# Patient Record
Sex: MALE | Race: WHITE | NOT HISPANIC OR LATINO | Employment: FULL TIME | URBAN - METROPOLITAN AREA
[De-identification: names, ages, dates, MRNs, and addresses within clinical notes are randomized per-mention and may not be internally consistent; named-entity substitution may affect disease eponyms.]

---

## 2017-11-09 ENCOUNTER — HOSPITAL ENCOUNTER (EMERGENCY)
Facility: OTHER | Age: 57
Discharge: HOME | End: 2017-11-09
Attending: INTERNAL MEDICINE
Payer: COMMERCIAL

## 2017-11-09 VITALS
SYSTOLIC BLOOD PRESSURE: 169 MMHG | WEIGHT: 187 LBS | RESPIRATION RATE: 18 BRPM | TEMPERATURE: 97 F | DIASTOLIC BLOOD PRESSURE: 97 MMHG | HEART RATE: 78 BPM | OXYGEN SATURATION: 99 % | BODY MASS INDEX: 30.05 KG/M2 | HEIGHT: 66 IN

## 2017-11-09 DIAGNOSIS — H61.20 IMPACTED CERUMEN, UNSPECIFIED LATERALITY: Primary | ICD-10-CM

## 2017-11-09 PROCEDURE — 99282 EMERGENCY DEPT VISIT SF MDM: CPT
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
## 2022-09-30 ENCOUNTER — LAB VISIT (OUTPATIENT)
Dept: LAB | Facility: HOSPITAL | Age: 62
End: 2022-09-30
Attending: NURSE PRACTITIONER
Payer: COMMERCIAL

## 2022-09-30 DIAGNOSIS — I10 HYPERTENSION, UNSPECIFIED TYPE: ICD-10-CM

## 2022-09-30 LAB
ALBUMIN SERPL BCP-MCNC: 3.1 G/DL (ref 3.5–5.2)
ALP SERPL-CCNC: 155 U/L (ref 55–135)
ALT SERPL W/O P-5'-P-CCNC: 12 U/L (ref 10–44)
ANION GAP SERPL CALC-SCNC: 8 MMOL/L (ref 8–16)
AST SERPL-CCNC: 21 U/L (ref 10–40)
BASOPHILS # BLD AUTO: 0.06 K/UL (ref 0–0.2)
BASOPHILS NFR BLD: 0.5 % (ref 0–1.9)
BILIRUB SERPL-MCNC: 0.4 MG/DL (ref 0.1–1)
BUN SERPL-MCNC: 10 MG/DL (ref 8–23)
CALCIUM SERPL-MCNC: 10 MG/DL (ref 8.7–10.5)
CHLORIDE SERPL-SCNC: 97 MMOL/L (ref 95–110)
CHOLEST SERPL-MCNC: 179 MG/DL (ref 120–199)
CHOLEST/HDLC SERPL: 2.8 {RATIO} (ref 2–5)
CO2 SERPL-SCNC: 26 MMOL/L (ref 23–29)
CREAT SERPL-MCNC: 1 MG/DL (ref 0.5–1.4)
DIFFERENTIAL METHOD: ABNORMAL
EOSINOPHIL # BLD AUTO: 0 K/UL (ref 0–0.5)
EOSINOPHIL NFR BLD: 0.3 % (ref 0–8)
ERYTHROCYTE [DISTWIDTH] IN BLOOD BY AUTOMATED COUNT: 12.5 % (ref 11.5–14.5)
EST. GFR  (NO RACE VARIABLE): >60 ML/MIN/1.73 M^2
GLUCOSE SERPL-MCNC: 102 MG/DL (ref 70–110)
HCT VFR BLD AUTO: 41.7 % (ref 40–54)
HDLC SERPL-MCNC: 65 MG/DL (ref 40–75)
HDLC SERPL: 36.3 % (ref 20–50)
HGB BLD-MCNC: 13.9 G/DL (ref 14–18)
IMM GRANULOCYTES # BLD AUTO: 0.06 K/UL (ref 0–0.04)
IMM GRANULOCYTES NFR BLD AUTO: 0.5 % (ref 0–0.5)
LDLC SERPL CALC-MCNC: 90.2 MG/DL (ref 63–159)
LYMPHOCYTES # BLD AUTO: 1.7 K/UL (ref 1–4.8)
LYMPHOCYTES NFR BLD: 14.4 % (ref 18–48)
MCH RBC QN AUTO: 34 PG (ref 27–31)
MCHC RBC AUTO-ENTMCNC: 33.3 G/DL (ref 32–36)
MCV RBC AUTO: 102 FL (ref 82–98)
MONOCYTES # BLD AUTO: 0.9 K/UL (ref 0.3–1)
MONOCYTES NFR BLD: 7.7 % (ref 4–15)
NEUTROPHILS # BLD AUTO: 9.2 K/UL (ref 1.8–7.7)
NEUTROPHILS NFR BLD: 76.6 % (ref 38–73)
NONHDLC SERPL-MCNC: 114 MG/DL
NRBC BLD-RTO: 0 /100 WBC
PLATELET # BLD AUTO: 436 K/UL (ref 150–450)
PMV BLD AUTO: 10.3 FL (ref 9.2–12.9)
POTASSIUM SERPL-SCNC: 4.6 MMOL/L (ref 3.5–5.1)
PROT SERPL-MCNC: 7.1 G/DL (ref 6–8.4)
RBC # BLD AUTO: 4.09 M/UL (ref 4.6–6.2)
SODIUM SERPL-SCNC: 131 MMOL/L (ref 136–145)
TRIGL SERPL-MCNC: 119 MG/DL (ref 30–150)
TSH SERPL DL<=0.005 MIU/L-ACNC: 1.77 UIU/ML (ref 0.4–4)
WBC # BLD AUTO: 12.04 K/UL (ref 3.9–12.7)

## 2022-09-30 PROCEDURE — 83036 HEMOGLOBIN GLYCOSYLATED A1C: CPT | Performed by: NURSE PRACTITIONER

## 2022-09-30 PROCEDURE — 84443 ASSAY THYROID STIM HORMONE: CPT | Performed by: NURSE PRACTITIONER

## 2022-09-30 PROCEDURE — 87389 HIV-1 AG W/HIV-1&-2 AB AG IA: CPT | Performed by: NURSE PRACTITIONER

## 2022-09-30 PROCEDURE — 86803 HEPATITIS C AB TEST: CPT | Performed by: NURSE PRACTITIONER

## 2022-09-30 PROCEDURE — 80061 LIPID PANEL: CPT | Performed by: NURSE PRACTITIONER

## 2022-09-30 PROCEDURE — 80053 COMPREHEN METABOLIC PANEL: CPT | Performed by: NURSE PRACTITIONER

## 2022-09-30 PROCEDURE — 85025 COMPLETE CBC W/AUTO DIFF WBC: CPT | Performed by: NURSE PRACTITIONER

## 2022-10-01 LAB
ESTIMATED AVG GLUCOSE: 85 MG/DL (ref 68–131)
HBA1C MFR BLD: 4.6 % (ref 4–5.6)
HCV AB SERPL QL IA: NORMAL
HIV 1+2 AB+HIV1 P24 AG SERPL QL IA: NORMAL

## 2022-10-07 ENCOUNTER — HOSPITAL ENCOUNTER (OUTPATIENT)
Dept: RADIOLOGY | Facility: HOSPITAL | Age: 62
Discharge: HOME | End: 2022-10-07
Attending: NURSE PRACTITIONER
Payer: MEDICAID

## 2022-10-07 DIAGNOSIS — M54.2 NECK PAIN ON RIGHT SIDE: ICD-10-CM

## 2022-10-07 PROCEDURE — 72040 XR CERVICAL SPINE AP LATERAL: ICD-10-PCS | Mod: 26,,, | Performed by: RADIOLOGY

## 2022-10-07 PROCEDURE — 72040 X-RAY EXAM NECK SPINE 2-3 VW: CPT | Mod: 26,,, | Performed by: RADIOLOGY

## 2022-10-07 PROCEDURE — 72040 X-RAY EXAM NECK SPINE 2-3 VW: CPT | Mod: TC,FY

## 2022-10-10 ENCOUNTER — HOSPITAL ENCOUNTER (INPATIENT)
Facility: HOSPITAL | Age: 62
LOS: 11 days | Discharge: NURSING, REHAB OR LONG-TERM CARE | DRG: 471 | End: 2022-10-21
Attending: EMERGENCY MEDICINE | Admitting: PSYCHIATRY & NEUROLOGY
Payer: MEDICAID

## 2022-10-10 DIAGNOSIS — I10 ESSENTIAL HYPERTENSION: ICD-10-CM

## 2022-10-10 DIAGNOSIS — R20.2 PARESTHESIAS: ICD-10-CM

## 2022-10-10 DIAGNOSIS — R29.898 WEAKNESS OF LOWER EXTREMITY, UNSPECIFIED LATERALITY: ICD-10-CM

## 2022-10-10 DIAGNOSIS — S12.101A CLOSED NONDISPLACED FRACTURE OF SECOND CERVICAL VERTEBRA, UNSPECIFIED FRACTURE MORPHOLOGY, INITIAL ENCOUNTER: Primary | ICD-10-CM

## 2022-10-10 DIAGNOSIS — R53.1 WEAKNESS: ICD-10-CM

## 2022-10-10 DIAGNOSIS — S12.100A CLOSED DISPLACED FRACTURE OF SECOND CERVICAL VERTEBRA, UNSPECIFIED FRACTURE MORPHOLOGY, INITIAL ENCOUNTER: ICD-10-CM

## 2022-10-10 DIAGNOSIS — R00.0 TACHYCARDIA: ICD-10-CM

## 2022-10-10 DIAGNOSIS — S12.110A ODONTOID FRACTURE: ICD-10-CM

## 2022-10-10 PROCEDURE — 12000002 HC ACUTE/MED SURGE SEMI-PRIVATE ROOM

## 2022-10-10 RX ORDER — HYDROMORPHONE HYDROCHLORIDE 1 MG/ML
0.5 INJECTION, SOLUTION INTRAMUSCULAR; INTRAVENOUS; SUBCUTANEOUS
Status: COMPLETED | OUTPATIENT
Start: 2022-10-11 | End: 2022-10-11

## 2022-10-11 ENCOUNTER — ANESTHESIA EVENT (OUTPATIENT)
Dept: SURGERY | Facility: HOSPITAL | Age: 62
DRG: 471 | End: 2022-10-11
Payer: MEDICAID

## 2022-10-11 PROBLEM — S12.110A ODONTOID FRACTURE: Status: ACTIVE | Noted: 2022-10-11

## 2022-10-11 LAB
ABO + RH BLD: NORMAL
ALBUMIN SERPL BCP-MCNC: 2.4 G/DL (ref 3.5–5.2)
ALP SERPL-CCNC: 99 U/L (ref 55–135)
ALT SERPL W/O P-5'-P-CCNC: 9 U/L (ref 10–44)
ANION GAP SERPL CALC-SCNC: 6 MMOL/L (ref 8–16)
APTT BLDCRRT: 26.3 SEC (ref 21–32)
APTT BLDCRRT: 26.5 SEC (ref 21–32)
ASCENDING AORTA: 3.43 CM
AST SERPL-CCNC: 17 U/L (ref 10–40)
AV INDEX (PROSTH): 1.11
AV MEAN GRADIENT: 5 MMHG
AV PEAK GRADIENT: 7 MMHG
AV VALVE AREA: 3.86 CM2
AV VELOCITY RATIO: 0.89
BASOPHILS # BLD AUTO: 0.05 K/UL (ref 0–0.2)
BASOPHILS NFR BLD: 0.5 % (ref 0–1.9)
BILIRUB SERPL-MCNC: 0.4 MG/DL (ref 0.1–1)
BLD GP AB SCN CELLS X3 SERPL QL: NORMAL
BSA FOR ECHO PROCEDURE: 1.77 M2
BUN SERPL-MCNC: 10 MG/DL (ref 8–23)
CALCIUM SERPL-MCNC: 9.3 MG/DL (ref 8.7–10.5)
CHLORIDE SERPL-SCNC: 103 MMOL/L (ref 95–110)
CO2 SERPL-SCNC: 25 MMOL/L (ref 23–29)
CREAT SERPL-MCNC: 0.8 MG/DL (ref 0.5–1.4)
CV ECHO LV RWT: 0.64 CM
DIFFERENTIAL METHOD: ABNORMAL
DOP CALC AO PEAK VEL: 1.31 M/S
DOP CALC AO VTI: 20.31 CM
DOP CALC LVOT AREA: 3.5 CM2
DOP CALC LVOT DIAMETER: 2.11 CM
DOP CALC LVOT PEAK VEL: 1.17 M/S
DOP CALC LVOT STROKE VOLUME: 78.46 CM3
DOP CALCLVOT PEAK VEL VTI: 22.45 CM
ECHO LV POSTERIOR WALL: 1.23 CM (ref 0.6–1.1)
EJECTION FRACTION: 60 %
EOSINOPHIL # BLD AUTO: 0.2 K/UL (ref 0–0.5)
EOSINOPHIL NFR BLD: 1.6 % (ref 0–8)
ERYTHROCYTE [DISTWIDTH] IN BLOOD BY AUTOMATED COUNT: 12.6 % (ref 11.5–14.5)
EST. GFR  (NO RACE VARIABLE): >60 ML/MIN/1.73 M^2
ESTIMATED AVG GLUCOSE: 91 MG/DL (ref 68–131)
FRACTIONAL SHORTENING: 25 % (ref 28–44)
GLUCOSE SERPL-MCNC: 76 MG/DL (ref 70–110)
HBA1C MFR BLD: 4.8 % (ref 4–5.6)
HCT VFR BLD AUTO: 36.6 % (ref 40–54)
HGB BLD-MCNC: 12.2 G/DL (ref 14–18)
IMM GRANULOCYTES # BLD AUTO: 0.03 K/UL (ref 0–0.04)
IMM GRANULOCYTES NFR BLD AUTO: 0.3 % (ref 0–0.5)
INR PPP: 1 (ref 0.8–1.2)
INR PPP: 1 (ref 0.8–1.2)
INTERVENTRICULAR SEPTUM: 1.42 CM (ref 0.6–1.1)
LA MAJOR: 5.81 CM
LA MINOR: 4.73 CM
LA WIDTH: 4.34 CM
LEFT ATRIUM SIZE: 3.05 CM
LEFT ATRIUM VOLUME INDEX: 33.3 ML/M2
LEFT ATRIUM VOLUME: 58.67 CM3
LEFT INTERNAL DIMENSION IN SYSTOLE: 2.87 CM (ref 2.1–4)
LEFT VENTRICLE DIASTOLIC VOLUME INDEX: 36.05 ML/M2
LEFT VENTRICLE DIASTOLIC VOLUME: 63.44 ML
LEFT VENTRICLE MASS INDEX: 103 G/M2
LEFT VENTRICLE SYSTOLIC VOLUME INDEX: 17.8 ML/M2
LEFT VENTRICLE SYSTOLIC VOLUME: 31.41 ML
LEFT VENTRICULAR INTERNAL DIMENSION IN DIASTOLE: 3.84 CM (ref 3.5–6)
LEFT VENTRICULAR MASS: 180.92 G
LYMPHOCYTES # BLD AUTO: 2 K/UL (ref 1–4.8)
LYMPHOCYTES NFR BLD: 21.2 % (ref 18–48)
MAGNESIUM SERPL-MCNC: 1.6 MG/DL (ref 1.6–2.6)
MCH RBC QN AUTO: 34.9 PG (ref 27–31)
MCHC RBC AUTO-ENTMCNC: 33.3 G/DL (ref 32–36)
MCV RBC AUTO: 105 FL (ref 82–98)
MONOCYTES # BLD AUTO: 0.8 K/UL (ref 0.3–1)
MONOCYTES NFR BLD: 8.7 % (ref 4–15)
NEUTROPHILS # BLD AUTO: 6.2 K/UL (ref 1.8–7.7)
NEUTROPHILS NFR BLD: 67.7 % (ref 38–73)
NRBC BLD-RTO: 0 /100 WBC
PHOSPHATE SERPL-MCNC: 2.7 MG/DL (ref 2.7–4.5)
PLATELET # BLD AUTO: 276 K/UL (ref 150–450)
PMV BLD AUTO: 10.5 FL (ref 9.2–12.9)
POCT GLUCOSE: 93 MG/DL (ref 70–110)
POTASSIUM SERPL-SCNC: 3.2 MMOL/L (ref 3.5–5.1)
PROT SERPL-MCNC: 5.5 G/DL (ref 6–8.4)
PROTHROMBIN TIME: 10.5 SEC (ref 9–12.5)
PROTHROMBIN TIME: 10.8 SEC (ref 9–12.5)
RA MAJOR: 4.96 CM
RA WIDTH: 3.26 CM
RBC # BLD AUTO: 3.5 M/UL (ref 4.6–6.2)
RIGHT VENTRICULAR END-DIASTOLIC DIMENSION: 2.75 CM
SARS-COV-2 RDRP RESP QL NAA+PROBE: NEGATIVE
SINUS: 2.96 CM
SODIUM SERPL-SCNC: 134 MMOL/L (ref 136–145)
STJ: 2.83 CM
TDI LATERAL: 0.06 M/S
TDI SEPTAL: 0.05 M/S
TDI: 0.06 M/S
TRICUSPID ANNULAR PLANE SYSTOLIC EXCURSION: 1.64 CM
TSH SERPL DL<=0.005 MIU/L-ACNC: 1.25 UIU/ML (ref 0.4–4)
WBC # BLD AUTO: 9.23 K/UL (ref 3.9–12.7)

## 2022-10-11 PROCEDURE — 85025 COMPLETE CBC W/AUTO DIFF WBC: CPT | Performed by: EMERGENCY MEDICINE

## 2022-10-11 PROCEDURE — 85610 PROTHROMBIN TIME: CPT | Mod: 91 | Performed by: PHYSICIAN ASSISTANT

## 2022-10-11 PROCEDURE — 93010 ELECTROCARDIOGRAM REPORT: CPT | Mod: ,,, | Performed by: INTERNAL MEDICINE

## 2022-10-11 PROCEDURE — 63600175 PHARM REV CODE 636 W HCPCS

## 2022-10-11 PROCEDURE — 85610 PROTHROMBIN TIME: CPT | Performed by: EMERGENCY MEDICINE

## 2022-10-11 PROCEDURE — 99291 CRITICAL CARE FIRST HOUR: CPT | Mod: ,,, | Performed by: INTERNAL MEDICINE

## 2022-10-11 PROCEDURE — 80053 COMPREHEN METABOLIC PANEL: CPT | Performed by: EMERGENCY MEDICINE

## 2022-10-11 PROCEDURE — 85730 THROMBOPLASTIN TIME PARTIAL: CPT | Performed by: EMERGENCY MEDICINE

## 2022-10-11 PROCEDURE — 25000003 PHARM REV CODE 250

## 2022-10-11 PROCEDURE — 83036 HEMOGLOBIN GLYCOSYLATED A1C: CPT | Performed by: PHYSICIAN ASSISTANT

## 2022-10-11 PROCEDURE — 96374 THER/PROPH/DIAG INJ IV PUSH: CPT

## 2022-10-11 PROCEDURE — 99285 EMERGENCY DEPT VISIT HI MDM: CPT | Mod: 25

## 2022-10-11 PROCEDURE — 99291 PR CRITICAL CARE, E/M 30-74 MINUTES: ICD-10-PCS | Mod: ,,, | Performed by: INTERNAL MEDICINE

## 2022-10-11 PROCEDURE — S4991 NICOTINE PATCH NONLEGEND: HCPCS

## 2022-10-11 PROCEDURE — 86850 RBC ANTIBODY SCREEN: CPT

## 2022-10-11 PROCEDURE — 84443 ASSAY THYROID STIM HORMONE: CPT | Performed by: PHYSICIAN ASSISTANT

## 2022-10-11 PROCEDURE — 85730 THROMBOPLASTIN TIME PARTIAL: CPT | Mod: 91 | Performed by: PHYSICIAN ASSISTANT

## 2022-10-11 PROCEDURE — 63600175 PHARM REV CODE 636 W HCPCS: Performed by: EMERGENCY MEDICINE

## 2022-10-11 PROCEDURE — 25000003 PHARM REV CODE 250: Performed by: EMERGENCY MEDICINE

## 2022-10-11 PROCEDURE — 25500020 PHARM REV CODE 255: Performed by: EMERGENCY MEDICINE

## 2022-10-11 PROCEDURE — 83735 ASSAY OF MAGNESIUM: CPT | Performed by: PHYSICIAN ASSISTANT

## 2022-10-11 PROCEDURE — 92610 EVALUATE SWALLOWING FUNCTION: CPT

## 2022-10-11 PROCEDURE — 84100 ASSAY OF PHOSPHORUS: CPT | Performed by: PHYSICIAN ASSISTANT

## 2022-10-11 PROCEDURE — U0002 COVID-19 LAB TEST NON-CDC: HCPCS | Performed by: EMERGENCY MEDICINE

## 2022-10-11 PROCEDURE — 25000003 PHARM REV CODE 250: Performed by: PHYSICIAN ASSISTANT

## 2022-10-11 PROCEDURE — 99232 PR SUBSEQUENT HOSPITAL CARE,LEVL II: ICD-10-PCS | Mod: 57,,, | Performed by: STUDENT IN AN ORGANIZED HEALTH CARE EDUCATION/TRAINING PROGRAM

## 2022-10-11 PROCEDURE — 92523 SPEECH SOUND LANG COMPREHEN: CPT

## 2022-10-11 PROCEDURE — 93010 EKG 12-LEAD: ICD-10-PCS | Mod: ,,, | Performed by: INTERNAL MEDICINE

## 2022-10-11 PROCEDURE — 20000000 HC ICU ROOM

## 2022-10-11 PROCEDURE — 93005 ELECTROCARDIOGRAM TRACING: CPT

## 2022-10-11 PROCEDURE — 99232 SBSQ HOSP IP/OBS MODERATE 35: CPT | Mod: 57,,, | Performed by: STUDENT IN AN ORGANIZED HEALTH CARE EDUCATION/TRAINING PROGRAM

## 2022-10-11 PROCEDURE — 94761 N-INVAS EAR/PLS OXIMETRY MLT: CPT

## 2022-10-11 RX ORDER — LISINOPRIL 20 MG/1
20 TABLET ORAL DAILY
Status: CANCELLED | OUTPATIENT
Start: 2022-10-12

## 2022-10-11 RX ORDER — MAGNESIUM SULFATE HEPTAHYDRATE 40 MG/ML
2 INJECTION, SOLUTION INTRAVENOUS
Status: DISCONTINUED | OUTPATIENT
Start: 2022-10-11 | End: 2022-10-11

## 2022-10-11 RX ORDER — HYDRALAZINE HYDROCHLORIDE 20 MG/ML
10 INJECTION INTRAMUSCULAR; INTRAVENOUS EVERY 6 HOURS PRN
Status: CANCELLED | OUTPATIENT
Start: 2022-10-11

## 2022-10-11 RX ORDER — LIDOCAINE HYDROCHLORIDE 10 MG/ML
10 INJECTION INFILTRATION; PERINEURAL ONCE
Status: DISCONTINUED | OUTPATIENT
Start: 2022-10-11 | End: 2022-10-13

## 2022-10-11 RX ORDER — MAGNESIUM SULFATE HEPTAHYDRATE 40 MG/ML
4 INJECTION, SOLUTION INTRAVENOUS
Status: DISCONTINUED | OUTPATIENT
Start: 2022-10-11 | End: 2022-10-11

## 2022-10-11 RX ORDER — MIDAZOLAM HYDROCHLORIDE 1 MG/ML
2 INJECTION INTRAMUSCULAR; INTRAVENOUS ONCE
Status: COMPLETED | OUTPATIENT
Start: 2022-10-11 | End: 2022-10-11

## 2022-10-11 RX ORDER — MIDAZOLAM HYDROCHLORIDE 1 MG/ML
INJECTION INTRAMUSCULAR; INTRAVENOUS
Status: COMPLETED
Start: 2022-10-11 | End: 2022-10-11

## 2022-10-11 RX ORDER — ONDANSETRON 2 MG/ML
4 INJECTION INTRAMUSCULAR; INTRAVENOUS EVERY 8 HOURS PRN
Status: DISCONTINUED | OUTPATIENT
Start: 2022-10-11 | End: 2022-10-21 | Stop reason: HOSPADM

## 2022-10-11 RX ORDER — LISINOPRIL 10 MG/1
10 TABLET ORAL DAILY
Status: DISCONTINUED | OUTPATIENT
Start: 2022-10-12 | End: 2022-10-14

## 2022-10-11 RX ORDER — IBUPROFEN 200 MG
1 TABLET ORAL DAILY
Status: DISCONTINUED | OUTPATIENT
Start: 2022-10-11 | End: 2022-10-21 | Stop reason: HOSPADM

## 2022-10-11 RX ORDER — LABETALOL HCL 20 MG/4 ML
10 SYRINGE (ML) INTRAVENOUS
Status: COMPLETED | OUTPATIENT
Start: 2022-10-11 | End: 2022-10-11

## 2022-10-11 RX ORDER — CALCIUM GLUCONATE 20 MG/ML
3 INJECTION, SOLUTION INTRAVENOUS
Status: DISCONTINUED | OUTPATIENT
Start: 2022-10-11 | End: 2022-10-19

## 2022-10-11 RX ORDER — AMOXICILLIN 250 MG
1 CAPSULE ORAL 2 TIMES DAILY
Status: DISCONTINUED | OUTPATIENT
Start: 2022-10-11 | End: 2022-10-16

## 2022-10-11 RX ORDER — LABETALOL HCL 20 MG/4 ML
10 SYRINGE (ML) INTRAVENOUS EVERY 6 HOURS PRN
Status: CANCELLED | OUTPATIENT
Start: 2022-10-11

## 2022-10-11 RX ORDER — HYDROCODONE BITARTRATE AND ACETAMINOPHEN 5; 325 MG/1; MG/1
1 TABLET ORAL EVERY 6 HOURS PRN
Status: DISCONTINUED | OUTPATIENT
Start: 2022-10-11 | End: 2022-10-12

## 2022-10-11 RX ORDER — LISINOPRIL 10 MG/1
10 TABLET ORAL ONCE
Status: CANCELLED | OUTPATIENT
Start: 2022-10-11

## 2022-10-11 RX ORDER — LIDOCAINE HYDROCHLORIDE 10 MG/ML
INJECTION INFILTRATION; PERINEURAL
Status: DISPENSED
Start: 2022-10-11 | End: 2022-10-12

## 2022-10-11 RX ORDER — ACETAMINOPHEN 325 MG/1
650 TABLET ORAL EVERY 6 HOURS PRN
Status: DISCONTINUED | OUTPATIENT
Start: 2022-10-11 | End: 2022-10-12

## 2022-10-11 RX ORDER — CALCIUM GLUCONATE 20 MG/ML
1 INJECTION, SOLUTION INTRAVENOUS
Status: DISCONTINUED | OUTPATIENT
Start: 2022-10-11 | End: 2022-10-19

## 2022-10-11 RX ORDER — POTASSIUM CHLORIDE 7.45 MG/ML
40 INJECTION INTRAVENOUS
Status: DISCONTINUED | OUTPATIENT
Start: 2022-10-11 | End: 2022-10-11

## 2022-10-11 RX ORDER — POTASSIUM CHLORIDE 7.45 MG/ML
60 INJECTION INTRAVENOUS
Status: DISCONTINUED | OUTPATIENT
Start: 2022-10-11 | End: 2022-10-11

## 2022-10-11 RX ORDER — NICARDIPINE HYDROCHLORIDE 0.2 MG/ML
0-15 INJECTION INTRAVENOUS CONTINUOUS
Status: DISCONTINUED | OUTPATIENT
Start: 2022-10-11 | End: 2022-10-13

## 2022-10-11 RX ORDER — SODIUM,POTASSIUM PHOSPHATES 280-250MG
2 POWDER IN PACKET (EA) ORAL
Status: DISCONTINUED | OUTPATIENT
Start: 2022-10-11 | End: 2022-10-19

## 2022-10-11 RX ORDER — LANOLIN ALCOHOL/MO/W.PET/CERES
800 CREAM (GRAM) TOPICAL
Status: DISCONTINUED | OUTPATIENT
Start: 2022-10-11 | End: 2022-10-12

## 2022-10-11 RX ORDER — METHOCARBAMOL 500 MG/1
500 TABLET, FILM COATED ORAL 4 TIMES DAILY
Status: CANCELLED | OUTPATIENT
Start: 2022-10-11

## 2022-10-11 RX ORDER — LISINOPRIL 10 MG/1
10 TABLET ORAL
Status: COMPLETED | OUTPATIENT
Start: 2022-10-11 | End: 2022-10-11

## 2022-10-11 RX ORDER — SODIUM CHLORIDE 0.9 % (FLUSH) 0.9 %
10 SYRINGE (ML) INJECTION
Status: DISCONTINUED | OUTPATIENT
Start: 2022-10-11 | End: 2022-10-21 | Stop reason: HOSPADM

## 2022-10-11 RX ORDER — POTASSIUM CHLORIDE 7.45 MG/ML
80 INJECTION INTRAVENOUS
Status: DISCONTINUED | OUTPATIENT
Start: 2022-10-11 | End: 2022-10-11

## 2022-10-11 RX ORDER — MIDAZOLAM HYDROCHLORIDE 1 MG/ML
1 INJECTION INTRAMUSCULAR; INTRAVENOUS EVERY 12 HOURS PRN
Status: DISCONTINUED | OUTPATIENT
Start: 2022-10-11 | End: 2022-10-13

## 2022-10-11 RX ORDER — CALCIUM GLUCONATE 20 MG/ML
2 INJECTION, SOLUTION INTRAVENOUS
Status: DISCONTINUED | OUTPATIENT
Start: 2022-10-11 | End: 2022-10-19

## 2022-10-11 RX ORDER — SODIUM CHLORIDE 9 MG/ML
INJECTION, SOLUTION INTRAVENOUS CONTINUOUS
Status: DISCONTINUED | OUTPATIENT
Start: 2022-10-11 | End: 2022-10-13

## 2022-10-11 RX ORDER — HYDROCODONE BITARTRATE AND ACETAMINOPHEN 500; 5 MG/1; MG/1
TABLET ORAL
Status: DISCONTINUED | OUTPATIENT
Start: 2022-10-11 | End: 2022-10-21 | Stop reason: HOSPADM

## 2022-10-11 RX ADMIN — IOHEXOL 100 ML: 350 INJECTION, SOLUTION INTRAVENOUS at 02:10

## 2022-10-11 RX ADMIN — MIDAZOLAM HYDROCHLORIDE 2 MG: 1 INJECTION INTRAMUSCULAR; INTRAVENOUS at 02:10

## 2022-10-11 RX ADMIN — POTASSIUM BICARBONATE 35 MEQ: 391 TABLET, EFFERVESCENT ORAL at 06:10

## 2022-10-11 RX ADMIN — SENNOSIDES AND DOCUSATE SODIUM 1 TABLET: 50; 8.6 TABLET ORAL at 09:10

## 2022-10-11 RX ADMIN — MIDAZOLAM 2 MG: 1 INJECTION INTRAMUSCULAR; INTRAVENOUS at 02:10

## 2022-10-11 RX ADMIN — SODIUM CHLORIDE: 0.9 INJECTION, SOLUTION INTRAVENOUS at 08:10

## 2022-10-11 RX ADMIN — HYDROMORPHONE HYDROCHLORIDE 0.5 MG: 1 INJECTION, SOLUTION INTRAMUSCULAR; INTRAVENOUS; SUBCUTANEOUS at 12:10

## 2022-10-11 RX ADMIN — POTASSIUM BICARBONATE 35 MEQ: 391 TABLET, EFFERVESCENT ORAL at 04:10

## 2022-10-11 RX ADMIN — LISINOPRIL 10 MG: 10 TABLET ORAL at 05:10

## 2022-10-11 RX ADMIN — Medication 800 MG: at 04:10

## 2022-10-11 RX ADMIN — NICARDIPINE HYDROCHLORIDE 2.5 MG/HR: 0.2 INJECTION, SOLUTION INTRAVENOUS at 09:10

## 2022-10-11 RX ADMIN — NICOTINE 1 PATCH: 14 PATCH, EXTENDED RELEASE TRANSDERMAL at 10:10

## 2022-10-11 RX ADMIN — LABETALOL HYDROCHLORIDE 10 MG: 5 INJECTION, SOLUTION INTRAVENOUS at 05:10

## 2022-10-11 RX ADMIN — HYDROCODONE BITARTRATE AND ACETAMINOPHEN 1 TABLET: 5; 325 TABLET ORAL at 12:10

## 2022-10-11 NOTE — NURSING
Per MD Yessenia with [facility] team RN to prepare patient for traction placement. Traction cart requested with Transport team, ETA ~20 minutes. Will call [facility] resident once traction cart is at bedside.

## 2022-10-11 NOTE — CONSULTS
Inpatient consult to Physical Medicine Rehab  Consult performed by: Giovanna Ron NP  Consult ordered by: Tammy Montano PA-C  Reason for consult: Assess rehab needs    Reviewed patient history and current admission.  Rehab team following.  Full consult to follow.    QUINTON Fonseca, FNP-C  Physical Medicine & Rehabilitation   10/11/2022

## 2022-10-11 NOTE — NURSING TRANSFER
Nursing Transfer Note      10/11/2022     Reason patient is being transferred: Blood pressure management and neuro monitoring    Transfer From: ED    Transfer via stretcher    Transfer with cardiac monitoring    Transported by ED      Medicines sent: LISINOPRIL 10MG  MELOXICAM 7.5 MG  CYCLOBENZAPR 5 MG    Any special needs or follow-up needed: N/A      Chart send with patient: Yes    Notified: daughter    Patient reassessed at: 10/11 0746       Upon arrival to floor: cardiac monitor applied, patient oriented to room, call bell in reach, and bed in lowest position
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
## 2022-10-11 NOTE — PLAN OF CARE
3:05 PM    The SW emailed [employer] regarding applying for Medicaid benefits on behalf of this patient.       Kera Lora LMSW  Case Management [facility]  Ext: [phone]

## 2022-10-11 NOTE — PLAN OF CARE
A 62M with with traumatic odontoid and chasidy type I frx. Imaging reviewed.          Continue ICU care   Neurocheck   To the OR tomrw for C1-C4 PCF   Will place him on traction today   Pain control and muscle relaxer  Keep NPO after MN  Will obtain consent   Continue to monitor clinically, notify NSGY immediately with any changes in neuro status      MD NITESH YoungGY

## 2022-10-11 NOTE — FIRST PROVIDER EVALUATION
Emergency Department TeleTriage Encounter Note      CHIEF COMPLAINT    Chief Complaint   Patient presents with    Neck Pain     Pt BIB EMS for neck s/p a fall x3 weeks ago. Pt stated he has difficulty ambulating and using the restroom.       VITAL SIGNS   Initial Vitals [10/10/22 1743]   BP Pulse Resp Temp SpO2   (!) 182/90 (!) 115 18 98.5 °F (36.9 °C) 96 %      MAP       --            ALLERGIES    Review of patient's allergies indicates:  No Known Allergies    PROVIDER TRIAGE NOTE  61 yo M to the ED with neck pain. Fall a few weeks ago with neck injury. Now having limited strength and mobility of the upper extremities.       ORDERS  Labs Reviewed - No data to display    ED Orders (720h ago, onward)      Start Ordered     Status Ordering Provider    10/10/22 2012 10/10/22 2011  CT Cervical Spine Without Contrast  1 time imaging         Ordered SILVANA NARANJO              Virtual Visit Note: The provider triage portion of this emergency department evaluation and documentation was performed via [employer], a HIPAA-compliant telemedicine application, in concert with a tele-presenter in the room. A face to face patient evaluation with one of my colleagues will occur once the patient is placed in an emergency department room.      DISCLAIMER: This note was prepared with [employer] voice recognition transcription software. Garbled syntax, mangled pronouns, and other bizarre constructions may be attributed to that software system.
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
## 2022-10-11 NOTE — HPI
"Patient is a 62M with PMH of HTN and remote L knee surgery who presents with 3 days of weakness after a fall 2-3 weeks ago. Patient transferred from OSH for NSGY consult regarding odontoid fracture. He reports that he has had persistent neck pain, but that his weakness did not start until a few days ago and is worsening. Patient complains of an intermittent "rumbling" almost vibrating feeling in his chest/abdomen, as well as sometimes in his various muscle groups. He claims that he feel decreased sensation across his limbs/body, including his thighs and calves where it started before moving up to his upper extremities. He symptoms do not seem to localize to a specific dermatome. He does not complain of any new radiculopathic or myelopathic symptoms. Patient reports that he has had to use a walker in the past few days. When laying down flat on the bed, he feels fine and can move all extremities at least AG. When he is sitting upright, he states that he cannot move his arms past the shoulder line. He also notes weakness in his BLE which was required him to use a walker to get around.Patient did not feel safe to get up and demonstrate gait, but claims he is weak when walking.    He presented in a C-collar. Patient denies taking AC/AP. He also denies bowel/bladder dysfunction.  "

## 2022-10-11 NOTE — PLAN OF CARE
[facility]  Initial Discharge Assessment       Primary Care Provider: Andrae Camarillo MD    Admission Diagnosis: Paresthesias [R20.2]  Weakness [R53.1]  Odontoid fracture [S12.110A]  Essential hypertension [I10]  Weakness of lower extremity, unspecified laterality [R29.898]  Closed nondisplaced fracture of second cervical vertebra, unspecified fracture morphology, initial encounter [S12.101A]    Admission Date: 10/10/2022  Expected Discharge Date:     Discharge Barriers Identified: Unisured        Extended Emergency Contact Information  Primary Emergency Contact: Melissa De Jesus   [facility]  Home Phone: [phone]  Mobile Phone: [phone]  Relation: Daughter    Discharge Plan A: Rehab  Discharge Plan B: [facility]      [id number] - [place] - [address] AT [facility] & [facility]  [address]  [place] [address]  Phone: [phone] Fax: [phone]    [id number] - [place] - [address] AT [facility] & [place]  [address]  [place] [address]  Phone: [phone] Fax: [phone]    [id number] - [place] - [address]  [address]  [place] [address]  Phone: [phone] Fax: [phone]      Initial Assessment (most recent)       Adult Discharge Assessment - 10/11/22 1457          Discharge Assessment    Assessment Type Discharge Planning Assessment     Confirmed/corrected address, phone number and insurance Yes     Confirmed Demographics Correct on Facesheet     Source of Information family     Communicated BRANDON with patient/caregiver Date not available/Unable to determine     Reason For Admission Odontoid fracture     Lives With alone     Do you expect to return to your current living situation? Yes     Do you have help at home or someone to help you manage your care at home? No   The patient's daugther reported that she is unsure if the patient would have hep at home to manage his  care.    Prior to hospitilization cognitive status: Alert/Oriented     Home Layout Able to live on 1st floor     Equipment Currently Used at Home none     Patient currently being followed by outpatient case management? No     Do you currently have service(s) that help you manage your care at home? No     Do you take prescription medications? Yes     Is the patient taking medications as prescribed? yes     Who is going to help you get home at discharge? Melissa De Jesus     Are you on dialysis? No     Do you take coumadin? No     Discharge Plan A Rehab     Discharge Plan B Home Health     DME Needed Upon Discharge  other (see comments)   TBD    Discharge Plan discussed with: Adult children     Discharge Barriers Identified Unisured                      This SW spoke with the patient's daughter ( Melissa De Jesus)  to complete DPA. Questions answered / contact numbers provided.  Use PREFERRED PHARMACY / BEDSIDE DELIVERY for any necessary medications at time of discharge. The patient is independent with all ADLs - does not use DME, In-home equipment, is not on HD, BTs or home oxygen.   The patient's daughter reported that she is unsure if the patient would have help at home.  The patient's daughter  will be providing transportation home. Hospital follow up will be scheduled with PCP. Will continue to follow for course of hospitalization.     Kera Lora LMSW  Case Management [facility]  Ext: [phone]
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
## 2022-10-11 NOTE — PROGRESS NOTES
Physical Therapy Missed Evaluation Note      Patient Name:  Israel De Jesus   MRN:  [id number]  Admitting Diagnosis:  <principal problem not specified>   Recent Surgery: * No surgery found *    Admit Date: 10/10/2022  Length of Stay: 0 days    Patient not seen today due to hold for unstable odontoid fracture .Will follow-up for progressive mobility pending medical stability and patient participation.

## 2022-10-11 NOTE — PLAN OF CARE
Bedside swallow and cognitive-communication assessment completed this morning. Recommend pt be placed on a dental soft solid diet and thin liquids when cleared for PO intake. ST services will continue to follow.

## 2022-10-11 NOTE — PLAN OF CARE
Problem: Adult Inpatient Plan of Care  Goal: Plan of Care Review  Outcome: Ongoing, Progressing  Goal: Patient-Specific Goal (Individualized)  Outcome: Ongoing, Progressing  Goal: Absence of Hospital-Acquired Illness or Injury  Outcome: Ongoing, Progressing  Goal: Optimal Comfort and Wellbeing  Outcome: Ongoing, Progressing  Goal: Readiness for Transition of Care  Outcome: Ongoing, Progressing     Problem: Adjustment to Illness (Stroke, Hemorrhagic)  Goal: Optimal Coping  Outcome: Ongoing, Progressing     Problem: Bowel Elimination Impaired (Stroke, Hemorrhagic)  Goal: Effective Bowel Elimination  Outcome: Ongoing, Progressing     Problem: Cerebral Tissue Perfusion (Stroke, Hemorrhagic)  Goal: Optimal Cerebral Tissue Perfusion  Outcome: Ongoing, Progressing     Problem: Cognitive Impairment (Stroke, Hemorrhagic)  Goal: Optimal Cognitive Function  Outcome: Ongoing, Progressing     Problem: Communication Impairment (Stroke, Hemorrhagic)  Goal: Effective Communication Skills  Outcome: Ongoing, Progressing     Problem: Functional Ability Impaired (Stroke, Hemorrhagic)  Goal: Optimal Functional Ability  Outcome: Ongoing, Progressing     Problem: Pain (Stroke, Hemorrhagic)  Goal: Acceptable Pain Control  Outcome: Ongoing, Progressing     Problem: Respiratory Compromise (Stroke, Hemorrhagic)  Goal: Effective Oxygenation and Ventilation  Outcome: Ongoing, Progressing     Problem: Sensorimotor Impairment (Stroke, Hemorrhagic)  Goal: Improved Sensorimotor Function  Outcome: Ongoing, Progressing     Problem: Swallowing Impairment (Stroke, Hemorrhagic)  Goal: Optimal Eating and Swallowing Without Aspiration  Outcome: Ongoing, Progressing     Problem: Urinary Elimination Impaired (Stroke, Hemorrhagic)  Goal: Effective Urinary Elimination  Outcome: Ongoing, Progressing     Problem: Skin Injury Risk Increased  Goal: Skin Health and Integrity  Outcome: Ongoing, Progressing

## 2022-10-11 NOTE — ANESTHESIA PREPROCEDURE EVALUATION
[facility]  Anesthesia Pre-Operative Evaluation         Patient Name: Israel De Jesus  YOB: 1960  MRN: [id number]    SUBJECTIVE:     Pre-operative evaluation for Procedure(s) (LRB):  POSTERIOR CERVICAL FUSION, SPINE C1-C4 PCF REGULAR BED. [place]. CHEST ROLLS. FLUOROSCOPY. DEPUY NEUROMONITORING: EMG, SSEP, MEP (N/A)     10/11/2022    Israel De Jesus is a 62 y.o. male w/ a significant PMHx of HTN, current smoker, and remote L knee surgery who presented after a fall. Transferred to [facility] due to concern for odontoid fx.     CT Head w/o Contrast Impression:   Unstable acute C2 fracture as detailed above with moderate to severe spinal canal stenosis at the C1-2 level.     Patient now presents for the above procedure(s).      LDA:        Peripheral IV - Single Lumen 10/10/22 2219 18 G Right Antecubital (Active)   Site Assessment Clean;Dry;Intact;No redness;No swelling 10/11/22 1105   Extremity Assessment Distal to IV No abnormal discoloration;No redness;No swelling;No warmth 10/11/22 1105   Line Status Flushed;Saline locked 10/11/22 1105   Dressing Status Clean;Dry;Intact 10/11/22 1105   Dressing Intervention Sterile dressing change 10/11/22 1105   Dressing Change Due 10/14/22 10/11/22 1105   Site Change Due 10/14/22 10/11/22 0746   Reason Not Rotated Not due 10/11/22 1105   Number of days: 0            Peripheral IV - Single Lumen 10/10/22 2220 18 G Left Forearm (Active)   Site Assessment Clean;Dry;Intact;No redness;No swelling 10/11/22 1105   Extremity Assessment Distal to IV No abnormal discoloration;No redness;No swelling;No warmth 10/11/22 1105   Line Status Flushed;Saline locked 10/11/22 1105   Dressing Status Clean;Dry;Intact 10/11/22 1105   Dressing Intervention Integrity maintained 10/11/22 1105   Dressing Change Due 10/14/22 10/11/22 1105   Site Change Due 10/14/22 10/11/22 0746   Reason Not Rotated Not due 10/11/22 1105   Number of days: 0       Prev airway: None  documented.    Drips:    sodium chloride 0.9% 50 mL/hr at 10/11/22 0848    niCARdipine 2.5 mg/hr (10/11/22 1009)       Patient Active Problem List   Diagnosis    Hypertension    Tobacco use disorder    Impacted cerumen    Odontoid fracture       Review of patient's allergies indicates:  No Known Allergies    Current Inpatient Medications:   LIDOcaine HCL 10 mg/ml (1%)        LIDOcaine HCL 10 mg/ml (1%)  10 mL Other Once    [START ON 10/12/2022] lisinopriL  10 mg Oral Daily    nicotine  1 patch Transdermal Daily    senna-docusate 8.6-50 mg  1 tablet Oral BID       No current facility-administered medications on file prior to encounter.     Current Outpatient Medications on File Prior to Encounter   Medication Sig Dispense Refill    aspirin (ECOTRIN) 81 MG EC tablet Take 81 mg by mouth once daily.      ibuprofen (ADVIL,MOTRIN) 600 MG tablet Take 1 tablet (600 mg total) by mouth every 6 (six) hours as needed for Pain. 20 tablet 0    lisinopriL 10 MG tablet Take 1 tablet (10 mg total) by mouth once daily. 30 tablet 0    meloxicam (MOBIC) 7.5 MG tablet Take 1 tablet (7.5 mg total) by mouth once daily. for 14 days 14 tablet 0       Past Surgical History:   Procedure Laterality Date    KNEE ARTHROPLASTY         Social History     Socioeconomic History    Marital status: Single   Tobacco Use    Smoking status: Every Day     Packs/day: 1.00     Types: Cigarettes    Smokeless tobacco: Never   Substance and Sexual Activity    Alcohol use: Yes    Drug use: No    Sexual activity: Not Currently       OBJECTIVE:     Vital Signs Range (Last 24H):  Temp:  [36.5 °C (97.7 °F)-37.1 °C (98.8 °F)]   Pulse:  []   Resp:  [12-35]   BP: (147-229)/()   SpO2:  [93 %-100 %]       Significant Labs:  Lab Results   Component Value Date    WBC 9.23 10/11/2022    HGB 12.2 (L) 10/11/2022    HCT 36.6 (L) 10/11/2022     10/11/2022    CHOL 179 09/30/2022    TRIG 119 09/30/2022    HDL 65 09/30/2022    ALT 9 (L)  10/11/2022    AST 17 10/11/2022     (L) 10/11/2022    K 3.2 (L) 10/11/2022     10/11/2022    CREATININE 0.8 10/11/2022    BUN 10 10/11/2022    CO2 25 10/11/2022    TSH 1.252 10/11/2022    INR 1.0 10/11/2022    HGBA1C 4.8 10/11/2022       Diagnostic Studies: No relevant studies.    EKG:   Results for orders placed or performed during the hospital encounter of 10/10/22   EKG 12-lead    Collection Time: 10/11/22  1:15 AM    Narrative    Test Reason : R53.1,    Vent. Rate : 080 BPM     Atrial Rate : 080 BPM     P-R Int : 146 ms          QRS Dur : 086 ms      QT Int : 428 ms       P-R-T Axes : 084 056 061 degrees     QTc Int : 493 ms    Normal sinus rhythm  Nonspecific ST abnormality  Possible Inferior infarct ,age undetermined  Cannot rule out Anterior infarct ,age undetermined  Abnormal ECG  When compared with ECG of 25-APR-2016 11:37,  Borderline criteria for Inferior infarct are now Present    QT has lengthened  Confirmed by CARMELITA DC MD ([id number]) on 10/11/2022 9:46:13 AM    Referred By: [email or web address]   SELF           Confirmed By:CARMELITA DC MD       2D ECHO:  TTE:  Results for orders placed or performed during the hospital encounter of 10/10/22   Echo   Result Value Ref Range    Ascending aorta 3.43 cm    STJ 2.83 cm    AV mean gradient 5 mmHg    Ao peak gifty 1.31 m/s    Ao VTI 20.31 cm    IVS 1.42 (A) 0.6 - 1.1 cm    LA size 3.05 cm    Left Atrium Major Axis 5.81 cm    Left Atrium Minor Axis 4.73 cm    LVIDd 3.84 3.5 - 6.0 cm    LVIDs 2.87 2.1 - 4.0 cm    LVOT diameter 2.11 cm    LVOT peak VTI 22.45 cm    Posterior Wall 1.23 (A) 0.6 - 1.1 cm    RA Major Axis 4.96 cm    RA Width 3.26 cm    RVDD 2.75 cm    Sinus 2.96 cm    TAPSE 1.64 cm    TDI LATERAL 0.06 m/s    TDI SEPTAL 0.05 m/s    LA WIDTH 4.34 cm    LV Diastolic Volume 63.44 mL    LV Systolic Volume 31.41 mL    LVOT peak gifty 1.17 m/s    FS 25 %    LA volume 58.67 cm3    LV mass 180.92 g    Left Ventricle Relative Wall Thickness 0.64 cm    AV valve  area 3.86 cm2    AV Velocity Ratio 0.89     AV index (prosthetic) 1.11     Mean e' 0.06 m/s    LVOT area 3.5 cm2    LVOT stroke volume 78.46 cm3    AV peak gradient 7 mmHg    LV Systolic Volume Index 17.8 mL/m2    LV Diastolic Volume Index 36.05 mL/m2    LA Volume Index 33.3 mL/m2    LV Mass Index 103 g/m2    BSA 1.77 m2    EF 60 %    Narrative    · The left ventricle is normal in size with concentric remodeling and   normal systolic function. The estimated ejection fraction is 60%.  · Normal right ventricular size with normal right ventricular systolic   function.  · Indeterminate left ventricular diastolic function.  · The IVC was not well visualized.          MADI:  No results found for this or any previous visit.    ASSESSMENT/PLAN:                                                                                                                  10/11/2022  Israel De Jesus is a 62 y.o., male.      Pre-op Assessment    I have reviewed the Patient Summary Reports.     I have reviewed the Nursing Notes. I have reviewed the NPO Status.   I have reviewed the Medications.     Review of Systems  Anesthesia Hx:  No problems with previous Anesthesia  History of prior surgery of interest to airway management or planning: Denies Family Hx of Anesthesia complications.   Denies Personal Hx of Anesthesia complications.   Social:  Smoker    Cardiovascular:   Hypertension    Musculoskeletal:  Spine Disorders:        Physical Exam  General: Well nourished, Cooperative, Alert and Oriented    Airway:  Mallampati: II   Mouth Opening: Normal  TM Distance: Normal  Tongue: Normal    Dental:  Intact        Anesthesia Plan  Type of Anesthesia, risks & benefits discussed:    Anesthesia Type: Gen ETT  Intra-op Monitoring Plan: Standard ASA Monitors and Art Line  Post Op Pain Control Plan: multimodal analgesia  Induction:  IV  Airway Plan: Direct, Post-Induction  Informed Consent: Informed consent signed with the Patient and all parties  understand the risks and agree with anesthesia plan.  All questions answered.   ASA Score: 4  Day of Surgery Review of History & Physical: H&P Update referred to the surgeon/provider.    Ready For Surgery From Anesthesia Perspective.     .
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
## 2022-10-11 NOTE — ASSESSMENT & PLAN NOTE
Patient is a 62M with PMH of HTN who presents with odontoid fracture.    --Patient seen by NSGY at bedside  --CT cervical spine 10/10: acute complex odontoid fx completely through the base, displaced 12 mm anteriorly, mild fx of C2 pedicles, mod to severe C1-2  canal stenosis  --Follow up CTA neck  --Follow up MRI C-spine wo  --MAP goals >85 and neuro-ICU admission if cord compression is seen on MRI   -Reccomend A-line for active pressure monitoring per NCC   -Reccomend Central line for pressor administration per NCC  --Maintain C-collar at all times  --Follow-up full pre-op labs (CBC/CMP/PT-INR/PTT/T&S)  --NPO at this time for possible operative intervention  --Continue to monitor clinically, notify NSGY immediately with any changes in neuro status    Dispo: Ongoing    D/w Dr. Storm

## 2022-10-11 NOTE — ASSESSMENT & PLAN NOTE
Patient is a 62M with PMH of HTN who presents with odontoid fracture.  --CT cervical spine 10/10: acute complex odontoid fx completely through the base, displaced 12 mm anteriorly, mild fx of C2 pedicles, mod to severe C1-2  canal stenosis  -- CTA neck   -- Unstable displaced acute fracture of the odontoid process with extension into the bilateral lateral masses of C2 and left transverse foramen.  Associated focal narrowing of the left vertebral artery at the level of the left transverse foramen, which remains patent distally.  Subtle left vertebral artery injury or compression is difficult to exclude.  --MRI C-spine w/o  -- Complex comminuted fracture involving the C2 body and dens demonstrated to better extent on prior cervical spine CT. 9 mm anterolisthesis of the dens relative to the body of C2.  Subsequent moderate/severe spinal canal stenosis.  Increased intramedullary cord signal at this concerning for edema.    --SBP <200   --Maintain C-collar at all times  --Follow-up full pre-op labs (CBC/CMP/PT-INR/PTT/T&S)  --EKG, echo  --NPO at midnight for OR tomrw for C1-C4 PCF  --Placed in traction today   --PRN norco 5 for pain     --Continue to monitor clinically, notify NSGY immediately with any changes in neuro status

## 2022-10-11 NOTE — CONSULTS
"  [facility] - [facility] Critical Care  Adult Nutrition  Consult Note    SUMMARY     Recommendations    1. If able to tolerate PO intake, rec'd regular diet- texture per SLP.     2. If PO intake <50% and able to tolerate thin liquids, add Boost Plus TID.     3. If alternative route of nutrition warranted, rec'd TF regimen of Isosource 1.5 @ goal rate of 45 ml/hr to provide 1620 kcals, 73 g pro, and 825 ml free water.     4. RD following.    Goals: Will meet % EEN/EPN by next RD f/u.  Nutrition Goal Status: new  Communication of RD Recs:  (POC)    Assessment and Plan    Nutrition Problem  Inadequate oral intake    Related to (etiology):   Inability to consume sufficient needs    Signs and Symptoms (as evidenced by):   NPO status    Interventions/Recommendations (treatment strategy):  Collaboration of nutrition care with other providers  Diet and EN recs    Nutrition Diagnosis Status:   New    Reason for Assessment    Reason For Assessment: consult  Diagnosis:  (Odontoid fracture)  Relevant Medical History: HTN  Interdisciplinary Rounds: did not attend  General Information Comments: RD consulted to assess dietary needs. Unable to speak with pt or assess nutrtition status PTA 2/2 being unarousable during RD visit. NPO waiting escobar/SLP eval. No pert wt hx. NFPE not warranted 2/2 appearing well-nourished. LBM 10/10.  Nutrition Discharge Planning: Pending hospital course    Nutrition Risk Screen    Nutrition Risk Screen: no indicators present    Nutrition/Diet History    Food Allergies: NKFA  Factors Affecting Nutritional Intake: NPO    Anthropometrics    Temp: 97.7 °F (36.5 °C)  Height Method: Stated  Height: 5' 6" (167.6 cm)  Height (inches): 66 in  Weight Method: Stated  Weight: 68 kg (149 lb 14.6 oz)  Weight (lb): 149.91 lb  Ideal Body Weight (IBW), Male: 142 lb  % Ideal Body Weight, Male (lb): 105.57 %  BMI (Calculated): 24.2  BMI Grade: 18.5-24.9 - normal     Lab/Procedures/Meds    Pertinent Labs Reviewed: " reviewed  Pertinent Labs Comments: Albumin 2.4, Sodium 134, Potassium 3.2, ALT 9, Hgb 12.2, Hct 36.6  Pertinent Medications Reviewed: reviewed  Pertinent Medications Comments: senna-docusate    Estimated/Assessed Needs    Weight Used For Calorie Calculations: 68 kg (149 lb 14.6 oz)  Energy Calorie Requirements (kcal): 0595-3522 kcals  Energy Need Method: [place] Jeor (MSJ x 1.1-1.2 PAL)  Protein Requirements: 68-82 g (1.0-1.2 g/kg)  Weight Used For Protein Calculations: 68 kg (149 lb 14.6 oz)  Fluid Requirements (mL): 1 ml or fluid per MD  Estimated Fluid Requirement Method: RDA Method  RDA Method (mL): 1565     Nutrition Prescription Ordered    Current Diet Order: NPO    Evaluation of Received Nutrient/Fluid Intake    I/O: -  % Intake of Estimated Energy Needs: 0%  % Meal Intake: NPO    Nutrition Risk    Level of Risk/Frequency of Follow-up:  (1 time/week)     Monitor and Evaluation    Food and Nutrient Intake: energy intake, food and beverage intake, enteral nutrition intake  Food and Nutrient Adminstration: diet order, enteral and parenteral nutrition administration  Knowledge/Beliefs/Attitudes: food and nutrition knowledge/skill, beliefs and attitudes  Physical Activity and Function: nutrition-related ADLs and IADLs  Anthropometric Measurements: height/length, weight, weight change, body mass index  Biochemical Data, Medical Tests and Procedures: electrolyte and renal panel, gastrointestinal profile, glucose/endocrine profile, lipid profile, inflammatory profile  Nutrition-Focused Physical Findings: overall appearance     Nutrition Follow-Up    RD Follow-up?: Yes    Melissa Lowery PL-LDN

## 2022-10-11 NOTE — ED TRIAGE NOTES
Pt reports from home c/o not being able to ambulate [facility]. Reports difficulty walking since. Per pt he has noted generalized weakness with a gradual onset since Friday

## 2022-10-11 NOTE — HPI
"Patient is a 62M with PMH of HTN and remote L knee surgery who presents with 3 days of weakness after a fall on his right side 2.5 weeks ago. Patient transferred from OSH for NSGY consult regarding odontoid fracture. He reports that he has had persistent neck pain, but that his weakness did not start until a few days ago and is worsening. Patient complains of an intermittent "rumbling" almost vibrating feeling in his chest/abdomen, as well as sometimes in his various muscle groups. He claims that he feel decreased sensation across his limbs/body, including his thighs and calves where it started before moving up to his upper extremities. He symptoms do not seem to localize to a specific dermatome. He does not complain of any new radiculopathic or myelopathic symptoms. Patient reports that he has had to use a walker in the past few days due to BLE weakness, feeling like lifting his legs to walk is very difficult. When laying down flat on the bed, he feels fine and can move all extremities antigravity. When he is sitting upright, he states that he cannot move his arms past the shoulder line.     He presented in a C-collar. Patient takes home ASA 81mg. He also denies bowel/bladder dysfunction. Pt admitted to [facility] for higher level of pre-op care.   "

## 2022-10-11 NOTE — PT/OT/SLP PROGRESS
Occupational Therapy      Patient Name:  Israel De Jesus   MRN:  [id number]    Patient not seen today secondary to HOLD: continued bed rest, not appropriate for OOB activity this date- pt pending potential surgical intervention. Will follow-up when medically stable for eval.    10/11/2022

## 2022-10-11 NOTE — PLAN OF CARE
[facility] Care Plan    POC reviewed with Israel Martinsblanc and family at 1400. Pt verbalized understanding. Questions and concerns addressed. No acute events today. Pt progressing toward goals. Will continue to monitor. See below and flowsheets for full assessment and VS info.     -Pt blood pressure within parameters with cardene  -Pt neurologically intact  -Cervical traction applied  -Blanco initiated per neurosurgery  -NPO at midnight for surgery tomorrow        Is this a stroke patient? no    Neuro:  Chloe Coma Scale  Best Eye Response: 4-->(E4) spontaneous  Best Motor Response: 6-->(M6) obeys commands  Best Verbal Response: 5-->(V5) oriented  Chloe Coma Scale Score: 15  Assessment Qualifiers: patient not sedated/intubated  Pupil PERRLA: yes     24 hr Temp:  [97.7 °F (36.5 °C)-98.8 °F (37.1 °C)]     CV:   Rhythm: normal sinus rhythm  BP goals:   SBP <  200  MAP > 85    Resp:   O2 Device (Oxygen Therapy): room air       Plan: N/A    GI/:     Diet/Nutrition Received: NPO  Last Bowel Movement: 10/10/22       Intake/Output Summary (Last 24 hours) at 10/11/2022 1815  Last data filed at 10/11/2022 1744  Gross per 24 hour   Intake 1090.31 ml   Output 650 ml   Net 440.31 ml          Labs/Accuchecks:  Recent Labs   Lab 10/11/22  0138   WBC 9.23   RBC 3.50*   HGB 12.2*   HCT 36.6*         Recent Labs   Lab 10/11/22  0138   *   K 3.2*   CO2 25      BUN 10   CREATININE 0.8   ALKPHOS 99   ALT 9*   AST 17   BILITOT 0.4      Recent Labs   Lab 10/11/22  0624   INR 1.0   APTT 26.3    No results for input(s): CPK, CPKMB, TROPONINI, MB in the last 168 hours.    Electrolytes: Electrolytes replaced  Accuchecks: none    Gtts:   sodium chloride 0.9% 50 mL/hr at 10/11/22 1744    niCARdipine 2.5 mg/hr (10/11/22 1744)       LDA/Wounds:  Lines/Drains/Airways       Drain  Duration                  Urethral Catheter 10/11/22 1700 Silicone 16 Fr. <1 day              Peripheral Intravenous Line  Duration                   Peripheral IV - Single Lumen 10/10/22 2219 18 G Right Antecubital <1 day         Peripheral IV - Single Lumen 10/10/22 2220 18 G Left Forearm <1 day                  Wounds: No  Wound care consulted: No

## 2022-10-11 NOTE — PLAN OF CARE
Recommendations     1. If able to tolerate PO intake, rec'd regular diet- texture per SLP.      2. If PO intake <50% and able to tolerate thin liquids, add Boost Plus TID.      3. If alternative route of nutrition warranted, rec'd TF regimen of Isosource 1.5 @ goal rate of 45 ml/hr to provide 1620 kcals, 73 g pro, and 825 ml free water.      4. RD following.     Goals: Will meet % EEN/EPN by next RD f/u.  Nutrition Goal Status: new  Communication of RD Recs:  (POC)    Melissa Lowery PL-LDN

## 2022-10-11 NOTE — NURSING
Patient arrived to [facility] from ED (name of hospital or home) by (mode of transportation & company name)    Report received from: ED RN,      Type of stroke/diagnosis:  CERVICAL FRACTURE     start and end time (if applicable)    Thrombectomy start and end time (if applicable)    Current symptoms: N/A    Skin assessment done: YES  Wounds noted:  N/A  *If wounds noted, was Wound Care consulted? N/A    Wu Completed? NO    Patient Belongings on Admit: SHOES, MEDICATIONS, CELL PHONE, SHIRT, HAT, SOCKS, , CAR KEYS    NCC notified: DR. BARRY

## 2022-10-11 NOTE — NURSING
Pt c/o pain and requesting pain medication at this time. SLP completed dysphagia screen at 1130, refer to note for complete information. PRN pain medication order carried out. Will continue to monitor closely.
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
## 2022-10-12 ENCOUNTER — ANESTHESIA (OUTPATIENT)
Dept: SURGERY | Facility: HOSPITAL | Age: 62
DRG: 471 | End: 2022-10-12
Payer: MEDICAID

## 2022-10-12 LAB
ALBUMIN SERPL BCP-MCNC: 2.4 G/DL (ref 3.5–5.2)
ALP SERPL-CCNC: 105 U/L (ref 55–135)
ALT SERPL W/O P-5'-P-CCNC: 12 U/L (ref 10–44)
ANION GAP SERPL CALC-SCNC: 11 MMOL/L (ref 8–16)
AST SERPL-CCNC: 25 U/L (ref 10–40)
BASOPHILS # BLD AUTO: 0.08 K/UL (ref 0–0.2)
BASOPHILS NFR BLD: 0.7 % (ref 0–1.9)
BILIRUB SERPL-MCNC: 0.4 MG/DL (ref 0.1–1)
BUN SERPL-MCNC: 8 MG/DL (ref 8–23)
CALCIUM SERPL-MCNC: 9.2 MG/DL (ref 8.7–10.5)
CHLORIDE SERPL-SCNC: 104 MMOL/L (ref 95–110)
CO2 SERPL-SCNC: 19 MMOL/L (ref 23–29)
CREAT SERPL-MCNC: 0.8 MG/DL (ref 0.5–1.4)
DIFFERENTIAL METHOD: ABNORMAL
EOSINOPHIL # BLD AUTO: 0.1 K/UL (ref 0–0.5)
EOSINOPHIL NFR BLD: 1.2 % (ref 0–8)
ERYTHROCYTE [DISTWIDTH] IN BLOOD BY AUTOMATED COUNT: 12.6 % (ref 11.5–14.5)
EST. GFR  (NO RACE VARIABLE): >60 ML/MIN/1.73 M^2
GLUCOSE SERPL-MCNC: 80 MG/DL (ref 70–110)
HCT VFR BLD AUTO: 44.7 % (ref 40–54)
HGB BLD-MCNC: 14.4 G/DL (ref 14–18)
IMM GRANULOCYTES # BLD AUTO: 0.09 K/UL (ref 0–0.04)
IMM GRANULOCYTES NFR BLD AUTO: 0.8 % (ref 0–0.5)
LYMPHOCYTES # BLD AUTO: 1.6 K/UL (ref 1–4.8)
LYMPHOCYTES NFR BLD: 14.9 % (ref 18–48)
MAGNESIUM SERPL-MCNC: 1.6 MG/DL (ref 1.6–2.6)
MCH RBC QN AUTO: 34.2 PG (ref 27–31)
MCHC RBC AUTO-ENTMCNC: 32.2 G/DL (ref 32–36)
MCV RBC AUTO: 106 FL (ref 82–98)
MONOCYTES # BLD AUTO: 0.9 K/UL (ref 0.3–1)
MONOCYTES NFR BLD: 8.3 % (ref 4–15)
NEUTROPHILS # BLD AUTO: 8 K/UL (ref 1.8–7.7)
NEUTROPHILS NFR BLD: 74.1 % (ref 38–73)
NRBC BLD-RTO: 0 /100 WBC
PHOSPHATE SERPL-MCNC: 2.2 MG/DL (ref 2.7–4.5)
PLATELET # BLD AUTO: 288 K/UL (ref 150–450)
PMV BLD AUTO: 10.2 FL (ref 9.2–12.9)
POTASSIUM SERPL-SCNC: 4.6 MMOL/L (ref 3.5–5.1)
PROT SERPL-MCNC: 6 G/DL (ref 6–8.4)
RBC # BLD AUTO: 4.21 M/UL (ref 4.6–6.2)
SODIUM SERPL-SCNC: 134 MMOL/L (ref 136–145)
WBC # BLD AUTO: 10.82 K/UL (ref 3.9–12.7)

## 2022-10-12 PROCEDURE — 25000003 PHARM REV CODE 250: Performed by: NURSE ANESTHETIST, CERTIFIED REGISTERED

## 2022-10-12 PROCEDURE — 22595 ARTHRD PST TQ ATLAS-AXIS: CPT | Mod: 22,62,, | Performed by: ORTHOPAEDIC SURGERY

## 2022-10-12 PROCEDURE — 36620 PR INSERT CATH,ART,PERCUT,SHORTTERM: ICD-10-PCS | Mod: 59,,, | Performed by: ANESTHESIOLOGY

## 2022-10-12 PROCEDURE — 63600175 PHARM REV CODE 636 W HCPCS

## 2022-10-12 PROCEDURE — D9220A PRA ANESTHESIA: ICD-10-PCS | Mod: CRNA,,, | Performed by: NURSE ANESTHETIST, CERTIFIED REGISTERED

## 2022-10-12 PROCEDURE — 22595 ARTHRD PST TQ ATLAS-AXIS: CPT | Mod: 22,62,, | Performed by: STUDENT IN AN ORGANIZED HEALTH CARE EDUCATION/TRAINING PROGRAM

## 2022-10-12 PROCEDURE — 93010 EKG 12-LEAD: ICD-10-PCS | Mod: ,,, | Performed by: INTERNAL MEDICINE

## 2022-10-12 PROCEDURE — 22614 PR ARTHRODESIS, POST/POSTLAT, SNGL INTERSPACE, EA ADDTL: ICD-10-PCS | Mod: 62,,, | Performed by: ORTHOPAEDIC SURGERY

## 2022-10-12 PROCEDURE — 99291 CRITICAL CARE FIRST HOUR: CPT | Mod: ,,, | Performed by: INTERNAL MEDICINE

## 2022-10-12 PROCEDURE — D9220A PRA ANESTHESIA: ICD-10-PCS | Mod: ANES,,, | Performed by: ANESTHESIOLOGY

## 2022-10-12 PROCEDURE — 20930 PR ALLOGRAFT FOR SPINE SURGERY ONLY MORSELIZED: ICD-10-PCS | Mod: ,,, | Performed by: STUDENT IN AN ORGANIZED HEALTH CARE EDUCATION/TRAINING PROGRAM

## 2022-10-12 PROCEDURE — 93010 ELECTROCARDIOGRAM REPORT: CPT | Mod: ,,, | Performed by: INTERNAL MEDICINE

## 2022-10-12 PROCEDURE — 37000008 HC ANESTHESIA 1ST 15 MINUTES: Performed by: STUDENT IN AN ORGANIZED HEALTH CARE EDUCATION/TRAINING PROGRAM

## 2022-10-12 PROCEDURE — 25000003 PHARM REV CODE 250

## 2022-10-12 PROCEDURE — 25000003 PHARM REV CODE 250: Performed by: INTERNAL MEDICINE

## 2022-10-12 PROCEDURE — D9220A PRA ANESTHESIA: Mod: ANES,,, | Performed by: ANESTHESIOLOGY

## 2022-10-12 PROCEDURE — 94761 N-INVAS EAR/PLS OXIMETRY MLT: CPT

## 2022-10-12 PROCEDURE — 22600 PR ARTHRODESIS, POST/POSTLAT, SNGL INTERSPACE, CERVICAL BELOW C2: ICD-10-PCS | Mod: 62,51,, | Performed by: ORTHOPAEDIC SURGERY

## 2022-10-12 PROCEDURE — 25000003 PHARM REV CODE 250: Performed by: STUDENT IN AN ORGANIZED HEALTH CARE EDUCATION/TRAINING PROGRAM

## 2022-10-12 PROCEDURE — 84100 ASSAY OF PHOSPHORUS: CPT | Performed by: PHYSICIAN ASSISTANT

## 2022-10-12 PROCEDURE — 22842 PR POSTERIOR SEGMENTAL INSTRUMENTATION 3-6 VRT SEG: ICD-10-PCS | Mod: ,,, | Performed by: STUDENT IN AN ORGANIZED HEALTH CARE EDUCATION/TRAINING PROGRAM

## 2022-10-12 PROCEDURE — 22326 PR OPEN POST RX CERV VERT FX,1 LVL: ICD-10-PCS | Mod: 62,51,, | Performed by: STUDENT IN AN ORGANIZED HEALTH CARE EDUCATION/TRAINING PROGRAM

## 2022-10-12 PROCEDURE — 63600175 PHARM REV CODE 636 W HCPCS: Performed by: NURSE ANESTHETIST, CERTIFIED REGISTERED

## 2022-10-12 PROCEDURE — 25000003 PHARM REV CODE 250: Performed by: PHYSICIAN ASSISTANT

## 2022-10-12 PROCEDURE — 22595 PR ARTHRODESIS POSTERIOR ATLAS-AXIS C1-C2: ICD-10-PCS | Mod: 22,62,, | Performed by: ORTHOPAEDIC SURGERY

## 2022-10-12 PROCEDURE — 22326 PR OPEN POST RX CERV VERT FX,1 LVL: ICD-10-PCS | Mod: 62,51,, | Performed by: ORTHOPAEDIC SURGERY

## 2022-10-12 PROCEDURE — 27201423 OPTIME MED/SURG SUP & DEVICES STERILE SUPPLY: Performed by: STUDENT IN AN ORGANIZED HEALTH CARE EDUCATION/TRAINING PROGRAM

## 2022-10-12 PROCEDURE — 37000009 HC ANESTHESIA EA ADD 15 MINS: Performed by: STUDENT IN AN ORGANIZED HEALTH CARE EDUCATION/TRAINING PROGRAM

## 2022-10-12 PROCEDURE — C1713 ANCHOR/SCREW BN/BN,TIS/BN: HCPCS | Performed by: STUDENT IN AN ORGANIZED HEALTH CARE EDUCATION/TRAINING PROGRAM

## 2022-10-12 PROCEDURE — 93005 ELECTROCARDIOGRAM TRACING: CPT

## 2022-10-12 PROCEDURE — 22326 TREAT NECK SPINE FRACTURE: CPT | Mod: 62,51,, | Performed by: STUDENT IN AN ORGANIZED HEALTH CARE EDUCATION/TRAINING PROGRAM

## 2022-10-12 PROCEDURE — 22614 ARTHRD PST TQ 1NTRSPC EA ADD: CPT | Mod: 62,,, | Performed by: ORTHOPAEDIC SURGERY

## 2022-10-12 PROCEDURE — 20936 SP BONE AGRFT LOCAL ADD-ON: CPT | Mod: ,,, | Performed by: STUDENT IN AN ORGANIZED HEALTH CARE EDUCATION/TRAINING PROGRAM

## 2022-10-12 PROCEDURE — D9220A PRA ANESTHESIA: Mod: CRNA,,, | Performed by: NURSE ANESTHETIST, CERTIFIED REGISTERED

## 2022-10-12 PROCEDURE — S4991 NICOTINE PATCH NONLEGEND: HCPCS

## 2022-10-12 PROCEDURE — 20936 PR AUTOGRAFT SPINE SURGERY LOCAL FROM SAME INCISION: ICD-10-PCS | Mod: ,,, | Performed by: STUDENT IN AN ORGANIZED HEALTH CARE EDUCATION/TRAINING PROGRAM

## 2022-10-12 PROCEDURE — 22600 ARTHRD PST TQ 1NTRSPC CRV: CPT | Mod: 62,51,, | Performed by: ORTHOPAEDIC SURGERY

## 2022-10-12 PROCEDURE — 22600 ARTHRD PST TQ 1NTRSPC CRV: CPT | Mod: 62,51,, | Performed by: STUDENT IN AN ORGANIZED HEALTH CARE EDUCATION/TRAINING PROGRAM

## 2022-10-12 PROCEDURE — 20000000 HC ICU ROOM

## 2022-10-12 PROCEDURE — 22595 PR ARTHRODESIS POSTERIOR ATLAS-AXIS C1-C2: ICD-10-PCS | Mod: 22,62,, | Performed by: STUDENT IN AN ORGANIZED HEALTH CARE EDUCATION/TRAINING PROGRAM

## 2022-10-12 PROCEDURE — 99291 PR CRITICAL CARE, E/M 30-74 MINUTES: ICD-10-PCS | Mod: ,,, | Performed by: INTERNAL MEDICINE

## 2022-10-12 PROCEDURE — 83735 ASSAY OF MAGNESIUM: CPT | Performed by: PHYSICIAN ASSISTANT

## 2022-10-12 PROCEDURE — 80053 COMPREHEN METABOLIC PANEL: CPT | Performed by: PHYSICIAN ASSISTANT

## 2022-10-12 PROCEDURE — 22842 INSERT SPINE FIXATION DEVICE: CPT | Mod: 80,,, | Performed by: ORTHOPAEDIC SURGERY

## 2022-10-12 PROCEDURE — 63600175 PHARM REV CODE 636 W HCPCS: Performed by: STUDENT IN AN ORGANIZED HEALTH CARE EDUCATION/TRAINING PROGRAM

## 2022-10-12 PROCEDURE — 85025 COMPLETE CBC W/AUTO DIFF WBC: CPT | Performed by: INTERNAL MEDICINE

## 2022-10-12 PROCEDURE — 22842 INSERT SPINE FIXATION DEVICE: CPT | Mod: ,,, | Performed by: STUDENT IN AN ORGANIZED HEALTH CARE EDUCATION/TRAINING PROGRAM

## 2022-10-12 PROCEDURE — 36620 INSERTION CATHETER ARTERY: CPT | Mod: 59,,, | Performed by: ANESTHESIOLOGY

## 2022-10-12 PROCEDURE — 22614 PR ARTHRODESIS, POST/POSTLAT, SNGL INTERSPACE, EA ADDTL: ICD-10-PCS | Mod: 62,,, | Performed by: STUDENT IN AN ORGANIZED HEALTH CARE EDUCATION/TRAINING PROGRAM

## 2022-10-12 PROCEDURE — 22842 PR POSTERIOR SEGMENTAL INSTRUMENTATION 3-6 VRT SEG: ICD-10-PCS | Mod: 80,,, | Performed by: ORTHOPAEDIC SURGERY

## 2022-10-12 PROCEDURE — 99232 SBSQ HOSP IP/OBS MODERATE 35: CPT | Mod: 57,,, | Performed by: STUDENT IN AN ORGANIZED HEALTH CARE EDUCATION/TRAINING PROGRAM

## 2022-10-12 PROCEDURE — 27800903 OPTIME MED/SURG SUP & DEVICES OTHER IMPLANTS: Performed by: STUDENT IN AN ORGANIZED HEALTH CARE EDUCATION/TRAINING PROGRAM

## 2022-10-12 PROCEDURE — 22614 ARTHRD PST TQ 1NTRSPC EA ADD: CPT | Mod: 62,,, | Performed by: STUDENT IN AN ORGANIZED HEALTH CARE EDUCATION/TRAINING PROGRAM

## 2022-10-12 PROCEDURE — 22600 PR ARTHRODESIS, POST/POSTLAT, SNGL INTERSPACE, CERVICAL BELOW C2: ICD-10-PCS | Mod: 62,51,, | Performed by: STUDENT IN AN ORGANIZED HEALTH CARE EDUCATION/TRAINING PROGRAM

## 2022-10-12 PROCEDURE — 36000711: Performed by: STUDENT IN AN ORGANIZED HEALTH CARE EDUCATION/TRAINING PROGRAM

## 2022-10-12 PROCEDURE — 20930 SP BONE ALGRFT MORSEL ADD-ON: CPT | Mod: ,,, | Performed by: STUDENT IN AN ORGANIZED HEALTH CARE EDUCATION/TRAINING PROGRAM

## 2022-10-12 PROCEDURE — 99232 PR SUBSEQUENT HOSPITAL CARE,LEVL II: ICD-10-PCS | Mod: 57,,, | Performed by: STUDENT IN AN ORGANIZED HEALTH CARE EDUCATION/TRAINING PROGRAM

## 2022-10-12 PROCEDURE — 36000710: Performed by: STUDENT IN AN ORGANIZED HEALTH CARE EDUCATION/TRAINING PROGRAM

## 2022-10-12 PROCEDURE — 22326 TREAT NECK SPINE FRACTURE: CPT | Mod: 62,51,, | Performed by: ORTHOPAEDIC SURGERY

## 2022-10-12 DEVICE — KIT MED BONE INFUSE: Type: IMPLANTABLE DEVICE | Site: NECK | Status: FUNCTIONAL

## 2022-10-12 DEVICE — SET SYMPHONY SCREW NS: Type: IMPLANTABLE DEVICE | Site: NECK | Status: FUNCTIONAL

## 2022-10-12 DEVICE — GRAFT ALTAPORE MED CYL 2.6ML: Type: IMPLANTABLE DEVICE | Site: NECK | Status: FUNCTIONAL

## 2022-10-12 DEVICE — IMPLANTABLE DEVICE: Type: IMPLANTABLE DEVICE | Site: NECK | Status: FUNCTIONAL

## 2022-10-12 DEVICE — ROD SYMPHONY PRE-CUT 3.5X60MM: Type: IMPLANTABLE DEVICE | Site: NECK | Status: FUNCTIONAL

## 2022-10-12 DEVICE — SCREW SYMPHONY PA 3.5X14MM: Type: IMPLANTABLE DEVICE | Site: NECK | Status: FUNCTIONAL

## 2022-10-12 RX ORDER — HYDRALAZINE HYDROCHLORIDE 20 MG/ML
INJECTION INTRAMUSCULAR; INTRAVENOUS
Status: DISCONTINUED | OUTPATIENT
Start: 2022-10-12 | End: 2022-10-12

## 2022-10-12 RX ORDER — POLYETHYLENE GLYCOL 3350 17 G/17G
17 POWDER, FOR SOLUTION ORAL DAILY
Status: DISCONTINUED | OUTPATIENT
Start: 2022-10-13 | End: 2022-10-16

## 2022-10-12 RX ORDER — VANCOMYCIN HYDROCHLORIDE 1 G/20ML
INJECTION, POWDER, LYOPHILIZED, FOR SOLUTION INTRAVENOUS
Status: DISCONTINUED | OUTPATIENT
Start: 2022-10-12 | End: 2022-10-12 | Stop reason: HOSPADM

## 2022-10-12 RX ORDER — MAGNESIUM SULFATE HEPTAHYDRATE 40 MG/ML
4 INJECTION, SOLUTION INTRAVENOUS
Status: DISCONTINUED | OUTPATIENT
Start: 2022-10-12 | End: 2022-10-19

## 2022-10-12 RX ORDER — PROPOFOL 10 MG/ML
VIAL (ML) INTRAVENOUS
Status: DISCONTINUED | OUTPATIENT
Start: 2022-10-12 | End: 2022-10-12

## 2022-10-12 RX ORDER — POTASSIUM CHLORIDE 7.45 MG/ML
10 INJECTION INTRAVENOUS ONCE
Status: DISCONTINUED | OUTPATIENT
Start: 2022-10-12 | End: 2022-10-12

## 2022-10-12 RX ORDER — SUCCINYLCHOLINE CHLORIDE 20 MG/ML
INJECTION INTRAMUSCULAR; INTRAVENOUS
Status: DISCONTINUED | OUTPATIENT
Start: 2022-10-12 | End: 2022-10-12

## 2022-10-12 RX ORDER — REMIFENTANIL HYDROCHLORIDE 1 MG/ML
INJECTION, POWDER, LYOPHILIZED, FOR SOLUTION INTRAVENOUS CONTINUOUS PRN
Status: DISCONTINUED | OUTPATIENT
Start: 2022-10-12 | End: 2022-10-12

## 2022-10-12 RX ORDER — PHENYLEPHRINE HYDROCHLORIDE 10 MG/ML
INJECTION INTRAVENOUS CONTINUOUS PRN
Status: DISCONTINUED | OUTPATIENT
Start: 2022-10-12 | End: 2022-10-12

## 2022-10-12 RX ORDER — ONDANSETRON 2 MG/ML
INJECTION INTRAMUSCULAR; INTRAVENOUS
Status: DISCONTINUED | OUTPATIENT
Start: 2022-10-12 | End: 2022-10-12

## 2022-10-12 RX ORDER — ACETAMINOPHEN 10 MG/ML
INJECTION, SOLUTION INTRAVENOUS
Status: DISCONTINUED | OUTPATIENT
Start: 2022-10-12 | End: 2022-10-12

## 2022-10-12 RX ORDER — ACETAMINOPHEN 500 MG
1000 TABLET ORAL EVERY 8 HOURS
Status: DISCONTINUED | OUTPATIENT
Start: 2022-10-12 | End: 2022-10-21 | Stop reason: HOSPADM

## 2022-10-12 RX ORDER — KETAMINE HCL IN 0.9 % NACL 50 MG/5 ML
SYRINGE (ML) INTRAVENOUS
Status: DISCONTINUED | OUTPATIENT
Start: 2022-10-12 | End: 2022-10-12

## 2022-10-12 RX ORDER — OXYCODONE HYDROCHLORIDE 5 MG/1
5 TABLET ORAL EVERY 4 HOURS PRN
Status: DISCONTINUED | OUTPATIENT
Start: 2022-10-12 | End: 2022-10-19

## 2022-10-12 RX ORDER — HEPARIN SODIUM 5000 [USP'U]/ML
5000 INJECTION, SOLUTION INTRAVENOUS; SUBCUTANEOUS EVERY 8 HOURS
Status: DISCONTINUED | OUTPATIENT
Start: 2022-10-13 | End: 2022-10-18

## 2022-10-12 RX ORDER — FENTANYL CITRATE 50 UG/ML
INJECTION, SOLUTION INTRAMUSCULAR; INTRAVENOUS
Status: DISCONTINUED | OUTPATIENT
Start: 2022-10-12 | End: 2022-10-12

## 2022-10-12 RX ORDER — HYDROMORPHONE HYDROCHLORIDE 1 MG/ML
1 INJECTION, SOLUTION INTRAMUSCULAR; INTRAVENOUS; SUBCUTANEOUS EVERY 6 HOURS PRN
Status: DISCONTINUED | OUTPATIENT
Start: 2022-10-12 | End: 2022-10-19

## 2022-10-12 RX ORDER — ROCURONIUM BROMIDE 10 MG/ML
INJECTION, SOLUTION INTRAVENOUS
Status: DISCONTINUED | OUTPATIENT
Start: 2022-10-12 | End: 2022-10-12

## 2022-10-12 RX ORDER — DEXAMETHASONE SODIUM PHOSPHATE 4 MG/ML
INJECTION, SOLUTION INTRA-ARTICULAR; INTRALESIONAL; INTRAMUSCULAR; INTRAVENOUS; SOFT TISSUE
Status: DISCONTINUED | OUTPATIENT
Start: 2022-10-12 | End: 2022-10-12

## 2022-10-12 RX ORDER — CEFAZOLIN SODIUM 1 G/3ML
INJECTION, POWDER, FOR SOLUTION INTRAMUSCULAR; INTRAVENOUS
Status: DISCONTINUED | OUTPATIENT
Start: 2022-10-12 | End: 2022-10-12

## 2022-10-12 RX ORDER — PROPOFOL 10 MG/ML
VIAL (ML) INTRAVENOUS CONTINUOUS PRN
Status: DISCONTINUED | OUTPATIENT
Start: 2022-10-12 | End: 2022-10-12

## 2022-10-12 RX ORDER — LIDOCAINE HYDROCHLORIDE 20 MG/ML
INJECTION, SOLUTION EPIDURAL; INFILTRATION; INTRACAUDAL; PERINEURAL
Status: DISCONTINUED | OUTPATIENT
Start: 2022-10-12 | End: 2022-10-12

## 2022-10-12 RX ORDER — MAGNESIUM SULFATE HEPTAHYDRATE 40 MG/ML
2 INJECTION, SOLUTION INTRAVENOUS
Status: DISCONTINUED | OUTPATIENT
Start: 2022-10-12 | End: 2022-10-19

## 2022-10-12 RX ORDER — PREGABALIN 75 MG/1
75 CAPSULE ORAL 2 TIMES DAILY
Status: DISCONTINUED | OUTPATIENT
Start: 2022-10-12 | End: 2022-10-21 | Stop reason: HOSPADM

## 2022-10-12 RX ADMIN — SODIUM CHLORIDE, SODIUM GLUCONATE, SODIUM ACETATE, POTASSIUM CHLORIDE, MAGNESIUM CHLORIDE, SODIUM PHOSPHATE, DIBASIC, AND POTASSIUM PHOSPHATE: .53; .5; .37; .037; .03; .012; .00082 INJECTION, SOLUTION INTRAVENOUS at 05:10

## 2022-10-12 RX ADMIN — SODIUM CHLORIDE 250 ML: 0.9 INJECTION, SOLUTION INTRAVENOUS at 01:10

## 2022-10-12 RX ADMIN — PROPOFOL 200 MG: 10 INJECTION, EMULSION INTRAVENOUS at 03:10

## 2022-10-12 RX ADMIN — OXYCODONE 5 MG: 5 TABLET ORAL at 09:10

## 2022-10-12 RX ADMIN — REMIFENTANIL HYDROCHLORIDE 0.2 MCG/KG/MIN: 1 INJECTION, POWDER, LYOPHILIZED, FOR SOLUTION INTRAVENOUS at 03:10

## 2022-10-12 RX ADMIN — Medication 20 MG: at 05:10

## 2022-10-12 RX ADMIN — ROCURONIUM BROMIDE 30 MG: 10 INJECTION INTRAVENOUS at 04:10

## 2022-10-12 RX ADMIN — NICOTINE 1 PATCH: 14 PATCH, EXTENDED RELEASE TRANSDERMAL at 08:10

## 2022-10-12 RX ADMIN — SODIUM CHLORIDE: 9 INJECTION, SOLUTION INTRAVENOUS at 03:10

## 2022-10-12 RX ADMIN — DEXAMETHASONE SODIUM PHOSPHATE 10 MG: 4 INJECTION INTRA-ARTICULAR; INTRALESIONAL; INTRAMUSCULAR; INTRAVENOUS; SOFT TISSUE at 04:10

## 2022-10-12 RX ADMIN — SUCCINYLCHOLINE CHLORIDE 160 MG: 20 INJECTION, SOLUTION INTRAMUSCULAR; INTRAVENOUS at 03:10

## 2022-10-12 RX ADMIN — SENNOSIDES AND DOCUSATE SODIUM 1 TABLET: 50; 8.6 TABLET ORAL at 08:10

## 2022-10-12 RX ADMIN — LISINOPRIL 10 MG: 10 TABLET ORAL at 08:10

## 2022-10-12 RX ADMIN — Medication 20 MG: at 03:10

## 2022-10-12 RX ADMIN — PREGABALIN 75 MG: 75 CAPSULE ORAL at 08:10

## 2022-10-12 RX ADMIN — HYDRALAZINE HYDROCHLORIDE 5 MG: 20 INJECTION INTRAMUSCULAR; INTRAVENOUS at 06:10

## 2022-10-12 RX ADMIN — FENTANYL CITRATE 100 MCG: 50 INJECTION, SOLUTION INTRAMUSCULAR; INTRAVENOUS at 03:10

## 2022-10-12 RX ADMIN — Medication 125 MCG/KG/MIN: at 03:10

## 2022-10-12 RX ADMIN — PROPOFOL 50 MG: 10 INJECTION, EMULSION INTRAVENOUS at 04:10

## 2022-10-12 RX ADMIN — ONDANSETRON 4 MG: 2 INJECTION INTRAMUSCULAR; INTRAVENOUS at 05:10

## 2022-10-12 RX ADMIN — SODIUM CHLORIDE, SODIUM GLUCONATE, SODIUM ACETATE, POTASSIUM CHLORIDE, MAGNESIUM CHLORIDE, SODIUM PHOSPHATE, DIBASIC, AND POTASSIUM PHOSPHATE: .53; .5; .37; .037; .03; .012; .00082 INJECTION, SOLUTION INTRAVENOUS at 03:10

## 2022-10-12 RX ADMIN — ACETAMINOPHEN 1000 MG: 500 TABLET ORAL at 08:10

## 2022-10-12 RX ADMIN — LIDOCAINE HYDROCHLORIDE 100 MG: 20 INJECTION, SOLUTION EPIDURAL; INFILTRATION; INTRACAUDAL; PERINEURAL at 03:10

## 2022-10-12 RX ADMIN — ROCURONIUM BROMIDE 10 MG: 10 INJECTION INTRAVENOUS at 03:10

## 2022-10-12 RX ADMIN — MIDAZOLAM HYDROCHLORIDE 2 MG: 1 INJECTION INTRAMUSCULAR; INTRAVENOUS at 03:10

## 2022-10-12 RX ADMIN — ACETAMINOPHEN 1000 MG: 10 INJECTION INTRAVENOUS at 05:10

## 2022-10-12 RX ADMIN — PHENYLEPHRINE HYDROCHLORIDE 0.5 MCG/KG/MIN: 10 INJECTION INTRAVENOUS at 03:10

## 2022-10-12 RX ADMIN — CEFAZOLIN 2 G: 330 INJECTION, POWDER, FOR SOLUTION INTRAMUSCULAR; INTRAVENOUS at 03:10

## 2022-10-12 RX ADMIN — POTASSIUM PHOSPHATE, MONOBASIC POTASSIUM PHOSPHATE, DIBASIC 15 MMOL: 224; 236 INJECTION, SOLUTION, CONCENTRATE INTRAVENOUS at 09:10

## 2022-10-12 RX ADMIN — NICARDIPINE HYDROCHLORIDE 2.5 MG/HR: 0.2 INJECTION, SOLUTION INTRAVENOUS at 06:10

## 2022-10-12 RX ADMIN — METHOCARBAMOL 1000 MG: 100 INJECTION, SOLUTION INTRAMUSCULAR; INTRAVENOUS at 10:10

## 2022-10-12 RX ADMIN — DEXAMETHASONE SODIUM PHOSPHATE 12 MG: 4 INJECTION INTRA-ARTICULAR; INTRALESIONAL; INTRAMUSCULAR; INTRAVENOUS; SOFT TISSUE at 04:10

## 2022-10-12 RX ADMIN — SUGAMMADEX 200 MG: 100 INJECTION, SOLUTION INTRAVENOUS at 04:10

## 2022-10-12 RX ADMIN — PHENYLEPHRINE HYDROCHLORIDE 0.25 MCG/KG/MIN: 10 INJECTION INTRAVENOUS at 03:10

## 2022-10-12 RX ADMIN — Medication 10 MG: at 04:10
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
## 2022-10-12 NOTE — TRANSFER OF CARE
Anesthesia Transfer of Care Note    Patient: Israel De Jesus    Procedure(s) Performed: Procedure(s) (LRB):  POSTERIOR CERVICAL FUSION, SPINE C1-C4 PCF  (N/A)  LAMINECTOMY, SPINE, C-1 (N/A)    Patient location: ICU    Anesthesia Type: general    Transport from OR: Transported from OR on 6-10 L/min O2 by face mask with adequate spontaneous ventilation    Post pain: adequate analgesia    Post assessment: no apparent anesthetic complications    Post vital signs: stable    Level of consciousness: awake, alert and oriented    Nausea/Vomiting: no nausea/vomiting    Complications: none    Transfer of care protocol was followed      Last vitals:

## 2022-10-12 NOTE — ASSESSMENT & PLAN NOTE
Patient is a 62M with PMH of HTN who presents with odontoid fracture.  --CT cervical spine 10/10: acute complex odontoid fx completely through the base, displaced 12 mm anteriorly, mild fx of C2 pedicles, mod to severe C1-2  canal stenosis  -- CTA neck   -- Unstable displaced acute fracture of the odontoid process with extension into the bilateral lateral masses of C2 and left transverse foramen.  Associated focal narrowing of the left vertebral artery at the level of the left transverse foramen, which remains patent distally.  Subtle left vertebral artery injury or compression is difficult to exclude.  --MRI C-spine w/o  -- Complex comminuted fracture involving the C2 body and dens demonstrated to better extent on prior cervical spine CT. 9 mm anterolisthesis of the dens relative to the body of C2.  Subsequent moderate/severe spinal canal stenosis.  Increased intramedullary cord signal at this concerning for edema.    --SBP <200   --Maintain C-collar at all times  --Follow-up full pre-op labs (CBC/CMP/PT-INR/PTT/T&S)  --EKG  --Echo, EF 60%  --Placed in traction 10/11  --NPO, IVF NS  --OR today 10/12 for C1-C4 PCF    --Continue to monitor clinically, notify NSGY immediately with any changes in neuro status

## 2022-10-12 NOTE — PLAN OF CARE
[facility] Care Plan    POC reviewed with Israel Martinsblanc and family at 1400. Pt verbalized understanding. Questions and concerns addressed. No acute events today. Pt progressing toward goals. Will continue to monitor. See below and flowsheets for full assessment and VS info.     -C1-C4 fusion completed  -Pt on cardene to keep blood pressure within parameters  -C-collar being ordered per neurosurgery. Will place c-collar when it arrives        Is this a stroke patient? no    Neuro:  Chloe Coma Scale  Best Eye Response: 4-->(E4) spontaneous  Best Motor Response: 6-->(M6) obeys commands  Best Verbal Response: 5-->(V5) oriented  Chloe Coma Scale Score: 15  Assessment Qualifiers: patient not sedated/intubated  Pupil PERRLA: yes     24 hr Temp:  [94.8 °F (34.9 °C)-98.3 °F (36.8 °C)]     CV:   Rhythm: normal sinus rhythm  BP goals:   SBP < 160  MAP > 85    Resp:   O2 Device (Oxygen Therapy): room air       Plan: N/A    GI/:     Diet/Nutrition Received: mechanical/dental soft  Last Bowel Movement: 10/10/22  Voiding Characteristics: urostomy    Intake/Output Summary (Last 24 hours) at 10/12/2022 1842  Last data filed at 10/12/2022 1823  Gross per 24 hour   Intake 3074.82 ml   Output 880 ml   Net 2194.82 ml          Labs/Accuchecks:  Recent Labs   Lab 10/12/22  0225   WBC 10.82   RBC 4.21*   HGB 14.4   HCT 44.7         Recent Labs   Lab 10/12/22  0129   *   K 4.6   CO2 19*      BUN 8   CREATININE 0.8   ALKPHOS 105   ALT 12   AST 25   BILITOT 0.4      Recent Labs   Lab 10/11/22  0624   INR 1.0   APTT 26.3    No results for input(s): CPK, CPKMB, TROPONINI, MB in the last 168 hours.    Electrolytes: Electrolytes replaced  Accuchecks: none    Gtts:   sodium chloride 0.9% Stopped (10/12/22 1456)    niCARdipine 7.5 mg/hr (10/12/22 1833)       LDA/Wounds:  Lines/Drains/Airways       Drain  Duration                  Urethral Catheter 10/11/22 1700 Silicone 16 Fr. 1 day         Closed/Suction Drain 10/12/22  1749 Posterior Neck Accordion 10 Fr. <1 day              Arterial Line  Duration             Arterial Line 10/12/22 1518 Right Radial <1 day              Peripheral Intravenous Line  Duration                  Peripheral IV - Single Lumen 10/10/22 2219 18 G Right Antecubital 1 day         Peripheral IV - Single Lumen 10/10/22 2220 18 G Left Forearm 1 day                  Wounds: Yes  Wound care consulted: No

## 2022-10-12 NOTE — PT/OT/SLP PROGRESS
Occupational Therapy      Patient Name:  Israel De Jesus   MRN:  [id number]    Patient not seen today secondary to  Pt to the OR for C1-C4 PCDF and possible occiput fusion with rib graft. Will required new OT orders when medically appropriate.     10/12/2022

## 2022-10-12 NOTE — HOSPITAL COURSE
10/11: Patient presents to the ED for odontoid fax. Admitted to [facility] for map goals. Imaging negative for vert dissection/injury. Traction tomorrow.  10/12: NAEON. AFVSS. Exam stable. Cervical traction in place. OR today.   10/13: POD1. NAEON. Neuro stable. Has c-collar on. HV in place. XR c spine look good.  10/14: Two short runs of vtach last night. Exam stable. C-collar continues to be in place.  10/15: Stepped down from ICU overnight, no telemetry issues. HV with 70cc/24h. AM labs pending. Pain controlled.   10/16: NAEON. AF, VSS. Tolerating PO, DC obrien today, passing gas.   10/17: Afebrile. Tachycardic without signs of symptoms. Repeat troponin WNL. Sodium 129. Denies new weakness or numbness.  BLE US negative. Multiple BM overnight. Reporting slight bleeding due to hemorrhoids.   10/18: Afebrile. Leukocytosis down-trending. Remains tachy, EKG showing multifocal atrial tachycardia. Magnesium and phosphorus WNL. F/u BUE US. UA negative. Patient with coffee ground emesis overnight and this AM. GI consulted. H&H stable. Heparin dc'd. Drain removed.   10/19: NAEON. Patient continues to have intermittent tachycardia. Afebrile. Dopplers showing SVT of the distal right cephalic vein. No evidence of DVTs. Workup negative otherwise. Na 129 today, labs ordered to assess hyponatremia. Patient denies further emesis. Denies new numbness/weakness/tingling. Working well with therapy. States he is having significant posterior neck pain and stiffness. Lidocaine patches ordered.   10/20: NAEON. HR improved. Afebrile. WBC trending down. No emesis, okay to restart SQH and continue Protonix per GI. Na 129 today. Labs showing likely pseudohyponatremia. Neuro stable. Pain controlled. Large BM today with relief of abdominal distention and pain.   10/21: NAEON. Neuro stable. Tachycardia resolved. Labs improving/wnl. Loose black stools yesterday evening but resolved this am. Repeat Hg trended up and occult stool negative. Pain  controlled.
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
## 2022-10-12 NOTE — PT/OT/SLP PROGRESS
Speech Language Pathology  Discharge Summary      Israel De Jesus  MRN: [id number]    Patient not seen today secondary to currently NPO for surgery. SLP will require new orders post op for continued skilled ST services.

## 2022-10-12 NOTE — ANESTHESIA PROCEDURE NOTES
Intubation    Date/Time: 10/12/2022 3:12 PM  Performed by: Blake Barker CRNA  Authorized by: Adebayo Dorsey MD     Intubation:     Induction:  Intravenous    Intubated:  Postinduction    Mask Ventilation:  N/a    Attempts:  1    Attempted By:  Student    Method of Intubation:  Video laryngoscopy    Blade:  Suosa 3    Laryngeal View Grade: Grade I - full view of cords      Difficult Airway Encountered?: No      Airway Device:  Oral endotracheal tube    Airway Device Size:  7.5    Style/Cuff Inflation:  Cuffed (inflated to minimal occlusive pressure)    Tube secured:  23    Secured at:  The lips    Placement Verified By:  Capnometry    Complicating Factors:  None    Findings Post-Intubation:  BS equal bilateral

## 2022-10-12 NOTE — PT/OT/SLP PROGRESS
Physical Therapy      Patient Name:  Israel De Jesus   MRN:  [id number]    Patient not seen today secondary to pt to OR today for C1-C4 PCDF and possible occiput fusion with rib graft. Current PT orders discontinued, pt will require new orders post op. Will follow-up pending new orders post op..

## 2022-10-12 NOTE — PROGRESS NOTES
"[facility] - Neuro Critical Care  Neurosurgery  Progress Note    Subjective:     History of Present Illness: Patient is a 62M with PMH of HTN and remote L knee surgery who presents with 3 days of weakness after a fall 2-3 weeks ago. Patient transferred from [facility] for NSGY consult regarding odontoid fracture. He reports that he has had persistent neck pain, but that his weakness did not start until a few days ago and is worsening. Patient complains of an intermittent "rumbling" almost vibrating feeling in his chest/abdomen, as well as sometimes in his various muscle groups. He claims that he feel decreased sensation across his limbs/body, including his thighs and calves where it started before moving up to his upper extremities. He symptoms do not seem to localize to a specific dermatome. He does not complain of any new radiculopathic or myelopathic symptoms. Patient reports that he has had to use a walker in the past few days. When laying down flat on the bed, he feels fine and can move all extremities at least AG. When he is sitting upright, he states that he cannot move his arms past the shoulder line. He also notes weakness in his BLE which was required him to use a walker to get around.Patient did not feel safe to get up and demonstrate gait, but claims he is weak when walking.    He presented in a C-collar. Patient denies taking AC/AP. He also denies bowel/bladder dysfunction.      Post-Op Info:  Procedure(s) (LRB):  POSTERIOR CERVICAL FUSION, SPINE C1-C4 PCF REGULAR BED. [place]. CHEST ROLLS. FLUOROSCOPY. [facility] NEUROMONITORING: EMG, SSEP, MEP (N/A)         Interval History: 10/12: NAEON. AFVSS. Exam stable. Cervical traction in place. OR today.     Medications:  Continuous Infusions:   sodium chloride 0.9% 50 mL/hr at 10/12/22 0601    niCARdipine Stopped (10/12/22 0739)     Scheduled Meds:   LIDOcaine HCL 10 mg/ml (1%)  10 mL Other Once    lisinopriL  10 mg Oral Daily    nicotine  1 patch Transdermal Daily " "   potassium phosphate IVPB  15 mmol Intravenous Once    senna-docusate 8.6-50 mg  1 tablet Oral BID     PRN Meds:sodium chloride, acetaminophen, calcium gluconate IVPB, calcium gluconate IVPB, calcium gluconate IVPB, HYDROcodone-acetaminophen, magnesium sulfate IVPB, magnesium sulfate IVPB, midazolam, ondansetron, potassium bicarbonate, potassium bicarbonate, potassium bicarbonate, potassium, sodium phosphates, potassium, sodium phosphates, potassium, sodium phosphates, sodium chloride 0.9%     Review of Systems  Objective:     Weight: 67.6 kg (149 lb)  Body mass index is 24.05 kg/m².  Vital Signs (Most Recent):  Temp: 98.3 °F (36.8 °C) (10/12/22 0705)  Pulse: 109 (10/12/22 0904)  Resp: 18 (10/12/22 0705)  BP: (!) 161/95 (10/12/22 0904)  SpO2: 97 % (10/12/22 0904)   Vital Signs (24h Range):  Temp:  [97.8 °F (36.6 °C)-98.3 °F (36.8 °C)] 98.3 °F (36.8 °C)  Pulse:  [] 109  Resp:  [15-38] 18  SpO2:  [94 %-98 %] 97 %  BP: (133-167)/(73-96) 161/95     Date 10/12/22 0700 - 10/13/22 0659   Shift 1362-0807 2256-5602 8243-8130 24 Hour Total   INTAKE   Shift Total(mL/kg)       OUTPUT   Urine(mL/kg/hr) 75   75   Shift Total(mL/kg) 75(1.1)   75(1.1)   Weight (kg) 67.6 67.6 67.6 67.6                        Urethral Catheter 10/11/22 1700 Silicone 16 Fr. (Active)   Site Assessment Clean;Intact 10/12/22 0301   Collection Container Urimeter 10/12/22 0301   Securement Method secured to top of thigh w/ adhesive device 10/12/22 0301   Catheter Care Performed yes 10/12/22 0301   Reason for Continuing Urinary Catheterization Critically ill in ICU and requiring hourly monitoring of intake/output 10/12/22 0301   CAUTI Prevention Bundle Securement Device in place with 1" slack;Intact seal between catheter & drainage tubing;Drainage bag/urimeter off the floor;Sheeting clip in use;No dependent loops or kinks;Drainage bag/urimeter not overfilled (<2/3 full);Drainage bag/urimeter below bladder 10/11/22 1901   Output (mL) 75 mL 10/12/22 " 0920       Physical Exam    Neurosurgery Physical Exam    AOx3, GCS [id number]  PERRL, EOMI, visual fields grossly intact  Facial sensation normal, no asymmetry, TM  Should shrug equal, no pronator drift  LUE 4 delt, 5 bi, 5 tri  RUE 4 delt, 4 bi, 4 tri  BLE 5/5 motor throughout  SILT, decreased sensation on anterior thighs/calves, upper arms  Coordination intact throughout  DTRs 1+ R knee, 2+ L knee  No Bains's/clonus    Cervical traction in place with 10lb traction    Significant Labs:  Recent Labs   Lab 10/11/22  0138 10/11/22  0624 10/12/22  0129   GLU 76  --  80   *  --  134*   K 3.2*  --  4.6     --  104   CO2 25  --  19*   BUN 10  --  8   CREATININE 0.8  --  0.8   CALCIUM 9.3  --  9.2   MG  --  1.6 1.6     Recent Labs   Lab 10/11/22  0138 10/12/22  0225   WBC 9.23 10.82   HGB 12.2* 14.4   HCT 36.6* 44.7    288     Recent Labs   Lab 10/11/22  0138 10/11/22  0624   INR 1.0 1.0   APTT 26.5 26.3     Microbiology Results (last 7 days)       ** No results found for the last 168 hours. **          All pertinent labs from the last 24 hours have been reviewed.    Significant Diagnostics:  I have reviewed all pertinent imaging results/findings within the past 24 hours.  X-Ray Spine 1 View Any Level    Result Date: 10/12/2022  As above. Electronically signed by: Anil Boyd Date:    10/12/2022 Time:    09:28    X-Ray Spine 1 View Any Level    Result Date: 10/12/2022  As above. This report was flagged in Epic as abnormal. Electronically signed by: Anil Boyd Date:    10/12/2022 Time:    09:03       Assessment/Plan:     * Odontoid fracture  Patient is a 62M with PMH of HTN who presents with odontoid fracture.    --Patient seen by NSGY at bedside  --CT cervical spine 10/10: acute complex odontoid fx completely through the base, displaced 12 mm anteriorly, mild fx of C2 pedicles, mod to severe C1-2  canal stenosis  --CTA neck without evidence for vert injury  --MRI Csp with cord edema and C2  alar, transverse and longitudinal ligamentous disruption  --MAP goals >85 and neuro-ICU admission if cord compression is seen on MRI   -Reccomend A-line for active pressure monitoring per NCC   -Reccomend Central line for pressor administration per NCC  --Maintain C-collar at all times   --Cervical traction in place with 10lb traction -> XR Csp with some reduction   --NPO   --OR today for C1-C4 PCDF; possible Occiput fusion with rib graft  --Continue to monitor clinically, notify NSGY immediately with any changes in neuro status    Dispo: OR    D/w Dr. Dotty Becker MD  Neurosurgery  [facility] - Neuro Critical Care

## 2022-10-12 NOTE — BRIEF OP NOTE
[facility] - Neuro Critical Care  Brief Operative Note    SUMMARY     Surgery Date: 10/12/2022     Surgeon(s) and Role:     * Ike Storm, DO - Primary     * Hasmukh Oliveira MD    Assisting Surgeon: None    Pre-op Diagnosis:  Closed displaced fracture of second cervical vertebra, unspecified fracture morphology, initial encounter [S12.100A]    Post-op Diagnosis:  Post-Op Diagnosis Codes:     * Closed displaced fracture of second cervical vertebra, unspecified fracture morphology, initial encounter [S12.100A]    Procedure(s) (LRB):  POSTERIOR CERVICAL FUSION, SPINE C1-C4 PCF  (N/A)  LAMINECTOMY, SPINE, C-1 (N/A)    Anesthesia: General    Operative Findings: C1-C4 decompressed and instrumented. No intraoperative complications.     Estimated Blood Loss: see op note    Estimated Blood Loss has been documented.         Specimens:   Specimen (24h ago, onward)      None            [id number]

## 2022-10-12 NOTE — ASSESSMENT & PLAN NOTE
Patient is a 62M with PMH of HTN who presents with odontoid fracture.    --Patient seen by NSGY at bedside  --CT cervical spine 10/10: acute complex odontoid fx completely through the base, displaced 12 mm anteriorly, mild fx of C2 pedicles, mod to severe C1-2  canal stenosis  --CTA neck without evidence for vert injury  --MRI Csp with cord edema and C2 alar, transverse and longitudinal ligamentous disruption  --MAP goals >85 and neuro-ICU admission if cord compression is seen on MRI   -Reccomend A-line for active pressure monitoring per NCC   -Reccomend Central line for pressor administration per NCC  --Maintain C-collar at all times   --Cervical traction in place with 10lb traction -> XR Csp with some reduction   --NPO   --OR today for C1-C4 PCDF; possible Occiput fusion with rib graft  --Continue to monitor clinically, notify NSGY immediately with any changes in neuro status    Dispo: OR    D/w Dr. Storm

## 2022-10-12 NOTE — OP NOTE
DATE OF PROCEDURE: 10/12/22    PREOPERATIVE DIAGNOSIS:  1. Complex anteriorly displaced type II odontoid fracture  2. Type I hangman's fracture  3. Severe cervical stenosis C1/2      POSTOPERATIVE DIAGNOSIS:  Same.     PROCEDURE PERFORMED:  1. Posterior spinal fusion, C1-C4  2. Posterior nonsegmental spinal fixation, C1-4 (Depuy)  3. Open reduction and internal fixation of type II odontoid and type I hangman's fractures of C2  4. C1 laminectomy for severe central stenosis  5. Use of intraoperative flouroscopy  6. Use of intraoperative neuromonitoring with MEPs  7. Local and synthetic bone grafting     SURGEON: DO Marita Chavez surgeon: Hasmukh Oliveira MD Ortho spine    ASSISTANT: none     ANESTHESIA: GETA     ESTIMATED BLOOD LOSS: 100mL     COMPLICATIONS: None     DRAINS: One deep.     SPECIMENS SENT: None     FINDINGS: None     INDICATIONS:     62 M presented after fall with neck pain and upper/lower extremity weakness.  He was found to have a complex anteriorly displaced type II odontoid fracture with 12mm of displacement and type I hangman's fractures.  MRI c spine also showed severe cervical stenosis at C1/2.  He was placed in cervical traction in the ICU with moderate reduction of his odontoid fracture.  He was thus offered a C1-4 posterior instrumented fusion with C1 laminectomy in order to reduce and fixate his fractures and to decompress his neural elements.     I explained the risks, benefits, alternatives, indications and methods of the procedure in detail. The patient voiced understanding and all questions were answered. No guarantees were made. The patient agreed to proceed as planned.    PROCEDURE:     The patient was brought into the operating room where he was intubated and placed under general anesthesia without difficulty. All lines were placed.  Pre flip motors were done and found to be reliable in all 4 extremities.  A Tineo clamp was placed bitemporally and he was repositioned prone  onto the hospital bed with the head fixed to the table in capital flexion and neck extension. Appropriate padding of all pressure points was placed.  Xrays were used to localize the region of interest. It also showed adequate spinal alignment in the sagittal plane with further reduction of his displaced odontoid fracture.  Motors were run again and found to be consistent with baseline.  The posterior cervical spine was marked prepped and draped in the usual sterile fashion.  A timeout was performed prior to the procedure.  10mL of Lidocaine with epinephrine were injected in the skin.     A linear incision was made with a 10 blade from approximately C1-C4.  Suprafascial dissection was carried out in the midline with Bovie electrocautery from C1 to C4, and a subfascial dissection was carried out with Bovie electrocautery and Tarango elevators to expose the posterior elements from C1 to C4. Levels were confirmed with lateral xray.     First, we exposed the C1-C2 facet joint by mobilizing the C2 nerve root and its venous plexus superiorly. We decorticated the joint with the high speed drill. Next we mobilized the C2 nerve root and its plexus inferiorly to expose the C1 lateral masses bilaterally.  We then cauterized bilateral C2 nerve roots using bipolar and monopolar cautery.   This allowed excellent visualization of bilateral C1 lateral masses.    We placed bilateral C1 lateral mass screws with anatomic landmarks and freehand technique. AP and lateral xrays showed good hardware placement and motors were consistent with baseline.    We then placed bilateral lateral mass screws at C3,4 using anatomic landmarks and free hand technique.  Fluoro showed excellent placement of hardware and motors were consistent with baseline.    We then performed a C1 laminectomy with high speed drill and rongeurs.  The thecal sac was well decompressed following the C1 laminectomy.  We then decorticated the posterior elements from C2-4.         Titanium rods were placed into the tulip heads at C1 and C4 bilaterally. Set screws were tightened. Final xrays showed good spinal alignment and a good positioning of the final hardware construct.The wound was copiously irrigated with a dilute Betadine solution and normal saline. Infuse was then packed into the decorticated C1-2 facet joints and along the posterior elements from C1-4.  Additional synthetic bone and autologous bone was laid down bilaterally from C1-4 to promote arthrodesis.  One gram of vancomycin powder was placed into the wound. A subfascial Hemovac drain was placed and tunneled through a separate incision, where it was secured with Vicryl sutures.  A watertight fascial closure was achieved with interrupted 0 Vicryl sutures and a running Stratafix suture.  The soft tissues were closed in layers and the skin was closed with 4/0 running monocryl.  A preneo dressing was then placed and coated with dermabond.     The patient appeared to tolerate the procedure well from a hemodynamic and neuromonitoring standpoint.  Motor evoked potentials were present and stable in all extremities throughout the case. I was present for all critical portions of the case, and at the end of the case all counts were correct. He was removed from the Tineo clamp and repositioned supine onto the hospital bed where he was extubated and allowed to emerge from anesthesia without difficulty.  He was sent to the ICU for recovery.    Justification of Co surgeon: This was a complex displaced type II odontoid fracture in the setting of type I hangman's fracture of C2.  It was felt that 2 attending surgeons were needed in order to limit blood loss, place hardware, reduce and fixate the fracture, and perform the decompression in order to optimize patient outcomes.

## 2022-10-12 NOTE — ANESTHESIA PROCEDURE NOTES
Arterial    Diagnosis: posterior cervical fusion    Patient location during procedure: done in OR  Procedure start time: 10/12/2022 3:18 PM  Timeout: 10/12/2022 3:17 PM  Procedure end time: 10/12/2022 3:19 PM    Staffing  Authorizing Provider: Adebayo Dorsey MD  Performing Provider: Blake Barker CRNA    Anesthesiologist was present at the time of the procedure.    Preanesthetic Checklist  Completed: patient identified, IV checked, site marked, risks and benefits discussed, surgical consent, monitors and equipment checked, pre-op evaluation, timeout performed and anesthesia consent givenArterial  Skin Prep: chlorhexidine gluconate  Local Infiltration: none  Orientation: right  Location: radial    Catheter Size: 20 G  Catheter placement by Anatomical landmarks. Heme positive aspiration all ports. Insertion Attempts: 1  Assessment  Dressing: secured with tape and tegaderm  Patient: Tolerated well

## 2022-10-12 NOTE — PLAN OF CARE
Problem: Adult Inpatient Plan of Care  Goal: Plan of Care Review  10/12/2022 1842 by Getachew Mcmahan RN  Outcome: Ongoing, Progressing  10/12/2022 1506 by Getachew Mcmahan RN  Outcome: Ongoing, Progressing  Goal: Patient-Specific Goal (Individualized)  10/12/2022 1842 by Getachew Mcmahan RN  Outcome: Ongoing, Progressing  10/12/2022 1506 by Getachew Mcmahan RN  Outcome: Ongoing, Progressing  Goal: Absence of Hospital-Acquired Illness or Injury  10/12/2022 1842 by Getachew Mcmahan RN  Outcome: Ongoing, Progressing  10/12/2022 1506 by Getachew Mcmahan RN  Outcome: Ongoing, Progressing  Goal: Optimal Comfort and Wellbeing  10/12/2022 1842 by Getachew Mcmahan RN  Outcome: Ongoing, Progressing  10/12/2022 1506 by Getachew Mcmahan RN  Outcome: Ongoing, Progressing  Goal: Readiness for Transition of Care  10/12/2022 1842 by Getachew Mcmahan RN  Outcome: Ongoing, Progressing  10/12/2022 1506 by Getachew Mcmahan RN  Outcome: Ongoing, Progressing     Problem: Adjustment to Illness (Stroke, Hemorrhagic)  Goal: Optimal Coping  10/12/2022 1842 by Getachew Mcmahan RN  Outcome: Ongoing, Progressing  10/12/2022 1506 by Getachew Mcmahan RN  Outcome: Ongoing, Progressing     Problem: Bowel Elimination Impaired (Stroke, Hemorrhagic)  Goal: Effective Bowel Elimination  10/12/2022 1842 by Getachew Mcmahan RN  Outcome: Ongoing, Progressing  10/12/2022 1506 by Getachew Mcmahan RN  Outcome: Ongoing, Progressing     Problem: Cerebral Tissue Perfusion (Stroke, Hemorrhagic)  Goal: Optimal Cerebral Tissue Perfusion  10/12/2022 1842 by Getachew Mcmahan RN  Outcome: Ongoing, Progressing  10/12/2022 1506 by Getachew Mcmahan RN  Outcome: Ongoing, Progressing     Problem: Cognitive Impairment (Stroke, Hemorrhagic)  Goal: Optimal Cognitive Function  10/12/2022 1842 by Getachew Mcmahan RN  Outcome: Ongoing, Progressing  10/12/2022 1506 by Getachew Mcmahan RN  Outcome: Ongoing, Progressing     Problem: Communication Impairment (Stroke, Hemorrhagic)  Goal: Effective Communication  Skills  10/12/2022 1842 by Getachew Mcmahan RN  Outcome: Ongoing, Progressing  10/12/2022 1506 by Getachew Mcmahan RN  Outcome: Ongoing, Progressing     Problem: Functional Ability Impaired (Stroke, Hemorrhagic)  Goal: Optimal Functional Ability  10/12/2022 1842 by Getachew Mcmahan RN  Outcome: Ongoing, Progressing  10/12/2022 1506 by Getachew Mcmahan RN  Outcome: Ongoing, Progressing     Problem: Pain (Stroke, Hemorrhagic)  Goal: Acceptable Pain Control  10/12/2022 1842 by Getachew Mcmahan RN  Outcome: Ongoing, Progressing  10/12/2022 1506 by Getachew Mcmahan RN  Outcome: Ongoing, Progressing     Problem: Respiratory Compromise (Stroke, Hemorrhagic)  Goal: Effective Oxygenation and Ventilation  10/12/2022 1842 by Getachew Mcmahan RN  Outcome: Ongoing, Progressing  10/12/2022 1506 by Getachew Mcmahan RN  Outcome: Ongoing, Progressing     Problem: Sensorimotor Impairment (Stroke, Hemorrhagic)  Goal: Improved Sensorimotor Function  10/12/2022 1842 by Getachew Mcmahan RN  Outcome: Ongoing, Progressing  10/12/2022 1506 by Getachew Mcmahan RN  Outcome: Ongoing, Progressing     Problem: Swallowing Impairment (Stroke, Hemorrhagic)  Goal: Optimal Eating and Swallowing Without Aspiration  10/12/2022 1842 by Getachew Mcmahan RN  Outcome: Ongoing, Progressing  10/12/2022 1506 by Getachew Mcmahan RN  Outcome: Ongoing, Progressing     Problem: Urinary Elimination Impaired (Stroke, Hemorrhagic)  Goal: Effective Urinary Elimination  10/12/2022 1842 by Getachew Mcmahan RN  Outcome: Ongoing, Progressing  10/12/2022 1506 by Getachew Mcmahan RN  Outcome: Ongoing, Progressing     Problem: Skin Injury Risk Increased  Goal: Skin Health and Integrity  10/12/2022 1842 by Getachew Mcmahan RN  Outcome: Ongoing, Progressing  10/12/2022 1506 by Getachew Mcmahan RN  Outcome: Ongoing, Progressing     Problem: Infection  Goal: Absence of Infection Signs and Symptoms  10/12/2022 1842 by Getachew Mcmahan RN  Outcome: Ongoing, Progressing  10/12/2022 1506 by Getachew Mcmahan  RN  Outcome: Ongoing, Progressing

## 2022-10-12 NOTE — HOSPITAL COURSE
10/12/2022 NAEON. Cardene off this AM. After physical exam, pt became tachycardic to 150-200 sustaining for around 10 minutes. Resolved without intervention. Pt denied any pain, SOB, lightheadedness, chest pain or palpitations. EKG with shortened NJ interval, no arrhythmia. Pt in 10lb traction. Pending OR this afternoon.   10/13/2022 S/p C1-4 ORIF. Occasional episodes of tachycardia 150-210s. Added amlodipine 5mg.   10/14/2022 6 beat run of Vtach overnight. CMP WNL. Increase lisinopril. D/c Art line. Stable to TTF.

## 2022-10-13 PROBLEM — R00.0 TACHYCARDIA: Status: ACTIVE | Noted: 2022-10-13

## 2022-10-13 LAB
ALBUMIN SERPL BCP-MCNC: 2.2 G/DL (ref 3.5–5.2)
ALP SERPL-CCNC: 95 U/L (ref 55–135)
ALT SERPL W/O P-5'-P-CCNC: 12 U/L (ref 10–44)
ANION GAP SERPL CALC-SCNC: 5 MMOL/L (ref 8–16)
ANION GAP SERPL CALC-SCNC: 9 MMOL/L (ref 8–16)
AST SERPL-CCNC: 23 U/L (ref 10–40)
BASOPHILS # BLD AUTO: 0.01 K/UL (ref 0–0.2)
BASOPHILS NFR BLD: 0.1 % (ref 0–1.9)
BILIRUB SERPL-MCNC: 0.3 MG/DL (ref 0.1–1)
BUN SERPL-MCNC: 16 MG/DL (ref 8–23)
BUN SERPL-MCNC: 8 MG/DL (ref 8–23)
CALCIUM SERPL-MCNC: 8.9 MG/DL (ref 8.7–10.5)
CALCIUM SERPL-MCNC: 9.1 MG/DL (ref 8.7–10.5)
CHLORIDE SERPL-SCNC: 105 MMOL/L (ref 95–110)
CHLORIDE SERPL-SCNC: 105 MMOL/L (ref 95–110)
CO2 SERPL-SCNC: 22 MMOL/L (ref 23–29)
CO2 SERPL-SCNC: 24 MMOL/L (ref 23–29)
CREAT SERPL-MCNC: 0.8 MG/DL (ref 0.5–1.4)
CREAT SERPL-MCNC: 1 MG/DL (ref 0.5–1.4)
DIFFERENTIAL METHOD: ABNORMAL
EOSINOPHIL # BLD AUTO: 0 K/UL (ref 0–0.5)
EOSINOPHIL NFR BLD: 0 % (ref 0–8)
ERYTHROCYTE [DISTWIDTH] IN BLOOD BY AUTOMATED COUNT: 12.5 % (ref 11.5–14.5)
EST. GFR  (NO RACE VARIABLE): >60 ML/MIN/1.73 M^2
EST. GFR  (NO RACE VARIABLE): >60 ML/MIN/1.73 M^2
GLUCOSE SERPL-MCNC: 173 MG/DL (ref 70–110)
GLUCOSE SERPL-MCNC: 211 MG/DL (ref 70–110)
HCT VFR BLD AUTO: 41.1 % (ref 40–54)
HGB BLD-MCNC: 13.5 G/DL (ref 14–18)
IMM GRANULOCYTES # BLD AUTO: 0.04 K/UL (ref 0–0.04)
IMM GRANULOCYTES NFR BLD AUTO: 0.3 % (ref 0–0.5)
LYMPHOCYTES # BLD AUTO: 0.3 K/UL (ref 1–4.8)
LYMPHOCYTES NFR BLD: 2.7 % (ref 18–48)
MAGNESIUM SERPL-MCNC: 1.6 MG/DL (ref 1.6–2.6)
MAGNESIUM SERPL-MCNC: 1.9 MG/DL (ref 1.6–2.6)
MCH RBC QN AUTO: 34.3 PG (ref 27–31)
MCHC RBC AUTO-ENTMCNC: 32.8 G/DL (ref 32–36)
MCV RBC AUTO: 104 FL (ref 82–98)
MONOCYTES # BLD AUTO: 0.1 K/UL (ref 0.3–1)
MONOCYTES NFR BLD: 0.4 % (ref 4–15)
NEUTROPHILS # BLD AUTO: 11.4 K/UL (ref 1.8–7.7)
NEUTROPHILS NFR BLD: 96.5 % (ref 38–73)
NRBC BLD-RTO: 0 /100 WBC
PHOSPHATE SERPL-MCNC: 3.6 MG/DL (ref 2.7–4.5)
PLATELET # BLD AUTO: 316 K/UL (ref 150–450)
PMV BLD AUTO: 10.8 FL (ref 9.2–12.9)
POTASSIUM SERPL-SCNC: 4.6 MMOL/L (ref 3.5–5.1)
POTASSIUM SERPL-SCNC: 4.6 MMOL/L (ref 3.5–5.1)
PROT SERPL-MCNC: 5.2 G/DL (ref 6–8.4)
RBC # BLD AUTO: 3.94 M/UL (ref 4.6–6.2)
SODIUM SERPL-SCNC: 134 MMOL/L (ref 136–145)
SODIUM SERPL-SCNC: 136 MMOL/L (ref 136–145)
WBC # BLD AUTO: 11.78 K/UL (ref 3.9–12.7)

## 2022-10-13 PROCEDURE — 97161 PT EVAL LOW COMPLEX 20 MIN: CPT

## 2022-10-13 PROCEDURE — 94761 N-INVAS EAR/PLS OXIMETRY MLT: CPT

## 2022-10-13 PROCEDURE — 85025 COMPLETE CBC W/AUTO DIFF WBC: CPT | Performed by: INTERNAL MEDICINE

## 2022-10-13 PROCEDURE — 25000003 PHARM REV CODE 250: Performed by: INTERNAL MEDICINE

## 2022-10-13 PROCEDURE — 25000003 PHARM REV CODE 250

## 2022-10-13 PROCEDURE — 63600175 PHARM REV CODE 636 W HCPCS: Performed by: STUDENT IN AN ORGANIZED HEALTH CARE EDUCATION/TRAINING PROGRAM

## 2022-10-13 PROCEDURE — 80053 COMPREHEN METABOLIC PANEL: CPT | Performed by: PHYSICIAN ASSISTANT

## 2022-10-13 PROCEDURE — 63600175 PHARM REV CODE 636 W HCPCS

## 2022-10-13 PROCEDURE — 20000000 HC ICU ROOM

## 2022-10-13 PROCEDURE — 84100 ASSAY OF PHOSPHORUS: CPT | Performed by: PHYSICIAN ASSISTANT

## 2022-10-13 PROCEDURE — 99233 PR SUBSEQUENT HOSPITAL CARE,LEVL III: ICD-10-PCS | Mod: ,,, | Performed by: INTERNAL MEDICINE

## 2022-10-13 PROCEDURE — S4991 NICOTINE PATCH NONLEGEND: HCPCS

## 2022-10-13 PROCEDURE — 97112 NEUROMUSCULAR REEDUCATION: CPT

## 2022-10-13 PROCEDURE — 25000003 PHARM REV CODE 250: Performed by: PHYSICIAN ASSISTANT

## 2022-10-13 PROCEDURE — 97535 SELF CARE MNGMENT TRAINING: CPT

## 2022-10-13 PROCEDURE — 97166 OT EVAL MOD COMPLEX 45 MIN: CPT

## 2022-10-13 PROCEDURE — 97116 GAIT TRAINING THERAPY: CPT

## 2022-10-13 PROCEDURE — 83735 ASSAY OF MAGNESIUM: CPT | Mod: 91 | Performed by: INTERNAL MEDICINE

## 2022-10-13 PROCEDURE — 80048 BASIC METABOLIC PNL TOTAL CA: CPT | Mod: XB | Performed by: INTERNAL MEDICINE

## 2022-10-13 PROCEDURE — 25000003 PHARM REV CODE 250: Performed by: STUDENT IN AN ORGANIZED HEALTH CARE EDUCATION/TRAINING PROGRAM

## 2022-10-13 PROCEDURE — 99233 SBSQ HOSP IP/OBS HIGH 50: CPT | Mod: ,,, | Performed by: INTERNAL MEDICINE

## 2022-10-13 PROCEDURE — 83735 ASSAY OF MAGNESIUM: CPT | Performed by: PHYSICIAN ASSISTANT

## 2022-10-13 RX ORDER — METHOCARBAMOL 500 MG/1
1000 TABLET, FILM COATED ORAL EVERY 8 HOURS
Status: DISCONTINUED | OUTPATIENT
Start: 2022-10-13 | End: 2022-10-21 | Stop reason: HOSPADM

## 2022-10-13 RX ORDER — AMLODIPINE BESYLATE 5 MG/1
5 TABLET ORAL DAILY
Status: DISCONTINUED | OUTPATIENT
Start: 2022-10-13 | End: 2022-10-21 | Stop reason: HOSPADM

## 2022-10-13 RX ORDER — AMLODIPINE BESYLATE 10 MG/1
10 TABLET ORAL DAILY
Status: DISCONTINUED | OUTPATIENT
Start: 2022-10-13 | End: 2022-10-13

## 2022-10-13 RX ADMIN — HEPARIN SODIUM 5000 UNITS: 5000 INJECTION INTRAVENOUS; SUBCUTANEOUS at 06:10

## 2022-10-13 RX ADMIN — METHOCARBAMOL 1000 MG: 500 TABLET ORAL at 02:10

## 2022-10-13 RX ADMIN — LISINOPRIL 10 MG: 10 TABLET ORAL at 09:10

## 2022-10-13 RX ADMIN — ACETAMINOPHEN 1000 MG: 500 TABLET ORAL at 09:10

## 2022-10-13 RX ADMIN — MAGNESIUM SULFATE 2 G: 2 INJECTION INTRAVENOUS at 03:10

## 2022-10-13 RX ADMIN — AMLODIPINE BESYLATE 5 MG: 5 TABLET ORAL at 10:10

## 2022-10-13 RX ADMIN — NICOTINE 1 PATCH: 14 PATCH, EXTENDED RELEASE TRANSDERMAL at 09:10

## 2022-10-13 RX ADMIN — METHOCARBAMOL 1000 MG: 500 TABLET ORAL at 09:10

## 2022-10-13 RX ADMIN — OXYCODONE 5 MG: 5 TABLET ORAL at 10:10

## 2022-10-13 RX ADMIN — OXYCODONE 5 MG: 5 TABLET ORAL at 09:10

## 2022-10-13 RX ADMIN — PREGABALIN 75 MG: 75 CAPSULE ORAL at 09:10

## 2022-10-13 RX ADMIN — METHOCARBAMOL 1000 MG: 100 INJECTION, SOLUTION INTRAMUSCULAR; INTRAVENOUS at 06:10

## 2022-10-13 RX ADMIN — ACETAMINOPHEN 1000 MG: 500 TABLET ORAL at 06:10

## 2022-10-13 RX ADMIN — SENNOSIDES AND DOCUSATE SODIUM 1 TABLET: 50; 8.6 TABLET ORAL at 09:10

## 2022-10-13 NOTE — PLAN OF CARE
[facility] Care Plan    POC reviewed with Israel De Jesus 0315. Pt verbalized understanding. Questions and concerns addressed. No acute events overnight. Pt progressing toward goals.     Cardene on/off.  XR of c-spine complete  C-collar placed  Oxy x1   Mg replaced  Drain output: 110 cc/shift.  Blanco removed    Will continue to monitor. See below and flowsheets for full assessment and VS info.           Is this a stroke patient? no    Neuro:  [place] Coma Scale  Best Eye Response: 4-->(E4) spontaneous  Best Motor Response: 6-->(M6) obeys commands  Best Verbal Response: 5-->(V5) oriented  [place] Coma Scale Score: 15  Assessment Qualifiers: patient not sedated/intubated, no eye obstruction present  Pupil PERRLA: yes     24hr Temp:  [94.8 °F (34.9 °C)-98.3 °F (36.8 °C)]     CV:   Rhythm: sinus tachycardia  BP goals:   SBP < 160  MAP > 85    Resp:   O2 Device (Oxygen Therapy): room air       Plan: N/A    GI/:     Diet/Nutrition Received: regular  Last Bowel Movement: 10/10/22  Voiding Characteristics: urethral catheter (bladder)    Intake/Output Summary (Last 24 hours) at 10/13/2022 0344  Last data filed at 10/13/2022 0302  Gross per 24 hour   Intake 3162.94 ml   Output 1185 ml   Net 1977.94 ml     Unmeasured Output  Stool Occurrence: 0    Labs/Accuchecks:  Recent Labs   Lab 10/13/22  0031   WBC 11.78   RBC 3.94*   HGB 13.5*   HCT 41.1         Recent Labs   Lab 10/13/22  0031      K 4.6   CO2 22*      BUN 8   CREATININE 0.8   ALKPHOS 95   ALT 12   AST 23   BILITOT 0.3      Recent Labs   Lab 10/11/22  0624   INR 1.0   APTT 26.3    No results for input(s): CPK, CPKMB, TROPONINI, MB in the last 168 hours.    Electrolytes: Electrolytes replaced  Accuchecks: none    Gtts:   sodium chloride 0.9% Stopped (10/12/22 1456)    niCARdipine Stopped (10/13/22 0256)       LDA/Wounds:  Lines/Drains/Airways       Drain  Duration                  Urethral Catheter 10/11/22 1700 Silicone 16 Fr. 1 day          Closed/Suction Drain 10/12/22 1749 Posterior Neck Accordion 10 Fr. <1 day              Arterial Line  Duration             Arterial Line 10/12/22 1518 Right Radial <1 day              Peripheral Intravenous Line  Duration                  Peripheral IV - Single Lumen 10/10/22 2219 18 G Right Antecubital 2 days         Peripheral IV - Single Lumen 10/10/22 2220 18 G Left Forearm 2 days                  Wounds: No  Wound care consulted: No         Problem: Adult Inpatient Plan of Care  Goal: Plan of Care Review  Outcome: Ongoing, Progressing  Goal: Optimal Comfort and Wellbeing  Outcome: Ongoing, Progressing  Goal: Readiness for Transition of Care  Outcome: Ongoing, Progressing

## 2022-10-13 NOTE — PLAN OF CARE
Problem: Physical Therapy  Goal: Physical Therapy Goal  Description: PT goals until 10/20/22    1. Pt supine to sit with CGA through sidelying-not met  2. Pt sit to supine with CGA through sidelying-not met  3. Pt sit to stand with RW with CGA-not met  4. Pt to perform gait 100ft with RW with CGA.-not met  5. Pt to go up/down curb step with RW with minimal assist.-not met  6. Pt to transfer bed to/from bedside chair with RW with CGA.-not met  7. Pt to perform B LE exs in sitting or supine x 15 reps to strengthen B LE to improve functional mobility.-not met   Outcome: Ongoing, Progressing   Pt's goals set and pt will benefit from skilled PT services to work towards improved functional mobility including: bed mobility, transfers, up/down steps, and gait.   10/13/2022

## 2022-10-13 NOTE — PROGRESS NOTES
"[facility] - Neuro Critical Care  Neurosurgery  Progress Note    Subjective:     History of Present Illness: Patient is a 62M with PMH of HTN and remote L knee surgery who presents with 3 days of weakness after a fall 2-3 weeks ago. Patient transferred from OSH for NSGY consult regarding odontoid fracture. He reports that he has had persistent neck pain, but that his weakness did not start until a few days ago and is worsening. Patient complains of an intermittent "rumbling" almost vibrating feeling in his chest/abdomen, as well as sometimes in his various muscle groups. He claims that he feel decreased sensation across his limbs/body, including his thighs and calves where it started before moving up to his upper extremities. He symptoms do not seem to localize to a specific dermatome. He does not complain of any new radiculopathic or myelopathic symptoms. Patient reports that he has had to use a walker in the past few days. When laying down flat on the bed, he feels fine and can move all extremities at least AG. When he is sitting upright, he states that he cannot move his arms past the shoulder line. He also notes weakness in his BLE which was required him to use a walker to get around.Patient did not feel safe to get up and demonstrate gait, but claims he is weak when walking.    He presented in a C-collar. Patient denies taking AC/AP. He also denies bowel/bladder dysfunction.      Post-Op Info:  Procedure(s) (LRB):  POSTERIOR CERVICAL FUSION, SPINE C1-C4 PCF  (N/A)  LAMINECTOMY, SPINE, C-1 (N/A)   1 Day Post-Op     Interval History: 10/13: POD1. MICHAELON. Neuro stable. Has c-collar on. HV in place. XR c spine look good.  Medications:  Continuous Infusions:      Scheduled Meds:   acetaminophen  1,000 mg Oral Q8H    amLODIPine  5 mg Oral Daily    heparin (porcine)  5,000 Units Subcutaneous Q8H    lisinopriL  10 mg Oral Daily    methocarbamoL  1,000 mg Oral Q8H    nicotine  1 patch Transdermal Daily    " "polyethylene glycol  17 g Oral Daily    pregabalin  75 mg Oral BID    senna-docusate 8.6-50 mg  1 tablet Oral BID     PRN Meds:sodium chloride, calcium gluconate IVPB, calcium gluconate IVPB, calcium gluconate IVPB, HYDROmorphone, magnesium sulfate IVPB, magnesium sulfate IVPB, ondansetron, oxyCODONE, potassium bicarbonate, potassium bicarbonate, potassium bicarbonate, potassium, sodium phosphates, potassium, sodium phosphates, potassium, sodium phosphates, sodium chloride 0.9%     Review of Systems  Objective:     Weight: 67.6 kg (149 lb)  Body mass index is 24.05 kg/m².  Vital Signs (Most Recent):  Temp: 97.6 °F (36.4 °C) (10/13/22 0302)  Pulse: 102 (10/13/22 0603)  Resp: (!) 23 (10/13/22 1023)  BP: (!) 159/81 (10/13/22 1000)  SpO2: 96 % (10/13/22 0603)   Vital Signs (24h Range):  Temp:  [94.8 °F (34.9 °C)-97.6 °F (36.4 °C)] 97.6 °F (36.4 °C)  Pulse:  [] 102  Resp:  [15-23] 23  SpO2:  [94 %-100 %] 96 %  BP: (132-185)/() 159/81  Arterial Line BP: (107-171)/(69-93) 107/93     Date 10/13/22 0700 - 10/14/22 0659   Shift 2618-2359 7308-7447 0246-7581 24 Hour Total   INTAKE   P.O. 500   500   IV Piggyback 99.9   99.9   Shift Total(mL/kg) 599.9(8.9)   599.9(8.9)   OUTPUT   Shift Total(mL/kg)       Weight (kg) 67.6 67.6 67.6 67.6                          Urethral Catheter 10/11/22 1700 Silicone 16 Fr. (Active)   Site Assessment Clean;Intact 10/12/22 0301   Collection Container Urimeter 10/12/22 0301   Securement Method secured to top of thigh w/ adhesive device 10/12/22 0301   Catheter Care Performed yes 10/12/22 0301   Reason for Continuing Urinary Catheterization Critically ill in ICU and requiring hourly monitoring of intake/output 10/12/22 0301   CAUTI Prevention Bundle Securement Device in place with 1" slack;Intact seal between catheter & drainage tubing;Drainage bag/urimeter off the floor;Sheeting clip in use;No dependent loops or kinks;Drainage bag/urimeter not overfilled (<2/3 full);Drainage " bag/urimeter below bladder 10/11/22 1901   Output (mL) 75 mL 10/12/22 0920       Physical Exam    Neurosurgery Physical Exam    AOx3, GCS E4V5M6  PERRL, EOMI, visual fields grossly intact  Facial sensation normal, no asymmetry, TM  Should shrug equal, no pronator drift  LUE 4 io, otherwise 5/5  RUE 4+ io, effort dependent R tri weakness, otherwise 5/5  BLE 5/5 motor throughout  SILT, decreased sensation on anterior thighs/calves, upper arms  Coordination intact throughout  DTRs 1+ R knee, 2+ L knee  No Bains's/clonus    C-collar in place    Significant Labs:  Recent Labs   Lab 10/12/22  0129 10/13/22  0031   GLU 80 211*   * 136   K 4.6 4.6    105   CO2 19* 22*   BUN 8 8   CREATININE 0.8 0.8   CALCIUM 9.2 9.1   MG 1.6 1.6       Recent Labs   Lab 10/12/22  0225 10/13/22  0031   WBC 10.82 11.78   HGB 14.4 13.5*   HCT 44.7 41.1    316       No results for input(s): LABPT, INR, APTT in the last 48 hours.    Microbiology Results (last 7 days)       ** No results found for the last 168 hours. **          All pertinent labs from the last 24 hours have been reviewed.    Significant Diagnostics:  I have reviewed all pertinent imaging results/findings within the past 24 hours.  X-Ray Cervical Spine AP And Lateral 10/12/2022    Narrative  EXAMINATION:  XR CERVICAL SPINE AP LATERAL    CLINICAL HISTORY:  s/p C1-C4 PCDF;    TECHNIQUE:  AP, lateral and open mouth views of the cervical spine were performed.    COMPARISON:  10/12/2022.    FINDINGS:  Postoperative changes of posterior cervical fusion at C1-C4 with grossly normal alignment of known odontoid fracture.  Remaining vertebral body heights are relatively well maintained.  Grossly normal sagittal alignment.  No new acute fracture identified.  Surgical drain and cervical collar are present.  Prevertebral soft tissues are unremarkable.    Impression  Postoperative changes of posterior cervical fusion at C1-C4 in this patient with known odontoid  fracture.      Electronically signed by: Ivan East MD  Date:    10/12/2022  Time:    23:33        Assessment/Plan:     * Odontoid fracture  Patient is a 62M with PMH of HTN who presents with odontoid fracture.    --Neuro-ICU status   -stable for TTF today with q4 neurochecks  --CT cervical spine 10/10: acute complex odontoid fx completely through the base, displaced 12 mm anteriorly, mild fx of C2 pedicles, mod to severe C1-2  canal stenosis  --CTA neck without evidence for vert injury  --MRI Csp with cord edema and C2 alar, transverse and longitudinal ligamentous disruption  --XR Csp 10/12: grossly normal alignment of known odontoid fracture  --Maintain C-collar at all times   --PT/OT  --OK for SQH 24 hours after surgery  --Continue to monitor clinically, notify NSGY immediately with any changes in neuro status    Dispo: stable for TTF    D/w Dr. Dotty Mike MD  Neurosurgery  [facility] - Neuro Critical Care
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
## 2022-10-13 NOTE — ASSESSMENT & PLAN NOTE
Patient is a 62M with PMH of HTN who presents with odontoid fracture.    --Neuro-ICU status   -stable for TTF today with q4 neurochecks  --CT cervical spine 10/10: acute complex odontoid fx completely through the base, displaced 12 mm anteriorly, mild fx of C2 pedicles, mod to severe C1-2  canal stenosis  --CTA neck without evidence for vert injury  --MRI Csp with cord edema and C2 alar, transverse and longitudinal ligamentous disruption  --XR Csp 10/12: grossly normal alignment of known odontoid fracture  --Maintain C-collar at all times   --PT/OT  --OK for SQH 24 hours after surgery  --Continue to monitor clinically, notify NSGY immediately with any changes in neuro status    Dispo: stable for TTF    D/w Dr. Storm
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
## 2022-10-13 NOTE — PLAN OF CARE
[facility] - Neuro Critical Care  Discharge Reassessment    Primary Care Provider: Andrae Camarillo MD    Expected Discharge Date: 10/19/2022    Post-Op Info:  Procedure(s) (LRB):  POSTERIOR CERVICAL FUSION, SPINE C1-C4 PCF  (N/A)  LAMINECTOMY, SPINE, C-1 (N/A)   1 Day Post-Op      Per MD:  10/13: POD1. NAEON. Neuro stable. Has c-collar on. HV in place.  Awaiting therapy recs when appropriate.  Patient with Pending Medicaid and no post acute benefits at this time.    Patient not medically ready for discharge.     Payor: MEDICAID / Plan: PENDING MEDICAID / Product Type: Government /       Reassessment (most recent)       Discharge Reassessment - 10/13/22 1217          Discharge Reassessment    Assessment Type Discharge Planning Reassessment     Did the patient's condition or plan change since previous assessment? No     Communicated BRANDON with patient/caregiver Date not available/Unable to determine     Discharge Plan A Rehab     Discharge Plan B Home Health     DME Needed Upon Discharge  none     Discharge Barriers Identified Unisured     Why the patient remains in the hospital Requires continued medical care                   Mimi Miller RN, CCRN-K, [facility]  Neuro-Critical Care   [phone]

## 2022-10-13 NOTE — PLAN OF CARE
10/13/22 1606   Post-Acute Status   Post-Acute Authorization Placement;Other   Post-Acute Placement Status Pending medical clearance/testing   Other Status See Comments  (need rehab, pending Medicaid)   Discharge Delays (!) Payor Issues  (pending Medicaid)     SW reviewed chart for dc planning needs. Therapy recs are for rehab, but Pt has no insurance, is pending Medicaid so this is not a covered benefit. Pt will need OP upon dc.     Madeline Stanley LCSW  Neurocritical Care   [facility]  [phone]

## 2022-10-13 NOTE — ASSESSMENT & PLAN NOTE
Patient is a 62M with PMH of HTN who presents with odontoid fracture.  --CT cervical spine 10/10: acute complex odontoid fx completely through the base, displaced 12 mm anteriorly, mild fx of C2 pedicles, mod to severe C1-2  canal stenosis  -- CTA neck   -- Unstable displaced acute fracture of the odontoid process with extension into the bilateral lateral masses of C2 and left transverse foramen.  Associated focal narrowing of the left vertebral artery at the level of the left transverse foramen, which remains patent distally.  Subtle left vertebral artery injury or compression is difficult to exclude.  --MRI C-spine w/o  -- Complex comminuted fracture involving the C2 body and dens demonstrated to better extent on prior cervical spine CT. 9 mm anterolisthesis of the dens relative to the body of C2.  Subsequent moderate/severe spinal canal stenosis.  Increased intramedullary cord signal at this concerning for edema.    --SBP <160  --Maintain C-collar at all times  --Follow-up full pre-op labs (CBC/CMP/PT-INR/PTT/T&S)  --EKG  --Echo, EF 60%  --Placed in traction 10/11  --C1-4 ORIF on 10/12  --Keep C collar in place  --Resume diet   --Start SQH tonight   --Robaxin  --Tylenol   --Lyrica   --Continue to monitor clinically, notify NSGY immediately with any changes in neuro status

## 2022-10-13 NOTE — ANESTHESIA POSTPROCEDURE EVALUATION
Anesthesia Post Evaluation    Patient: Israel De Jesus    Procedure(s) Performed: Procedure(s) (LRB):  POSTERIOR CERVICAL FUSION, SPINE C1-C4 PCF  (N/A)  LAMINECTOMY, SPINE, C-1 (N/A)    Final Anesthesia Type: general      Patient location during evaluation: PACU  Patient participation: Yes- Able to Participate  Level of consciousness: awake and alert  Post-procedure vital signs: reviewed and stable  Pain management: adequate  Airway patency: patent    PONV status at discharge: No PONV  Anesthetic complications: no      Cardiovascular status: blood pressure returned to baseline  Respiratory status: unassisted  Hydration status: euvolemic  Follow-up not needed.          Vitals Value Taken Time   /111 10/13/22 1401   Temp 36.4 °C (97.6 °F) 10/13/22 0302   Pulse 101 10/13/22 1409   Resp 23 10/13/22 1023   SpO2 97 % 10/13/22 1409   Vitals shown include unvalidated device data.      No case tracking events are documented in the log.      Pain/Fannie Score: Pain Rating Prior to Med Admin: 6 (10/13/2022 10:23 AM)

## 2022-10-13 NOTE — ASSESSMENT & PLAN NOTE
Occasional episodes of tachycardia 150-210s sustaining for several minutes. BP stable.  - P waves present   - Slight improvement with vagal maneuver.  - On home Lisinopril 10  - Given pt's lack of CAD, added amlodipine 5mg  - EKG   - Monitor for 24 hours before transfer to floor with nsgy   - Will likely need out patient cards workup

## 2022-10-13 NOTE — SUBJECTIVE & OBJECTIVE
Interval History: 10/13: POD1. NAEON. Neuro stable. Has c-collar on. HV in place. XR c spine look good.  Medications:  Continuous Infusions:      Scheduled Meds:   acetaminophen  1,000 mg Oral Q8H    amLODIPine  5 mg Oral Daily    heparin (porcine)  5,000 Units Subcutaneous Q8H    lisinopriL  10 mg Oral Daily    methocarbamoL  1,000 mg Oral Q8H    nicotine  1 patch Transdermal Daily    polyethylene glycol  17 g Oral Daily    pregabalin  75 mg Oral BID    senna-docusate 8.6-50 mg  1 tablet Oral BID     PRN Meds:sodium chloride, calcium gluconate IVPB, calcium gluconate IVPB, calcium gluconate IVPB, HYDROmorphone, magnesium sulfate IVPB, magnesium sulfate IVPB, ondansetron, oxyCODONE, potassium bicarbonate, potassium bicarbonate, potassium bicarbonate, potassium, sodium phosphates, potassium, sodium phosphates, potassium, sodium phosphates, sodium chloride 0.9%     Review of Systems  Objective:     Weight: 67.6 kg (149 lb)  Body mass index is 24.05 kg/m².  Vital Signs (Most Recent):  Temp: 97.6 °F (36.4 °C) (10/13/22 0302)  Pulse: 102 (10/13/22 0603)  Resp: (!) 23 (10/13/22 1023)  BP: (!) 159/81 (10/13/22 1000)  SpO2: 96 % (10/13/22 0603)   Vital Signs (24h Range):  Temp:  [94.8 °F (34.9 °C)-97.6 °F (36.4 °C)] 97.6 °F (36.4 °C)  Pulse:  [] 102  Resp:  [15-23] 23  SpO2:  [94 %-100 %] 96 %  BP: (132-185)/() 159/81  Arterial Line BP: (107-171)/(69-93) 107/93     Date 10/13/22 0700 - 10/14/22 0659   Shift 6162-6648 1973-6105 3426-7819 24 Hour Total   INTAKE   P.O. 500   500   IV Piggyback 99.9   99.9   Shift Total(mL/kg) 599.9(8.9)   599.9(8.9)   OUTPUT   Shift Total(mL/kg)       Weight (kg) 67.6 67.6 67.6 67.6                          Urethral Catheter 10/11/22 1700 Silicone 16 Fr. (Active)   Site Assessment Clean;Intact 10/12/22 0301   Collection Container Urimeter 10/12/22 0301   Securement Method secured to top of thigh w/ adhesive device 10/12/22 0301   Catheter Care Performed yes 10/12/22 0301   Reason  "for Continuing Urinary Catheterization Critically ill in ICU and requiring hourly monitoring of intake/output 10/12/22 0301   CAUTI Prevention Bundle Securement Device in place with 1" slack;Intact seal between catheter & drainage tubing;Drainage bag/urimeter off the floor;Sheeting clip in use;No dependent loops or kinks;Drainage bag/urimeter not overfilled (<2/3 full);Drainage bag/urimeter below bladder 10/11/22 1901   Output (mL) 75 mL 10/12/22 0920       Physical Exam    Neurosurgery Physical Exam    AOx3, GCS E4V5M6  PERRL, EOMI, visual fields grossly intact  Facial sensation normal, no asymmetry, TM  Should shrug equal, no pronator drift  LUE 4 io, otherwise 5/5  RUE 4+ io, effort dependent R tri weakness, otherwise 5/5  BLE 5/5 motor throughout  SILT, decreased sensation on anterior thighs/calves, upper arms  Coordination intact throughout  DTRs 1+ R knee, 2+ L knee  No Bains's/clonus    C-collar in place    Significant Labs:  Recent Labs   Lab 10/12/22  0129 10/13/22  0031   GLU 80 211*   * 136   K 4.6 4.6    105   CO2 19* 22*   BUN 8 8   CREATININE 0.8 0.8   CALCIUM 9.2 9.1   MG 1.6 1.6       Recent Labs   Lab 10/12/22  0225 10/13/22  0031   WBC 10.82 11.78   HGB 14.4 13.5*   HCT 44.7 41.1    316       No results for input(s): LABPT, INR, APTT in the last 48 hours.    Microbiology Results (last 7 days)       ** No results found for the last 168 hours. **          All pertinent labs from the last 24 hours have been reviewed.    Significant Diagnostics:  I have reviewed all pertinent imaging results/findings within the past 24 hours.  X-Ray Cervical Spine AP And Lateral 10/12/2022    Narrative  EXAMINATION:  XR CERVICAL SPINE AP LATERAL    CLINICAL HISTORY:  s/p C1-C4 PCDF;    TECHNIQUE:  AP, lateral and open mouth views of the cervical spine were performed.    COMPARISON:  10/12/2022.    FINDINGS:  Postoperative changes of posterior cervical fusion at C1-C4 with grossly normal alignment " of known odontoid fracture.  Remaining vertebral body heights are relatively well maintained.  Grossly normal sagittal alignment.  No new acute fracture identified.  Surgical drain and cervical collar are present.  Prevertebral soft tissues are unremarkable.    Impression  Postoperative changes of posterior cervical fusion at C1-C4 in this patient with known odontoid fracture.      Electronically signed by: Ivan East MD  Date:    10/12/2022  Time:    23:33

## 2022-10-13 NOTE — OP NOTE
DATE OF PROCEDURE: 10/12/22     PRIMARY SURGEON: Ike Storm DO NSGY     CO-SURGEON: Hasmukh Oliveira M.D. Orthopedics     PREOPERATIVE DIAGNOSIS:  1. Complex anteriorly displaced type II odontoid fracture  2. Type I hangman's fracture  3. Severe cervical stenosis C1/2      POSTOPERATIVE DIAGNOSIS:  Same.     PROCEDURE PERFORMED:  1. Posterior spinal fusion, C1-C4  2. Posterior nonsegmental spinal fixation, C1-4 (Depuy)  3. Open reduction and internal fixation of type II odontoid and type I hangman's fractures of C2  4. C1 laminectomy for severe central stenosis  5. Use of intraoperative flouroscopy  6. Use of intraoperative neuromonitoring with MEPs  7. Local and synthetic bone grafting     ANESTHESIA: General endotracheal anesthesia.     ESTIMATED BLOOD LOSS: 100 mL.     IMPLANTS: DePuy Synthes screws  Medium Infuse  Altapore shape     DRAINS: One deep HV drain    COMPLICATIONS: None.     SPONGE AND NEEDLE COUNT: Correct x2.     NEUROLOGICAL MONITORING: Motor evoked potentials, somatosensory evoked potential, free-running EMG.  There were no changes to preincisional MEP and SSEP baselines.       REASON FOR OPERATION AND BRIEF HISTORY AND PHYSICAL: Israel Milton a 62 y.o. male who presented after fall with neck pain and upper/lower extremity weakness.  He was found to have a complex anteriorly displaced type II odontoid fracture with 12mm of displacement and type I hangman's fractures.  MRI c spine also showed severe cervical stenosis at C1/2.  He was placed in cervical traction in the ICU with moderate reduction of his odontoid fracture.  He was thus offered a C1-4 posterior instrumented fusion with C1 laminectomy in order to reduce and fixate his fractures and to decompress his neural elements.      DESCRIPTION OF INFORMED CONSENT: Please see Dr. Storm's note for informed consent.     DESCRIPTION OF PROCEDURE:   The patient was brought into the operating room where he was intubated and placed under  general anesthesia without difficulty. All lines were placed.  Pre flip motors were done and found to be reliable in all 4 extremities.  A Tineo clamp was placed bitemporally and he was repositioned prone onto the hospital bed with the head fixed to the table in capital flexion and neck extension. Appropriate padding of all pressure points was placed.  Xrays were used to localize the region of interest. It also showed adequate spinal alignment in the sagittal plane with further reduction of his displaced odontoid fracture.  Motors were run again and found to be consistent with baseline.  The posterior cervical spine was marked prepped and draped in the usual sterile fashion.  A timeout was performed prior to the procedure.  10mL of Lidocaine with epinephrine were injected in the skin.     A linear incision was made with a 10 blade from approximately C1-C4.  Suprafascial dissection was carried out in the midline with Bovie electrocautery from C1 to C4, and a subfascial dissection was carried out with Bovie electrocautery and Tarango elevators to expose the posterior elements from C1 to C4. Levels were confirmed with lateral xray.     First, we exposed the C1-C2 facet joint by mobilizing the C2 nerve root and its venous plexus superiorly. We decorticated the joint with the high speed drill. Next we mobilized the C2 nerve root and its plexus inferiorly to expose the C1 lateral masses bilaterally.  We then cauterized bilateral C2 nerve roots using bipolar and monopolar cautery. This allowed excellent visualization of bilateral C1 lateral masses.     We placed bilateral C1 lateral mass screws with anatomic landmarks and freehand technique. AP and lateral xrays showed good hardware placement and motors were consistent with baseline.     We then placed bilateral lateral mass screws at C3,4 using anatomic landmarks and free hand technique.  Fluoro showed excellent placement of hardware and motors were consistent with  baseline.     We then performed a C1 laminectomy with high speed drill and rongeurs.  The thecal sac was well decompressed following the C1 laminectomy.  We then decorticated the posterior elements from C2-4.     Titanium rods were placed into the tulip heads at C1 and C4 bilaterally. Set screws were tightened. Final xrays showed good spinal alignment and a good positioning of the final hardware construct.The wound was copiously irrigated with a dilute Betadine solution and normal saline. Infuse was then packed into the decorticated C1-2 facet joints and along the posterior elements from C1-4.  Additional synthetic bone and autologous bone was laid down bilaterally from C1-4 to promote arthrodesis.  One gram of vancomycin powder was placed into the wound. A subfascial Hemovac drain was placed and tunneled through a separate incision, where it was secured with Vicryl sutures.  A watertight fascial closure was achieved with interrupted 0 Vicryl sutures and a running Stratafix suture.      Please see Dr. Storm's OP note for rest of closure and extubation process.    JUSTIFICATION OF CO-SURGEON AND MODIFIER-22: This was a complex displaced type II odontoid fracture in the setting of type I hangman's fracture of C2.  It was felt that 2 attending surgeons were needed in order to limit blood loss, place hardware, reduce and fixate the fracture, and perform the decompression in order to optimize patient outcomes.    Hasmukh Oliveira MD  Orthopaedic Spine Surgeon  Department of Orthopaedic Surgery  [phone]
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
## 2022-10-13 NOTE — PLAN OF CARE
Problem: Occupational Therapy  Goal: Occupational Therapy Goal  Description: Goals to be met by: 10/27/22     Patient will increase functional independence with ADLs by performing:    Feeding with [place].  UE Dressing with Stand-by Assistance.  LE Dressing with Minimal Assistance.  Grooming while standing at sink with Contact Guard Assistance.  Toileting from toilet with Minimal Assistance for hygiene and clothing management.   Toilet transfer to toilet with Contact Guard Assistance.    Outcome: Ongoing, Progressing

## 2022-10-14 LAB
ALBUMIN SERPL BCP-MCNC: 2.1 G/DL (ref 3.5–5.2)
ALP SERPL-CCNC: 85 U/L (ref 55–135)
ALT SERPL W/O P-5'-P-CCNC: 10 U/L (ref 10–44)
ANION GAP SERPL CALC-SCNC: 8 MMOL/L (ref 8–16)
AST SERPL-CCNC: 15 U/L (ref 10–40)
BASOPHILS # BLD AUTO: 0.02 K/UL (ref 0–0.2)
BASOPHILS NFR BLD: 0.1 % (ref 0–1.9)
BILIRUB SERPL-MCNC: 0.2 MG/DL (ref 0.1–1)
BUN SERPL-MCNC: 16 MG/DL (ref 8–23)
CALCIUM SERPL-MCNC: 9 MG/DL (ref 8.7–10.5)
CHLORIDE SERPL-SCNC: 105 MMOL/L (ref 95–110)
CO2 SERPL-SCNC: 22 MMOL/L (ref 23–29)
CREAT SERPL-MCNC: 1 MG/DL (ref 0.5–1.4)
DIFFERENTIAL METHOD: ABNORMAL
EOSINOPHIL # BLD AUTO: 0 K/UL (ref 0–0.5)
EOSINOPHIL NFR BLD: 0 % (ref 0–8)
ERYTHROCYTE [DISTWIDTH] IN BLOOD BY AUTOMATED COUNT: 12.8 % (ref 11.5–14.5)
EST. GFR  (NO RACE VARIABLE): >60 ML/MIN/1.73 M^2
GLUCOSE SERPL-MCNC: 143 MG/DL (ref 70–110)
HCT VFR BLD AUTO: 39 % (ref 40–54)
HGB BLD-MCNC: 13 G/DL (ref 14–18)
IMM GRANULOCYTES # BLD AUTO: 0.1 K/UL (ref 0–0.04)
IMM GRANULOCYTES NFR BLD AUTO: 0.5 % (ref 0–0.5)
LYMPHOCYTES # BLD AUTO: 1.1 K/UL (ref 1–4.8)
LYMPHOCYTES NFR BLD: 5.4 % (ref 18–48)
MAGNESIUM SERPL-MCNC: 2 MG/DL (ref 1.6–2.6)
MCH RBC QN AUTO: 34.2 PG (ref 27–31)
MCHC RBC AUTO-ENTMCNC: 33.3 G/DL (ref 32–36)
MCV RBC AUTO: 103 FL (ref 82–98)
MONOCYTES # BLD AUTO: 0.7 K/UL (ref 0.3–1)
MONOCYTES NFR BLD: 3.5 % (ref 4–15)
NEUTROPHILS # BLD AUTO: 17.5 K/UL (ref 1.8–7.7)
NEUTROPHILS NFR BLD: 90.5 % (ref 38–73)
NRBC BLD-RTO: 0 /100 WBC
PHOSPHATE SERPL-MCNC: 2.8 MG/DL (ref 2.7–4.5)
PLATELET # BLD AUTO: 310 K/UL (ref 150–450)
PMV BLD AUTO: 10.7 FL (ref 9.2–12.9)
POTASSIUM SERPL-SCNC: 4.7 MMOL/L (ref 3.5–5.1)
PROT SERPL-MCNC: 5.4 G/DL (ref 6–8.4)
RBC # BLD AUTO: 3.8 M/UL (ref 4.6–6.2)
SODIUM SERPL-SCNC: 135 MMOL/L (ref 136–145)
WBC # BLD AUTO: 19.39 K/UL (ref 3.9–12.7)

## 2022-10-14 PROCEDURE — 25000003 PHARM REV CODE 250: Performed by: STUDENT IN AN ORGANIZED HEALTH CARE EDUCATION/TRAINING PROGRAM

## 2022-10-14 PROCEDURE — 84100 ASSAY OF PHOSPHORUS: CPT | Performed by: PHYSICIAN ASSISTANT

## 2022-10-14 PROCEDURE — 25000003 PHARM REV CODE 250

## 2022-10-14 PROCEDURE — 99233 PR SUBSEQUENT HOSPITAL CARE,LEVL III: ICD-10-PCS | Mod: ,,, | Performed by: INTERNAL MEDICINE

## 2022-10-14 PROCEDURE — 63600175 PHARM REV CODE 636 W HCPCS: Performed by: STUDENT IN AN ORGANIZED HEALTH CARE EDUCATION/TRAINING PROGRAM

## 2022-10-14 PROCEDURE — 51798 US URINE CAPACITY MEASURE: CPT

## 2022-10-14 PROCEDURE — 83735 ASSAY OF MAGNESIUM: CPT | Performed by: PHYSICIAN ASSISTANT

## 2022-10-14 PROCEDURE — S4991 NICOTINE PATCH NONLEGEND: HCPCS

## 2022-10-14 PROCEDURE — 11000001 HC ACUTE MED/SURG PRIVATE ROOM

## 2022-10-14 PROCEDURE — 97530 THERAPEUTIC ACTIVITIES: CPT | Mod: CQ

## 2022-10-14 PROCEDURE — 25000003 PHARM REV CODE 250: Performed by: NURSE PRACTITIONER

## 2022-10-14 PROCEDURE — 25000003 PHARM REV CODE 250: Performed by: PHYSICIAN ASSISTANT

## 2022-10-14 PROCEDURE — 85025 COMPLETE CBC W/AUTO DIFF WBC: CPT | Performed by: INTERNAL MEDICINE

## 2022-10-14 PROCEDURE — 80053 COMPREHEN METABOLIC PANEL: CPT | Performed by: PHYSICIAN ASSISTANT

## 2022-10-14 PROCEDURE — 94761 N-INVAS EAR/PLS OXIMETRY MLT: CPT

## 2022-10-14 PROCEDURE — 99233 SBSQ HOSP IP/OBS HIGH 50: CPT | Mod: ,,, | Performed by: INTERNAL MEDICINE

## 2022-10-14 PROCEDURE — 97116 GAIT TRAINING THERAPY: CPT | Mod: CQ

## 2022-10-14 RX ORDER — LISINOPRIL 20 MG/1
20 TABLET ORAL DAILY
Status: DISCONTINUED | OUTPATIENT
Start: 2022-10-14 | End: 2022-10-21 | Stop reason: HOSPADM

## 2022-10-14 RX ORDER — LABETALOL HCL 20 MG/4 ML
10 SYRINGE (ML) INTRAVENOUS EVERY 4 HOURS PRN
Status: DISCONTINUED | OUTPATIENT
Start: 2022-10-14 | End: 2022-10-21 | Stop reason: HOSPADM

## 2022-10-14 RX ORDER — LISINOPRIL 20 MG/1
20 TABLET ORAL DAILY
Status: DISCONTINUED | OUTPATIENT
Start: 2022-10-15 | End: 2022-10-14

## 2022-10-14 RX ORDER — LABETALOL HYDROCHLORIDE 5 MG/ML
10 INJECTION, SOLUTION INTRAVENOUS EVERY 4 HOURS PRN
Status: DISCONTINUED | OUTPATIENT
Start: 2022-10-14 | End: 2022-10-14

## 2022-10-14 RX ADMIN — LISINOPRIL 20 MG: 20 TABLET ORAL at 11:10

## 2022-10-14 RX ADMIN — OXYCODONE 5 MG: 5 TABLET ORAL at 11:10

## 2022-10-14 RX ADMIN — PREGABALIN 75 MG: 75 CAPSULE ORAL at 08:10

## 2022-10-14 RX ADMIN — POLYETHYLENE GLYCOL 3350 17 G: 17 POWDER, FOR SOLUTION ORAL at 09:10

## 2022-10-14 RX ADMIN — OXYCODONE 5 MG: 5 TABLET ORAL at 01:10

## 2022-10-14 RX ADMIN — METHOCARBAMOL 1000 MG: 500 TABLET ORAL at 09:10

## 2022-10-14 RX ADMIN — SENNOSIDES AND DOCUSATE SODIUM 1 TABLET: 50; 8.6 TABLET ORAL at 08:10

## 2022-10-14 RX ADMIN — ACETAMINOPHEN 1000 MG: 500 TABLET ORAL at 02:10

## 2022-10-14 RX ADMIN — HEPARIN SODIUM 5000 UNITS: 5000 INJECTION INTRAVENOUS; SUBCUTANEOUS at 09:10

## 2022-10-14 RX ADMIN — HEPARIN SODIUM 5000 UNITS: 5000 INJECTION INTRAVENOUS; SUBCUTANEOUS at 02:10

## 2022-10-14 RX ADMIN — HEPARIN SODIUM 5000 UNITS: 5000 INJECTION INTRAVENOUS; SUBCUTANEOUS at 05:10

## 2022-10-14 RX ADMIN — SENNOSIDES AND DOCUSATE SODIUM 1 TABLET: 50; 8.6 TABLET ORAL at 09:10

## 2022-10-14 RX ADMIN — AMLODIPINE BESYLATE 5 MG: 5 TABLET ORAL at 09:10

## 2022-10-14 RX ADMIN — LABETALOL HYDROCHLORIDE 10 MG: 5 INJECTION INTRAVENOUS at 05:10

## 2022-10-14 RX ADMIN — ACETAMINOPHEN 1000 MG: 500 TABLET ORAL at 05:10

## 2022-10-14 RX ADMIN — ACETAMINOPHEN 1000 MG: 500 TABLET ORAL at 09:10

## 2022-10-14 RX ADMIN — NICOTINE 1 PATCH: 14 PATCH, EXTENDED RELEASE TRANSDERMAL at 09:10

## 2022-10-14 RX ADMIN — PREGABALIN 75 MG: 75 CAPSULE ORAL at 09:10

## 2022-10-14 RX ADMIN — METHOCARBAMOL 1000 MG: 500 TABLET ORAL at 05:10

## 2022-10-14 RX ADMIN — METHOCARBAMOL 1000 MG: 500 TABLET ORAL at 02:10

## 2022-10-14 NOTE — PT/OT/SLP PROGRESS
"Physical Therapy Treatment    Patient Name:  Israel De Jesus   MRN:  [id number]    Recommendations:     Discharge Recommendations:  rehabilitation facility   Discharge Equipment Recommendations:  (TBD)   Barriers to discharge: Inaccessible home and Decreased caregiver support    Assessment:     Israel De Jesus is a 62 y.o. male admitted with a medical diagnosis of Odontoid fracture.  He presents with the following impairments/functional limitations:  weakness, impaired endurance, impaired self care skills, impaired functional mobility, gait instability, impaired balance, decreased upper extremity function, decreased lower extremity function, pain.    Rehab Prognosis: Good; patient would benefit from acute skilled PT services to address these deficits and reach maximum level of function.    Recent Surgery: Procedure(s) (LRB):  POSTERIOR CERVICAL FUSION, SPINE C1-C4 PCF  (N/A)  LAMINECTOMY, SPINE, C-1 (N/A) 2 Days Post-Op    Plan:     During this hospitalization, patient to be seen 4 x/week to address the identified rehab impairments via gait training, therapeutic activities, therapeutic exercises, neuromuscular re-education and progress toward the following goals:    Plan of Care Expires:  11/12/22    Subjective     Chief Complaint: pain  Patient/Family Comments/goals: "I'm just really nervous about all of this."  Pain/Comfort:  Pain Rating 1: 5/10  Location - Orientation 1: generalized  Location 1: neck  Pain Addressed 1: Reposition, Distraction  Pain Rating Post-Intervention 1: 3/10      Objective:     Communicated with RN prior to session.  Patient found HOB elevated, too low in bed causing neck and trunk flexion with   obrien catheter, telemetry, pulse ox, and hemovac upon PTA entry to room.     General Precautions: Standard, fall   Orthopedic Precautions:spinal precautions   Braces: Cervical collar  Respiratory Status: Room air     Functional Mobility:  Bed Mobility:     Rolling Left:  minimum " assistance  Supine to Sit: minimum assistance  Transfers:     Sit to Stand:  minimum assistance with rolling walker; min A with no AD/B HHA  Gait: Pt ambulates ~16 ft fwd/bwd at EOB with RW and Min A for stability. Second trial for L side steps only to get pt higher in bed prior to return to supine.       AM-PAC 6 CLICK MOBILITY  Turning over in bed (including adjusting bedclothes, sheets and blankets)?: 3  Sitting down on and standing up from a chair with arms (e.g., wheelchair, bedside commode, etc.): 3  Moving from lying on back to sitting on the side of the bed?: 3  Moving to and from a bed to a chair (including a wheelchair)?: 3  Need to walk in hospital room?: 3  Climbing 3-5 steps with a railing?: 1  Basic Mobility Total Score: 16       Therapeutic Activities and Exercises:   Pt assisted with functional mobility as noted above.   Pt and son educated on pts spinal precautions, safe positioning in bed, calling for assistance to mobilize, and importance of mobility OOB to bedside chair with RN as able/tolerated.     Patient left HOB elevated with all lines intact, call button in reach, RN notified, and son present..    GOALS:   Multidisciplinary Problems       Physical Therapy Goals          Problem: Physical Therapy    Goal Priority Disciplines Outcome Goal Variances Interventions   Physical Therapy Goal     PT, PT/OT Ongoing, Progressing     Description: PT goals until 10/20/22    1. Pt supine to sit with CGA through sidelying-not met  2. Pt sit to supine with CGA through sidelying-not met  3. Pt sit to stand with RW with CGA-not met  4. Pt to perform gait 100ft with RW with CGA.-not met  5. Pt to go up/down curb step with RW with minimal assist.-not met  6. Pt to transfer bed to/from bedside chair with RW with CGA.-not met  7. Pt to perform B LE exs in sitting or supine x 15 reps to strengthen B LE to improve functional mobility.-not met                        Time Tracking:     PT Received On: 10/14/22  PT  Start Time: 1435     PT Stop Time: 1514  PT Total Time (min): 39 min     Billable Minutes: Gait Training 10 and Therapeutic Activity 29    Treatment Type: Treatment  PT/PTA: PTA     PTA Visit Number: 1     10/14/2022

## 2022-10-14 NOTE — PROGRESS NOTES
"[facility]  Neurosurgery  Progress Note    Subjective:     History of Present Illness: Patient is a 62M with PMH of HTN and remote L knee surgery who presents with 3 days of weakness after a fall 2-3 weeks ago. Patient transferred from OSH for NSGY consult regarding odontoid fracture. He reports that he has had persistent neck pain, but that his weakness did not start until a few days ago and is worsening. Patient complains of an intermittent "rumbling" almost vibrating feeling in his chest/abdomen, as well as sometimes in his various muscle groups. He claims that he feel decreased sensation across his limbs/body, including his thighs and calves where it started before moving up to his upper extremities. He symptoms do not seem to localize to a specific dermatome. He does not complain of any new radiculopathic or myelopathic symptoms. Patient reports that he has had to use a walker in the past few days. When laying down flat on the bed, he feels fine and can move all extremities at least AG. When he is sitting upright, he states that he cannot move his arms past the shoulder line. He also notes weakness in his BLE which was required him to use a walker to get around.Patient did not feel safe to get up and demonstrate gait, but claims he is weak when walking.    He presented in a C-collar. Patient denies taking AC/AP. He also denies bowel/bladder dysfunction.      Post-Op Info:  Procedure(s) (LRB):  POSTERIOR CERVICAL FUSION, SPINE C1-C4 PCF  (N/A)  LAMINECTOMY, SPINE, C-1 (N/A)   2 Days Post-Op     Interval History: 10/14: Two short runs of vtach last night. Exam stable. C-collar continues to be in place.    Medications:  Continuous Infusions:      Scheduled Meds:   acetaminophen  1,000 mg Oral Q8H    amLODIPine  5 mg Oral Daily    heparin (porcine)  5,000 Units Subcutaneous Q8H    lisinopriL  20 mg Oral Daily    methocarbamoL  1,000 mg Oral Q8H    nicotine  1 patch Transdermal Daily    " "polyethylene glycol  17 g Oral Daily    pregabalin  75 mg Oral BID    senna-docusate 8.6-50 mg  1 tablet Oral BID     PRN Meds:sodium chloride, calcium gluconate IVPB, calcium gluconate IVPB, calcium gluconate IVPB, HYDROmorphone, labetaloL, magnesium sulfate IVPB, magnesium sulfate IVPB, ondansetron, oxyCODONE, potassium bicarbonate, potassium bicarbonate, potassium bicarbonate, potassium, sodium phosphates, potassium, sodium phosphates, potassium, sodium phosphates, sodium chloride 0.9%     Review of Systems  Objective:     Weight: 67.6 kg (149 lb)  Body mass index is 24.05 kg/m².  Vital Signs (Most Recent):  Temp: 97.7 °F (36.5 °C) (10/14/22 0800)  Pulse: 103 (10/14/22 1100)  Resp: (!) 23 (10/14/22 1104)  BP: (!) 146/83 (10/14/22 1100)  SpO2: 99 % (10/14/22 1100)   Vital Signs (24h Range):  Temp:  [97.7 °F (36.5 °C)-98.6 °F (37 °C)] 97.7 °F (36.5 °C)  Pulse:  [] 103  Resp:  [11-31] 23  SpO2:  [96 %-100 %] 99 %  BP: (136-188)/() 146/83  Arterial Line BP: (105-175)/() 111/81     Date 10/14/22 0700 - 10/15/22 0659   Shift 8471-5404 5498-0780 2804-4884 24 Hour Total   INTAKE   P.O. 350   350   Shift Total(mL/kg) 350(5.2)   350(5.2)   OUTPUT   Urine(mL/kg/hr) 150   150   Shift Total(mL/kg) 150(2.2)   150(2.2)   Weight (kg) 67.6 67.6 67.6 67.6                          Urethral Catheter 10/11/22 1700 Silicone 16 Fr. (Active)   Site Assessment Clean;Intact 10/12/22 0301   Collection Container Urimeter 10/12/22 0301   Securement Method secured to top of thigh w/ adhesive device 10/12/22 0301   Catheter Care Performed yes 10/12/22 0301   Reason for Continuing Urinary Catheterization Critically ill in ICU and requiring hourly monitoring of intake/output 10/12/22 0301   CAUTI Prevention Bundle Securement Device in place with 1" slack;Intact seal between catheter & drainage tubing;Drainage bag/urimeter off the floor;Sheeting clip in use;No dependent loops or kinks;Drainage bag/urimeter not overfilled (<2/3 " full);Drainage bag/urimeter below bladder 10/11/22 1901   Output (mL) 75 mL 10/12/22 0920       Physical Exam    Neurosurgery Physical Exam    AOx3, GCS E4V5M6  PERRL, EOMI, visual fields grossly intact  Facial sensation normal, no asymmetry, TM  Should shrug equal, no pronator drift  LUE 4 io, otherwise 5/5  RUE 4+ io, effort dependent R tri weakness, otherwise 5/5  BLE 5/5 motor throughout  SILT, decreased sensation on anterior thighs/calves, upper arms  Coordination intact throughout  DTRs 1+ R knee, 2+ L knee  No Bains's/clonus    C-collar in place    Significant Labs:  Recent Labs   Lab 10/13/22  0031 10/13/22  2140 10/14/22  0138   * 173* 143*    134* 135*   K 4.6 4.6 4.7    105 105   CO2 22* 24 22*   BUN 8 16 16   CREATININE 0.8 1.0 1.0   CALCIUM 9.1 8.9 9.0   MG 1.6 1.9 2.0       Recent Labs   Lab 10/13/22  0031 10/14/22  0138   WBC 11.78 19.39*   HGB 13.5* 13.0*   HCT 41.1 39.0*    310       No results for input(s): LABPT, INR, APTT in the last 48 hours.    Microbiology Results (last 7 days)       ** No results found for the last 168 hours. **          All pertinent labs from the last 24 hours have been reviewed.    Significant Diagnostics:  I have reviewed all pertinent imaging results/findings within the past 24 hours.  X-Ray Cervical Spine AP And Lateral 10/12/2022    Narrative  EXAMINATION:  XR CERVICAL SPINE AP LATERAL    CLINICAL HISTORY:  s/p C1-C4 PCDF;    TECHNIQUE:  AP, lateral and open mouth views of the cervical spine were performed.    COMPARISON:  10/12/2022.    FINDINGS:  Postoperative changes of posterior cervical fusion at C1-C4 with grossly normal alignment of known odontoid fracture.  Remaining vertebral body heights are relatively well maintained.  Grossly normal sagittal alignment.  No new acute fracture identified.  Surgical drain and cervical collar are present.  Prevertebral soft tissues are unremarkable.    Impression  Postoperative changes of posterior  cervical fusion at C1-C4 in this patient with known odontoid fracture.      Electronically signed by: Ivan East MD  Date:    10/12/2022  Time:    23:33        Assessment/Plan:     * Odontoid fracture  Patient is a 62M with PMH of HTN who presents with odontoid fracture.    --Neuro-ICU status   -stable for TTF today with q4 neurochecks  --CT cervical spine 10/10: acute complex odontoid fx completely through the base, displaced 12 mm anteriorly, mild fx of C2 pedicles, mod to severe C1-2  canal stenosis  --CTA neck without evidence for vert injury  --MRI Csp with cord edema and C2 alar, transverse and longitudinal ligamentous disruption  --XR Csp 10/12: grossly normal alignment of known odontoid fracture  --Maintain C-collar at all times   --PT/OT  --OK for SQH 24 hours after surgery  --Continue to monitor clinically, notify NSGY immediately with any changes in neuro status    Dispo: stable for TTF per [facility]    D/w Dr. Dotty Mike MD  Neurosurgery  [facility] - Neuro Critical Care

## 2022-10-14 NOTE — SUBJECTIVE & OBJECTIVE
Interval History:  See above.     Review of Systems   Constitutional:  Negative for fever.   HENT:  Negative for facial swelling and tinnitus.    Eyes:  Negative for visual disturbance.   Respiratory:  Negative for chest tightness and shortness of breath.    Cardiovascular:  Negative for chest pain, palpitations and leg swelling.   Gastrointestinal:  Negative for abdominal distention, abdominal pain, constipation, diarrhea, nausea and vomiting.   Endocrine: Negative for polyuria.   Musculoskeletal:  Positive for neck pain and neck stiffness.   Skin:  Positive for wound.   Neurological:  Positive for weakness and headaches. Negative for dizziness, tremors, syncope and light-headedness.     Objective:     Vitals:  Temp: 98 °F (36.7 °C)  Pulse: 98  Rhythm: normal sinus rhythm  BP: 125/86  MAP (mmHg): 101  Resp: (!) 21  SpO2: 98 %  O2 Device (Oxygen Therapy): room air    Temp  Min: 97.7 °F (36.5 °C)  Max: 98.6 °F (37 °C)  Pulse  Min: 56  Max: 103  BP  Min: 125/86  Max: 188/94  MAP (mmHg)  Min: 100  Max: 153  Resp  Min: 11  Max: 31  SpO2  Min: 96 %  Max: 100 %    10/13 0701 - 10/14 0700  In: 1879.9 [P.O.:1780]  Out: 580 [Urine:450; Drains:130]   Unmeasured Output  Urine Occurrence: 1  Stool Occurrence: 0       Physical Exam  Vitals and nursing note reviewed.   Constitutional:       Appearance: He is normal weight.      Comments: Sitting in bed eating breakfast.    HENT:      Head: Normocephalic.      Right Ear: External ear normal.      Left Ear: External ear normal.      Nose: Nose normal.      Mouth/Throat:      Pharynx: Oropharynx is clear.   Eyes:      Pupils: Pupils are equal, round, and reactive to light.   Cardiovascular:      Rate and Rhythm: Normal rate and regular rhythm.      Pulses: Normal pulses.   Pulmonary:      Effort: Pulmonary effort is normal.      Breath sounds: Normal breath sounds.   Abdominal:      General: Abdomen is flat.      Palpations: Abdomen is soft.   Skin:     General: Skin is warm and  dry.      Capillary Refill: Capillary refill takes less than 2 seconds.   Neurological:      Mental Status: He is alert and oriented to person, place, and time.      Comments: E4V5M6  Awake, Oriented x4. Appropriate insight. Recent and remote memory intact.   PERRLA, EOMI  No facial asymmetry  Shoulder shrug equal  BUE strength equal, no pronator drift, decreased ROM- cannot lift above shoulder, no ataxia   BLE strength equal  Mildly decreased sensation on upper arms      Psychiatric:         Mood and Affect: Mood normal.         Behavior: Behavior normal.         Thought Content: Thought content normal.         Judgment: Judgment normal.         Medications:  Continuous Scheduledacetaminophen, 1,000 mg, Q8H  amLODIPine, 5 mg, Daily  heparin (porcine), 5,000 Units, Q8H  lisinopriL, 20 mg, Daily  methocarbamoL, 1,000 mg, Q8H  nicotine, 1 patch, Daily  polyethylene glycol, 17 g, Daily  pregabalin, 75 mg, BID  senna-docusate 8.6-50 mg, 1 tablet, BID    PRNsodium chloride, , Q24H PRN  calcium gluconate IVPB, 1 g, PRN  calcium gluconate IVPB, 2 g, PRN  calcium gluconate IVPB, 3 g, PRN  HYDROmorphone, 1 mg, Q6H PRN  labetaloL, 10 mg, Q4H PRN  magnesium sulfate IVPB, 2 g, PRN  magnesium sulfate IVPB, 4 g, PRN  ondansetron, 4 mg, Q8H PRN  oxyCODONE, 5 mg, Q4H PRN  potassium bicarbonate, 35 mEq, PRN  potassium bicarbonate, 50 mEq, PRN  potassium bicarbonate, 60 mEq, PRN  potassium, sodium phosphates, 2 packet, PRN  potassium, sodium phosphates, 2 packet, PRN  potassium, sodium phosphates, 2 packet, PRN  sodium chloride 0.9%, 10 mL, PRN      Today I personally reviewed pertinent medications, lines/drains/airways, imaging, cardiology results, laboratory results, microbiology results, notably:    Diet  Diet Adult Regular (IDDSI Level 7)  Diet Adult Regular (IDDSI Level 7)

## 2022-10-14 NOTE — SUBJECTIVE & OBJECTIVE
Interval History: 10/14: Two short runs of vtach last night. Exam stable. C-collar continues to be in place.    Medications:  Continuous Infusions:      Scheduled Meds:   acetaminophen  1,000 mg Oral Q8H    amLODIPine  5 mg Oral Daily    heparin (porcine)  5,000 Units Subcutaneous Q8H    lisinopriL  20 mg Oral Daily    methocarbamoL  1,000 mg Oral Q8H    nicotine  1 patch Transdermal Daily    polyethylene glycol  17 g Oral Daily    pregabalin  75 mg Oral BID    senna-docusate 8.6-50 mg  1 tablet Oral BID     PRN Meds:sodium chloride, calcium gluconate IVPB, calcium gluconate IVPB, calcium gluconate IVPB, HYDROmorphone, labetaloL, magnesium sulfate IVPB, magnesium sulfate IVPB, ondansetron, oxyCODONE, potassium bicarbonate, potassium bicarbonate, potassium bicarbonate, potassium, sodium phosphates, potassium, sodium phosphates, potassium, sodium phosphates, sodium chloride 0.9%     Review of Systems  Objective:     Weight: 67.6 kg (149 lb)  Body mass index is 24.05 kg/m².  Vital Signs (Most Recent):  Temp: 97.7 °F (36.5 °C) (10/14/22 0800)  Pulse: 103 (10/14/22 1100)  Resp: (!) 23 (10/14/22 1104)  BP: (!) 146/83 (10/14/22 1100)  SpO2: 99 % (10/14/22 1100)   Vital Signs (24h Range):  Temp:  [97.7 °F (36.5 °C)-98.6 °F (37 °C)] 97.7 °F (36.5 °C)  Pulse:  [] 103  Resp:  [11-31] 23  SpO2:  [96 %-100 %] 99 %  BP: (136-188)/() 146/83  Arterial Line BP: (105-175)/() 111/81     Date 10/14/22 0700 - 10/15/22 0659   Shift 2696-6685 9254-1925 7613-5646 24 Hour Total   INTAKE   P.O. 350   350   Shift Total(mL/kg) 350(5.2)   350(5.2)   OUTPUT   Urine(mL/kg/hr) 150   150   Shift Total(mL/kg) 150(2.2)   150(2.2)   Weight (kg) 67.6 67.6 67.6 67.6                          Urethral Catheter 10/11/22 1700 Silicone 16 Fr. (Active)   Site Assessment Clean;Intact 10/12/22 0301   Collection Container Urimeter 10/12/22 0301   Securement Method secured to top of thigh w/ adhesive device 10/12/22 0301   Catheter Care  "Performed yes 10/12/22 0301   Reason for Continuing Urinary Catheterization Critically ill in ICU and requiring hourly monitoring of intake/output 10/12/22 0301   CAUTI Prevention Bundle Securement Device in place with 1" slack;Intact seal between catheter & drainage tubing;Drainage bag/urimeter off the floor;Sheeting clip in use;No dependent loops or kinks;Drainage bag/urimeter not overfilled (<2/3 full);Drainage bag/urimeter below bladder 10/11/22 1901   Output (mL) 75 mL 10/12/22 0920       Physical Exam    Neurosurgery Physical Exam    AOx3, GCS E4V5M6  PERRL, EOMI, visual fields grossly intact  Facial sensation normal, no asymmetry, TM  Should shrug equal, no pronator drift  LUE 4 io, otherwise 5/5  RUE 4+ io, effort dependent R tri weakness, otherwise 5/5  BLE 5/5 motor throughout  SILT, decreased sensation on anterior thighs/calves, upper arms  Coordination intact throughout  DTRs 1+ R knee, 2+ L knee  No Bains's/clonus    C-collar in place    Significant Labs:  Recent Labs   Lab 10/13/22  0031 10/13/22  2140 10/14/22  0138   * 173* 143*    134* 135*   K 4.6 4.6 4.7    105 105   CO2 22* 24 22*   BUN 8 16 16   CREATININE 0.8 1.0 1.0   CALCIUM 9.1 8.9 9.0   MG 1.6 1.9 2.0       Recent Labs   Lab 10/13/22  0031 10/14/22  0138   WBC 11.78 19.39*   HGB 13.5* 13.0*   HCT 41.1 39.0*    310       No results for input(s): LABPT, INR, APTT in the last 48 hours.    Microbiology Results (last 7 days)       ** No results found for the last 168 hours. **          All pertinent labs from the last 24 hours have been reviewed.    Significant Diagnostics:  I have reviewed all pertinent imaging results/findings within the past 24 hours.  X-Ray Cervical Spine AP And Lateral 10/12/2022    Narrative  EXAMINATION:  XR CERVICAL SPINE AP LATERAL    CLINICAL HISTORY:  s/p C1-C4 PCDF;    TECHNIQUE:  AP, lateral and open mouth views of the cervical spine were " performed.    COMPARISON:  10/12/2022.    FINDINGS:  Postoperative changes of posterior cervical fusion at C1-C4 with grossly normal alignment of known odontoid fracture.  Remaining vertebral body heights are relatively well maintained.  Grossly normal sagittal alignment.  No new acute fracture identified.  Surgical drain and cervical collar are present.  Prevertebral soft tissues are unremarkable.    Impression  Postoperative changes of posterior cervical fusion at C1-C4 in this patient with known odontoid fracture.      Electronically signed by: Ivan East MD  Date:    10/12/2022  Time:    23:33

## 2022-10-14 NOTE — ASSESSMENT & PLAN NOTE
Patient is a 62M with PMH of HTN who presents with odontoid fracture.    --Neuro-ICU status   -stable for TTF today with q4 neurochecks  --CT cervical spine 10/10: acute complex odontoid fx completely through the base, displaced 12 mm anteriorly, mild fx of C2 pedicles, mod to severe C1-2  canal stenosis  --CTA neck without evidence for vert injury  --MRI Csp with cord edema and C2 alar, transverse and longitudinal ligamentous disruption  --XR Csp 10/12: grossly normal alignment of known odontoid fracture  --Maintain C-collar at all times   --PT/OT  --OK for SQH 24 hours after surgery  --Continue to monitor clinically, notify NSGY immediately with any changes in neuro status    Dispo: stable for TTF per NCC    D/w Dr. Storm

## 2022-10-14 NOTE — ASSESSMENT & PLAN NOTE
Occasional episodes of tachycardia 150-210s sustaining for several minutes. On 10/14 patient with 6 beat run of Vtach over night. CMP WNL  - Will likely need out patient cards workup   - Stable for TTF

## 2022-10-14 NOTE — PLAN OF CARE
The patient stepped down to NPU at 1700. Pt A/O x4, neuro assessment intact. VSS. Bladder scan at 1700 is 103 mL. Pt has condom cath on and is voiding. Family at bedside. Skin is intact.   Problem: Adult Inpatient Plan of Care  Goal: Plan of Care Review  Outcome: Ongoing, Progressing  Goal: Patient-Specific Goal (Individualized)  Outcome: Ongoing, Progressing  Goal: Absence of Hospital-Acquired Illness or Injury  Outcome: Ongoing, Progressing  Goal: Optimal Comfort and Wellbeing  Outcome: Ongoing, Progressing  Goal: Readiness for Transition of Care  Outcome: Ongoing, Progressing

## 2022-10-14 NOTE — PROGRESS NOTES
"[facility]  Neurocritical Care  Progress Note    Admit Date: 10/10/2022  Service Date: 10/14/2022  Length of Stay: 3    Subjective:     Chief Complaint: Odontoid fracture    History of Present Illness: Patient is a 62M with PMH of HTN and remote L knee surgery who presents with 3 days of weakness after a fall on his right side 2.5 weeks ago. Patient transferred from OSH for NSGY consult regarding odontoid fracture. He reports that he has had persistent neck pain, but that his weakness did not start until a few days ago and is worsening. Patient complains of an intermittent "rumbling" almost vibrating feeling in his chest/abdomen, as well as sometimes in his various muscle groups. He claims that he feel decreased sensation across his limbs/body, including his thighs and calves where it started before moving up to his upper extremities. He symptoms do not seem to localize to a specific dermatome. He does not complain of any new radiculopathic or myelopathic symptoms. Patient reports that he has had to use a walker in the past few days due to BLE weakness, feeling like lifting his legs to walk is very difficult. When laying down flat on the bed, he feels fine and can move all extremities antigravity. When he is sitting upright, he states that he cannot move his arms past the shoulder line.     He presented in a C-collar. Patient takes home ASA 81mg. He also denies bowel/bladder dysfunction. Pt admitted to [facility] for higher level of pre-op care.       Hospital Course: 10/12/2022 NAEON. Cardene off this AM. After physical exam, pt became tachycardic to 150-200 sustaining for around 10 minutes. Resolved without intervention. Pt denied any pain, SOB, lightheadedness, chest pain or palpitations. EKG with shortened MD interval, no arrhythmia. Pt in 10lb traction. Pending OR this afternoon.   10/13/2022 S/p C1-4 ORIF. Occasional episodes of tachycardia 150-210s. Added amlodipine 5mg.   10/14/2022 6 beat run " of Vtach overnight. CMP WNL. Increase lisinopril. D/c Art line. Stable to TTF.       Interval History:  See above.     Review of Systems   Constitutional:  Negative for fever.   HENT:  Negative for facial swelling and tinnitus.    Eyes:  Negative for visual disturbance.   Respiratory:  Negative for chest tightness and shortness of breath.    Cardiovascular:  Negative for chest pain, palpitations and leg swelling.   Gastrointestinal:  Negative for abdominal distention, abdominal pain, constipation, diarrhea, nausea and vomiting.   Endocrine: Negative for polyuria.   Musculoskeletal:  Positive for neck pain and neck stiffness.   Skin:  Positive for wound.   Neurological:  Positive for weakness and headaches. Negative for dizziness, tremors, syncope and light-headedness.     Objective:     Vitals:  Temp: 98 °F (36.7 °C)  Pulse: 98  Rhythm: normal sinus rhythm  BP: 125/86  MAP (mmHg): 101  Resp: (!) 21  SpO2: 98 %  O2 Device (Oxygen Therapy): room air    Temp  Min: 97.7 °F (36.5 °C)  Max: 98.6 °F (37 °C)  Pulse  Min: 56  Max: 103  BP  Min: 125/86  Max: 188/94  MAP (mmHg)  Min: 100  Max: 153  Resp  Min: 11  Max: 31  SpO2  Min: 96 %  Max: 100 %    10/13 0701 - 10/14 0700  In: 1879.9 [P.O.:1780]  Out: 580 [Urine:450; Drains:130]   Unmeasured Output  Urine Occurrence: 1  Stool Occurrence: 0       Physical Exam  Vitals and nursing note reviewed.   Constitutional:       Appearance: He is normal weight.      Comments: Sitting in bed eating breakfast.    HENT:      Head: Normocephalic.      Right Ear: External ear normal.      Left Ear: External ear normal.      Nose: Nose normal.      Mouth/Throat:      Pharynx: Oropharynx is clear.   Eyes:      Pupils: Pupils are equal, round, and reactive to light.   Cardiovascular:      Rate and Rhythm: Normal rate and regular rhythm.      Pulses: Normal pulses.   Pulmonary:      Effort: Pulmonary effort is normal.      Breath sounds: Normal breath sounds.   Abdominal:      General: Abdomen  is flat.      Palpations: Abdomen is soft.   Skin:     General: Skin is warm and dry.      Capillary Refill: Capillary refill takes less than 2 seconds.   Neurological:      Mental Status: He is alert and oriented to person, place, and time.      Comments: E4V5M6  Awake, Oriented x4. Appropriate insight. Recent and remote memory intact.   PERRLA, EOMI  No facial asymmetry  Shoulder shrug equal  BUE strength equal, no pronator drift, decreased ROM- cannot lift above shoulder, no ataxia   BLE strength equal  Mildly decreased sensation on upper arms      Psychiatric:         Mood and Affect: Mood normal.         Behavior: Behavior normal.         Thought Content: Thought content normal.         Judgment: Judgment normal.         Medications:  Continuous Scheduledacetaminophen, 1,000 mg, Q8H  amLODIPine, 5 mg, Daily  heparin (porcine), 5,000 Units, Q8H  lisinopriL, 20 mg, Daily  methocarbamoL, 1,000 mg, Q8H  nicotine, 1 patch, Daily  polyethylene glycol, 17 g, Daily  pregabalin, 75 mg, BID  senna-docusate 8.6-50 mg, 1 tablet, BID    PRNsodium chloride, , Q24H PRN  calcium gluconate IVPB, 1 g, PRN  calcium gluconate IVPB, 2 g, PRN  calcium gluconate IVPB, 3 g, PRN  HYDROmorphone, 1 mg, Q6H PRN  labetaloL, 10 mg, Q4H PRN  magnesium sulfate IVPB, 2 g, PRN  magnesium sulfate IVPB, 4 g, PRN  ondansetron, 4 mg, Q8H PRN  oxyCODONE, 5 mg, Q4H PRN  potassium bicarbonate, 35 mEq, PRN  potassium bicarbonate, 50 mEq, PRN  potassium bicarbonate, 60 mEq, PRN  potassium, sodium phosphates, 2 packet, PRN  potassium, sodium phosphates, 2 packet, PRN  potassium, sodium phosphates, 2 packet, PRN  sodium chloride 0.9%, 10 mL, PRN      Today I personally reviewed pertinent medications, lines/drains/airways, imaging, cardiology results, laboratory results, microbiology results, notably:    Diet  Diet Adult Regular (IDDSI Level 7)  Diet Adult Regular (IDDSI Level 7)          Assessment/Plan:     Neuro  * Odontoid fracture  Patient is a 62M  with PMH of HTN who presents with odontoid fracture. Placed in traction 10/11. C1-4 ORIF on 10/12.  --CT cervical spine 10/10: acute complex odontoid fx completely through the base, displaced 12 mm anteriorly, mild fx of C2 pedicles, mod to severe C1-2  canal stenosis  -- CTA neck   -- Unstable displaced acute fracture of the odontoid process with extension into the bilateral lateral masses of C2 and left transverse foramen.  Associated focal narrowing of the left vertebral artery at the level of the left transverse foramen, which remains patent distally.  Subtle left vertebral artery injury or compression is difficult to exclude.  --MRI C-spine w/o  -- Complex comminuted fracture involving the C2 body and dens demonstrated to better extent on prior cervical spine CT. 9 mm anterolisthesis of the dens relative to the body of C2.  Subsequent moderate/severe spinal canal stenosis.  Increased intramedullary cord signal at this concerning for edema.    --SBP <160, MAP goals complete  --Maintain C-collar at all times  --Tylenol, Lyrica, robaxin  --Stable for transfer to NS floor       Cardiac/Vascular  Tachycardia  Occasional episodes of tachycardia 150-210s sustaining for several minutes. On 10/14 patient with 6 beat run of Vtach over night. CMP WNL  - Will likely need out patient cards workup   - Stable for TTF    Hypertension  SBP goal <160  - Lisinopril 10 --> 20   - Added amlodipine 5    Other  Tobacco use disorder  -Nicotine patch           The patient is being Prophylaxed for:  Venous Thromboembolism with: Mechanical or Chemical  Stress Ulcer with: None  Ventilator Pneumonia with: none    Activity Orders          Discontinue arterial line starting at 10/14 1012    Diet Adult Regular (IDDSI Level 7): Regular starting at 10/12 1756    Elevate HOB starting at 10/11 0600    Turn patient starting at 10/11 0600        Full Code     Level III    [facility]  Neurocritical Care  [facility]  [facility]

## 2022-10-14 NOTE — PLAN OF CARE
[facility] Care Plan    POC reviewed with Israel De Jesus and family at 0315. Pt verbalized understanding. Questions and concerns addressed. No acute events overnight. Pt progressing toward goals.       7- beat run of Vtach overnight.   Labetalol x1 for SBP >160  Oxy x2   Drain output: 40 cc/shift.     Will continue to monitor. See below and flowsheets for full assessment and VS info.           Is this a stroke patient? no    Neuro:  Chloe Coma Scale  Best Eye Response: 4-->(E4) spontaneous  Best Motor Response: 6-->(M6) obeys commands  Best Verbal Response: 5-->(V5) oriented  Chloe Coma Scale Score: 15  Assessment Qualifiers: patient not sedated/intubated, no eye obstruction present  Pupil PERRLA: yes     24hr Temp:  [97.7 °F (36.5 °C)-98.6 °F (37 °C)]     CV:   Rhythm: normal sinus rhythm  BP goals:   SBP < 160  MAP > 65    Resp:   O2 Device (Oxygen Therapy): room air       Plan: N/A    GI/:     Diet/Nutrition Received: regular  Last Bowel Movement: 10/10/22  Voiding Characteristics: external catheter    Intake/Output Summary (Last 24 hours) at 10/14/2022 0323  Last data filed at 10/14/2022 0202  Gross per 24 hour   Intake 1858.25 ml   Output 810 ml   Net 1048.25 ml     Unmeasured Output  Urine Occurrence: 1  Stool Occurrence: 0    Labs/Accuchecks:  Recent Labs   Lab 10/14/22  0138   WBC 19.39*   RBC 3.80*   HGB 13.0*   HCT 39.0*         Recent Labs   Lab 10/14/22  0138   *   K 4.7   CO2 22*      BUN 16   CREATININE 1.0   ALKPHOS 85   ALT 10   AST 15   BILITOT 0.2      Recent Labs   Lab 10/11/22  0624   INR 1.0   APTT 26.3    No results for input(s): CPK, CPKMB, TROPONINI, MB in the last 168 hours.    Electrolytes: N/A - electrolytes WDL  Accuchecks: none    Gtts:      LDA/Wounds:  Lines/Drains/Airways       Drain  Duration                  Closed/Suction Drain 10/12/22 1749 Posterior Neck Accordion 10 Fr. 1 day    Male External Urinary Catheter 10/13/22 0622 Small <1 day               Arterial Line  Duration             Arterial Line 10/12/22 1518 Right Radial 1 day              Peripheral Intravenous Line  Duration                  Peripheral IV - Single Lumen 10/10/22 2219 18 G Right Antecubital 3 days         Peripheral IV - Single Lumen 10/10/22 2220 18 G Left Forearm 3 days                  Wounds: No  Wound care consulted: No       Problem: Adult Inpatient Plan of Care  Goal: Plan of Care Review  Outcome: Ongoing, Progressing  Goal: Optimal Comfort and Wellbeing  Outcome: Ongoing, Progressing  Goal: Readiness for Transition of Care  Outcome: Ongoing, Progressing

## 2022-10-14 NOTE — ASSESSMENT & PLAN NOTE
Patient is a 62M with PMH of HTN who presents with odontoid fracture. Placed in traction 10/11. C1-4 ORIF on 10/12.  --CT cervical spine 10/10: acute complex odontoid fx completely through the base, displaced 12 mm anteriorly, mild fx of C2 pedicles, mod to severe C1-2  canal stenosis  -- CTA neck   -- Unstable displaced acute fracture of the odontoid process with extension into the bilateral lateral masses of C2 and left transverse foramen.  Associated focal narrowing of the left vertebral artery at the level of the left transverse foramen, which remains patent distally.  Subtle left vertebral artery injury or compression is difficult to exclude.  --MRI C-spine w/o  -- Complex comminuted fracture involving the C2 body and dens demonstrated to better extent on prior cervical spine CT. 9 mm anterolisthesis of the dens relative to the body of C2.  Subsequent moderate/severe spinal canal stenosis.  Increased intramedullary cord signal at this concerning for edema.    --SBP <160, MAP goals complete  --Maintain C-collar at all times  --Tylenol, Lyrica, robaxin  --Stable for transfer to NSGY floor

## 2022-10-15 LAB
ALBUMIN SERPL BCP-MCNC: 2 G/DL (ref 3.5–5.2)
ALP SERPL-CCNC: 78 U/L (ref 55–135)
ALT SERPL W/O P-5'-P-CCNC: 9 U/L (ref 10–44)
ANION GAP SERPL CALC-SCNC: 5 MMOL/L (ref 8–16)
AST SERPL-CCNC: 15 U/L (ref 10–40)
BASOPHILS # BLD AUTO: 0.02 K/UL (ref 0–0.2)
BASOPHILS NFR BLD: 0.1 % (ref 0–1.9)
BILIRUB SERPL-MCNC: 0.2 MG/DL (ref 0.1–1)
BUN SERPL-MCNC: 25 MG/DL (ref 8–23)
CALCIUM SERPL-MCNC: 9 MG/DL (ref 8.7–10.5)
CHLORIDE SERPL-SCNC: 102 MMOL/L (ref 95–110)
CO2 SERPL-SCNC: 26 MMOL/L (ref 23–29)
CREAT SERPL-MCNC: 1 MG/DL (ref 0.5–1.4)
DIFFERENTIAL METHOD: ABNORMAL
EOSINOPHIL # BLD AUTO: 0 K/UL (ref 0–0.5)
EOSINOPHIL NFR BLD: 0.1 % (ref 0–8)
ERYTHROCYTE [DISTWIDTH] IN BLOOD BY AUTOMATED COUNT: 13.1 % (ref 11.5–14.5)
EST. GFR  (NO RACE VARIABLE): >60 ML/MIN/1.73 M^2
GLUCOSE SERPL-MCNC: 71 MG/DL (ref 70–110)
HCT VFR BLD AUTO: 36.8 % (ref 40–54)
HGB BLD-MCNC: 11.9 G/DL (ref 14–18)
IMM GRANULOCYTES # BLD AUTO: 0.1 K/UL (ref 0–0.04)
IMM GRANULOCYTES NFR BLD AUTO: 0.7 % (ref 0–0.5)
LYMPHOCYTES # BLD AUTO: 1.8 K/UL (ref 1–4.8)
LYMPHOCYTES NFR BLD: 12.8 % (ref 18–48)
MAGNESIUM SERPL-MCNC: 1.7 MG/DL (ref 1.6–2.6)
MCH RBC QN AUTO: 34 PG (ref 27–31)
MCHC RBC AUTO-ENTMCNC: 32.3 G/DL (ref 32–36)
MCV RBC AUTO: 105 FL (ref 82–98)
MONOCYTES # BLD AUTO: 1.1 K/UL (ref 0.3–1)
MONOCYTES NFR BLD: 7.6 % (ref 4–15)
NEUTROPHILS # BLD AUTO: 11.2 K/UL (ref 1.8–7.7)
NEUTROPHILS NFR BLD: 78.7 % (ref 38–73)
NRBC BLD-RTO: 0 /100 WBC
PHOSPHATE SERPL-MCNC: 2.6 MG/DL (ref 2.7–4.5)
PLATELET # BLD AUTO: 263 K/UL (ref 150–450)
PMV BLD AUTO: 11.1 FL (ref 9.2–12.9)
POTASSIUM SERPL-SCNC: 4.2 MMOL/L (ref 3.5–5.1)
PROT SERPL-MCNC: 5.1 G/DL (ref 6–8.4)
RBC # BLD AUTO: 3.5 M/UL (ref 4.6–6.2)
SODIUM SERPL-SCNC: 133 MMOL/L (ref 136–145)
WBC # BLD AUTO: 14.28 K/UL (ref 3.9–12.7)

## 2022-10-15 PROCEDURE — 80053 COMPREHEN METABOLIC PANEL: CPT | Performed by: PHYSICIAN ASSISTANT

## 2022-10-15 PROCEDURE — 25000003 PHARM REV CODE 250: Performed by: STUDENT IN AN ORGANIZED HEALTH CARE EDUCATION/TRAINING PROGRAM

## 2022-10-15 PROCEDURE — 25000003 PHARM REV CODE 250

## 2022-10-15 PROCEDURE — 25000003 PHARM REV CODE 250: Performed by: PHYSICIAN ASSISTANT

## 2022-10-15 PROCEDURE — 84100 ASSAY OF PHOSPHORUS: CPT | Performed by: PHYSICIAN ASSISTANT

## 2022-10-15 PROCEDURE — 25000003 PHARM REV CODE 250: Performed by: NEUROLOGICAL SURGERY

## 2022-10-15 PROCEDURE — 63600175 PHARM REV CODE 636 W HCPCS: Performed by: PHYSICIAN ASSISTANT

## 2022-10-15 PROCEDURE — 83735 ASSAY OF MAGNESIUM: CPT | Performed by: PHYSICIAN ASSISTANT

## 2022-10-15 PROCEDURE — 63600175 PHARM REV CODE 636 W HCPCS: Performed by: STUDENT IN AN ORGANIZED HEALTH CARE EDUCATION/TRAINING PROGRAM

## 2022-10-15 PROCEDURE — 85025 COMPLETE CBC W/AUTO DIFF WBC: CPT | Performed by: INTERNAL MEDICINE

## 2022-10-15 PROCEDURE — S4991 NICOTINE PATCH NONLEGEND: HCPCS

## 2022-10-15 PROCEDURE — 11000001 HC ACUTE MED/SURG PRIVATE ROOM

## 2022-10-15 PROCEDURE — 36415 COLL VENOUS BLD VENIPUNCTURE: CPT | Performed by: PHYSICIAN ASSISTANT

## 2022-10-15 RX ORDER — CEFAZOLIN SODIUM/D5W 2 G/100 ML
2 PLASTIC BAG, INJECTION (ML) INTRAVENOUS
Status: DISCONTINUED | OUTPATIENT
Start: 2022-10-15 | End: 2022-10-16

## 2022-10-15 RX ADMIN — HEPARIN SODIUM 5000 UNITS: 5000 INJECTION INTRAVENOUS; SUBCUTANEOUS at 01:10

## 2022-10-15 RX ADMIN — HEPARIN SODIUM 5000 UNITS: 5000 INJECTION INTRAVENOUS; SUBCUTANEOUS at 05:10

## 2022-10-15 RX ADMIN — ONDANSETRON 4 MG: 2 INJECTION INTRAMUSCULAR; INTRAVENOUS at 09:10

## 2022-10-15 RX ADMIN — PREGABALIN 75 MG: 75 CAPSULE ORAL at 09:10

## 2022-10-15 RX ADMIN — Medication 2 G: at 11:10

## 2022-10-15 RX ADMIN — AMLODIPINE BESYLATE 5 MG: 5 TABLET ORAL at 09:10

## 2022-10-15 RX ADMIN — METHOCARBAMOL 1000 MG: 500 TABLET ORAL at 09:10

## 2022-10-15 RX ADMIN — METHOCARBAMOL 1000 MG: 500 TABLET ORAL at 01:10

## 2022-10-15 RX ADMIN — Medication 2 G: at 04:10

## 2022-10-15 RX ADMIN — ACETAMINOPHEN 1000 MG: 500 TABLET ORAL at 02:10

## 2022-10-15 RX ADMIN — SENNOSIDES AND DOCUSATE SODIUM 1 TABLET: 50; 8.6 TABLET ORAL at 09:10

## 2022-10-15 RX ADMIN — ACETAMINOPHEN 1000 MG: 500 TABLET ORAL at 05:10

## 2022-10-15 RX ADMIN — HEPARIN SODIUM 5000 UNITS: 5000 INJECTION INTRAVENOUS; SUBCUTANEOUS at 09:10

## 2022-10-15 RX ADMIN — OXYCODONE 5 MG: 5 TABLET ORAL at 05:10

## 2022-10-15 RX ADMIN — LABETALOL HYDROCHLORIDE 10 MG: 5 INJECTION, SOLUTION INTRAVENOUS at 09:10

## 2022-10-15 RX ADMIN — NICOTINE 1 PATCH: 14 PATCH, EXTENDED RELEASE TRANSDERMAL at 09:10

## 2022-10-15 RX ADMIN — OXYCODONE 5 MG: 5 TABLET ORAL at 04:10

## 2022-10-15 RX ADMIN — METHOCARBAMOL 1000 MG: 500 TABLET ORAL at 05:10

## 2022-10-15 RX ADMIN — Medication 2 G: at 06:10

## 2022-10-15 RX ADMIN — LISINOPRIL 20 MG: 20 TABLET ORAL at 09:10

## 2022-10-15 RX ADMIN — OXYCODONE 5 MG: 5 TABLET ORAL at 09:10

## 2022-10-15 NOTE — SUBJECTIVE & OBJECTIVE
Interval History: Stepped down from ICU overnight, no telemetry issues. HV with 70cc/24h. AM labs pending. Pain controlle    Medications:  Continuous Infusions:  Scheduled Meds:   acetaminophen  1,000 mg Oral Q8H    amLODIPine  5 mg Oral Daily    heparin (porcine)  5,000 Units Subcutaneous Q8H    lisinopriL  20 mg Oral Daily    methocarbamoL  1,000 mg Oral Q8H    nicotine  1 patch Transdermal Daily    polyethylene glycol  17 g Oral Daily    pregabalin  75 mg Oral BID    senna-docusate 8.6-50 mg  1 tablet Oral BID     PRN Meds:sodium chloride, calcium gluconate IVPB, calcium gluconate IVPB, calcium gluconate IVPB, HYDROmorphone, labetaloL, magnesium sulfate IVPB, magnesium sulfate IVPB, ondansetron, oxyCODONE, potassium bicarbonate, potassium bicarbonate, potassium bicarbonate, potassium, sodium phosphates, potassium, sodium phosphates, potassium, sodium phosphates, sodium chloride 0.9%     Review of Systems  Objective:     Weight: 67.6 kg (149 lb)  Body mass index is 24.05 kg/m².  Vital Signs (Most Recent):  Temp: 98 °F (36.7 °C) (10/15/22 0024)  Pulse: 87 (10/15/22 0024)  Resp: 18 (10/15/22 0024)  BP: (!) 159/91 (10/15/22 0024)  SpO2: 96 % (10/15/22 0202)   Vital Signs (24h Range):  Temp:  [97.7 °F (36.5 °C)-98.2 °F (36.8 °C)] 98 °F (36.7 °C)  Pulse:  [] 87  Resp:  [11-27] 18  SpO2:  [95 %-100 %] 96 %  BP: (125-186)/() 159/91  Arterial Line BP: (108-175)/() 126/86                          Closed/Suction Drain 10/12/22 1749 Posterior Neck Accordion 10 Fr. (Active)   Site Description Unable to view 10/14/22 2000   Dressing Type Other (Comment) 10/14/22 1200   Dressing Status Dry 10/14/22 1200   Dressing Intervention Other (Comment) 10/14/22 1200   Drainage Sanguineous 10/14/22 2000   Status To bulb suction 10/14/22 2000   Output (mL) 40 mL 10/14/22 2000       Male External Urinary Catheter 10/13/22 0622 Small (Active)   Collection Container Urimeter 10/14/22 1200   Securement Method secured to top  of thigh w/ adhesive device 10/14/22 1200   Skin no redness;no breakdown 10/14/22 1200   Tolerance no signs/symptoms of discomfort 10/14/22 1200   Output (mL) 0 mL 10/14/22 2000   Catheter Change Date 10/13/22 10/14/22 0302   Catheter Change Time 0645 10/14/22 0302       Physical Exam    Neurosurgery Physical Exam  AOx3, GCS E4V5M6  PERRL, EOMI, visual fields grossly intact  Facial sensation normal, no asymmetry, TM  Should shrug equal, no pronator drift  LUE 4 io, otherwise 5/5  RUE 4+ io, effort dependent R tri weakness, otherwise 5/5  BLE 5/5 motor throughout  SILT, decreased sensation on anterior thighs/calves, upper arms  Coordination intact throughout  DTRs 1+ R knee, 2+ L knee  No Bains's/clonus     C-collar in place    Significant Labs:  Recent Labs   Lab 10/13/22  2140 10/14/22  0138   * 143*   * 135*   K 4.6 4.7    105   CO2 24 22*   BUN 16 16   CREATININE 1.0 1.0   CALCIUM 8.9 9.0   MG 1.9 2.0     Recent Labs   Lab 10/14/22  0138   WBC 19.39*   HGB 13.0*   HCT 39.0*        No results for input(s): LABPT, INR, APTT in the last 48 hours.  Microbiology Results (last 7 days)       ** No results found for the last 168 hours. **          All pertinent labs from the last 24 hours have been reviewed.    Significant Diagnostics:  I have reviewed and interpreted all pertinent imaging results/findings within the past 24 hours.

## 2022-10-15 NOTE — PROGRESS NOTES
"Joel Zhou - Neurosurgery ([facility])  Neurosurgery  Progress Note    Subjective:     History of Present Illness: Patient is a 62M with PMH of HTN and remote L knee surgery who presents with 3 days of weakness after a fall 2-3 weeks ago. Patient transferred from OSH for NSGY consult regarding odontoid fracture. He reports that he has had persistent neck pain, but that his weakness did not start until a few days ago and is worsening. Patient complains of an intermittent "rumbling" almost vibrating feeling in his chest/abdomen, as well as sometimes in his various muscle groups. He claims that he feel decreased sensation across his limbs/body, including his thighs and calves where it started before moving up to his upper extremities. He symptoms do not seem to localize to a specific dermatome. He does not complain of any new radiculopathic or myelopathic symptoms. Patient reports that he has had to use a walker in the past few days. When laying down flat on the bed, he feels fine and can move all extremities at least AG. When he is sitting upright, he states that he cannot move his arms past the shoulder line. He also notes weakness in his BLE which was required him to use a walker to get around.Patient did not feel safe to get up and demonstrate gait, but claims he is weak when walking.    He presented in a C-collar. Patient denies taking AC/AP. He also denies bowel/bladder dysfunction.      Post-Op Info:  Procedure(s) (LRB):  POSTERIOR CERVICAL FUSION, SPINE C1-C4 PCF  (N/A)  LAMINECTOMY, SPINE, C-1 (N/A)   3 Days Post-Op     Interval History: Stepped down from ICU overnight, no telemetry issues. HV with 110cc/24h. AM labs pending. Pain controlled.    Medications:  Continuous Infusions:  Scheduled Meds:   acetaminophen  1,000 mg Oral Q8H    amLODIPine  5 mg Oral Daily    heparin (porcine)  5,000 Units Subcutaneous Q8H    lisinopriL  20 mg Oral Daily    methocarbamoL  1,000 mg Oral Q8H    nicotine  1 patch Transdermal " Daily    polyethylene glycol  17 g Oral Daily    pregabalin  75 mg Oral BID    senna-docusate 8.6-50 mg  1 tablet Oral BID     PRN Meds:sodium chloride, calcium gluconate IVPB, calcium gluconate IVPB, calcium gluconate IVPB, HYDROmorphone, labetaloL, magnesium sulfate IVPB, magnesium sulfate IVPB, ondansetron, oxyCODONE, potassium bicarbonate, potassium bicarbonate, potassium bicarbonate, potassium, sodium phosphates, potassium, sodium phosphates, potassium, sodium phosphates, sodium chloride 0.9%     Review of Systems  Objective:     Weight: 67.6 kg (149 lb)  Body mass index is 24.05 kg/m².  Vital Signs (Most Recent):  Temp: 98 °F (36.7 °C) (10/15/22 0024)  Pulse: 87 (10/15/22 0024)  Resp: 18 (10/15/22 0024)  BP: (!) 159/91 (10/15/22 0024)  SpO2: 96 % (10/15/22 0202)   Vital Signs (24h Range):  Temp:  [97.7 °F (36.5 °C)-98.2 °F (36.8 °C)] 98 °F (36.7 °C)  Pulse:  [] 87  Resp:  [11-27] 18  SpO2:  [95 %-100 %] 96 %  BP: (125-186)/() 159/91  Arterial Line BP: (108-175)/() 126/86                          Closed/Suction Drain 10/12/22 1749 Posterior Neck Accordion 10 Fr. (Active)   Site Description Unable to view 10/14/22 2000   Dressing Type Other (Comment) 10/14/22 1200   Dressing Status Dry 10/14/22 1200   Dressing Intervention Other (Comment) 10/14/22 1200   Drainage Sanguineous 10/14/22 2000   Status To bulb suction 10/14/22 2000   Output (mL) 40 mL 10/14/22 2000       Male External Urinary Catheter 10/13/22 0622 Small (Active)   Collection Container Urimeter 10/14/22 1200   Securement Method secured to top of thigh w/ adhesive device 10/14/22 1200   Skin no redness;no breakdown 10/14/22 1200   Tolerance no signs/symptoms of discomfort 10/14/22 1200   Output (mL) 0 mL 10/14/22 2000   Catheter Change Date 10/13/22 10/14/22 0302   Catheter Change Time 0645 10/14/22 0302       Physical Exam    Neurosurgery Physical Exam  AOx3, GCS E4V5M6  PERRL, EOMI, visual fields grossly intact  Facial sensation  normal, no asymmetry, TM  Should shrug equal, no pronator drift  LUE 4 io, otherwise 5/5  RUE 4+ io, effort dependent R tri weakness, otherwise 5/5  BLE 5/5 motor throughout  SILT, decreased sensation on anterior thighs/calves, upper arms  Coordination intact throughout  DTRs 1+ R knee, 2+ L knee  No Bains's/clonus     C-collar in place    Significant Labs:  Recent Labs   Lab 10/13/22  2140 10/14/22  0138   * 143*   * 135*   K 4.6 4.7    105   CO2 24 22*   BUN 16 16   CREATININE 1.0 1.0   CALCIUM 8.9 9.0   MG 1.9 2.0     Recent Labs   Lab 10/14/22  0138   WBC 19.39*   HGB 13.0*   HCT 39.0*        No results for input(s): LABPT, INR, APTT in the last 48 hours.  Microbiology Results (last 7 days)       ** No results found for the last 168 hours. **          All pertinent labs from the last 24 hours have been reviewed.    Significant Diagnostics:  I have reviewed and interpreted all pertinent imaging results/findings within the past 24 hours.    Assessment/Plan:     * Odontoid fracture  Patient is a 62M with PMH of HTN who presents with odontoid fracture now s/p C1-4 PCDF on 10/12:    --Transferred to NSGY floor with telemetry on 10/14  --q4h neurochecks on floor  --CT cervical spine 10/10: acute complex odontoid fx completely through the base, displaced 12 mm anteriorly, mild fx of C2 pedicles, mod to severe C1-2  canal stenosis  --CTA neck without evidence for vert injury  --MRI Csp with cord edema and C2 alar, transverse and longitudinal ligamentous disruption  --XR Csp 10/12: grossly normal alignment of known odontoid fracture  --XR 10/12: post op hardware in good position, s/p C1-4 PCF  --Maintain C-collar at all times   --HV to full suction, abx while in place  --Ok for diet  --AM labs pending  --Continue continuous telemetry  --PT/OT rec IPR (ordered)  --SQH/LOLLY/SCDs  --Continue to monitor clinically, notify NSGY immediately with any changes in neuro status    Dispo: ongoing pending  IPR            Guera Hutson MD  Neurosurgery  [facility] - Neurosurgery ([facility])

## 2022-10-15 NOTE — PLAN OF CARE
Pt up x1 assist + walker pivot to chair and bedside commode. Bed bath completed today. IV site rotated. Pain controlled with PO medications. Pt voiding well. VSS. No significant changes today. Drain with minimal output.   Problem: Adult Inpatient Plan of Care  Goal: Plan of Care Review  Outcome: Ongoing, Progressing  Goal: Patient-Specific Goal (Individualized)  Outcome: Ongoing, Progressing  Goal: Absence of Hospital-Acquired Illness or Injury  Outcome: Ongoing, Progressing  Goal: Optimal Comfort and Wellbeing  Outcome: Ongoing, Progressing  Goal: Readiness for Transition of Care  Outcome: Ongoing, Progressing

## 2022-10-15 NOTE — ASSESSMENT & PLAN NOTE
Patient is a 62M with PMH of HTN who presents with odontoid fracture now s/p C1-4 PCDF on 10/12:    --Transferred to NSGY floor with telemetry on 10/14  --q4h neurochecks on floor  --CT cervical spine 10/10: acute complex odontoid fx completely through the base, displaced 12 mm anteriorly, mild fx of C2 pedicles, mod to severe C1-2  canal stenosis  --CTA neck without evidence for vert injury  --MRI Csp with cord edema and C2 alar, transverse and longitudinal ligamentous disruption  --XR Csp 10/12: grossly normal alignment of known odontoid fracture  --XR 10/12: post op hardware in good position, s/p C1-4 PCF  --Maintain C-collar at all times   --HV to full suction, abx while in place  --Ok for diet  --AM labs pending  --Continue continuous telemetry  --PT/OT rec IPR (ordered)  --SQH/LOLLY/SCDs  --Continue to monitor clinically, notify NSGY immediately with any changes in neuro status    Dispo: ongoing pending IPR

## 2022-10-15 NOTE — PLAN OF CARE
Problem: Adult Inpatient Plan of Care  Goal: Optimal Comfort and Wellbeing  Outcome: Ongoing, Progressing     Problem: Functional Ability Impaired (Stroke, Hemorrhagic)  Goal: Optimal Functional Ability  Outcome: Ongoing, Progressing     Problem: Pain (Stroke, Hemorrhagic)  Goal: Acceptable Pain Control  Outcome: Ongoing, Progressing     Problem: Skin Injury Risk Increased  Goal: Skin Health and Integrity  Outcome: Ongoing, Progressing    Patient has been awake off and on with no s/sx of distress and continues to deny pain. C-Collar in place, adjusted for comfort and pressure relief. Bed in lowest position with SR's up times 3, call light in reach and bed alarm activated for safety purposes. He is AOx4 and makes his needs known, uses call light as directed. VSS, flow sheets completed see for assessments. SCD's in palce

## 2022-10-16 LAB
ALBUMIN SERPL BCP-MCNC: 2 G/DL (ref 3.5–5.2)
ALP SERPL-CCNC: 80 U/L (ref 55–135)
ALT SERPL W/O P-5'-P-CCNC: 8 U/L (ref 10–44)
ANION GAP SERPL CALC-SCNC: 6 MMOL/L (ref 8–16)
ANION GAP SERPL CALC-SCNC: 7 MMOL/L (ref 8–16)
AST SERPL-CCNC: 14 U/L (ref 10–40)
BASOPHILS # BLD AUTO: 0.04 K/UL (ref 0–0.2)
BASOPHILS NFR BLD: 0.3 % (ref 0–1.9)
BILIRUB SERPL-MCNC: 0.1 MG/DL (ref 0.1–1)
BUN SERPL-MCNC: 17 MG/DL (ref 8–23)
BUN SERPL-MCNC: 18 MG/DL (ref 8–23)
CALCIUM SERPL-MCNC: 9.2 MG/DL (ref 8.7–10.5)
CALCIUM SERPL-MCNC: 9.5 MG/DL (ref 8.7–10.5)
CHLORIDE SERPL-SCNC: 101 MMOL/L (ref 95–110)
CHLORIDE SERPL-SCNC: 99 MMOL/L (ref 95–110)
CO2 SERPL-SCNC: 25 MMOL/L (ref 23–29)
CO2 SERPL-SCNC: 25 MMOL/L (ref 23–29)
CREAT SERPL-MCNC: 0.8 MG/DL (ref 0.5–1.4)
CREAT SERPL-MCNC: 0.8 MG/DL (ref 0.5–1.4)
DIFFERENTIAL METHOD: ABNORMAL
EOSINOPHIL # BLD AUTO: 0 K/UL (ref 0–0.5)
EOSINOPHIL NFR BLD: 0.3 % (ref 0–8)
ERYTHROCYTE [DISTWIDTH] IN BLOOD BY AUTOMATED COUNT: 12.9 % (ref 11.5–14.5)
EST. GFR  (NO RACE VARIABLE): >60 ML/MIN/1.73 M^2
EST. GFR  (NO RACE VARIABLE): >60 ML/MIN/1.73 M^2
GLUCOSE SERPL-MCNC: 110 MG/DL (ref 70–110)
GLUCOSE SERPL-MCNC: 91 MG/DL (ref 70–110)
HCT VFR BLD AUTO: 40.5 % (ref 40–54)
HGB BLD-MCNC: 13.4 G/DL (ref 14–18)
IMM GRANULOCYTES # BLD AUTO: 0.1 K/UL (ref 0–0.04)
IMM GRANULOCYTES NFR BLD AUTO: 0.7 % (ref 0–0.5)
LYMPHOCYTES # BLD AUTO: 1.8 K/UL (ref 1–4.8)
LYMPHOCYTES NFR BLD: 11.7 % (ref 18–48)
MAGNESIUM SERPL-MCNC: 1.6 MG/DL (ref 1.6–2.6)
MAGNESIUM SERPL-MCNC: 1.7 MG/DL (ref 1.6–2.6)
MCH RBC QN AUTO: 34.3 PG (ref 27–31)
MCHC RBC AUTO-ENTMCNC: 33.1 G/DL (ref 32–36)
MCV RBC AUTO: 104 FL (ref 82–98)
MONOCYTES # BLD AUTO: 1.2 K/UL (ref 0.3–1)
MONOCYTES NFR BLD: 8.1 % (ref 4–15)
NEUTROPHILS # BLD AUTO: 12.1 K/UL (ref 1.8–7.7)
NEUTROPHILS NFR BLD: 78.9 % (ref 38–73)
NRBC BLD-RTO: 0 /100 WBC
PHOSPHATE SERPL-MCNC: 2.3 MG/DL (ref 2.7–4.5)
PHOSPHATE SERPL-MCNC: 2.9 MG/DL (ref 2.7–4.5)
PLATELET # BLD AUTO: 265 K/UL (ref 150–450)
PMV BLD AUTO: 11 FL (ref 9.2–12.9)
POTASSIUM SERPL-SCNC: 4.5 MMOL/L (ref 3.5–5.1)
POTASSIUM SERPL-SCNC: 4.7 MMOL/L (ref 3.5–5.1)
PROT SERPL-MCNC: 5.6 G/DL (ref 6–8.4)
RBC # BLD AUTO: 3.91 M/UL (ref 4.6–6.2)
SODIUM SERPL-SCNC: 131 MMOL/L (ref 136–145)
SODIUM SERPL-SCNC: 132 MMOL/L (ref 136–145)
TROPONIN I SERPL DL<=0.01 NG/ML-MCNC: 0.03 NG/ML (ref 0–0.03)
WBC # BLD AUTO: 15.27 K/UL (ref 3.9–12.7)

## 2022-10-16 PROCEDURE — 25000003 PHARM REV CODE 250: Performed by: STUDENT IN AN ORGANIZED HEALTH CARE EDUCATION/TRAINING PROGRAM

## 2022-10-16 PROCEDURE — 84100 ASSAY OF PHOSPHORUS: CPT | Mod: 91 | Performed by: STUDENT IN AN ORGANIZED HEALTH CARE EDUCATION/TRAINING PROGRAM

## 2022-10-16 PROCEDURE — 25000003 PHARM REV CODE 250

## 2022-10-16 PROCEDURE — 63600175 PHARM REV CODE 636 W HCPCS: Performed by: STUDENT IN AN ORGANIZED HEALTH CARE EDUCATION/TRAINING PROGRAM

## 2022-10-16 PROCEDURE — 83735 ASSAY OF MAGNESIUM: CPT | Performed by: PHYSICIAN ASSISTANT

## 2022-10-16 PROCEDURE — 25000003 PHARM REV CODE 250: Performed by: PHYSICIAN ASSISTANT

## 2022-10-16 PROCEDURE — 36415 COLL VENOUS BLD VENIPUNCTURE: CPT | Performed by: PHYSICIAN ASSISTANT

## 2022-10-16 PROCEDURE — 80053 COMPREHEN METABOLIC PANEL: CPT | Performed by: PHYSICIAN ASSISTANT

## 2022-10-16 PROCEDURE — 99900035 HC TECH TIME PER 15 MIN (STAT)

## 2022-10-16 PROCEDURE — S4991 NICOTINE PATCH NONLEGEND: HCPCS

## 2022-10-16 PROCEDURE — 85025 COMPLETE CBC W/AUTO DIFF WBC: CPT | Performed by: INTERNAL MEDICINE

## 2022-10-16 PROCEDURE — 94761 N-INVAS EAR/PLS OXIMETRY MLT: CPT

## 2022-10-16 PROCEDURE — 80048 BASIC METABOLIC PNL TOTAL CA: CPT | Mod: XB | Performed by: STUDENT IN AN ORGANIZED HEALTH CARE EDUCATION/TRAINING PROGRAM

## 2022-10-16 PROCEDURE — 11000001 HC ACUTE MED/SURG PRIVATE ROOM

## 2022-10-16 PROCEDURE — 93005 ELECTROCARDIOGRAM TRACING: CPT

## 2022-10-16 PROCEDURE — 84484 ASSAY OF TROPONIN QUANT: CPT | Performed by: STUDENT IN AN ORGANIZED HEALTH CARE EDUCATION/TRAINING PROGRAM

## 2022-10-16 PROCEDURE — 83735 ASSAY OF MAGNESIUM: CPT | Mod: 91 | Performed by: STUDENT IN AN ORGANIZED HEALTH CARE EDUCATION/TRAINING PROGRAM

## 2022-10-16 PROCEDURE — 93010 EKG 12-LEAD: ICD-10-PCS | Mod: ,,, | Performed by: INTERNAL MEDICINE

## 2022-10-16 PROCEDURE — 84100 ASSAY OF PHOSPHORUS: CPT | Performed by: PHYSICIAN ASSISTANT

## 2022-10-16 PROCEDURE — 36415 COLL VENOUS BLD VENIPUNCTURE: CPT | Performed by: STUDENT IN AN ORGANIZED HEALTH CARE EDUCATION/TRAINING PROGRAM

## 2022-10-16 PROCEDURE — 93010 ELECTROCARDIOGRAM REPORT: CPT | Mod: ,,, | Performed by: INTERNAL MEDICINE

## 2022-10-16 RX ORDER — POLYETHYLENE GLYCOL 3350 17 G/17G
17 POWDER, FOR SOLUTION ORAL 2 TIMES DAILY
Status: DISCONTINUED | OUTPATIENT
Start: 2022-10-16 | End: 2022-10-20

## 2022-10-16 RX ORDER — BISACODYL 10 MG
10 SUPPOSITORY, RECTAL RECTAL DAILY PRN
Status: DISCONTINUED | OUTPATIENT
Start: 2022-10-16 | End: 2022-10-20

## 2022-10-16 RX ORDER — MAGNESIUM SULFATE 1 G/100ML
1 INJECTION INTRAVENOUS ONCE
Status: COMPLETED | OUTPATIENT
Start: 2022-10-16 | End: 2022-10-17

## 2022-10-16 RX ORDER — HYDRALAZINE HYDROCHLORIDE 20 MG/ML
10 INJECTION INTRAMUSCULAR; INTRAVENOUS EVERY 6 HOURS PRN
Status: DISCONTINUED | OUTPATIENT
Start: 2022-10-16 | End: 2022-10-21 | Stop reason: HOSPADM

## 2022-10-16 RX ORDER — CEFAZOLIN SODIUM 1 G/50ML
1 SOLUTION INTRAVENOUS
Status: DISCONTINUED | OUTPATIENT
Start: 2022-10-16 | End: 2022-10-18

## 2022-10-16 RX ORDER — SODIUM,POTASSIUM PHOSPHATES 280-250MG
2 POWDER IN PACKET (EA) ORAL EVERY 4 HOURS
Status: COMPLETED | OUTPATIENT
Start: 2022-10-16 | End: 2022-10-17

## 2022-10-16 RX ORDER — AMOXICILLIN 250 MG
2 CAPSULE ORAL 2 TIMES DAILY
Status: DISCONTINUED | OUTPATIENT
Start: 2022-10-17 | End: 2022-10-20

## 2022-10-16 RX ADMIN — METHOCARBAMOL 1000 MG: 500 TABLET ORAL at 05:10

## 2022-10-16 RX ADMIN — SENNOSIDES AND DOCUSATE SODIUM 1 TABLET: 50; 8.6 TABLET ORAL at 09:10

## 2022-10-16 RX ADMIN — HEPARIN SODIUM 5000 UNITS: 5000 INJECTION INTRAVENOUS; SUBCUTANEOUS at 09:10

## 2022-10-16 RX ADMIN — SODIUM CHLORIDE 1000 ML: 0.9 INJECTION, SOLUTION INTRAVENOUS at 04:10

## 2022-10-16 RX ADMIN — POTASSIUM & SODIUM PHOSPHATES POWDER PACK 280-160-250 MG 2 PACKET: 280-160-250 PACK at 11:10

## 2022-10-16 RX ADMIN — POLYETHYLENE GLYCOL 3350 17 G: 17 POWDER, FOR SOLUTION ORAL at 11:10

## 2022-10-16 RX ADMIN — BISACODYL 10 MG: 10 SUPPOSITORY RECTAL at 11:10

## 2022-10-16 RX ADMIN — HEPARIN SODIUM 5000 UNITS: 5000 INJECTION INTRAVENOUS; SUBCUTANEOUS at 05:10

## 2022-10-16 RX ADMIN — AMLODIPINE BESYLATE 5 MG: 5 TABLET ORAL at 08:10

## 2022-10-16 RX ADMIN — OXYCODONE 5 MG: 5 TABLET ORAL at 08:10

## 2022-10-16 RX ADMIN — METHOCARBAMOL 1000 MG: 500 TABLET ORAL at 02:10

## 2022-10-16 RX ADMIN — MAGNESIUM SULFATE HEPTAHYDRATE 1 G: 500 INJECTION, SOLUTION INTRAMUSCULAR; INTRAVENOUS at 11:10

## 2022-10-16 RX ADMIN — ACETAMINOPHEN 1000 MG: 500 TABLET ORAL at 09:10

## 2022-10-16 RX ADMIN — ACETAMINOPHEN 1000 MG: 500 TABLET ORAL at 02:10

## 2022-10-16 RX ADMIN — METHOCARBAMOL 1000 MG: 500 TABLET ORAL at 09:10

## 2022-10-16 RX ADMIN — PREGABALIN 75 MG: 75 CAPSULE ORAL at 09:10

## 2022-10-16 RX ADMIN — HYDRALAZINE HYDROCHLORIDE 10 MG: 20 INJECTION, SOLUTION INTRAMUSCULAR; INTRAVENOUS at 09:10

## 2022-10-16 RX ADMIN — PREGABALIN 75 MG: 75 CAPSULE ORAL at 08:10

## 2022-10-16 RX ADMIN — LISINOPRIL 20 MG: 20 TABLET ORAL at 08:10

## 2022-10-16 RX ADMIN — ACETAMINOPHEN 1000 MG: 500 TABLET ORAL at 05:10

## 2022-10-16 RX ADMIN — CEFAZOLIN SODIUM 1 G: 1 SOLUTION INTRAVENOUS at 11:10

## 2022-10-16 RX ADMIN — HEPARIN SODIUM 5000 UNITS: 5000 INJECTION INTRAVENOUS; SUBCUTANEOUS at 02:10

## 2022-10-16 RX ADMIN — Medication 2 G: at 02:10

## 2022-10-16 RX ADMIN — OXYCODONE 5 MG: 5 TABLET ORAL at 09:10

## 2022-10-16 RX ADMIN — NICOTINE 1 PATCH: 14 PATCH, EXTENDED RELEASE TRANSDERMAL at 08:10

## 2022-10-16 NOTE — PLAN OF CARE
Pt resting in bed and up to chair with 1 assist + walker today. Pt's HR tachy in the 100's-110's throughout the day. Neurosurgery team notified, 1000 mL NS bolus and STAT EKG order obtained. No other orders received. Pt asymptomatic, reports he is comfortable with minimal pain. Pain controlled with PO medications. IV abx continued today.   Problem: Adult Inpatient Plan of Care  Goal: Plan of Care Review  Outcome: Ongoing, Progressing  Goal: Patient-Specific Goal (Individualized)  Outcome: Ongoing, Progressing  Goal: Absence of Hospital-Acquired Illness or Injury  Outcome: Ongoing, Progressing  Goal: Optimal Comfort and Wellbeing  Outcome: Ongoing, Progressing  Goal: Readiness for Transition of Care  Outcome: Ongoing, Progressing

## 2022-10-16 NOTE — SUBJECTIVE & OBJECTIVE
Interval History:   10/16: NAEON. AF, VSS. Tolerating PO, DC obrien today, passing gas.     Medications:  Continuous Infusions:  Scheduled Meds:   acetaminophen  1,000 mg Oral Q8H    amLODIPine  5 mg Oral Daily    ceFAZolin (ANCEF) IVPB  1 g Intravenous Q8H    heparin (porcine)  5,000 Units Subcutaneous Q8H    lisinopriL  20 mg Oral Daily    methocarbamoL  1,000 mg Oral Q8H    nicotine  1 patch Transdermal Daily    polyethylene glycol  17 g Oral Daily    pregabalin  75 mg Oral BID    senna-docusate 8.6-50 mg  1 tablet Oral BID     PRN Meds:sodium chloride, calcium gluconate IVPB, calcium gluconate IVPB, calcium gluconate IVPB, HYDROmorphone, labetaloL, magnesium sulfate IVPB, magnesium sulfate IVPB, ondansetron, oxyCODONE, potassium bicarbonate, potassium bicarbonate, potassium bicarbonate, potassium, sodium phosphates, potassium, sodium phosphates, potassium, sodium phosphates, sodium chloride 0.9%     Review of Systems  Objective:     Weight: 67.6 kg (149 lb)  Body mass index is 24.05 kg/m².  Vital Signs (Most Recent):  Temp: 98.4 °F (36.9 °C) (10/16/22 0715)  Pulse: 93 (10/16/22 0715)  Resp: 18 (10/16/22 0715)  BP: (!) 138/93 (10/16/22 0715)  SpO2: 97 % (10/16/22 0715)   Vital Signs (24h Range):  Temp:  [97.6 °F (36.4 °C)-98.6 °F (37 °C)] 98.4 °F (36.9 °C)  Pulse:  [] 93  Resp:  [16-20] 18  SpO2:  [93 %-97 %] 97 %  BP: (126-170)/() 138/93                          Closed/Suction Drain 10/12/22 1749 Posterior Neck Accordion 10 Fr. (Active)   Site Description Unable to view 10/15/22 2000   Dressing Type Other (Comment) 10/14/22 1200   Dressing Status Clean;Dry;Intact 10/15/22 2000   Dressing Intervention Other (Comment) 10/14/22 1200   Drainage Sanguineous 10/14/22 2000   Status To bulb suction 10/15/22 2000   Output (mL) 10 mL 10/15/22 1755       Male External Urinary Catheter 10/13/22 0622 Small (Active)   Collection Container Standard drainage bag 10/15/22 2000   Securement Method secured to top of  thigh w/ adhesive device 10/15/22 2000   Skin no redness;no breakdown 10/15/22 2000   Tolerance no signs/symptoms of discomfort 10/15/22 2000   Output (mL) 500 mL 10/15/22 0844   Catheter Change Date 10/13/22 10/14/22 0302   Catheter Change Time 0645 10/14/22 0302       Physical Exam    Neurosurgery Physical Exam  AOx3, GCS E4V5M6  PERRL, EOMI, visual fields grossly intact  Facial sensation normal, no asymmetry, TM  Should shrug equal, no pronator drift  LUE 4 io, otherwise 5/5  RUE 4+ io, effort dependent R tri weakness, otherwise 5/5  BLE 5/5 motor throughout  SILT, decreased sensation on anterior thighs/calves, upper arms  Coordination intact throughout  DTRs 1+ R knee, 2+ L knee  No Bains's/clonus     C-collar in place       Significant Labs:  Recent Labs   Lab 10/15/22  0426 10/16/22  0443   GLU 71 91   * 131*   K 4.2 4.7    99   CO2 26 25   BUN 25* 17   CREATININE 1.0 0.8   CALCIUM 9.0 9.5   MG 1.7 1.7     Recent Labs   Lab 10/15/22  0426 10/16/22  0443   WBC 14.28* 15.27*   HGB 11.9* 13.4*   HCT 36.8* 40.5    265     No results for input(s): LABPT, INR, APTT in the last 48 hours.  Microbiology Results (last 7 days)       ** No results found for the last 168 hours. **          All pertinent labs from the last 24 hours have been reviewed.    Significant Diagnostics:  I have reviewed all pertinent imaging results/findings within the past 24 hours.

## 2022-10-16 NOTE — PROGRESS NOTES
"Joel Zhou - Neurosurgery ([facility])  Neurosurgery  Progress Note    Subjective:     History of Present Illness: Patient is a 62M with PMH of HTN and remote L knee surgery who presents with 3 days of weakness after a fall 2-3 weeks ago. Patient transferred from OSH for NSGY consult regarding odontoid fracture. He reports that he has had persistent neck pain, but that his weakness did not start until a few days ago and is worsening. Patient complains of an intermittent "rumbling" almost vibrating feeling in his chest/abdomen, as well as sometimes in his various muscle groups. He claims that he feel decreased sensation across his limbs/body, including his thighs and calves where it started before moving up to his upper extremities. He symptoms do not seem to localize to a specific dermatome. He does not complain of any new radiculopathic or myelopathic symptoms. Patient reports that he has had to use a walker in the past few days. When laying down flat on the bed, he feels fine and can move all extremities at least AG. When he is sitting upright, he states that he cannot move his arms past the shoulder line. He also notes weakness in his BLE which was required him to use a walker to get around.Patient did not feel safe to get up and demonstrate gait, but claims he is weak when walking.    He presented in a C-collar. Patient denies taking AC/AP. He also denies bowel/bladder dysfunction.      Post-Op Info:  Procedure(s) (LRB):  POSTERIOR CERVICAL FUSION, SPINE C1-C4 PCF  (N/A)  LAMINECTOMY, SPINE, C-1 (N/A)   4 Days Post-Op     Interval History:   10/16: COLUMBA. AF, VSS. Tolerating PO, DC obrien today, passing gas.     Medications:  Continuous Infusions:  Scheduled Meds:   acetaminophen  1,000 mg Oral Q8H    amLODIPine  5 mg Oral Daily    ceFAZolin (ANCEF) IVPB  1 g Intravenous Q8H    heparin (porcine)  5,000 Units Subcutaneous Q8H    lisinopriL  20 mg Oral Daily    methocarbamoL  1,000 mg Oral Q8H    nicotine  1 " patch Transdermal Daily    polyethylene glycol  17 g Oral Daily    pregabalin  75 mg Oral BID    senna-docusate 8.6-50 mg  1 tablet Oral BID     PRN Meds:sodium chloride, calcium gluconate IVPB, calcium gluconate IVPB, calcium gluconate IVPB, HYDROmorphone, labetaloL, magnesium sulfate IVPB, magnesium sulfate IVPB, ondansetron, oxyCODONE, potassium bicarbonate, potassium bicarbonate, potassium bicarbonate, potassium, sodium phosphates, potassium, sodium phosphates, potassium, sodium phosphates, sodium chloride 0.9%     Review of Systems  Objective:     Weight: 67.6 kg (149 lb)  Body mass index is 24.05 kg/m².  Vital Signs (Most Recent):  Temp: 98.4 °F (36.9 °C) (10/16/22 0715)  Pulse: 93 (10/16/22 0715)  Resp: 18 (10/16/22 0715)  BP: (!) 138/93 (10/16/22 0715)  SpO2: 97 % (10/16/22 0715)   Vital Signs (24h Range):  Temp:  [97.6 °F (36.4 °C)-98.6 °F (37 °C)] 98.4 °F (36.9 °C)  Pulse:  [] 93  Resp:  [16-20] 18  SpO2:  [93 %-97 %] 97 %  BP: (126-170)/() 138/93                          Closed/Suction Drain 10/12/22 1749 Posterior Neck Accordion 10 Fr. (Active)   Site Description Unable to view 10/15/22 2000   Dressing Type Other (Comment) 10/14/22 1200   Dressing Status Clean;Dry;Intact 10/15/22 2000   Dressing Intervention Other (Comment) 10/14/22 1200   Drainage Sanguineous 10/14/22 2000   Status To bulb suction 10/15/22 2000   Output (mL) 10 mL 10/15/22 1755       Male External Urinary Catheter 10/13/22 0622 Small (Active)   Collection Container Standard drainage bag 10/15/22 2000   Securement Method secured to top of thigh w/ adhesive device 10/15/22 2000   Skin no redness;no breakdown 10/15/22 2000   Tolerance no signs/symptoms of discomfort 10/15/22 2000   Output (mL) 500 mL 10/15/22 0844   Catheter Change Date 10/13/22 10/14/22 0302   Catheter Change Time 0645 10/14/22 0302       Physical Exam    Neurosurgery Physical Exam  AOx3, GCS E4V5M6  PERRL, EOMI, visual fields grossly intact  Facial  sensation normal, no asymmetry, TM  Should shrug equal, no pronator drift  LUE 4 io, otherwise 5/5  RUE 4+ io, effort dependent R tri weakness, otherwise 5/5  BLE 5/5 motor throughout  SILT, decreased sensation on anterior thighs/calves, upper arms  Coordination intact throughout  DTRs 1+ R knee, 2+ L knee  No Bains's/clonus     C-collar in place       Significant Labs:  Recent Labs   Lab 10/15/22  0426 10/16/22  0443   GLU 71 91   * 131*   K 4.2 4.7    99   CO2 26 25   BUN 25* 17   CREATININE 1.0 0.8   CALCIUM 9.0 9.5   MG 1.7 1.7     Recent Labs   Lab 10/15/22  0426 10/16/22  0443   WBC 14.28* 15.27*   HGB 11.9* 13.4*   HCT 36.8* 40.5    265     No results for input(s): LABPT, INR, APTT in the last 48 hours.  Microbiology Results (last 7 days)       ** No results found for the last 168 hours. **          All pertinent labs from the last 24 hours have been reviewed.    Significant Diagnostics:  I have reviewed all pertinent imaging results/findings within the past 24 hours.    Assessment/Plan:     * Odontoid fracture  Patient is a 62M with PMH of HTN who presents with odontoid fracture now s/p C1-4 PCDF on 10/12:    --Transferred to NSGY floor with telemetry on 10/14  --q4h neurochecks on floor  --CT cervical spine 10/10: acute complex odontoid fx completely through the base, displaced 12 mm anteriorly, mild fx of C2 pedicles, mod to severe C1-2  canal stenosis  --CTA neck without evidence for vert injury  --MRI Csp with cord edema and C2 alar, transverse and longitudinal ligamentous disruption  --XR Csp 10/12: grossly normal alignment of known odontoid fracture  --XR 10/12: post op hardware in good position, s/p C1-4 PCF  --Maintain C-collar at all times   --HV to full suction, abx while in place  --Ok for diet  --AM labs pending  --Continue continuous telemetry  --PT/OT rec IPR (ordered)  --SQH/LOLLY/SCDs  --Continue to monitor clinically, notify NSGY immediately with any changes in neuro  status    Dispo: ongoing pending IPR            Colten Lujan MD  Neurosurgery  [facility] - Neurosurgery ([facility])

## 2022-10-17 LAB
ALBUMIN SERPL BCP-MCNC: 1.8 G/DL (ref 3.5–5.2)
ALP SERPL-CCNC: 79 U/L (ref 55–135)
ALT SERPL W/O P-5'-P-CCNC: 7 U/L (ref 10–44)
ANION GAP SERPL CALC-SCNC: 7 MMOL/L (ref 8–16)
AST SERPL-CCNC: 16 U/L (ref 10–40)
BASOPHILS # BLD AUTO: 0.03 K/UL (ref 0–0.2)
BASOPHILS NFR BLD: 0.2 % (ref 0–1.9)
BILIRUB SERPL-MCNC: 0.3 MG/DL (ref 0.1–1)
BILIRUB UR QL STRIP: NEGATIVE
BUN SERPL-MCNC: 14 MG/DL (ref 8–23)
CALCIUM SERPL-MCNC: 9.2 MG/DL (ref 8.7–10.5)
CHLORIDE SERPL-SCNC: 98 MMOL/L (ref 95–110)
CLARITY UR REFRACT.AUTO: CLEAR
CO2 SERPL-SCNC: 24 MMOL/L (ref 23–29)
COLOR UR AUTO: YELLOW
CREAT SERPL-MCNC: 0.7 MG/DL (ref 0.5–1.4)
DIFFERENTIAL METHOD: ABNORMAL
EOSINOPHIL # BLD AUTO: 0 K/UL (ref 0–0.5)
EOSINOPHIL NFR BLD: 0.2 % (ref 0–8)
ERYTHROCYTE [DISTWIDTH] IN BLOOD BY AUTOMATED COUNT: 13 % (ref 11.5–14.5)
EST. GFR  (NO RACE VARIABLE): >60 ML/MIN/1.73 M^2
GLUCOSE SERPL-MCNC: 91 MG/DL (ref 70–110)
GLUCOSE UR QL STRIP: NEGATIVE
HCT VFR BLD AUTO: 37.6 % (ref 40–54)
HGB BLD-MCNC: 12.2 G/DL (ref 14–18)
HGB UR QL STRIP: NEGATIVE
IMM GRANULOCYTES # BLD AUTO: 0.19 K/UL (ref 0–0.04)
IMM GRANULOCYTES NFR BLD AUTO: 1 % (ref 0–0.5)
KETONES UR QL STRIP: NEGATIVE
LEUKOCYTE ESTERASE UR QL STRIP: ABNORMAL
LYMPHOCYTES # BLD AUTO: 1.7 K/UL (ref 1–4.8)
LYMPHOCYTES NFR BLD: 8.4 % (ref 18–48)
MAGNESIUM SERPL-MCNC: 1.9 MG/DL (ref 1.6–2.6)
MCH RBC QN AUTO: 34 PG (ref 27–31)
MCHC RBC AUTO-ENTMCNC: 32.4 G/DL (ref 32–36)
MCV RBC AUTO: 105 FL (ref 82–98)
MICROSCOPIC COMMENT: NORMAL
MONOCYTES # BLD AUTO: 1.5 K/UL (ref 0.3–1)
MONOCYTES NFR BLD: 7.8 % (ref 4–15)
NEUTROPHILS # BLD AUTO: 16.2 K/UL (ref 1.8–7.7)
NEUTROPHILS NFR BLD: 82.4 % (ref 38–73)
NITRITE UR QL STRIP: NEGATIVE
NRBC BLD-RTO: 0 /100 WBC
PH UR STRIP: 6 [PH] (ref 5–8)
PHOSPHATE SERPL-MCNC: 3.2 MG/DL (ref 2.7–4.5)
PLATELET # BLD AUTO: 260 K/UL (ref 150–450)
PMV BLD AUTO: 11 FL (ref 9.2–12.9)
POTASSIUM SERPL-SCNC: 4.8 MMOL/L (ref 3.5–5.1)
PROT SERPL-MCNC: 5.5 G/DL (ref 6–8.4)
PROT UR QL STRIP: NEGATIVE
RBC # BLD AUTO: 3.59 M/UL (ref 4.6–6.2)
RBC #/AREA URNS AUTO: 0 /HPF (ref 0–4)
SODIUM SERPL-SCNC: 129 MMOL/L (ref 136–145)
SP GR UR STRIP: 1.01 (ref 1–1.03)
SQUAMOUS #/AREA URNS AUTO: 0 /HPF
TROPONIN I SERPL DL<=0.01 NG/ML-MCNC: 0.01 NG/ML (ref 0–0.03)
URN SPEC COLLECT METH UR: ABNORMAL
WBC # BLD AUTO: 19.68 K/UL (ref 3.9–12.7)
WBC #/AREA URNS AUTO: 1 /HPF (ref 0–5)

## 2022-10-17 PROCEDURE — 84484 ASSAY OF TROPONIN QUANT: CPT | Performed by: STUDENT IN AN ORGANIZED HEALTH CARE EDUCATION/TRAINING PROGRAM

## 2022-10-17 PROCEDURE — 25000003 PHARM REV CODE 250: Performed by: STUDENT IN AN ORGANIZED HEALTH CARE EDUCATION/TRAINING PROGRAM

## 2022-10-17 PROCEDURE — 80053 COMPREHEN METABOLIC PANEL: CPT | Performed by: PHYSICIAN ASSISTANT

## 2022-10-17 PROCEDURE — 63600175 PHARM REV CODE 636 W HCPCS: Performed by: STUDENT IN AN ORGANIZED HEALTH CARE EDUCATION/TRAINING PROGRAM

## 2022-10-17 PROCEDURE — 83735 ASSAY OF MAGNESIUM: CPT | Performed by: PHYSICIAN ASSISTANT

## 2022-10-17 PROCEDURE — 25000003 PHARM REV CODE 250

## 2022-10-17 PROCEDURE — 99222 PR INITIAL HOSPITAL CARE,LEVL II: ICD-10-PCS | Mod: ,,, | Performed by: NURSE PRACTITIONER

## 2022-10-17 PROCEDURE — 25000003 PHARM REV CODE 250: Performed by: PHYSICIAN ASSISTANT

## 2022-10-17 PROCEDURE — 93010 EKG 12-LEAD: ICD-10-PCS | Mod: ,,, | Performed by: INTERNAL MEDICINE

## 2022-10-17 PROCEDURE — 11000001 HC ACUTE MED/SURG PRIVATE ROOM

## 2022-10-17 PROCEDURE — 36415 COLL VENOUS BLD VENIPUNCTURE: CPT | Performed by: PHYSICIAN ASSISTANT

## 2022-10-17 PROCEDURE — 99222 1ST HOSP IP/OBS MODERATE 55: CPT | Mod: ,,, | Performed by: NURSE PRACTITIONER

## 2022-10-17 PROCEDURE — S4991 NICOTINE PATCH NONLEGEND: HCPCS

## 2022-10-17 PROCEDURE — 99024 POSTOP FOLLOW-UP VISIT: CPT | Mod: ,,, | Performed by: PHYSICIAN ASSISTANT

## 2022-10-17 PROCEDURE — 25000003 PHARM REV CODE 250: Performed by: NEUROLOGICAL SURGERY

## 2022-10-17 PROCEDURE — 93010 ELECTROCARDIOGRAM REPORT: CPT | Mod: ,,, | Performed by: INTERNAL MEDICINE

## 2022-10-17 PROCEDURE — 84100 ASSAY OF PHOSPHORUS: CPT | Performed by: PHYSICIAN ASSISTANT

## 2022-10-17 PROCEDURE — 81001 URINALYSIS AUTO W/SCOPE: CPT | Performed by: PHYSICIAN ASSISTANT

## 2022-10-17 PROCEDURE — 94761 N-INVAS EAR/PLS OXIMETRY MLT: CPT

## 2022-10-17 PROCEDURE — 99900035 HC TECH TIME PER 15 MIN (STAT)

## 2022-10-17 PROCEDURE — 99024 PR POST-OP FOLLOW-UP VISIT: ICD-10-PCS | Mod: ,,, | Performed by: PHYSICIAN ASSISTANT

## 2022-10-17 PROCEDURE — 93005 ELECTROCARDIOGRAM TRACING: CPT

## 2022-10-17 PROCEDURE — 85025 COMPLETE CBC W/AUTO DIFF WBC: CPT | Performed by: INTERNAL MEDICINE

## 2022-10-17 RX ADMIN — OXYCODONE 5 MG: 5 TABLET ORAL at 02:10

## 2022-10-17 RX ADMIN — PREGABALIN 75 MG: 75 CAPSULE ORAL at 08:10

## 2022-10-17 RX ADMIN — ACETAMINOPHEN 1000 MG: 500 TABLET ORAL at 02:10

## 2022-10-17 RX ADMIN — POTASSIUM & SODIUM PHOSPHATES POWDER PACK 280-160-250 MG 2 PACKET: 280-160-250 PACK at 02:10

## 2022-10-17 RX ADMIN — AMLODIPINE BESYLATE 5 MG: 5 TABLET ORAL at 09:10

## 2022-10-17 RX ADMIN — OXYCODONE 5 MG: 5 TABLET ORAL at 09:10

## 2022-10-17 RX ADMIN — METHOCARBAMOL 1000 MG: 500 TABLET ORAL at 05:10

## 2022-10-17 RX ADMIN — OXYCODONE 5 MG: 5 TABLET ORAL at 01:10

## 2022-10-17 RX ADMIN — OXYCODONE 5 MG: 5 TABLET ORAL at 08:10

## 2022-10-17 RX ADMIN — ACETAMINOPHEN 1000 MG: 500 TABLET ORAL at 10:10

## 2022-10-17 RX ADMIN — HYDRALAZINE HYDROCHLORIDE 10 MG: 20 INJECTION, SOLUTION INTRAMUSCULAR; INTRAVENOUS at 08:10

## 2022-10-17 RX ADMIN — METHOCARBAMOL 1000 MG: 500 TABLET ORAL at 02:10

## 2022-10-17 RX ADMIN — NICOTINE 1 PATCH: 14 PATCH, EXTENDED RELEASE TRANSDERMAL at 09:10

## 2022-10-17 RX ADMIN — HEPARIN SODIUM 5000 UNITS: 5000 INJECTION INTRAVENOUS; SUBCUTANEOUS at 10:10

## 2022-10-17 RX ADMIN — CEFAZOLIN SODIUM 1 G: 1 SOLUTION INTRAVENOUS at 09:10

## 2022-10-17 RX ADMIN — POLYETHYLENE GLYCOL 3350 17 G: 17 POWDER, FOR SOLUTION ORAL at 08:10

## 2022-10-17 RX ADMIN — HEPARIN SODIUM 5000 UNITS: 5000 INJECTION INTRAVENOUS; SUBCUTANEOUS at 02:10

## 2022-10-17 RX ADMIN — LISINOPRIL 20 MG: 20 TABLET ORAL at 09:10

## 2022-10-17 RX ADMIN — SODIUM CHLORIDE 500 ML: 0.9 INJECTION, SOLUTION INTRAVENOUS at 10:10

## 2022-10-17 RX ADMIN — CEFAZOLIN SODIUM 1 G: 1 SOLUTION INTRAVENOUS at 05:10

## 2022-10-17 RX ADMIN — PREGABALIN 75 MG: 75 CAPSULE ORAL at 09:10

## 2022-10-17 RX ADMIN — METHOCARBAMOL 1000 MG: 500 TABLET ORAL at 10:10

## 2022-10-17 RX ADMIN — LABETALOL HYDROCHLORIDE 10 MG: 5 INJECTION, SOLUTION INTRAVENOUS at 02:10

## 2022-10-17 RX ADMIN — HEPARIN SODIUM 5000 UNITS: 5000 INJECTION INTRAVENOUS; SUBCUTANEOUS at 05:10

## 2022-10-17 RX ADMIN — SENNOSIDES AND DOCUSATE SODIUM 2 TABLET: 50; 8.6 TABLET ORAL at 08:10

## 2022-10-17 NOTE — NURSING
Patient is alert and awake. No signs of acute distress. Most recent vitals are stable. No needs at this time

## 2022-10-17 NOTE — CARE UPDATE
RAPID RESPONSE NURSE ROUND       Rounding completed with charge RNDivina for tachycardia No additional concerns verbalized at this time. Instructed to call [phone] for further concerns or assistance.

## 2022-10-17 NOTE — ASSESSMENT & PLAN NOTE
-s/p C1-4 PCDF on 10/12.  -Post Op Xray stable as per NSGY.  -C-collar at all times as per NSGY.  -HV drain in place.  -Pain appears controlled with oral regimen.  -Post op BM occurred already.  -PT/OT rec for IPR. Pt amenable.

## 2022-10-17 NOTE — HPI
"Per chart review, Patient is a 62M with PMH of HTN and remote L knee surgery who presents with 3 days of weakness after a fall 2-3 weeks ago. Patient transferred from OSH for NSGY consult regarding odontoid fracture. He reports that he has had persistent neck pain, but that his weakness did not start until a few days ago and is worsening. Patient complains of an intermittent "rumbling" almost vibrating feeling in his chest/abdomen, as well as sometimes in his various muscle groups. He claims that he feel decreased sensation across his limbs/body, including his thighs and calves where it started before moving up to his upper extremities. He symptoms do not seem to localize to a specific dermatome. He does not complain of any new radiculopathic or myelopathic symptoms. Patient reports that he has had to use a walker in the past few days. When laying down flat on the bed, he feels fine and can move all extremities at least AG. When he is sitting upright, he states that he cannot move his arms past the shoulder line. He also notes weakness in his BLE which was required him to use a walker to get around.Patient did not feel safe to get up and demonstrate gait, but claims he is weak when walking. Pt is now S/P  C1-4 PCDF on 10/12. PM&R was consulted to evaluate pt for IPR placement.         Functional History: Patient lives in alone  in a single  story home with threshold  to enter.  Prior to admission, ptindependent with ADLs and used a standard walker for mobility  DME: cane, straight, walker, standard.  "
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
## 2022-10-17 NOTE — CARE UPDATE
Patient with persistent tachycardia this afternoon. BP stable. Stat EKG sinus tach. Minimal improvement with IVF bolus.    Patient with distressing news today w/death in family, certainly could be contributing.    Repeat BMP with low Mg, Phos - repletion ordered  DVT US negative  Trop 0.032, repeat pending for 0330  KUB with significant stool burden, no BM documented during admission. +flatus. Bowel regimen increased and suppository/enema added.  Bladder scan 100cc, voiding spontaneously via condom cath  Pain controlled.    WCTM and f/up repeat trop    Guera Savage MD  Neurosurgery  [facility]

## 2022-10-17 NOTE — NURSING
Reported hemorrhoids with minimal bleeding. RAIMUNDO Young gave verbal order for prep H. Ordered. WCTM.

## 2022-10-17 NOTE — PLAN OF CARE
Problem: Adult Inpatient Plan of Care  Goal: Plan of Care Review  Outcome: Ongoing, Progressing  Goal: Patient-Specific Goal (Individualized)  Outcome: Ongoing, Progressing  Goal: Absence of Hospital-Acquired Illness or Injury  Outcome: Ongoing, Progressing  Goal: Optimal Comfort and Wellbeing  Outcome: Ongoing, Progressing  Goal: Readiness for Transition of Care  Outcome: Ongoing, Progressing     Problem: Adjustment to Illness (Stroke, Hemorrhagic)  Goal: Optimal Coping  Outcome: Ongoing, Progressing     Problem: Cerebral Tissue Perfusion (Stroke, Hemorrhagic)  Goal: Optimal Cerebral Tissue Perfusion  Outcome: Ongoing, Progressing     Problem: Cognitive Impairment (Stroke, Hemorrhagic)  Goal: Optimal Cognitive Function  Outcome: Ongoing, Progressing     Problem: Communication Impairment (Stroke, Hemorrhagic)  Goal: Effective Communication Skills  Outcome: Ongoing, Progressing     Problem: Functional Ability Impaired (Stroke, Hemorrhagic)  Goal: Optimal Functional Ability  Outcome: Ongoing, Progressing     Problem: Pain (Stroke, Hemorrhagic)  Goal: Acceptable Pain Control  Outcome: Ongoing, Progressing     Problem: Respiratory Compromise (Stroke, Hemorrhagic)  Goal: Effective Oxygenation and Ventilation  Outcome: Ongoing, Progressing     Problem: Sensorimotor Impairment (Stroke, Hemorrhagic)  Goal: Improved Sensorimotor Function  Outcome: Ongoing, Progressing     Problem: Swallowing Impairment (Stroke, Hemorrhagic)  Goal: Optimal Eating and Swallowing Without Aspiration  Outcome: Ongoing, Progressing     Problem: Urinary Elimination Impaired (Stroke, Hemorrhagic)  Goal: Effective Urinary Elimination  Outcome: Ongoing, Progressing     Problem: Skin Injury Risk Increased  Goal: Skin Health and Integrity  Outcome: Ongoing, Progressing     Problem: Infection  Goal: Absence of Infection Signs and Symptoms  Outcome: Ongoing, Progressing     Problem: Impaired Wound Healing  Goal: Optimal Wound Healing  Outcome: Ongoing,  Progressing
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
## 2022-10-17 NOTE — ASSESSMENT & PLAN NOTE
-Intermittent tachycardia noted. EKG with ST.  -Recommend cardiology consult.  -Recommend checking CMP, Mg, Phos.  -Monitor HR and rhythm.

## 2022-10-17 NOTE — SUBJECTIVE & OBJECTIVE
Interval History: Afebrile. Tachycardic without signs of symptoms. Repeat troponin WNL. Sodium 129. Denies new weakness or numbness.  BLE US negative. Multiple BM overnight. Reporting slight bleeding due to hemorrhoids.    Medications:  Continuous Infusions:  Scheduled Meds:   acetaminophen  1,000 mg Oral Q8H    amLODIPine  5 mg Oral Daily    ceFAZolin (ANCEF) IVPB  1 g Intravenous Q8H    heparin (porcine)  5,000 Units Subcutaneous Q8H    lisinopriL  20 mg Oral Daily    methocarbamoL  1,000 mg Oral Q8H    nicotine  1 patch Transdermal Daily    polyethylene glycol  17 g Oral BID    pregabalin  75 mg Oral BID    senna-docusate 8.6-50 mg  2 tablet Oral BID     PRN Meds:sodium chloride, bisacodyL, calcium gluconate IVPB, calcium gluconate IVPB, calcium gluconate IVPB, hydrALAZINE, HYDROmorphone, labetaloL, magnesium sulfate IVPB, magnesium sulfate IVPB, ondansetron, oxyCODONE, potassium bicarbonate, potassium bicarbonate, potassium bicarbonate, potassium, sodium phosphates, potassium, sodium phosphates, potassium, sodium phosphates, sodium chloride 0.9%, sodium phosphates     Review of Systems  Objective:     Weight: 67.6 kg (149 lb)  Body mass index is 24.05 kg/m².  Vital Signs (Most Recent):  Temp: 98.8 °F (37.1 °C) (10/17/22 0732)  Pulse: (!) 113 (10/17/22 0909)  Resp: (!) 22 (10/17/22 0909)  BP: (!) 148/99 (10/17/22 0909)  SpO2: 97 % (10/17/22 0909)   Vital Signs (24h Range):  Temp:  [97.6 °F (36.4 °C)-98.8 °F (37.1 °C)] 98.8 °F (37.1 °C)  Pulse:  [100-126] 113  Resp:  [18-22] 22  SpO2:  [96 %-100 %] 97 %  BP: (135-173)/() 148/99     Date 10/17/22 0700 - 10/18/22 0659   Shift 3422-8399 6779-3723 7387-0589 24 Hour Total   INTAKE   Shift Total(mL/kg)       OUTPUT   Urine(mL/kg/hr) 425   425   Shift Total(mL/kg) 425(6.3)   425(6.3)   Weight (kg) 67.6 67.6 67.6 67.6              Oxygen Concentration (%):  [21] 21         Closed/Suction Drain 10/12/22 3474 Posterior Neck Accordion 10 Fr. (Active)   Site Description  Unable to view 10/16/22 2000   Dressing Type Transparent (Tegaderm) 10/16/22 2000   Dressing Status Clean;Dry;Intact 10/16/22 2000   Dressing Intervention Integrity maintained 10/16/22 2000   Drainage Serosanguineous 10/16/22 2000   Status To bulb suction 10/16/22 2000   Output (mL) 30 mL 10/17/22 0613       Male External Urinary Catheter 10/13/22 0622 Small (Active)   Collection Container Standard drainage bag 10/16/22 2000   Securement Method secured to top of thigh w/ adhesive device 10/16/22 2000   Skin no redness;no breakdown 10/16/22 2000   Tolerance no signs/symptoms of discomfort 10/16/22 0816   Output (mL) 900 mL 10/17/22 0633   Catheter Change Date 10/13/22 10/14/22 0302   Catheter Change Time 0645 10/14/22 0302     Neurosurgery Physical Exam  AOx3, GCS [id number]  PERRL, EOMI, visual fields grossly intact  Facial sensation normal, no asymmetry, TM  Should shrug equal, no pronator drift  LUE 4 io, otherwise 5/5  RUE 4+ io, effort dependent R tri weakness, otherwise 5/5  BLE 5/5 motor throughout  SILT, decreased sensation on anterior thighs/calves, upper arms  Coordination intact throughout  DTRs 1+ R knee, 2+ L knee  No Bains's/clonus     C-collar in place   Incision: c/d/i with skin edges well approximated with Prineo tape. No surrounding erythema or edema. No drainage from incision. No palpable hematoma or underlying fluid collection.      Significant Labs:  Recent Labs   Lab 10/16/22  0443 10/16/22  2029 10/17/22  0625   GLU 91 110 91   * 132* 129*   K 4.7 4.5 4.8   CL 99 101 98   CO2 25 25 24   BUN 17 18 14   CREATININE 0.8 0.8 0.7   CALCIUM 9.5 9.2 9.2   MG 1.7 1.6 1.9     Recent Labs   Lab 10/16/22  0443 10/17/22  0625   WBC 15.27* 19.68*   HGB 13.4* 12.2*   HCT 40.5 37.6*    260     No results for input(s): LABPT, INR, APTT in the last 48 hours.  Microbiology Results (last 7 days)       ** No results found for the last 168 hours. **          All pertinent labs from the last 24 hours have  been reviewed.    Significant Diagnostics:  I have reviewed all pertinent imaging results/findings within the past 24 hours.

## 2022-10-17 NOTE — HOSPITAL COURSE
PT-10/14    Functional Mobility:  Bed Mobility:     Rolling Left:  minimum assistance  Supine to Sit: minimum assistance  Transfers:     Sit to Stand:  minimum assistance with rolling walker; min A with no AD/B HHA  Gait: Pt ambulates ~16 ft fwd/bwd at EOB with RW and Min A for stability. Second trial for L side steps only to get pt higher in bed prior to return to supine.     OT-  10/13    Bed Mobility:    Patient completed Rolling/Turning to Left with  moderate assistance  Patient completed Scooting/Bridging with moderate assistance  Patient completed Supine to Sit with moderate assistance  Patient completed Sit to Supine with moderate assistance  Pt sat EOB with Mod A progressing to CGA with a L sided lean      Functional Mobility/Transfers:  Patient completed Sit <> Stand Transfer with minimum assistance  with  hand-held assist   Functional Mobility: Pt took forward and side steps towards with Mod A and B HHA      Activities of Daily Living:  Grooming: contact guard assistance : To wash his face sitting EOB  Bathing: moderate assistance : To clean underarms and apply deodorant sitting EOB. Assistance to apply under L arm.

## 2022-10-17 NOTE — PLAN OF CARE
Problem: Adult Inpatient Plan of Care  Goal: Plan of Care Review  Outcome: Ongoing, Progressing  Flowsheets (Taken 10/17/2022 1820)  Plan of Care Reviewed With: patient  Goal: Patient-Specific Goal (Individualized)  Outcome: Ongoing, Progressing  Flowsheets (Taken 10/17/2022 1820)  Individualized Care Needs: hemovac x 1, c-collar in place  Patient-Specific Goals (Include Timeframe): work with therapy tomorrow     Problem: Skin Injury Risk Increased  Goal: Skin Health and Integrity  Outcome: Ongoing, Progressing     Problem: Infection  Goal: Absence of Infection Signs and Symptoms  Outcome: Ongoing, Progressing     POC reviewed with the patient and they verbalized understanding. All comments and concerns addressed. Bed locked in lowest position with bed alarm set, call light within reach. Safety precautions maintained -- c-collar in place. Visimobile and telemetry -- VSS, see flowsheets; pt sinus tachycardia through out shift, team aware. Hemovac x 1 with serosanguineous output.  No events this shift. Will continue to monitor for changes to POC and clinical condition.

## 2022-10-17 NOTE — ASSESSMENT & PLAN NOTE
Patient is a 62M with PMH of HTN who presents with odontoid fracture now s/p C1-4 PCDF on 10/12:    --Transferred to NSGY floor with telemetry on 10/14  --q4h neurochecks on floor  --CT cervical spine 10/10: acute complex odontoid fx completely through the base, displaced 12 mm anteriorly, mild fx of C2 pedicles, mod to severe C1-2  canal stenosis  --CTA neck without evidence for vert injury  --MRI Csp with cord edema and C2 alar, transverse and longitudinal ligamentous disruption  --XR Csp 10/12: grossly normal alignment of known odontoid fracture  --XR 10/12: post op hardware in good position, s/p C1-4 PCF  --Maintain C-collar at all times   --Continue HV to full suction, abx while in place  --Ok for diet  --AM labs pending  --Continue continuous telemetry  --PT/OT rec IPR   --SQH/LOLLY/SCDs. BLE US 10/16 negative for DVT.  --Tachycardia:   -Tachy overnight continuing this AM.    -Repeat troponin down-trending and WNL.   -EKG sinus tachy. F/u repeat EKG  --Hyponatremia: 129 10/17. 500 mL bolus ordered. F/u AM 10/18 labs.  --Continue to monitor clinically, notify NSGY immediately with any changes in neuro status    Dispo: ongoing pending IPR    Discussed with Dr. Storm

## 2022-10-17 NOTE — PLAN OF CARE
[facility] - Neurosurgery (Hospital)  Discharge Reassessment    Primary Care Provider: Andrae Camarillo MD    Expected Discharge Date: 10/19/2022    Patient is not medically ready for discharge.  Patient is pending Medicaid.    Reassessment (most recent)       Discharge Reassessment - 10/17/22 1250          Discharge Reassessment    Assessment Type Discharge Planning Reassessment     Did the patient's condition or plan change since previous assessment? No     Communicated BRANDON with patient/caregiver Date not available/Unable to determine     Discharge Plan A Rehab     Discharge Plan B Home with family     DME Needed Upon Discharge  other (see comments)   tbd    Discharge Barriers Identified Unisured     Why the patient remains in the hospital Requires continued medical care        Post-Acute Status    Discharge Delays None known at this time

## 2022-10-17 NOTE — PROGRESS NOTES
"[facility] - Neurosurgery ([facility])  Neurosurgery  Progress Note    Subjective:     History of Present Illness: Patient is a 62M with PMH of HTN and remote L knee surgery who presents with 3 days of weakness after a fall 2-3 weeks ago. Patient transferred from OSH for NSGY consult regarding odontoid fracture. He reports that he has had persistent neck pain, but that his weakness did not start until a few days ago and is worsening. Patient complains of an intermittent "rumbling" almost vibrating feeling in his chest/abdomen, as well as sometimes in his various muscle groups. He claims that he feel decreased sensation across his limbs/body, including his thighs and calves where it started before moving up to his upper extremities. He symptoms do not seem to localize to a specific dermatome. He does not complain of any new radiculopathic or myelopathic symptoms. Patient reports that he has had to use a walker in the past few days. When laying down flat on the bed, he feels fine and can move all extremities at least AG. When he is sitting upright, he states that he cannot move his arms past the shoulder line. He also notes weakness in his BLE which was required him to use a walker to get around.Patient did not feel safe to get up and demonstrate gait, but claims he is weak when walking.    He presented in a C-collar. Patient denies taking AC/AP. He also denies bowel/bladder dysfunction.      Post-Op Info:  Procedure(s) (LRB):  POSTERIOR CERVICAL FUSION, SPINE C1-C4 PCF  (N/A)  LAMINECTOMY, SPINE, C-1 (N/A)   5 Days Post-Op     Interval History: Afebrile. Tachycardic without signs of symptoms. Repeat troponin WNL. Sodium 129. Denies new weakness or numbness.  BLE US negative. Multiple BM overnight. Reporting slight bleeding due to hemorrhoids.    Medications:  Continuous Infusions:  Scheduled Meds:   acetaminophen  1,000 mg Oral Q8H    amLODIPine  5 mg Oral Daily    ceFAZolin (ANCEF) IVPB  1 g Intravenous Q8H    " heparin (porcine)  5,000 Units Subcutaneous Q8H    lisinopriL  20 mg Oral Daily    methocarbamoL  1,000 mg Oral Q8H    nicotine  1 patch Transdermal Daily    polyethylene glycol  17 g Oral BID    pregabalin  75 mg Oral BID    senna-docusate 8.6-50 mg  2 tablet Oral BID     PRN Meds:sodium chloride, bisacodyL, calcium gluconate IVPB, calcium gluconate IVPB, calcium gluconate IVPB, hydrALAZINE, HYDROmorphone, labetaloL, magnesium sulfate IVPB, magnesium sulfate IVPB, ondansetron, oxyCODONE, potassium bicarbonate, potassium bicarbonate, potassium bicarbonate, potassium, sodium phosphates, potassium, sodium phosphates, potassium, sodium phosphates, sodium chloride 0.9%, sodium phosphates     Review of Systems  Objective:     Weight: 67.6 kg (149 lb)  Body mass index is 24.05 kg/m².  Vital Signs (Most Recent):  Temp: 98.8 °F (37.1 °C) (10/17/22 0732)  Pulse: (!) 113 (10/17/22 0909)  Resp: (!) 22 (10/17/22 0909)  BP: (!) 148/99 (10/17/22 0909)  SpO2: 97 % (10/17/22 0909)   Vital Signs (24h Range):  Temp:  [97.6 °F (36.4 °C)-98.8 °F (37.1 °C)] 98.8 °F (37.1 °C)  Pulse:  [100-126] 113  Resp:  [18-22] 22  SpO2:  [96 %-100 %] 97 %  BP: (135-173)/() 148/99     Date 10/17/22 0700 - 10/18/22 0659   Shift 8773-5596 5310-6482 4140-7825 24 Hour Total   INTAKE   Shift Total(mL/kg)       OUTPUT   Urine(mL/kg/hr) 425   425   Shift Total(mL/kg) 425(6.3)   425(6.3)   Weight (kg) 67.6 67.6 67.6 67.6              Oxygen Concentration (%):  [21] 21         Closed/Suction Drain 10/12/22 5760 Posterior Neck Accordion 10 Fr. (Active)   Site Description Unable to view 10/16/22 2000   Dressing Type Transparent (Tegaderm) 10/16/22 2000   Dressing Status Clean;Dry;Intact 10/16/22 2000   Dressing Intervention Integrity maintained 10/16/22 2000   Drainage Serosanguineous 10/16/22 2000   Status To bulb suction 10/16/22 2000   Output (mL) 30 mL 10/17/22 0613       Male External Urinary Catheter 10/13/22 0622 Small (Active)   Collection  Container Standard drainage bag 10/16/22 2000   Securement Method secured to top of thigh w/ adhesive device 10/16/22 2000   Skin no redness;no breakdown 10/16/22 2000   Tolerance no signs/symptoms of discomfort 10/16/22 0816   Output (mL) 900 mL 10/17/22 0633   Catheter Change Date 10/13/22 10/14/22 0302   Catheter Change Time 0645 10/14/22 0302     Neurosurgery Physical Exam  AOx3, GCS [id number]  PERRL, EOMI, visual fields grossly intact  Facial sensation normal, no asymmetry, TM  Should shrug equal, no pronator drift  LUE 4 io, otherwise 5/5  RUE 4+ io, effort dependent R tri weakness, otherwise 5/5  BLE 5/5 motor throughout  SILT, decreased sensation on anterior thighs/calves, upper arms  Coordination intact throughout  DTRs 1+ R knee, 2+ L knee  No Bains's/clonus     C-collar in place   Incision: c/d/i with skin edges well approximated with Prineo tape. No surrounding erythema or edema. No drainage from incision. No palpable hematoma or underlying fluid collection.      Significant Labs:  Recent Labs   Lab 10/16/22  0443 10/16/22  2029 10/17/22  0625   GLU 91 110 91   * 132* 129*   K 4.7 4.5 4.8   CL 99 101 98   CO2 25 25 24   BUN 17 18 14   CREATININE 0.8 0.8 0.7   CALCIUM 9.5 9.2 9.2   MG 1.7 1.6 1.9     Recent Labs   Lab 10/16/22  0443 10/17/22  0625   WBC 15.27* 19.68*   HGB 13.4* 12.2*   HCT 40.5 37.6*    260     No results for input(s): LABPT, INR, APTT in the last 48 hours.  Microbiology Results (last 7 days)       ** No results found for the last 168 hours. **          All pertinent labs from the last 24 hours have been reviewed.    Significant Diagnostics:  I have reviewed all pertinent imaging results/findings within the past 24 hours.    Assessment/Plan:     * Odontoid fracture  Patient is a 62M with PMH of HTN who presents with odontoid fracture now s/p C1-4 PCDF on 10/12:    --[place] [facility] [place] with [place] on 10/14  --q4h neurochecks on floor  --CT cervical spine 10/10: acute  complex odontoid fx completely through the base, displaced 12 mm anteriorly, mild fx of C2 pedicles, mod to severe C1-2  canal stenosis  --CTA neck without evidence for vert injury  --MRI Csp with cord edema and C2 alar, transverse and longitudinal ligamentous disruption  --XR Csp 10/12: grossly normal alignment of known odontoid fracture  --XR 10/12: post op hardware in good position, s/p C1-4 PCF  --Maintain C-collar at all times   --Continue HV to full suction, abx while in place  --Ok for diet  --AM labs pending  --Continue continuous telemetry  --PT/OT rec IPR   --SQH/LOLLY/SCDs. BLE US 10/16 negative for DVT.  --Tachycardia:   -Tachy overnight continuing this AM.    -Repeat troponin down-trending and WNL.   -EKG sinus tachy. F/u repeat EKG  --Hyponatremia: 129 10/17. 500 mL bolus ordered. F/u AM 10/18 labs.  --Continue to monitor clinically, notify NSGY immediately with any changes in neuro status    Dispo: ongoing pending IPR    Discussed with IAN RileyC  Neurosurgery  [facility] - Neurosurgery ([facility])

## 2022-10-18 LAB
ALBUMIN SERPL BCP-MCNC: 1.9 G/DL (ref 3.5–5.2)
ALP SERPL-CCNC: 83 U/L (ref 55–135)
ALT SERPL W/O P-5'-P-CCNC: 7 U/L (ref 10–44)
ANION GAP SERPL CALC-SCNC: 10 MMOL/L (ref 8–16)
AST SERPL-CCNC: 15 U/L (ref 10–40)
BASOPHILS # BLD AUTO: 0.03 K/UL (ref 0–0.2)
BASOPHILS NFR BLD: 0.2 % (ref 0–1.9)
BILIRUB SERPL-MCNC: 0.3 MG/DL (ref 0.1–1)
BUN SERPL-MCNC: 12 MG/DL (ref 8–23)
CALCIUM SERPL-MCNC: 9.8 MG/DL (ref 8.7–10.5)
CHLORIDE SERPL-SCNC: 100 MMOL/L (ref 95–110)
CO2 SERPL-SCNC: 22 MMOL/L (ref 23–29)
CREAT SERPL-MCNC: 0.7 MG/DL (ref 0.5–1.4)
DIFFERENTIAL METHOD: ABNORMAL
EOSINOPHIL # BLD AUTO: 0 K/UL (ref 0–0.5)
EOSINOPHIL NFR BLD: 0 % (ref 0–8)
ERYTHROCYTE [DISTWIDTH] IN BLOOD BY AUTOMATED COUNT: 12.9 % (ref 11.5–14.5)
EST. GFR  (NO RACE VARIABLE): >60 ML/MIN/1.73 M^2
GLUCOSE SERPL-MCNC: 129 MG/DL (ref 70–110)
HCT VFR BLD AUTO: 38.8 % (ref 40–54)
HGB BLD-MCNC: 12.9 G/DL (ref 14–18)
IMM GRANULOCYTES # BLD AUTO: 0.08 K/UL (ref 0–0.04)
IMM GRANULOCYTES NFR BLD AUTO: 0.5 % (ref 0–0.5)
LYMPHOCYTES # BLD AUTO: 0.9 K/UL (ref 1–4.8)
LYMPHOCYTES NFR BLD: 5.1 % (ref 18–48)
MAGNESIUM SERPL-MCNC: 1.7 MG/DL (ref 1.6–2.6)
MCH RBC QN AUTO: 33.8 PG (ref 27–31)
MCHC RBC AUTO-ENTMCNC: 33.2 G/DL (ref 32–36)
MCV RBC AUTO: 102 FL (ref 82–98)
MONOCYTES # BLD AUTO: 0.8 K/UL (ref 0.3–1)
MONOCYTES NFR BLD: 4.9 % (ref 4–15)
NEUTROPHILS # BLD AUTO: 15.3 K/UL (ref 1.8–7.7)
NEUTROPHILS NFR BLD: 89.3 % (ref 38–73)
NRBC BLD-RTO: 0 /100 WBC
PHOSPHATE SERPL-MCNC: 2.7 MG/DL (ref 2.7–4.5)
PLATELET # BLD AUTO: 277 K/UL (ref 150–450)
PMV BLD AUTO: 11 FL (ref 9.2–12.9)
POTASSIUM SERPL-SCNC: 4.4 MMOL/L (ref 3.5–5.1)
PROT SERPL-MCNC: 6 G/DL (ref 6–8.4)
RBC # BLD AUTO: 3.82 M/UL (ref 4.6–6.2)
SODIUM SERPL-SCNC: 132 MMOL/L (ref 136–145)
WBC # BLD AUTO: 17.13 K/UL (ref 3.9–12.7)

## 2022-10-18 PROCEDURE — 63600175 PHARM REV CODE 636 W HCPCS: Performed by: STUDENT IN AN ORGANIZED HEALTH CARE EDUCATION/TRAINING PROGRAM

## 2022-10-18 PROCEDURE — 63600175 PHARM REV CODE 636 W HCPCS: Performed by: PHYSICIAN ASSISTANT

## 2022-10-18 PROCEDURE — 25000003 PHARM REV CODE 250

## 2022-10-18 PROCEDURE — 80053 COMPREHEN METABOLIC PANEL: CPT | Performed by: PHYSICIAN ASSISTANT

## 2022-10-18 PROCEDURE — 99222 PR INITIAL HOSPITAL CARE,LEVL II: ICD-10-PCS | Mod: ,,, | Performed by: INTERNAL MEDICINE

## 2022-10-18 PROCEDURE — 25000003 PHARM REV CODE 250: Performed by: STUDENT IN AN ORGANIZED HEALTH CARE EDUCATION/TRAINING PROGRAM

## 2022-10-18 PROCEDURE — 36415 COLL VENOUS BLD VENIPUNCTURE: CPT | Performed by: PHYSICIAN ASSISTANT

## 2022-10-18 PROCEDURE — 83735 ASSAY OF MAGNESIUM: CPT | Performed by: PHYSICIAN ASSISTANT

## 2022-10-18 PROCEDURE — 97530 THERAPEUTIC ACTIVITIES: CPT

## 2022-10-18 PROCEDURE — 99222 1ST HOSP IP/OBS MODERATE 55: CPT | Mod: ,,, | Performed by: INTERNAL MEDICINE

## 2022-10-18 PROCEDURE — 85025 COMPLETE CBC W/AUTO DIFF WBC: CPT | Performed by: PHYSICIAN ASSISTANT

## 2022-10-18 PROCEDURE — 97116 GAIT TRAINING THERAPY: CPT

## 2022-10-18 PROCEDURE — 11000001 HC ACUTE MED/SURG PRIVATE ROOM

## 2022-10-18 PROCEDURE — 84100 ASSAY OF PHOSPHORUS: CPT | Performed by: PHYSICIAN ASSISTANT

## 2022-10-18 PROCEDURE — S4991 NICOTINE PATCH NONLEGEND: HCPCS

## 2022-10-18 PROCEDURE — 99024 PR POST-OP FOLLOW-UP VISIT: ICD-10-PCS | Mod: ,,, | Performed by: PHYSICIAN ASSISTANT

## 2022-10-18 PROCEDURE — 99024 POSTOP FOLLOW-UP VISIT: CPT | Mod: ,,, | Performed by: PHYSICIAN ASSISTANT

## 2022-10-18 RX ORDER — METOPROLOL TARTRATE 25 MG/1
25 TABLET, FILM COATED ORAL EVERY 6 HOURS
Status: DISCONTINUED | OUTPATIENT
Start: 2022-10-18 | End: 2022-10-21 | Stop reason: HOSPADM

## 2022-10-18 RX ORDER — PANTOPRAZOLE SODIUM 40 MG/1
40 TABLET, DELAYED RELEASE ORAL DAILY
Status: DISCONTINUED | OUTPATIENT
Start: 2022-10-18 | End: 2022-10-19

## 2022-10-18 RX ADMIN — METOPROLOL TARTRATE 25 MG: 25 TABLET, FILM COATED ORAL at 11:10

## 2022-10-18 RX ADMIN — CEFAZOLIN SODIUM 1 G: 1 SOLUTION INTRAVENOUS at 08:10

## 2022-10-18 RX ADMIN — ACETAMINOPHEN 1000 MG: 500 TABLET ORAL at 05:10

## 2022-10-18 RX ADMIN — PREGABALIN 75 MG: 75 CAPSULE ORAL at 09:10

## 2022-10-18 RX ADMIN — METHOCARBAMOL 1000 MG: 500 TABLET ORAL at 09:10

## 2022-10-18 RX ADMIN — OXYCODONE 5 MG: 5 TABLET ORAL at 08:10

## 2022-10-18 RX ADMIN — HEPARIN SODIUM 5000 UNITS: 5000 INJECTION INTRAVENOUS; SUBCUTANEOUS at 05:10

## 2022-10-18 RX ADMIN — PANTOPRAZOLE SODIUM 40 MG: 40 TABLET, DELAYED RELEASE ORAL at 01:10

## 2022-10-18 RX ADMIN — SENNOSIDES AND DOCUSATE SODIUM 2 TABLET: 50; 8.6 TABLET ORAL at 08:10

## 2022-10-18 RX ADMIN — ACETAMINOPHEN 1000 MG: 500 TABLET ORAL at 02:10

## 2022-10-18 RX ADMIN — PREGABALIN 75 MG: 75 CAPSULE ORAL at 08:10

## 2022-10-18 RX ADMIN — NICOTINE 1 PATCH: 14 PATCH, EXTENDED RELEASE TRANSDERMAL at 08:10

## 2022-10-18 RX ADMIN — METHOCARBAMOL 1000 MG: 500 TABLET ORAL at 05:10

## 2022-10-18 RX ADMIN — AMLODIPINE BESYLATE 5 MG: 5 TABLET ORAL at 08:10

## 2022-10-18 RX ADMIN — POLYETHYLENE GLYCOL 3350 17 G: 17 POWDER, FOR SOLUTION ORAL at 08:10

## 2022-10-18 RX ADMIN — ONDANSETRON 4 MG: 2 INJECTION INTRAMUSCULAR; INTRAVENOUS at 01:10

## 2022-10-18 RX ADMIN — SENNOSIDES AND DOCUSATE SODIUM 2 TABLET: 50; 8.6 TABLET ORAL at 09:10

## 2022-10-18 RX ADMIN — OXYCODONE 5 MG: 5 TABLET ORAL at 09:10

## 2022-10-18 RX ADMIN — METHOCARBAMOL 1000 MG: 500 TABLET ORAL at 02:10

## 2022-10-18 RX ADMIN — CEFAZOLIN SODIUM 1 G: 1 SOLUTION INTRAVENOUS at 12:10

## 2022-10-18 RX ADMIN — LISINOPRIL 20 MG: 20 TABLET ORAL at 08:10

## 2022-10-18 NOTE — PROGRESS NOTES
I have reviewed the notes, assessments, and/or procedures performed this visit, and I concur with the documentation.    Patient appears to be coughing or spitting up coffee-ground material.  There does not appear to be significant blood loss.  I suspect he has significant reflux esophagitis related to being supine for the past 7 days, the might even be some baseline reflux.  For the time being I do not think urgent EGD while he is in the hospital is indicated.  Would treat reflux empirically with PPI

## 2022-10-18 NOTE — PT/OT/SLP PROGRESS
Physical Therapy Treatment    Patient Name:  Israel De Jesus   MRN:  [id number]    Recommendations:     Discharge Recommendations:  rehabilitation facility   Discharge Equipment Recommendations:  (TBD at next level of care)   Barriers to discharge: Inaccessible home and Decreased caregiver support at current functional level    Assessment:     Israel De Jesus is a 62 y.o. male admitted with a medical diagnosis of Odontoid fracture.  He presents with the following impairments/functional limitations:  weakness, impaired endurance, impaired self care skills, impaired functional mobility, gait instability, impaired balance, decreased coordination, decreased lower extremity function, pain, orthopedic precautions, impaired cardiopulmonary response to activity. Pt with good tolerance to therapy session on this day. Pt motivated and agreeable to participating in OOB mobility. Pt requiring less assistance than previous therapy session for bed mobility and tranfers. Pt with improved activity tolerance as demo'd by increased distance of ambulation trials with RW, but continues to require Alberto for maintenance of postural stability. Given pt's high level of motivation, progress made in therapy, prior level of independence, decreased caregiver support within his home, and inability to safely navigate stairs to enter home, pt an excellent candidate for inpatient rehabilitation to improve aerobic endurance, increase strength, and reduce fall risk. Pt to benefit from continued skilled acute care therapy to remedy the above deficits and maximize functional mobility potential.     Rehab Prognosis: Good; patient would benefit from acute skilled PT services to address these deficits and reach maximum level of function.    Recent Surgery: Procedure(s) (LRB):  POSTERIOR CERVICAL FUSION, SPINE C1-C4 PCF  (N/A)  LAMINECTOMY, SPINE, C-1 (N/A) 6 Days Post-Op    Plan:     During this hospitalization, patient to be seen 4 x/week to address  the identified rehab impairments via gait training, therapeutic activities, therapeutic exercises, neuromuscular re-education and progress toward the following goals:    Plan of Care Expires:  10/19/22    Subjective     Chief Complaint: Heartburn, lack of sleep  Patient/Family Comments/goals: Pt agreeable to OOB mobility throughout session.  Pain/Comfort:  Pain Rating 1:  (did not rate)  Location - Orientation 1: generalized  Location 1:  (heartburn)  Pain Addressed 1: Reposition, Cessation of Activity      Objective:     Communicated with RN prior to session.  Patient found HOB elevated with SCD, cervical collar, telemetry upon PT entry to room.   Cervical collar donned throughout session.   Pt not able to recall 3 spinal precautions correctly. Educated on precautions. Pt able to recall 3/3 spinal precautions at end of session without cueing.    General Precautions: Standard, fall   Orthopedic Precautions:spinal precautions   Braces: Cervical collar  Respiratory Status: Room air     Functional Mobility:  Bed Mobility:     Supine to Sit: stand by assistance with HOB elevated using log roll technique  Verbal cueing for technique throughout  Transfers:     Sit to Stand x 2 reps:  contact guard assistance with rolling walker 1 rep from EOB and 1 rep from chair  Verbal cueing for transfer technique using RW  Toilet Transfer  Using RW and bedside commode with CGA.  Verbal cueing for technique throughout  Gait:   Pt ambulated ~50' x 4 CGA-Alberto with RW. Pt requiring intermittent rest breaks due to SOB and LE fatigue. Pt with decreased weight shifting ability, heavy UE reliance on RW, decreased foot-clearance, decreased heel strike, increased bo, and mild gait instability. Verbal cueing provided throughout to increase lateral weight shifting, slow bo, and relax UE. Cueing provided on pacing and managing energy expenditure.   Pt ambulated ~5' CGA with RW from chair to bedside commode.  Balance:  Static sitting:  SBA  Static standing: CGA with RW      AM-PAC 6 CLICK MOBILITY  Turning over in bed (including adjusting bedclothes, sheets and blankets)?: 3  Sitting down on and standing up from a chair with arms (e.g., wheelchair, bedside commode, etc.): 3  Moving from lying on back to sitting on the side of the bed?: 3  Moving to and from a bed to a chair (including a wheelchair)?: 3  Need to walk in hospital room?: 3  Climbing 3-5 steps with a railing?: 2  Basic Mobility Total Score: 17       Treatment & Education:  Pt educated on PT role, goal of session, and POC. Pt verbalized understanding.  Pt educated on spinal precautions and importance of adherence to precautions and log roll technique. Pt verbalized understanding.  Pt's gown changed in supine prior to beginning session.    RN and nursing student notified of pt position on bedside commode and level of assist required to return to chair/bed.    Patient left  on bedside commode  with call button in reach and RN notified..    GOALS:   Multidisciplinary Problems       Physical Therapy Goals          Problem: Physical Therapy    Goal Priority Disciplines Outcome Goal Variances Interventions   Physical Therapy Goal     PT, PT/OT Ongoing, Progressing     Description: PT goals until 10/20/22    1. Pt supine to sit with CGA through sidelying- met 10/18  1a. Pt supine to sit with Lanier through sidelying  2. Pt sit to supine with CGA through sidelying- met 10/18  3. Pt sit to stand with RW with CGA-not met  4. Pt to perform gait 100ft with RW with CGA - not met  5. Pt to go up/down curb step with RW with minimal assist.-not met  6. Pt to transfer bed to/from bedside chair with RW with CGA.-not met  7. Pt to perform B LE exs in sitting or supine x 15 reps to strengthen B LE to improve functional mobility.-not met                        Time Tracking:     PT Received On: 10/18/22  PT Start Time: 0320     PT Stop Time: 0346  PT Total Time (min): 26 min     Billable Minutes:  Gait Training 14 and Therapeutic Activity 12    Treatment Type: Treatment  PT/PTA: PT     PTA Visit Number: 0     10/18/2022

## 2022-10-18 NOTE — PLAN OF CARE
Problem: Adult Inpatient Plan of Care  Goal: Plan of Care Review  Outcome: Ongoing, Progressing  Goal: Absence of Hospital-Acquired Illness or Injury  Outcome: Ongoing, Progressing  Intervention: Identify and Manage Fall Risk  Flowsheets (Taken 10/18/2022 1642)  Safety Promotion/Fall Prevention:   assistive device/personal item within reach   commode/urinal/bedpan at bedside   Fall Risk reviewed with patient/family   nonskid shoes/socks when out of bed  Intervention: Prevent and Manage VTE (Venous Thromboembolism) Risk  Flowsheets (Taken 10/18/2022 1642)  Activity Management: Ambulated in room - L4  VTE Prevention/Management: remove, assess skin, and reapply sequential compression device  Range of Motion: active ROM (range of motion) encouraged  Goal: Optimal Comfort and Wellbeing  Outcome: Ongoing, Progressing     Problem: Bowel Elimination Impaired (Stroke, Hemorrhagic)  Goal: Effective Bowel Elimination  Outcome: Ongoing, Progressing  Intervention: Promote Effective Bowel Elimination  Flowsheets (Taken 10/18/2022 1642)  Bowel Elimination Management:   sitting position facilitated   relaxation techniques promoted  Bowel Program: maintenance program followed     Problem: Functional Ability Impaired (Stroke, Hemorrhagic)  Goal: Optimal Functional Ability  Outcome: Ongoing, Progressing  Intervention: Optimize Functional Ability  Flowsheets (Taken 10/18/2022 1642)  Self-Care Promotion:   independence encouraged   BADL personal objects within reach   safe use of adaptive equipment encouraged  Activity Management: Ambulated in room - L4

## 2022-10-18 NOTE — ASSESSMENT & PLAN NOTE
Patient is a 62M with PMH of HTN who presents with odontoid fracture now s/p C1-4 PCDF on 10/12:    --Transferred to NSGY floor with telemetry on 10/14  --All pertinent labs and diagnostics personally reviewed.  --q4h neurochecks on floor  --CT cervical spine 10/10: acute complex odontoid fx completely through the base, displaced 12 mm anteriorly, mild fx of C2 pedicles, mod to severe C1-2  canal stenosis  --CTA neck without evidence for vert injury  --MRI Csp with cord edema and C2 alar, transverse and longitudinal ligamentous disruption  --XR Csp 10/12: grossly normal alignment of known odontoid fracture  --XR 10/12: post op hardware in good position, s/p C1-4 PCF  --Maintain C-collar at all times   --Continue HV to full suction, abx while in place  --Ok for diet  --Continue continuous telemetry  --PT/OT rec IPR   --DVT ppx: LOLLY/SCDs. BLE US 10/16 negative for DVT. F/u BUE US. Heparin ppx dc'd in setting of possible GI bleed.   --Coffee ground emesis: Pt reports started night of 10/17. GI consulted. Appreciate assistance.    -Heparin dc'd. H&H stable; 12.9/38.8.  --Tachycardia:   -Tachy overnight continuing this AM.    -Repeat troponin down-trending and WNL.   -EKG 10/17: Multifocal atrial tachycardia. Mag/phos WNL. F/u BUE us.  --Hyponatremia: Improving s/p NaCl bolus. 132 this AM. CTM  --Continue to monitor clinically, notify NSGY immediately with any changes in neuro status    Dispo: ongoing pending IPR    Discussed with Dr. Storm

## 2022-10-18 NOTE — PROGRESS NOTES
"Joel Zhou - Neurosurgery ([facility])  Neurosurgery  Progress Note    Subjective:     History of Present Illness: Patient is a 62M with PMH of HTN and remote L knee surgery who presents with 3 days of weakness after a fall 2-3 weeks ago. Patient transferred from OSH for NSGY consult regarding odontoid fracture. He reports that he has had persistent neck pain, but that his weakness did not start until a few days ago and is worsening. Patient complains of an intermittent "rumbling" almost vibrating feeling in his chest/abdomen, as well as sometimes in his various muscle groups. He claims that he feel decreased sensation across his limbs/body, including his thighs and calves where it started before moving up to his upper extremities. He symptoms do not seem to localize to a specific dermatome. He does not complain of any new radiculopathic or myelopathic symptoms. Patient reports that he has had to use a walker in the past few days. When laying down flat on the bed, he feels fine and can move all extremities at least AG. When he is sitting upright, he states that he cannot move his arms past the shoulder line. He also notes weakness in his BLE which was required him to use a walker to get around.Patient did not feel safe to get up and demonstrate gait, but claims he is weak when walking.    He presented in a C-collar. Patient denies taking AC/AP. He also denies bowel/bladder dysfunction.      Post-Op Info:  Procedure(s) (LRB):  POSTERIOR CERVICAL FUSION, SPINE C1-C4 PCF  (N/A)  LAMINECTOMY, SPINE, C-1 (N/A)   6 Days Post-Op     Interval History: Afebrile. Leukocytosis down-trending. Remains tachy, EKG showing multifocal atrial tachycardia. Magnesium and phosphorus WNL. F/u BUE US. UA negative. Patient with coffee ground emesis overnight and this AM. GI consulted. H&H stable. Heparin dc'd. Drain removed.     Medications:  Continuous Infusions:  Scheduled Meds:   acetaminophen  1,000 mg Oral Q8H    amLODIPine  5 mg Oral " Daily    lisinopriL  20 mg Oral Daily    methocarbamoL  1,000 mg Oral Q8H    nicotine  1 patch Transdermal Daily    pantoprazole  40 mg Oral Daily    polyethylene glycol  17 g Oral BID    pregabalin  75 mg Oral BID    senna-docusate 8.6-50 mg  2 tablet Oral BID     PRN Meds:sodium chloride, bisacodyL, calcium gluconate IVPB, calcium gluconate IVPB, calcium gluconate IVPB, hydrALAZINE, HYDROmorphone, labetaloL, magnesium sulfate IVPB, magnesium sulfate IVPB, ondansetron, oxyCODONE, phenyleph-min oil-petrolatum, potassium bicarbonate, potassium bicarbonate, potassium bicarbonate, potassium, sodium phosphates, potassium, sodium phosphates, potassium, sodium phosphates, sodium chloride 0.9%, sodium phosphates     Review of Systems  Objective:     Weight: 67.6 kg (149 lb)  Body mass index is 24.05 kg/m².  Vital Signs (Most Recent):  Temp: 98.6 °F (37 °C) (10/18/22 0714)  Pulse: 97 (10/18/22 0714)  Resp: 16 (10/18/22 0843)  BP: 134/62 (10/18/22 0714)  SpO2: 95 % (10/18/22 0714) Vital Signs (24h Range):  Temp:  [97.1 °F (36.2 °C)-98.6 °F (37 °C)] 98.6 °F (37 °C)  Pulse:  [] 97  Resp:  [16-24] 16  SpO2:  [90 %-97 %] 95 %  BP: (130-216)/() 134/62     Date 10/18/22 0700 - 10/19/22 0659   Shift 1899-4599 6995-7344 0837-7082 24 Hour Total   INTAKE   Shift Total(mL/kg)       OUTPUT   Emesis/NG output 15   15   Drains 10   10   Shift Total(mL/kg) 25(0.4)   25(0.4)   Weight (kg) 67.6 67.6 67.6 67.6                        Closed/Suction Drain 10/12/22 1749 Posterior Neck Accordion 10 Fr. (Active)   Site Description Healing 10/17/22 2000   Dressing Type Transparent (Tegaderm) 10/16/22 2000   Dressing Status Clean;Dry;Intact 10/16/22 2000   Dressing Intervention Integrity maintained 10/16/22 2000   Drainage Serosanguineous 10/17/22 2000   Status Other (Comment) 10/17/22 2000   Output (mL) 10 mL 10/18/22 0741     Neurosurgery Physical Exam  General: well developed, well nourished  Head: normocephalic,  atraumatic  Neck: No tracheal deviation. No palpable masses. Full ROM.   Neurologic: Alert and oriented. Thought content appropriate.  GCS: E4 V5 M6. GCS Total: 15  Mental Status: Awake, Alert, Oriented x 4  Language: No aphasia  Speech: No dysarthria  Cranial nerves: face symmetric  Eyes: EOMI.   Pulmonary: normal respirations, no signs of respiratory distress  Abdomen: not tender to palpation    Sensory: intact to light touch throughout  Motor Strength: Moves all extremities spontaneously with good tone.  No abnormal movements seen.     Strength  Deltoids Triceps Biceps Wrist Extension Wrist Flexion Hand    Upper: R 4+/5 5/5 5/5 5/5 5/5 5/5    L 4+/5 5/5 5/5 5/5 5/5 5/5     Strength  Iliopsoas Quadriceps Knee  Flexion Tibialis  anterior Gastro- cnemius EHL   Lower: R 5/5 5/5 5/5 5/5 5/5 5/5    L 5/5 5/5 5/5 5/5 5/5 5/5     Vascular: No LE edema.   Skin: Skin is warm, dry and intact.    Incision: c/d/i with skin edges well approximated with Prineo tape. No surrounding erythema or edema. No drainage from incision. No palpable hematoma or underlying fluid collection.    Coffee ground emesis on gown:    Significant Labs:  Recent Labs   Lab 10/16/22  2029 10/17/22  0625 10/18/22  0521    91 129*   * 129* 132*   K 4.5 4.8 4.4    98 100   CO2 25 24 22*   BUN 18 14 12   CREATININE 0.8 0.7 0.7   CALCIUM 9.2 9.2 9.8   MG 1.6 1.9 1.7     Recent Labs   Lab 10/17/22  0625 10/18/22  0846   WBC 19.68* 17.13*   HGB 12.2* 12.9*   HCT 37.6* 38.8*    277     No results for input(s): LABPT, INR, APTT in the last 48 hours.  Microbiology Results (last 7 days)       ** No results found for the last 168 hours. **          All pertinent labs from the last 24 hours have been reviewed.    Significant Diagnostics:  I have reviewed all pertinent imaging results/findings within the past 24 hours.    Assessment/Plan:     * Odontoid fracture  Patient is a 62M with PMH of HTN who presents with odontoid fracture now  s/p C1-4 PCDF on 10/12:    --Transferred to NSGY floor with telemetry on 10/14  --All pertinent labs and diagnostics personally reviewed.  --q4h neurochecks on floor  --CT cervical spine 10/10: acute complex odontoid fx completely through the base, displaced 12 mm anteriorly, mild fx of C2 pedicles, mod to severe C1-2  canal stenosis  --CTA neck without evidence for vert injury  --MRI Csp with cord edema and C2 alar, transverse and longitudinal ligamentous disruption  --XR Csp 10/12: grossly normal alignment of known odontoid fracture  --XR 10/12: post op hardware in good position, s/p C1-4 PCF  --Maintain C-collar at all times   --Continue HV to full suction, abx while in place  --Ok for diet  --Continue continuous telemetry  --PT/OT rec IPR   --DVT ppx: LOLLY/SCDs. BLE US 10/16 negative for DVT. F/u BUE US. Heparin ppx dc'd in setting of possible GI bleed.   --Coffee ground emesis: Pt reports started night of 10/17. GI consulted. Appreciate assistance.    -Heparin dc'd. H&H stable; 12.9/38.8.  --Tachycardia:   -Tachy overnight continuing this AM.    -Repeat troponin down-trending and WNL.   -EKG 10/17: Multifocal atrial tachycardia. Mag/phos WNL. F/u BUE us.  --Hyponatremia: Improving s/p NaCl bolus. 132 this AM. CTM  --Continue to monitor clinically, notify NSGY immediately with any changes in neuro status    Dispo: ongoing pending IPR    Discussed with IAN RileyC  Neurosurgery  Joel Zhou - Neurosurgery ([facility])

## 2022-10-18 NOTE — PLAN OF CARE
Problem: Adult Inpatient Plan of Care  Goal: Plan of Care Review  Outcome: Ongoing, Progressing  Flowsheets (Taken 10/18/2022 5256)  Plan of Care Reviewed With: patient     Problem: Skin Injury Risk Increased  Goal: Skin Health and Integrity  Outcome: Ongoing, Progressing     Problem: Infection  Goal: Absence of Infection Signs and Symptoms  Outcome: Ongoing, Progressing     Problem: Impaired Wound Healing  Goal: Optimal Wound Healing  Outcome: Ongoing, Progressing  POC reviewed with patient, verbalized understanding. Pt AAOX4, pain managed with prn pain med regimen. Pt c/o heartburn, paged Dr. Adkins. New order placed for Protonix PO, pt also medicated with prn Zofran for nausea. Fall precautions maintained. Bed locked and in lowest position, side rails up and locked. Bed alarm set and audible. All questions and concerns addressed, see flow sheet for full assessment and V/S info. X1 hemovac still in place, c-collar at all times. Will continue to monitor.

## 2022-10-18 NOTE — PROGRESS NOTES
"Joel Zhou - Neurosurgery ([facility])  Adult Nutrition  Progress Note    SUMMARY       Recommendations    1. Continue current regular diet- encourage adequate PO intake.   - If pt has increased issues with swallowing, consider altering diet texture per SLP.     2. Add Boost Plus TID.     3. RD following.    Goals: Will meet % EEN/EPN by next RD f/u.  Nutrition Goal Status: progressing towards goal  Communication of RD Recs:  (POC)    Assessment and Plan    Nutrition Problem  Inadequate energy/protein intake    Related to (etiology):   Decreased appetite and issues with heartburn    Signs and Symptoms (as evidenced by):   PO intake 0-25%    Interventions/Recommendations (treatment strategy):  Collaboration of nutrition care with other providers  Shareablee    Nutrition Diagnosis Status:   New    Reason for Assessment    Reason For Assessment: RD follow-up  Diagnosis:  (Odontoid fracture)  Relevant Medical History: HTN  Interdisciplinary Rounds: did not attend  General Information Comments: Spoke with pt at bedside. Unable to provide information regarding intake PTA. States intake now 0-25% d/t issues with heartburn and poor appetite. States issues with nausea but issues with v/d/c/chewing. Reports issues with swallowing hot tea and sweets. States he tolerates iced tea better. Noted SLP rec'd on 10/11 for pt to be on dental soft diet. #. No wt changes since last RD visit. NFPE not warranted d/t appearing well-nourished. LBM 10/17.  Nutrition Discharge Planning: Adequate nutrition    Nutrition Risk Screen    Nutrition Risk Screen: no indicators present    Nutrition/Diet History    Spiritual, Cultural Beliefs, Anabaptism Practices, Values that Affect Care: no  Food Allergies: NKFA  Factors Affecting Nutritional Intake: decreased appetite    Anthropometrics    Temp: 98.6 °F (37 °C)  Height Method: Stated  Height: 5' 6" (167.6 cm)  Height (inches): 66 in  Weight Method: Bed Scale  Weight: 67.6 kg (149 " lb)  Weight (lb): 149 lb  Ideal Body Weight (IBW), Male: 142 lb  % Ideal Body Weight, Male (lb): 104.93 %  BMI (Calculated): 24.1  BMI Grade: 18.5-24.9 - normal     Lab/Procedures/Meds    Pertinent Labs Reviewed: reviewed  Pertinent Labs Comments: Glucose 129, Albumin 1.9, Sodium 132, ALT 7, Hgb 12.2, Hct 37.6  Pertinent Medications Reviewed: reviewed  Pertinent Medications Comments: amlodipine, heparin, pantoprazole, senna-docusate    Estimated/Assessed Needs    Weight Used For Calorie Calculations: 68 kg (149 lb 14.6 oz)  Energy Calorie Requirements (kcal): 4955-7628 kcals  Energy Need Method: [place] (MSJ x 1.1-1.2 PAL)  Protein Requirements: 68-82 g (1.0-1.2 g/kg)  Weight Used For Protein Calculations: 68 kg (149 lb 14.6 oz)  Fluid Requirements (mL): 1 ml or fluid per MD  Estimated Fluid Requirement Method: RDA Method  RDA Method (mL): 1565     Nutrition Prescription Ordered    Current Diet Order: Regular    Evaluation of Received Nutrient/Fluid Intake    I/O: -187 ml since admit  Energy Calories Required: not meeting needs  Protein Required: not meeting needs  Fluid Required: not meeting needs  Tolerance: tolerating  % Intake of Estimated Energy Needs: 0 - 25 %  % Meal Intake: 0 - 25 %    Nutrition Risk    Level of Risk/Frequency of Follow-up:  (1 time/week)     Monitor and Evaluation    Food and Nutrient Intake: food and beverage intake, energy intake  Food and Nutrient Adminstration: diet order  Knowledge/Beliefs/Attitudes: food and nutrition knowledge/skill, beliefs and attitudes  Physical Activity and Function: nutrition-related ADLs and IADLs  Anthropometric Measurements: height/length, weight, weight change, body mass index  Biochemical Data, Medical Tests and Procedures: electrolyte and renal panel, gastrointestinal profile, glucose/endocrine profile, lipid profile, inflammatory profile  Nutrition-Focused Physical Findings: overall appearance     Nutrition Follow-Up    RD Follow-up?: Yes    Melissa  Colon PL-LDN
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
## 2022-10-18 NOTE — PLAN OF CARE
Recommendations     1. Continue current regular diet- encourage adequate PO intake.   - If pt has increased issues with swallowing, consider altering diet texture per SLP.      2. Add Boost Plus TID.      3. RD following.     Goals: Will meet % EEN/EPN by next RD f/u.  Nutrition Goal Status: progressing towards goal  Communication of RD Recs:  (POC)    Melissa Lowery PL-LDN

## 2022-10-18 NOTE — PLAN OF CARE
Problem: Physical Therapy  Goal: Physical Therapy Goal  Description: PT goals until 10/20/22    1. Pt supine to sit with CGA through sidelying- met 10/18  1a. Pt supine to sit with [place] through sidelying  2. Pt sit to supine with CGA through sidelying- met 10/18  3. Pt sit to stand with RW with CGA-not met  4. Pt to perform gait 100ft with RW with CGA - not met  5. Pt to go up/down curb step with RW with minimal assist.-not met  6. Pt to transfer bed to/from bedside chair with RW with CGA.-not met  7. Pt to perform B LE exs in sitting or supine x 15 reps to strengthen B LE to improve functional mobility.-not met   Outcome: Ongoing, Progressing   Updated goals due to pt's progress in therapy    Diana Li, SPT  10/18/2022

## 2022-10-18 NOTE — SUBJECTIVE & OBJECTIVE
Interval History: Afebrile. Leukocytosis down-trending. Remains tachy, EKG showing multifocal atrial tachycardia. Magnesium and phosphorus WNL. F/u BUE US. UA negative. Patient with coffee ground emesis overnight and this AM. GI consulted. H&H stable. Heparin dc'd. Drain removed.     Medications:  Continuous Infusions:  Scheduled Meds:   acetaminophen  1,000 mg Oral Q8H    amLODIPine  5 mg Oral Daily    lisinopriL  20 mg Oral Daily    methocarbamoL  1,000 mg Oral Q8H    nicotine  1 patch Transdermal Daily    pantoprazole  40 mg Oral Daily    polyethylene glycol  17 g Oral BID    pregabalin  75 mg Oral BID    senna-docusate 8.6-50 mg  2 tablet Oral BID     PRN Meds:sodium chloride, bisacodyL, calcium gluconate IVPB, calcium gluconate IVPB, calcium gluconate IVPB, hydrALAZINE, HYDROmorphone, labetaloL, magnesium sulfate IVPB, magnesium sulfate IVPB, ondansetron, oxyCODONE, phenyleph-min oil-petrolatum, potassium bicarbonate, potassium bicarbonate, potassium bicarbonate, potassium, sodium phosphates, potassium, sodium phosphates, potassium, sodium phosphates, sodium chloride 0.9%, sodium phosphates     Review of Systems  Objective:     Weight: 67.6 kg (149 lb)  Body mass index is 24.05 kg/m².  Vital Signs (Most Recent):  Temp: 98.6 °F (37 °C) (10/18/22 0714)  Pulse: 97 (10/18/22 0714)  Resp: 16 (10/18/22 0843)  BP: 134/62 (10/18/22 0714)  SpO2: 95 % (10/18/22 0714) Vital Signs (24h Range):  Temp:  [97.1 °F (36.2 °C)-98.6 °F (37 °C)] 98.6 °F (37 °C)  Pulse:  [] 97  Resp:  [16-24] 16  SpO2:  [90 %-97 %] 95 %  BP: (130-216)/() 134/62     Date 10/18/22 0700 - 10/19/22 0659   Shift 5298-8885 1640-4694 8781-9645 24 Hour Total   INTAKE   Shift Total(mL/kg)       OUTPUT   Emesis/NG output 15   15   Drains 10   10   Shift Total(mL/kg) 25(0.4)   25(0.4)   Weight (kg) 67.6 67.6 67.6 67.6                        Closed/Suction Drain 10/12/22 1749 Posterior Neck Accordion 10 Fr. (Active)   Site Description Healing  10/17/22 2000   Dressing Type Transparent (Tegaderm) 10/16/22 2000   Dressing Status Clean;Dry;Intact 10/16/22 2000   Dressing Intervention Integrity maintained 10/16/22 2000   Drainage Serosanguineous 10/17/22 2000   Status Other (Comment) 10/17/22 2000   Output (mL) 10 mL 10/18/22 0741     Neurosurgery Physical Exam  General: well developed, well nourished  Head: normocephalic, atraumatic  Neck: No tracheal deviation. No palpable masses. Full ROM.   Neurologic: Alert and oriented. Thought content appropriate.  GCS: E4 V5 M6. GCS Total: 15  Mental Status: Awake, Alert, Oriented x 4  Language: No aphasia  Speech: No dysarthria  Cranial nerves: face symmetric  Eyes: EOMI.   Pulmonary: normal respirations, no signs of respiratory distress  Abdomen: not tender to palpation    Sensory: intact to light touch throughout  Motor Strength: Moves all extremities spontaneously with good tone.  No abnormal movements seen.     Strength  Deltoids Triceps Biceps Wrist Extension Wrist Flexion Hand    Upper: R 4+/5 5/5 5/5 5/5 5/5 5/5    L 4+/5 5/5 5/5 5/5 5/5 5/5     Strength  Iliopsoas Quadriceps Knee  Flexion Tibialis  anterior Gastro- cnemius EHL   Lower: R 5/5 5/5 5/5 5/5 5/5 5/5    L 5/5 5/5 5/5 5/5 5/5 5/5     Vascular: No LE edema.   Skin: Skin is warm, dry and intact.    Incision: c/d/i with skin edges well approximated with Prineo tape. No surrounding erythema or edema. No drainage from incision. No palpable hematoma or underlying fluid collection.    Coffee ground emesis on gown:    Significant Labs:  Recent Labs   Lab 10/16/22  2029 10/17/22  0625 10/18/22  0521    91 129*   * 129* 132*   K 4.5 4.8 4.4    98 100   CO2 25 24 22*   BUN 18 14 12   CREATININE 0.8 0.7 0.7   CALCIUM 9.2 9.2 9.8   MG 1.6 1.9 1.7     Recent Labs   Lab 10/17/22  0625 10/18/22  0846   WBC 19.68* 17.13*   HGB 12.2* 12.9*   HCT 37.6* 38.8*    277     No results for input(s): LABPT, INR, APTT in the last 48  hours.  Microbiology Results (last 7 days)       ** No results found for the last 168 hours. **          All pertinent labs from the last 24 hours have been reviewed.    Significant Diagnostics:  I have reviewed all pertinent imaging results/findings within the past 24 hours.

## 2022-10-19 LAB
ALBUMIN SERPL BCP-MCNC: 1.7 G/DL (ref 3.5–5.2)
ALP SERPL-CCNC: 74 U/L (ref 55–135)
ALT SERPL W/O P-5'-P-CCNC: 5 U/L (ref 10–44)
ANION GAP SERPL CALC-SCNC: 8 MMOL/L (ref 8–16)
AST SERPL-CCNC: 12 U/L (ref 10–40)
BASOPHILS # BLD AUTO: 0.03 K/UL (ref 0–0.2)
BASOPHILS NFR BLD: 0.2 % (ref 0–1.9)
BILIRUB SERPL-MCNC: 0.3 MG/DL (ref 0.1–1)
BUN SERPL-MCNC: 16 MG/DL (ref 8–23)
CALCIUM SERPL-MCNC: 9.6 MG/DL (ref 8.7–10.5)
CHLORIDE SERPL-SCNC: 98 MMOL/L (ref 95–110)
CO2 SERPL-SCNC: 23 MMOL/L (ref 23–29)
CREAT SERPL-MCNC: 0.7 MG/DL (ref 0.5–1.4)
DIFFERENTIAL METHOD: ABNORMAL
EOSINOPHIL # BLD AUTO: 0 K/UL (ref 0–0.5)
EOSINOPHIL NFR BLD: 0.2 % (ref 0–8)
ERYTHROCYTE [DISTWIDTH] IN BLOOD BY AUTOMATED COUNT: 13 % (ref 11.5–14.5)
EST. GFR  (NO RACE VARIABLE): >60 ML/MIN/1.73 M^2
GLUCOSE SERPL-MCNC: 80 MG/DL (ref 70–110)
HCT VFR BLD AUTO: 32 % (ref 40–54)
HGB BLD-MCNC: 10.6 G/DL (ref 14–18)
IMM GRANULOCYTES # BLD AUTO: 0.09 K/UL (ref 0–0.04)
IMM GRANULOCYTES NFR BLD AUTO: 0.5 % (ref 0–0.5)
LYMPHOCYTES # BLD AUTO: 1.2 K/UL (ref 1–4.8)
LYMPHOCYTES NFR BLD: 7.1 % (ref 18–48)
MAGNESIUM SERPL-MCNC: 1.7 MG/DL (ref 1.6–2.6)
MCH RBC QN AUTO: 33.9 PG (ref 27–31)
MCHC RBC AUTO-ENTMCNC: 33.1 G/DL (ref 32–36)
MCV RBC AUTO: 102 FL (ref 82–98)
MONOCYTES # BLD AUTO: 1.2 K/UL (ref 0.3–1)
MONOCYTES NFR BLD: 7.1 % (ref 4–15)
NEUTROPHILS # BLD AUTO: 13.9 K/UL (ref 1.8–7.7)
NEUTROPHILS NFR BLD: 84.9 % (ref 38–73)
NRBC BLD-RTO: 0 /100 WBC
OSMOLALITY SERPL: 285 MOSM/KG (ref 280–300)
OSMOLALITY UR: 451 MOSM/KG (ref 50–1200)
PHOSPHATE SERPL-MCNC: 2.6 MG/DL (ref 2.7–4.5)
PLATELET # BLD AUTO: 299 K/UL (ref 150–450)
PMV BLD AUTO: 11.1 FL (ref 9.2–12.9)
POTASSIUM SERPL-SCNC: 4.3 MMOL/L (ref 3.5–5.1)
PROT SERPL-MCNC: 5.5 G/DL (ref 6–8.4)
RBC # BLD AUTO: 3.13 M/UL (ref 4.6–6.2)
SODIUM SERPL-SCNC: 129 MMOL/L (ref 136–145)
SODIUM UR-SCNC: 96 MMOL/L (ref 20–250)
WBC # BLD AUTO: 16.39 K/UL (ref 3.9–12.7)

## 2022-10-19 PROCEDURE — 11000001 HC ACUTE MED/SURG PRIVATE ROOM

## 2022-10-19 PROCEDURE — 63600175 PHARM REV CODE 636 W HCPCS

## 2022-10-19 PROCEDURE — 25000003 PHARM REV CODE 250

## 2022-10-19 PROCEDURE — 83735 ASSAY OF MAGNESIUM: CPT | Performed by: PHYSICIAN ASSISTANT

## 2022-10-19 PROCEDURE — 80053 COMPREHEN METABOLIC PANEL: CPT | Performed by: PHYSICIAN ASSISTANT

## 2022-10-19 PROCEDURE — 83935 ASSAY OF URINE OSMOLALITY: CPT

## 2022-10-19 PROCEDURE — 25000003 PHARM REV CODE 250: Performed by: STUDENT IN AN ORGANIZED HEALTH CARE EDUCATION/TRAINING PROGRAM

## 2022-10-19 PROCEDURE — 25000003 PHARM REV CODE 250: Performed by: PHYSICIAN ASSISTANT

## 2022-10-19 PROCEDURE — 83930 ASSAY OF BLOOD OSMOLALITY: CPT

## 2022-10-19 PROCEDURE — 99024 POSTOP FOLLOW-UP VISIT: CPT | Mod: ,,,

## 2022-10-19 PROCEDURE — 97530 THERAPEUTIC ACTIVITIES: CPT

## 2022-10-19 PROCEDURE — 36415 COLL VENOUS BLD VENIPUNCTURE: CPT | Performed by: PHYSICIAN ASSISTANT

## 2022-10-19 PROCEDURE — 84100 ASSAY OF PHOSPHORUS: CPT | Performed by: PHYSICIAN ASSISTANT

## 2022-10-19 PROCEDURE — C9113 INJ PANTOPRAZOLE SODIUM, VIA: HCPCS

## 2022-10-19 PROCEDURE — 97535 SELF CARE MNGMENT TRAINING: CPT

## 2022-10-19 PROCEDURE — 25000003 PHARM REV CODE 250: Performed by: NEUROLOGICAL SURGERY

## 2022-10-19 PROCEDURE — 99024 PR POST-OP FOLLOW-UP VISIT: ICD-10-PCS | Mod: ,,,

## 2022-10-19 PROCEDURE — 84300 ASSAY OF URINE SODIUM: CPT

## 2022-10-19 PROCEDURE — 85025 COMPLETE CBC W/AUTO DIFF WBC: CPT

## 2022-10-19 PROCEDURE — S4991 NICOTINE PATCH NONLEGEND: HCPCS

## 2022-10-19 PROCEDURE — 36415 COLL VENOUS BLD VENIPUNCTURE: CPT

## 2022-10-19 RX ORDER — SODIUM,POTASSIUM PHOSPHATES 280-250MG
1 POWDER IN PACKET (EA) ORAL ONCE
Status: DISCONTINUED | OUTPATIENT
Start: 2022-10-19 | End: 2022-10-19

## 2022-10-19 RX ORDER — SODIUM,POTASSIUM PHOSPHATES 280-250MG
2 POWDER IN PACKET (EA) ORAL ONCE
Status: COMPLETED | OUTPATIENT
Start: 2022-10-19 | End: 2022-10-19

## 2022-10-19 RX ORDER — LIDOCAINE 50 MG/G
2 PATCH TOPICAL
Status: DISCONTINUED | OUTPATIENT
Start: 2022-10-19 | End: 2022-10-21 | Stop reason: HOSPADM

## 2022-10-19 RX ORDER — OXYCODONE HYDROCHLORIDE 5 MG/1
5 TABLET ORAL EVERY 4 HOURS PRN
Status: DISCONTINUED | OUTPATIENT
Start: 2022-10-19 | End: 2022-10-21 | Stop reason: HOSPADM

## 2022-10-19 RX ORDER — PANTOPRAZOLE SODIUM 40 MG/10ML
40 INJECTION, POWDER, LYOPHILIZED, FOR SOLUTION INTRAVENOUS EVERY 12 HOURS
Status: DISCONTINUED | OUTPATIENT
Start: 2022-10-19 | End: 2022-10-21 | Stop reason: HOSPADM

## 2022-10-19 RX ADMIN — NICOTINE 1 PATCH: 14 PATCH, EXTENDED RELEASE TRANSDERMAL at 09:10

## 2022-10-19 RX ADMIN — PREGABALIN 75 MG: 75 CAPSULE ORAL at 09:10

## 2022-10-19 RX ADMIN — PANTOPRAZOLE SODIUM 40 MG: 40 TABLET, DELAYED RELEASE ORAL at 09:10

## 2022-10-19 RX ADMIN — PREGABALIN 75 MG: 75 CAPSULE ORAL at 08:10

## 2022-10-19 RX ADMIN — PANTOPRAZOLE SODIUM 40 MG: 40 INJECTION, POWDER, FOR SOLUTION INTRAVENOUS at 08:10

## 2022-10-19 RX ADMIN — AMLODIPINE BESYLATE 5 MG: 5 TABLET ORAL at 09:10

## 2022-10-19 RX ADMIN — METHOCARBAMOL 1000 MG: 500 TABLET ORAL at 01:10

## 2022-10-19 RX ADMIN — POLYETHYLENE GLYCOL 3350 17 G: 17 POWDER, FOR SOLUTION ORAL at 08:10

## 2022-10-19 RX ADMIN — METOPROLOL TARTRATE 25 MG: 25 TABLET, FILM COATED ORAL at 05:10

## 2022-10-19 RX ADMIN — METHOCARBAMOL 1000 MG: 500 TABLET ORAL at 05:10

## 2022-10-19 RX ADMIN — MINERAL OIL, PETROLATUM, PHENYLEPHRINE HCI 1 APPLICATOR: .25; 14; 74.9 OINTMENT TOPICAL at 11:10

## 2022-10-19 RX ADMIN — SENNOSIDES AND DOCUSATE SODIUM 2 TABLET: 50; 8.6 TABLET ORAL at 08:10

## 2022-10-19 RX ADMIN — LABETALOL HYDROCHLORIDE 10 MG: 5 INJECTION, SOLUTION INTRAVENOUS at 08:10

## 2022-10-19 RX ADMIN — LISINOPRIL 20 MG: 20 TABLET ORAL at 09:10

## 2022-10-19 RX ADMIN — SENNOSIDES AND DOCUSATE SODIUM 2 TABLET: 50; 8.6 TABLET ORAL at 09:10

## 2022-10-19 RX ADMIN — METOPROLOL TARTRATE 25 MG: 25 TABLET, FILM COATED ORAL at 11:10

## 2022-10-19 RX ADMIN — METOPROLOL TARTRATE 25 MG: 25 TABLET, FILM COATED ORAL at 12:10

## 2022-10-19 RX ADMIN — METHOCARBAMOL 1000 MG: 500 TABLET ORAL at 08:10

## 2022-10-19 RX ADMIN — LIDOCAINE 2 PATCH: 50 PATCH CUTANEOUS at 01:10

## 2022-10-19 RX ADMIN — ACETAMINOPHEN 1000 MG: 500 TABLET ORAL at 08:10

## 2022-10-19 RX ADMIN — POTASSIUM & SODIUM PHOSPHATES POWDER PACK 280-160-250 MG 2 PACKET: 280-160-250 PACK at 04:10

## 2022-10-19 RX ADMIN — ACETAMINOPHEN 1000 MG: 500 TABLET ORAL at 05:10

## 2022-10-19 RX ADMIN — MINERAL OIL, PETROLATUM, PHENYLEPHRINE HCI 1 APPLICATOR: .25; 14; 74.9 OINTMENT TOPICAL at 05:10

## 2022-10-19 RX ADMIN — ACETAMINOPHEN 1000 MG: 500 TABLET ORAL at 01:10

## 2022-10-19 RX ADMIN — POLYETHYLENE GLYCOL 3350 17 G: 17 POWDER, FOR SOLUTION ORAL at 09:10

## 2022-10-19 NOTE — PT/OT/SLP PROGRESS
"Occupational Therapy   Treatment    Name: Israel De Jesus  MRN: [id number]  Admitting Diagnosis:  Odontoid fracture  7 Days Post-Op    Recommendations:     Discharge Recommendations: rehabilitation facility  Discharge Equipment Recommendations:  other (see comments) (TBD)  Barriers to discharge:  Decreased caregiver support    Assessment:     Israel De Jesus is a 62 y.o. male with a medical diagnosis of Odontoid fracture.  He presents with the following performance deficits affecting function:  weakness, impaired endurance, impaired self care skills, impaired functional mobility, impaired balance, gait instability, decreased lower extremity function, decreased safety awareness, pain, orthopedic precautions.     Pt agreeable to therapy and tolerated the session well. He demonstrated increased progress towards his goals this date, especially with functional mobility. Pt also performed UB dressing with Mod A and a toilet transfer with Min A. Pt would benefit from continued skilled acute OT services in order to maximize independence and safety with ADLs and functional mobility to ensure safe return to PLOF in the least restrictive environment. OT recommending Rehab once pt is medically appropriate for d/c.     Rehab Prognosis:  Good; patient would benefit from acute skilled OT services to address these deficits and reach maximum level of function.       Plan:     Patient to be seen 4 x/week to address the above listed problems via self-care/home management, therapeutic activities, therapeutic exercises, neuromuscular re-education  Plan of Care Expires: 11/13/22  Plan of Care Reviewed with: patient    Subjective     "I am doing better today"     Pain/Comfort:  Pain Rating 1: 4/10  Location - Side 1: Bilateral  Location - Orientation 1: generalized  Location 1: neck  Pain Addressed 1: Reposition, Distraction  Pain Rating Post-Intervention 1: other (see comments) (not rated)    Objective:     Communicated with: RN " prior to session.  Patient found HOB elevated with SCD, cervical collar, obrien catheter, telemetry upon OT entry to room.    General Precautions: Standard, fall   Orthopedic Precautions:spinal precautions   Braces: Cervical collar  Respiratory Status: Room air     Occupational Performance:     Bed Mobility:    Patient completed Rolling/Turning to Right with stand by assistance  Patient completed Scooting/Bridging with stand by assistance  Patient completed Supine to Sit with contact guard assistance     Functional Mobility/Transfers:  Patient completed Sit <> Stand Transfer with minimum assistance  with  rolling walker   Patient completed Toilet Transfer Step Transfer technique with minimum assistance with  rolling walker  Functional Mobility: Pt engaging in functional mobility to simulate household/community distances approx 75+75 ft with CGA and utilizing RW in order to maximize functional activity tolerance and standing balance required for engagement in occupations of choice.  Standing rest break half way through   Cues for upright posture     Activities of Daily Living:  Upper Body Dressing: moderate assistance : to don gown around the back like a robe      [facility] 6 Click ADL: 17    Treatment & Education:  Therapist provided facilitation and instruction of proper body mechanics and fall prevention strategies during tasks listed above.  Instructed patient to sit in bedside chair daily to increase OOB/activity tolerance.  Instructed patient to use call light to have nursing staff assist with needs/transfers.  Discussed OT POC and answered all questions within OT scope of practice.  Whiteboard updated       Patient left up in chair with all lines intact, call button in reach, chair alarm on, and RN notified    GOALS:   Multidisciplinary Problems       Occupational Therapy Goals          Problem: Occupational Therapy    Goal Priority Disciplines Outcome Interventions   Occupational Therapy Goal     OT, PT/OT  Ongoing, Progressing    Description: Goals to be met by: 10/27/22     Patient will increase functional independence with ADLs by performing:    Feeding with [place].  UE Dressing with Stand-by Assistance.  LE Dressing with Minimal Assistance.  Grooming while standing at sink with Contact Guard Assistance.  Toileting from toilet with Minimal Assistance for hygiene and clothing management.   Toilet transfer to toilet with Contact Guard Assistance.                         Time Tracking:     OT Date of Treatment: 10/19/22  OT Start Time: 1539  OT Stop Time: 1617  OT Total Time (min): 38 min    Billable Minutes:Self Care/Home Management 28  Therapeutic Activity 10    OT/SONA: OT          10/19/2022

## 2022-10-19 NOTE — PLAN OF CARE
Problem: Adult Inpatient Plan of Care  Goal: Plan of Care Review  Outcome: Ongoing, Progressing  Flowsheets (Taken 10/19/2022 0352)  Plan of Care Reviewed With: patient  Goal: Optimal Comfort and Wellbeing  Outcome: Ongoing, Progressing  Intervention: Monitor Pain and Promote Comfort  Flowsheets (Taken 10/19/2022 0352)  Pain Management Interventions:   care clustered   pain management plan reviewed with patient/caregiver   pillow support provided   position adjusted   quiet environment facilitated   relaxation techniques promoted     Problem: Skin Injury Risk Increased  Goal: Skin Health and Integrity  Outcome: Ongoing, Progressing     Problem: Impaired Wound Healing  Goal: Optimal Wound Healing  Outcome: Ongoing, Progressing     POC reviewed with patient, verbalized understanding. Pt AAOX4, pain managed with prn pain med regimen. Pt was tachy overnight, sustaining in the 130s-140s. Paged Dr. Mao, new order placed for metoprolol 25mg BID. Fall precautions maintained. Bed locked and in lowest position, side rails up and locked. Bed alarm set and audible. All questions and concerns addressed, see flow sheet for full assessment and V/S info. C-collar at all times, will continue to monitor.

## 2022-10-19 NOTE — SUBJECTIVE & OBJECTIVE
Interval History: NAEON. Patient continues to have intermittent tachycardia. Afebrile. Dopplers showing SVT of the distal right cephalic vein. No evidence of DVTs. Workup negative otherwise. Na 129 today, labs ordered to assess hyponatremia. Patient denies further emesis. Denies new numbness/weakness/tingling. Working well with therapy. States he is having significant posterior neck pain and stiffness. Lidocaine patches ordered.     Medications:  Continuous Infusions:  Scheduled Meds:   acetaminophen  1,000 mg Oral Q8H    amLODIPine  5 mg Oral Daily    LIDOcaine  2 patch Transdermal Q24H    lisinopriL  20 mg Oral Daily    methocarbamoL  1,000 mg Oral Q8H    metoprolol tartrate  25 mg Oral Q6H    nicotine  1 patch Transdermal Daily    pantoprazole  40 mg Oral Daily    polyethylene glycol  17 g Oral BID    pregabalin  75 mg Oral BID    senna-docusate 8.6-50 mg  2 tablet Oral BID     PRN Meds:sodium chloride, bisacodyL, calcium gluconate IVPB, calcium gluconate IVPB, calcium gluconate IVPB, hydrALAZINE, HYDROmorphone, labetaloL, magnesium sulfate IVPB, magnesium sulfate IVPB, ondansetron, oxyCODONE, phenyleph-min oil-petrolatum, potassium bicarbonate, potassium bicarbonate, potassium bicarbonate, potassium, sodium phosphates, potassium, sodium phosphates, potassium, sodium phosphates, sodium chloride 0.9%, sodium phosphates     Review of Systems  Objective:     Weight: 67.6 kg (149 lb)  Body mass index is 24.05 kg/m².  Vital Signs (Most Recent):  Temp: 98.7 °F (37.1 °C) (10/19/22 1128)  Pulse: (!) 118 (10/19/22 1157)  Resp: 16 (10/19/22 1128)  BP: (!) 150/95 (10/19/22 1128)  SpO2: (!) 94 % (10/19/22 1128)   Vital Signs (24h Range):  Temp:  [97.8 °F (36.6 °C)-99 °F (37.2 °C)] 98.7 °F (37.1 °C)  Pulse:  [] 118  Resp:  [16-18] 16  SpO2:  [94 %-97 %] 94 %  BP: (130-165)/(65-97) 150/95     Date 10/19/22 0700 - 10/20/22 0659   Shift 7475-6331 9665-1580 1741-3687 24 Hour Total   INTAKE   P.O. 240   240   Shift  Total(mL/kg) 240(3.6)   240(3.6)   OUTPUT   Urine(mL/kg/hr) 600   600   Shift Total(mL/kg) 600(8.9)   600(8.9)   Weight (kg) 67.6 67.6 67.6 67.6                        Closed/Suction Drain 10/12/22 1749 Posterior Neck Accordion 10 Fr. (Active)   Site Description Healing 10/17/22 2000   Dressing Type Transparent (Tegaderm) 10/16/22 2000   Dressing Status Clean;Dry;Intact 10/16/22 2000   Dressing Intervention Integrity maintained 10/16/22 2000   Drainage Serosanguineous 10/17/22 2000   Status Other (Comment) 10/17/22 2000   Output (mL) 10 mL 10/18/22 0741       Neurosurgery Physical Exam  General: well developed, well nourished  Head: normocephalic, atraumatic  Neck: No tracheal deviation. No palpable masses. Full ROM.   Neurologic: Alert and oriented. Thought content appropriate.  GCS: E4 V5 M6. GCS Total: 15  Mental Status: Awake, Alert, Oriented x 4  Language: No aphasia  Speech: No dysarthria  Cranial nerves: face symmetric  Eyes: EOMI.   Pulmonary: normal respirations, no signs of respiratory distress  Abdomen: not tender to palpation  Sensory: intact to light touch throughout  Motor Strength: Moves all extremities spontaneously with good tone.  No abnormal movements seen.      Strength   Deltoids Triceps Biceps Wrist Extension Wrist Flexion Hand    Upper: R 4+/5 5/5 5/5 5/5 5/5 5/5     L 4+/5 5/5 5/5 5/5 5/5 5/5      Strength   Iliopsoas Quadriceps Knee  Flexion Tibialis  anterior Gastro- cnemius EHL   Lower: R 5/5 5/5 5/5 5/5 5/5 5/5     L 5/5 5/5 5/5 5/5 5/5 5/5    4+/5 R IO; 4/5 L IO  Vascular: No LE edema.   Skin: Skin is warm, dry and intact.     Incision: c/d/i with skin edges well approximated with Prineo tape. No surrounding erythema or edema. No drainage from incision. No palpable hematoma or underlying fluid collection.    Significant Labs:  Recent Labs   Lab 10/18/22  0521 10/19/22  0510   * 80   * 129*   K 4.4 4.3    98   CO2 22* 23   BUN 12 16   CREATININE 0.7 0.7   CALCIUM  9.8 9.6   MG 1.7 1.7     Recent Labs   Lab 10/18/22  0846   WBC 17.13*   HGB 12.9*   HCT 38.8*        No results for input(s): LABPT, INR, APTT in the last 48 hours.  Microbiology Results (last 7 days)       ** No results found for the last 168 hours. **          All pertinent labs from the last 24 hours have been reviewed.    Significant Diagnostics:  I have reviewed and interpreted all pertinent imaging results/findings within the past 24 hours.

## 2022-10-19 NOTE — ASSESSMENT & PLAN NOTE
Patient is a 62M with PMH of HTN who presents with odontoid fracture/type I hangman's fracture now s/p C1-4 PCDF on 10/12:    --Floor status, nsgy primary. Q4hr neurochecks.  --All pertinent labs and diagnostics personally reviewed.  --XR 10/12: post op hardware in good position, s/p C1-4 PCF.  --C-collar when out of bed.  --HV removed.   --Regular diet.  --PT/OT rec IPR.  --DVT ppx: LOLLY/SCDs. BLE US 10/16 negative for DVT. BUE US showing SVT of distal R cephalic vein. Warm compresses to site. Heparin ppx dc'd in setting of possible GI bleed.   --Coffee ground emesis: GI consulted. Rec IV Protonix 40 MG BID. No EGD while inpatient. Monitor H/H.   -Heparin dc'd per GI recs. H&H stable.  --Tachycardia:              -Continue telemetry.   -Repeat troponin down-trending and WNL.   -EKG 10/17: Multifocal atrial tachycardia.               -Mag/phos WNL. Replace PRN.              -May be pain related. Lidocaine patches to posterior neck.  --Leukocytosis downtrending:              -UA negative.              -CXR negative.   --Hyponatremia: 129 this am. Labs ordered to assess for SIADH. Will f/u.  --Encourage IS use hourly.   --Continue to monitor clinically, notify NSGY immediately with any changes in neuro status    Dispo: ongoing pending IPR    Discussed with Dr. Storm

## 2022-10-19 NOTE — PROGRESS NOTES
"Joel Zhou - Neurosurgery ([facility])  Neurosurgery  Progress Note    Subjective:     History of Present Illness: Patient is a 62M with PMH of HTN and remote L knee surgery who presents with 3 days of weakness after a fall 2-3 weeks ago. Patient transferred from OSH for NSGY consult regarding odontoid fracture. He reports that he has had persistent neck pain, but that his weakness did not start until a few days ago and is worsening. Patient complains of an intermittent "rumbling" almost vibrating feeling in his chest/abdomen, as well as sometimes in his various muscle groups. He claims that he feel decreased sensation across his limbs/body, including his thighs and calves where it started before moving up to his upper extremities. He symptoms do not seem to localize to a specific dermatome. He does not complain of any new radiculopathic or myelopathic symptoms. Patient reports that he has had to use a walker in the past few days. When laying down flat on the bed, he feels fine and can move all extremities at least AG. When he is sitting upright, he states that he cannot move his arms past the shoulder line. He also notes weakness in his BLE which was required him to use a walker to get around.Patient did not feel safe to get up and demonstrate gait, but claims he is weak when walking.    He presented in a C-collar. Patient denies taking AC/AP. He also denies bowel/bladder dysfunction.      Post-Op Info:  Procedure(s) (LRB):  POSTERIOR CERVICAL FUSION, SPINE C1-C4 PCF  (N/A)  LAMINECTOMY, SPINE, C-1 (N/A)   7 Days Post-Op     Interval History: NAEON. Patient continues to have intermittent tachycardia. Afebrile. Dopplers showing SVT of the distal right cephalic vein. No evidence of DVTs. Workup negative otherwise. Na 129 today, labs ordered to assess hyponatremia. Patient denies further emesis. Denies new numbness/weakness/tingling. Working well with therapy. States he is having significant posterior neck pain and " stiffness. Lidocaine patches ordered.     Medications:  Continuous Infusions:  Scheduled Meds:   acetaminophen  1,000 mg Oral Q8H    amLODIPine  5 mg Oral Daily    LIDOcaine  2 patch Transdermal Q24H    lisinopriL  20 mg Oral Daily    methocarbamoL  1,000 mg Oral Q8H    metoprolol tartrate  25 mg Oral Q6H    nicotine  1 patch Transdermal Daily    pantoprazole  40 mg Oral Daily    polyethylene glycol  17 g Oral BID    pregabalin  75 mg Oral BID    senna-docusate 8.6-50 mg  2 tablet Oral BID     PRN Meds:sodium chloride, bisacodyL, calcium gluconate IVPB, calcium gluconate IVPB, calcium gluconate IVPB, hydrALAZINE, HYDROmorphone, labetaloL, magnesium sulfate IVPB, magnesium sulfate IVPB, ondansetron, oxyCODONE, phenyleph-min oil-petrolatum, potassium bicarbonate, potassium bicarbonate, potassium bicarbonate, potassium, sodium phosphates, potassium, sodium phosphates, potassium, sodium phosphates, sodium chloride 0.9%, sodium phosphates     Review of Systems  Objective:     Weight: 67.6 kg (149 lb)  Body mass index is 24.05 kg/m².  Vital Signs (Most Recent):  Temp: 98.7 °F (37.1 °C) (10/19/22 1128)  Pulse: (!) 118 (10/19/22 1157)  Resp: 16 (10/19/22 1128)  BP: (!) 150/95 (10/19/22 1128)  SpO2: (!) 94 % (10/19/22 1128)   Vital Signs (24h Range):  Temp:  [97.8 °F (36.6 °C)-99 °F (37.2 °C)] 98.7 °F (37.1 °C)  Pulse:  [] 118  Resp:  [16-18] 16  SpO2:  [94 %-97 %] 94 %  BP: (130-165)/(65-97) 150/95     Date 10/19/22 0700 - 10/20/22 0659   Shift 5980-5635 8316-8906 3823-9143 24 Hour Total   INTAKE   P.O. 240   240   Shift Total(mL/kg) 240(3.6)   240(3.6)   OUTPUT   Urine(mL/kg/hr) 600   600   Shift Total(mL/kg) 600(8.9)   600(8.9)   Weight (kg) 67.6 67.6 67.6 67.6                        Closed/Suction Drain 10/12/22 1749 Posterior Neck Accordion 10 Fr. (Active)   Site Description Healing 10/17/22 2000   Dressing Type Transparent (Tegaderm) 10/16/22 2000   Dressing Status Clean;Dry;Intact 10/16/22 2000    Dressing Intervention Integrity maintained 10/16/22 2000   Drainage Serosanguineous 10/17/22 2000   Status Other (Comment) 10/17/22 2000   Output (mL) 10 mL 10/18/22 0741       Neurosurgery Physical Exam  General: well developed, well nourished  Head: normocephalic, atraumatic  Neck: No tracheal deviation. No palpable masses. Full ROM.   Neurologic: Alert and oriented. Thought content appropriate.  GCS: E4 V5 M6. GCS Total: 15  Mental Status: Awake, Alert, Oriented x 4  Language: No aphasia  Speech: No dysarthria  Cranial nerves: face symmetric  Eyes: EOMI.   Pulmonary: normal respirations, no signs of respiratory distress  Abdomen: not tender to palpation  Sensory: intact to light touch throughout  Motor Strength: Moves all extremities spontaneously with good tone.  No abnormal movements seen.      Strength   Deltoids Triceps Biceps Wrist Extension Wrist Flexion Hand    Upper: R 4+/5 5/5 5/5 5/5 5/5 5/5     L 4+/5 5/5 5/5 5/5 5/5 5/5      Strength   Iliopsoas Quadriceps Knee  Flexion Tibialis  anterior Gastro- cnemius EHL   Lower: R 5/5 5/5 5/5 5/5 5/5 5/5     L 5/5 5/5 5/5 5/5 5/5 5/5    4+/5 R IO; 4/5 L IO  Vascular: No LE edema.   Skin: Skin is warm, dry and intact.     Incision: c/d/i with skin edges well approximated with Prineo tape. No surrounding erythema or edema. No drainage from incision. No palpable hematoma or underlying fluid collection.    Significant Labs:  Recent Labs   Lab 10/18/22  0521 10/19/22  0510   * 80   * 129*   K 4.4 4.3    98   CO2 22* 23   BUN 12 16   CREATININE 0.7 0.7   CALCIUM 9.8 9.6   MG 1.7 1.7     Recent Labs   Lab 10/18/22  0846   WBC 17.13*   HGB 12.9*   HCT 38.8*        No results for input(s): LABPT, INR, APTT in the last 48 hours.  Microbiology Results (last 7 days)       ** No results found for the last 168 hours. **          All pertinent labs from the last 24 hours have been reviewed.    Significant Diagnostics:  I have reviewed and  interpreted all pertinent imaging results/findings within the past 24 hours.    Assessment/Plan:     * Odontoid fracture  Patient is a 62M with PMH of HTN who presents with odontoid fracture/type I hangman's fracture now s/p C1-4 PCDF on 10/12:    --Floor status, nsgy primary. Q4hr neurochecks.  --All pertinent labs and diagnostics personally reviewed.  --XR 10/12: post op hardware in good position, s/p C1-4 PCF.  --C-collar when out of bed.  --HV removed.   --Regular diet.  --PT/OT rec IPR.  --DVT ppx: LOLLY/SCDs. BLE US 10/16 negative for DVT. BUE US showing SVT of distal R cephalic vein. Warm compresses to site. Heparin ppx dc'd in setting of possible GI bleed.   --Coffee ground emesis: GI consulted. Rec IV Protonix 40 MG BID. No EGD while inpatient. Monitor H/H.   -Heparin dc'd per GI recs. H&H stable.  --Tachycardia:              -Continue telemetry.        -Repeat troponin down-trending and WNL.        -EKG 10/17: Multifocal atrial tachycardia.               -Mag/phos WNL. Replace PRN.              -May be pain related. Lidocaine patches to posterior neck.  --Leukocytosis downtrending:              -UA negative.              -CXR negative.   --Hyponatremia: 129 this am. Labs ordered to assess for SIADH. Will f/u.  --Encourage IS use hourly.   --Continue to monitor clinically, notify NSGY immediately with any changes in neuro status    Dispo: ongoing pending IPR    Discussed with Dr. Dotty Boyle PAYoandyC  Neurosurgery  Joel Zhou - Neurosurgery ([facility])

## 2022-10-19 NOTE — PLAN OF CARE
Patient rec is Rehab.  Per PT, patient will probably be good to discharge home after 2 weeks of inpatient Rehab.  CM sent referral to [facility]. PMR rec is IPR.  Per Jil Torres,   Dept., CM may be able to pay for the 2 weeks of IPR since Medicaid has not been approved at this time.

## 2022-10-19 NOTE — PLAN OF CARE
Problem: Adult Inpatient Plan of Care  Goal: Plan of Care Review  Outcome: Ongoing, Progressing  Goal: Patient-Specific Goal (Individualized)  Outcome: Ongoing, Progressing  Goal: Absence of Hospital-Acquired Illness or Injury  Outcome: Ongoing, Progressing  Intervention: Identify and Manage Fall Risk  Flowsheets (Taken 10/19/2022 1700)  Safety Promotion/Fall Prevention:   assistive device/personal item within reach   side rails raised x 2   nonskid shoes/socks when out of bed   instructed to call staff for mobility  Intervention: Prevent and Manage VTE (Venous Thromboembolism) Risk  Flowsheets (Taken 10/19/2022 1700)  Activity Management: Walk with assistive devise and /or staff member - L3  VTE Prevention/Management:   remove, assess skin, and reapply sequential compression device   ROM (active) performed

## 2022-10-20 LAB
ALBUMIN SERPL BCP-MCNC: 1.8 G/DL (ref 3.5–5.2)
ALP SERPL-CCNC: 81 U/L (ref 55–135)
ALT SERPL W/O P-5'-P-CCNC: 7 U/L (ref 10–44)
ANION GAP SERPL CALC-SCNC: 7 MMOL/L (ref 8–16)
AST SERPL-CCNC: 13 U/L (ref 10–40)
BILIRUB SERPL-MCNC: 0.3 MG/DL (ref 0.1–1)
BUN SERPL-MCNC: 13 MG/DL (ref 8–23)
CALCIUM SERPL-MCNC: 9.4 MG/DL (ref 8.7–10.5)
CHLORIDE SERPL-SCNC: 96 MMOL/L (ref 95–110)
CO2 SERPL-SCNC: 26 MMOL/L (ref 23–29)
CREAT SERPL-MCNC: 0.7 MG/DL (ref 0.5–1.4)
EST. GFR  (NO RACE VARIABLE): >60 ML/MIN/1.73 M^2
GLUCOSE SERPL-MCNC: 100 MG/DL (ref 70–110)
HGB BLD-MCNC: 11.1 G/DL (ref 14–18)
MAGNESIUM SERPL-MCNC: 1.6 MG/DL (ref 1.6–2.6)
PHOSPHATE SERPL-MCNC: 3.3 MG/DL (ref 2.7–4.5)
POTASSIUM SERPL-SCNC: 4.2 MMOL/L (ref 3.5–5.1)
PROT SERPL-MCNC: 5.6 G/DL (ref 6–8.4)
SODIUM SERPL-SCNC: 129 MMOL/L (ref 136–145)

## 2022-10-20 PROCEDURE — 36415 COLL VENOUS BLD VENIPUNCTURE: CPT

## 2022-10-20 PROCEDURE — 83735 ASSAY OF MAGNESIUM: CPT | Performed by: PHYSICIAN ASSISTANT

## 2022-10-20 PROCEDURE — 25000003 PHARM REV CODE 250

## 2022-10-20 PROCEDURE — 84100 ASSAY OF PHOSPHORUS: CPT | Performed by: PHYSICIAN ASSISTANT

## 2022-10-20 PROCEDURE — 11000001 HC ACUTE MED/SURG PRIVATE ROOM

## 2022-10-20 PROCEDURE — S4991 NICOTINE PATCH NONLEGEND: HCPCS

## 2022-10-20 PROCEDURE — 25000003 PHARM REV CODE 250: Performed by: STUDENT IN AN ORGANIZED HEALTH CARE EDUCATION/TRAINING PROGRAM

## 2022-10-20 PROCEDURE — 36415 COLL VENOUS BLD VENIPUNCTURE: CPT | Performed by: PHYSICIAN ASSISTANT

## 2022-10-20 PROCEDURE — 63600175 PHARM REV CODE 636 W HCPCS

## 2022-10-20 PROCEDURE — 99024 PR POST-OP FOLLOW-UP VISIT: ICD-10-PCS | Mod: ,,,

## 2022-10-20 PROCEDURE — 99024 POSTOP FOLLOW-UP VISIT: CPT | Mod: ,,,

## 2022-10-20 PROCEDURE — 97530 THERAPEUTIC ACTIVITIES: CPT

## 2022-10-20 PROCEDURE — 82272 OCCULT BLD FECES 1-3 TESTS: CPT

## 2022-10-20 PROCEDURE — C9113 INJ PANTOPRAZOLE SODIUM, VIA: HCPCS

## 2022-10-20 PROCEDURE — 85018 HEMOGLOBIN: CPT

## 2022-10-20 PROCEDURE — 80053 COMPREHEN METABOLIC PANEL: CPT | Performed by: PHYSICIAN ASSISTANT

## 2022-10-20 RX ORDER — AMOXICILLIN 250 MG
2 CAPSULE ORAL 2 TIMES DAILY
Start: 2022-10-20 | End: 2022-12-01 | Stop reason: CLARIF

## 2022-10-20 RX ORDER — METHOCARBAMOL 500 MG/1
1000 TABLET, FILM COATED ORAL EVERY 8 HOURS
Qty: 60 TABLET | Refills: 0
Start: 2022-10-20 | End: 2022-10-30

## 2022-10-20 RX ORDER — POLYETHYLENE GLYCOL 3350 17 G/17G
17 POWDER, FOR SOLUTION ORAL 2 TIMES DAILY
Refills: 0
Start: 2022-10-20 | End: 2022-12-01 | Stop reason: CLARIF

## 2022-10-20 RX ORDER — OXYCODONE HYDROCHLORIDE 5 MG/1
5 TABLET ORAL EVERY 6 HOURS PRN
Refills: 0 | Status: ON HOLD
Start: 2022-10-20 | End: 2022-12-12 | Stop reason: HOSPADM

## 2022-10-20 RX ORDER — PSEUDOEPHEDRINE/ACETAMINOPHEN 30MG-500MG
100 TABLET ORAL
Status: DISCONTINUED | OUTPATIENT
Start: 2022-10-20 | End: 2022-10-20

## 2022-10-20 RX ORDER — AMLODIPINE BESYLATE 5 MG/1
5 TABLET ORAL DAILY
Qty: 30 TABLET | Refills: 11
Start: 2022-10-21 | End: 2023-03-03 | Stop reason: SDUPTHER

## 2022-10-20 RX ORDER — PANTOPRAZOLE SODIUM 40 MG/1
40 TABLET, DELAYED RELEASE ORAL 2 TIMES DAILY
Qty: 112 TABLET | Refills: 0 | Status: SHIPPED | OUTPATIENT
Start: 2022-10-20 | End: 2022-12-02 | Stop reason: SDUPTHER

## 2022-10-20 RX ORDER — HEPARIN SODIUM 5000 [USP'U]/ML
5000 INJECTION, SOLUTION INTRAVENOUS; SUBCUTANEOUS EVERY 8 HOURS
Status: DISCONTINUED | OUTPATIENT
Start: 2022-10-20 | End: 2022-10-21 | Stop reason: HOSPADM

## 2022-10-20 RX ORDER — PREGABALIN 75 MG/1
75 CAPSULE ORAL 2 TIMES DAILY
Qty: 60 CAPSULE | Refills: 6
Start: 2022-10-20 | End: 2023-04-20

## 2022-10-20 RX ORDER — METOPROLOL TARTRATE 25 MG/1
25 TABLET, FILM COATED ORAL EVERY 6 HOURS
Qty: 120 TABLET | Refills: 11
Start: 2022-10-20 | End: 2023-03-03 | Stop reason: SDUPTHER

## 2022-10-20 RX ORDER — SYRING-NEEDL,DISP,INSUL,0.3 ML 29 G X1/2"
296 SYRINGE, EMPTY DISPOSABLE MISCELLANEOUS
Status: DISCONTINUED | OUTPATIENT
Start: 2022-10-20 | End: 2022-10-20

## 2022-10-20 RX ADMIN — PREGABALIN 75 MG: 75 CAPSULE ORAL at 08:10

## 2022-10-20 RX ADMIN — ACETAMINOPHEN 1000 MG: 500 TABLET ORAL at 08:10

## 2022-10-20 RX ADMIN — SENNOSIDES AND DOCUSATE SODIUM 2 TABLET: 50; 8.6 TABLET ORAL at 08:10

## 2022-10-20 RX ADMIN — METHOCARBAMOL 1000 MG: 500 TABLET ORAL at 08:10

## 2022-10-20 RX ADMIN — PANTOPRAZOLE SODIUM 40 MG: 40 INJECTION, POWDER, FOR SOLUTION INTRAVENOUS at 08:10

## 2022-10-20 RX ADMIN — METHOCARBAMOL 1000 MG: 500 TABLET ORAL at 02:10

## 2022-10-20 RX ADMIN — METOPROLOL TARTRATE 25 MG: 25 TABLET, FILM COATED ORAL at 01:10

## 2022-10-20 RX ADMIN — AMLODIPINE BESYLATE 5 MG: 5 TABLET ORAL at 08:10

## 2022-10-20 RX ADMIN — METOPROLOL TARTRATE 25 MG: 25 TABLET, FILM COATED ORAL at 05:10

## 2022-10-20 RX ADMIN — HEPARIN SODIUM 5000 UNITS: 5000 INJECTION INTRAVENOUS; SUBCUTANEOUS at 02:10

## 2022-10-20 RX ADMIN — LIDOCAINE 2 PATCH: 50 PATCH CUTANEOUS at 02:10

## 2022-10-20 RX ADMIN — POLYETHYLENE GLYCOL 3350 17 G: 17 POWDER, FOR SOLUTION ORAL at 08:10

## 2022-10-20 RX ADMIN — NICOTINE 1 PATCH: 14 PATCH, EXTENDED RELEASE TRANSDERMAL at 08:10

## 2022-10-20 RX ADMIN — LISINOPRIL 20 MG: 20 TABLET ORAL at 08:10

## 2022-10-20 RX ADMIN — METHOCARBAMOL 1000 MG: 500 TABLET ORAL at 05:10

## 2022-10-20 RX ADMIN — ACETAMINOPHEN 1000 MG: 500 TABLET ORAL at 02:10

## 2022-10-20 RX ADMIN — HEPARIN SODIUM 5000 UNITS: 5000 INJECTION INTRAVENOUS; SUBCUTANEOUS at 08:10

## 2022-10-20 RX ADMIN — PANTOPRAZOLE SODIUM 40 MG: 40 INJECTION, POWDER, FOR SOLUTION INTRAVENOUS at 10:10

## 2022-10-20 NOTE — DISCHARGE INSTRUCTIONS
Please follow ONLY the instructions that are checked below.    Activity Restrictions:  [x]  Return to work will be determined on an individual basis.  [x]  No lifting greater than 10 pounds.  [x]  Avoid bending and twisting the area of your surgery more than 45 degrees from neutral position in any direction.  [x]  No driving or operating machinery:  []  until cleared by your surgeon.  [x]  while taking narcotic pain medications or muscle relaxants.  [x]  Wear your c-collar except when flat in bed.  [x]  Increase ambulation over the next 2 weeks so that you are walking 2 miles per day at 2 weeks post-operatively.  [x]  Walk on paved surfaces only. It is okay to walk up and down stairs while holding onto a side rail.  [x]  No sexual activity for 2-3 weeks.    Discharge Medication/Follow-up:  [x]  Please refer to discharge medication reconciliation form.  [x]  Do not take ANY non-steroidal anti-inflammatory drugs (NSAIDS), including the following: ibuprofen, naprosyn, Aleve, Advil, Indocin, Mobic, or Celebrex for:  []  4 weeks  []  8 weeks  [x]  6 months  [x]  Prescriptions for appropriate medication will be given upon discharge.  [x]  Take docusate (Colace 100 mg): take one capsule a day as needed for constipation. You can get this over the counter.  [x]  Follow-up appointment:  [x]  10-14 days post-op for wound check by physician assistant/nurse  [x]  4-6 weeks with MD:  [x]  with x-rays  []  without x-rays  [x]  An appointment will be mailed to you.    Wound Care:  [x]  No bandage required. Keep your incision open to the air.  [x]  You may shower on the 2nd day after your surgery. Have the force of water hit you opposite from the incision. Pat the incision dry after your shower; do not scrub the incision.  [x]  You cannot take a bath until 8 weeks after surgery.    Call your doctor or go to the Emergency Room for any signs of infection, including: increased redness, drainage, pain, or fever (temperature ?101.5 for  24 hours). Call your doctor or go to the Emergency Room if there are any localized neurological changes; problems with speech, vision, numbness, tingling, weakness, or severe headache; or for other concerns.    Special Instructions:  [x]  No use of tobacco products.  [x]  Diet: Please eat a regular diet as tolerated.  []  Other diet:              Specific physician instructions:           Physicians need 3 days' notice for pain medicine to be refilled. Pain medicine will only be refilled between 8 AM and 5 PM, Monday through Friday, due to Food and Drug Administration regulation of documentation.    If you have any questions about this form, please call [phone].    Form No. 45551 (Revised 10/31/2013)

## 2022-10-20 NOTE — PT/OT/SLP PROGRESS
"Physical Therapy Treatment    Patient Name:  Israel De Jesus   MRN:  [id number]    Recommendations:     Discharge Recommendations:  rehabilitation facility   Discharge Equipment Recommendations:  (TBD at next level of care)   Barriers to discharge: Inaccessible home and Decreased caregiver support at current functional level    Assessment:     Israel De Jesus is a 62 y.o. male admitted with a medical diagnosis of Odontoid fracture.  He presents with the following impairments/functional limitations:  weakness, impaired endurance, impaired self care skills, impaired functional mobility, gait instability, impaired balance, decreased lower extremity function, decreased safety awareness, pain, orthopedic precautions. Pt with good tolerance to therapy session on this day. Pt motivated and agreeable to participating in OOB mobility. Pt continues to require same assist levels with functional mobility as previous session. Upon initial STS to begin ambulation, pt requested the beside commode to have a bowel movement. Ambulation was deferred on this day 2* pt having prolonged bowel movement after several attempts to begin ambulation trials. Pt to benefit from continued skilled acute care therapy to remedy the above deficits and maximize functional mobility potential.     Rehab Prognosis: Good; patient would benefit from acute skilled PT services to address these deficits and reach maximum level of function.    Recent Surgery: Procedure(s) (LRB):  POSTERIOR CERVICAL FUSION, SPINE C1-C4 PCF  (N/A)  LAMINECTOMY, SPINE, C-1 (N/A) 8 Days Post-Op    Plan:     During this hospitalization, patient to be seen 4 x/week to address the identified rehab impairments via gait training, therapeutic activities, therapeutic exercises, neuromuscular re-education and progress toward the following goals:    Plan of Care Expires:  11/11/22    Subjective     Chief Complaint: Stomach pain, needing to have BM  Patient/Family Comments/goals: "My " "stomach is killing me, and he is going to give me an enema later."  Pain/Comfort:  Pain Rating 1: 0/10  Pain Rating Post-Intervention 1:  (not rated)  Location - Orientation 2: posterior  Location 2: neck  Pain Addressed 2: Distraction, Cessation of Activity, Reposition      Objective:     Communicated with RN prior to session.  Patient found HOB elevated with SCD, telemetry, condom catheter, cervical collar upon PT entry to room.   Cervical collar donned while seated at EOB, and remained throughout session.   Pt able to recall 3/3 spinal precautions correctly with minimal cueing (given acronym).     General Precautions: Standard, fall   Orthopedic Precautions:spinal precautions   Braces: Cervical collar  Respiratory Status: Room air     Functional Mobility:  Bed Mobility:     Supine to Sit: stand by assistance with HOB elevated using log roll technique  Verbal cueing for technique throughout  Transfers:     Sit to Stand x 6 reps:  contact guard assistance with rolling walker 3 rep from EOB and 3 rep from bedside commode  Verbal cueing for transfer technique using RW  Rocio-care completed in standing  Toilet Transfer  Using RW and bedside commode with CGA.  Verbal cueing for technique throughout  Gait  ~8 ft ambulated within room between bedside commode and bed CGA with RW  Verbal cueing for technique of managing RW   Balance:  Static sitting: SBA  Static standing: CGA with RW x 5 extended trials for rocio-care       AM-PAC 6 CLICK MOBILITY  Turning over in bed (including adjusting bedclothes, sheets and blankets)?: 3  Sitting down on and standing up from a chair with arms (e.g., wheelchair, bedside commode, etc.): 3  Moving from lying on back to sitting on the side of the bed?: 3  Moving to and from a bed to a chair (including a wheelchair)?: 3  Need to walk in hospital room?: 3  Climbing 3-5 steps with a railing?: 2  Basic Mobility Total Score: 17       Treatment & Education:  Pt educated on PT role, goal of " session, and POC. Pt verbalized understanding.  Pt educated on spinal precautions and importance of adherence to precautions and log roll technique. Pt verbalized understanding.  Pt's gown changed while seated at EOB prior to beginning session.    RN and nursing student notified of pt position on bedside commode and level of assist required to return to chair/bed.    Patient left  on bedside commode  with call button in reach and RN notified..    GOALS:   Multidisciplinary Problems       Physical Therapy Goals          Problem: Physical Therapy    Goal Priority Disciplines Outcome Goal Variances Interventions   Physical Therapy Goal     PT, PT/OT Ongoing, Progressing     Description: PT goals until 10/30/22    1. Pt supine to sit with CGA through sidelying- met 10/18  1a. Pt supine to sit with [place] through sidelying  2. Pt sit to supine with CGA through sidelying- met 10/18  3. Pt sit to stand with RW with CGA- met 10/20  3a. Pt sit to stand with RW Modified Independent- not met  4. Pt to perform gait 100ft with RW with CGA - not met  5. Pt to go up/down curb step with RW with minimal assist.-not met  6. Pt to transfer bed to/from bedside chair with RW with CGA.-not met  7. Pt to perform B LE exs in sitting or supine x 15 reps to strengthen B LE to improve functional mobility.-not met                        Time Tracking:     PT Received On: 10/20/22  PT Start Time: 1033     PT Stop Time: 1111  PT Total Time (min): 38 min     Billable Minutes: Therapeutic Activity 38    Treatment Type: Treatment  PT/PTA: PT     PTA Visit Number: 0     10/20/2022

## 2022-10-20 NOTE — TREATMENT PLAN
GI Treatment Plan    Israel De Jesus is a 62 y.o. male admitted to hospital 10/10/2022 (Hospital Day: 11) due to Odontoid fracture.     Interval History  Spitting/coughing up brown contents has resolved, no more heartburn    Objective  Temp:  [97.1 °F (36.2 °C)-98.7 °F (37.1 °C)] 98.2 °F (36.8 °C) (10/20 0750)  Pulse:  [] 107 (10/20 0750)  BP: (146-176)/() 165/79 (10/20 0758)  Resp:  [15-18] 15 (10/20 0750)  SpO2:  [90 %-98 %] 96 % (10/20 0750)      Laboratory    Recent Labs   Lab 10/17/22  0625 10/18/22  0846 10/19/22  1342   HGB 12.2* 12.9* 10.6*         Assessment and Plan  Israel De Jesus is a 62 y.o. male with history of HTN who presents with odontoid fracture now s/p C1-4 PCDF on 10/12.      Problem List:  Possible hematemesis      Suspect etiology due to esophagitis or gastric reflux  Hgb/hct today 12.9/38.8 on admission 12.2/36.6 now to 10 but with no further episodes        Recommendations:  - Suspect esophagitis while laying supine. No further episodes of emesis.  - IV pantoprazole 40mg BID while inpatient  - At discharge, can transition to pantoprazole 40 mg BID for 8 weeks then once daily  - Trend Hgb q24 hrs. Transfuse for Hgb <7, unless otherwise indicated      Thank you for involving us in the care of Israel De Jesus. GI will sign off at this time. Please call with any additional questions, concerns or changes in the patient's clinical status.    Harvey Miller MD  Gastroenterology Fellow, PGY IV

## 2022-10-20 NOTE — ASSESSMENT & PLAN NOTE
Patient is a 62M with PMH of HTN who presents with odontoid fracture/type I hangman's fracture now s/p C1-4 PCDF on 10/12:    --Floor status, nsgy primary. Q4hr neurochecks.  --All pertinent labs and diagnostics personally reviewed.  --XR 10/12: post op hardware in good position, s/p C1-4 PCF.  --C-collar when out of bed only.  --HV removed.   --Regular diet.  --PT/OT rec IPR.  --DVT ppx: LOLLY/SCDs. BLE US 10/16 negative for DVT. BUE US showing SVT of distal R cephalic vein. Warm compresses to site. Heparin ppx dc'd in setting of possible GI bleed.   --Coffee ground emesis: GI consulted. Rec IV Protonix 40 MG BID. No EGD while inpatient. Monitor H/H.   -okay to restart SQH per GI.  --Tachycardia:              -Continue telemetry.   -Repeat troponin down-trending and WNL.   -EKG 10/17: Multifocal atrial tachycardia.               -Mag/phos WNL. Replace PRN.              -May be pain related. Lidocaine patches to posterior neck. Improved today.  --Leukocytosis downtrending:              -UA negative.              -CXR negative.   --Hyponatremia: 129 this am. Labs ordered to assess, showing it is likely pseudohyponatremia.  --Continue bowel regimen.  --Encourage IS use hourly.   --Continue to monitor clinically, notify NSGY immediately with any changes in neuro status    Dispo: ongoing pending IPR    Discussed with Dr. Storm

## 2022-10-20 NOTE — PLAN OF CARE
Problem: Adult Inpatient Plan of Care  Goal: Plan of Care Review  Outcome: Ongoing, Progressing     Problem: Adult Inpatient Plan of Care  Goal: Optimal Comfort and Wellbeing  Outcome: Ongoing, Progressing     Problem: Adjustment to Illness (Stroke, Hemorrhagic)  Goal: Optimal Coping  Outcome: Ongoing, Progressing     Problem: Bowel Elimination Impaired (Stroke, Hemorrhagic)  Goal: Effective Bowel Elimination  Outcome: Ongoing, Progressing     Problem: Infection  Goal: Absence of Infection Signs and Symptoms  Outcome: Ongoing, Progressing     Problem: Pain (Stroke, Hemorrhagic)  Goal: Acceptable Pain Control  Outcome: Ongoing, Progressing     POC reviewed with the patient and they verbalized understanding. All comments and concerns addressed. PRN BP given for HTN. Pt has hx of hemorroids and given stool softeners and refusing suppository as has feeling of having a BM but no BM during this shift. Tele in place and with sinus tachy in low 100s and asymptomatic. Bed locked in lowest position with bed alarm set, call light within reach. Safety precautions maintained. VSS, see flowsheets. No events this shift. Will continue to monitor for changes to POC and clinical condition.

## 2022-10-20 NOTE — PLAN OF CARE
[facility]    FACILITY TRANSFER ORDERS      Patient Name: Israel De Jesus  YOB: 1960    PCP: Andrae Camarillo MD   PCP Address: [facility] [address]  PCP Phone Number: [phone]  PCP Fax: [phone]    Encounter Date: 10/20/2022    Admit to: rehab    Vital Signs:  Routine    Diagnoses:   Active Hospital Problems    Diagnosis  POA    *Odontoid fracture [S12.110A]  Yes    Tachycardia [R00.0]  Yes    Tobacco use disorder [F17.200]  Yes    Hypertension [I10]  Yes     Chronic      Resolved Hospital Problems   No resolved problems to display.       Allergies:Review of patient's allergies indicates:  No Known Allergies    Diet: regular diet    Activities: Activity as tolerated    Goals of Care Treatment Preferences:  Code Status: Full Code      Nursing: fall precautions; c-collar when out of bed only      Labs: CBC and BMP  per facility protocol      CONSULTS:    Physical Therapy to evaluate and treat.  and Occupational Therapy to evaluate and treat.    WOUND CARE ORDERS  Yes: Surgical Wound:  Location: posterior cervical incision with dissolvable sutures and prineo tape.    No dressings needed. Keep clean and dry. No ointments needed. Allow tape to fall off on its own, do not peel off.     Medications: Review discharge medications with patient and family and provide education.      Current Discharge Medication List        START taking these medications    Details   amLODIPine (NORVASC) 5 MG tablet Take 1 tablet (5 mg total) by mouth once daily.  Qty: 30 tablet, Refills: 11    Comments: .      methocarbamoL (ROBAXIN) 500 MG Tab Take 2 tablets (1,000 mg total) by mouth every 8 (eight) hours. for 10 days  Qty: 60 tablet, Refills: 0      metoprolol tartrate (LOPRESSOR) 25 MG tablet Take 1 tablet (25 mg total) by mouth every 6 (six) hours.  Qty: 120 tablet, Refills: 11    Comments: .      oxyCODONE (ROXICODONE) 5 MG immediate release tablet Take 1 tablet (5 mg total) by  mouth every 6 (six) hours as needed for Pain.  Refills: 0    Comments: Quantity prescribed more than 7 day supply? Yes, quantity medically necessary      polyethylene glycol (GLYCOLAX) 17 gram PwPk Take 17 g by mouth 2 (two) times daily.  Refills: 0      pregabalin (LYRICA) 75 MG capsule Take 1 capsule (75 mg total) by mouth 2 (two) times daily.  Qty: 60 capsule, Refills: 6      senna-docusate 8.6-50 mg (PERICOLACE) 8.6-50 mg per tablet Take 2 tablets by mouth 2 (two) times daily.           CONTINUE these medications which have NOT CHANGED    Details   lisinopriL 10 MG tablet Take 1 tablet (10 mg total) by mouth once daily.  Qty: 30 tablet, Refills: 0    Comments: .  Associated Diagnoses: Hypertension, unspecified type           STOP taking these medications       aspirin (ECOTRIN) 81 MG EC tablet Comments:   Reason for Stopping:         ibuprofen (ADVIL,MOTRIN) 600 MG tablet Comments:   Reason for Stopping:         meloxicam (MOBIC) 7.5 MG tablet Comments:   Reason for Stopping:                  Immunizations Administered as of 10/20/2022       No immunizations on file.              _________________________________  Christina Boyle PA-C  10/20/2022

## 2022-10-20 NOTE — PLAN OF CARE
[facility] (Hospital)  Discharge Reassessment    Primary Care Provider: Andrae Camarillo MD    Expected Discharge Date: 10/21/2022    Patient medically ready for discharge.  Patient is pending Medicaid.  Per JAMAL Patterson CM possible that CM Dept will pay for Rehab.  O Rehab to work on agreement with JAMAL Patterson    Reassessment (most recent)       Discharge Reassessment - 10/20/22 1513          Discharge Reassessment    Assessment Type Discharge Planning Reassessment     Did the patient's condition or plan change since previous assessment? No     Discharge Plan discussed with: Patient;Adult children     Communicated BRANDON with patient/caregiver Date not available/Unable to determine     Discharge Plan A Rehab     Discharge Plan B Home with family     DME Needed Upon Discharge  other (see comments)   tbd    Discharge Barriers Identified Unisured     Why the patient remains in the hospital Insurance issues        Post-Acute Status    Post-Acute Authorization Placement     Post-Acute Placement Status Pending state direction/certification     Discharge Delays Other   Patient pending medicaid

## 2022-10-20 NOTE — SUBJECTIVE & OBJECTIVE
Interval History: NAEON. HR improved. Afebrile. WBC trending down. No emesis, okay to restart SQH and continue Protonix per GI. Na 129 today. Labs showing likely pseudohyponatremia. Neuro stable. Pain controlled. Large BM today with relief of abdominal distention and pain.     Medications:  Continuous Infusions:  Scheduled Meds:   acetaminophen  1,000 mg Oral Q8H    amLODIPine  5 mg Oral Daily    heparin (porcine)  5,000 Units Subcutaneous Q8H    LIDOcaine  2 patch Transdermal Q24H    lisinopriL  20 mg Oral Daily    methocarbamoL  1,000 mg Oral Q8H    metoprolol tartrate  25 mg Oral Q6H    nicotine  1 patch Transdermal Daily    pantoprozole (PROTONIX) IV  40 mg Intravenous Q12H    pregabalin  75 mg Oral BID     PRN Meds:sodium chloride, hydrALAZINE, labetaloL, ondansetron, oxyCODONE, sodium chloride 0.9%     Review of Systems  Objective:     Weight: 67.6 kg (149 lb)  Body mass index is 24.05 kg/m².  Vital Signs (Most Recent):  Temp: 98.2 °F (36.8 °C) (10/20/22 0750)  Pulse: 107 (10/20/22 0750)  Resp: 15 (10/20/22 0750)  BP: (!) 165/79 (10/20/22 0758)  SpO2: 96 % (10/20/22 0750)   Vital Signs (24h Range):  Temp:  [97.1 °F (36.2 °C)-98.7 °F (37.1 °C)] 98.2 °F (36.8 °C)  Pulse:  [] 107  Resp:  [15-18] 15  SpO2:  [90 %-98 %] 96 %  BP: (146-176)/() 165/79       Neurosurgery Physical Exam  General: well developed, well nourished  Head: normocephalic, atraumatic  Neck: No tracheal deviation. No palpable masses. Full ROM.   Neurologic: Alert and oriented. Thought content appropriate.  GCS: E4 V5 M6. GCS Total: 15  Mental Status: Awake, Alert, Oriented x 4  Language: No aphasia  Speech: No dysarthria  Cranial nerves: face symmetric  Eyes: EOMI.   Pulmonary: normal respirations, no signs of respiratory distress  Abdomen: not tender to palpation  Sensory: intact to light touch throughout  Motor Strength: Moves all extremities spontaneously with good tone.  No abnormal movements seen.      Strength   Deltoids  Triceps Biceps Wrist Extension Wrist Flexion Hand    Upper: R 4+/5 5/5 5/5 5/5 5/5 5/5     L 4+/5 5/5 5/5 5/5 5/5 5/5      Strength   Iliopsoas Quadriceps Knee  Flexion Tibialis  anterior Gastro- cnemius EHL   Lower: R 5/5 5/5 5/5 5/5 5/5 5/5     L 5/5 5/5 5/5 5/5 5/5 5/5    4+/5 R IO; 4/5 L IO  Vascular: No LE edema.   Skin: Skin is warm, dry and intact.     Incision: c/d/i with skin edges well approximated with Prineo tape. No surrounding erythema or edema. No drainage from incision. No palpable hematoma or underlying fluid collection.    Significant Labs:  Recent Labs   Lab 10/19/22  0510 10/20/22  0536   GLU 80 100   * 129*   K 4.3 4.2   CL 98 96   CO2 23 26   BUN 16 13   CREATININE 0.7 0.7   CALCIUM 9.6 9.4   MG 1.7 1.6     Recent Labs   Lab 10/19/22  1342   WBC 16.39*   HGB 10.6*   HCT 32.0*        No results for input(s): LABPT, INR, APTT in the last 48 hours.  Microbiology Results (last 7 days)       ** No results found for the last 168 hours. **          All pertinent labs from the last 24 hours have been reviewed.    Significant Diagnostics:  I have reviewed all pertinent imaging results/findings within the past 24 hours.

## 2022-10-20 NOTE — PLAN OF CARE
Problem: Physical Therapy  Goal: Physical Therapy Goal  Description: PT goals until 10/30/22    1. Pt supine to sit with CGA through sidelying- met 10/18  1a. Pt supine to sit with [place] through sidelying  2. Pt sit to supine with CGA through sidelying- met 10/18  3. Pt sit to stand with RW with CGA- met 10/20  3a. Pt sit to stand with RW Modified Independent- not met  4. Pt to perform gait 100ft with RW with CGA - not met  5. Pt to go up/down curb step with RW with minimal assist.-not met  6. Pt to transfer bed to/from bedside chair with RW with CGA.-not met  7. Pt to perform B LE exs in sitting or supine x 15 reps to strengthen B LE to improve functional mobility.-not met   Outcome: Ongoing, Progressing   Goals updated to reflect pt progress    Diana Li, SPT  10/20/2022

## 2022-10-20 NOTE — PROGRESS NOTES
"Joel Zhou - Neurosurgery ([facility])  Neurosurgery  Progress Note    Subjective:     History of Present Illness: Patient is a 62M with PMH of HTN and remote L knee surgery who presents with 3 days of weakness after a fall 2-3 weeks ago. Patient transferred from OSH for NSGY consult regarding odontoid fracture. He reports that he has had persistent neck pain, but that his weakness did not start until a few days ago and is worsening. Patient complains of an intermittent "rumbling" almost vibrating feeling in his chest/abdomen, as well as sometimes in his various muscle groups. He claims that he feel decreased sensation across his limbs/body, including his thighs and calves where it started before moving up to his upper extremities. He symptoms do not seem to localize to a specific dermatome. He does not complain of any new radiculopathic or myelopathic symptoms. Patient reports that he has had to use a walker in the past few days. When laying down flat on the bed, he feels fine and can move all extremities at least AG. When he is sitting upright, he states that he cannot move his arms past the shoulder line. He also notes weakness in his BLE which was required him to use a walker to get around.Patient did not feel safe to get up and demonstrate gait, but claims he is weak when walking.    He presented in a C-collar. Patient denies taking AC/AP. He also denies bowel/bladder dysfunction.      Post-Op Info:  Procedure(s) (LRB):  POSTERIOR CERVICAL FUSION, SPINE C1-C4 PCF  (N/A)  LAMINECTOMY, SPINE, C-1 (N/A)   8 Days Post-Op     Interval History: NAEON. HR improved. Afebrile. WBC trending down. No emesis, okay to restart SQH and continue Protonix per GI. Na 129 today. Labs showing likely pseudohyponatremia. Neuro stable. Pain controlled. Large BM today with relief of abdominal distention and pain.     Medications:  Continuous Infusions:  Scheduled Meds:   acetaminophen  1,000 mg Oral Q8H    amLODIPine  5 mg Oral Daily "    heparin (porcine)  5,000 Units Subcutaneous Q8H    LIDOcaine  2 patch Transdermal Q24H    lisinopriL  20 mg Oral Daily    methocarbamoL  1,000 mg Oral Q8H    metoprolol tartrate  25 mg Oral Q6H    nicotine  1 patch Transdermal Daily    pantoprozole (PROTONIX) IV  40 mg Intravenous Q12H    pregabalin  75 mg Oral BID     PRN Meds:sodium chloride, hydrALAZINE, labetaloL, ondansetron, oxyCODONE, sodium chloride 0.9%     Review of Systems  Objective:     Weight: 67.6 kg (149 lb)  Body mass index is 24.05 kg/m².  Vital Signs (Most Recent):  Temp: 98.2 °F (36.8 °C) (10/20/22 0750)  Pulse: 107 (10/20/22 0750)  Resp: 15 (10/20/22 0750)  BP: (!) 165/79 (10/20/22 0758)  SpO2: 96 % (10/20/22 0750)   Vital Signs (24h Range):  Temp:  [97.1 °F (36.2 °C)-98.7 °F (37.1 °C)] 98.2 °F (36.8 °C)  Pulse:  [] 107  Resp:  [15-18] 15  SpO2:  [90 %-98 %] 96 %  BP: (146-176)/() 165/79       Neurosurgery Physical Exam  General: well developed, well nourished  Head: normocephalic, atraumatic  Neck: No tracheal deviation. No palpable masses. Full ROM.   Neurologic: Alert and oriented. Thought content appropriate.  GCS: E4 V5 M6. GCS Total: 15  Mental Status: Awake, Alert, Oriented x 4  Language: No aphasia  Speech: No dysarthria  Cranial nerves: face symmetric  Eyes: EOMI.   Pulmonary: normal respirations, no signs of respiratory distress  Abdomen: not tender to palpation  Sensory: intact to light touch throughout  Motor Strength: Moves all extremities spontaneously with good tone.  No abnormal movements seen.      Strength   Deltoids Triceps Biceps Wrist Extension Wrist Flexion Hand    Upper: R 4+/5 5/5 5/5 5/5 5/5 5/5     L 4+/5 5/5 5/5 5/5 5/5 5/5      Strength   Iliopsoas Quadriceps Knee  Flexion Tibialis  anterior Gastro- cnemius EHL   Lower: R 5/5 5/5 5/5 5/5 5/5 5/5     L 5/5 5/5 5/5 5/5 5/5 5/5    4+/5 R IO; 4/5 L IO  Vascular: No LE edema.   Skin: Skin is warm, dry and intact.     Incision: c/d/i with skin  edges well approximated with Prineo tape. No surrounding erythema or edema. No drainage from incision. No palpable hematoma or underlying fluid collection.    Significant Labs:  Recent Labs   Lab 10/19/22  0510 10/20/22  0536   GLU 80 100   * 129*   K 4.3 4.2   CL 98 96   CO2 23 26   BUN 16 13   CREATININE 0.7 0.7   CALCIUM 9.6 9.4   MG 1.7 1.6     Recent Labs   Lab 10/19/22  1342   WBC 16.39*   HGB 10.6*   HCT 32.0*        No results for input(s): LABPT, INR, APTT in the last 48 hours.  Microbiology Results (last 7 days)       ** No results found for the last 168 hours. **          All pertinent labs from the last 24 hours have been reviewed.    Significant Diagnostics:  I have reviewed all pertinent imaging results/findings within the past 24 hours.    Assessment/Plan:     * Odontoid fracture  Patient is a 62M with PMH of HTN who presents with odontoid fracture/type I hangman's fracture now s/p C1-4 PCDF on 10/12:    --Floor status, nsgy primary. Q4hr neurochecks.  --All pertinent labs and diagnostics personally reviewed.  --XR 10/12: post op hardware in good position, s/p C1-4 PCF.  --C-collar when out of bed only.  --HV removed.   --Regular diet.  --PT/OT rec IPR.  --DVT ppx: LOLLY/SCDs. BLE US 10/16 negative for DVT. BUE US showing SVT of distal R cephalic vein. Warm compresses to site. Heparin ppx dc'd in setting of possible GI bleed.   --Coffee ground emesis: GI consulted. Rec IV Protonix 40 MG BID. No EGD while inpatient. Monitor H/H.   -okay to restart SQH per GI.  --Tachycardia:              -Continue telemetry.   -Repeat troponin down-trending and WNL.   -EKG 10/17: Multifocal atrial tachycardia.               -Mag/phos WNL. Replace PRN.              -May be pain related. Lidocaine patches to posterior neck. Improved today.  --Leukocytosis downtrending:              -UA negative.              -CXR negative.   --Hyponatremia: 129 this am. Labs ordered to assess, showing it is likely  pseudohyponatremia.  --Continue bowel regimen.  --Encourage IS use hourly.   --Continue to monitor clinically, notify NSGY immediately with any changes in neuro status    Dispo: ongoing pending IPR    Discussed with IAN BarC  Neurosurgery  Joel Zhou - Neurosurgery ([facility])

## 2022-10-21 VITALS
TEMPERATURE: 99 F | RESPIRATION RATE: 17 BRPM | OXYGEN SATURATION: 95 % | HEART RATE: 100 BPM | HEIGHT: 66 IN | SYSTOLIC BLOOD PRESSURE: 153 MMHG | WEIGHT: 149 LBS | BODY MASS INDEX: 23.95 KG/M2 | DIASTOLIC BLOOD PRESSURE: 74 MMHG

## 2022-10-21 DIAGNOSIS — S12.101A CLOSED NONDISPLACED FRACTURE OF SECOND CERVICAL VERTEBRA, UNSPECIFIED FRACTURE MORPHOLOGY, INITIAL ENCOUNTER: Primary | ICD-10-CM

## 2022-10-21 LAB
ALBUMIN SERPL BCP-MCNC: 2 G/DL (ref 3.5–5.2)
ALP SERPL-CCNC: 85 U/L (ref 55–135)
ALT SERPL W/O P-5'-P-CCNC: 11 U/L (ref 10–44)
ANION GAP SERPL CALC-SCNC: 6 MMOL/L (ref 8–16)
AST SERPL-CCNC: 20 U/L (ref 10–40)
BILIRUB SERPL-MCNC: 0.3 MG/DL (ref 0.1–1)
BUN SERPL-MCNC: 11 MG/DL (ref 8–23)
CALCIUM SERPL-MCNC: 9.6 MG/DL (ref 8.7–10.5)
CHLORIDE SERPL-SCNC: 98 MMOL/L (ref 95–110)
CO2 SERPL-SCNC: 28 MMOL/L (ref 23–29)
CREAT SERPL-MCNC: 0.7 MG/DL (ref 0.5–1.4)
EST. GFR  (NO RACE VARIABLE): >60 ML/MIN/1.73 M^2
GLUCOSE SERPL-MCNC: 87 MG/DL (ref 70–110)
MAGNESIUM SERPL-MCNC: 1.7 MG/DL (ref 1.6–2.6)
OB PNL STL: NEGATIVE
PHOSPHATE SERPL-MCNC: 3.4 MG/DL (ref 2.7–4.5)
POTASSIUM SERPL-SCNC: 4.4 MMOL/L (ref 3.5–5.1)
PROT SERPL-MCNC: 5.8 G/DL (ref 6–8.4)
SODIUM SERPL-SCNC: 132 MMOL/L (ref 136–145)

## 2022-10-21 PROCEDURE — 36415 COLL VENOUS BLD VENIPUNCTURE: CPT | Performed by: PHYSICIAN ASSISTANT

## 2022-10-21 PROCEDURE — 99024 PR POST-OP FOLLOW-UP VISIT: ICD-10-PCS | Mod: ,,,

## 2022-10-21 PROCEDURE — S4991 NICOTINE PATCH NONLEGEND: HCPCS

## 2022-10-21 PROCEDURE — 94761 N-INVAS EAR/PLS OXIMETRY MLT: CPT

## 2022-10-21 PROCEDURE — C9113 INJ PANTOPRAZOLE SODIUM, VIA: HCPCS

## 2022-10-21 PROCEDURE — 84100 ASSAY OF PHOSPHORUS: CPT | Performed by: PHYSICIAN ASSISTANT

## 2022-10-21 PROCEDURE — 25000003 PHARM REV CODE 250

## 2022-10-21 PROCEDURE — 25000003 PHARM REV CODE 250: Performed by: STUDENT IN AN ORGANIZED HEALTH CARE EDUCATION/TRAINING PROGRAM

## 2022-10-21 PROCEDURE — 80053 COMPREHEN METABOLIC PANEL: CPT | Performed by: PHYSICIAN ASSISTANT

## 2022-10-21 PROCEDURE — 99024 POSTOP FOLLOW-UP VISIT: CPT | Mod: ,,,

## 2022-10-21 PROCEDURE — 83735 ASSAY OF MAGNESIUM: CPT | Performed by: PHYSICIAN ASSISTANT

## 2022-10-21 PROCEDURE — 97116 GAIT TRAINING THERAPY: CPT

## 2022-10-21 PROCEDURE — 63600175 PHARM REV CODE 636 W HCPCS

## 2022-10-21 PROCEDURE — 97530 THERAPEUTIC ACTIVITIES: CPT

## 2022-10-21 RX ADMIN — METHOCARBAMOL 1000 MG: 500 TABLET ORAL at 05:10

## 2022-10-21 RX ADMIN — ACETAMINOPHEN 1000 MG: 500 TABLET ORAL at 01:10

## 2022-10-21 RX ADMIN — LISINOPRIL 20 MG: 20 TABLET ORAL at 08:10

## 2022-10-21 RX ADMIN — ACETAMINOPHEN 1000 MG: 500 TABLET ORAL at 05:10

## 2022-10-21 RX ADMIN — HEPARIN SODIUM 5000 UNITS: 5000 INJECTION INTRAVENOUS; SUBCUTANEOUS at 05:10

## 2022-10-21 RX ADMIN — METOPROLOL TARTRATE 25 MG: 25 TABLET, FILM COATED ORAL at 05:10

## 2022-10-21 RX ADMIN — PANTOPRAZOLE SODIUM 40 MG: 40 INJECTION, POWDER, FOR SOLUTION INTRAVENOUS at 08:10

## 2022-10-21 RX ADMIN — PREGABALIN 75 MG: 75 CAPSULE ORAL at 08:10

## 2022-10-21 RX ADMIN — METHOCARBAMOL 1000 MG: 500 TABLET ORAL at 01:10

## 2022-10-21 RX ADMIN — METOPROLOL TARTRATE 25 MG: 25 TABLET, FILM COATED ORAL at 12:10

## 2022-10-21 RX ADMIN — NICOTINE 1 PATCH: 14 PATCH, EXTENDED RELEASE TRANSDERMAL at 08:10

## 2022-10-21 RX ADMIN — AMLODIPINE BESYLATE 5 MG: 5 TABLET ORAL at 08:10

## 2022-10-21 NOTE — NURSING
Patient discharged to [facility]    Patient visualized leaving floor. Telemetry Dc'd. IV Dc'd. VSS. Aox4.

## 2022-10-21 NOTE — SUBJECTIVE & OBJECTIVE
Interval History: NAEON. Neuro stable. Tachycardia resolved. Labs improving/wnl. Loose black stools yesterday evening but resolved this am. Repeat Hg trended up and occult stool negative. Pain controlled.     Medications:  Continuous Infusions:  Scheduled Meds:   acetaminophen  1,000 mg Oral Q8H    amLODIPine  5 mg Oral Daily    heparin (porcine)  5,000 Units Subcutaneous Q8H    LIDOcaine  2 patch Transdermal Q24H    lisinopriL  20 mg Oral Daily    methocarbamoL  1,000 mg Oral Q8H    metoprolol tartrate  25 mg Oral Q6H    nicotine  1 patch Transdermal Daily    pantoprozole (PROTONIX) IV  40 mg Intravenous Q12H    pregabalin  75 mg Oral BID     PRN Meds:sodium chloride, hydrALAZINE, labetaloL, ondansetron, oxyCODONE, sodium chloride 0.9%     Review of Systems  Objective:     Weight: 67.6 kg (149 lb)  Body mass index is 24.05 kg/m².  Vital Signs (Most Recent):  Temp: 97.7 °F (36.5 °C) (10/21/22 0802)  Pulse: 89 (10/21/22 0802)  Resp: 16 (10/21/22 0802)  BP: (!) 151/82 (10/21/22 0802)  SpO2: (!) 94 % (10/21/22 0802)   Vital Signs (24h Range):  Temp:  [97.6 °F (36.4 °C)-99.2 °F (37.3 °C)] 97.7 °F (36.5 °C)  Pulse:  [] 89  Resp:  [14-18] 16  SpO2:  [91 %-98 %] 94 %  BP: (135-176)/(76-96) 151/82     Neurosurgery Physical Exam  General: well developed, well nourished  Head: normocephalic, atraumatic  Neck: No tracheal deviation. No palpable masses. Full ROM.   Neurologic: Alert and oriented. Thought content appropriate.  GCS: E4 V5 M6. GCS Total: 15  Mental Status: Awake, Alert, Oriented x 4  Language: No aphasia  Speech: No dysarthria  Cranial nerves: face symmetric  Eyes: EOMI.   Pulmonary: normal respirations, no signs of respiratory distress  Abdomen: not tender to palpation  Sensory: intact to light touch throughout  Motor Strength: Moves all extremities spontaneously with good tone.  No abnormal movements seen.      Strength   Deltoids Triceps Biceps Wrist Extension Wrist Flexion Hand    Upper: R 4/5 5/5  5/5 5/5 5/5 5/5     L 4/5 5/5 5/5 5/5 5/5 5/5      Strength   Iliopsoas Quadriceps Knee  Flexion Tibialis  anterior Gastro- cnemius EHL   Lower: R 5/5 5/5 5/5 5/5 5/5 5/5     L 5/5 5/5 5/5 5/5 5/5 5/5    4+/5 R IO; 4/5 L IO  Vascular: No LE edema.   Skin: Skin is warm, dry and intact.     Incision: c/d/i with skin edges well approximated with Prineo tape. No surrounding erythema or edema. No drainage from incision. No palpable hematoma or underlying fluid collection.     Significant Labs:  Recent Labs   Lab 10/20/22  0536 10/21/22  0506    87   * 132*   K 4.2 4.4   CL 96 98   CO2 26 28   BUN 13 11   CREATININE 0.7 0.7   CALCIUM 9.4 9.6   MG 1.6 1.7     Recent Labs   Lab 10/19/22  1342 10/20/22  1721   WBC 16.39*  --    HGB 10.6* 11.1*   HCT 32.0*  --      --      No results for input(s): LABPT, INR, APTT in the last 48 hours.  Microbiology Results (last 7 days)       ** No results found for the last 168 hours. **          All pertinent labs from the last 24 hours have been reviewed.    Significant Diagnostics:  I have reviewed and interpreted all pertinent imaging results/findings within the past 24 hours.

## 2022-10-21 NOTE — ASSESSMENT & PLAN NOTE
Patient is a 62M with PMH of HTN who presents with odontoid fracture/type I hangman's fracture now s/p C1-4 PCDF on 10/12:    --Floor status, nsgy primary. Q4hr neurochecks.  --All pertinent labs and diagnostics personally reviewed.  --XR 10/12: post op hardware in good position, s/p C1-4 PCF.  --C-collar when out of bed only.  --HV removed.   --Regular diet.  --PT/OT rec IPR.  --DVT ppx: LOLLY/SCDs. BLE US 10/16 negative for DVT. BUE US showing SVT of distal R cephalic vein. Warm compresses to site. SQH restarted.  --Coffee ground emesis: GI consulted. Rec IV Protonix 40 MG BID. No EGD while inpatient. Monitor H/H.   -okay to restart SQH per GI.  --Loose black stools resolved. Hg trended up. Stool occult blood negative.   --Tachycardia resolved.  --Leukocytosis downtrending.  --Hyponatremia: Improved to 132 this am. Labs ordered to assess, showing it is likely pseudohyponatremia.  --Continue bowel regimen.  --Encourage IS use hourly.   --Continue to monitor clinically, notify NSGY immediately with any changes in neuro status.    Dispo: medically ready for rehab    Discussed with Dr. Storm

## 2022-10-21 NOTE — PLAN OF CARE
Problem: Adult Inpatient Plan of Care  Goal: Plan of Care Review  Outcome: Ongoing, Progressing     Problem: Adult Inpatient Plan of Care  Goal: Optimal Comfort and Wellbeing  Outcome: Ongoing, Progressing     Problem: Adult Inpatient Plan of Care  Goal: Readiness for Transition of Care  Outcome: Ongoing, Progressing     Problem: Adjustment to Illness (Stroke, Hemorrhagic)  Goal: Optimal Coping  Outcome: Ongoing, Progressing     Problem: Pain (Stroke, Hemorrhagic)  Goal: Acceptable Pain Control  Outcome: Ongoing, Progressing     Problem: Urinary Elimination Impaired (Stroke, Hemorrhagic)  Goal: Effective Urinary Elimination  Outcome: Ongoing, Progressing     Problem: Infection  Goal: Absence of Infection Signs and Symptoms  Outcome: Ongoing, Progressing     Problem: Impaired Wound Healing  Goal: Optimal Wound Healing  Outcome: Ongoing, Progressing     POC updated and reviewed with the patient/family at bedside. Questions regarding POC were encouraged and addressed. Occult stool results are negative. Pt had a small BM during this shift. One time dose of IV hydralazine given this AM for HTN. VSS, see flowsheets. Patient is AO x 4 at this time. Tele maintained. Pain/Nausea management using PRNs, effective. See MAR for medication administration details. Fall/safety precautions maintained. No signs of injury noted over night. Patient was repositioned for comfort with bed locked in low position, side rails up x 2, bed alarm on, and call light within reach. Instructed patient to call staff for mobility, verbalized understanding. No acute signs of distress noted at this time.

## 2022-10-21 NOTE — PT/OT/SLP PROGRESS
"Physical Therapy Treatment    Patient Name:  Israel De Jesus   MRN:  [id number]    Recommendations:     Discharge Recommendations:  rehabilitation facility   Discharge Equipment Recommendations: other (see comments) (TBD at next level of care)   Barriers to discharge: Inaccessible home and Decreased caregiver support    Assessment:     Israel De Jesus is a 62 y.o. male admitted with a medical diagnosis of Odontoid fracture.  He presents with the following impairments/functional limitations:  weakness, impaired endurance, impaired self care skills, impaired functional mobility, gait instability, impaired balance, decreased safety awareness, pain, orthopedic precautions. Pt agreeable to PT and tolerated ambulating ~70' x 2 with CGA and RW with no LOB noted. Pt was pleasant with nurse and PT when during discussing discharge process and rehab schedule, and wanted assistance with packing up room and preparing for scheduled discharge to rehab today.     Rehab Prognosis: Good; patient would benefit from acute skilled PT services to address these deficits and reach maximum level of function.    Recent Surgery: Procedure(s) (LRB):  POSTERIOR CERVICAL FUSION, SPINE C1-C4 PCF  (N/A)  LAMINECTOMY, SPINE, C-1 (N/A) 9 Days Post-Op    Plan:     During this hospitalization, patient to be seen 4 x/week to address the identified rehab impairments via gait training, therapeutic activities, therapeutic exercises, neuromuscular re-education and progress toward the following goals:    Plan of Care Expires:  11/11/22    Subjective     Chief Complaint: to discharge to rehab  Patient/Family Comments/goals: "will you help me get changed into my clothes"; "can we go to the exit sign and then come back to get ready"  Pain/Comfort:  Pain Rating 1: 0/10      Objective:     Communicated with nursing prior to session.  Patient found up in chair with cervical collar, telemetry upon PT entry to room.     General Precautions: Standard, fall "   Orthopedic Precautions:spinal precautions   Braces: Cervical collar  Respiratory Status: Room air     Functional Mobility:  Transfers:     Sit to Stand: from rolling bedside chair: contact guard assistance with rolling walker  Scoot anteriorly to edge of chair with verbal cuing and CGA  Gait: CGA with RW, ambulating ~70' x 2 with no LOB noted; verbal cuing to facilitate cervical extension; demo'd increased kyphotic posture, decreased B hip extension in terminal stance, excess knee flexion, mild gait instability, impaired weight shifting, and lack of heel strike  Balance: static sitting: good, SBA in chair      AM-PAC 6 CLICK MOBILITY  Turning over in bed (including adjusting bedclothes, sheets and blankets)?: 3  Sitting down on and standing up from a chair with arms (e.g., wheelchair, bedside commode, etc.): 3  Moving from lying on back to sitting on the side of the bed?: 3  Moving to and from a bed to a chair (including a wheelchair)?: 3  Need to walk in hospital room?: 3  Climbing 3-5 steps with a railing?: 2  Basic Mobility Total Score: 17       Treatment & Education:  Assisted pt doff and don front and back gown and placed pt's belongings and objects in bags. Discussed rehab scheduling and discharge process with pt and pt verbalized understanding.    Patient left up in chair with all lines intact, call button in reach, chair alarm on, and nurse present..    GOALS:   Multidisciplinary Problems       Physical Therapy Goals          Problem: Physical Therapy    Goal Priority Disciplines Outcome Goal Variances Interventions   Physical Therapy Goal     PT, PT/OT Ongoing, Progressing     Description: PT goals until 10/30/22    1. Pt supine to sit with CGA through sidelying- met 10/18  1a. Pt supine to sit with [place] through sidelying  2. Pt sit to supine with CGA through sidelying- met 10/18  3. Pt sit to stand with RW with CGA- met 10/20  3a. Pt sit to stand with RW Modified Independent- not met  4. Pt to  perform gait 100ft with RW with CGA - not met  5. Pt to go up/down curb step with RW with minimal assist.-not met  6. Pt to transfer bed to/from bedside chair with RW with CGA.-not met  7. Pt to perform B LE exs in sitting or supine x 15 reps to strengthen B LE to improve functional mobility.-not met                        Time Tracking:     PT Received On: 10/21/22  PT Start Time: 1400     PT Stop Time: 1423  PT Total Time (min): 23 min     Billable Minutes: Gait Training 10 min and Therapeutic Activity 13 min    Treatment Type: Treatment  PT/PTA: PT     PTA Visit Number: 0     10/21/2022

## 2022-10-21 NOTE — PLAN OF CARE
Joel Zhou - Neurosurgery (Hospital)  Discharge Final Note    Primary Care Provider: Andrae Camarillo MD    Expected Discharge Date: 10/21/2022    [facility] to be [facility] to [facility].  Care deferred to [facility].  Patient continues to be pending Medicaid, [place] Dept. To cover cost for Rehab, approved by Jil Torres ,[facility] to provide transportation.  Neurosurgery clinic to schedule follow up appointment.    Nurse to call report to [phone] or [phone].  Wheelchair requested for 2:30 pm.    Final Discharge Note (most recent)       Final Note - 10/21/22 1320          Final Note    Assessment Type Final Discharge Note     Anticipated Discharge Disposition Rehab Facility        Post-Acute Status    Post-Acute Authorization Placement     Post-Acute Placement Status Set-up Complete/Auth obtained     Discharge Delays None known at this time                     Important Message from Medicare

## 2022-10-21 NOTE — PROGRESS NOTES
"Joel Zhou - Neurosurgery ([facility])  Neurosurgery  Progress Note    Subjective:     History of Present Illness: Patient is a 62M with PMH of HTN and remote L knee surgery who presents with 3 days of weakness after a fall 2-3 weeks ago. Patient transferred from OSH for NSGY consult regarding odontoid fracture. He reports that he has had persistent neck pain, but that his weakness did not start until a few days ago and is worsening. Patient complains of an intermittent "rumbling" almost vibrating feeling in his chest/abdomen, as well as sometimes in his various muscle groups. He claims that he feel decreased sensation across his limbs/body, including his thighs and calves where it started before moving up to his upper extremities. He symptoms do not seem to localize to a specific dermatome. He does not complain of any new radiculopathic or myelopathic symptoms. Patient reports that he has had to use a walker in the past few days. When laying down flat on the bed, he feels fine and can move all extremities at least AG. When he is sitting upright, he states that he cannot move his arms past the shoulder line. He also notes weakness in his BLE which was required him to use a walker to get around.Patient did not feel safe to get up and demonstrate gait, but claims he is weak when walking.    He presented in a C-collar. Patient denies taking AC/AP. He also denies bowel/bladder dysfunction.      Post-Op Info:  Procedure(s) (LRB):  POSTERIOR CERVICAL FUSION, SPINE C1-C4 PCF  (N/A)  LAMINECTOMY, SPINE, C-1 (N/A)   9 Days Post-Op     Interval History: NAEON. Neuro stable. Tachycardia resolved. Labs improving/wnl. Loose black stools yesterday evening but resolved this am. Repeat Hg trended up and occult stool negative. Pain controlled.     Medications:  Continuous Infusions:  Scheduled Meds:   acetaminophen  1,000 mg Oral Q8H    amLODIPine  5 mg Oral Daily    heparin (porcine)  5,000 Units Subcutaneous Q8H    LIDOcaine  2 " patch Transdermal Q24H    lisinopriL  20 mg Oral Daily    methocarbamoL  1,000 mg Oral Q8H    metoprolol tartrate  25 mg Oral Q6H    nicotine  1 patch Transdermal Daily    pantoprozole (PROTONIX) IV  40 mg Intravenous Q12H    pregabalin  75 mg Oral BID     PRN Meds:sodium chloride, hydrALAZINE, labetaloL, ondansetron, oxyCODONE, sodium chloride 0.9%     Review of Systems  Objective:     Weight: 67.6 kg (149 lb)  Body mass index is 24.05 kg/m².  Vital Signs (Most Recent):  Temp: 97.7 °F (36.5 °C) (10/21/22 0802)  Pulse: 89 (10/21/22 0802)  Resp: 16 (10/21/22 0802)  BP: (!) 151/82 (10/21/22 0802)  SpO2: (!) 94 % (10/21/22 0802)   Vital Signs (24h Range):  Temp:  [97.6 °F (36.4 °C)-99.2 °F (37.3 °C)] 97.7 °F (36.5 °C)  Pulse:  [] 89  Resp:  [14-18] 16  SpO2:  [91 %-98 %] 94 %  BP: (135-176)/(76-96) 151/82     Neurosurgery Physical Exam  General: well developed, well nourished  Head: normocephalic, atraumatic  Neck: No tracheal deviation. No palpable masses. Full ROM.   Neurologic: Alert and oriented. Thought content appropriate.  GCS: E4 V5 M6. GCS Total: 15  Mental Status: Awake, Alert, Oriented x 4  Language: No aphasia  Speech: No dysarthria  Cranial nerves: face symmetric  Eyes: EOMI.   Pulmonary: normal respirations, no signs of respiratory distress  Abdomen: not tender to palpation  Sensory: intact to light touch throughout  Motor Strength: Moves all extremities spontaneously with good tone.  No abnormal movements seen.      Strength   Deltoids Triceps Biceps Wrist Extension Wrist Flexion Hand    Upper: R 4/5 5/5 5/5 5/5 5/5 5/5     L 4/5 5/5 5/5 5/5 5/5 5/5      Strength   Iliopsoas Quadriceps Knee  Flexion Tibialis  anterior Gastro- cnemius EHL   Lower: R 5/5 5/5 5/5 5/5 5/5 5/5     L 5/5 5/5 5/5 5/5 5/5 5/5    4+/5 R IO; 4/5 L IO  Vascular: No LE edema.   Skin: Skin is warm, dry and intact.     Incision: c/d/i with skin edges well approximated with Prineo tape. No surrounding erythema or edema.  No drainage from incision. No palpable hematoma or underlying fluid collection.     Significant Labs:  Recent Labs   Lab 10/20/22  0536 10/21/22  0506    87   * 132*   K 4.2 4.4   CL 96 98   CO2 26 28   BUN 13 11   CREATININE 0.7 0.7   CALCIUM 9.4 9.6   MG 1.6 1.7     Recent Labs   Lab 10/19/22  1342 10/20/22  1721   WBC 16.39*  --    HGB 10.6* 11.1*   HCT 32.0*  --      --      No results for input(s): LABPT, INR, APTT in the last 48 hours.  Microbiology Results (last 7 days)       ** No results found for the last 168 hours. **          All pertinent labs from the last 24 hours have been reviewed.    Significant Diagnostics:  I have reviewed and interpreted all pertinent imaging results/findings within the past 24 hours.    Assessment/Plan:     * Odontoid fracture  Patient is a 62M with PMH of HTN who presents with odontoid fracture/type I hangman's fracture now s/p C1-4 PCDF on 10/12:    --Floor status, nsgy primary. Q4hr neurochecks.  --All pertinent labs and diagnostics personally reviewed.  --XR 10/12: post op hardware in good position, s/p C1-4 PCF.  --C-collar when out of bed only.  --HV removed.   --Regular diet.  --PT/OT rec IPR.  --DVT ppx: LOLLY/SCDs. BLE US 10/16 negative for DVT. BUE US showing SVT of distal R cephalic vein. Warm compresses to site. SQH restarted.  --Coffee ground emesis: GI consulted. Rec IV Protonix 40 MG BID. No EGD while inpatient. Monitor H/H.   -okay to restart SQH per GI.  --Loose black stools resolved. Hg trended up. Stool occult blood negative.   --Tachycardia resolved.  --Leukocytosis downtrending.  --Hyponatremia: Improved to 132 this am. Labs ordered to assess, showing it is likely pseudohyponatremia.  --Continue bowel regimen.  --Encourage IS use hourly.   --Continue to monitor clinically, notify NSGY immediately with any changes in neuro status.    Dispo: medically ready for rehab    Discussed with Dr. Dotty Boyle,  MADHURI  Neurosurgery  Joel Zhou - Neurosurgery ([facility])

## 2022-10-24 NOTE — DISCHARGE SUMMARY
"Joel Zhou - Neurosurgery ([facility])  Neurosurgery  Discharge Summary      Patient Name: Israel De Jesus  MRN: [id number]  Admission Date: 10/10/2022  Hospital Length of Stay: 11 days  Discharge Date and Time: 10/21/2022  2:40 PM  Attending Physician: No att. providers found   Discharging Provider: Christina Boyle PA-C  Primary Care Provider: Andrae Camarillo MD    HPI:   Patient is a 62M with PMH of HTN and remote L knee surgery who presents with 3 days of weakness after a fall 2-3 weeks ago. Patient transferred from [facility] for NSGY consult regarding odontoid fracture. He reports that he has had persistent neck pain, but that his weakness did not start until a few days ago and is worsening. Patient complains of an intermittent "rumbling" almost vibrating feeling in his chest/abdomen, as well as sometimes in his various muscle groups. He claims that he feel decreased sensation across his limbs/body, including his thighs and calves where it started before moving up to his upper extremities. He symptoms do not seem to localize to a specific dermatome. He does not complain of any new radiculopathic or myelopathic symptoms. Patient reports that he has had to use a walker in the past few days. When laying down flat on the bed, he feels fine and can move all extremities at least AG. When he is sitting upright, he states that he cannot move his arms past the shoulder line. He also notes weakness in his BLE which was required him to use a walker to get around.Patient did not feel safe to get up and demonstrate gait, but claims he is weak when walking.    He presented in a C-collar. Patient denies taking AC/AP. He also denies bowel/bladder dysfunction.      Procedure(s) (LRB):  POSTERIOR CERVICAL FUSION, SPINE C1-C4 PCF  (N/A)  LAMINECTOMY, SPINE, C-1 (N/A)     Hospital Course: 10/11: Patient presents to the ED for odontoid fax. Admitted to [facility] for map goals. Imaging negative for vert dissection/injury. Traction tomorrow.  10/12: " NAEON. AFVSS. Exam stable. Cervical traction in place. OR today.   10/13: POD1. NAEON. Neuro stable. Has c-collar on. HV in place. XR c spine look good.  10/14: Two short runs of vtach last night. Exam stable. C-collar continues to be in place.  10/15: Stepped down from ICU overnight, no telemetry issues. HV with 70cc/24h. AM labs pending. Pain controlled.   10/16: NAEON. AF, VSS. Tolerating PO, DC obrien today, passing gas.   10/17: Afebrile. Tachycardic without signs of symptoms. Repeat troponin WNL. Sodium 129. Denies new weakness or numbness.  BLE US negative. Multiple BM overnight. Reporting slight bleeding due to hemorrhoids.   10/18: Afebrile. Leukocytosis down-trending. Remains tachy, EKG showing multifocal atrial tachycardia. Magnesium and phosphorus WNL. F/u BUE US. UA negative. Patient with coffee ground emesis overnight and this AM. GI consulted. H&H stable. Heparin dc'd. Drain removed.   10/19: NAEON. Patient continues to have intermittent tachycardia. Afebrile. Dopplers showing SVT of the distal right cephalic vein. No evidence of DVTs. Workup negative otherwise. Na 129 today, labs ordered to assess hyponatremia. Patient denies further emesis. Denies new numbness/weakness/tingling. Working well with therapy. States he is having significant posterior neck pain and stiffness. Lidocaine patches ordered.   10/20: NAEON. HR improved. Afebrile. WBC trending down. No emesis, okay to restart SQH and continue Protonix per GI. Na 129 today. Labs showing likely pseudohyponatremia. Neuro stable. Pain controlled. Large BM today with relief of abdominal distention and pain.   10/21: NAEON. Neuro stable. Tachycardia resolved. Labs improving/wnl. Loose black stools yesterday evening but resolved this am. Repeat Hg trended up and occult stool negative. Pain controlled.       Goals of Care Treatment Preferences:  Code Status: Full Code      Consults:   Consults (From admission, onward)        Status Ordering Provider      Inpatient consult to Gastroenterology  Once        Provider:  (Not yet assigned)    Completed YOLETTE MCKENZIE     Inpatient consult to Physical Medicine Rehab  Once        Provider:  (Not yet assigned)    Completed CHUY CAMARENA     Inpatient consult to Registered Dietitian/Nutritionist  Once        Provider:  (Not yet assigned)    Completed CHUY CAMARENA     Inpatient consult to Neurosurgery  Once        Provider:  (Not yet assigned)    Completed SEDA SHIPMAN          Significant Diagnostic Studies: Labs: CMP No results for input(s): NA, K, CL, CO2, GLU, BUN, CREATININE, CALCIUM, PROT, ALBUMIN, BILITOT, ALKPHOS, AST, ALT, ANIONGAP, ESTGFRAFRICA, EGFRNONAA in the last 48 hours., CBC No results for input(s): WBC, HGB, HCT, PLT in the last 48 hours. and All labs within the past 24 hours have been reviewed  Radiology: X-Ray: cervical spine  MRI: cervical spine  CT scan: cervical spine; CTA head and neck     Pending Diagnostic Studies:     None        Final Active Diagnoses:    Diagnosis Date Noted POA    PRINCIPAL PROBLEM:  Odontoid fracture [S12.110A] 10/11/2022 Yes    Tachycardia [R00.0] 10/13/2022 Yes    Tobacco use disorder [F17.200] 07/29/2016 Yes    Hypertension [I10] 04/03/2014 Yes     Chronic      Problems Resolved During this Admission:      Discharged Condition: good     Disposition: Rehab Facility    Follow Up:    Patient Instructions:      Notify your health care provider if you experience any of the following:  temperature >100.4     Notify your health care provider if you experience any of the following:  persistent nausea and vomiting or diarrhea     Notify your health care provider if you experience any of the following:  severe uncontrolled pain     Notify your health care provider if you experience any of the following:  redness, tenderness, or signs of infection (pain, swelling, redness, odor or green/yellow discharge around incision site)     Notify your health care provider if you  experience any of the following:  difficulty breathing or increased cough     Notify your health care provider if you experience any of the following:  severe persistent headache     Notify your health care provider if you experience any of the following:  worsening rash     Notify your health care provider if you experience any of the following:  persistent dizziness, light-headedness, or visual disturbances     Notify your health care provider if you experience any of the following:  increased confusion or weakness     Activity as tolerated     Medications:  Reconciled Home Medications:      Medication List      START taking these medications    amLODIPine 5 MG tablet  Commonly known as: NORVASC  Take 1 tablet (5 mg total) by mouth once daily.     methocarbamoL 500 MG Tab  Commonly known as: ROBAXIN  Take 2 tablets (1,000 mg total) by mouth every 8 (eight) hours. for 10 days     metoprolol tartrate 25 MG tablet  Commonly known as: LOPRESSOR  Take 1 tablet (25 mg total) by mouth every 6 (six) hours.     oxyCODONE 5 MG immediate release tablet  Commonly known as: ROXICODONE  Take 1 tablet (5 mg total) by mouth every 6 (six) hours as needed for Pain.     pantoprazole 40 MG tablet  Commonly known as: PROTONIX  Take 1 tablet (40 mg total) by mouth 2 (two) times daily.     polyethylene glycol 17 gram Pwpk  Commonly known as: GLYCOLAX  Take 17 g by mouth 2 (two) times daily.     pregabalin 75 MG capsule  Commonly known as: LYRICA  Take 1 capsule (75 mg total) by mouth 2 (two) times daily.     senna-docusate 8.6-50 mg 8.6-50 mg per tablet  Commonly known as: PERICOLACE  Take 2 tablets by mouth 2 (two) times daily.        CONTINUE taking these medications    lisinopriL 10 MG tablet  Take 1 tablet (10 mg total) by mouth once daily.        STOP taking these medications    aspirin 81 MG EC tablet  Commonly known as: ECOTRIN     ibuprofen 600 MG tablet  Commonly known as: ADVIL,MOTRIN     meloxicam 7.5 MG tablet  Commonly  known as: EBONY Boyle PA-C  Neurosurgery  [facility] ([facility])

## 2022-10-31 ENCOUNTER — TELEPHONE (OUTPATIENT)
Dept: NEUROSURGERY | Facility: CLINIC | Age: 62
End: 2022-10-31
Payer: MEDICAID

## 2022-10-31 NOTE — TELEPHONE ENCOUNTER
----- Message from Sherri Trinh MA sent at 10/21/2022  3:33 PM CDT -----    ----- Message -----  From: Christina Boyle PA-C  Sent: 10/21/2022   3:06 PM CDT  To: Dotty [facility] Staff    Hi,    This patient needs a 2 week post op scheduled as well as a 6 week with a cervical spine XR! Thanks!

## 2022-11-04 ENCOUNTER — TELEPHONE (OUTPATIENT)
Dept: NEUROSURGERY | Facility: CLINIC | Age: 62
End: 2022-11-04
Payer: MEDICAID

## 2022-11-04 NOTE — TELEPHONE ENCOUNTER
Patient currently at [facility]- spoke to EVIN Johnston NP who informed me patient's staples have been removed and picture uploaded into media dated today. Patient to followup with Dr. Storm with xrays. Will cancel today's appointment.    Future Appointments   Date Time Provider Department Center   11/28/2022 12:00 PM [facility] OIC-XRAY [facility]   11/28/2022  1:00 PM Ike Storm DO [facility] NEUROS8 [facility]   12/5/2022  3:00 PM Benoit Arenas MD [facility] [place]

## 2022-11-28 ENCOUNTER — OFFICE VISIT (OUTPATIENT)
Dept: NEUROSURGERY | Facility: CLINIC | Age: 62
End: 2022-11-28
Payer: MEDICAID

## 2022-11-28 ENCOUNTER — HOSPITAL ENCOUNTER (OUTPATIENT)
Dept: RADIOLOGY | Facility: HOSPITAL | Age: 62
Discharge: HOME | End: 2022-11-28
Payer: MEDICAID

## 2022-11-28 VITALS — SYSTOLIC BLOOD PRESSURE: 191 MMHG | HEART RATE: 108 BPM | DIASTOLIC BLOOD PRESSURE: 91 MMHG

## 2022-11-28 DIAGNOSIS — S12.101A CLOSED NONDISPLACED FRACTURE OF SECOND CERVICAL VERTEBRA, UNSPECIFIED FRACTURE MORPHOLOGY, INITIAL ENCOUNTER: ICD-10-CM

## 2022-11-28 DIAGNOSIS — Z98.1 S/P CERVICAL SPINAL FUSION: Primary | ICD-10-CM

## 2022-11-28 PROCEDURE — 99024 POSTOP FOLLOW-UP VISIT: CPT | Mod: S$PBB,,, | Performed by: STUDENT IN AN ORGANIZED HEALTH CARE EDUCATION/TRAINING PROGRAM

## 2022-11-28 PROCEDURE — 3080F DIAST BP >= 90 MM HG: CPT | Mod: CPTII,,, | Performed by: STUDENT IN AN ORGANIZED HEALTH CARE EDUCATION/TRAINING PROGRAM

## 2022-11-28 PROCEDURE — 4010F PR ACE/ARB THEARPY RXD/TAKEN: ICD-10-PCS | Mod: CPTII,,, | Performed by: STUDENT IN AN ORGANIZED HEALTH CARE EDUCATION/TRAINING PROGRAM

## 2022-11-28 PROCEDURE — 3077F PR MOST RECENT SYSTOLIC BLOOD PRESSURE >= 140 MM HG: ICD-10-PCS | Mod: CPTII,,, | Performed by: STUDENT IN AN ORGANIZED HEALTH CARE EDUCATION/TRAINING PROGRAM

## 2022-11-28 PROCEDURE — 99211 OFF/OP EST MAY X REQ PHY/QHP: CPT | Mod: PBBFAC | Performed by: STUDENT IN AN ORGANIZED HEALTH CARE EDUCATION/TRAINING PROGRAM

## 2022-11-28 PROCEDURE — 3044F HG A1C LEVEL LT 7.0%: CPT | Mod: CPTII,,, | Performed by: STUDENT IN AN ORGANIZED HEALTH CARE EDUCATION/TRAINING PROGRAM

## 2022-11-28 PROCEDURE — 3044F PR MOST RECENT HEMOGLOBIN A1C LEVEL <7.0%: ICD-10-PCS | Mod: CPTII,,, | Performed by: STUDENT IN AN ORGANIZED HEALTH CARE EDUCATION/TRAINING PROGRAM

## 2022-11-28 PROCEDURE — 72040 XR CERVICAL SPINE AP LATERAL: ICD-10-PCS | Mod: 26,,, | Performed by: RADIOLOGY

## 2022-11-28 PROCEDURE — 99999 PR PBB SHADOW E&M-EST. PATIENT-LVL I: ICD-10-PCS | Mod: PBBFAC,,, | Performed by: STUDENT IN AN ORGANIZED HEALTH CARE EDUCATION/TRAINING PROGRAM

## 2022-11-28 PROCEDURE — 4010F ACE/ARB THERAPY RXD/TAKEN: CPT | Mod: CPTII,,, | Performed by: STUDENT IN AN ORGANIZED HEALTH CARE EDUCATION/TRAINING PROGRAM

## 2022-11-28 PROCEDURE — 3061F PR NEG MICROALBUMINURIA RESULT DOCUMENTED/REVIEW: ICD-10-PCS | Mod: CPTII,,, | Performed by: STUDENT IN AN ORGANIZED HEALTH CARE EDUCATION/TRAINING PROGRAM

## 2022-11-28 PROCEDURE — 72040 X-RAY EXAM NECK SPINE 2-3 VW: CPT | Mod: TC

## 2022-11-28 PROCEDURE — 3080F PR MOST RECENT DIASTOLIC BLOOD PRESSURE >= 90 MM HG: ICD-10-PCS | Mod: CPTII,,, | Performed by: STUDENT IN AN ORGANIZED HEALTH CARE EDUCATION/TRAINING PROGRAM

## 2022-11-28 PROCEDURE — 99999 PR PBB SHADOW E&M-EST. PATIENT-LVL I: CPT | Mod: PBBFAC,,, | Performed by: STUDENT IN AN ORGANIZED HEALTH CARE EDUCATION/TRAINING PROGRAM

## 2022-11-28 PROCEDURE — 3077F SYST BP >= 140 MM HG: CPT | Mod: CPTII,,, | Performed by: STUDENT IN AN ORGANIZED HEALTH CARE EDUCATION/TRAINING PROGRAM

## 2022-11-28 PROCEDURE — 99024 PR POST-OP FOLLOW-UP VISIT: ICD-10-PCS | Mod: S$PBB,,, | Performed by: STUDENT IN AN ORGANIZED HEALTH CARE EDUCATION/TRAINING PROGRAM

## 2022-11-28 PROCEDURE — 3066F NEPHROPATHY DOC TX: CPT | Mod: CPTII,,, | Performed by: STUDENT IN AN ORGANIZED HEALTH CARE EDUCATION/TRAINING PROGRAM

## 2022-11-28 PROCEDURE — 3066F PR DOCUMENTATION OF TREATMENT FOR NEPHROPATHY: ICD-10-PCS | Mod: CPTII,,, | Performed by: STUDENT IN AN ORGANIZED HEALTH CARE EDUCATION/TRAINING PROGRAM

## 2022-11-28 PROCEDURE — 3061F NEG MICROALBUMINURIA REV: CPT | Mod: CPTII,,, | Performed by: STUDENT IN AN ORGANIZED HEALTH CARE EDUCATION/TRAINING PROGRAM

## 2022-11-28 PROCEDURE — 72040 X-RAY EXAM NECK SPINE 2-3 VW: CPT | Mod: 26,,, | Performed by: RADIOLOGY
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
## 2022-11-29 ENCOUNTER — HOSPITAL ENCOUNTER (OUTPATIENT)
Dept: RADIOLOGY | Facility: HOSPITAL | Age: 62
Discharge: HOME | End: 2022-11-29
Attending: STUDENT IN AN ORGANIZED HEALTH CARE EDUCATION/TRAINING PROGRAM
Payer: MEDICAID

## 2022-11-29 DIAGNOSIS — Z98.1 S/P CERVICAL SPINAL FUSION: ICD-10-CM

## 2022-11-29 PROCEDURE — 72125 CT NECK SPINE W/O DYE: CPT | Mod: 26,,, | Performed by: RADIOLOGY

## 2022-11-29 PROCEDURE — 72125 CT NECK SPINE W/O DYE: CPT | Mod: TC

## 2022-11-29 PROCEDURE — 72125 CT CERVICAL SPINE WITHOUT CONTRAST: ICD-10-PCS | Mod: 26,,, | Performed by: RADIOLOGY

## 2022-11-30 ENCOUNTER — TELEPHONE (OUTPATIENT)
Dept: NEUROSURGERY | Facility: CLINIC | Age: 62
End: 2022-11-30
Payer: MEDICAID

## 2022-11-30 DIAGNOSIS — M40.209 KYPHOSIS, UNSPECIFIED KYPHOSIS TYPE, UNSPECIFIED SPINAL REGION: Primary | ICD-10-CM

## 2022-11-30 DIAGNOSIS — Z98.1 STATUS POST CERVICAL SPINAL FUSION: Primary | ICD-10-CM

## 2022-12-01 DIAGNOSIS — Z01.818 PREOPERATIVE TESTING: Primary | ICD-10-CM

## 2022-12-01 RX ORDER — ACETAMINOPHEN 325 MG/1
325 TABLET ORAL EVERY 6 HOURS PRN
COMMUNITY
End: 2024-02-14

## 2022-12-01 NOTE — PRE-PROCEDURE INSTRUCTIONS
Patient stated has not had any problem with anesthesia in the past. Will  need medical clearance from your PCP. He dose not have a PCP . He is out of his meds and will try to see Margarita Ramos NP at [facility]. Will need t&s. Our  will call to set up this appt. He saidd he sometimes is able to get into [facility] portal.  Preop instructions given. Hold aspirin, aspirin containing products, nsaids(aleve, advil, motrin, ibuprofen, naprosyn, naproxen, voltaren, diclofenac), vitamins and supplements one week prior to surgery.     May take Tylenol.  Medication instructions given:   acetaminophen (TYLENOL) 325 MG tablet       Sig: Take 325 mg by mouth every 6 (six) hours as needed for Pain.   Class: Historical Med   Route: Francis Lockwood RN 12/1/2022  3:08 PM   TAKE IF NEEDED             amLODIPine (NORVASC) 5 MG tablet 30 tablet 11 10/21/2022 10/21/2023   Sig: Take 1 tablet (5 mg total) by mouth once daily.   Class: No Print   Route: Oral   Renewals    Renewal requests to authorizing provider (Christina Boyle PA-C) [email or web address]          Dolores Lockwood RN 12/1/2022  3:05 PM   TAKE IN AM OF SURGERY.              lisinopriL 10 MG tablet 30 tablet 0 9/30/2022 10/30/2022   Sig: Take 1 tablet (10 mg total) by mouth once daily.   Route: Oral       Dolores Lockwood RN 12/1/2022  3:06 PM   HOLD IN AM OF SURGERY.             metoprolol tartrate (LOPRESSOR) 25 MG tablet 120 tablet 11 10/20/2022 10/20/2023   Sig: Take 1 tablet (25 mg total) by mouth every 6 (six) hours.   Class: No Print   Route: Oral   Renewals    Renewal requests to authorizing provider (Christina Boyle PA-C) jennifer Lockwood RN 12/1/2022  3:06 PM   TAKE IN AM OF SURGERY.              oxyCODONE (ROXICODONE) 5 MG immediate release tablet  0 10/20/2022    Sig: Take 1 tablet (5 mg total) by mouth every 6 (six) hours as needed for Pain.   Class: No Print   Route: Oral       Dolores Lockwood RN 12/1/2022  3:06 PM   TAKE IF NEEDED             pantoprazole (PROTONIX) 40 MG tablet 112 tablet 0 10/20/2022 12/15/2022   Sig: Take 1 tablet (40 mg total) by mouth 2 (two) times daily.   Route: Francis Lockwood RN 12/1/2022  3:06 PM   Not currently taking            pregabalin (LYRICA) 75 MG capsule 60 capsule 6 10/20/2022 4/20/2023   Sig: Take 1 capsule (75 mg total) by mouth 2 (two) times daily.   Class: No Print   Route: Francis Lockwood RN 12/1/2022  3:07 PM   TAKE IN AM OF SURGERY.              Stated knows where the surgery center is located. Verbalizes understanding. Sent preop and med instructions to [facility] portal. He then asked if I could speak with his daughter , Melissa De Jesus and given her all this information. I called her and went over all of his preop and med instructions . She called me back and siad he has an ppt with Margarita Ramos Np on 12/2 at 10:40 am for medical clearance and to see about getting his medications that he is out of.

## 2022-12-01 NOTE — ANESTHESIA PAT ROS NOTE
12/01/2022  Israel De Jesus is a 62 y.o., male.      Pre-op Assessment          Review of Systems  Anesthesia Hx:  No problems with previous Anesthesia   History of prior surgery of interest to airway management or planning: cervical fusion.         Denies Family Hx of Anesthesia complications.    Denies Personal Hx of Anesthesia complications.                    Social:  Smoker, No Alcohol Use IS DECREASING THE AMOUNT OF SMOKING PRIOR TO SURGERY PER PATIENT.  Tobacco Use:  of cigarette    Hematology/Oncology:  Hematology Normal   Oncology Normal                                   EENT/Dental:  EENT/Dental Normal           Cardiovascular:            Denies Angina.          Functional Capacity 4 METS, ABLE TO CLIMB 2 FLIGHTS OF STAIRS PER PATIENT                   Hypertension  , Recent typical clinic B/P of 191/91       Pulmonary:  Pulmonary Normal      Denies Shortness of breath.  Denies Recent URI.                 Renal/:  Renal/ Normal                 Hepatic/GI:  Hepatic/GI Normal                 Musculoskeletal:   Musculoskeletal General/Symptoms:  Functional capacity is ambulatory with walker.        Cervical Spine Disorder     Neurological:           KYPHOSIS                            Endocrine:  Endocrine Normal            Psych:  Psychiatric Normal                       Anesthesia Assessment: Preoperative EQUATION    Planned Procedure: Procedure(s) (LRB):  FUSION,SPINE,CERVICAL,POSTERIOR APPROACH MINIMALLY INVASIVE C1-T1 (N/A)  Requested Anesthesia Type:General  Surgeon: Ike Storm DO  Service: Neurosurgery  Known or anticipated Date of Surgery:12/7/2022    Surgeon notes: reviewed    Electronic QUestionnaire Assessment completed via nurse interview with patient.        Triage considerations:     The patient has no apparent active cardiac condition (No unstable coronary Syndrome such  as severe unstable angina or recent [<1 month] myocardial infarction, decompensated CHF, severe valvular   disease or significant arrhythmia)    Previous anesthesia records:GETA and No problems  10/12/2022 POSTERIOR CERVICAL FUSION, SPINE C1-C4 PCF   LAMINECTOMY, SPINE, C-1 (Back)  Airway:  Mallampati: II   Mouth Opening: Normal  TM Distance: Normal  Tongue: Normal       Airway - Non-Surgical Placement Date: 10/12/22; Placement Time: 1512 (created via procedure documentation); Method of Intubation: Video Laryngoscopy; Mask Ventilation: Not Attempted; Intubated: Postinduction; Blade: Sousa #3; Airway Device Size: 7.5; Cuff Inflation: Minimal occlusive pressure; Placement Verified By: Capnometry; Complicating Factors: None; Intubation Findings: Bilateral breath sounds; Securment: Lips; Removal Date: 10/12/22;  Removal Time: 1813   ** USED VIDEO LARYNGOSCOPY WITH LAST SURGERY**    ** H/O CERVICAL FUSION**    ** LIMITED NECK MOBILITY, LOOKING UP PER PATIENT**    Last PCP note: within 3 months , outside [facility]   Subspecialty notes: Neurosurgery    Other important co-morbidities: HTN, Smoker, and KYPHOSIS       Tests already available:  Available tests,  within 3 months , within [facility] .   11/29/2022 CT CERVICAL SPINE W.IO CONTRAST, 11/28/2022 XRAY CERVICAL SPINE AP & L, 11/17/2022 UA, MICRO UA, 1/3/2022 CBC,BMP,10/17/2022 EKG 10/11/2022 PT/INR, PTT, TSH, HGA1C             Instructions given. (See in Nurse's note)    Optimization:  Anesthesia Preop Clinic Assessment- not  Indicated for this surgery    Medical Opinion Indicated             Plan:    Testing:  T&S       Consultation:Patient's PCP for re-evaluation     Patient  has previously scheduled Medical Appointment:NONE    Navigation: Tests Scheduled. TBD             Consults scheduled.TBD             Results will be tracked by Preop Clinic.  12/6 I spoke with Margarita Ramos NP on 12/6 and she said she will clear him for surgery and finish her note after lunch today.  -My only reservation is BP control especially when holding his ACE; however, I know that this can be managed intraoperatively if needed.  -I would like for him to have home health postoperatively.   Dolores Lockwood RN BSN

## 2022-12-02 ENCOUNTER — TELEPHONE (OUTPATIENT)
Dept: PREADMISSION TESTING | Facility: HOSPITAL | Age: 62
End: 2022-12-02
Payer: MEDICAID

## 2022-12-02 NOTE — TELEPHONE ENCOUNTER
----- Message from Dolores Lockwood RN sent at 12/1/2022  3:46 PM CST -----  Surgery 12/7  Please scheduled t&s. May talk with his daughter, Melissa De Jesus per patient.She will be bringing him to appt.  Thanks!

## 2022-12-05 ENCOUNTER — TELEPHONE (OUTPATIENT)
Dept: NEUROSURGERY | Facility: CLINIC | Age: 62
End: 2022-12-05
Payer: MEDICAID

## 2022-12-05 DIAGNOSIS — Z01.818 PREOPERATIVE TESTING: Primary | ICD-10-CM

## 2022-12-05 NOTE — TELEPHONE ENCOUNTER
Called patient with arrival time for surgery that is scheduled for 12/7/22 answered answered all of patient questions ans concerns patient verbalized understanding.

## 2022-12-05 NOTE — TELEPHONE ENCOUNTER
----- Message from Nuria Alejo sent at 12/5/2022 11:56 AM CST -----  Regarding: arrival time  Name of Who is Calling: Israel           What is the request in detail: Patient is requesting a call back to get arrival time.            Can the clinic reply by [facility]: No           What Number to Call Back if not in [facility]: [phone]

## 2022-12-07 ENCOUNTER — HOSPITAL ENCOUNTER (INPATIENT)
Facility: HOSPITAL | Age: 62
LOS: 5 days | Discharge: HOME HEALTH | DRG: 472 | End: 2022-12-12
Attending: STUDENT IN AN ORGANIZED HEALTH CARE EDUCATION/TRAINING PROGRAM | Admitting: STUDENT IN AN ORGANIZED HEALTH CARE EDUCATION/TRAINING PROGRAM
Payer: MEDICAID

## 2022-12-07 ENCOUNTER — ANESTHESIA EVENT (OUTPATIENT)
Dept: SURGERY | Facility: HOSPITAL | Age: 62
DRG: 472 | End: 2022-12-07
Payer: MEDICAID

## 2022-12-07 ENCOUNTER — ANESTHESIA (OUTPATIENT)
Dept: SURGERY | Facility: HOSPITAL | Age: 62
DRG: 472 | End: 2022-12-07
Payer: MEDICAID

## 2022-12-07 DIAGNOSIS — Z01.818 PREOPERATIVE TESTING: ICD-10-CM

## 2022-12-07 DIAGNOSIS — S12.110A: ICD-10-CM

## 2022-12-07 LAB
ABO + RH BLD: NORMAL
BLD GP AB SCN CELLS X3 SERPL QL: NORMAL
GRAM STN SPEC: NORMAL

## 2022-12-07 PROCEDURE — 86901 BLOOD TYPING SEROLOGIC RH(D): CPT | Performed by: ANESTHESIOLOGY

## 2022-12-07 PROCEDURE — 36620 PR INSERT CATH,ART,PERCUT,SHORTTERM: ICD-10-PCS | Mod: 59,,, | Performed by: ANESTHESIOLOGY

## 2022-12-07 PROCEDURE — 22600 ARTHRD PST TQ 1NTRSPC CRV: CPT | Mod: 62,51,, | Performed by: STUDENT IN AN ORGANIZED HEALTH CARE EDUCATION/TRAINING PROGRAM

## 2022-12-07 PROCEDURE — 63600175 PHARM REV CODE 636 W HCPCS: Performed by: NURSE ANESTHETIST, CERTIFIED REGISTERED

## 2022-12-07 PROCEDURE — 22595 ARTHRD PST TQ ATLAS-AXIS: CPT | Mod: 62,,, | Performed by: ORTHOPAEDIC SURGERY

## 2022-12-07 PROCEDURE — 87075 CULTR BACTERIA EXCEPT BLOOD: CPT | Performed by: STUDENT IN AN ORGANIZED HEALTH CARE EDUCATION/TRAINING PROGRAM

## 2022-12-07 PROCEDURE — 22843 INSERT SPINE FIXATION DEVICE: CPT | Mod: 80,,, | Performed by: ORTHOPAEDIC SURGERY

## 2022-12-07 PROCEDURE — 63600175 PHARM REV CODE 636 W HCPCS: Performed by: STUDENT IN AN ORGANIZED HEALTH CARE EDUCATION/TRAINING PROGRAM

## 2022-12-07 PROCEDURE — D9220A PRA ANESTHESIA: ICD-10-PCS | Mod: CRNA,,, | Performed by: NURSE ANESTHETIST, CERTIFIED REGISTERED

## 2022-12-07 PROCEDURE — 22614 PR ARTHRODESIS, POST/POSTLAT, SNGL INTERSPACE, EA ADDTL: ICD-10-PCS | Mod: 62,,, | Performed by: ORTHOPAEDIC SURGERY

## 2022-12-07 PROCEDURE — 25000003 PHARM REV CODE 250: Performed by: STUDENT IN AN ORGANIZED HEALTH CARE EDUCATION/TRAINING PROGRAM

## 2022-12-07 PROCEDURE — 22326 PR OPEN POST RX CERV VERT FX,1 LVL: ICD-10-PCS | Mod: 62,51,, | Performed by: STUDENT IN AN ORGANIZED HEALTH CARE EDUCATION/TRAINING PROGRAM

## 2022-12-07 PROCEDURE — D9220A PRA ANESTHESIA: Mod: ANES,,, | Performed by: ANESTHESIOLOGY

## 2022-12-07 PROCEDURE — 22614 ARTHRD PST TQ 1NTRSPC EA ADD: CPT | Mod: 62,,, | Performed by: ORTHOPAEDIC SURGERY

## 2022-12-07 PROCEDURE — 22595 PR ARTHRODESIS POSTERIOR ATLAS-AXIS C1-C2: ICD-10-PCS | Mod: 62,,, | Performed by: ORTHOPAEDIC SURGERY

## 2022-12-07 PROCEDURE — 22595 ARTHRD PST TQ ATLAS-AXIS: CPT | Mod: 62,,, | Performed by: STUDENT IN AN ORGANIZED HEALTH CARE EDUCATION/TRAINING PROGRAM

## 2022-12-07 PROCEDURE — 63600175 PHARM REV CODE 636 W HCPCS: Performed by: ANESTHESIOLOGY

## 2022-12-07 PROCEDURE — 71000015 HC POSTOP RECOV 1ST HR: Performed by: STUDENT IN AN ORGANIZED HEALTH CARE EDUCATION/TRAINING PROGRAM

## 2022-12-07 PROCEDURE — 87205 SMEAR GRAM STAIN: CPT | Performed by: STUDENT IN AN ORGANIZED HEALTH CARE EDUCATION/TRAINING PROGRAM

## 2022-12-07 PROCEDURE — 87015 SPECIMEN INFECT AGNT CONCNTJ: CPT | Mod: 59 | Performed by: STUDENT IN AN ORGANIZED HEALTH CARE EDUCATION/TRAINING PROGRAM

## 2022-12-07 PROCEDURE — 27800903 OPTIME MED/SURG SUP & DEVICES OTHER IMPLANTS: Performed by: STUDENT IN AN ORGANIZED HEALTH CARE EDUCATION/TRAINING PROGRAM

## 2022-12-07 PROCEDURE — C1713 ANCHOR/SCREW BN/BN,TIS/BN: HCPCS | Performed by: STUDENT IN AN ORGANIZED HEALTH CARE EDUCATION/TRAINING PROGRAM

## 2022-12-07 PROCEDURE — 20930 SP BONE ALGRFT MORSEL ADD-ON: CPT | Mod: ,,, | Performed by: STUDENT IN AN ORGANIZED HEALTH CARE EDUCATION/TRAINING PROGRAM

## 2022-12-07 PROCEDURE — D9220A PRA ANESTHESIA: Mod: CRNA,,, | Performed by: NURSE ANESTHETIST, CERTIFIED REGISTERED

## 2022-12-07 PROCEDURE — 63600175 PHARM REV CODE 636 W HCPCS

## 2022-12-07 PROCEDURE — 71000016 HC POSTOP RECOV ADDL HR: Performed by: STUDENT IN AN ORGANIZED HEALTH CARE EDUCATION/TRAINING PROGRAM

## 2022-12-07 PROCEDURE — 22600 ARTHRD PST TQ 1NTRSPC CRV: CPT | Mod: 62,51,, | Performed by: ORTHOPAEDIC SURGERY

## 2022-12-07 PROCEDURE — 25000003 PHARM REV CODE 250: Performed by: NURSE ANESTHETIST, CERTIFIED REGISTERED

## 2022-12-07 PROCEDURE — D9220A PRA ANESTHESIA: ICD-10-PCS | Mod: ANES,,, | Performed by: ANESTHESIOLOGY

## 2022-12-07 PROCEDURE — 36620 INSERTION CATHETER ARTERY: CPT | Mod: 59,,, | Performed by: ANESTHESIOLOGY

## 2022-12-07 PROCEDURE — 22595 PR ARTHRODESIS POSTERIOR ATLAS-AXIS C1-C2: ICD-10-PCS | Mod: 62,,, | Performed by: STUDENT IN AN ORGANIZED HEALTH CARE EDUCATION/TRAINING PROGRAM

## 2022-12-07 PROCEDURE — 22843 PR POSTERIOR SEGMENTAL INSTRUMENTATION 7-12 VRT SEG: ICD-10-PCS | Mod: ,,, | Performed by: STUDENT IN AN ORGANIZED HEALTH CARE EDUCATION/TRAINING PROGRAM

## 2022-12-07 PROCEDURE — 37000009 HC ANESTHESIA EA ADD 15 MINS: Performed by: STUDENT IN AN ORGANIZED HEALTH CARE EDUCATION/TRAINING PROGRAM

## 2022-12-07 PROCEDURE — 22843 INSERT SPINE FIXATION DEVICE: CPT | Mod: ,,, | Performed by: STUDENT IN AN ORGANIZED HEALTH CARE EDUCATION/TRAINING PROGRAM

## 2022-12-07 PROCEDURE — 22326 TREAT NECK SPINE FRACTURE: CPT | Mod: 62,51,, | Performed by: STUDENT IN AN ORGANIZED HEALTH CARE EDUCATION/TRAINING PROGRAM

## 2022-12-07 PROCEDURE — 22326 TREAT NECK SPINE FRACTURE: CPT | Mod: 62,51,, | Performed by: ORTHOPAEDIC SURGERY

## 2022-12-07 PROCEDURE — 22614 PR ARTHRODESIS, POST/POSTLAT, SNGL INTERSPACE, EA ADDTL: ICD-10-PCS | Mod: 62,,, | Performed by: STUDENT IN AN ORGANIZED HEALTH CARE EDUCATION/TRAINING PROGRAM

## 2022-12-07 PROCEDURE — 11000001 HC ACUTE MED/SURG PRIVATE ROOM

## 2022-12-07 PROCEDURE — 22600 PR ARTHRODESIS, POST/POSTLAT, SNGL INTERSPACE, CERVICAL BELOW C2: ICD-10-PCS | Mod: 62,51,, | Performed by: ORTHOPAEDIC SURGERY

## 2022-12-07 PROCEDURE — 20930 PR ALLOGRAFT FOR SPINE SURGERY ONLY MORSELIZED: ICD-10-PCS | Mod: ,,, | Performed by: STUDENT IN AN ORGANIZED HEALTH CARE EDUCATION/TRAINING PROGRAM

## 2022-12-07 PROCEDURE — 87070 CULTURE OTHR SPECIMN AEROBIC: CPT | Mod: 59 | Performed by: STUDENT IN AN ORGANIZED HEALTH CARE EDUCATION/TRAINING PROGRAM

## 2022-12-07 PROCEDURE — 87102 FUNGUS ISOLATION CULTURE: CPT | Mod: 59 | Performed by: STUDENT IN AN ORGANIZED HEALTH CARE EDUCATION/TRAINING PROGRAM

## 2022-12-07 PROCEDURE — 87206 SMEAR FLUORESCENT/ACID STAI: CPT | Mod: 91 | Performed by: STUDENT IN AN ORGANIZED HEALTH CARE EDUCATION/TRAINING PROGRAM

## 2022-12-07 PROCEDURE — 87116 MYCOBACTERIA CULTURE: CPT | Performed by: STUDENT IN AN ORGANIZED HEALTH CARE EDUCATION/TRAINING PROGRAM

## 2022-12-07 PROCEDURE — 22843 PR POSTERIOR SEGMENTAL INSTRUMENTATION 7-12 VRT SEG: ICD-10-PCS | Mod: 80,,, | Performed by: ORTHOPAEDIC SURGERY

## 2022-12-07 PROCEDURE — 36000711: Performed by: STUDENT IN AN ORGANIZED HEALTH CARE EDUCATION/TRAINING PROGRAM

## 2022-12-07 PROCEDURE — 22600 PR ARTHRODESIS, POST/POSTLAT, SNGL INTERSPACE, CERVICAL BELOW C2: ICD-10-PCS | Mod: 62,51,, | Performed by: STUDENT IN AN ORGANIZED HEALTH CARE EDUCATION/TRAINING PROGRAM

## 2022-12-07 PROCEDURE — 27201423 OPTIME MED/SURG SUP & DEVICES STERILE SUPPLY: Performed by: STUDENT IN AN ORGANIZED HEALTH CARE EDUCATION/TRAINING PROGRAM

## 2022-12-07 PROCEDURE — 71000033 HC RECOVERY, INTIAL HOUR: Performed by: STUDENT IN AN ORGANIZED HEALTH CARE EDUCATION/TRAINING PROGRAM

## 2022-12-07 PROCEDURE — 22326 PR OPEN POST RX CERV VERT FX,1 LVL: ICD-10-PCS | Mod: 62,51,, | Performed by: ORTHOPAEDIC SURGERY

## 2022-12-07 PROCEDURE — 37000008 HC ANESTHESIA 1ST 15 MINUTES: Performed by: STUDENT IN AN ORGANIZED HEALTH CARE EDUCATION/TRAINING PROGRAM

## 2022-12-07 PROCEDURE — 36000710: Performed by: STUDENT IN AN ORGANIZED HEALTH CARE EDUCATION/TRAINING PROGRAM

## 2022-12-07 PROCEDURE — 22614 ARTHRD PST TQ 1NTRSPC EA ADD: CPT | Mod: 62,,, | Performed by: STUDENT IN AN ORGANIZED HEALTH CARE EDUCATION/TRAINING PROGRAM

## 2022-12-07 DEVICE — GRAFT ALTAPORE LARGE CYL 8ML: Type: IMPLANTABLE DEVICE | Site: SPINE CERVICAL | Status: FUNCTIONAL

## 2022-12-07 DEVICE — SCREW SYMPHONY PA 3.5X14MM: Type: IMPLANTABLE DEVICE | Site: SPINE CERVICAL | Status: FUNCTIONAL

## 2022-12-07 DEVICE — SET SYMPHONY SCREW NS: Type: IMPLANTABLE DEVICE | Site: SPINE CERVICAL | Status: FUNCTIONAL

## 2022-12-07 DEVICE — NUT SYMPHONY OUTER: Type: IMPLANTABLE DEVICE | Site: SPINE CERVICAL | Status: FUNCTIONAL

## 2022-12-07 DEVICE — SCREW SYMPHONY SET TALL: Type: IMPLANTABLE DEVICE | Site: SPINE CERVICAL | Status: FUNCTIONAL

## 2022-12-07 DEVICE — KIT MED BONE INFUSE: Type: IMPLANTABLE DEVICE | Site: SPINE CERVICAL | Status: FUNCTIONAL

## 2022-12-07 RX ORDER — GLUCAGON 1 MG
1 KIT INJECTION
Status: DISCONTINUED | OUTPATIENT
Start: 2022-12-07 | End: 2022-12-12 | Stop reason: HOSPADM

## 2022-12-07 RX ORDER — SODIUM CHLORIDE 0.9 % (FLUSH) 0.9 %
3 SYRINGE (ML) INJECTION EVERY 30 MIN PRN
Status: DISCONTINUED | OUTPATIENT
Start: 2022-12-07 | End: 2022-12-07 | Stop reason: HOSPADM

## 2022-12-07 RX ORDER — OXYCODONE HYDROCHLORIDE 5 MG/1
5 TABLET ORAL EVERY 4 HOURS PRN
Status: DISCONTINUED | OUTPATIENT
Start: 2022-12-07 | End: 2022-12-08

## 2022-12-07 RX ORDER — LIDOCAINE HYDROCHLORIDE AND EPINEPHRINE 10; 10 MG/ML; UG/ML
INJECTION, SOLUTION INFILTRATION; PERINEURAL
Status: DISCONTINUED | OUTPATIENT
Start: 2022-12-07 | End: 2022-12-07 | Stop reason: HOSPADM

## 2022-12-07 RX ORDER — KETAMINE HCL IN 0.9 % NACL 50 MG/5 ML
SYRINGE (ML) INTRAVENOUS
Status: DISCONTINUED | OUTPATIENT
Start: 2022-12-07 | End: 2022-12-07

## 2022-12-07 RX ORDER — HEPARIN SODIUM 5000 [USP'U]/ML
5000 INJECTION, SOLUTION INTRAVENOUS; SUBCUTANEOUS EVERY 8 HOURS
Status: DISCONTINUED | OUTPATIENT
Start: 2022-12-08 | End: 2022-12-12 | Stop reason: HOSPADM

## 2022-12-07 RX ORDER — BACITRACIN ZINC 500 UNIT/G
OINTMENT (GRAM) TOPICAL
Status: DISCONTINUED | OUTPATIENT
Start: 2022-12-07 | End: 2022-12-07 | Stop reason: HOSPADM

## 2022-12-07 RX ORDER — PREGABALIN 75 MG/1
75 CAPSULE ORAL 2 TIMES DAILY
Status: DISCONTINUED | OUTPATIENT
Start: 2022-12-07 | End: 2022-12-12 | Stop reason: HOSPADM

## 2022-12-07 RX ORDER — IBUPROFEN 200 MG
1 TABLET ORAL DAILY
Status: DISCONTINUED | OUTPATIENT
Start: 2022-12-08 | End: 2022-12-12 | Stop reason: HOSPADM

## 2022-12-07 RX ORDER — METOPROLOL TARTRATE 25 MG/1
25 TABLET, FILM COATED ORAL EVERY 6 HOURS
Status: DISCONTINUED | OUTPATIENT
Start: 2022-12-07 | End: 2022-12-12 | Stop reason: HOSPADM

## 2022-12-07 RX ORDER — HYDROMORPHONE HYDROCHLORIDE 1 MG/ML
1 INJECTION, SOLUTION INTRAMUSCULAR; INTRAVENOUS; SUBCUTANEOUS
Status: DISCONTINUED | OUTPATIENT
Start: 2022-12-07 | End: 2022-12-08

## 2022-12-07 RX ORDER — HYDROMORPHONE HYDROCHLORIDE 1 MG/ML
0.2 INJECTION, SOLUTION INTRAMUSCULAR; INTRAVENOUS; SUBCUTANEOUS EVERY 5 MIN PRN
Status: DISCONTINUED | OUTPATIENT
Start: 2022-12-07 | End: 2022-12-07 | Stop reason: HOSPADM

## 2022-12-07 RX ORDER — TIZANIDINE 4 MG/1
4 TABLET ORAL EVERY 6 HOURS PRN
Status: DISCONTINUED | OUTPATIENT
Start: 2022-12-07 | End: 2022-12-12 | Stop reason: HOSPADM

## 2022-12-07 RX ORDER — ONDANSETRON 2 MG/ML
4 INJECTION INTRAMUSCULAR; INTRAVENOUS DAILY PRN
Status: DISCONTINUED | OUTPATIENT
Start: 2022-12-07 | End: 2022-12-07

## 2022-12-07 RX ORDER — SUCCINYLCHOLINE CHLORIDE 20 MG/ML
INJECTION INTRAMUSCULAR; INTRAVENOUS
Status: DISCONTINUED | OUTPATIENT
Start: 2022-12-07 | End: 2022-12-07

## 2022-12-07 RX ORDER — MUPIROCIN 20 MG/G
1 OINTMENT TOPICAL 2 TIMES DAILY
Status: DISPENSED | OUTPATIENT
Start: 2022-12-07 | End: 2022-12-08

## 2022-12-07 RX ORDER — DIAZEPAM 5 MG/1
5 TABLET ORAL EVERY 8 HOURS PRN
Status: DISCONTINUED | OUTPATIENT
Start: 2022-12-07 | End: 2022-12-12 | Stop reason: HOSPADM

## 2022-12-07 RX ORDER — HYDRALAZINE HYDROCHLORIDE 20 MG/ML
10 INJECTION INTRAMUSCULAR; INTRAVENOUS ONCE
Status: COMPLETED | OUTPATIENT
Start: 2022-12-07 | End: 2022-12-07

## 2022-12-07 RX ORDER — LIDOCAINE HYDROCHLORIDE 20 MG/ML
INJECTION, SOLUTION EPIDURAL; INFILTRATION; INTRACAUDAL; PERINEURAL
Status: DISCONTINUED | OUTPATIENT
Start: 2022-12-07 | End: 2022-12-07

## 2022-12-07 RX ORDER — PROPOFOL 10 MG/ML
VIAL (ML) INTRAVENOUS
Status: DISCONTINUED | OUTPATIENT
Start: 2022-12-07 | End: 2022-12-07

## 2022-12-07 RX ORDER — IBUPROFEN 200 MG
24 TABLET ORAL
Status: DISCONTINUED | OUTPATIENT
Start: 2022-12-07 | End: 2022-12-12 | Stop reason: HOSPADM

## 2022-12-07 RX ORDER — AMLODIPINE BESYLATE 5 MG/1
5 TABLET ORAL DAILY
Status: DISCONTINUED | OUTPATIENT
Start: 2022-12-08 | End: 2022-12-07

## 2022-12-07 RX ORDER — LISINOPRIL 20 MG/1
20 TABLET ORAL DAILY
Status: DISCONTINUED | OUTPATIENT
Start: 2022-12-08 | End: 2022-12-12 | Stop reason: HOSPADM

## 2022-12-07 RX ORDER — HYDROMORPHONE HYDROCHLORIDE 2 MG/ML
INJECTION, SOLUTION INTRAMUSCULAR; INTRAVENOUS; SUBCUTANEOUS
Status: DISCONTINUED | OUTPATIENT
Start: 2022-12-07 | End: 2022-12-07

## 2022-12-07 RX ORDER — ONDANSETRON 2 MG/ML
4 INJECTION INTRAMUSCULAR; INTRAVENOUS EVERY 6 HOURS PRN
Status: DISCONTINUED | OUTPATIENT
Start: 2022-12-07 | End: 2022-12-12 | Stop reason: HOSPADM

## 2022-12-07 RX ORDER — PANTOPRAZOLE SODIUM 20 MG/1
40 TABLET, DELAYED RELEASE ORAL DAILY
Status: DISCONTINUED | OUTPATIENT
Start: 2022-12-08 | End: 2022-12-12 | Stop reason: HOSPADM

## 2022-12-07 RX ORDER — MUPIROCIN 20 MG/G
OINTMENT TOPICAL
Status: DISPENSED | OUTPATIENT
Start: 2022-12-07

## 2022-12-07 RX ORDER — MIDAZOLAM HYDROCHLORIDE 1 MG/ML
INJECTION, SOLUTION INTRAMUSCULAR; INTRAVENOUS
Status: DISCONTINUED | OUTPATIENT
Start: 2022-12-07 | End: 2022-12-07

## 2022-12-07 RX ORDER — SODIUM CHLORIDE 9 MG/ML
INJECTION, SOLUTION INTRAVENOUS CONTINUOUS
Status: DISCONTINUED | OUTPATIENT
Start: 2022-12-07 | End: 2022-12-08

## 2022-12-07 RX ORDER — ACETAMINOPHEN 500 MG
1000 TABLET ORAL EVERY 8 HOURS PRN
Status: DISCONTINUED | OUTPATIENT
Start: 2022-12-07 | End: 2022-12-12 | Stop reason: HOSPADM

## 2022-12-07 RX ORDER — BISACODYL 10 MG
10 SUPPOSITORY, RECTAL RECTAL DAILY PRN
Status: DISCONTINUED | OUTPATIENT
Start: 2022-12-07 | End: 2022-12-12 | Stop reason: HOSPADM

## 2022-12-07 RX ORDER — DEXAMETHASONE SODIUM PHOSPHATE 4 MG/ML
INJECTION, SOLUTION INTRA-ARTICULAR; INTRALESIONAL; INTRAMUSCULAR; INTRAVENOUS; SOFT TISSUE
Status: DISCONTINUED | OUTPATIENT
Start: 2022-12-07 | End: 2022-12-07

## 2022-12-07 RX ORDER — ROCURONIUM BROMIDE 10 MG/ML
INJECTION, SOLUTION INTRAVENOUS
Status: DISCONTINUED | OUTPATIENT
Start: 2022-12-07 | End: 2022-12-07

## 2022-12-07 RX ORDER — PROPOFOL 10 MG/ML
VIAL (ML) INTRAVENOUS CONTINUOUS PRN
Status: DISCONTINUED | OUTPATIENT
Start: 2022-12-07 | End: 2022-12-07

## 2022-12-07 RX ORDER — ACETAMINOPHEN 10 MG/ML
INJECTION, SOLUTION INTRAVENOUS
Status: DISCONTINUED | OUTPATIENT
Start: 2022-12-07 | End: 2022-12-07

## 2022-12-07 RX ORDER — POLYETHYLENE GLYCOL 3350 17 G/17G
17 POWDER, FOR SOLUTION ORAL DAILY
Status: DISCONTINUED | OUTPATIENT
Start: 2022-12-08 | End: 2022-12-12 | Stop reason: HOSPADM

## 2022-12-07 RX ORDER — DIAZEPAM 5 MG/1
5 TABLET ORAL EVERY 8 HOURS PRN
Status: DISCONTINUED | OUTPATIENT
Start: 2022-12-07 | End: 2022-12-07

## 2022-12-07 RX ORDER — OXYCODONE HYDROCHLORIDE 5 MG/1
5 TABLET ORAL EVERY 6 HOURS PRN
Status: DISCONTINUED | OUTPATIENT
Start: 2022-12-07 | End: 2022-12-07

## 2022-12-07 RX ORDER — FENTANYL CITRATE 50 UG/ML
INJECTION, SOLUTION INTRAMUSCULAR; INTRAVENOUS
Status: DISCONTINUED | OUTPATIENT
Start: 2022-12-07 | End: 2022-12-07

## 2022-12-07 RX ORDER — AMLODIPINE BESYLATE 10 MG/1
10 TABLET ORAL DAILY
Status: DISCONTINUED | OUTPATIENT
Start: 2022-12-08 | End: 2022-12-12 | Stop reason: HOSPADM

## 2022-12-07 RX ORDER — IBUPROFEN 200 MG
16 TABLET ORAL
Status: DISCONTINUED | OUTPATIENT
Start: 2022-12-07 | End: 2022-12-12 | Stop reason: HOSPADM

## 2022-12-07 RX ORDER — LABETALOL HCL 20 MG/4 ML
10 SYRINGE (ML) INTRAVENOUS EVERY 10 MIN PRN
Status: COMPLETED | OUTPATIENT
Start: 2022-12-07 | End: 2022-12-07

## 2022-12-07 RX ORDER — METHOCARBAMOL 500 MG/1
1000 TABLET, FILM COATED ORAL EVERY 8 HOURS
Status: DISCONTINUED | OUTPATIENT
Start: 2022-12-07 | End: 2022-12-12 | Stop reason: HOSPADM

## 2022-12-07 RX ORDER — NICARDIPINE HYDROCHLORIDE 2.5 MG/ML
INJECTION INTRAVENOUS
Status: DISCONTINUED | OUTPATIENT
Start: 2022-12-07 | End: 2022-12-07

## 2022-12-07 RX ORDER — ONDANSETRON 2 MG/ML
INJECTION INTRAMUSCULAR; INTRAVENOUS
Status: DISCONTINUED | OUTPATIENT
Start: 2022-12-07 | End: 2022-12-07

## 2022-12-07 RX ADMIN — HYDRALAZINE HYDROCHLORIDE 10 MG: 20 INJECTION, SOLUTION INTRAMUSCULAR; INTRAVENOUS at 07:12

## 2022-12-07 RX ADMIN — HYDROMORPHONE HYDROCHLORIDE 0.5 MG: 2 INJECTION INTRAMUSCULAR; INTRAVENOUS; SUBCUTANEOUS at 04:12

## 2022-12-07 RX ADMIN — REMIFENTANIL HYDROCHLORIDE 180 MCG: 1 INJECTION, POWDER, LYOPHILIZED, FOR SOLUTION INTRAVENOUS at 04:12

## 2022-12-07 RX ADMIN — LABETALOL HYDROCHLORIDE 10 MG: 5 INJECTION, SOLUTION INTRAVENOUS at 08:12

## 2022-12-07 RX ADMIN — Medication 10 MG: at 04:12

## 2022-12-07 RX ADMIN — ACETAMINOPHEN 1000 MG: 10 INJECTION, SOLUTION INTRAVENOUS at 04:12

## 2022-12-07 RX ADMIN — REMIFENTANIL HYDROCHLORIDE 180 MCG: 1 INJECTION, POWDER, LYOPHILIZED, FOR SOLUTION INTRAVENOUS at 01:12

## 2022-12-07 RX ADMIN — MUPIROCIN 1 G: 20 OINTMENT TOPICAL at 09:12

## 2022-12-07 RX ADMIN — CEFAZOLIN 2 G: 2 INJECTION, POWDER, FOR SOLUTION INTRAMUSCULAR; INTRAVENOUS at 02:12

## 2022-12-07 RX ADMIN — SODIUM CHLORIDE: 0.9 INJECTION, SOLUTION INTRAVENOUS at 01:12

## 2022-12-07 RX ADMIN — DIAZEPAM 5 MG: 5 TABLET ORAL at 06:12

## 2022-12-07 RX ADMIN — HYDROMORPHONE HYDROCHLORIDE 0.2 MG: 1 INJECTION, SOLUTION INTRAMUSCULAR; INTRAVENOUS; SUBCUTANEOUS at 05:12

## 2022-12-07 RX ADMIN — HYDROMORPHONE HYDROCHLORIDE 0.2 MG: 1 INJECTION, SOLUTION INTRAMUSCULAR; INTRAVENOUS; SUBCUTANEOUS at 06:12

## 2022-12-07 RX ADMIN — LABETALOL HYDROCHLORIDE 10 MG: 5 INJECTION, SOLUTION INTRAVENOUS at 06:12

## 2022-12-07 RX ADMIN — NICARDIPINE HYDROCHLORIDE 600 MCG: 25 INJECTION INTRAVENOUS at 05:12

## 2022-12-07 RX ADMIN — REMIFENTANIL HYDROCHLORIDE 180 MCG: 1 INJECTION, POWDER, LYOPHILIZED, FOR SOLUTION INTRAVENOUS at 02:12

## 2022-12-07 RX ADMIN — SODIUM CHLORIDE 0.25 MCG/KG/MIN: 9 INJECTION, SOLUTION INTRAVENOUS at 01:12

## 2022-12-07 RX ADMIN — FENTANYL CITRATE 50 MCG: 50 INJECTION, SOLUTION INTRAMUSCULAR; INTRAVENOUS at 01:12

## 2022-12-07 RX ADMIN — SUGAMMADEX 200 MG: 100 INJECTION, SOLUTION INTRAVENOUS at 03:12

## 2022-12-07 RX ADMIN — NICARDIPINE HYDROCHLORIDE 400 MCG: 25 INJECTION INTRAVENOUS at 03:12

## 2022-12-07 RX ADMIN — REMIFENTANIL HYDROCHLORIDE 0.2 MCG/KG/MIN: 1 INJECTION, POWDER, LYOPHILIZED, FOR SOLUTION INTRAVENOUS at 01:12

## 2022-12-07 RX ADMIN — DEXAMETHASONE SODIUM PHOSPHATE 12 MG: 4 INJECTION, SOLUTION INTRAMUSCULAR; INTRAVENOUS at 02:12

## 2022-12-07 RX ADMIN — MIDAZOLAM HYDROCHLORIDE 2 MG: 1 INJECTION, SOLUTION INTRAMUSCULAR; INTRAVENOUS at 01:12

## 2022-12-07 RX ADMIN — Medication 10 MG: at 02:12

## 2022-12-07 RX ADMIN — SUCCINYLCHOLINE CHLORIDE 120 MG: 20 INJECTION, SOLUTION INTRAMUSCULAR; INTRAVENOUS at 01:12

## 2022-12-07 RX ADMIN — OXYCODONE 5 MG: 5 TABLET ORAL at 09:12

## 2022-12-07 RX ADMIN — MUPIROCIN: 20 OINTMENT TOPICAL at 12:12

## 2022-12-07 RX ADMIN — Medication 150 MCG/KG/MIN: at 01:12

## 2022-12-07 RX ADMIN — PROPOFOL 50 MG: 10 INJECTION, EMULSION INTRAVENOUS at 01:12

## 2022-12-07 RX ADMIN — LABETALOL HYDROCHLORIDE 10 MG: 5 INJECTION, SOLUTION INTRAVENOUS at 05:12

## 2022-12-07 RX ADMIN — METHOCARBAMOL 1000 MG: 500 TABLET ORAL at 09:12

## 2022-12-07 RX ADMIN — NICARDIPINE HYDROCHLORIDE 600 MCG: 25 INJECTION INTRAVENOUS at 03:12

## 2022-12-07 RX ADMIN — OXYCODONE 5 MG: 5 TABLET ORAL at 05:12

## 2022-12-07 RX ADMIN — ROCURONIUM BROMIDE 25 MG: 10 INJECTION INTRAVENOUS at 02:12

## 2022-12-07 RX ADMIN — LIDOCAINE HYDROCHLORIDE 80 MG: 20 INJECTION, SOLUTION EPIDURAL; INFILTRATION; INTRACAUDAL; PERINEURAL at 01:12

## 2022-12-07 RX ADMIN — Medication 30 MG: at 01:12

## 2022-12-07 RX ADMIN — METOPROLOL TARTRATE 25 MG: 25 TABLET, FILM COATED ORAL at 06:12

## 2022-12-07 RX ADMIN — PROPOFOL 150 MG: 10 INJECTION, EMULSION INTRAVENOUS at 01:12

## 2022-12-07 RX ADMIN — ONDANSETRON 4 MG: 2 INJECTION INTRAMUSCULAR; INTRAVENOUS at 04:12

## 2022-12-07 RX ADMIN — ROCURONIUM BROMIDE 5 MG: 10 INJECTION INTRAVENOUS at 01:12

## 2022-12-07 RX ADMIN — DEXTROSE MONOHYDRATE 1 G: 2.5 INJECTION INTRAVENOUS at 09:12

## 2022-12-07 NOTE — TRANSFER OF CARE
"Anesthesia Transfer of Care Note    Patient: Israel De Jesus    Procedure(s) Performed: Procedure(s) (LRB):  FUSION,SPINE,CERVICAL,POSTERIOR APPROACH C1-T1 (N/A)    Patient location: PACU    Anesthesia Type: general    Transport from OR: Transported from OR on 6-10 L/min O2 by face mask with adequate spontaneous ventilation    Post pain: adequate analgesia    Post assessment: no apparent anesthetic complications    Post vital signs: stable    Level of consciousness: sedated    Nausea/Vomiting: no nausea/vomiting    Complications: none    Transfer of care protocol was followed      Last vitals:   Visit Vitals  BP (!) 177/107 (BP Location: Left arm, Patient Position: Lying)   Pulse 93   Temp 36.7 °C (98.1 °F) (Oral)   Resp 20   Ht 5' 7" (1.702 m)   Wt 64.4 kg (142 lb)   SpO2 97%   BMI 22.24 kg/m²     "

## 2022-12-07 NOTE — ANESTHESIA PROCEDURE NOTES
Arterial    Diagnosis: cervical spine fracture    Patient location during procedure: done in OR  Procedure start time: 12/7/2022 1:38 PM  Procedure end time: 12/7/2022 1:42 PM    Staffing  Authorizing Provider: Lynda Garza MD  Performing Provider: Lynda Garza MD    Anesthesiologist was present at the time of the procedure.    Preanesthetic Checklist  Completed: patient identified, IV checked, site marked, risks and benefits discussed, surgical consent, monitors and equipment checked, pre-op evaluation, timeout performed and anesthesia consent givenArterial  Skin Prep: chlorhexidine gluconate  Orientation: left  Location: radial    Catheter Size: 20 G  Catheter placement by Anatomical landmarks. Heme positive aspiration all ports. Insertion Attempts: 1  Assessment  Dressing: secured with tape and tegaderm  Patient: Tolerated well

## 2022-12-07 NOTE — INTERVAL H&P NOTE
The patient has been examined and the H&P has been reviewed:    I concur with the findings and no changes have occurred since H&P was written.    Surgery risks, benefits and alternative options discussed and understood by patient/family.    There are no hospital problems to display for this patient.    C1-T1 posterior cervical fusion revision    --Patient evaluated prior to procedure  --All diagnostics and imaging reviewed  --Patient NPO since MN  --No anti-coag/plt medication in the last 72h  --Patient consented and all questions answered  --Further reccs to follow procedure

## 2022-12-07 NOTE — BRIEF OP NOTE
[facility] - Surgery ([facility])  Brief Operative Note  Neurosurgery    SUMMARY     Surgery Date: 12/7/2022     Surgeon(s) and Role:     * Ike Storm DO - Primary     * Colten Lujan MD - Resident - Assisting     * Hasmukh Oliveira MD        Pre-op Diagnosis:  Kyphosis, unspecified kyphosis type, unspecified spinal region [M40.209]    Post-op Diagnosis: Post-Op Diagnosis Codes:     * Kyphosis, unspecified kyphosis type, unspecified spinal region [M40.209]    Procedure Performed:     Procedure(s) (LRB):  FUSION,SPINE,CERVICAL,POSTERIOR APPROACH C1-T1 (N/A)    Technical Procedures Used:   C1-T1 fusion revision    Operative Findings: see full op note    Estimated Blood Loss: * No values recorded between 12/7/2022  2:38 PM and 12/7/2022  5:10 PM *         Specimens:   Specimen (24h ago, onward)      None
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
## 2022-12-07 NOTE — PLAN OF CARE
Chart reviewed. Pre-op nursing care per orders. Safe surgery checklist complete. Family at bedside. Patient belongings given to family. Type and screen sent to blood bank.

## 2022-12-07 NOTE — OP NOTE
DATE OF PROCEDURE: 12/7/22     PREOPERATIVE DIAGNOSIS:  1. Complex anteriorly displaced type II odontoid fracture  2. Type I hangman's fracture  3. S/P C1-4 posterior cervical decompression/fusion with hardware failure.     POSTOPERATIVE DIAGNOSIS:  Same.     PROCEDURE PERFORMED:  1. Posterior spinal revision fusion, C1-T1  2. Posterior nonsegmental spinal fixation, C1-T1(Depuy)  3. Open reduction and internal fixation of type II odontoid and type I hangman's fractures of C2  4. Use of intraoperative flouroscopy  5. Use of intraoperative neuromonitoring with MEPs  6. Synthetic bone grafting     SURGEON: DO Marita Chavez surgeon: Hasmukh Oliveira MD Ortho spine     ASSISTANT: Colten Lujan MD     ANESTHESIA: GETA     ESTIMATED BLOOD LOSS: 100mL     COMPLICATIONS: None     DRAINS: One deep.     SPECIMENS SENT: None     FINDINGS: None     INDICATIONS:     62 M with hx of tobacco abuse who is s/p C1-4 ORIF d/t complex odontoid fracture in early oct of 2022 presented in routing [facility].  His neck pain was improving and he was at his neurobaseline however, his xrays showed worsening sagittal alignment secondary to hardware failure.  CT c spine showed haloing of the right C1 lateral mass screw with failure of the bilateral C3/4 lateral mass screws and worsened kyphotic deformity of his prior odontoid fracture.  He was thus offered a posterior cervical revision fusion with possible extension to the occiput.     I explained the risks, benefits, alternatives, indications and methods of the procedure in detail. The patient voiced understanding and all questions were answered. No guarantees were made. The patient agreed to proceed as planned.     PROCEDURE:     The patient was brought into the operating room where he was intubated and placed under general anesthesia without difficulty. All lines were placed.  Pre flip motors were done and found to be reliable in all 4 extremities.  A Tineo clamp was placed bitemporally and  he was repositioned prone onto the hospital bed with the head fixed to the table in capital flexion and neck extension. Appropriate padding of all pressure points was placed.  Xrays were used to localize the region of interest. It also showed adequate spinal alignment in the sagittal plane with minimal reduction of his displaced odontoid fracture.  Motors were run again and found to be consistent with baseline.  The posterior cervical spine was marked prepped and draped in the usual sterile fashion.  A timeout was performed prior to the procedure.  10mL of Lidocaine with epinephrine were injected in the skin.     A linear incision was made with a 10 blade from approximately C1-T1.  Suprafascial dissection was carried out in the midline with Bovie electrocautery from C1 to T1.  We then identified the prior hardware from C1-4 and carried out our subperiosteal dissection from C2-T1 with bovie and mcghee elevators.  We then removed the caps from the screws and then removed the rods.  I then scrubbed out in order to readjust the patient's head in order to obtain further reduction of his displaced odontoid fracture.  Motors following readjustment were stable with baseline and xrays showed minimal improvement in the odontoid fracture.    We then removed the prior screws from C1-4.  Both lateral mass screws were quite loose and easily removed.  The tracts had a mucoid film in them.  This tract was swabbed and sent for cultures in addition to the hardware.  We then removed bilateral C1 lateral mass screws and also swabbed their screw tracts.  We confirmed no breeches in the C1 lateral mass tracts with ball tip feeler and then placed upsized 5.0 x30mm threaded screws.  These screws exhibited solid purchase.  We chose not to reinstrument the bilateral C3,4 lateral masses as they were not suitable for hardware.  AP and lateral xrays showed good hardware placement and motors were consistent with baseline.    We then performed  inferior C7 facetectomies with osteotome to expose our entry point for bilateral T1 pedicle screws.  We placed bilateral T1 pedicle screws using anatomic landmarks and free hand technique.  AP and lateral xrays showed good hardware placement and motors were consistent with baseline.     We then placed bilateral lateral mass screws at C5,6 using anatomic landmarks and free hand technique.  Fluoro showed excellent placement of hardware and motors were consistent with baseline.     We then decorticated the posterior elements from C2-T1.        Titanium rods were placed into the tulip heads from C1 to T1 bilaterally. Set screws were tightened. Final xrays showed adequate spinal alignment and a good positioning of the final hardware construct.The wound was copiously irrigated with a dilute Betadine solution and normal saline. Infuse and altapore shape was then packed bilaterally from C1-T1 to promote arthrodesis.  One gram of vancomycin powder was placed into the wound. A subfascial Hemovac drain was placed and tunneled through a separate incision, where it was secured with Vicryl sutures.  A watertight fascial closure was achieved with interrupted 0 Vicryl sutures and a running Stratafix suture.  The soft tissues were closed in layers and the skin was closed with 4/0 running monocryl.  A preneo dressing was then placed and coated with dermabond.     The patient appeared to tolerate the procedure well from a hemodynamic and neuromonitoring standpoint.  Motor evoked potentials were present and stable in all extremities throughout the case. I was present for all critical portions of the case, and at the end of the case all counts were correct. He was removed from the Tineo clamp and repositioned supine onto the hospital bed where he was extubated and allowed to emerge from anesthesia without difficulty.  He was sent to the PACU for recovery.     Justification of Co surgeon: This was a complex hardware failure case in a  patient with a displaced type II odontoid fracture in the setting of type I hangman's fracture of C2 who already undergone ORIF.  It was felt that 2 attending surgeons were needed in order to limit blood loss, revise hardware and reduce and fixate the fracture in order to optimize patient outcomes.

## 2022-12-08 LAB
ANION GAP SERPL CALC-SCNC: 11 MMOL/L (ref 8–16)
BASOPHILS # BLD AUTO: 0.01 K/UL (ref 0–0.2)
BASOPHILS NFR BLD: 0.1 % (ref 0–1.9)
BUN SERPL-MCNC: 12 MG/DL (ref 8–23)
CALCIUM SERPL-MCNC: 10 MG/DL (ref 8.7–10.5)
CHLORIDE SERPL-SCNC: 104 MMOL/L (ref 95–110)
CO2 SERPL-SCNC: 23 MMOL/L (ref 23–29)
CREAT SERPL-MCNC: 0.9 MG/DL (ref 0.5–1.4)
DIFFERENTIAL METHOD: ABNORMAL
EOSINOPHIL # BLD AUTO: 0 K/UL (ref 0–0.5)
EOSINOPHIL NFR BLD: 0 % (ref 0–8)
ERYTHROCYTE [DISTWIDTH] IN BLOOD BY AUTOMATED COUNT: 12.4 % (ref 11.5–14.5)
EST. GFR  (NO RACE VARIABLE): >60 ML/MIN/1.73 M^2
GLUCOSE SERPL-MCNC: 128 MG/DL (ref 70–110)
HCT VFR BLD AUTO: 36.7 % (ref 40–54)
HGB BLD-MCNC: 11.7 G/DL (ref 14–18)
IMM GRANULOCYTES # BLD AUTO: 0.04 K/UL (ref 0–0.04)
IMM GRANULOCYTES NFR BLD AUTO: 0.3 % (ref 0–0.5)
LYMPHOCYTES # BLD AUTO: 1 K/UL (ref 1–4.8)
LYMPHOCYTES NFR BLD: 8.5 % (ref 18–48)
MCH RBC QN AUTO: 31.6 PG (ref 27–31)
MCHC RBC AUTO-ENTMCNC: 31.9 G/DL (ref 32–36)
MCV RBC AUTO: 99 FL (ref 82–98)
MONOCYTES # BLD AUTO: 0.1 K/UL (ref 0.3–1)
MONOCYTES NFR BLD: 1.1 % (ref 4–15)
NEUTROPHILS # BLD AUTO: 10.4 K/UL (ref 1.8–7.7)
NEUTROPHILS NFR BLD: 90 % (ref 38–73)
NRBC BLD-RTO: 0 /100 WBC
PLATELET # BLD AUTO: 350 K/UL (ref 150–450)
PMV BLD AUTO: 10.4 FL (ref 9.2–12.9)
POTASSIUM SERPL-SCNC: 4.5 MMOL/L (ref 3.5–5.1)
RBC # BLD AUTO: 3.7 M/UL (ref 4.6–6.2)
SODIUM SERPL-SCNC: 138 MMOL/L (ref 136–145)
WBC # BLD AUTO: 11.53 K/UL (ref 3.9–12.7)

## 2022-12-08 PROCEDURE — 97535 SELF CARE MNGMENT TRAINING: CPT

## 2022-12-08 PROCEDURE — 80048 BASIC METABOLIC PNL TOTAL CA: CPT | Performed by: STUDENT IN AN ORGANIZED HEALTH CARE EDUCATION/TRAINING PROGRAM

## 2022-12-08 PROCEDURE — 63600175 PHARM REV CODE 636 W HCPCS: Performed by: STUDENT IN AN ORGANIZED HEALTH CARE EDUCATION/TRAINING PROGRAM

## 2022-12-08 PROCEDURE — 25000003 PHARM REV CODE 250: Performed by: STUDENT IN AN ORGANIZED HEALTH CARE EDUCATION/TRAINING PROGRAM

## 2022-12-08 PROCEDURE — 36415 COLL VENOUS BLD VENIPUNCTURE: CPT | Performed by: STUDENT IN AN ORGANIZED HEALTH CARE EDUCATION/TRAINING PROGRAM

## 2022-12-08 PROCEDURE — 97161 PT EVAL LOW COMPLEX 20 MIN: CPT

## 2022-12-08 PROCEDURE — 99024 PR POST-OP FOLLOW-UP VISIT: ICD-10-PCS | Mod: ,,,

## 2022-12-08 PROCEDURE — 25000003 PHARM REV CODE 250

## 2022-12-08 PROCEDURE — 99024 POSTOP FOLLOW-UP VISIT: CPT | Mod: ,,,

## 2022-12-08 PROCEDURE — 11000001 HC ACUTE MED/SURG PRIVATE ROOM

## 2022-12-08 PROCEDURE — 97165 OT EVAL LOW COMPLEX 30 MIN: CPT

## 2022-12-08 PROCEDURE — S4991 NICOTINE PATCH NONLEGEND: HCPCS | Performed by: STUDENT IN AN ORGANIZED HEALTH CARE EDUCATION/TRAINING PROGRAM

## 2022-12-08 PROCEDURE — 97530 THERAPEUTIC ACTIVITIES: CPT

## 2022-12-08 PROCEDURE — 97116 GAIT TRAINING THERAPY: CPT

## 2022-12-08 PROCEDURE — 85025 COMPLETE CBC W/AUTO DIFF WBC: CPT | Performed by: STUDENT IN AN ORGANIZED HEALTH CARE EDUCATION/TRAINING PROGRAM

## 2022-12-08 RX ORDER — OXYCODONE HYDROCHLORIDE 5 MG/1
5 TABLET ORAL EVERY 4 HOURS PRN
Status: DISCONTINUED | OUTPATIENT
Start: 2022-12-08 | End: 2022-12-12 | Stop reason: HOSPADM

## 2022-12-08 RX ORDER — OXYCODONE HYDROCHLORIDE 10 MG/1
10 TABLET ORAL EVERY 4 HOURS PRN
Status: DISCONTINUED | OUTPATIENT
Start: 2022-12-08 | End: 2022-12-12 | Stop reason: HOSPADM

## 2022-12-08 RX ORDER — OXYCODONE HYDROCHLORIDE 5 MG/1
5 TABLET ORAL EVERY 4 HOURS PRN
Status: DISCONTINUED | OUTPATIENT
Start: 2022-12-08 | End: 2022-12-08

## 2022-12-08 RX ORDER — HYDROMORPHONE HYDROCHLORIDE 1 MG/ML
1 INJECTION, SOLUTION INTRAMUSCULAR; INTRAVENOUS; SUBCUTANEOUS EVERY 4 HOURS PRN
Status: DISCONTINUED | OUTPATIENT
Start: 2022-12-08 | End: 2022-12-12

## 2022-12-08 RX ORDER — HYDROMORPHONE HYDROCHLORIDE 1 MG/ML
1 INJECTION, SOLUTION INTRAMUSCULAR; INTRAVENOUS; SUBCUTANEOUS
Status: DISCONTINUED | OUTPATIENT
Start: 2022-12-08 | End: 2022-12-08

## 2022-12-08 RX ADMIN — PREGABALIN 75 MG: 75 CAPSULE ORAL at 09:12

## 2022-12-08 RX ADMIN — METOPROLOL TARTRATE 25 MG: 25 TABLET, FILM COATED ORAL at 05:12

## 2022-12-08 RX ADMIN — METOPROLOL TARTRATE 25 MG: 25 TABLET, FILM COATED ORAL at 12:12

## 2022-12-08 RX ADMIN — HYDROMORPHONE HYDROCHLORIDE 1 MG: 1 INJECTION, SOLUTION INTRAMUSCULAR; INTRAVENOUS; SUBCUTANEOUS at 12:12

## 2022-12-08 RX ADMIN — PREGABALIN 75 MG: 75 CAPSULE ORAL at 08:12

## 2022-12-08 RX ADMIN — OXYCODONE 5 MG: 5 TABLET ORAL at 06:12

## 2022-12-08 RX ADMIN — METOPROLOL TARTRATE 25 MG: 25 TABLET, FILM COATED ORAL at 11:12

## 2022-12-08 RX ADMIN — OXYCODONE HYDROCHLORIDE 10 MG: 10 TABLET ORAL at 09:12

## 2022-12-08 RX ADMIN — METHOCARBAMOL 1000 MG: 500 TABLET ORAL at 09:12

## 2022-12-08 RX ADMIN — METHOCARBAMOL 1000 MG: 500 TABLET ORAL at 05:12

## 2022-12-08 RX ADMIN — AMLODIPINE BESYLATE 10 MG: 10 TABLET ORAL at 08:12

## 2022-12-08 RX ADMIN — HEPARIN SODIUM 5000 UNITS: 5000 INJECTION INTRAVENOUS; SUBCUTANEOUS at 09:12

## 2022-12-08 RX ADMIN — OXYCODONE 5 MG: 5 TABLET ORAL at 01:12

## 2022-12-08 RX ADMIN — DEXTROSE MONOHYDRATE 1 G: 2.5 INJECTION INTRAVENOUS at 05:12

## 2022-12-08 RX ADMIN — METHOCARBAMOL 1000 MG: 500 TABLET ORAL at 11:12

## 2022-12-08 RX ADMIN — DEXTROSE MONOHYDRATE 1 G: 2.5 INJECTION INTRAVENOUS at 09:12

## 2022-12-08 RX ADMIN — LISINOPRIL 20 MG: 20 TABLET ORAL at 08:12

## 2022-12-08 RX ADMIN — POLYETHYLENE GLYCOL 3350 17 G: 17 POWDER, FOR SOLUTION ORAL at 08:12

## 2022-12-08 RX ADMIN — NICOTINE 1 PATCH: 14 PATCH, EXTENDED RELEASE TRANSDERMAL at 08:12

## 2022-12-08 RX ADMIN — HYDROMORPHONE HYDROCHLORIDE 1 MG: 1 INJECTION, SOLUTION INTRAMUSCULAR; INTRAVENOUS; SUBCUTANEOUS at 05:12

## 2022-12-08 RX ADMIN — PANTOPRAZOLE SODIUM 40 MG: 20 TABLET, DELAYED RELEASE ORAL at 08:12

## 2022-12-08 RX ADMIN — DEXTROSE MONOHYDRATE 1 G: 2.5 INJECTION INTRAVENOUS at 01:12

## 2022-12-08 NOTE — HPI
62 M with hx of tobacco abuse who is s/p C1-4 ORIF d/t complex odontoid fracture in early oct of 2022 presented in [facility].  His neck pain was improving and he was at his neurobaseline however, his xrays showed worsening sagittal alignment secondary to hardware failure.  CT c spine showed haloing of the right C1 lateral mass screw with failure of the bilateral C3/4 lateral mass screws and worsened kyphotic deformity of his prior odontoid fracture.  He was thus offered a posterior cervical revision fusion with possible extension to the occiput.

## 2022-12-08 NOTE — NURSING TRANSFER
Nursing Transfer Note      12/7/2022     Reason patient is being transferred: post procedure    Transfer To: 944    Transfer via bed    Transfer with cardiac monitoring, personal belongings (walker, eye glasses, celphone, clothes, shoes)    Transported by PCT    Medicines sent: none    Any special needs or follow-up needed: routine    Chart send with patient: Yes    Notified: daughter    Patient reassessed at: 12/7/2022 at 2000

## 2022-12-08 NOTE — PLAN OF CARE
Eval and POC in place spinal precautions Aspen collar AAT.  Problem: Occupational Therapy  Goal: Occupational Therapy Goal  Description: Goals to be met by: 1/09/22     Patient will increase functional independence with ADLs by performing:    LE Dressing with [facility].  Grooming while standing at sink with [facility].  Toileting from toilet with [facility] for hygiene and clothing management.   Toilet transfer to toilet with [facility] with RW and grab bars.    Outcome: Ongoing, Progressing

## 2022-12-08 NOTE — SUBJECTIVE & OBJECTIVE
Interval History:  NAEON. AFVSS. POD 1 s/p C1-T1 posterior cervical fusion revision. Neuro stable. Ambulated well with therapy. HV drain without high output, will keep. C-collar in place. Post op XR pending. Tolerating PO. Blanco removed this am, will follow up voiding.     Medications:  Continuous Infusions:  Scheduled Meds:   amLODIPine  10 mg Oral Daily    ceFAZolin (ANCEF) IVPB  1 g Intravenous Q8H    heparin (porcine)  5,000 Units Subcutaneous Q8H    lisinopriL  20 mg Oral Daily    methocarbamoL  1,000 mg Oral Q8H    metoprolol tartrate  25 mg Oral Q6H    nicotine  1 patch Transdermal Daily    pantoprazole  40 mg Oral Daily    polyethylene glycol  17 g Oral Daily    pregabalin  75 mg Oral BID     PRN Meds:acetaminophen, bisacodyL, diazePAM, glucagon (human recombinant), glucose, glucose, HYDROmorphone, mupirocin, ondansetron, oxyCODONE, oxyCODONE, tiZANidine     Review of Systems  Objective:     Weight: 64.4 kg (142 lb)  Body mass index is 22.24 kg/m².  Vital Signs (Most Recent):  Temp: 98.1 °F (36.7 °C) (12/08/22 1142)  Pulse: 96 (12/08/22 1142)  Resp: 16 (12/08/22 1310)  BP: 133/77 (12/08/22 1142)  SpO2: 98 % (12/08/22 1142) Vital Signs (24h Range):  Temp:  [96.8 °F (36 °C)-98.1 °F (36.7 °C)] 98.1 °F (36.7 °C)  Pulse:  [61-96] 96  Resp:  [15-21] 16  SpO2:  [94 %-100 %] 98 %  BP: (126-183)/() 133/77                          Closed/Suction Drain 12/07/22 1628 Posterior Neck 10 Fr. (Active)   Dressing Status Clean;Dry;Intact 12/08/22 0800   Dressing Intervention Integrity maintained 12/08/22 0800   Drainage Bloody 12/07/22 2100   Status To bulb suction 12/07/22 2100   Output (mL) 70 mL 12/08/22 0608       Neurosurgery Physical Exam  General: Well developed, well nourished, not in acute distress.   Head: Normocephalic, atraumatic.  Neck: C-collar in place.  Neurologic: Alert and oriented x 4. Thought content appropriate.  GCS: Motor:6  Verbal:5  Eyes:4   GCS Total: 15  Language: No aphasia.  Speech: No  dysarthria.  Cranial nerves: Face symmetric, tongue midline, CN II-XII grossly intact.   Eyes: Pupils equal, round, reactive to light with accomodation, EOMI.   Pulmonary: Normal respirations, no signs of respiratory distress.  Abdomen: Soft, non-distended, non-tender to palpation.  Sensory: Intact to light touch throughout.  Motor Strength: Moves all extremities spontaneously with good tone. Full strength upper and lower extremities. No abnormal movements seen.     Strength  Deltoids Triceps Biceps Wrist Extension Wrist Flexion Hand    Upper: R 5/5 5/5 5/5 5/5 5/5 5/5    L 5/5 5/5 5/5 5/5 5/5 5/5     Hip Flexion Knee Extension Knee  Flexion Dorsiflexion Plantar flexion EHL   Lower: R 5/5 5/5 5/5 5/5 5/5 5/5    L 5/5 5/5 5/5 5/5 5/5 5/5   4+/5 hand intrinsics    Bains: Absent.  Vascular: Pulses 2+ and symmetric radial and dorsalis pedis. No LE edema.   Skin: Skin is warm, dry and intact.  Incision c/d/I with skin edges well approximated with monocryl and prineo tape. No surrounding erythema or edema. No drainage from incision. No palpable hematoma or underlying fluid collection.    HV drain in place to full suction with SS output.    Significant Labs:  Recent Labs   Lab 12/08/22  0433   *      K 4.5      CO2 23   BUN 12   CREATININE 0.9   CALCIUM 10.0     Recent Labs   Lab 12/08/22  0433   WBC 11.53   HGB 11.7*   HCT 36.7*        No results for input(s): LABPT, INR, APTT in the last 48 hours.  Microbiology Results (last 7 days)       Procedure Component Value Units Date/Time    AFB Culture & Smear [[id number]] Collected: 12/07/22 1503    Order Status: Completed Specimen: Incision site from Back Updated: 12/08/22 1237     AFB CULTURE STAIN No acid fast bacilli seen.    Narrative:      LATERAL MASS SCREW TRACK    AFB Culture & Smear [[id number]] Collected: 12/07/22 1533    Order Status: Completed Specimen: Incision site from Back Updated: 12/08/22 1237     AFB CULTURE STAIN No acid fast  bacilli seen.    Narrative:      C1 SCREW TRACT    AFB Culture & Smear [[id number]] Collected: 12/07/22 1500    Order Status: Completed Specimen: Incision site from Back Updated: 12/08/22 1237     AFB CULTURE STAIN No acid fast bacilli seen.    Narrative:      EXPLANTED HARDWARE FOR CULTURE    Fungus culture [[id number]] Collected: 12/07/22 1533    Order Status: Completed Specimen: Incision site from Back Updated: 12/08/22 1020     Fungus (Mycology) Culture Culture in progress    Narrative:      C1 SCREW TRACT    Fungus culture [[id number]] Collected: 12/07/22 1503    Order Status: Completed Specimen: Incision site from Back Updated: 12/08/22 1009     Fungus (Mycology) Culture Culture in progress    Narrative:      LATERAL MASS SCREW TRACK    Fungus culture [[id number]] Collected: 12/07/22 1500    Order Status: Completed Specimen: Incision site from Back Updated: 12/08/22 1009     Fungus (Mycology) Culture Culture in progress    Narrative:      EXPLANTED HARDWARE FOR CULTURE    Culture, Anaerobe [[id number]] Collected: 12/07/22 1500    Order Status: Completed Specimen: Incision site from Back Updated: 12/08/22 0750     Anaerobic Culture Culture in progress    Narrative:      EXPLANTED HARDWARE FOR CULTURE    Culture, Anaerobe [[id number]] Collected: 12/07/22 1533    Order Status: Completed Specimen: Incision site from Back Updated: 12/08/22 0750     Anaerobic Culture Culture in progress    Narrative:      C1 SCREW TRACT    Culture, Anaerobe [[id number]] Collected: 12/07/22 1503    Order Status: Completed Specimen: Incision site from Back Updated: 12/08/22 0750     Anaerobic Culture Culture in progress    Narrative:      LATERAL MASS SCREW TRACK    Aerobic culture [[id number]] Collected: 12/07/22 1500    Order Status: Completed Specimen: Incision site from Back Updated: 12/08/22 0739     Aerobic Bacterial Culture No growth    Narrative:      EXPLANTED HARDWARE FOR CULTURE    Aerobic culture [[id number]] Collected: 12/07/22  1503    Order Status: Completed Specimen: Incision site from Back Updated: 12/08/22 0739     Aerobic Bacterial Culture No growth    Narrative:      LATERAL MASS SCREW TRACK    Aerobic culture [[id number]] Collected: 12/07/22 1533    Order Status: Completed Specimen: Incision site from Back Updated: 12/08/22 0739     Aerobic Bacterial Culture No growth    Narrative:      C1 SCREW TRACT    Gram stain [[id number]] Collected: 12/07/22 1500    Order Status: Completed Specimen: Incision site from Back Updated: 12/07/22 2001     Gram Stain Result No WBC's      No organisms seen    Narrative:      EXPLANTED HARDWARE FOR CULTURE    Gram stain [[id number]] Collected: 12/07/22 1503    Order Status: Completed Specimen: Incision site from Back Updated: 12/07/22 1959     Gram Stain Result Rare WBC's      No organisms seen    Narrative:      LATERAL MASS SCREW TRACK    Gram stain [[id number]] Collected: 12/07/22 1533    Order Status: Completed Specimen: Incision site from Back Updated: 12/07/22 1823     Gram Stain Result Rare WBC's      No organisms seen    Narrative:      C1 SCREW TRACT          All pertinent labs from the last 24 hours have been reviewed.    Significant Diagnostics:  I have reviewed and interpreted all pertinent imaging results/findings within the past 24 hours.

## 2022-12-08 NOTE — ANESTHESIA POSTPROCEDURE EVALUATION
Anesthesia Post Evaluation    Patient: Israel De Jesus    Procedure(s) Performed: Procedure(s) (LRB):  FUSION,SPINE,CERVICAL,POSTERIOR APPROACH C1-T1 (N/A)    Final Anesthesia Type: general      Patient location during evaluation: PACU  Patient participation: Yes- Able to Participate  Level of consciousness: awake and alert  Post-procedure vital signs: reviewed and stable  Pain management: adequate  Airway patency: patent    PONV status at discharge: No PONV  Anesthetic complications: no      Cardiovascular status: blood pressure returned to baseline  Respiratory status: unassisted  Hydration status: euvolemic  Follow-up not needed.          Vitals Value Taken Time   /74 12/08/22 0542   Temp 36.4 °C (97.6 °F) 12/08/22 0542   Pulse 78 12/08/22 0542   Resp 19 12/08/22 0542   SpO2 98 % 12/08/22 0542         Event Time   Out of Recovery 17:45:00         Pain/Fannie Score: Pain Rating Prior to Med Admin: 10 (12/8/2022  5:00 AM)  Pain Rating Post Med Admin: 0 (12/8/2022 12:41 AM)  Fannie Score: 10 (12/7/2022  5:45 PM)

## 2022-12-08 NOTE — PLAN OF CARE
[facility] - Neurosurgery (Hospital)  Initial Discharge Assessment       Primary Care Provider: Andrae Camarillo MD    Admission Diagnosis: Kyphosis, unspecified kyphosis type, unspecified spinal region [M40.209]  Odontoid fracture with type II morphology [S12.110A]    Admission Date: 12/7/2022  Expected Discharge Date: 12/10/2022    Discharge Barriers Identified: None    Payor: MEDICAID / Plan: MEDICAID [place] / Product Type: Government /     Extended Emergency Contact Information  Primary Emergency Contact: Melissa De Jesus   [facility]  Home Phone: [phone]  Mobile Phone: [phone]  Relation: Daughter    Discharge Plan A: Home  Discharge Plan B: Rehab      [id number] - [place] - [address] AT [facility]  [address]  [place] [address]  Phone: [phone] Fax: [phone]    [id number] - [place] - [address] AT [facility] [place]  [address]  [place] [address]  Phone: [phone] Fax: [phone]    [id number] - [place] - [address]  [address]  [place] [address]  Phone: [phone] Fax: [phone]    CM met with patient to obtain discharge planning assessment.  Patient provided with discharge planning booklet.    Initial Assessment (most recent)       Adult Discharge Assessment - 12/08/22 1319          Discharge Assessment    Assessment Type Discharge Planning Assessment     Confirmed/corrected address, phone number and insurance Yes     Confirmed Demographics Correct on Facesheet     Source of Information patient     Communicated BRANDON with patient/caregiver Yes     Reason For Admission kyphosis unspecified     People in Home alone     Do you expect to return to your current living situation? Yes     Do you have help at home or someone to help you manage your care at home? No     Prior to hospitilization cognitive status: Alert/Oriented     Current cognitive status:  Alert/Oriented     Walking or Climbing Stairs ambulation difficulty, requires equipment     Mobility Management rolling walker     Dressing/Bathing bathing difficulty, requires equipment     Dressing/Bathing Management bath bench     Equipment Currently Used at Home walker, rolling;bath bench     Readmission within 30 days? No     Patient currently being followed by outpatient case management? Unable to determine (comments)     Do you currently have service(s) that help you manage your care at home? No     Do you take prescription medications? Yes     Do you have prescription coverage? Yes     Coverage MEDICAID - MEDICAID [place]     Do you have any problems affording any of your prescribed medications? No     Is the patient taking medications as prescribed? yes     Who is going to help you get home at discharge? family     How do you get to doctors appointments? family or friend will provide     Are you on dialysis? No     Do you take coumadin? No     Discharge Plan A Home     Discharge Plan B Rehab     DME Needed Upon Discharge  none     Discharge Plan discussed with: Patient     Discharge Barriers Identified None

## 2022-12-08 NOTE — PLAN OF CARE
PT Jorge complete, appropriate goals created    Problem: Physical Therapy  Goal: Physical Therapy Goal  Description: Goals to be met by: 22     Patient will increase functional independence with mobility by performin. Supine to sit with [place]  2. Sit to stand transfer with [place]  3. Bed to chair transfer with [place] using No Assistive Device  4. Gait  x 300 feet with Modified [place] using Rolling Walker.   5. Ascend/descend 4 stair with right Handrails Modified [place] using No Assistive Device.     Outcome: Ongoing, Progressing

## 2022-12-08 NOTE — PROGRESS NOTES
[facility] - Neurosurgery ([facility])  Neurosurgery  Progress Note    Subjective:     History of Present Illness: 62 M with hx of tobacco abuse who is s/p C1-4 ORIF d/t complex odontoid fracture in early oct of 2022 presented in [facility].  His neck pain was improving and he was at his neurobaseline however, his xrays showed worsening sagittal alignment secondary to hardware failure.  CT c spine showed haloing of the right C1 lateral mass screw with failure of the bilateral C3/4 lateral mass screws and worsened kyphotic deformity of his prior odontoid fracture.  He was thus offered a posterior cervical revision fusion with possible extension to the occiput.      Post-Op Info:  Procedure(s) (LRB):  FUSION,SPINE,CERVICAL,POSTERIOR APPROACH C1-T1 (N/A)   1 Day Post-Op     Interval History:  NAEON. AFVSS. POD 1 s/p C1-T1 posterior cervical fusion revision. Neuro stable. Reports intermittent left finger numbness which is his baseline. Ambulated well with therapy. HV drain without high output, will keep. C-collar in place. Post op XR pending. Tolerating PO. Blanco removed this am, will follow up voiding.     Medications:  Continuous Infusions:  Scheduled Meds:   amLODIPine  10 mg Oral Daily    ceFAZolin (ANCEF) IVPB  1 g Intravenous Q8H    heparin (porcine)  5,000 Units Subcutaneous Q8H    lisinopriL  20 mg Oral Daily    methocarbamoL  1,000 mg Oral Q8H    metoprolol tartrate  25 mg Oral Q6H    nicotine  1 patch Transdermal Daily    pantoprazole  40 mg Oral Daily    polyethylene glycol  17 g Oral Daily    pregabalin  75 mg Oral BID     PRN Meds:acetaminophen, bisacodyL, diazePAM, glucagon (human recombinant), glucose, glucose, HYDROmorphone, mupirocin, ondansetron, oxyCODONE, oxyCODONE, tiZANidine     Review of Systems  Objective:     Weight: 64.4 kg (142 lb)  Body mass index is 22.24 kg/m².  Vital Signs (Most Recent):  Temp: 98.1 °F (36.7 °C) (12/08/22 1142)  Pulse: 96 (12/08/22 1142)  Resp: 16 (12/08/22 1310)  BP: 133/77  (12/08/22 1142)  SpO2: 98 % (12/08/22 1142) Vital Signs (24h Range):  Temp:  [96.8 °F (36 °C)-98.1 °F (36.7 °C)] 98.1 °F (36.7 °C)  Pulse:  [61-96] 96  Resp:  [15-21] 16  SpO2:  [94 %-100 %] 98 %  BP: (126-183)/() 133/77                          Closed/Suction Drain 12/07/22 1628 Posterior Neck 10 Fr. (Active)   Dressing Status Clean;Dry;Intact 12/08/22 0800   Dressing Intervention Integrity maintained 12/08/22 0800   Drainage Bloody 12/07/22 2100   Status To bulb suction 12/07/22 2100   Output (mL) 70 mL 12/08/22 0608       Neurosurgery Physical Exam  General: Well developed, well nourished, not in acute distress.   Head: Normocephalic, atraumatic.  Neck: C-collar in place.  Neurologic: Alert and oriented x 4. Thought content appropriate.  GCS: Motor:6  Verbal:5  Eyes:4   GCS Total: 15  Language: No aphasia.  Speech: No dysarthria.  Cranial nerves: Face symmetric, tongue midline, CN II-XII grossly intact.   Eyes: Pupils equal, round, reactive to light with accomodation, EOMI.   Pulmonary: Normal respirations, no signs of respiratory distress.  Abdomen: Soft, non-distended, non-tender to palpation.  Sensory: Intact to light touch throughout.  Motor Strength: Moves all extremities spontaneously with good tone. Full strength upper and lower extremities. No abnormal movements seen.     Strength  Deltoids Triceps Biceps Wrist Extension Wrist Flexion Hand    Upper: R 5/5 5/5 5/5 5/5 5/5 5/5    L 5/5 5/5 5/5 5/5 5/5 5/5     Hip Flexion Knee Extension Knee  Flexion Dorsiflexion Plantar flexion EHL   Lower: R 5/5 5/5 5/5 5/5 5/5 5/5    L 5/5 5/5 5/5 5/5 5/5 5/5   4+/5 hand intrinsics    Bains: Absent.  Vascular: Pulses 2+ and symmetric radial and dorsalis pedis. No LE edema.   Skin: Skin is warm, dry and intact.  Incision c/d/I with skin edges well approximated with monocryl and prineo tape. No surrounding erythema or edema. No drainage from incision. No palpable hematoma or underlying fluid collection.    HV  drain in place to full suction with SS output.    Significant Labs:  Recent Labs   Lab 12/08/22  0433   *      K 4.5      CO2 23   BUN 12   CREATININE 0.9   CALCIUM 10.0     Recent Labs   Lab 12/08/22  0433   WBC 11.53   HGB 11.7*   HCT 36.7*        No results for input(s): LABPT, INR, APTT in the last 48 hours.  Microbiology Results (last 7 days)       Procedure Component Value Units Date/Time    AFB Culture & Smear [[id number]] Collected: 12/07/22 1503    Order Status: Completed Specimen: Incision site from Back Updated: 12/08/22 1237     AFB CULTURE STAIN No acid fast bacilli seen.    Narrative:      LATERAL MASS SCREW TRACK    AFB Culture & Smear [[id number]] Collected: 12/07/22 1533    Order Status: Completed Specimen: Incision site from Back Updated: 12/08/22 1237     AFB CULTURE STAIN No acid fast bacilli seen.    Narrative:      C1 SCREW TRACT    AFB Culture & Smear [[id number]] Collected: 12/07/22 1500    Order Status: Completed Specimen: Incision site from Back Updated: 12/08/22 1237     AFB CULTURE STAIN No acid fast bacilli seen.    Narrative:      EXPLANTED HARDWARE FOR CULTURE    Fungus culture [[id number]] Collected: 12/07/22 1533    Order Status: Completed Specimen: Incision site from Back Updated: 12/08/22 1020     Fungus (Mycology) Culture Culture in progress    Narrative:      C1 SCREW TRACT    Fungus culture [[id number]] Collected: 12/07/22 1503    Order Status: Completed Specimen: Incision site from Back Updated: 12/08/22 1009     Fungus (Mycology) Culture Culture in progress    Narrative:      LATERAL MASS SCREW TRACK    Fungus culture [[id number]] Collected: 12/07/22 1500    Order Status: Completed Specimen: Incision site from Back Updated: 12/08/22 1009     Fungus (Mycology) Culture Culture in progress    Narrative:      EXPLANTED HARDWARE FOR CULTURE    Culture, Anaerobe [[id number]] Collected: 12/07/22 1500    Order Status: Completed Specimen: Incision site from Back  Updated: 12/08/22 0750     Anaerobic Culture Culture in progress    Narrative:      EXPLANTED HARDWARE FOR CULTURE    Culture, Anaerobe [[id number]] Collected: 12/07/22 1533    Order Status: Completed Specimen: Incision site from Back Updated: 12/08/22 0750     Anaerobic Culture Culture in progress    Narrative:      C1 SCREW TRACT    Culture, Anaerobe [[id number]] Collected: 12/07/22 1503    Order Status: Completed Specimen: Incision site from Back Updated: 12/08/22 0750     Anaerobic Culture Culture in progress    Narrative:      LATERAL MASS SCREW TRACK    Aerobic culture [[id number]] Collected: 12/07/22 1500    Order Status: Completed Specimen: Incision site from Back Updated: 12/08/22 0739     Aerobic Bacterial Culture No growth    Narrative:      EXPLANTED HARDWARE FOR CULTURE    Aerobic culture [[id number]] Collected: 12/07/22 1503    Order Status: Completed Specimen: Incision site from Back Updated: 12/08/22 0739     Aerobic Bacterial Culture No growth    Narrative:      LATERAL MASS SCREW TRACK    Aerobic culture [[id number]] Collected: 12/07/22 1533    Order Status: Completed Specimen: Incision site from Back Updated: 12/08/22 0739     Aerobic Bacterial Culture No growth    Narrative:      C1 SCREW TRACT    Gram stain [[id number]] Collected: 12/07/22 1500    Order Status: Completed Specimen: Incision site from Back Updated: 12/07/22 2001     Gram Stain Result No WBC's      No organisms seen    Narrative:      EXPLANTED HARDWARE FOR CULTURE    Gram stain [[id number]] Collected: 12/07/22 1503    Order Status: Completed Specimen: Incision site from Back Updated: 12/07/22 1959     Gram Stain Result Rare WBC's      No organisms seen    Narrative:      LATERAL MASS SCREW TRACK    Gram stain [[id number]] Collected: 12/07/22 1533    Order Status: Completed Specimen: Incision site from Back Updated: 12/07/22 1823     Gram Stain Result Rare WBC's      No organisms seen    Narrative:      C1 SCREW TRACT          All  pertinent labs from the last 24 hours have been reviewed.    Significant Diagnostics:  I have reviewed and interpreted all pertinent imaging results/findings within the past 24 hours.    Assessment/Plan:     * Odontoid fracture  Israel De Jesus is a 62 y.o. male with h/o odontoid fracture/type I hangman's s/p C1-C4 posterior cervical fusion on 10/12/22. He presents with hardware failure and worsening sagittal alignment. He is s/p C1-T1 posterior cervical revision 12/7/22 with Dr. Storm/Dr. Oliveira.    - Admit to neurosurgery floor.   -q4h neuro checks  - Patient is neurologically stable.  - All pertinent labs and diagnostics personally reviewed.  - Post op XR pending.  - HV drain to full suction. IV ancef while in place. Record output qshift.  - Pain control w/ multimodal regimen.  - DVT ppx: TEDs/SCDs/SQH  - Activity: PT/OT; OOB; C-collar at all times.  - Diet: regular.  - Bowel regimen: senna-docusate and miralax daily.  - Urinary: obrien removed this am, will f/u voiding status.  - Atelectasis ppx: Encourage IS hourly, up in chair at least 6 hrs/day.  - Please contact neurosurgery with any exam changes.    Discussed with Dr. Storm    Dispo: ongoing      IAN OrrC  Neurosurgery  [facility] - Neurosurgery ([facility])

## 2022-12-08 NOTE — HOSPITAL COURSE
12/8: DUNG SIMPSON. POD 1 s/p C1-T1 posterior cervical fusion revision. Neuro stable. Reports intermittent left finger numbness which is his baseline. Ambulated well with therapy. HV drain without high output, will keep. C-collar in place. Post op XR pending. Tolerating PO. Blanco removed this am, will follow up voiding.   12/9: DUNG SIMPSON. Neuro stable. Denies new numbness, weakness. Upper back feels sore but muscle relaxants are helping. Keep HV drain. Post op XR with hardware in satisfactory positioning. Voiding spontaneously. + flatus. Tolerating PO intake.   12/10: COLUMBA, continued lower neck burning pain, but no radiculopathy or weakness in upper extremities. OOB with PT. Drain output 30.   12/11: DUNG TRISTANS. Will remove HV today. No PAC from PT perspective, likely home this week

## 2022-12-08 NOTE — OP NOTE
DATE OF PROCEDURE: 12/7/22     PRIMARY SURGEON: Ike Storm DO NSGY     CO-SURGEON: Hasmukh Oliveira MD Orthopedics     PREOPERATIVE DIAGNOSIS:  1. Complex anteriorly displaced type II odontoid fracture  2. Type I hangman's fracture  3. S/P C1-4 posterior cervical decompression/fusion with hardware failure.     POSTOPERATIVE DIAGNOSIS:  Same.     PROCEDURE PERFORMED:  1. Posterior spinal revision fusion, C1-T1  2. Posterior nonsegmental spinal fixation, C1-T1(Depuy)  3. Open reduction and internal fixation of type II odontoid and type I hangman's fractures of C2  4. Use of intraoperative flouroscopy  5. Use of intraoperative neuromonitoring with MEPs  6. Synthetic bone grafting     ANESTHESIA: General endotracheal anesthesia.     ESTIMATED BLOOD LOSS: 100 mL.     IMPLANTS: DePuy Synthes screws  Altapore shape  Infuse     SPECIMENS: screw tract cultures     FINDINGS: None.     DRAINS: One deep HV drain     COMPLICATIONS: None.     SPONGE AND NEEDLE COUNT: Correct x2.     NEUROLOGICAL MONITORING: Motor evoked potentials, somatosensory evoked potential, free-running EMG.  There were no changes to preincisional MEP and SSEP baselines.       REASON FOR OPERATION AND BRIEF HISTORY AND PHYSICAL: Israel Milton a 62 y.o. male s/p C1-4 ORIF d/t complex odontoid fracture in early oct of 2022 presented in [facility].  His neck pain was improving and he was at his neurobaseline however, his xrays showed worsening sagittal alignment secondary to hardware failure.  CT c spine showed haloing of the right C1 lateral mass screw with failure of the bilateral C3/4 lateral mass screws and worsened kyphotic deformity of his prior odontoid fracture.  He was thus offered a posterior cervical revision fusion with possible extension to the occiput.     DESCRIPTION OF INFORMED CONSENT: Please see Dr. Storm's note for informed consent.     DESCRIPTION OF PROCEDURE:   The patient was brought into the operating room where he was  intubated and placed under general anesthesia without difficulty. All lines were placed.  Pre flip motors were done and found to be reliable in all 4 extremities.  A Tineo clamp was placed bitemporally and he was repositioned prone onto the hospital bed with the head fixed to the table in capital flexion and neck extension. Appropriate padding of all pressure points was placed.  Xrays were used to localize the region of interest. It also showed adequate spinal alignment in the sagittal plane with minimal reduction of his displaced odontoid fracture.  Motors were run again and found to be consistent with baseline.  The posterior cervical spine was marked prepped and draped in the usual sterile fashion.  A timeout was performed prior to the procedure.  10mL of Lidocaine with epinephrine were injected in the skin.     A linear incision was made with a 10 blade from approximately C1-T1.  Suprafascial dissection was carried out in the midline with Bovie electrocautery from C1 to T1.  We then identified the prior hardware from C1-4 and carried out our subperiosteal dissection from C2-T1 with bovie and mcghee elevators.  We then removed the caps from the screws and then removed the rods.  I then scrubbed out in order to readjust the patient's head in order to obtain further reduction of his displaced odontoid fracture.  Motors following readjustment were stable with baseline and xrays showed minimal improvement in the odontoid fracture.     We then removed the prior screws from C1-4.  Both lateral mass screws were quite loose and easily removed.  The tracts had a mucoid film in them.  This tract was swabbed and sent for cultures in addition to the hardware.  We then removed bilateral C1 lateral mass screws and also swabbed their screw tracts.  We confirmed no breeches in the C1 lateral mass tracts with ball tip feeler and then placed upsized 5.0 x30mm threaded screws.  These screws exhibited solid purchase.  We chose not  to reinstrument the bilateral C3,4 lateral masses as they were not suitable for hardware.  AP and lateral xrays showed good hardware placement and motors were consistent with baseline.     We then performed inferior C7 facetectomies with osteotome to expose our entry point for bilateral T1 pedicle screws.  We placed bilateral T1 pedicle screws using anatomic landmarks and free hand technique.  AP and lateral xrays showed good hardware placement and motors were consistent with baseline.     We then placed bilateral lateral mass screws at C5,6 using anatomic landmarks and free hand technique.  Fluoro showed excellent placement of hardware and motors were consistent with baseline.     We then decorticated the posterior elements from C2-T1.     Titanium rods were placed into the tulip heads from C1 to T1 bilaterally. Set screws were tightened. Final xrays showed adequate spinal alignment and a good positioning of the final hardware construct.The wound was copiously irrigated with a dilute Betadine solution and normal saline. Infuse and altapore shape was then packed bilaterally from C1-T1 to promote arthrodesis.  One gram of vancomycin powder was placed into the wound. A subfascial Hemovac drain was placed and tunneled through a separate incision, where it was secured with Vicryl sutures.  A watertight fascial closure was achieved with interrupted 0 Vicryl sutures and a running Stratafix suture.      Please see Dr. Storm's OP note for rest of closure and extubation.    JUSTIFICATION OF CO-SURGEON AND MODIFIER -22: This was a complex hardware failure case in a patient with a displaced type II odontoid fracture in the setting of type I hangman's fracture of C2 who already undergone ORIF.  It was felt that 2 attending surgeons were needed in order to limit blood loss, revise hardware and reduce and fixate the fracture in order to optimize patient outcomes.    Hasmukh Oliveira MD  Orthopaedic Spine Surgeon  Department of  Orthopaedic Surgery  [phone]
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
## 2022-12-08 NOTE — ASSESSMENT & PLAN NOTE
Israel De Jesus is a 62 y.o. male with h/o odontoid fracture/type I hangman's s/p C1-C4 posterior cervical fusion on 10/12/22. He presents with hardware failure and worsening sagittal alignment. He is s/p C1-T1 posterior cervical revision 12/7/22 with Dr. Storm/Dr. Oliveira.    - Admit to neurosurgery floor.   -q4h neuro checks  - Patient is neurologically stable.  - All pertinent labs and diagnostics personally reviewed.  - Post op XR pending.  - HV drain to full suction. IV ancef while in place. Record output qshift.  - Pain control w/ multimodal regimen.  - DVT ppx: TEDs/SCDs/SQH  - Activity: PT/OT; OOB; C-collar at all times.  - Diet: regular.  - Bowel regimen: senna-docusate and miralax daily.  - Urinary: obrien removed this am, will f/u voiding status.  - Atelectasis ppx: Encourage IS hourly, up in chair at least 6 hrs/day.  - Please contact neurosurgery with any exam changes.    Discussed with Dr. Storm    Dispo: ongoing
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
## 2022-12-09 LAB
ANION GAP SERPL CALC-SCNC: 6 MMOL/L (ref 8–16)
BASOPHILS # BLD AUTO: 0.02 K/UL (ref 0–0.2)
BASOPHILS NFR BLD: 0.2 % (ref 0–1.9)
BUN SERPL-MCNC: 17 MG/DL (ref 8–23)
CALCIUM SERPL-MCNC: 9.2 MG/DL (ref 8.7–10.5)
CHLORIDE SERPL-SCNC: 106 MMOL/L (ref 95–110)
CO2 SERPL-SCNC: 23 MMOL/L (ref 23–29)
CREAT SERPL-MCNC: 0.9 MG/DL (ref 0.5–1.4)
DIFFERENTIAL METHOD: ABNORMAL
EOSINOPHIL # BLD AUTO: 0 K/UL (ref 0–0.5)
EOSINOPHIL NFR BLD: 0.1 % (ref 0–8)
ERYTHROCYTE [DISTWIDTH] IN BLOOD BY AUTOMATED COUNT: 12.9 % (ref 11.5–14.5)
EST. GFR  (NO RACE VARIABLE): >60 ML/MIN/1.73 M^2
GLUCOSE SERPL-MCNC: 99 MG/DL (ref 70–110)
HCT VFR BLD AUTO: 30.6 % (ref 40–54)
HGB BLD-MCNC: 9.9 G/DL (ref 14–18)
IMM GRANULOCYTES # BLD AUTO: 0.03 K/UL (ref 0–0.04)
IMM GRANULOCYTES NFR BLD AUTO: 0.3 % (ref 0–0.5)
LYMPHOCYTES # BLD AUTO: 2 K/UL (ref 1–4.8)
LYMPHOCYTES NFR BLD: 17.9 % (ref 18–48)
MCH RBC QN AUTO: 31.5 PG (ref 27–31)
MCHC RBC AUTO-ENTMCNC: 32.4 G/DL (ref 32–36)
MCV RBC AUTO: 98 FL (ref 82–98)
MONOCYTES # BLD AUTO: 0.9 K/UL (ref 0.3–1)
MONOCYTES NFR BLD: 8.4 % (ref 4–15)
NEUTROPHILS # BLD AUTO: 8.2 K/UL (ref 1.8–7.7)
NEUTROPHILS NFR BLD: 73.1 % (ref 38–73)
NRBC BLD-RTO: 0 /100 WBC
PLATELET # BLD AUTO: 306 K/UL (ref 150–450)
PMV BLD AUTO: 10.5 FL (ref 9.2–12.9)
POTASSIUM SERPL-SCNC: 4 MMOL/L (ref 3.5–5.1)
RBC # BLD AUTO: 3.14 M/UL (ref 4.6–6.2)
SODIUM SERPL-SCNC: 135 MMOL/L (ref 136–145)
WBC # BLD AUTO: 11.19 K/UL (ref 3.9–12.7)

## 2022-12-09 PROCEDURE — 85025 COMPLETE CBC W/AUTO DIFF WBC: CPT | Performed by: STUDENT IN AN ORGANIZED HEALTH CARE EDUCATION/TRAINING PROGRAM

## 2022-12-09 PROCEDURE — 11000001 HC ACUTE MED/SURG PRIVATE ROOM

## 2022-12-09 PROCEDURE — 25000003 PHARM REV CODE 250

## 2022-12-09 PROCEDURE — 36415 COLL VENOUS BLD VENIPUNCTURE: CPT | Performed by: STUDENT IN AN ORGANIZED HEALTH CARE EDUCATION/TRAINING PROGRAM

## 2022-12-09 PROCEDURE — 63600175 PHARM REV CODE 636 W HCPCS: Performed by: STUDENT IN AN ORGANIZED HEALTH CARE EDUCATION/TRAINING PROGRAM

## 2022-12-09 PROCEDURE — 97116 GAIT TRAINING THERAPY: CPT | Mod: CQ

## 2022-12-09 PROCEDURE — 80048 BASIC METABOLIC PNL TOTAL CA: CPT | Performed by: STUDENT IN AN ORGANIZED HEALTH CARE EDUCATION/TRAINING PROGRAM

## 2022-12-09 PROCEDURE — 97530 THERAPEUTIC ACTIVITIES: CPT | Mod: CQ

## 2022-12-09 PROCEDURE — 25000003 PHARM REV CODE 250: Performed by: STUDENT IN AN ORGANIZED HEALTH CARE EDUCATION/TRAINING PROGRAM

## 2022-12-09 PROCEDURE — S4991 NICOTINE PATCH NONLEGEND: HCPCS | Performed by: STUDENT IN AN ORGANIZED HEALTH CARE EDUCATION/TRAINING PROGRAM

## 2022-12-09 PROCEDURE — 99024 PR POST-OP FOLLOW-UP VISIT: ICD-10-PCS | Mod: ,,,

## 2022-12-09 PROCEDURE — 99024 POSTOP FOLLOW-UP VISIT: CPT | Mod: ,,,

## 2022-12-09 RX ADMIN — DEXTROSE MONOHYDRATE 1 G: 2.5 INJECTION INTRAVENOUS at 09:12

## 2022-12-09 RX ADMIN — HEPARIN SODIUM 5000 UNITS: 5000 INJECTION INTRAVENOUS; SUBCUTANEOUS at 09:12

## 2022-12-09 RX ADMIN — METOPROLOL TARTRATE 25 MG: 25 TABLET, FILM COATED ORAL at 05:12

## 2022-12-09 RX ADMIN — PREGABALIN 75 MG: 75 CAPSULE ORAL at 09:12

## 2022-12-09 RX ADMIN — DEXTROSE MONOHYDRATE 1 G: 2.5 INJECTION INTRAVENOUS at 05:12

## 2022-12-09 RX ADMIN — METOPROLOL TARTRATE 25 MG: 25 TABLET, FILM COATED ORAL at 09:12

## 2022-12-09 RX ADMIN — HEPARIN SODIUM 5000 UNITS: 5000 INJECTION INTRAVENOUS; SUBCUTANEOUS at 01:12

## 2022-12-09 RX ADMIN — PANTOPRAZOLE SODIUM 40 MG: 20 TABLET, DELAYED RELEASE ORAL at 09:12

## 2022-12-09 RX ADMIN — METOPROLOL TARTRATE 25 MG: 25 TABLET, FILM COATED ORAL at 12:12

## 2022-12-09 RX ADMIN — HEPARIN SODIUM 5000 UNITS: 5000 INJECTION INTRAVENOUS; SUBCUTANEOUS at 05:12

## 2022-12-09 RX ADMIN — METHOCARBAMOL 1000 MG: 500 TABLET ORAL at 01:12

## 2022-12-09 RX ADMIN — NICOTINE 1 PATCH: 14 PATCH, EXTENDED RELEASE TRANSDERMAL at 09:12

## 2022-12-09 RX ADMIN — AMLODIPINE BESYLATE 10 MG: 10 TABLET ORAL at 09:12

## 2022-12-09 RX ADMIN — LISINOPRIL 20 MG: 20 TABLET ORAL at 09:12

## 2022-12-09 RX ADMIN — ACETAMINOPHEN 1000 MG: 500 TABLET ORAL at 12:12

## 2022-12-09 RX ADMIN — DEXTROSE MONOHYDRATE 1 G: 2.5 INJECTION INTRAVENOUS at 01:12

## 2022-12-09 RX ADMIN — DIAZEPAM 5 MG: 5 TABLET ORAL at 09:12

## 2022-12-09 RX ADMIN — METHOCARBAMOL 1000 MG: 500 TABLET ORAL at 05:12

## 2022-12-09 RX ADMIN — OXYCODONE HYDROCHLORIDE 10 MG: 10 TABLET ORAL at 09:12

## 2022-12-09 RX ADMIN — OXYCODONE HYDROCHLORIDE 10 MG: 10 TABLET ORAL at 05:12

## 2022-12-09 RX ADMIN — METHOCARBAMOL 1000 MG: 500 TABLET ORAL at 09:12

## 2022-12-09 NOTE — PROGRESS NOTES
Joel Zhou - Neurosurgery ([facility])  Neurosurgery  Progress Note    Subjective:     History of Present Illness: 62 M with hx of tobacco abuse who is s/p C1-4 ORIF d/t complex odontoid fracture in early oct of 2022 presented in routing [facility].  His neck pain was improving and he was at his neurobaseline however, his xrays showed worsening sagittal alignment secondary to hardware failure.  CT c spine showed haloing of the right C1 lateral mass screw with failure of the bilateral C3/4 lateral mass screws and worsened kyphotic deformity of his prior odontoid fracture.  He was thus offered a posterior cervical revision fusion with possible extension to the occiput.      Post-Op Info:  Procedure(s) (LRB):  FUSION,SPINE,CERVICAL,POSTERIOR APPROACH C1-T1 (N/A)   2 Days Post-Op     Interval History: NAEON. AFVSS. Neuro stable. Denies new numbness, weakness. Upper back feels sore but muscle relaxants are helping. Keep HV drain. Post op XR with hardware in satisfactory positioning. Voiding spontaneously. + flatus. Tolerating PO intake.    Medications:  Continuous Infusions:  Scheduled Meds:   amLODIPine  10 mg Oral Daily    ceFAZolin (ANCEF) IVPB  1 g Intravenous Q8H    heparin (porcine)  5,000 Units Subcutaneous Q8H    lisinopriL  20 mg Oral Daily    methocarbamoL  1,000 mg Oral Q8H    metoprolol tartrate  25 mg Oral Q6H    nicotine  1 patch Transdermal Daily    pantoprazole  40 mg Oral Daily    polyethylene glycol  17 g Oral Daily    pregabalin  75 mg Oral BID     PRN Meds:acetaminophen, bisacodyL, diazePAM, glucagon (human recombinant), glucose, glucose, HYDROmorphone, mupirocin, ondansetron, oxyCODONE, oxyCODONE, tiZANidine     Review of Systems  Objective:     Weight: 64.4 kg (142 lb)  Body mass index is 22.24 kg/m².  Vital Signs (Most Recent):  Temp: 98.5 °F (36.9 °C) (12/09/22 1517)  Pulse: 97 (12/09/22 1517)  Resp: 18 (12/09/22 1517)  BP: 115/61 (12/09/22 1517)  SpO2: 97 % (12/09/22 1517) Vital Signs (24h  Range):  Temp:  [96.5 °F (35.8 °C)-98.7 °F (37.1 °C)] 98.5 °F (36.9 °C)  Pulse:  [] 97  Resp:  [17-19] 18  SpO2:  [94 %-97 %] 97 %  BP: (107-131)/(61-83) 115/61     Date 12/09/22 0700 - 12/10/22 0659   Shift 7739-6608 1203-5367 5320-7902 24 Hour Total   INTAKE   P.O.  900  900   Shift Total(mL/kg)  900(14)  900(14)   OUTPUT   Urine(mL/kg/hr)  750  750   Shift Total(mL/kg)  750(11.6)  750(11.6)   Weight (kg) 64.4 64.4 64.4 64.4                        Closed/Suction Drain 12/07/22 1628 Posterior Neck 10 Fr. (Active)   Site Description Healing 12/09/22 0900   Dressing Status Clean;Dry;Intact 12/08/22 1902   Dressing Intervention Integrity maintained 12/08/22 1902   Drainage Bloody 12/09/22 0900   Status To bulb suction 12/09/22 0900   Output (mL) 128 mL 12/09/22 0556       Neurosurgery Physical Exam  General: Well developed, well nourished, not in acute distress.   Head: Normocephalic, atraumatic.  Neck: C-collar in place.  Neurologic: Alert and oriented x 4. Thought content appropriate.  GCS: Motor:6  Verbal:5  Eyes:4   GCS Total: 15  Language: No aphasia.  Speech: No dysarthria.  Cranial nerves: Face symmetric, tongue midline, CN II-XII grossly intact.   Eyes: Pupils equal, round, reactive to light with accomodation, EOMI.   Pulmonary: Normal respirations, no signs of respiratory distress.  Abdomen: Soft, non-distended, non-tender to palpation.  Sensory: Intact to light touch throughout.  Motor Strength: Moves all extremities spontaneously with good tone. Full strength upper and lower extremities. No abnormal movements seen.      Strength   Deltoids Triceps Biceps Wrist Extension Wrist Flexion Hand    Upper: R 5/5 5/5 5/5 5/5 5/5 5/5     L 5/5 5/5 5/5 5/5 5/5 5/5       Hip Flexion Knee Extension Knee  Flexion Dorsiflexion Plantar flexion EHL   Lower: R 5/5 5/5 5/5 5/5 5/5 5/5     L 5/5 5/5 5/5 5/5 5/5 5/5   4+/5 hand intrinsics     Bains: Absent.  Vascular: Pulses 2+ and symmetric radial and dorsalis  pedis. No LE edema.   Skin: Skin is warm, dry and intact.  Incision c/d/I with skin edges well approximated with monocryl and prineo tape. No surrounding erythema or edema. No drainage from incision. No palpable hematoma or underlying fluid collection.     HV drain in place to full suction with SS output.    Significant Labs:  Recent Labs   Lab 12/08/22 0433 12/09/22 0416   * 99    135*   K 4.5 4.0    106   CO2 23 23   BUN 12 17   CREATININE 0.9 0.9   CALCIUM 10.0 9.2     Recent Labs   Lab 12/08/22 0433 12/09/22 0416   WBC 11.53 11.19   HGB 11.7* 9.9*   HCT 36.7* 30.6*    306     No results for input(s): LABPT, INR, APTT in the last 48 hours.  Microbiology Results (last 7 days)       Procedure Component Value Units Date/Time    Culture, Anaerobe [[id number]] Collected: 12/07/22 1533    Order Status: Completed Specimen: Incision site from Back Updated: 12/09/22 0927     Anaerobic Culture Culture in progress    Narrative:      C1 SCREW TRACT    Culture, Anaerobe [[id number]] Collected: 12/07/22 1503    Order Status: Completed Specimen: Incision site from Back Updated: 12/09/22 0926     Anaerobic Culture Culture in progress    Narrative:      LATERAL MASS SCREW TRACK    Culture, Anaerobe [[id number]] Collected: 12/07/22 1500    Order Status: Completed Specimen: Incision site from Back Updated: 12/09/22 0925     Anaerobic Culture Culture in progress    Narrative:      EXPLANTED HARDWARE FOR CULTURE    AFB Culture & Smear [[id number]] Collected: 12/07/22 1533    Order Status: Completed Specimen: Incision site from Back Updated: 12/08/22 2127     AFB Culture & Smear Culture in progress     AFB CULTURE STAIN No acid fast bacilli seen.    Narrative:      C1 SCREW TRACT    AFB Culture & Smear [[id number]] Collected: 12/07/22 1500    Order Status: Completed Specimen: Incision site from Back Updated: 12/08/22 2127     AFB Culture & Smear Culture in progress     AFB CULTURE STAIN No acid fast bacilli  seen.    Narrative:      EXPLANTED HARDWARE FOR CULTURE    AFB Culture & Smear [[id number]] Collected: 12/07/22 1503    Order Status: Completed Specimen: Incision site from Back Updated: 12/08/22 2127     AFB Culture & Smear Culture in progress     AFB CULTURE STAIN No acid fast bacilli seen.    Narrative:      LATERAL MASS SCREW TRACK    Fungus culture [[id number]] Collected: 12/07/22 1533    Order Status: Completed Specimen: Incision site from Back Updated: 12/08/22 1020     Fungus (Mycology) Culture Culture in progress    Narrative:      C1 SCREW TRACT    Fungus culture [[id number]] Collected: 12/07/22 1503    Order Status: Completed Specimen: Incision site from Back Updated: 12/08/22 1009     Fungus (Mycology) Culture Culture in progress    Narrative:      LATERAL MASS SCREW TRACK    Fungus culture [[id number]] Collected: 12/07/22 1500    Order Status: Completed Specimen: Incision site from Back Updated: 12/08/22 1009     Fungus (Mycology) Culture Culture in progress    Narrative:      EXPLANTED HARDWARE FOR CULTURE    Aerobic culture [[id number]] Collected: 12/07/22 1500    Order Status: Completed Specimen: Incision site from Back Updated: 12/08/22 0739     Aerobic Bacterial Culture No growth    Narrative:      EXPLANTED HARDWARE FOR CULTURE    Aerobic culture [[id number]] Collected: 12/07/22 1503    Order Status: Completed Specimen: Incision site from Back Updated: 12/08/22 0739     Aerobic Bacterial Culture No growth    Narrative:      LATERAL MASS SCREW TRACK    Aerobic culture [[id number]] Collected: 12/07/22 1533    Order Status: Completed Specimen: Incision site from Back Updated: 12/08/22 0739     Aerobic Bacterial Culture No growth    Narrative:      C1 SCREW TRACT    Gram stain [[id number]] Collected: 12/07/22 1500    Order Status: Completed Specimen: Incision site from Back Updated: 12/07/22 2001     Gram Stain Result No WBC's      No organisms seen    Narrative:      EXPLANTED HARDWARE FOR CULTURE    Gram  stain [[id number]] Collected: 12/07/22 1503    Order Status: Completed Specimen: Incision site from Back Updated: 12/07/22 1959     Gram Stain Result Rare WBC's      No organisms seen    Narrative:      LATERAL MASS SCREW TRACK    Gram stain [[id number]] Collected: 12/07/22 1533    Order Status: Completed Specimen: Incision site from Back Updated: 12/07/22 1823     Gram Stain Result Rare WBC's      No organisms seen    Narrative:      C1 SCREW TRACT          All pertinent labs from the last 24 hours have been reviewed.    Significant Diagnostics:  I have reviewed and interpreted all pertinent imaging results/findings within the past 24 hours.    Assessment/Plan:     * Odontoid fracture  Israel De Jesus is a 62 y.o. male with h/o odontoid fracture/type I hangman's s/p C1-C4 posterior cervical fusion on 10/12/22. He presents with hardware failure and worsening sagittal alignment. He is s/p C1-T1 posterior cervical revision 12/7/22 with Dr. Storm/Dr. Oliveira.    - Admit to neurosurgery floor.   -q4h neuro checks  - Patient is neurologically stable.  - All pertinent labs and diagnostics personally reviewed.  - Post op XR with hardware in satisfactory positioning.  - HV drain to full suction. IV ancef while in place. Record output qshift.  - Pain control w/ multimodal regimen.  - DVT ppx: TEDs/SCDs/SQH  - Activity: PT/OT; OOB; C-collar at all times.  - Diet: regular.  - Bowel regimen: senna-docusate and miralax daily.  - Urinary: voiding spontaneously.  - Atelectasis ppx: Encourage IS hourly, up in chair at least 6 hrs/day.  - Please contact neurosurgery with any exam changes.    Discussed with Dr. Storm    Dispo: dc pending drain removal        Christina Boyle PA-C  Neurosurgery  Joel Zhou - Neurosurgery ([facility])

## 2022-12-09 NOTE — ASSESSMENT & PLAN NOTE
Israel De Jesus is a 62 y.o. male with h/o odontoid fracture/type I hangman's s/p C1-C4 posterior cervical fusion on 10/12/22. He presents with hardware failure and worsening sagittal alignment. He is s/p C1-T1 posterior cervical revision 12/7/22 with Dr. Storm/Dr. Oliveira.    - Admit to neurosurgery floor.   -q4h neuro checks  - Patient is neurologically stable.  - All pertinent labs and diagnostics personally reviewed.  - Post op XR with hardware in satisfactory positioning.  - HV drain to full suction. IV ancef while in place. Record output qshift.  - Pain control w/ multimodal regimen.  - DVT ppx: TEDs/SCDs/SQH  - Activity: PT/OT; OOB; C-collar at all times.  - Diet: regular.  - Bowel regimen: senna-docusate and miralax daily.  - Urinary: voiding spontaneously.  - Atelectasis ppx: Encourage IS hourly, up in chair at least 6 hrs/day.  - Please contact neurosurgery with any exam changes.    Discussed with Dr. Storm    Dispo: dc pending drain removal

## 2022-12-09 NOTE — SUBJECTIVE & OBJECTIVE
Interval History: NAEON. AFVSS. Neuro stable. Denies new numbness, weakness. Upper back feels sore but muscle relaxants are helping. Keep HV drain. Post op XR with hardware in satisfactory positioning. Voiding spontaneously. + flatus. Tolerating PO intake.    Medications:  Continuous Infusions:  Scheduled Meds:   amLODIPine  10 mg Oral Daily    ceFAZolin (ANCEF) IVPB  1 g Intravenous Q8H    heparin (porcine)  5,000 Units Subcutaneous Q8H    lisinopriL  20 mg Oral Daily    methocarbamoL  1,000 mg Oral Q8H    metoprolol tartrate  25 mg Oral Q6H    nicotine  1 patch Transdermal Daily    pantoprazole  40 mg Oral Daily    polyethylene glycol  17 g Oral Daily    pregabalin  75 mg Oral BID     PRN Meds:acetaminophen, bisacodyL, diazePAM, glucagon (human recombinant), glucose, glucose, HYDROmorphone, mupirocin, ondansetron, oxyCODONE, oxyCODONE, tiZANidine     Review of Systems  Objective:     Weight: 64.4 kg (142 lb)  Body mass index is 22.24 kg/m².  Vital Signs (Most Recent):  Temp: 98.5 °F (36.9 °C) (12/09/22 1517)  Pulse: 97 (12/09/22 1517)  Resp: 18 (12/09/22 1517)  BP: 115/61 (12/09/22 1517)  SpO2: 97 % (12/09/22 1517) Vital Signs (24h Range):  Temp:  [96.5 °F (35.8 °C)-98.7 °F (37.1 °C)] 98.5 °F (36.9 °C)  Pulse:  [] 97  Resp:  [17-19] 18  SpO2:  [94 %-97 %] 97 %  BP: (107-131)/(61-83) 115/61     Date 12/09/22 0700 - 12/10/22 0659   Shift 7050-6368 4139-1639 4757-3035 24 Hour Total   INTAKE   P.O.  900  900   Shift Total(mL/kg)  900(14)  900(14)   OUTPUT   Urine(mL/kg/hr)  750  750   Shift Total(mL/kg)  750(11.6)  750(11.6)   Weight (kg) 64.4 64.4 64.4 64.4                        Closed/Suction Drain 12/07/22 1628 Posterior Neck 10 Fr. (Active)   Site Description Healing 12/09/22 0900   Dressing Status Clean;Dry;Intact 12/08/22 1902   Dressing Intervention Integrity maintained 12/08/22 1902   Drainage Bloody 12/09/22 0900   Status To bulb suction 12/09/22 0900   Output (mL) 128 mL 12/09/22 0556        Neurosurgery Physical Exam  General: Well developed, well nourished, not in acute distress.   Head: Normocephalic, atraumatic.  Neck: C-collar in place.  Neurologic: Alert and oriented x 4. Thought content appropriate.  GCS: Motor:6  Verbal:5  Eyes:4   GCS Total: 15  Language: No aphasia.  Speech: No dysarthria.  Cranial nerves: Face symmetric, tongue midline, CN II-XII grossly intact.   Eyes: Pupils equal, round, reactive to light with accomodation, EOMI.   Pulmonary: Normal respirations, no signs of respiratory distress.  Abdomen: Soft, non-distended, non-tender to palpation.  Sensory: Intact to light touch throughout.  Motor Strength: Moves all extremities spontaneously with good tone. Full strength upper and lower extremities. No abnormal movements seen.      Strength   Deltoids Triceps Biceps Wrist Extension Wrist Flexion Hand    Upper: R 5/5 5/5 5/5 5/5 5/5 5/5     L 5/5 5/5 5/5 5/5 5/5 5/5       Hip Flexion Knee Extension Knee  Flexion Dorsiflexion Plantar flexion EHL   Lower: R 5/5 5/5 5/5 5/5 5/5 5/5     L 5/5 5/5 5/5 5/5 5/5 5/5   4+/5 hand intrinsics     Bains: Absent.  Vascular: Pulses 2+ and symmetric radial and dorsalis pedis. No LE edema.   Skin: Skin is warm, dry and intact.  Incision c/d/I with skin edges well approximated with monocryl and prineo tape. No surrounding erythema or edema. No drainage from incision. No palpable hematoma or underlying fluid collection.     HV drain in place to full suction with SS output.    Significant Labs:  Recent Labs   Lab 12/08/22 0433 12/09/22 0416   * 99    135*   K 4.5 4.0    106   CO2 23 23   BUN 12 17   CREATININE 0.9 0.9   CALCIUM 10.0 9.2     Recent Labs   Lab 12/08/22 0433 12/09/22 0416   WBC 11.53 11.19   HGB 11.7* 9.9*   HCT 36.7* 30.6*    306     No results for input(s): LABPT, INR, APTT in the last 48 hours.  Microbiology Results (last 7 days)       Procedure Component Value Units Date/Time    Culture,  Anaerobe [[id number]] Collected: 12/07/22 1533    Order Status: Completed Specimen: Incision site from Back Updated: 12/09/22 0927     Anaerobic Culture Culture in progress    Narrative:      C1 SCREW TRACT    Culture, Anaerobe [[id number]] Collected: 12/07/22 1503    Order Status: Completed Specimen: Incision site from Back Updated: 12/09/22 0926     Anaerobic Culture Culture in progress    Narrative:      LATERAL MASS SCREW TRACK    Culture, Anaerobe [[id number]] Collected: 12/07/22 1500    Order Status: Completed Specimen: Incision site from Back Updated: 12/09/22 0925     Anaerobic Culture Culture in progress    Narrative:      EXPLANTED HARDWARE FOR CULTURE    AFB Culture & Smear [[id number]] Collected: 12/07/22 1533    Order Status: Completed Specimen: Incision site from Back Updated: 12/08/22 2127     AFB Culture & Smear Culture in progress     AFB CULTURE STAIN No acid fast bacilli seen.    Narrative:      C1 SCREW TRACT    AFB Culture & Smear [[id number]] Collected: 12/07/22 1500    Order Status: Completed Specimen: Incision site from Back Updated: 12/08/22 2127     AFB Culture & Smear Culture in progress     AFB CULTURE STAIN No acid fast bacilli seen.    Narrative:      EXPLANTED HARDWARE FOR CULTURE    AFB Culture & Smear [[id number]] Collected: 12/07/22 1503    Order Status: Completed Specimen: Incision site from Back Updated: 12/08/22 2127     AFB Culture & Smear Culture in progress     AFB CULTURE STAIN No acid fast bacilli seen.    Narrative:      LATERAL MASS SCREW TRACK    Fungus culture [[id number]] Collected: 12/07/22 1533    Order Status: Completed Specimen: Incision site from Back Updated: 12/08/22 1020     Fungus (Mycology) Culture Culture in progress    Narrative:      C1 SCREW TRACT    Fungus culture [[id number]] Collected: 12/07/22 1503    Order Status: Completed Specimen: Incision site from Back Updated: 12/08/22 1009     Fungus (Mycology) Culture Culture in progress    Narrative:      LATERAL  MASS SCREW TRACK    Fungus culture [[id number]] Collected: 12/07/22 1500    Order Status: Completed Specimen: Incision site from Back Updated: 12/08/22 1009     Fungus (Mycology) Culture Culture in progress    Narrative:      EXPLANTED HARDWARE FOR CULTURE    Aerobic culture [[id number]] Collected: 12/07/22 1500    Order Status: Completed Specimen: Incision site from Back Updated: 12/08/22 0739     Aerobic Bacterial Culture No growth    Narrative:      EXPLANTED HARDWARE FOR CULTURE    Aerobic culture [[id number]] Collected: 12/07/22 1503    Order Status: Completed Specimen: Incision site from Back Updated: 12/08/22 0739     Aerobic Bacterial Culture No growth    Narrative:      LATERAL MASS SCREW TRACK    Aerobic culture [[id number]] Collected: 12/07/22 1533    Order Status: Completed Specimen: Incision site from Back Updated: 12/08/22 0739     Aerobic Bacterial Culture No growth    Narrative:      C1 SCREW TRACT    Gram stain [[id number]] Collected: 12/07/22 1500    Order Status: Completed Specimen: Incision site from Back Updated: 12/07/22 2001     Gram Stain Result No WBC's      No organisms seen    Narrative:      EXPLANTED HARDWARE FOR CULTURE    Gram stain [[id number]] Collected: 12/07/22 1503    Order Status: Completed Specimen: Incision site from Back Updated: 12/07/22 1959     Gram Stain Result Rare WBC's      No organisms seen    Narrative:      LATERAL MASS SCREW TRACK    Gram stain [[id number]] Collected: 12/07/22 1533    Order Status: Completed Specimen: Incision site from Back Updated: 12/07/22 1823     Gram Stain Result Rare WBC's      No organisms seen    Narrative:      C1 SCREW TRACT          All pertinent labs from the last 24 hours have been reviewed.    Significant Diagnostics:  I have reviewed and interpreted all pertinent imaging results/findings within the past 24 hours.

## 2022-12-09 NOTE — PLAN OF CARE
No significant changes this shift. Patient is voiding and has significant output. No signs of distress noted at this time and reports pain only when he is moving/ turning positions. Safety measures remain in place. Call light within reach, side rails up x2 and bed in lowest position.       Problem: Adult Inpatient Plan of Care  Goal: Plan of Care Review  Outcome: Ongoing, Progressing  Goal: Patient-Specific Goal (Individualized)  Outcome: Ongoing, Progressing  Goal: Absence of Hospital-Acquired Illness or Injury  Outcome: Ongoing, Progressing  Goal: Optimal Comfort and Wellbeing  Outcome: Ongoing, Progressing  Goal: Readiness for Transition of Care  Outcome: Ongoing, Progressing     Problem: Impaired Wound Healing  Goal: Optimal Wound Healing  Outcome: Ongoing, Progressing     Problem: Skin Injury Risk Increased  Goal: Skin Health and Integrity  Outcome: Ongoing, Progressing

## 2022-12-09 NOTE — PT/OT/SLP PROGRESS
"Physical Therapy Treatment    Patient Name:  Israel De Jesus   MRN:  [id number]    Recommendations:     Discharge Recommendations: other (see comments)  Discharge Equipment Recommendations: none  Barriers to discharge:  Decreased caregiver support; pt has good safety awareness and dtr helps w/ cooking/cleaning, pt doesn't require PAC placement from PT standpoint however pt did verbalize that he would feel more confident if he had another week prior to going home 2/2 living alone    Assessment:     Israel De Jesus is a 62 y.o. male admitted with a medical diagnosis of Odontoid fracture.  He presents with the following impairments/functional limitations: impaired endurance, impaired balance, impaired self care skills, impaired functional mobility, orthopedic precautions, gait instability. Pt demonstrated mod I with bed mobility while implementing spinal precautions. Pt was able to participate in gait training >300 ft @ min A with mod v/c for safety and prevent risk for falls. Pt will continue to benefit fro skilled acute PT to improve mobility, balance and safety awareness.    Rehab Prognosis: Good; patient would benefit from acute skilled PT services to address these deficits and reach maximum level of function.    Recent Surgery: Procedure(s) (LRB):  FUSION,SPINE,CERVICAL,POSTERIOR APPROACH C1-T1 (N/A) 2 Days Post-Op    Plan:     During this hospitalization, patient to be seen 3 x/week to address the identified rehab impairments via gait training, therapeutic activities, therapeutic exercises, neuromuscular re-education and progress toward the following goals:    Plan of Care Expires:  01/06/23    Subjective     Chief Complaint: None  Patient/Family Comments/goals: "Thank you"  Pain/Comfort:  Pain Rating 1:  (not rated)  Location - Side 1: Bilateral  Location - Orientation 1: posterior  Location 1: neck  Pain Addressed 1: Cessation of Activity, Nurse notified, Reposition      Objective:     Communicated with " nurse prior to session.  Patient found HOB elevated with hemovac, cervical collar, obrien catheter, bed alarm upon PT entry to room.     General Precautions: Standard, fall  Orthopedic Precautions: spinal precautions  Braces: Cervical collar  Respiratory Status: Room air     Functional Mobility:  Bed Mobility:     Rolling Left:  supervision  Rolling Right: supervision  Scooting: supervision  Bridging: supervision  Supine to Sit: supervision  Sit to Supine: supervision  Transfers:     Sit to Stand:  contact guard assistance with rolling walker  Gait: 260 ft with RW @ min A with mod v/c for safety  Balance: Fair dynamic standing dynamic balance      AM-PAC 6 CLICK MOBILITY  Turning over in bed (including adjusting bedclothes, sheets and blankets)?: 4  Sitting down on and standing up from a chair with arms (e.g., wheelchair, bedside commode, etc.): 3  Moving from lying on back to sitting on the side of the bed?: 3  Moving to and from a bed to a chair (including a wheelchair)?: 3  Need to walk in hospital room?: 3  Climbing 3-5 steps with a railing?:  (not done)       Treatment & Education:  Pt was educated implementing spinal precautions to enable optimal healing process.    Patient left HOB elevated with all lines intact, call button in reach, bed alarm on, and nurse notified..    GOALS:   Multidisciplinary Problems       Physical Therapy Goals          Problem: Physical Therapy    Goal Priority Disciplines Outcome Goal Variances Interventions   Physical Therapy Goal     PT, PT/OT Ongoing, Progressing     Description: Goals to be met by: 22     Patient will increase functional independence with mobility by performin. Supine to sit with [place]  2. Sit to stand transfer with [place]  3. Bed to chair transfer with [place] using No Assistive Device  4. Gait  x 300 feet with Modified [place] using Rolling Walker.   5. Ascend/descend 4 stair with right Handrails Modified [place] using  No Assistive Device.                          Time Tracking:     PT Received On: 12/09/22  PT Start Time: 1545     PT Stop Time: 1619  PT Total Time (min): 34 min     Billable Minutes: Gait Training 15 and Therapeutic Activity 19    Treatment Type: Treatment  PT/PTA: PTA     PTA Visit Number: 1     12/09/2022

## 2022-12-10 LAB
ANION GAP SERPL CALC-SCNC: 7 MMOL/L (ref 8–16)
BACTERIA SPEC AEROBE CULT: NO GROWTH
BASOPHILS # BLD AUTO: 0.03 K/UL (ref 0–0.2)
BASOPHILS NFR BLD: 0.3 % (ref 0–1.9)
BUN SERPL-MCNC: 16 MG/DL (ref 8–23)
CALCIUM SERPL-MCNC: 9.3 MG/DL (ref 8.7–10.5)
CHLORIDE SERPL-SCNC: 103 MMOL/L (ref 95–110)
CO2 SERPL-SCNC: 23 MMOL/L (ref 23–29)
CREAT SERPL-MCNC: 0.9 MG/DL (ref 0.5–1.4)
DIFFERENTIAL METHOD: ABNORMAL
EOSINOPHIL # BLD AUTO: 0 K/UL (ref 0–0.5)
EOSINOPHIL NFR BLD: 0.3 % (ref 0–8)
ERYTHROCYTE [DISTWIDTH] IN BLOOD BY AUTOMATED COUNT: 13.1 % (ref 11.5–14.5)
EST. GFR  (NO RACE VARIABLE): >60 ML/MIN/1.73 M^2
GLUCOSE SERPL-MCNC: 97 MG/DL (ref 70–110)
HCT VFR BLD AUTO: 28.8 % (ref 40–54)
HGB BLD-MCNC: 9.2 G/DL (ref 14–18)
IMM GRANULOCYTES # BLD AUTO: 0.11 K/UL (ref 0–0.04)
IMM GRANULOCYTES NFR BLD AUTO: 0.9 % (ref 0–0.5)
LYMPHOCYTES # BLD AUTO: 2.1 K/UL (ref 1–4.8)
LYMPHOCYTES NFR BLD: 17.3 % (ref 18–48)
MCH RBC QN AUTO: 31.5 PG (ref 27–31)
MCHC RBC AUTO-ENTMCNC: 31.9 G/DL (ref 32–36)
MCV RBC AUTO: 99 FL (ref 82–98)
MONOCYTES # BLD AUTO: 1.4 K/UL (ref 0.3–1)
MONOCYTES NFR BLD: 11.3 % (ref 4–15)
NEUTROPHILS # BLD AUTO: 8.4 K/UL (ref 1.8–7.7)
NEUTROPHILS NFR BLD: 69.9 % (ref 38–73)
NRBC BLD-RTO: 0 /100 WBC
PLATELET # BLD AUTO: 317 K/UL (ref 150–450)
PMV BLD AUTO: 10.5 FL (ref 9.2–12.9)
POTASSIUM SERPL-SCNC: 4.2 MMOL/L (ref 3.5–5.1)
RBC # BLD AUTO: 2.92 M/UL (ref 4.6–6.2)
SODIUM SERPL-SCNC: 133 MMOL/L (ref 136–145)
WBC # BLD AUTO: 11.95 K/UL (ref 3.9–12.7)

## 2022-12-10 PROCEDURE — S4991 NICOTINE PATCH NONLEGEND: HCPCS | Performed by: STUDENT IN AN ORGANIZED HEALTH CARE EDUCATION/TRAINING PROGRAM

## 2022-12-10 PROCEDURE — 36415 COLL VENOUS BLD VENIPUNCTURE: CPT | Performed by: STUDENT IN AN ORGANIZED HEALTH CARE EDUCATION/TRAINING PROGRAM

## 2022-12-10 PROCEDURE — 85025 COMPLETE CBC W/AUTO DIFF WBC: CPT | Performed by: STUDENT IN AN ORGANIZED HEALTH CARE EDUCATION/TRAINING PROGRAM

## 2022-12-10 PROCEDURE — 80048 BASIC METABOLIC PNL TOTAL CA: CPT | Performed by: STUDENT IN AN ORGANIZED HEALTH CARE EDUCATION/TRAINING PROGRAM

## 2022-12-10 PROCEDURE — 25000003 PHARM REV CODE 250: Performed by: STUDENT IN AN ORGANIZED HEALTH CARE EDUCATION/TRAINING PROGRAM

## 2022-12-10 PROCEDURE — 25000003 PHARM REV CODE 250

## 2022-12-10 PROCEDURE — 63600175 PHARM REV CODE 636 W HCPCS: Performed by: STUDENT IN AN ORGANIZED HEALTH CARE EDUCATION/TRAINING PROGRAM

## 2022-12-10 PROCEDURE — 94761 N-INVAS EAR/PLS OXIMETRY MLT: CPT

## 2022-12-10 PROCEDURE — 11000001 HC ACUTE MED/SURG PRIVATE ROOM

## 2022-12-10 RX ADMIN — METHOCARBAMOL 1000 MG: 500 TABLET ORAL at 06:12

## 2022-12-10 RX ADMIN — PREGABALIN 75 MG: 75 CAPSULE ORAL at 09:12

## 2022-12-10 RX ADMIN — METOPROLOL TARTRATE 25 MG: 25 TABLET, FILM COATED ORAL at 05:12

## 2022-12-10 RX ADMIN — HEPARIN SODIUM 5000 UNITS: 5000 INJECTION INTRAVENOUS; SUBCUTANEOUS at 10:12

## 2022-12-10 RX ADMIN — NICOTINE 1 PATCH: 14 PATCH, EXTENDED RELEASE TRANSDERMAL at 09:12

## 2022-12-10 RX ADMIN — DEXTROSE MONOHYDRATE 1 G: 2.5 INJECTION INTRAVENOUS at 09:12

## 2022-12-10 RX ADMIN — METOPROLOL TARTRATE 25 MG: 25 TABLET, FILM COATED ORAL at 12:12

## 2022-12-10 RX ADMIN — DEXTROSE MONOHYDRATE 1 G: 2.5 INJECTION INTRAVENOUS at 06:12

## 2022-12-10 RX ADMIN — DIAZEPAM 5 MG: 5 TABLET ORAL at 09:12

## 2022-12-10 RX ADMIN — OXYCODONE HYDROCHLORIDE 10 MG: 10 TABLET ORAL at 10:12

## 2022-12-10 RX ADMIN — METOPROLOL TARTRATE 25 MG: 25 TABLET, FILM COATED ORAL at 06:12

## 2022-12-10 RX ADMIN — DIAZEPAM 5 MG: 5 TABLET ORAL at 08:12

## 2022-12-10 RX ADMIN — HEPARIN SODIUM 5000 UNITS: 5000 INJECTION INTRAVENOUS; SUBCUTANEOUS at 06:12

## 2022-12-10 RX ADMIN — OXYCODONE HYDROCHLORIDE 10 MG: 10 TABLET ORAL at 06:12

## 2022-12-10 RX ADMIN — DEXTROSE MONOHYDRATE 1 G: 2.5 INJECTION INTRAVENOUS at 02:12

## 2022-12-10 RX ADMIN — METHOCARBAMOL 1000 MG: 500 TABLET ORAL at 08:12

## 2022-12-10 RX ADMIN — AMLODIPINE BESYLATE 10 MG: 10 TABLET ORAL at 09:12

## 2022-12-10 RX ADMIN — PANTOPRAZOLE SODIUM 40 MG: 20 TABLET, DELAYED RELEASE ORAL at 09:12

## 2022-12-10 RX ADMIN — OXYCODONE HYDROCHLORIDE 10 MG: 10 TABLET ORAL at 08:12

## 2022-12-10 RX ADMIN — HEPARIN SODIUM 5000 UNITS: 5000 INJECTION INTRAVENOUS; SUBCUTANEOUS at 02:12

## 2022-12-10 RX ADMIN — LISINOPRIL 20 MG: 20 TABLET ORAL at 09:12

## 2022-12-10 RX ADMIN — METHOCARBAMOL 1000 MG: 500 TABLET ORAL at 02:12

## 2022-12-10 NOTE — SUBJECTIVE & OBJECTIVE
Interval History: 12/10: NAEON, continued lower neck burning pain, but no radiculopathy or weakness in upper extremities. OOB with PT. Drain output 30.     Medications:  Continuous Infusions:  Scheduled Meds:   amLODIPine  10 mg Oral Daily    ceFAZolin (ANCEF) IVPB  1 g Intravenous Q8H    heparin (porcine)  5,000 Units Subcutaneous Q8H    lisinopriL  20 mg Oral Daily    methocarbamoL  1,000 mg Oral Q8H    metoprolol tartrate  25 mg Oral Q6H    nicotine  1 patch Transdermal Daily    pantoprazole  40 mg Oral Daily    polyethylene glycol  17 g Oral Daily    pregabalin  75 mg Oral BID     PRN Meds:acetaminophen, bisacodyL, diazePAM, glucagon (human recombinant), glucose, glucose, HYDROmorphone, mupirocin, ondansetron, oxyCODONE, oxyCODONE, tiZANidine     Review of Systems  Objective:     Weight: 64.4 kg (142 lb)  Body mass index is 22.24 kg/m².  Vital Signs (Most Recent):  Temp: 98.2 °F (36.8 °C) (12/10/22 0833)  Pulse: 72 (12/10/22 0833)  Resp: 15 (12/10/22 1017)  BP: 123/77 (12/10/22 0833)  SpO2: 97 % (12/10/22 0833) Vital Signs (24h Range):  Temp:  [97.3 °F (36.3 °C)-98.5 °F (36.9 °C)] 98.2 °F (36.8 °C)  Pulse:  [] 72  Resp:  [15-18] 15  SpO2:  [96 %-98 %] 97 %  BP: (110-137)/(61-84) 123/77                            Closed/Suction Drain 12/07/22 1628 Posterior Neck 10 Fr. (Active)   Site Description Healing 12/09/22 0900   Dressing Status Clean;Dry;Intact 12/08/22 1902   Dressing Intervention Integrity maintained 12/08/22 1902   Drainage Bloody 12/09/22 0900   Status To bulb suction 12/09/22 0900   Output (mL) 128 mL 12/09/22 0556       Neurosurgery Physical Exam  General: Well developed, well nourished, not in acute distress.   Head: Normocephalic, atraumatic.  Neck: C-collar in place.  Neurologic: Alert and oriented x 4. Thought content appropriate.  GCS: Motor:6  Verbal:5  Eyes:4   GCS Total: 15  Language: No aphasia.  Speech: No dysarthria.  Cranial nerves: Face symmetric, tongue midline, CN II-XII  grossly intact.   Eyes: Pupils equal, round, reactive to light with accomodation, EOMI.   Pulmonary: Normal respirations, no signs of respiratory distress.  Abdomen: Soft, non-distended, non-tender to palpation.  Sensory: Intact to light touch throughout.  Motor Strength: Moves all extremities spontaneously with good tone. Full strength upper and lower extremities. No abnormal movements seen.      Strength   Deltoids Triceps Biceps Wrist Extension Wrist Flexion Hand    Upper: R 5/5 5/5 5/5 5/5 5/5 5/5     L 5/5 5/5 5/5 5/5 5/5 5/5       Hip Flexion Knee Extension Knee  Flexion Dorsiflexion Plantar flexion EHL   Lower: R 5/5 5/5 5/5 5/5 5/5 5/5     L 5/5 5/5 5/5 5/5 5/5 5/5   4+/5 hand intrinsics     Bains: Absent.  Vascular: Pulses 2+ and symmetric radial and dorsalis pedis. No LE edema.   Skin: Skin is warm, dry and intact.  Incision c/d/I with skin edges well approximated with monocryl and prineo tape. No surrounding erythema or edema. No drainage from incision. No palpable hematoma or underlying fluid collection.     HV drain in place to full suction with SS output.    Significant Labs:  Recent Labs   Lab 12/09/22  0416 12/10/22  0459   GLU 99 97   * 133*   K 4.0 4.2    103   CO2 23 23   BUN 17 16   CREATININE 0.9 0.9   CALCIUM 9.2 9.3       Recent Labs   Lab 12/09/22  0416 12/10/22  0459   WBC 11.19 11.95   HGB 9.9* 9.2*   HCT 30.6* 28.8*    317       No results for input(s): LABPT, INR, APTT in the last 48 hours.  Microbiology Results (last 7 days)       Procedure Component Value Units Date/Time    Culture, Anaerobe [[id number]] Collected: 12/07/22 1533    Order Status: Completed Specimen: Incision site from Back Updated: 12/09/22 0927     Anaerobic Culture Culture in progress    Narrative:      C1 SCREW TRACT    Culture, Anaerobe [[id number]] Collected: 12/07/22 1503    Order Status: Completed Specimen: Incision site from Back Updated: 12/09/22 0926     Anaerobic Culture Culture in  progress    Narrative:      LATERAL MASS SCREW TRACK    Culture, Anaerobe [[id number]] Collected: 12/07/22 1500    Order Status: Completed Specimen: Incision site from Back Updated: 12/09/22 0925     Anaerobic Culture Culture in progress    Narrative:      EXPLANTED HARDWARE FOR CULTURE    AFB Culture & Smear [[id number]] Collected: 12/07/22 1533    Order Status: Completed Specimen: Incision site from Back Updated: 12/08/22 2127     AFB Culture & Smear Culture in progress     AFB CULTURE STAIN No acid fast bacilli seen.    Narrative:      C1 SCREW TRACT    AFB Culture & Smear [[id number]] Collected: 12/07/22 1500    Order Status: Completed Specimen: Incision site from Back Updated: 12/08/22 2127     AFB Culture & Smear Culture in progress     AFB CULTURE STAIN No acid fast bacilli seen.    Narrative:      EXPLANTED HARDWARE FOR CULTURE    AFB Culture & Smear [[id number]] Collected: 12/07/22 1503    Order Status: Completed Specimen: Incision site from Back Updated: 12/08/22 2127     AFB Culture & Smear Culture in progress     AFB CULTURE STAIN No acid fast bacilli seen.    Narrative:      LATERAL MASS SCREW TRACK    Fungus culture [[id number]] Collected: 12/07/22 1533    Order Status: Completed Specimen: Incision site from Back Updated: 12/08/22 1020     Fungus (Mycology) Culture Culture in progress    Narrative:      C1 SCREW TRACT    Fungus culture [[id number]] Collected: 12/07/22 1503    Order Status: Completed Specimen: Incision site from Back Updated: 12/08/22 1009     Fungus (Mycology) Culture Culture in progress    Narrative:      LATERAL MASS SCREW TRACK    Fungus culture [[id number]] Collected: 12/07/22 1500    Order Status: Completed Specimen: Incision site from Back Updated: 12/08/22 1009     Fungus (Mycology) Culture Culture in progress    Narrative:      EXPLANTED HARDWARE FOR CULTURE    Aerobic culture [[id number]] Collected: 12/07/22 1500    Order Status: Completed Specimen: Incision site from Back Updated:  12/08/22 0739     Aerobic Bacterial Culture No growth    Narrative:      EXPLANTED HARDWARE FOR CULTURE    Aerobic culture [[id number]] Collected: 12/07/22 1503    Order Status: Completed Specimen: Incision site from Back Updated: 12/08/22 0739     Aerobic Bacterial Culture No growth    Narrative:      LATERAL MASS SCREW TRACK    Aerobic culture [[id number]] Collected: 12/07/22 1533    Order Status: Completed Specimen: Incision site from Back Updated: 12/08/22 0739     Aerobic Bacterial Culture No growth    Narrative:      C1 SCREW TRACT    Gram stain [[id number]] Collected: 12/07/22 1500    Order Status: Completed Specimen: Incision site from Back Updated: 12/07/22 2001     Gram Stain Result No WBC's      No organisms seen    Narrative:      EXPLANTED HARDWARE FOR CULTURE    Gram stain [[id number]] Collected: 12/07/22 1503    Order Status: Completed Specimen: Incision site from Back Updated: 12/07/22 1959     Gram Stain Result Rare WBC's      No organisms seen    Narrative:      LATERAL MASS SCREW TRACK    Gram stain [[id number]] Collected: 12/07/22 1533    Order Status: Completed Specimen: Incision site from Back Updated: 12/07/22 1823     Gram Stain Result Rare WBC's      No organisms seen    Narrative:      C1 SCREW TRACT          All pertinent labs from the last 24 hours have been reviewed.    Significant Diagnostics:  I have reviewed and interpreted all pertinent imaging results/findings within the past 24 hours.  Physical Exam  Neurosurgery Physical Exam

## 2022-12-10 NOTE — PROGRESS NOTES
Joel Zhou - Neurosurgery ([facility])  Neurosurgery  Progress Note    Subjective:     History of Present Illness: 62 M with hx of tobacco abuse who is s/p C1-4 ORIF d/t complex odontoid fracture in early oct of 2022 presented in [facility].  His neck pain was improving and he was at his neurobaseline however, his xrays showed worsening sagittal alignment secondary to hardware failure.  CT c spine showed haloing of the right C1 lateral mass screw with failure of the bilateral C3/4 lateral mass screws and worsened kyphotic deformity of his prior odontoid fracture.  He was thus offered a posterior cervical revision fusion with possible extension to the occiput.      Post-Op Info:  Procedure(s) (LRB):  FUSION,SPINE,CERVICAL,POSTERIOR APPROACH C1-T1 (N/A)   3 Days Post-Op     Interval History: 12/10: NAEON, continued lower neck burning pain, but no radiculopathy or weakness in upper extremities. OOB with PT. Drain output 30.     Medications:  Continuous Infusions:  Scheduled Meds:   amLODIPine  10 mg Oral Daily    ceFAZolin (ANCEF) IVPB  1 g Intravenous Q8H    heparin (porcine)  5,000 Units Subcutaneous Q8H    lisinopriL  20 mg Oral Daily    methocarbamoL  1,000 mg Oral Q8H    metoprolol tartrate  25 mg Oral Q6H    nicotine  1 patch Transdermal Daily    pantoprazole  40 mg Oral Daily    polyethylene glycol  17 g Oral Daily    pregabalin  75 mg Oral BID     PRN Meds:acetaminophen, bisacodyL, diazePAM, glucagon (human recombinant), glucose, glucose, HYDROmorphone, mupirocin, ondansetron, oxyCODONE, oxyCODONE, tiZANidine     Review of Systems  Objective:     Weight: 64.4 kg (142 lb)  Body mass index is 22.24 kg/m².  Vital Signs (Most Recent):  Temp: 98.2 °F (36.8 °C) (12/10/22 0833)  Pulse: 72 (12/10/22 0833)  Resp: 15 (12/10/22 1017)  BP: 123/77 (12/10/22 0833)  SpO2: 97 % (12/10/22 0833) Vital Signs (24h Range):  Temp:  [97.3 °F (36.3 °C)-98.5 °F (36.9 °C)] 98.2 °F (36.8 °C)  Pulse:  [] 72  Resp:  [15-18]  · Elevated AST, ALT, alk phos appreciated on admission CMP   · Likely 2/2 chronic ETOH consumption   · Received IVF  · CMP improving  · Follow-up as outpatient  15  SpO2:  [96 %-98 %] 97 %  BP: (110-137)/(61-84) 123/77                            Closed/Suction Drain 12/07/22 1628 Posterior Neck 10 Fr. (Active)   Site Description Healing 12/09/22 0900   Dressing Status Clean;Dry;Intact 12/08/22 1902   Dressing Intervention Integrity maintained 12/08/22 1902   Drainage Bloody 12/09/22 0900   Status To bulb suction 12/09/22 0900   Output (mL) 128 mL 12/09/22 0556       Neurosurgery Physical Exam  General: Well developed, well nourished, not in acute distress.   Head: Normocephalic, atraumatic.  Neck: C-collar in place.  Neurologic: Alert and oriented x 4. Thought content appropriate.  GCS: Motor:6  Verbal:5  Eyes:4   GCS Total: 15  Language: No aphasia.  Speech: No dysarthria.  Cranial nerves: Face symmetric, tongue midline, CN II-XII grossly intact.   Eyes: Pupils equal, round, reactive to light with accomodation, EOMI.   Pulmonary: Normal respirations, no signs of respiratory distress.  Abdomen: Soft, non-distended, non-tender to palpation.  Sensory: Intact to light touch throughout.  Motor Strength: Moves all extremities spontaneously with good tone. Full strength upper and lower extremities. No abnormal movements seen.      Strength   Deltoids Triceps Biceps Wrist Extension Wrist Flexion Hand    Upper: R 5/5 5/5 5/5 5/5 5/5 5/5     L 5/5 5/5 5/5 5/5 5/5 5/5       Hip Flexion Knee Extension Knee  Flexion Dorsiflexion Plantar flexion EHL   Lower: R 5/5 5/5 5/5 5/5 5/5 5/5     L 5/5 5/5 5/5 5/5 5/5 5/5   4+/5 hand intrinsics     Bains: Absent.  Vascular: Pulses 2+ and symmetric radial and dorsalis pedis. No LE edema.   Skin: Skin is warm, dry and intact.  Incision c/d/I with skin edges well approximated with monocryl and prineo tape. No surrounding erythema or edema. No drainage from incision. No palpable hematoma or underlying fluid collection.     HV drain in place to full suction with SS output.    Significant Labs:  Recent Labs   Lab 12/09/22  5936 12/10/22  7235    GLU 99 97   * 133*   K 4.0 4.2    103   CO2 23 23   BUN 17 16   CREATININE 0.9 0.9   CALCIUM 9.2 9.3       Recent Labs   Lab 12/09/22  0416 12/10/22  0459   WBC 11.19 11.95   HGB 9.9* 9.2*   HCT 30.6* 28.8*    317       No results for input(s): LABPT, INR, APTT in the last 48 hours.  Microbiology Results (last 7 days)       Procedure Component Value Units Date/Time    Culture, Anaerobe [[id number]] Collected: 12/07/22 1533    Order Status: Completed Specimen: Incision site from Back Updated: 12/09/22 0927     Anaerobic Culture Culture in progress    Narrative:      C1 SCREW TRACT    Culture, Anaerobe [[id number]] Collected: 12/07/22 1503    Order Status: Completed Specimen: Incision site from Back Updated: 12/09/22 0926     Anaerobic Culture Culture in progress    Narrative:      LATERAL MASS SCREW TRACK    Culture, Anaerobe [[id number]] Collected: 12/07/22 1500    Order Status: Completed Specimen: Incision site from Back Updated: 12/09/22 0925     Anaerobic Culture Culture in progress    Narrative:      EXPLANTED HARDWARE FOR CULTURE    AFB Culture & Smear [[id number]] Collected: 12/07/22 1533    Order Status: Completed Specimen: Incision site from Back Updated: 12/08/22 2127     AFB Culture & Smear Culture in progress     AFB CULTURE STAIN No acid fast bacilli seen.    Narrative:      C1 SCREW TRACT    AFB Culture & Smear [[id number]] Collected: 12/07/22 1500    Order Status: Completed Specimen: Incision site from Back Updated: 12/08/22 2127     AFB Culture & Smear Culture in progress     AFB CULTURE STAIN No acid fast bacilli seen.    Narrative:      EXPLANTED HARDWARE FOR CULTURE    AFB Culture & Smear [[id number]] Collected: 12/07/22 1503    Order Status: Completed Specimen: Incision site from Back Updated: 12/08/22 2127     AFB Culture & Smear Culture in progress     AFB CULTURE STAIN No acid fast bacilli seen.    Narrative:      LATERAL MASS SCREW TRACK    Fungus culture [[id number]] Collected:  12/07/22 1533    Order Status: Completed Specimen: Incision site from Back Updated: 12/08/22 1020     Fungus (Mycology) Culture Culture in progress    Narrative:      C1 SCREW TRACT    Fungus culture [[id number]] Collected: 12/07/22 1503    Order Status: Completed Specimen: Incision site from Back Updated: 12/08/22 1009     Fungus (Mycology) Culture Culture in progress    Narrative:      LATERAL MASS SCREW TRACK    Fungus culture [[id number]] Collected: 12/07/22 1500    Order Status: Completed Specimen: Incision site from Back Updated: 12/08/22 1009     Fungus (Mycology) Culture Culture in progress    Narrative:      EXPLANTED HARDWARE FOR CULTURE    Aerobic culture [[id number]] Collected: 12/07/22 1500    Order Status: Completed Specimen: Incision site from Back Updated: 12/08/22 0739     Aerobic Bacterial Culture No growth    Narrative:      EXPLANTED HARDWARE FOR CULTURE    Aerobic culture [[id number]] Collected: 12/07/22 1503    Order Status: Completed Specimen: Incision site from Back Updated: 12/08/22 0739     Aerobic Bacterial Culture No growth    Narrative:      LATERAL MASS SCREW TRACK    Aerobic culture [[id number]] Collected: 12/07/22 1533    Order Status: Completed Specimen: Incision site from Back Updated: 12/08/22 0739     Aerobic Bacterial Culture No growth    Narrative:      C1 SCREW TRACT    Gram stain [[id number]] Collected: 12/07/22 1500    Order Status: Completed Specimen: Incision site from Back Updated: 12/07/22 2001     Gram Stain Result No WBC's      No organisms seen    Narrative:      EXPLANTED HARDWARE FOR CULTURE    Gram stain [[id number]] Collected: 12/07/22 1503    Order Status: Completed Specimen: Incision site from Back Updated: 12/07/22 1959     Gram Stain Result Rare WBC's      No organisms seen    Narrative:      LATERAL MASS SCREW TRACK    Gram stain [[id number]] Collected: 12/07/22 1533    Order Status: Completed Specimen: Incision site from Back Updated: 12/07/22 1823     Gram Stain  Result Rare WBC's      No organisms seen    Narrative:      C1 SCREW TRACT          All pertinent labs from the last 24 hours have been reviewed.    Significant Diagnostics:  I have reviewed and interpreted all pertinent imaging results/findings within the past 24 hours.  Physical Exam  Neurosurgery Physical Exam    Assessment/Plan:     * Odontoid fracture  Israel De Jesus is a 62 y.o. male with h/o odontoid fracture/type I hangman's s/p C1-C4 posterior cervical fusion on 10/12/22. He presents with hardware failure and worsening sagittal alignment. He is s/p C1-T1 posterior cervical revision 12/7/22 with Dr. Storm/Dr. Oliveira.    - Admit to neurosurgery floor.   -q4h neuro checks  - Patient is neurologically stable.  - All pertinent labs and diagnostics personally reviewed.  - Post op XR with hardware in satisfactory positioning.  - HV drain to full suction. IV ancef while in place. Record output qshift.  - Pain control w/ multimodal regimen.  - DVT ppx: TEDs/SCDs/SQH  - Activity: PT/OT; OOB; C-collar at all times.  - Diet: regular.  - Bowel regimen: senna-docusate and miralax daily.  - Urinary: voiding spontaneously.  - Atelectasis ppx: Encourage IS hourly, up in chair at least 6 hrs/day.  - Please contact neurosurgery with any exam changes.    Discussed with Dr. Storm    Dispo: dc pending drain removal        Elizabeth Sinha MD  Neurosurgery  [facility] Zhou - Neurosurgery ([facility])

## 2022-12-11 LAB
ANION GAP SERPL CALC-SCNC: 9 MMOL/L (ref 8–16)
BASOPHILS # BLD AUTO: 0.02 K/UL (ref 0–0.2)
BASOPHILS NFR BLD: 0.2 % (ref 0–1.9)
BUN SERPL-MCNC: 16 MG/DL (ref 8–23)
CALCIUM SERPL-MCNC: 9.7 MG/DL (ref 8.7–10.5)
CHLORIDE SERPL-SCNC: 102 MMOL/L (ref 95–110)
CO2 SERPL-SCNC: 23 MMOL/L (ref 23–29)
CREAT SERPL-MCNC: 0.9 MG/DL (ref 0.5–1.4)
DIFFERENTIAL METHOD: ABNORMAL
EOSINOPHIL # BLD AUTO: 0.1 K/UL (ref 0–0.5)
EOSINOPHIL NFR BLD: 0.7 % (ref 0–8)
ERYTHROCYTE [DISTWIDTH] IN BLOOD BY AUTOMATED COUNT: 12.8 % (ref 11.5–14.5)
EST. GFR  (NO RACE VARIABLE): >60 ML/MIN/1.73 M^2
GLUCOSE SERPL-MCNC: 123 MG/DL (ref 70–110)
HCT VFR BLD AUTO: 29.5 % (ref 40–54)
HGB BLD-MCNC: 9.5 G/DL (ref 14–18)
IMM GRANULOCYTES # BLD AUTO: 0.12 K/UL (ref 0–0.04)
IMM GRANULOCYTES NFR BLD AUTO: 1.1 % (ref 0–0.5)
LYMPHOCYTES # BLD AUTO: 2.3 K/UL (ref 1–4.8)
LYMPHOCYTES NFR BLD: 20 % (ref 18–48)
MCH RBC QN AUTO: 31.6 PG (ref 27–31)
MCHC RBC AUTO-ENTMCNC: 32.2 G/DL (ref 32–36)
MCV RBC AUTO: 98 FL (ref 82–98)
MONOCYTES # BLD AUTO: 1 K/UL (ref 0.3–1)
MONOCYTES NFR BLD: 8.8 % (ref 4–15)
NEUTROPHILS # BLD AUTO: 7.9 K/UL (ref 1.8–7.7)
NEUTROPHILS NFR BLD: 69.2 % (ref 38–73)
NRBC BLD-RTO: 0 /100 WBC
PLATELET # BLD AUTO: 319 K/UL (ref 150–450)
PMV BLD AUTO: 10.8 FL (ref 9.2–12.9)
POTASSIUM SERPL-SCNC: 4 MMOL/L (ref 3.5–5.1)
RBC # BLD AUTO: 3.01 M/UL (ref 4.6–6.2)
SODIUM SERPL-SCNC: 134 MMOL/L (ref 136–145)
WBC # BLD AUTO: 11.39 K/UL (ref 3.9–12.7)

## 2022-12-11 PROCEDURE — S4991 NICOTINE PATCH NONLEGEND: HCPCS | Performed by: STUDENT IN AN ORGANIZED HEALTH CARE EDUCATION/TRAINING PROGRAM

## 2022-12-11 PROCEDURE — 80048 BASIC METABOLIC PNL TOTAL CA: CPT | Performed by: STUDENT IN AN ORGANIZED HEALTH CARE EDUCATION/TRAINING PROGRAM

## 2022-12-11 PROCEDURE — 97535 SELF CARE MNGMENT TRAINING: CPT

## 2022-12-11 PROCEDURE — 36415 COLL VENOUS BLD VENIPUNCTURE: CPT | Performed by: STUDENT IN AN ORGANIZED HEALTH CARE EDUCATION/TRAINING PROGRAM

## 2022-12-11 PROCEDURE — 85025 COMPLETE CBC W/AUTO DIFF WBC: CPT | Performed by: STUDENT IN AN ORGANIZED HEALTH CARE EDUCATION/TRAINING PROGRAM

## 2022-12-11 PROCEDURE — 25000003 PHARM REV CODE 250: Performed by: STUDENT IN AN ORGANIZED HEALTH CARE EDUCATION/TRAINING PROGRAM

## 2022-12-11 PROCEDURE — 63600175 PHARM REV CODE 636 W HCPCS: Performed by: STUDENT IN AN ORGANIZED HEALTH CARE EDUCATION/TRAINING PROGRAM

## 2022-12-11 PROCEDURE — 25000003 PHARM REV CODE 250

## 2022-12-11 PROCEDURE — 11000001 HC ACUTE MED/SURG PRIVATE ROOM

## 2022-12-11 RX ADMIN — PREGABALIN 75 MG: 75 CAPSULE ORAL at 09:12

## 2022-12-11 RX ADMIN — PANTOPRAZOLE SODIUM 40 MG: 20 TABLET, DELAYED RELEASE ORAL at 09:12

## 2022-12-11 RX ADMIN — METOPROLOL TARTRATE 25 MG: 25 TABLET, FILM COATED ORAL at 05:12

## 2022-12-11 RX ADMIN — HEPARIN SODIUM 5000 UNITS: 5000 INJECTION INTRAVENOUS; SUBCUTANEOUS at 05:12

## 2022-12-11 RX ADMIN — DEXTROSE MONOHYDRATE 1 G: 2.5 INJECTION INTRAVENOUS at 01:12

## 2022-12-11 RX ADMIN — OXYCODONE 5 MG: 5 TABLET ORAL at 12:12

## 2022-12-11 RX ADMIN — METOPROLOL TARTRATE 25 MG: 25 TABLET, FILM COATED ORAL at 12:12

## 2022-12-11 RX ADMIN — METHOCARBAMOL 1000 MG: 500 TABLET ORAL at 10:12

## 2022-12-11 RX ADMIN — METHOCARBAMOL 1000 MG: 500 TABLET ORAL at 05:12

## 2022-12-11 RX ADMIN — LISINOPRIL 20 MG: 20 TABLET ORAL at 09:12

## 2022-12-11 RX ADMIN — HEPARIN SODIUM 5000 UNITS: 5000 INJECTION INTRAVENOUS; SUBCUTANEOUS at 01:12

## 2022-12-11 RX ADMIN — AMLODIPINE BESYLATE 10 MG: 10 TABLET ORAL at 09:12

## 2022-12-11 RX ADMIN — POLYETHYLENE GLYCOL 3350 17 G: 17 POWDER, FOR SOLUTION ORAL at 09:12

## 2022-12-11 RX ADMIN — HEPARIN SODIUM 5000 UNITS: 5000 INJECTION INTRAVENOUS; SUBCUTANEOUS at 10:12

## 2022-12-11 RX ADMIN — NICOTINE 1 PATCH: 14 PATCH, EXTENDED RELEASE TRANSDERMAL at 09:12

## 2022-12-11 RX ADMIN — METHOCARBAMOL 1000 MG: 500 TABLET ORAL at 01:12

## 2022-12-11 RX ADMIN — PREGABALIN 75 MG: 75 CAPSULE ORAL at 10:12

## 2022-12-11 RX ADMIN — DEXTROSE MONOHYDRATE 1 G: 2.5 INJECTION INTRAVENOUS at 05:12

## 2022-12-11 RX ADMIN — DEXTROSE MONOHYDRATE 1 G: 2.5 INJECTION INTRAVENOUS at 10:12

## 2022-12-11 NOTE — ASSESSMENT & PLAN NOTE
Israel De Jesus is a 62 y.o. male with h/o odontoid fracture/type I hangman's s/p C1-C4 posterior cervical fusion on 10/12/22. He presents with hardware failure and worsening sagittal alignment. He is s/p C1-T1 posterior cervical revision 12/7/22 with Dr. Storm/Dr. Oliveira.    - Admit to neurosurgery floor.   -q4h neuro checks  - Patient is neurologically stable.  - All pertinent labs and diagnostics personally reviewed.  - Post op XR with hardware in satisfactory positioning.  - HV drain to full suction. IV ancef while in place. Record output qshift.    --Removing today 12/11  - Pain control w/ multimodal regimen.  - DVT ppx: TEDs/SCDs/SQH  - Activity: PT/OT; OOB; C-collar at all times.  - Diet: regular.  - Bowel regimen: senna-docusate and miralax daily.  - Urinary: voiding spontaneously.  - Atelectasis ppx: Encourage IS hourly, up in chair at least 6 hrs/day.  - Please contact neurosurgery with any exam changes.    Discussed with Dr. Storm    Dispo: dc early this week; no PAC per PT

## 2022-12-11 NOTE — SUBJECTIVE & OBJECTIVE
Interval History: 12/11: MAYRALAXMI. AFVSS. Will remove HV today. No PAC from PT perspective, likely home this week    Medications:  Continuous Infusions:  Scheduled Meds:   amLODIPine  10 mg Oral Daily    ceFAZolin (ANCEF) IVPB  1 g Intravenous Q8H    heparin (porcine)  5,000 Units Subcutaneous Q8H    lisinopriL  20 mg Oral Daily    methocarbamoL  1,000 mg Oral Q8H    metoprolol tartrate  25 mg Oral Q6H    nicotine  1 patch Transdermal Daily    pantoprazole  40 mg Oral Daily    polyethylene glycol  17 g Oral Daily    pregabalin  75 mg Oral BID     PRN Meds:acetaminophen, bisacodyL, diazePAM, glucagon (human recombinant), glucose, glucose, HYDROmorphone, mupirocin, ondansetron, oxyCODONE, oxyCODONE, tiZANidine     Review of Systems  Objective:     Weight: 64.4 kg (142 lb)  Body mass index is 22.24 kg/m².  Vital Signs (Most Recent):  Temp: 98.6 °F (37 °C) (12/11/22 0722)  Pulse: 98 (12/11/22 0726)  Resp: 18 (12/11/22 0722)  BP: 132/79 (12/11/22 0722)  SpO2: (!) 93 % (12/11/22 0722)   Vital Signs (24h Range):  Temp:  [97.3 °F (36.3 °C)-99.1 °F (37.3 °C)] 98.6 °F (37 °C)  Pulse:  [] 98  Resp:  [15-18] 18  SpO2:  [93 %-96 %] 93 %  BP: (108-132)/(69-81) 132/79                          Closed/Suction Drain 12/07/22 1628 Posterior Neck 10 Fr. (Active)   Site Description Healing 12/10/22 2000   Dressing Type Other (Comment) 12/10/22 2000   Dressing Status Clean;Dry;Intact 12/10/22 2000   Dressing Intervention Integrity maintained 12/10/22 2000   Drainage Bloody 12/10/22 2000   Status To bulb suction 12/10/22 2000   Output (mL) 70 mL 12/10/22 1723       Physical Exam    Neurosurgery Physical Exam  General: Well developed, well nourished, not in acute distress.   Head: Normocephalic, atraumatic.  Neck: C-collar in place.  Neurologic: Alert and oriented x 4. Thought content appropriate.  GCS: Motor:6  Verbal:5  Eyes:4   GCS Total: 15  Language: No aphasia.  Speech: No dysarthria.  Cranial nerves: Face symmetric, tongue  midline, CN II-XII grossly intact.   Eyes: Pupils equal, round, reactive to light with accomodation, EOMI.   Pulmonary: Normal respirations, no signs of respiratory distress.  Abdomen: Soft, non-distended, non-tender to palpation.  Sensory: Intact to light touch throughout.  Motor Strength: Moves all extremities spontaneously with good tone. Full strength upper and lower extremities. No abnormal movements seen.      Strength   Deltoids Triceps Biceps Wrist Extension Wrist Flexion Hand    Upper: R 5/5 5/5 5/5 5/5 5/5 5/5     L 5/5 5/5 5/5 5/5 5/5 5/5       Hip Flexion Knee Extension Knee  Flexion Dorsiflexion Plantar flexion EHL   Lower: R 5/5 5/5 5/5 5/5 5/5 5/5     L 5/5 5/5 5/5 5/5 5/5 5/5   4+/5 hand intrinsics     Bains: Absent.  Vascular: Pulses 2+ and symmetric radial and dorsalis pedis. No LE edema.   Skin: Skin is warm, dry and intact.  Incision c/d/I with skin edges well approximated with monocryl and prineo tape. No surrounding erythema or edema. No drainage from incision. No palpable hematoma or underlying fluid collection.  Significant Labs:  Recent Labs   Lab 12/10/22  0459 12/11/22  0326   GLU 97 123*   * 134*   K 4.2 4.0    102   CO2 23 23   BUN 16 16   CREATININE 0.9 0.9   CALCIUM 9.3 9.7     Recent Labs   Lab 12/10/22  0459 12/11/22  0326   WBC 11.95 11.39   HGB 9.2* 9.5*   HCT 28.8* 29.5*    319     No results for input(s): LABPT, INR, APTT in the last 48 hours.  Microbiology Results (last 7 days)       Procedure Component Value Units Date/Time    Aerobic culture [[id number]] Collected: 12/07/22 1500    Order Status: Completed Specimen: Incision site from Back Updated: 12/10/22 1128     Aerobic Bacterial Culture No growth    Narrative:      EXPLANTED HARDWARE FOR CULTURE    Aerobic culture [[id number]] Collected: 12/07/22 1503    Order Status: Completed Specimen: Incision site from Back Updated: 12/10/22 1128     Aerobic Bacterial Culture No growth    Narrative:      LATERAL  MASS SCREW TRACK    Aerobic culture [[id number]] Collected: 12/07/22 1533    Order Status: Completed Specimen: Incision site from Back Updated: 12/10/22 1128     Aerobic Bacterial Culture No growth    Narrative:      C1 SCREW TRACT    Culture, Anaerobe [[id number]] Collected: 12/07/22 1533    Order Status: Completed Specimen: Incision site from Back Updated: 12/09/22 0927     Anaerobic Culture Culture in progress    Narrative:      C1 SCREW TRACT    Culture, Anaerobe [[id number]] Collected: 12/07/22 1503    Order Status: Completed Specimen: Incision site from Back Updated: 12/09/22 0926     Anaerobic Culture Culture in progress    Narrative:      LATERAL MASS SCREW TRACK    Culture, Anaerobe [[id number]] Collected: 12/07/22 1500    Order Status: Completed Specimen: Incision site from Back Updated: 12/09/22 0925     Anaerobic Culture Culture in progress    Narrative:      EXPLANTED HARDWARE FOR CULTURE    AFB Culture & Smear [[id number]] Collected: 12/07/22 1533    Order Status: Completed Specimen: Incision site from Back Updated: 12/08/22 2127     AFB Culture & Smear Culture in progress     AFB CULTURE STAIN No acid fast bacilli seen.    Narrative:      C1 SCREW TRACT    AFB Culture & Smear [[id number]] Collected: 12/07/22 1500    Order Status: Completed Specimen: Incision site from Back Updated: 12/08/22 2127     AFB Culture & Smear Culture in progress     AFB CULTURE STAIN No acid fast bacilli seen.    Narrative:      EXPLANTED HARDWARE FOR CULTURE    AFB Culture & Smear [[id number]] Collected: 12/07/22 1503    Order Status: Completed Specimen: Incision site from Back Updated: 12/08/22 2127     AFB Culture & Smear Culture in progress     AFB CULTURE STAIN No acid fast bacilli seen.    Narrative:      LATERAL MASS SCREW TRACK    Fungus culture [[id number]] Collected: 12/07/22 1533    Order Status: Completed Specimen: Incision site from Back Updated: 12/08/22 1020     Fungus (Mycology) Culture Culture in progress     Narrative:      C1 SCREW TRACT    Fungus culture [[id number]] Collected: 12/07/22 1503    Order Status: Completed Specimen: Incision site from Back Updated: 12/08/22 1009     Fungus (Mycology) Culture Culture in progress    Narrative:      LATERAL MASS SCREW TRACK    Fungus culture [[id number]] Collected: 12/07/22 1500    Order Status: Completed Specimen: Incision site from Back Updated: 12/08/22 1009     Fungus (Mycology) Culture Culture in progress    Narrative:      EXPLANTED HARDWARE FOR CULTURE    Gram stain [[id number]] Collected: 12/07/22 1500    Order Status: Completed Specimen: Incision site from Back Updated: 12/07/22 2001     Gram Stain Result No WBC's      No organisms seen    Narrative:      EXPLANTED HARDWARE FOR CULTURE    Gram stain [[id number]] Collected: 12/07/22 1503    Order Status: Completed Specimen: Incision site from Back Updated: 12/07/22 1959     Gram Stain Result Rare WBC's      No organisms seen    Narrative:      LATERAL MASS SCREW TRACK    Gram stain [[id number]] Collected: 12/07/22 1533    Order Status: Completed Specimen: Incision site from Back Updated: 12/07/22 1823     Gram Stain Result Rare WBC's      No organisms seen    Narrative:      C1 SCREW TRACT          All pertinent labs from the last 24 hours have been reviewed.    Significant Diagnostics:  I have reviewed and interpreted all pertinent imaging results/findings within the past 24 hours.  No results found in the last 24 hours.

## 2022-12-11 NOTE — PROGRESS NOTES
[facility] - Neurosurgery ([facility])  Neurosurgery  Progress Note    Subjective:     History of Present Illness: 62 M with hx of tobacco abuse who is s/p C1-4 ORIF d/t complex odontoid fracture in early oct of 2022 presented [place].  His neck pain was improving and he was at his neurobaseline however, his xrays showed worsening sagittal alignment secondary to hardware failure.  CT c spine showed haloing of the right C1 lateral mass screw with failure of the bilateral C3/4 lateral mass screws and worsened kyphotic deformity of his prior odontoid fracture.  He was thus offered a posterior cervical revision fusion with possible extension to the occiput.      Post-Op Info:  Procedure(s) (LRB):  FUSION,SPINE,CERVICAL,POSTERIOR APPROACH C1-T1 (N/A)   4 Days Post-Op     Interval History: 12/11: DUNG SIMPSON. Will remove HV today. No PAC from PT perspective, likely home this week    Medications:  Continuous Infusions:  Scheduled Meds:   amLODIPine  10 mg Oral Daily    ceFAZolin (ANCEF) IVPB  1 g Intravenous Q8H    heparin (porcine)  5,000 Units Subcutaneous Q8H    lisinopriL  20 mg Oral Daily    methocarbamoL  1,000 mg Oral Q8H    metoprolol tartrate  25 mg Oral Q6H    nicotine  1 patch Transdermal Daily    pantoprazole  40 mg Oral Daily    polyethylene glycol  17 g Oral Daily    pregabalin  75 mg Oral BID     PRN Meds:acetaminophen, bisacodyL, diazePAM, glucagon (human recombinant), glucose, glucose, HYDROmorphone, mupirocin, ondansetron, oxyCODONE, oxyCODONE, tiZANidine     Review of Systems  Objective:     Weight: 64.4 kg (142 lb)  Body mass index is 22.24 kg/m².  Vital Signs (Most Recent):  Temp: 98.6 °F (37 °C) (12/11/22 0722)  Pulse: 98 (12/11/22 0726)  Resp: 18 (12/11/22 0722)  BP: 132/79 (12/11/22 0722)  SpO2: (!) 93 % (12/11/22 0722)   Vital Signs (24h Range):  Temp:  [97.3 °F (36.3 °C)-99.1 °F (37.3 °C)] 98.6 °F (37 °C)  Pulse:  [] 98  Resp:  [15-18] 18  SpO2:  [93 %-96 %] 93 %  BP:  (108-132)/(69-81) 132/79                          Closed/Suction Drain 12/07/22 1628 Posterior Neck 10 Fr. (Active)   Site Description Healing 12/10/22 2000   Dressing Type Other (Comment) 12/10/22 2000   Dressing Status Clean;Dry;Intact 12/10/22 2000   Dressing Intervention Integrity maintained 12/10/22 2000   Drainage Bloody 12/10/22 2000   Status To bulb suction 12/10/22 2000   Output (mL) 70 mL 12/10/22 1723       Physical Exam    Neurosurgery Physical Exam  General: Well developed, well nourished, not in acute distress.   Head: Normocephalic, atraumatic.  Neck: C-collar in place.  Neurologic: Alert and oriented x 4. Thought content appropriate.  GCS: Motor:6  Verbal:5  Eyes:4   GCS Total: 15  Language: No aphasia.  Speech: No dysarthria.  Cranial nerves: Face symmetric, tongue midline, CN II-XII grossly intact.   Eyes: Pupils equal, round, reactive to light with accomodation, EOMI.   Pulmonary: Normal respirations, no signs of respiratory distress.  Abdomen: Soft, non-distended, non-tender to palpation.  Sensory: Intact to light touch throughout.  Motor Strength: Moves all extremities spontaneously with good tone. Full strength upper and lower extremities. No abnormal movements seen.      Strength   Deltoids Triceps Biceps Wrist Extension Wrist Flexion Hand    Upper: R 5/5 5/5 5/5 5/5 5/5 5/5     L 5/5 5/5 5/5 5/5 5/5 5/5       Hip Flexion Knee Extension Knee  Flexion Dorsiflexion Plantar flexion EHL   Lower: R 5/5 5/5 5/5 5/5 5/5 5/5     L 5/5 5/5 5/5 5/5 5/5 5/5   4+/5 hand intrinsics     Bains: Absent.  Vascular: Pulses 2+ and symmetric radial and dorsalis pedis. No LE edema.   Skin: Skin is warm, dry and intact.  Incision c/d/I with skin edges well approximated with monocryl and prineo tape. No surrounding erythema or edema. No drainage from incision. No palpable hematoma or underlying fluid collection.  Significant Labs:  Recent Labs   Lab 12/10/22  0459 12/11/22  0326   GLU 97 123*   * 134*    K 4.2 4.0    102   CO2 23 23   BUN 16 16   CREATININE 0.9 0.9   CALCIUM 9.3 9.7     Recent Labs   Lab 12/10/22  0459 12/11/22  0326   WBC 11.95 11.39   HGB 9.2* 9.5*   HCT 28.8* 29.5*    319     No results for input(s): LABPT, INR, APTT in the last 48 hours.  Microbiology Results (last 7 days)       Procedure Component Value Units Date/Time    Aerobic culture [[id number]] Collected: 12/07/22 1500    Order Status: Completed Specimen: Incision site from Back Updated: 12/10/22 1128     Aerobic Bacterial Culture No growth    Narrative:      EXPLANTED HARDWARE FOR CULTURE    Aerobic culture [[id number]] Collected: 12/07/22 1503    Order Status: Completed Specimen: Incision site from Back Updated: 12/10/22 1128     Aerobic Bacterial Culture No growth    Narrative:      LATERAL MASS SCREW TRACK    Aerobic culture [[id number]] Collected: 12/07/22 1533    Order Status: Completed Specimen: Incision site from Back Updated: 12/10/22 1128     Aerobic Bacterial Culture No growth    Narrative:      C1 SCREW TRACT    Culture, Anaerobe [[id number]] Collected: 12/07/22 1533    Order Status: Completed Specimen: Incision site from Back Updated: 12/09/22 0927     Anaerobic Culture Culture in progress    Narrative:      C1 SCREW TRACT    Culture, Anaerobe [[id number]] Collected: 12/07/22 1503    Order Status: Completed Specimen: Incision site from Back Updated: 12/09/22 0926     Anaerobic Culture Culture in progress    Narrative:      LATERAL MASS SCREW TRACK    Culture, Anaerobe [[id number]] Collected: 12/07/22 1500    Order Status: Completed Specimen: Incision site from Back Updated: 12/09/22 0925     Anaerobic Culture Culture in progress    Narrative:      EXPLANTED HARDWARE FOR CULTURE    AFB Culture & Smear [[id number]] Collected: 12/07/22 1533    Order Status: Completed Specimen: Incision site from Back Updated: 12/08/22 2127     AFB Culture & Smear Culture in progress     AFB CULTURE STAIN No acid fast bacilli seen.     Narrative:      C1 SCREW TRACT    AFB Culture & Smear [[id number]] Collected: 12/07/22 1500    Order Status: Completed Specimen: Incision site from Back Updated: 12/08/22 2127     AFB Culture & Smear Culture in progress     AFB CULTURE STAIN No acid fast bacilli seen.    Narrative:      EXPLANTED HARDWARE FOR CULTURE    AFB Culture & Smear [[id number]] Collected: 12/07/22 1503    Order Status: Completed Specimen: Incision site from Back Updated: 12/08/22 2127     AFB Culture & Smear Culture in progress     AFB CULTURE STAIN No acid fast bacilli seen.    Narrative:      LATERAL MASS SCREW TRACK    Fungus culture [[id number]] Collected: 12/07/22 1533    Order Status: Completed Specimen: Incision site from Back Updated: 12/08/22 1020     Fungus (Mycology) Culture Culture in progress    Narrative:      C1 SCREW TRACT    Fungus culture [[id number]] Collected: 12/07/22 1503    Order Status: Completed Specimen: Incision site from Back Updated: 12/08/22 1009     Fungus (Mycology) Culture Culture in progress    Narrative:      LATERAL MASS SCREW TRACK    Fungus culture [[id number]] Collected: 12/07/22 1500    Order Status: Completed Specimen: Incision site from Back Updated: 12/08/22 1009     Fungus (Mycology) Culture Culture in progress    Narrative:      EXPLANTED HARDWARE FOR CULTURE    Gram stain [[id number]] Collected: 12/07/22 1500    Order Status: Completed Specimen: Incision site from Back Updated: 12/07/22 2001     Gram Stain Result No WBC's      No organisms seen    Narrative:      EXPLANTED HARDWARE FOR CULTURE    Gram stain [[id number]] Collected: 12/07/22 1503    Order Status: Completed Specimen: Incision site from Back Updated: 12/07/22 1959     Gram Stain Result Rare WBC's      No organisms seen    Narrative:      LATERAL MASS SCREW TRACK    Gram stain [[id number]] Collected: 12/07/22 1533    Order Status: Completed Specimen: Incision site from Back Updated: 12/07/22 1823     Gram Stain Result Rare WBC's      No  organisms seen    Narrative:      C1 SCREW TRACT          All pertinent labs from the last 24 hours have been reviewed.    Significant Diagnostics:  I have reviewed and interpreted all pertinent imaging results/findings within the past 24 hours.  No results found in the last 24 hours.      Assessment/Plan:     * Odontoid fracture  Israel De Jesus is a 62 y.o. male with h/o odontoid fracture/type I hangman's s/p C1-C4 posterior cervical fusion on 10/12/22. He presents with hardware failure and worsening sagittal alignment. He is s/p C1-T1 posterior cervical revision 12/7/22 with Dr. Storm/Dr. Oliveira.    - Admit to neurosurgery floor.   -q4h neuro checks  - Patient is neurologically stable.  - All pertinent labs and diagnostics personally reviewed.  - Post op XR with hardware in satisfactory positioning.  - HV drain to full suction. IV ancef while in place. Record output qshift.    --Removing today 12/11  - Pain control w/ multimodal regimen.  - DVT ppx: TEDs/SCDs/SQH  - Activity: PT/OT; OOB; C-collar at all times.  - Diet: regular.  - Bowel regimen: senna-docusate and miralax daily.  - Urinary: voiding spontaneously.  - Atelectasis ppx: Encourage IS hourly, up in chair at least 6 hrs/day.  - Please contact neurosurgery with any exam changes.    Discussed with Dr. Storm    Dispo: dc early this week; no PAC per PT        Jaciel Pickett MD  Neurosurgery  [facility] - Neurosurgery ([facility])

## 2022-12-11 NOTE — PLAN OF CARE
Goals remain appropriate.  Problem: Occupational Therapy  Goal: Occupational Therapy Goal  Description: Goals set 12/11 to be addressed for 14 days with expiration date, 12/25:  Patient will increase functional independence with ADLs by performing:    Patient will demonstrate supine -sit with modified independence.   Not met  Patient will demonstrate stand pivot transfers with modified independence.   Not met  Patient will demonstrate grooming while standing with modified independence.   Not met  Patient will demonstrate upper body dressing with modified independence while seated EOB.   Not met  Patient will demonstrate lower body dressing with modified independence while seated EOB.   Not met  Patient will demonstrate toileting with modified independence.   Not met  Patient will demonstrate bathing while seated EOB with modified independence.   Not met  Patient will demonstrate independence with spinal precautions during ADLs .    Not met

## 2022-12-11 NOTE — PT/OT/SLP PROGRESS
"Occupational Therapy   Treatment    Name: Israel De Jesus  MRN: [id number]  Admitting Diagnosis:  Odontoid fracture  4 Days Post-Op    Recommendations:     Discharge Recommendations: other (see comments)  Discharge Equipment Recommendations:  none  Barriers to discharge:  None    Assessment:     Israel De Jesus is a 62 y.o. male with a medical diagnosis of Odontoid fracture.  He presents with performance deficits affecting function are weakness, impaired endurance, impaired functional mobility, impaired self care skills.     Rehab Prognosis:  Good; patient would benefit from acute skilled OT services to address these deficits and reach maximum level of function.       Plan:     Patient to be seen 3 x/week to address the above listed problems via cognitive retraining, neuromuscular re-education, therapeutic exercises, therapeutic activities, self-care/home management  Plan of Care Expires: 01/05/23  Plan of Care Reviewed with: patient    Subjective   Patient:  "The only person I have to rely on for help is my daughter."  Pain/Comfort:  Pain Rating 1: 0/10  Pain Rating Post-Intervention 1: 0/10    Objective:     Communicated with: Nurse prior to session.  Patient found supine with peripheral IV, hemovac, telemetry upon OT entry to room.    General Precautions: Standard, aspiration, fall    Orthopedic Precautions:spinal precautions  Braces: Aspen collar  Respiratory Status: Room air     Occupational Performance:     Bed Mobility:    Patient completed Rolling/Turning to Left with  modified independence  Patient completed Rolling/Turning to Right with modified independence  Patient completed Supine to Sit with supervision  Patient completed Sit to Supine with supervision     Functional Mobility/Transfers:  Patient completed Sit <> Stand Transfer with stand by assistance  with  no assistive device   Patient completed Bed <> Chair Transfer using Stand Pivot technique with stand by assistance with no assistive " device    Activities of Daily Living:  Grooming: stand by assistance while standing  Upper Body Dressing: stand by assistance    Lower Body Dressing: stand by assistance while seated EOB    [facility] 6 Click ADL: 19    Treatment & Education:  Patient alert and oriented x 3; able to follow 4/4 one step commands.  Patient attentive and interactive throughout the session.      Patient left supine with all lines intact, call button in reach, and bed alarm on    GOALS:   Multidisciplinary Problems       Occupational Therapy Goals          Problem: Occupational Therapy    Goal Priority Disciplines Outcome Interventions   Occupational Therapy Goal     OT, PT/OT Ongoing, Progressing    Description: Goals set 12/11 to be addressed for 14 days with expiration date, 12/25:  Patient will increase functional independence with ADLs by performing:    Patient will demonstrate supine -sit with modified independence.   Not met  Patient will demonstrate stand pivot transfers with modified independence.   Not met  Patient will demonstrate grooming while standing with modified independence.   Not met  Patient will demonstrate upper body dressing with modified independence while seated EOB.   Not met  Patient will demonstrate lower body dressing with modified independence while seated EOB.   Not met  Patient will demonstrate toileting with modified independence.   Not met  Patient will demonstrate bathing while seated EOB with modified independence.   Not met  Patient will demonstrate independence with spinal precautions during ADLs .    Not met                       Time Tracking:     OT Date of Treatment: 12/11/22  OT Start Time: 0705  OT Stop Time: 0730  OT Total Time (min): 25 min    Billable Minutes:Self Care/Home Management 25    OT/SONA: OT          12/11/2022

## 2022-12-12 VITALS
SYSTOLIC BLOOD PRESSURE: 140 MMHG | RESPIRATION RATE: 15 BRPM | WEIGHT: 142 LBS | TEMPERATURE: 98 F | BODY MASS INDEX: 22.29 KG/M2 | DIASTOLIC BLOOD PRESSURE: 75 MMHG | HEART RATE: 95 BPM | OXYGEN SATURATION: 96 % | HEIGHT: 67 IN

## 2022-12-12 DIAGNOSIS — S12.100K CLOSED ODONTOID FRACTURE WITH NONUNION, SUBSEQUENT ENCOUNTER: Primary | ICD-10-CM

## 2022-12-12 LAB
ANION GAP SERPL CALC-SCNC: 7 MMOL/L (ref 8–16)
BASOPHILS # BLD AUTO: 0.03 K/UL (ref 0–0.2)
BASOPHILS NFR BLD: 0.3 % (ref 0–1.9)
BUN SERPL-MCNC: 13 MG/DL (ref 8–23)
CALCIUM SERPL-MCNC: 10 MG/DL (ref 8.7–10.5)
CHLORIDE SERPL-SCNC: 103 MMOL/L (ref 95–110)
CO2 SERPL-SCNC: 24 MMOL/L (ref 23–29)
CREAT SERPL-MCNC: 0.8 MG/DL (ref 0.5–1.4)
DIFFERENTIAL METHOD: ABNORMAL
EOSINOPHIL # BLD AUTO: 0.1 K/UL (ref 0–0.5)
EOSINOPHIL NFR BLD: 0.5 % (ref 0–8)
ERYTHROCYTE [DISTWIDTH] IN BLOOD BY AUTOMATED COUNT: 12.8 % (ref 11.5–14.5)
EST. GFR  (NO RACE VARIABLE): >60 ML/MIN/1.73 M^2
GLUCOSE SERPL-MCNC: 109 MG/DL (ref 70–110)
HCT VFR BLD AUTO: 30 % (ref 40–54)
HGB BLD-MCNC: 9.6 G/DL (ref 14–18)
IMM GRANULOCYTES # BLD AUTO: 0.09 K/UL (ref 0–0.04)
IMM GRANULOCYTES NFR BLD AUTO: 0.8 % (ref 0–0.5)
LYMPHOCYTES # BLD AUTO: 2.1 K/UL (ref 1–4.8)
LYMPHOCYTES NFR BLD: 17.5 % (ref 18–48)
MCH RBC QN AUTO: 30.8 PG (ref 27–31)
MCHC RBC AUTO-ENTMCNC: 32 G/DL (ref 32–36)
MCV RBC AUTO: 96 FL (ref 82–98)
MONOCYTES # BLD AUTO: 1.2 K/UL (ref 0.3–1)
MONOCYTES NFR BLD: 9.6 % (ref 4–15)
NEUTROPHILS # BLD AUTO: 8.5 K/UL (ref 1.8–7.7)
NEUTROPHILS NFR BLD: 71.3 % (ref 38–73)
NRBC BLD-RTO: 0 /100 WBC
PLATELET # BLD AUTO: 368 K/UL (ref 150–450)
PMV BLD AUTO: 10.8 FL (ref 9.2–12.9)
POTASSIUM SERPL-SCNC: 4.4 MMOL/L (ref 3.5–5.1)
RBC # BLD AUTO: 3.12 M/UL (ref 4.6–6.2)
SODIUM SERPL-SCNC: 134 MMOL/L (ref 136–145)
WBC # BLD AUTO: 11.95 K/UL (ref 3.9–12.7)

## 2022-12-12 PROCEDURE — 97530 THERAPEUTIC ACTIVITIES: CPT

## 2022-12-12 PROCEDURE — 99024 PR POST-OP FOLLOW-UP VISIT: ICD-10-PCS | Mod: ,,,

## 2022-12-12 PROCEDURE — S4991 NICOTINE PATCH NONLEGEND: HCPCS | Performed by: STUDENT IN AN ORGANIZED HEALTH CARE EDUCATION/TRAINING PROGRAM

## 2022-12-12 PROCEDURE — 85025 COMPLETE CBC W/AUTO DIFF WBC: CPT | Performed by: STUDENT IN AN ORGANIZED HEALTH CARE EDUCATION/TRAINING PROGRAM

## 2022-12-12 PROCEDURE — 25000003 PHARM REV CODE 250: Performed by: STUDENT IN AN ORGANIZED HEALTH CARE EDUCATION/TRAINING PROGRAM

## 2022-12-12 PROCEDURE — 97535 SELF CARE MNGMENT TRAINING: CPT

## 2022-12-12 PROCEDURE — 63600175 PHARM REV CODE 636 W HCPCS: Performed by: STUDENT IN AN ORGANIZED HEALTH CARE EDUCATION/TRAINING PROGRAM

## 2022-12-12 PROCEDURE — 80048 BASIC METABOLIC PNL TOTAL CA: CPT | Performed by: STUDENT IN AN ORGANIZED HEALTH CARE EDUCATION/TRAINING PROGRAM

## 2022-12-12 PROCEDURE — 36415 COLL VENOUS BLD VENIPUNCTURE: CPT | Performed by: STUDENT IN AN ORGANIZED HEALTH CARE EDUCATION/TRAINING PROGRAM

## 2022-12-12 PROCEDURE — 97116 GAIT TRAINING THERAPY: CPT

## 2022-12-12 PROCEDURE — 25000003 PHARM REV CODE 250

## 2022-12-12 PROCEDURE — 99024 POSTOP FOLLOW-UP VISIT: CPT | Mod: ,,,

## 2022-12-12 RX ORDER — OXYCODONE HYDROCHLORIDE 5 MG/1
5 TABLET ORAL EVERY 4 HOURS PRN
Qty: 40 TABLET | Refills: 0 | Status: SHIPPED | OUTPATIENT
Start: 2022-12-12 | End: 2023-01-27

## 2022-12-12 RX ORDER — METHOCARBAMOL 500 MG/1
500 TABLET, FILM COATED ORAL 4 TIMES DAILY
Qty: 40 TABLET | Refills: 0 | Status: SHIPPED | OUTPATIENT
Start: 2022-12-12 | End: 2022-12-22

## 2022-12-12 RX ORDER — LACTULOSE 10 G/15ML
15 SOLUTION ORAL 3 TIMES DAILY
Status: DISCONTINUED | OUTPATIENT
Start: 2022-12-12 | End: 2022-12-12 | Stop reason: HOSPADM

## 2022-12-12 RX ORDER — DIAZEPAM 5 MG/1
5 TABLET ORAL EVERY 8 HOURS PRN
Qty: 15 TABLET | Refills: 0 | Status: SHIPPED | OUTPATIENT
Start: 2022-12-12 | End: 2023-03-03

## 2022-12-12 RX ADMIN — DEXTROSE MONOHYDRATE 1 G: 2.5 INJECTION INTRAVENOUS at 06:12

## 2022-12-12 RX ADMIN — METOPROLOL TARTRATE 25 MG: 25 TABLET, FILM COATED ORAL at 12:12

## 2022-12-12 RX ADMIN — PANTOPRAZOLE SODIUM 40 MG: 20 TABLET, DELAYED RELEASE ORAL at 09:12

## 2022-12-12 RX ADMIN — HEPARIN SODIUM 5000 UNITS: 5000 INJECTION INTRAVENOUS; SUBCUTANEOUS at 05:12

## 2022-12-12 RX ADMIN — METHOCARBAMOL 1000 MG: 500 TABLET ORAL at 05:12

## 2022-12-12 RX ADMIN — METOPROLOL TARTRATE 25 MG: 25 TABLET, FILM COATED ORAL at 05:12

## 2022-12-12 RX ADMIN — OXYCODONE HYDROCHLORIDE 10 MG: 10 TABLET ORAL at 09:12

## 2022-12-12 RX ADMIN — POLYETHYLENE GLYCOL 3350 17 G: 17 POWDER, FOR SOLUTION ORAL at 09:12

## 2022-12-12 RX ADMIN — LACTULOSE 15 G: 20 SOLUTION ORAL at 09:12

## 2022-12-12 RX ADMIN — PREGABALIN 75 MG: 75 CAPSULE ORAL at 09:12

## 2022-12-12 RX ADMIN — NICOTINE 1 PATCH: 14 PATCH, EXTENDED RELEASE TRANSDERMAL at 09:12

## 2022-12-12 RX ADMIN — AMLODIPINE BESYLATE 10 MG: 10 TABLET ORAL at 09:12

## 2022-12-12 RX ADMIN — LISINOPRIL 20 MG: 20 TABLET ORAL at 09:12

## 2022-12-12 NOTE — PT/OT/SLP PROGRESS
"Occupational Therapy   Treatment    Name: Israel De Jesus  MRN: [id number]  Admitting Diagnosis:  Odontoid fracture  5 Days Post-Op    Recommendations:     Discharge Recommendations: other   Discharge Equipment Recommendations:  none  Barriers to discharge:  None    Assessment:     Israel De Jesus is a 62 y.o. male with a medical diagnosis of Odontoid fracture.  He presents with performance deficits affecting function are weakness, impaired endurance, impaired functional mobility, impaired self care skills.     Rehab Prognosis:  Good; patient would benefit from acute skilled OT services to address these deficits and reach maximum level of function.       Plan:     Patient to be seen 3 x/week to address the above listed problems via self-care/home management, therapeutic activities, therapeutic exercises, neuromuscular re-education, cognitive retraining  Plan of Care Expires: 12/05/22  Plan of Care Reviewed with: patient    Subjective   Patient:  "My back is hurting."    Pain/Comfort:  Pain Rating 1: 5/10  Pain Rating Post-Intervention 1: 5/10    Objective:     Communicated with: Nurse prior to session.  Patient found supine with peripheral IV, telemetry upon OT entry to room.    General Precautions: Standard, aspiration, fall    Orthopedic Precautions:spinal precautions  Braces: Aspen collar  Respiratory Status: Room air     Occupational Performance:     Bed Mobility:    Patient completed Rolling/Turning to Left with  supervision  Patient completed Rolling/Turning to Right with supervision  Patient completed Supine to Sit with supervision  Patient completed Sit to Supine with supervision     Functional Mobility/Transfers:  Patient completed Sit <> Stand Transfer with stand by assistance  with  no assistive device   Patient completed Bed <> Chair Transfer using Stand Pivot technique with stand by assistance with no assistive device    Activities of Daily Living:  Grooming: stand by assistance while " standing  Upper Body Dressing: stand by assistance      [facility] 6 Click ADL: 19    Treatment & Education:  Patient alert and oriented x 3; able to follow 4/4 one step commands.  Patient attentive and interactive throughout the session.  Patient education provided on spinal precautions throughout  ADLs.     Patient left supine with all lines intact, call button in reach, and bed alarm on    GOALS:   Multidisciplinary Problems       Occupational Therapy Goals          Problem: Occupational Therapy    Goal Priority Disciplines Outcome Interventions   Occupational Therapy Goal     OT, PT/OT Ongoing, Progressing    Description: Goals set 12/11 to be addressed for 14 days with expiration date, 12/25:  Patient will increase functional independence with ADLs by performing:    Patient will demonstrate supine -sit with modified independence.   Not met  Patient will demonstrate stand pivot transfers with modified independence.   Not met  Patient will demonstrate grooming while standing with modified independence.   Not met  Patient will demonstrate upper body dressing with modified independence while seated EOB.   Not met  Patient will demonstrate lower body dressing with modified independence while seated EOB.   Not met  Patient will demonstrate toileting with modified independence.   Not met  Patient will demonstrate bathing while seated EOB with modified independence.   Not met  Patient will demonstrate independence with spinal precautions during ADLs .    Not met                       Time Tracking:     OT Date of Treatment: 12/12/22  OT Start Time: 0739  OT Stop Time: 0755  OT Total Time (min): 16 min    Billable Minutes:Self Care/Home Management 16    OT/SONA: OT          12/12/2022

## 2022-12-12 NOTE — PLAN OF CARE
Goals remain appropriate.  Problem: Occupational Therapy  Goal: Occupational Therapy Goal  Description: Goals set 12/11 to be addressed for 14 days with expiration date, 12/25:  Patient will increase functional independence with ADLs by performing:    Patient will demonstrate supine -sit with modified independence.   Not met  Patient will demonstrate stand pivot transfers with modified independence.   Not met  Patient will demonstrate grooming while standing with modified independence.   Not met  Patient will demonstrate upper body dressing with modified independence while seated EOB.   Not met  Patient will demonstrate lower body dressing with modified independence while seated EOB.   Not met  Patient will demonstrate toileting with modified independence.   Not met  Patient will demonstrate bathing while seated EOB with modified independence.   Not met  Patient will demonstrate independence with spinal precautions during ADLs .    Not met  Outcome: Ongoing, Progressing

## 2022-12-12 NOTE — NURSING
Patient discharged to home with family at bedside. AVS reviewed with patient. All questions addressed. Meds delivered to bedside. Left in wheelchair to private vehicle to home.

## 2022-12-12 NOTE — DISCHARGE INSTRUCTIONS
Post op Spine Patient Instructions    Activity Restrictions:  [x]  Return to work/school will be determined on an individual basis.  [x]  No lifting greater than 5-10 pounds.  [x]  Avoid bending and twisting the area of your surgery more than 45 degrees from neutral position in any direction.  [x]  No driving or operating machinery:  [x]  until cleared by your surgeon.  [x]  while taking narcotic pain medications or muscle relaxants.  [x]  Wear your c-collar AT ALL TIMES.  [x]  Increase ambulation over the next 2 weeks so that you are walking 2 miles per day at 2 weeks post-operatively.  [x]  Walk on paved surfaces only. It is okay to walk up and down stairs while holding onto a side rail.  [x]  No sexual activity for 6 weeks.    Discharge Medication/Follow-up:  [x]  Please refer to discharge medication reconciliation form.  Robaxin (methocarbamol) 500 MG 4 times a day for 10 days.   Percocet (oxycodone-acetaminophen) 5/325 MG every 6 hours AS NEEDED for pain.  You may take Tylenol (acetaminophen) when not taking your Percocet. The maximum daily dose of Tylenol is 3000 MG. Do not exceed this limit. Be aware of taking any other over the counter medications that may contain acetaminophen.  Valium (diazepam) 5 MG every 8 ours AS NEEDED for muscle spasms/pain.  [x]  Do not take ANY Aspirin or Aspirin containing products for 2 weeks after surgery.   [x]  Do not take ANY herbal supplements for 2 weeks after surgery.    [x]  Do not take ANY non-steroidal anti-inflammatory drugs (NSAIDS), including the following: Ibuprofen, Naproxen, Aleve, Advil, Indocin, Mobic, or Celebrex for 12 weeks after surgery.   [x]  Prescriptions for appropriate medication will be given upon discharge.  [x]  Take docusate (Colace 100 mg): take one capsule a day as needed for constipation. You can get this over the counter.  [x]  Follow-up appointment:  [x]  In 10-14 days post-op for wound check by physician assistant/nurse.  [x]  In 4-6 weeks with  MD:  [x]  with x-rays.  [x]  Appointments will be arranged for you and you will be contacted with a date and time.    Wound Care:  [x]  No bandage required. Keep your incision open to the air. You have prineo tape over your incision with dissolvable sutures. Allow this to fall off on its own. We will ensure this is healing appropriately at your 2 week wound check appointment.  [x]  You may shower after your surgery. Keep the incision clean and dry as much as possible. Do not allow the force of water to hit the incision. If the incision gets damp, pat it dry. Do not rub or scrub the incision.  [x]  You cannot take a bath until 8 weeks after surgery.    Call your doctor or go to the Emergency Room for any signs of infection, including: increased redness, drainage, pain, or fever (temperature ?101.5 for 24 hours). Call your doctor or go to the Emergency Room if there are any localized neurological changes; problems with speech, vision, numbness, tingling, weakness, or severe headache; inability to control urination or bowel movements; inability to urinate; or for other concerns.    Special Instructions:  [x]  No use of tobacco products.  [x]  Diet: Please eat your regular diet as tolerated.      Physicians need 3 days notice for pain medicine to be refilled. Pain medicine will only be refilled between 8 AM and 5 PM, Monday through Friday, due to Food and Drug Administration regulation of documentation.    If you have any questions about this form, please call [phone].    Form No. 17677 (Revised 10/31/2013)

## 2022-12-12 NOTE — PT/OT/SLP PROGRESS
Physical Therapy Treatment    Patient Name:  Israel De Jesus   MRN:  [id number]    Recommendations:     Discharge Recommendations: other (see comments)  Discharge Equipment Recommendations: none  Barriers to discharge: Decreased caregiver support    Assessment:     Israel De Jesus is a 62 y.o. male admitted with a medical diagnosis of Odontoid fracture.  He presents with the following impairments/functional limitations: impaired self care skills, impaired functional mobility, gait instability, impaired balance, decreased safety awareness, pain, orthopedic precautions. Pt w/ improved mobility despite LBP from lying in bed. Pt performed toileting w/ SBA and ambulated 250' w/ RW and supervision w/ min cueing for posture to assist w/ pain. Pt is safe w/ functional mobility to return home safely from a PT standpoint, but will continue to benefit from treatment to address the above listed impairments.    Rehab Prognosis: Good; patient would benefit from acute skilled PT services to address these deficits and reach maximum level of function.    Recent Surgery: Procedure(s) (LRB):  FUSION,SPINE,CERVICAL,POSTERIOR APPROACH C1-T1 (N/A) 5 Days Post-Op    Plan:     During this hospitalization, patient to be seen 3 x/week to address the identified rehab impairments via gait training, therapeutic activities, therapeutic exercises, neuromuscular re-education and progress toward the following goals:    Plan of Care Expires:  01/06/23    Subjective     Chief Complaint: back pain  Patient/Family Comments/goals: get back home  Pain/Comfort:  Location 1:  (unrated back pain)      Objective:     Communicated with RN prior to session.  Patient found HOB elevated with peripheral IV, telemetry, cervical collar upon PT entry to room.     General Precautions: Standard, aspiration, fall  Orthopedic Precautions: spinal precautions  Braces: Aspen collar  Respiratory Status: Room air     Functional Mobility:  Bed Mobility:     Supine to  Sit: supervision  Transfers:     Sit to Stand:  supervision with rolling walker  Bed to Chair: supervision with  rolling walker  using  Step Transfer  Toilet Transfer: stand by assistance with  rolling walker  using  Step Transfer  Gait: 250' w/ RW supervision  Balance: supervision for dynamic standing balance w/ RW      AM-PAC 6 CLICK MOBILITY  Turning over in bed (including adjusting bedclothes, sheets and blankets)?: 4  Sitting down on and standing up from a chair with arms (e.g., wheelchair, bedside commode, etc.): 4  Moving from lying on back to sitting on the side of the bed?: 4  Moving to and from a bed to a chair (including a wheelchair)?: 4  Need to walk in hospital room?: 4  Climbing 3-5 steps with a railing?: 3  Basic Mobility Total Score: 23       Treatment & Education:  PT educated patient on spinal precautions: no bending, lifting >10lbs (~1 gallon of milk), or twisting, w/ acronym no BLT to assist w/ remembering precautions. Patient verbalized understanding of spinal precautions and that these precautions will remain in place until MD clears pt to return to these activities.  Pt educated on use of C collar and proper positioning of brace, informed they are required to wear this brace until told otherwise by MD. Pt verbalized understanding.  TA: bed mobility w/ supervision, sts w/ supervision and min cueing for hand placement on bed/chair when pushing up to stand, and toileting w/ SBA and cueing for maintaining close proximity to RW without leaving it behind in bathroom. Pt assisted w/ donning new brief in sitting w/ PT assisting to get it on BLE and pt able to pull it up from knees independently once standing.  3min dynamic standing balance at sink w/ supervision  Gait training x 250' w/ RW and intermittent cueing for posture w/ pt educated on sitting in firm chairs to work on actively holding sitting balance rather than sinking into bed/couch to assist w/ LBP and work on strengthening postural  muscles w/ use of exercises such as shoulder retraction.  Pt educated on PT POC/goals, d/c recs, and continued treatment. All questions answered and pt in agreement w/ POC.      Patient left up in chair with all lines intact, call button in reach, chair alarm on, and RN notified..    GOALS:   Multidisciplinary Problems       Physical Therapy Goals          Problem: Physical Therapy    Goal Priority Disciplines Outcome Goal Variances Interventions   Physical Therapy Goal     PT, PT/OT Ongoing, Progressing     Description: Goals to be met by: 22     Patient will increase functional independence with mobility by performin. Supine to sit with [place]  2. Sit to stand transfer with [place]  3. Bed to chair transfer with [place] using No Assistive Device  4. Gait  x 300 feet with Modified [place] using Rolling Walker.   5. Ascend/descend 4 stair with right Handrails Modified [place] using No Assistive Device.                          Time Tracking:     PT Received On: 22  PT Start Time: 09     PT Stop Time: 1007  PT Total Time (min): 29 min     Billable Minutes: Gait Training 15 and Therapeutic Activity 14    Treatment Type: Treatment  PT/PTA: PT     PTA Visit Number: 0     2022

## 2022-12-12 NOTE — DISCHARGE SUMMARY
[facility] - Neurosurgery ([facility])  Neurosurgery  Discharge Summary      Patient Name: Israel De Jesus  MRN: [id number]  Admission Date: 12/7/2022  Hospital Length of Stay: 5 days  Discharge Date and Time: No discharge date for patient encounter.  Attending Physician: Ike Storm DO   Discharging Provider: IAN OrrC  Primary Care Provider: Andrae Camarillo MD    HPI:   62 M with hx of tobacco abuse who is s/p C1-4 ORIF d/t complex odontoid fracture in early oct of 2022 presented in [facility].  His neck pain was improving and he was at his neurobaseline however, his xrays showed worsening sagittal alignment secondary to hardware failure.  CT c spine showed haloing of the right C1 lateral mass screw with failure of the bilateral C3/4 lateral mass screws and worsened kyphotic deformity of his prior odontoid fracture.  He was thus offered a posterior cervical revision fusion with possible extension to the occiput.      Procedure(s) (LRB):  FUSION,SPINE,CERVICAL,POSTERIOR APPROACH C1-T1 (N/A)     Hospital Course: 12/8: NAEON. AFVSS. POD 1 s/p C1-T1 posterior cervical fusion revision. Neuro stable. Reports intermittent left finger numbness which is his baseline. Ambulated well with therapy. HV drain without high output, will keep. C-collar in place. Post op XR pending. Tolerating PO. Blanco removed this am, will follow up voiding.   12/9: NAEON. AFVSS. Neuro stable. Denies new numbness, weakness. Upper back feels sore but muscle relaxants are helping. Keep HV drain. Post op XR with hardware in satisfactory positioning. Voiding spontaneously. + flatus. Tolerating PO intake.   12/10: NAEON, continued lower neck burning pain, but no radiculopathy or weakness in upper extremities. OOB with PT. Drain output 30.   12/11: NAEON. AFVSS. Will remove HV today. No PAC from PT perspective, likely home this week  12/12: NAEON. AFVSS. Neuro stable. Patient is medically stable for discharge to home today. Incision  care and activity recommendations reviewed. Plan of care discussed with patient and he voiced understanding. All his questions were answered. Follow-up in Neurosurgery clinic arranged.     Neurosurgery Physical Exam  General: Well developed, well nourished, not in acute distress.   Head: Normocephalic, atraumatic.  Neck: C-collar in place.  Neurologic: Alert and oriented x 4. Thought content appropriate.  GCS: Motor:6  Verbal:5  Eyes:4   GCS Total: 15  Language: No aphasia.  Speech: No dysarthria.  Cranial nerves: Face symmetric, tongue midline, CN II-XII grossly intact.   Eyes: Pupils equal, round, reactive to light with accomodation, EOMI.   Pulmonary: Normal respirations, no signs of respiratory distress.  Abdomen: Soft, non-distended, non-tender to palpation.  Sensory: Intact to light touch throughout.  Motor Strength: Moves all extremities spontaneously with good tone. No abnormal movements seen.      Strength   Deltoids Triceps Biceps Wrist Extension Wrist Flexion Hand    Upper: R 5/5 5/5 5/5 5/5 5/5 5/5     L 5/5 5/5 5/5 5/5 5/5 5/5       Hip Flexion Knee Extension Knee  Flexion Dorsiflexion Plantar flexion EHL   Lower: R 5/5 5/5 5/5 5/5 5/5 5/5     L 5/5 5/5 5/5 5/5 5/5 5/5   4+/5 hand intrinsics  Bains: Absent.  Vascular: Pulses 2+ and symmetric radial and dorsalis pedis. No LE edema.   Skin: Skin is warm, dry and intact.    Incision c/d/I with skin edges well approximated with monocryl and prineo tape. No surrounding erythema or edema. No drainage from incision. No palpable hematoma or underlying fluid collection.      Goals of Care Treatment Preferences:  Code Status: Full Code      Consults:     Significant Diagnostic Studies: Labs:   CMP   Recent Labs   Lab 12/11/22  0326 12/12/22  0424   * 134*   K 4.0 4.4    103   CO2 23 24   * 109   BUN 16 13   CREATININE 0.9 0.8   CALCIUM 9.7 10.0   ANIONGAP 9 7*   , CBC   Recent Labs   Lab 12/11/22  0326 12/12/22  0424   WBC 11.39 11.95    HGB 9.5* 9.6*   HCT 29.5* 30.0*    368    and All labs within the past 24 hours have been reviewed  Radiology: X-Ray: cervical spine    Pending Diagnostic Studies:     None        Final Active Diagnoses:    Diagnosis Date Noted POA    PRINCIPAL PROBLEM:  Odontoid fracture [S12.110A] 10/11/2022 Yes      Problems Resolved During this Admission:      Discharged Condition: good     Disposition: Home or Self Care    Follow Up:    Patient Instructions:      Ambulatory referral/consult to Home Health   Standing Status: Future   Referral Priority: Routine Referral Type: Home Health   Referral Reason: Specialty Services Required   Requested Specialty: Home Health Services   Number of Visits Requested: 1     Ambulatory referral/consult to Physical/Occupational Therapy   Standing Status: Future   Referral Priority: Routine Referral Type: Physical Medicine   Referral Reason: Specialty Services Required   Number of Visits Requested: 1     Other restrictions (specify):   Order Comments: C-collar at all times.     Notify your health care provider if you experience any of the following:  temperature >100.4     Notify your health care provider if you experience any of the following:  increased confusion or weakness     Notify your health care provider if you experience any of the following:  persistent dizziness, light-headedness, or visual disturbances     Notify your health care provider if you experience any of the following:  worsening rash     Notify your health care provider if you experience any of the following:  severe persistent headache     Notify your health care provider if you experience any of the following:  difficulty breathing or increased cough     Notify your health care provider if you experience any of the following:  redness, tenderness, or signs of infection (pain, swelling, redness, odor or green/yellow discharge around incision site)     Notify your health care provider if you experience any of  the following:  severe uncontrolled pain     Notify your health care provider if you experience any of the following:  persistent nausea and vomiting or diarrhea     No dressing needed     Activity as tolerated     Medications:  Reconciled Home Medications:      Medication List      START taking these medications    diazePAM 5 MG tablet  Commonly known as: VALIUM  Take 1 tablet (5 mg total) by mouth every 8 (eight) hours as needed (use only as needed for muscle spasms).        CHANGE how you take these medications    methocarbamoL 500 MG Tab  Commonly known as: ROBAXIN  Take 1 tablet (500 mg total) by mouth 4 (four) times daily. for 10 days  What changed:   · how much to take  · when to take this     oxyCODONE 5 MG immediate release tablet  Commonly known as: ROXICODONE  Take 1 tablet (5 mg total) by mouth every 4 (four) hours as needed for Pain.  What changed: when to take this        CONTINUE taking these medications    * acetaminophen 500 MG tablet  Commonly known as: TYLENOL  Take 1,000 mg by mouth every 8 (eight) hours.     * acetaminophen 325 MG tablet  Commonly known as: TYLENOL  Take 325 mg by mouth every 6 (six) hours as needed for Pain.     amLODIPine 5 MG tablet  Commonly known as: NORVASC  Take 1 tablet (5 mg total) by mouth once daily.     lisinopriL 20 MG tablet  Commonly known as: PRINIVIL,ZESTRIL  Take 1 tablet (20 mg total) by mouth once daily.     metoprolol tartrate 25 MG tablet  Commonly known as: LOPRESSOR  Take 1 tablet (25 mg total) by mouth every 6 (six) hours.     nicotine 14 mg/24 hr  Commonly known as: NICODERM CQ  Place 14 mg onto the skin.     pantoprazole 40 MG tablet  Commonly known as: PROTONIX  Take 1 tablet (40 mg total) by mouth once daily.     polyethylene glycol 17 gram/dose powder  Commonly known as: GLYCOLAX  Take 17 g by mouth.     pregabalin 75 MG capsule  Commonly known as: LYRICA  Take 1 capsule (75 mg total) by mouth 2 (two) times daily.     tiZANidine 4 MG  tablet  Commonly known as: ZANAFLEX  Take 1 tablet (4 mg total) by mouth every 6 (six) hours as needed.         * This list has 2 medication(s) that are the same as other medications prescribed for you. Read the directions carefully, and ask your doctor or other care provider to review them with you.                Christina Boyle PA-C  Neurosurgery  [facility] - Neurosurgery ([facility])

## 2022-12-12 NOTE — PLAN OF CARE
[facility] - Neurosurgery (Hospital)  Discharge Final Note    Primary Care Provider: Andrae Camarillo MD    Expected Discharge Date: 12/12/2022    Patient to be discharged home.  The patient will have OP Therapy upon discharge, referral placed.  Family to provide transportation home.  Neurosurgery clinic to schedule follow up appointment.      Final Discharge Note (most recent)       Final Note - 12/12/22 1416          Final Note    Assessment Type Final Discharge Note     Anticipated Discharge Disposition Home or Self Care        Post-Acute Status    Post-Acute Authorization Other     Other Status No Post-Acute Service Needs     Discharge Delays None known at this time                     Important Message from Medicare

## 2022-12-13 ENCOUNTER — PATIENT OUTREACH (OUTPATIENT)
Dept: ADMINISTRATIVE | Facility: CLINIC | Age: 62
End: 2022-12-13
Payer: MEDICAID

## 2022-12-13 ENCOUNTER — TELEPHONE (OUTPATIENT)
Dept: FAMILY MEDICINE | Facility: CLINIC | Age: 62
End: 2022-12-13

## 2022-12-13 NOTE — PROGRESS NOTES
C3 nurse attempted to contact Israel De Jesus for a TCC post hospital discharge follow up call. No answer. No voicemail available.[facility] MESSAGE SENT. The patient does not have a scheduled HOSFU appointment. Message sent to PCP staff for assistance with scheduling visit with patient.

## 2022-12-14 ENCOUNTER — TELEPHONE (OUTPATIENT)
Dept: NEUROSURGERY | Facility: CLINIC | Age: 62
End: 2022-12-14
Payer: MEDICAID

## 2022-12-14 LAB
BACTERIA SPEC ANAEROBE CULT: NORMAL

## 2022-12-14 NOTE — TELEPHONE ENCOUNTER
----- Message from Sherri Trinh MA sent at 12/12/2022  3:52 PM CST -----    ----- Message -----  From: Christina Boyle PA-C  Sent: 12/12/2022   3:49 PM CST  To: [facility] Staff    Hi this patient needs a 2 week wound check and his 6 week with X-ray cervical spine scheduled! Thanks

## 2023-01-04 PROBLEM — H91.90 HEARING LOSS: Status: ACTIVE | Noted: 2020-11-07

## 2023-01-04 PROBLEM — M54.30 SCIATICA: Status: ACTIVE | Noted: 2020-12-28

## 2023-01-04 PROBLEM — M54.50 LOW BACK PAIN: Status: ACTIVE | Noted: 2021-01-03

## 2023-01-10 LAB
FUNGUS SPEC CULT: NORMAL

## 2023-01-23 ENCOUNTER — TELEPHONE (OUTPATIENT)
Dept: NEUROSURGERY | Facility: CLINIC | Age: 63
End: 2023-01-23

## 2023-01-23 ENCOUNTER — OFFICE VISIT (OUTPATIENT)
Dept: NEUROSURGERY | Facility: CLINIC | Age: 63
End: 2023-01-23
Payer: MEDICAID

## 2023-01-23 ENCOUNTER — HOSPITAL ENCOUNTER (OUTPATIENT)
Dept: RADIOLOGY | Facility: HOSPITAL | Age: 63
Discharge: HOME | End: 2023-01-23
Payer: MEDICAID

## 2023-01-23 DIAGNOSIS — S12.100K CLOSED ODONTOID FRACTURE WITH NONUNION, SUBSEQUENT ENCOUNTER: ICD-10-CM

## 2023-01-23 DIAGNOSIS — Z98.1 STATUS POST CERVICAL SPINAL FUSION: Primary | ICD-10-CM

## 2023-01-23 PROCEDURE — 1160F PR REVIEW ALL MEDS BY PRESCRIBER/CLIN PHARMACIST DOCUMENTED: ICD-10-PCS | Mod: CPTII,,, | Performed by: NURSE PRACTITIONER

## 2023-01-23 PROCEDURE — 1159F MED LIST DOCD IN RCRD: CPT | Mod: CPTII,,, | Performed by: NURSE PRACTITIONER

## 2023-01-23 PROCEDURE — 99024 PR POST-OP FOLLOW-UP VISIT: ICD-10-PCS | Mod: ,,, | Performed by: NURSE PRACTITIONER

## 2023-01-23 PROCEDURE — 99999 PR PBB SHADOW E&M-EST. PATIENT-LVL III: CPT | Mod: PBBFAC,,, | Performed by: NURSE PRACTITIONER

## 2023-01-23 PROCEDURE — 99213 OFFICE O/P EST LOW 20 MIN: CPT | Mod: PBBFAC | Performed by: NURSE PRACTITIONER

## 2023-01-23 PROCEDURE — 72040 XR CERVICAL SPINE AP LATERAL: ICD-10-PCS | Mod: 26,,, | Performed by: RADIOLOGY

## 2023-01-23 PROCEDURE — 72040 X-RAY EXAM NECK SPINE 2-3 VW: CPT | Mod: TC

## 2023-01-23 PROCEDURE — 99024 POSTOP FOLLOW-UP VISIT: CPT | Mod: ,,, | Performed by: NURSE PRACTITIONER

## 2023-01-23 PROCEDURE — 1160F RVW MEDS BY RX/DR IN RCRD: CPT | Mod: CPTII,,, | Performed by: NURSE PRACTITIONER

## 2023-01-23 PROCEDURE — 99999 PR PBB SHADOW E&M-EST. PATIENT-LVL III: ICD-10-PCS | Mod: PBBFAC,,, | Performed by: NURSE PRACTITIONER

## 2023-01-23 PROCEDURE — 72040 X-RAY EXAM NECK SPINE 2-3 VW: CPT | Mod: 26,,, | Performed by: RADIOLOGY

## 2023-01-23 PROCEDURE — 1159F PR MEDICATION LIST DOCUMENTED IN MEDICAL RECORD: ICD-10-PCS | Mod: CPTII,,, | Performed by: NURSE PRACTITIONER

## 2023-01-23 RX ORDER — METHOCARBAMOL 750 MG/1
750 TABLET, FILM COATED ORAL 3 TIMES DAILY PRN
Qty: 30 TABLET | Refills: 0 | Status: SHIPPED | OUTPATIENT
Start: 2023-01-23 | End: 2023-01-27

## 2023-01-23 NOTE — PROGRESS NOTES
Neurosurgery  Established Patient    SUBJECTIVE:     History of Present Illness: Israel De Jesus is a 62 y.o. male s/p posterior spinal revision fusion, C1-T1 with Dr. Storm on 12/7/22. The patient is being seen in clinic today with his daughter for his 6 week post-op evaluation. States that he is doing well. Rates his pain today as a 4/10. He is taking Tylenol and muscle relaxer for relief. Denies falls or trauma. Reports compliance with the cervical collar and bone stimulator. He has been ambulating daily and is interested in outpatient therapy, but has had difficulties finding a facility that takes his insurance. Denies new neurological symptoms.     Review of patient's allergies indicates:  No Known Allergies    Current Outpatient Medications   Medication Sig Dispense Refill    acetaminophen (TYLENOL) 325 MG tablet Take 325 mg by mouth every 6 (six) hours as needed for Pain.      acetaminophen (TYLENOL) 500 MG tablet Take 1,000 mg by mouth every 8 (eight) hours.      amLODIPine (NORVASC) 5 MG tablet Take 1 tablet (5 mg total) by mouth once daily. 30 tablet 11    diazePAM (VALIUM) 5 MG tablet Take 1 tablet (5 mg total) by mouth every 8 (eight) hours as needed (use only as needed for muscle spasms). 15 tablet 0    lisinopriL (PRINIVIL,ZESTRIL) 20 MG tablet Take 1 tablet (20 mg total) by mouth once daily. 90 tablet 0    methocarbamoL (ROBAXIN) 750 MG Tab Take 1 tablet (750 mg total) by mouth 3 (three) times daily as needed (muscle spasms). 30 tablet 0    metoprolol tartrate (LOPRESSOR) 25 MG tablet Take 1 tablet (25 mg total) by mouth every 6 (six) hours. 120 tablet 11    nicotine (NICODERM CQ) 14 mg/24 hr Place 14 mg onto the skin.      oxyCODONE (ROXICODONE) 5 MG immediate release tablet Take 1 tablet (5 mg total) by mouth every 4 (four) hours as needed for Pain. 40 tablet 0    pantoprazole (PROTONIX) 40 MG tablet Take 1 tablet (40 mg total) by mouth once daily. 90 tablet 0    polyethylene glycol (GLYCOLAX)  17 gram/dose powder Take 17 g by mouth.      pregabalin (LYRICA) 75 MG capsule Take 1 capsule (75 mg total) by mouth 2 (two) times daily. 60 capsule 6     No current facility-administered medications for this visit.     Facility-Administered Medications Ordered in Other Visits   Medication Dose Route Frequency Provider Last Rate Last Admin    mupirocin 2 % ointment   Nasal On Call Procedure Colten Lujan MD   Given at 12/07/22 1241       Past Medical History:   Diagnosis Date    Eye injury 09/01/1980    ? eye hot metal, and burn eyes ou     Hypertension     Sciatica 12/28/2020     Past Surgical History:   Procedure Laterality Date    FUSION OF CERVICAL SPINE BY POSTERIOR APPROACH N/A 10/12/2022    Procedure: POSTERIOR CERVICAL FUSION, SPINE C1-C4 PCF ;  Surgeon: Ike Storm DO;  [facility];  Service: Neurosurgery;  Laterality: N/A;    FUSION OF CERVICAL SPINE BY POSTERIOR APPROACH N/A 12/7/2022    Procedure: FUSION,SPINE,CERVICAL,POSTERIOR APPROACH C1-T1;  Surgeon: Ike Storm DO;  [facility];  Service: Neurosurgery;  Laterality: N/A;  GENERAL/ TYPE & SCREEN/ EMG/ SEP/ MEP/ [place]/ REGULAR BED, REVERSED/ BROWN/ PRONE/ C-ARM/ DEPUY/ COLEMAN/ PACU/ ASSISTANT SURGEON DR. MAYURI LEMOS    KNEE ARTHROPLASTY      LAMINECTOMY N/A 10/12/2022    Procedure: LAMINECTOMY, SPINE, C-1;  Surgeon: Ike Storm DO;  [facility];  Service: Neurosurgery;  Laterality: N/A;     Family History       Problem Relation (Age of Onset)    Cancer Maternal Grandmother    Hypertension Mother    Stroke Maternal Grandmother          Social History     Socioeconomic History    Marital status: Single   Tobacco Use    Smoking status: Every Day     Packs/day: 1.00     Types: Cigarettes    Smokeless tobacco: Never   Substance and Sexual Activity    Alcohol use: Yes    Drug use: No    Sexual activity: Not Currently       Review of Systems   Constitutional:  Negative for activity change,  appetite change and fever.   HENT:  Negative for hearing loss and postnasal drip.    Eyes:  Negative for pain and visual disturbance.   Respiratory:  Negative for shortness of breath.    Cardiovascular:  Negative for chest pain and palpitations.   Gastrointestinal:  Negative for abdominal pain.   Endocrine: Negative for polydipsia, polyphagia and polyuria.   Musculoskeletal:  Positive for arthralgias, myalgias and neck stiffness. Negative for back pain and gait problem.   Neurological:  Negative for dizziness, weakness, numbness and headaches.   Psychiatric/Behavioral:  Negative for confusion and dysphoric mood.      OBJECTIVE:     Vital Signs     There is no height or weight on file to calculate BMI.    Neurosurgery Physical Exam  General: well developed, well nourished, no distress.   Head: normocephalic, atraumatic  Neurologic: Alert and oriented. Thought content appropriate.  GCS: Motor: 6/Verbal: 5/Eyes: 4 GCS Total: 15  Mental Status: Awake, Alert, Oriented x 4  Language: No aphasia  Speech: No dysarthria  Cranial nerves: face symmetric, tongue midline, CN II-XII grossly intact.   Eyes: pupils equal, round, reactive to light with accomodation, EOMI.   Pulmonary: normal respirations, no signs of respiratory distress  Abdomen: soft, non-distended  Skin: Skin is warm, dry and intact.  Sensory: intact to light touch throughout  Motor Strength:Moves all extremities spontaneously with good tone.  Full strength upper and lower extremities.     Diagnostic Results:  I have personally reviewed the x-ray cervical spine dated 1/23/23 with Dr. Storm. The imaging shows posterior instrumented pedicle screws and fixation rods from the level of the C1 odontoid level to the level of C5-C6 and T1. No hardware failure seen.      ASSESSMENT/PLAN:   Israel De Jesus is a 62 y.o. male s/p posterior spinal revision fusion, C1-T1 with Dr. Storm on 12/7/22. The patient was seen in clinic today with his daughter for his 6 week  post-op evaluation. States that he is doing well. He continues to smoke about 3-4 cigarettes a day. We discussed that he should talk with his PCP about other treatment options for his anxiety. He was agreeable. I have placed another order for PT. He was informed that he can remove the cervical collar to bathe, eat, and sleep. The patient was encouraged to continue to wear the bone stimulator. A CT cervical spine has been ordered to assess for bony fusion. Medications were refilled.     I would like the patient to follow-up in clinic in 6 weeks with Dr. Storm for the 3 month post-op evaluation. I have encouraged him to contact the clinic with any questions, concerns, or adverse clinical changes. He verbalized understanding.      LUKAS Otero  Neurosurgery  [facility].      Note dictated with voice recognition software, please excuse any grammatical errors.

## 2023-01-23 NOTE — TELEPHONE ENCOUNTER
----- Message from Guerline Verdin NP sent at 1/23/2023  3:49 PM CST -----  Regarding: RE: refill/ pt advice  Contact: [phone]  We are switching to Robaxin bc the Zanaflex is making him too tired.   ----- Message -----  From: Kristen Parmar MA  Sent: 1/23/2023   2:56 PM CST  To: Guerline Verdin NP  Subject: FW: refill/ pt advice                              ----- Message -----  From: Michelle Samano  Sent: 1/23/2023   2:54 PM CST  To: Lambert Núñez  Subject: refill/ pt advice                                Caller, from [facility] requesting clarification on PT RX methocarbamoL (ROBAXIN) 750 MG Tab. Caller asking if PT should be taking this medication after been taking tiZANidine (ZANAFLEX) 4 MG tablet. Please call to discuss further.     [id number] - [place] - [address] [address] [facility]  [place] [id number]  Phone: [phone] Fax: [phone]

## 2023-01-24 ENCOUNTER — CLINICAL SUPPORT (OUTPATIENT)
Dept: REHABILITATION | Facility: HOSPITAL | Age: 63
End: 2023-01-24
Payer: MEDICAID

## 2023-01-24 DIAGNOSIS — R29.898 IMPAIRED STRENGTH OF UPPER EXTREMITY: ICD-10-CM

## 2023-01-24 DIAGNOSIS — M53.82 IMPAIRED RANGE OF MOTION OF CERVICAL SPINE: ICD-10-CM

## 2023-01-24 DIAGNOSIS — Z98.1 STATUS POST CERVICAL SPINAL FUSION: ICD-10-CM

## 2023-01-24 PROCEDURE — 97110 THERAPEUTIC EXERCISES: CPT | Mod: PN

## 2023-01-24 PROCEDURE — 97161 PT EVAL LOW COMPLEX 20 MIN: CPT | Mod: PN

## 2023-01-24 NOTE — PLAN OF CARE
[facility]  Physical Therapy Initial Evaluation    Name: Israel De Jesus  Clinic Number: [id number]    Therapy Diagnosis:   Encounter Diagnoses   Name Primary?    Status post cervical spinal fusion     Impaired range of motion of cervical spine     Impaired strength of upper extremity      Physician: Guerline Verdin, NP    Physician Orders: PT Eval and Treat   Medical Diagnosis from Referral: Z98.1 (ICD-10-CM) - Status post cervical spinal fusion  Evaluation Date: 1/24/2023  Plan of Care Expiration: 4/24/23    Authorization Period Expiration: 12/31/23  Visit # / Visits authorized: 1/ 1    DOS: 12/7/22    Time In: 1230  Time Out: 1330    Total Billable Time: 60 minutes    Precautions: spinal precautions, C Collar (accept for bathing , eating and sleeping).  S/p cervical fusion    Subjective   Date of onset: 12/7/22    History of current condition:     Israel  is a 62 year old right handed male s/p posterior spinal revision fusion, C1-T1.  ORIF of type odontoid and type 1 hangman's fracture of C2   12/7/2022  He reports a traumatic onset s/p fall last September.   He reports a prior fusion October the 12th of last year.  He states he spent a few weeks in rehab after his initial surgery. He reports following up with MD yesterday with instructions to be able to remove collar while  sleeping, eating and bathing. He states he wears a bone stimulator 4 hrs a day. His goal is to get completely better where he can go back to work.       Medical History:   Past Medical History:   Diagnosis Date    Eye injury 09/01/1980    ? eye hot metal, and burn eyes ou     Hypertension     Sciatica 12/28/2020       Surgical History:   Israel De Jesus  has a past surgical history that includes Knee Arthroplasty; Fusion of cervical spine by posterior approach (N/A, 10/12/2022); Laminectomy (N/A, 10/12/2022); and Fusion of cervical spine by posterior approach (N/A, 12/7/2022).    Medications:   Israel New  has a current medication list which includes the following prescription(s): acetaminophen, acetaminophen, amlodipine, diazepam, lisinopril, methocarbamol, metoprolol tartrate, nicotine, oxycodone, pantoprazole, polyethylene glycol, and pregabalin, and the following Facility-Administered Medications: mupirocin.    Allergies:   Review of patient's allergies indicates:  No Known Allergies     Prior Therapy: none    Social History:  lives alone  Occupation: hasn't worked since October 10th ([employer])  Prior Level of Function: independent  Current Level of Function: FWW    Pain:  Current 3/10, worst 6/10, best 3/10   Location: cervical     Aggravating Factors: lifting, pushing, pulling, moving  Easing Factors: medication.     Pts goals: His goal is to get completely better where he can go back to work.     Objective     Observation: pt presents ambulating with a FWW in a C collar. Posterior incision appears to be healing well.   Palpation: mild tenderness sub-occipitals globally.       Range of Motion/Strength:      Cervical AROM: Degrees   Flexion 10   Extension 10   Right side bending 5   Left side bending 5   Right rotation 15   Left rotation 15   Cervical quadrant reveals: mild discomfort in all planes    AROM: Bilateral UE: Grossly WFL  MMT:  Right UE: 4-   Left UE: 4-    :  WNL bilaterally    Bed Mobility:Independent  Transfers: Independent    CMS Impairment/Limitation/Restriction for FOTO Neck Survey  Status Limitation G-Code CMS Severity Modifier  Intake 38% 62% Current Status CL - At least 60 percent but less than 80 percent  Predicted 49% 51% Goal Status+ CK - At least 40 percent but less than 60 percent    TREATMENT     Treatment Time In: 1230  Treatment Time Out: 1300    Total Treatment time separate from Evaluation: 30 minutes    Israel received therapeutic exercises to develop strength, endurance, ROM, flexibility, posture and core stabilization for 10 minutes including:   Shoulder shrugs 3 x  10  Scapular retraction 2 x 10  Corner stretch 30 sec x 4  Supine wand flexion x 15    Education provided:   - postural awareness    Written Home Exercises Provided: yes.    Exercises were reviewed and Israel was able to demonstrate them prior to the end of the session.  Israel demonstrated good  understanding of the education provided.     See EMR under Patient Instructions for exercises provided 1/24/2023.    Assessment     Pt presents with signs and symptoms consistent with referring diagnosis. Evaluation has determined a decrease in functional status and subjective and objective deficits that can be addressed by physical therapy intervention. Pt demonstrates pain limiting functional activities. Decreased flexibility and strength limiting normal movement patterns. Decreased segmental motion. Decreased postural strength and awareness. Positive special testing. Decreased participation in functional and recreational activities. Subjective and objective measures are addressed by goals in the plan of care.  Patient/family are involved in the development of these goals. Patient/family are educated about current injury and treatment.       Plan of care was dicussed with patient. Pt will benefit from skilled outpatient Physical Therapy to address the deficits stated above and in the chart below, provide pt/family education, and to maximize pt's level of independence. Pt's spiritual, cultural and educational needs considered and patient is agreeable to the plan of care and goals as stated below:     Pt prognosis is Good.  Anticipated Barriers for therapy: none    Medical Necessity is demonstrated by the following  History  Co-morbidities and personal factors that may impact the plan of care Co-morbidities:   HTN    Personal Factors:   no deficits     low   Examination  Body Structures and Functions, activity limitations and participation restrictions that may impact the plan of care Body Regions:   neck  lower  extremities    Body Systems:    gross symmetry  ROM  strength  balance  gait  transfers  transitions    Participation Restrictions:   Work, housework, cooking, shopping    Activity limitations:   Learning and applying knowledge  no deficits    General Tasks and Commands  no deficits    Communication  no deficits    Mobility  lifting and carrying objects  walking    Self care  dressing    Domestic Life  shopping  cooking  doing house work (cleaning house, washing dishes, laundry)    Interactions/Relationships  no deficits    Life Areas  no deficits    Community and Social Life  community life         low   Clinical Presentation stable and uncomplicated low   Decision Making/ Complexity Score: low     Goals:    Short Term Goals (4 Weeks):     1.Pt to increase strength by a 1/2 grade of muscles test to allow for improvement in functional activities such as performing chores.  2.Pt to improve range of motion by 25% to allow for improved functional mobility to allow for improvement in IADLs.   3.Pt to report compliance with HEP and demonstrate proper exercise technique to PT to show competence with self management of condition.  4.Decrease pain by 25% during functional activities.    Long Term Goals (12 Weeks):     1. Increase ROM to allow improved joint biomechanics during functional activities.   2.Increase trunk and upper extremity strength to within normal limits during functional activities.   3. Independent with home exercise program.   4. Full return to functional activities with manageable complaints.  5. Patient to demonstrate improved posture and body mechanics.  6. Decrease pain by 75% during functional activities.    Plan     Plan of care Certification: 1/24/2023 to 4/24/23.    Recommended Treatment Plan: 2 times per week for 12 weeks with treatments to consist of:  Neuromuscular and postural re-education,  training, therapeutic exercise, therapeutic activities,balance training, gait training,  manual therapy, soft tissue mobilization, ROM exercises, Cardiovascular,  Postural stabilization, manual traction, spinal mobilization, moist heat, cryotherapy, electrical stimulation, ultrasound, home exercise education and planning.    Cedric Degroot, PT

## 2023-01-25 LAB
ACID FAST MOD KINY STN SPEC: NORMAL
MYCOBACTERIUM SPEC QL CULT: NORMAL

## 2023-01-31 ENCOUNTER — CLINICAL SUPPORT (OUTPATIENT)
Dept: REHABILITATION | Facility: HOSPITAL | Age: 63
End: 2023-01-31
Payer: MEDICAID

## 2023-01-31 DIAGNOSIS — M53.82 IMPAIRED RANGE OF MOTION OF CERVICAL SPINE: Primary | ICD-10-CM

## 2023-01-31 DIAGNOSIS — R29.898 IMPAIRED STRENGTH OF UPPER EXTREMITY: ICD-10-CM

## 2023-01-31 PROCEDURE — 97110 THERAPEUTIC EXERCISES: CPT | Mod: PN

## 2023-01-31 NOTE — PROGRESS NOTES
[facility]ERAP[facility] AND [facility]   Physical Therapy Treatment Note     Name: Israel De Jesus  Clinic Number: [id number]    Therapy Diagnosis:   Encounter Diagnoses   Name Primary?    Impaired range of motion of cervical spine Yes    Impaired strength of upper extremity      Physician: Guerline Verdin NP    Visit Date: 1/31/2023    Physician: Guerline Verdin NP     Physician Orders: PT Eval and Treat   Medical Diagnosis from Referral: Z98.1 (ICD-10-CM) - Status post cervical spinal fusion  Evaluation Date: 1/24/2023  Plan of Care Expiration: 4/24/23     Authorization Period Expiration: 12/31/23  Visit # / Visits authorized: 1/ 20 + Eval      DOS: 12/7/22     Precautions: spinal precautions, C Collar (accept for bathing , eating and sleeping).  S/p cervical fusion    PTA Visit #: 0/5     Time In: 3:00 PM  Time Out: 3:59 PM  Total Billable Time: 36 minutes (2 TE)   Modified per 1 on 1 time     SUBJECTIVE     Pt reports: Adhering to restrictions and wearing ASPEN collar at times required. Completing his HEP 2 x per day and walking around well with use of FWW. Sleeping currently 2 hours with interruptions to get up and use bathroom.   He was compliant with home exercise program.  Response to previous treatment: initial evaluation  Functional change: ongoing     Pain: 2/10   Location: bilateral neck      OBJECTIVE     Objective Measures updated at progress report unless specified.     Treatment     Israel received the treatments listed below:      Israel received therapeutic exercises to develop strength, endurance, ROM, flexibility, posture and core stabilization for 36 minutes including:     Seated pulleys flexion, abduction x 6 minutes total   Shoulder shrugs 2 x 10  Scapular retraction 2 x 10  Corner stretch 15 sec x 5  High falcon incline press 3# x 20  High falcon OH raises 3# x 20   YTB seated ER x 20   Standing RTB rows x 20      manual therapy techniques: Joint mobilizations and Soft tissue  Mobilization were applied to the: neck and upper back for 15 minutes, including:  STM B sub occipitals, cervical paraspinals, LS/UT  Grade II/III lateral PAVIMs and PA cervical joint mobilizations    cold pack for 8 minutes to neck in high fowlers.        Patient Education and Home Exercises     Home Exercises Provided and Patient Education Provided     Education provided:   - HEP review   -ASPEN collar positioning     Written Home Exercises Provided: Patient instructed to cont prior HEP. Exercises were reviewed and Israel was able to demonstrate them prior to the end of the session.  Israel demonstrated good  understanding of the education provided. See EMR under Patient Instructions for exercises provided during therapy sessions    ASSESSMENT     Patient demonstrating understanding of HEP and independent completion. States after follow up with MD that he may spend time out of cervical collar while showering and for short periods throughout the day. Arrives wearing ASPEN collar that is loosely attached. Progressed there-ex for OH mobility and gentle cervical motion per tolerance. Patient progressing with chin tuck execution following manual cues. Addition of standing rows where RTB provided to assist with HEP moving forward.     Israel Is progressing well towards his goals.   Pt prognosis is Good.     Pt will continue to benefit from skilled outpatient physical therapy to address the deficits listed in the problem list box on initial evaluation, provide pt/family education and to maximize pt's level of independence in the home and community environment.     Pt's spiritual, cultural and educational needs considered and pt agreeable to plan of care and goals.     Anticipated barriers to physical therapy: none    Short Term Goals (4 Weeks): --Progressing below      1.Pt to increase strength by a 1/2 grade of muscles test to allow for improvement in functional activities such as performing chores.  2.Pt to improve  range of motion by 25% to allow for improved functional mobility to allow for improvement in IADLs.   3.Pt to report compliance with HEP and demonstrate proper exercise technique to PT to show competence with self management of condition.  4.Decrease pain by 25% during functional activities.     Long Term Goals (12 Weeks): --Progressing below      1. Increase ROM to allow improved joint biomechanics during functional activities.   2.Increase trunk and upper extremity strength to within normal limits during functional activities.   3. Independent with home exercise program.   4. Full return to functional activities with manageable complaints.  5. Patient to demonstrate improved posture and body mechanics.  6. Decrease pain by 75% during functional activities.    PLAN     Continue per protocol for post operative cervical fusion. Continuation of scapular strengthening and working into OH movements. Additional cervical mobility interventions actively.     Carlos Aguilar, PT

## 2023-02-07 ENCOUNTER — CLINICAL SUPPORT (OUTPATIENT)
Dept: REHABILITATION | Facility: HOSPITAL | Age: 63
End: 2023-02-07
Payer: MEDICAID

## 2023-02-07 DIAGNOSIS — M53.82 IMPAIRED RANGE OF MOTION OF CERVICAL SPINE: Primary | ICD-10-CM

## 2023-02-07 DIAGNOSIS — R29.898 IMPAIRED STRENGTH OF UPPER EXTREMITY: ICD-10-CM

## 2023-02-07 PROCEDURE — 97110 THERAPEUTIC EXERCISES: CPT | Mod: PN,CQ

## 2023-02-07 NOTE — PROGRESS NOTES
[facility]   Physical Therapy Treatment Note     Name: Israel De Jesus  Clinic Number: [id number]    Therapy Diagnosis:   Encounter Diagnoses   Name Primary?    Impaired range of motion of cervical spine Yes    Impaired strength of upper extremity      Physician: Guerline Verdin NP    Visit Date: 2/7/2023    Physician: Guerline Verdin NP     Physician Orders: PT Eval and Treat   Medical Diagnosis from Referral: Z98.1 (ICD-10-CM) - Status post cervical spinal fusion  Evaluation Date: 1/24/2023  Plan of Care Expiration: 4/24/23     Authorization Period Expiration: 12/31/23  Visit # / Visits authorized: 1/ 20 + Eval      DOS: 12/7/22     Precautions: spinal precautions, C Collar (accept for bathing , eating and sleeping).  S/p cervical fusion    PTA Visit #: 0/5     Time In: 3:00 PM  Time Out: 3:59 PM  Total Billable Time: 36 minutes (2 TE)   Modified per 1 on 1 time     SUBJECTIVE     Pt reports: Adhering to restrictions and wearing ASPEN collar at times required. Completing his HEP 2 x per day and walking around well with use of FWW. Sleeping currently 2 hours with interruptions to get up and use bathroom.   He was compliant with home exercise program.  Response to previous treatment: initial evaluation  Functional change: ongoing     Pain: 2/10   Location: bilateral neck      OBJECTIVE     Objective Measures updated at progress report unless specified.     Treatment     Israel received the treatments listed below:      Israel received therapeutic exercises to develop strength, endurance, ROM, flexibility, posture and core stabilization for 36 minutes including:     Seated pulleys flexion, abduction x 6 minutes total   Shoulder shrugs 2 x 10  Scapular retraction 2 x 10  Corner stretch 15 sec x 5  High falcon incline press 3# x 20  High falcon OH raises 3# x 20   YTB seated ER x 20   Standing RTB rows x 20      manual therapy techniques: Joint mobilizations and Soft tissue  Mobilization were applied to the: neck and upper back for 15 minutes, including:  STM B sub occipitals, cervical paraspinals, LS/UT  Grade II/III lateral PAVIMs and PA cervical joint mobilizations    cold pack for 8 minutes to neck in high fowlers.        Patient Education and Home Exercises     Home Exercises Provided and Patient Education Provided     Education provided:   - HEP review   -ASPEN collar positioning     Written Home Exercises Provided: Patient instructed to cont prior HEP. Exercises were reviewed and Israel was able to demonstrate them prior to the end of the session.  Israel demonstrated good  understanding of the education provided. See EMR under Patient Instructions for exercises provided during therapy sessions    ASSESSMENT     Patient demonstrating understanding of HEP and independent completion. States after follow up with MD that he may spend time out of cervical collar while showering and for short periods throughout the day. Arrives wearing ASPEN collar that is loosely attached. Progressed there-ex for OH mobility and gentle cervical motion per tolerance. Patient progressing with chin tuck execution following manual cues. Addition of standing rows where RTB provided to assist with HEP moving forward.     Israel Is progressing well towards his goals.   Pt prognosis is Good.     Pt will continue to benefit from skilled outpatient physical therapy to address the deficits listed in the problem list box on initial evaluation, provide pt/family education and to maximize pt's level of independence in the home and community environment.     Pt's spiritual, cultural and educational needs considered and pt agreeable to plan of care and goals.     Anticipated barriers to physical therapy: none    Short Term Goals (4 Weeks): --Progressing below      1.Pt to increase strength by a 1/2 grade of muscles test to allow for improvement in functional activities such as performing chores.  2.Pt to improve  range of motion by 25% to allow for improved functional mobility to allow for improvement in IADLs.   3.Pt to report compliance with HEP and demonstrate proper exercise technique to PT to show competence with self management of condition.  4.Decrease pain by 25% during functional activities.     Long Term Goals (12 Weeks): --Progressing below      1. Increase ROM to allow improved joint biomechanics during functional activities.   2.Increase trunk and upper extremity strength to within normal limits during functional activities.   3. Independent with home exercise program.   4. Full return to functional activities with manageable complaints.  5. Patient to demonstrate improved posture and body mechanics.  6. Decrease pain by 75% during functional activities.    PLAN     Continue per protocol for post operative cervical fusion. Continuation of scapular strengthening and working into OH movements. Additional cervical mobility interventions actively.     Angelina Garcia, PTA

## 2023-02-07 NOTE — PROGRESS NOTES
[facility]   Physical Therapy Treatment Note     Name: Israel De Jesus  Clinic Number: [id number]    Therapy Diagnosis:   Encounter Diagnoses   Name Primary?    Impaired range of motion of cervical spine Yes    Impaired strength of upper extremity      Physician: Guerline Verdin NP    Visit Date: 2/7/2023    Physician: Guerline Verdin NP     Physician Orders: PT Eval and Treat   Medical Diagnosis from Referral: Z98.1 (ICD-10-CM) - Status post cervical spinal fusion  Evaluation Date: 1/24/2023  Plan of Care Expiration: 4/24/23     Authorization Period Expiration: 12/31/23  Visit # / Visits authorized: 3/ 20 + Eval      DOS: 12/7/22     Precautions: spinal precautions, C Collar (accept for bathing , eating and sleeping).  S/p cervical fusion    PTA Visit #: 0/5     Time In: 1230PM  Time Out: 120PM  Total Billable Time: 28 minutes (2 TE)   Modified per 1 on 1 time     SUBJECTIVE     Pt reports: His right shoulder has been bothering him lately. Everything else in his neck has been fine. His pain might be a 1 or a 2. He has been walking some without his walker around his house. He was able to vacuumed his entire apartment with only needing to hold on to the vacuum. He has been able to rotate his neck all the way side to side 4 sets of 5 ea reps.   He was compliant with home exercise program.  Response to previous treatment: initial evaluation  Functional change: ongoing     Pain: 3/10   Location: R shoulder    OBJECTIVE     Objective Measures updated at progress report unless specified.     Treatment     Israel received the treatments listed below:      Israel received therapeutic exercises to develop strength, endurance, ROM, flexibility, posture and core stabilization for 45 minutes including:     Seated pulleys flexion, abduction x 6 minutes total   Shoulder shrugs 2 x 10  Scapular retraction 2 x 10  Corner stretch 15 sec x 5  High falcon incline press 2# x 20  High falcon OH raises  2# x 20   YTB seated ER 2 x 10    High falcon horizonatal  abduction YTB 2 x 10  Standing RTB rows x 20   Standing extension RTB x 20      manual therapy techniques: Joint mobilizations and Soft tissue Mobilization were applied to the: neck and upper back for 05 minutes, including:  STM B sub occipitals, cervical paraspinals, LS/UT      cold pack for 8 minutes to neck in high fowlers.    Patient Education and Home Exercises     Home Exercises Provided and Patient Education Provided     Education provided:   - HEP review   -ASPEN collar positioning     Written Home Exercises Provided: Patient instructed to cont prior HEP. Exercises were reviewed and Israel was able to demonstrate them prior to the end of the session.  Israel demonstrated good  understanding of the education provided. See EMR under Patient Instructions for exercises provided during therapy sessions    ASSESSMENT   Israel tolerated session well. He states he has been following his precautions but he has been perfroming cervical ROM at home and it has been fine. Continued previously prescribed with the introduction of additional periscapular strengthening. Pt reported pain he has starting having recently over his R shoulder region and pt demos considerable shoulder external rotation weakness on the right side. Pt is unable to externally rotate on that arm. Will progress as tolerated within precautions.     Israel Is progressing well towards his goals.   Pt prognosis is Good.     Pt will continue to benefit from skilled outpatient physical therapy to address the deficits listed in the problem list box on initial evaluation, provide pt/family education and to maximize pt's level of independence in the home and community environment.     Pt's spiritual, cultural and educational needs considered and pt agreeable to plan of care and goals.     Anticipated barriers to physical therapy: none    Short Term Goals (4 Weeks): --Progressing below      1.Pt to  increase strength by a 1/2 grade of muscles test to allow for improvement in functional activities such as performing chores.  2.Pt to improve range of motion by 25% to allow for improved functional mobility to allow for improvement in IADLs.   3.Pt to report compliance with HEP and demonstrate proper exercise technique to PT to show competence with self management of condition.  4.Decrease pain by 25% during functional activities.     Long Term Goals (12 Weeks): --Progressing below      1. Increase ROM to allow improved joint biomechanics during functional activities.   2.Increase trunk and upper extremity strength to within normal limits during functional activities.   3. Independent with home exercise program.   4. Full return to functional activities with manageable complaints.  5. Patient to demonstrate improved posture and body mechanics.  6. Decrease pain by 75% during functional activities.    PLAN     Continue per protocol for post operative cervical fusion. Continuation of scapular strengthening and working into OH movements. Additional cervical mobility interventions actively.     Pedrito Alatorre, PTA   02/07/2023

## 2023-02-15 ENCOUNTER — TELEPHONE (OUTPATIENT)
Dept: NEUROSURGERY | Facility: CLINIC | Age: 63
End: 2023-02-15
Payer: MEDICAID

## 2023-02-15 ENCOUNTER — CLINICAL SUPPORT (OUTPATIENT)
Dept: REHABILITATION | Facility: HOSPITAL | Age: 63
End: 2023-02-15
Payer: MEDICAID

## 2023-02-15 DIAGNOSIS — R29.898 IMPAIRED STRENGTH OF UPPER EXTREMITY: ICD-10-CM

## 2023-02-15 DIAGNOSIS — M53.82 IMPAIRED RANGE OF MOTION OF CERVICAL SPINE: Primary | ICD-10-CM

## 2023-02-15 PROCEDURE — 97110 THERAPEUTIC EXERCISES: CPT | Mod: PN

## 2023-02-15 NOTE — TELEPHONE ENCOUNTER
----- Message from Genaro Jordan MA sent at 2/15/2023  2:00 PM CST -----  Contact: [phone]  Patient's daughter Melissa is calling to schedule 6 wk f/u post op and CT scan appt on the same day.  The patient's daughter would like to be reached at  [phone].

## 2023-02-15 NOTE — PROGRESS NOTES
[facility]   Physical Therapy Treatment Note     Name: Israel De Jesus  Clinic Number: [id number]    Therapy Diagnosis:   Encounter Diagnoses   Name Primary?    Impaired range of motion of cervical spine Yes    Impaired strength of upper extremity      Physician: Guerline Verdin NP    Visit Date: 2/15/2023     Physician Orders: PT Eval and Treat   Medical Diagnosis from Referral: Z98.1 (ICD-10-CM) - Status post cervical spinal fusion  Evaluation Date: 1/24/2023  Plan of Care Expiration: 4/24/23     Authorization Period Expiration: 12/31/23  Visit # / Visits authorized: 3/ 20 + Eval      DOS: 12/7/22     Precautions: spinal precautions, C Collar (accept for bathing , eating and sleeping).  S/p cervical fusion    PTA Visit #: 0/5     Time In: 1220  Time Out: 1315    Total Billable Time: 55 minutes      SUBJECTIVE     Pt reports: his is doing better overall. States his right shoulder has been a little bothersome of late.     Response to previous treatment: fair  Functional change: ongoing     Pain: 3/10   Location: R shoulder    OBJECTIVE     Objective Measures updated at progress report unless specified.     Treatment     Israel received the treatments listed below:      Israel received therapeutic exercises to develop strength, endurance, ROM, flexibility, posture and core stabilization for 45 minutes including:     R-bike x 6 min  Seated pulleys fscaption x 4 min  Shoulder shrugs 2 x 10  Scapular retraction 2 x 10  Corner stretch 15 sec x 5  Supine press up 2# 3 x 10  Supine wand flexion 2  10  YTB seated ER 2 x 10: hold  Supine horizonatal  abduction YTB 2 x 10  Standing RTB rows x 20   Standing extension RTB x 20      manual therapy techniques: Joint mobilizations and Soft tissue Mobilization were applied to the: neck and upper back for 05 minutes, including:  STM B sub occipitals, cervical paraspinals, LS/UT      cold pack for 8 minutes to neck in high fowlers.    Patient  Education and Home Exercises     Home Exercises Provided and Patient Education Provided     Education provided:   - HEP review   -ASPEN collar positioning     Written Home Exercises Provided: Patient instructed to cont prior HEP. Exercises were reviewed and Israel was able to demonstrate them prior to the end of the session.  Israel demonstrated good  understanding of the education provided. See EMR under Patient Instructions for exercises provided during therapy sessions    ASSESSMENT     Pt present ambulating compliant with cervical collar with FWW with difficulty.  Pt requires min cueing with postural correction with prescribed therex. No c/o increased discomfort with prescribed activities.  Good response to exercise progression consisting of cervicothoracic stabilization emphasis. Israel Is progressing well towards his goals.     Pt prognosis is Good.     Pt will continue to benefit from skilled outpatient physical therapy to address the deficits listed in the problem list box on initial evaluation, provide pt/family education and to maximize pt's level of independence in the home and community environment.     Pt's spiritual, cultural and educational needs considered and pt agreeable to plan of care and goals.     Anticipated barriers to physical therapy: none    Short Term Goals (4 Weeks):  in progress     1.Pt to increase strength by a 1/2 grade of muscles test to allow for improvement in functional activities such as performing chores.  2.Pt to improve range of motion by 25% to allow for improved functional mobility to allow for improvement in IADLs.   3.Pt to report compliance with HEP and demonstrate proper exercise technique to PT to show competence with self management of condition.  4.Decrease pain by 25% during functional activities.     Long Term Goals (12 Weeks): in progress     1. Increase ROM to allow improved joint biomechanics during functional activities.   2.Increase trunk and upper extremity  strength to within normal limits during functional activities.   3. Independent with home exercise program.   4. Full return to functional activities with manageable complaints.  5. Patient to demonstrate improved posture and body mechanics.  6. Decrease pain by 75% during functional activities.    PLAN     Continue per protocol for post operative cervical fusion. Continuation of scapular stabilization therex.     Cedric Degroot, PT   02/15/2023

## 2023-02-22 ENCOUNTER — TELEPHONE (OUTPATIENT)
Dept: NEUROSURGERY | Facility: CLINIC | Age: 63
End: 2023-02-22
Payer: MEDICAID

## 2023-02-22 NOTE — TELEPHONE ENCOUNTER
----- Message from Melissa Mares MA sent at 2023  2:06 PM CST -----  Regardinwks f/u with Dr. Storm with a CT  Hi Guerline Polanco saw this patient today and she would like him to see Dr. Storm in 6wks with a CT. Their orders are already in.      Thank you!  Melissa

## 2023-02-24 ENCOUNTER — CLINICAL SUPPORT (OUTPATIENT)
Dept: REHABILITATION | Facility: HOSPITAL | Age: 63
End: 2023-02-24
Payer: MEDICAID

## 2023-02-24 DIAGNOSIS — R29.898 IMPAIRED STRENGTH OF UPPER EXTREMITY: ICD-10-CM

## 2023-02-24 DIAGNOSIS — M53.82 IMPAIRED RANGE OF MOTION OF CERVICAL SPINE: Primary | ICD-10-CM

## 2023-02-24 PROCEDURE — 97140 MANUAL THERAPY 1/> REGIONS: CPT | Mod: PN

## 2023-02-24 PROCEDURE — 97110 THERAPEUTIC EXERCISES: CPT | Mod: PN

## 2023-02-24 NOTE — PROGRESS NOTES
"[facility]   Physical Therapy Treatment Note     Name: Israel De Jesus  Clinic Number: [id number]    Therapy Diagnosis:   Encounter Diagnoses   Name Primary?    Impaired range of motion of cervical spine Yes    Impaired strength of upper extremity        Physician: Guerline Verdin NP    Visit Date: 2/24/2023     Physician Orders: PT Eval and Treat   Medical Diagnosis from Referral: Z98.1 (ICD-10-CM) - Status post cervical spinal fusion  Evaluation Date: 1/24/2023  Plan of Care Expiration: 4/24/23     Authorization Period Expiration: 12/31/23  Visit # / Visits authorized: 3/ 20 + Eval      DOS: 12/7/22     Precautions: spinal precautions, C Collar (accept for bathing , eating and sleeping).  S/p cervical fusion    PTA Visit #: 0/5     Time In: 1045 (15min late)  Time Out: 1145    Total Billable Time: 60 minutes 40min 1:1       SUBJECTIVE     Pt reports: "I just feel tight. I am tired of wearing this collar but I am following my orders. I have been performing AROM cervical movements at night with my collar off. I do rotations B, flexion, extension and sidebending B 3x5. It feels like things loosen up and I am able to sleep better after doing those."    Response to previous treatment: fair  Functional change: ongoing     Pain: 3/10   Location: R shoulder    OBJECTIVE     Objective Measures updated at progress report unless specified.     Treatment     Israel received the treatments listed below:      Israel received therapeutic exercises to develop strength, endurance, ROM, flexibility, posture and core stabilization for 45 minutes including:   Collar on:   R-bike x 6 min  Seated pulleys scaption x 4 min  Shoulder shrugs retro 3 x 10  Scapular retraction 2 x 10  Standing SA slides at wall x30  Corner stretch 15 sec x 5   YTB seated ER 3 x 10: hold  Standing RTB rows x 30   Standing extension RTB x 20  Seated horizonatal abduction  YTB 2 x 10        manual therapy techniques: Joint " mobilizations and Soft tissue Mobilization were applied to the: neck and upper back for 15 minutes, including:  STM B sub occipitals, cervical paraspinals, LS/UT      cold pack for 00 minutes to neck in high fowlers. - pt opted out today     Patient Education and Home Exercises     Home Exercises Provided and Patient Education Provided     Education provided:   - HEP review   -ASPEN collar positioning     Written Home Exercises Provided: Patient instructed to cont prior HEP. Exercises were reviewed and Israel was able to demonstrate them prior to the end of the session.  Israel demonstrated good  understanding of the education provided. See EMR under Patient Instructions for exercises provided during therapy sessions    ASSESSMENT     Israel presents today with continued complaints of general stiffness and tension t/o base of skull and UT region R>L. Israel demonstrated AROM cervical movements seated at EOT without collar x5 reps of each movement and tolerated well. Noted compensations from upper thoracic during rotation B. Overall pt tolerated all progressions to TE today and will continue to benefit. Israel Is progressing well towards his goals.     Pt prognosis is Good.     Pt will continue to benefit from skilled outpatient physical therapy to address the deficits listed in the problem list box on initial evaluation, provide pt/family education and to maximize pt's level of independence in the home and community environment.     Pt's spiritual, cultural and educational needs considered and pt agreeable to plan of care and goals.     Anticipated barriers to physical therapy: none    Short Term Goals (4 Weeks):  in progress     1.Pt to increase strength by a 1/2 grade of muscles test to allow for improvement in functional activities such as performing chores.  2.Pt to improve range of motion by 25% to allow for improved functional mobility to allow for improvement in IADLs.   3.Pt to report compliance with HEP  and demonstrate proper exercise technique to PT to show competence with self management of condition.  4.Decrease pain by 25% during functional activities.     Long Term Goals (12 Weeks): in progress     1. Increase ROM to allow improved joint biomechanics during functional activities.   2.Increase trunk and upper extremity strength to within normal limits during functional activities.   3. Independent with home exercise program.   4. Full return to functional activities with manageable complaints.  5. Patient to demonstrate improved posture and body mechanics.  6. Decrease pain by 75% during functional activities.    PLAN     Continue per protocol for post operative cervical fusion. Continuation of scapular stabilization therex.     Brandi Mcintyre, PT, DPT, Cert DN  02/24/2023

## 2023-03-03 ENCOUNTER — CLINICAL SUPPORT (OUTPATIENT)
Dept: REHABILITATION | Facility: HOSPITAL | Age: 63
End: 2023-03-03
Payer: MEDICAID

## 2023-03-03 DIAGNOSIS — M53.82 IMPAIRED RANGE OF MOTION OF CERVICAL SPINE: Primary | ICD-10-CM

## 2023-03-03 DIAGNOSIS — R29.898 IMPAIRED STRENGTH OF UPPER EXTREMITY: ICD-10-CM

## 2023-03-03 PROBLEM — F41.1 GENERALIZED ANXIETY DISORDER: Status: ACTIVE | Noted: 2023-03-03

## 2023-03-03 PROBLEM — S12.120S: Status: ACTIVE | Noted: 2022-10-11

## 2023-03-03 PROCEDURE — 97110 THERAPEUTIC EXERCISES: CPT | Mod: PN

## 2023-03-03 NOTE — PROGRESS NOTES
[facility]   Physical Therapy Treatment Note     Name: Israel De Jesus  Clinic Number: [id number]    Therapy Diagnosis:   Encounter Diagnoses   Name Primary?    Impaired range of motion of cervical spine Yes    Impaired strength of upper extremity        Physician: Guerline Verdin NP    Visit Date: 3/3/2023     Physician Orders: PT Eval and Treat   Medical Diagnosis from Referral: Z98.1 (ICD-10-CM) - Status post cervical spinal fusion  Evaluation Date: 1/24/2023  Plan of Care Expiration: 4/24/23     Authorization Period Expiration: 12/31/23  Visit # / Visits authorized: 3/ 20 + Eval      DOS: 12/7/22     Precautions: spinal precautions, C Collar (accept for bathing , eating and sleeping).  S/p cervical fusion    PTA Visit #: 0/5     Time In: 1315  Time Out: 1410    Total Billable Time: 55 minutes       SUBJECTIVE     Pt reports: he continues to improve with therapy.     Response to previous treatment: fair  Functional change: ongoing     Pain: 3/10   Location: R shoulder    OBJECTIVE     Objective Measures updated at progress report unless specified.       Range of Motion/Strength:       Cervical AROM: Degrees   Flexion 20   Extension 15   Right side bending 8   Left side bending 8   Right rotation 27   Left rotation 28   Cervical quadrant reveals: mild discomfort in all planes     AROM: Bilateral UE: Grossly WFL  MMT:  Right UE: 4-   Left UE: 4-       Treatment     Israel received the treatments listed below:      Israel received therapeutic exercises to develop strength, endurance, ROM, flexibility, posture and core stabilization for 45 minutes including:     Collar on:   R-bike x 8 min  Seated pulleys scaption x 4 min  Shoulder shrugs retro 3 x 10  Scapular retraction 2 x 10  Standing SA slides at wall with pillow case 2 x 10  Corner stretch 15 sec x 5   YTB seated ER 3 x 10: hold  Standing RTB rows x 30   Standing extension RTB x 20  Seated horizonatal abduction  YTB 2 x  10    manual therapy techniques: Joint mobilizations and Soft tissue Mobilization were applied to the: neck and upper back for 10 minutes, including:  STM B sub occipitals, cervical paraspinals, LS/UT      cold pack for 00 minutes to neck in high fowlers. - pt opted out today     Patient Education and Home Exercises     Home Exercises Provided and Patient Education Provided     Education provided:   - HEP review   -ASPEN collar positioning     Written Home Exercises Provided: Patient instructed to cont prior HEP. Exercises were reviewed and Israel was able to demonstrate them prior to the end of the session.  Israel demonstrated good  understanding of the education provided. See EMR under Patient Instructions for exercises provided during therapy sessions    ASSESSMENT     Pt requires decreased  cueing with postural correction with prescribed therex. Improved cervical AROM in all planes. No c/o increased discomfort with prescribed activities.  Good response to exercise progression consisting of cervicothoracic stabilization emphasis. Israel Is progressing well towards his goals.     Pt prognosis is Good.     Pt will continue to benefit from skilled outpatient physical therapy to address the deficits listed in the problem list box on initial evaluation, provide pt/family education and to maximize pt's level of independence in the home and community environment.     Pt's spiritual, cultural and educational needs considered and pt agreeable to plan of care and goals.     Anticipated barriers to physical therapy: none    Short Term Goals (4 Weeks):  Updated 3/3/23  MET     1.Pt to increase strength by a 1/2 grade of muscles test to allow for improvement in functional activities such as performing chores.  2.Pt to improve range of motion by 25% to allow for improved functional mobility to allow for improvement in IADLs.   3.Pt to report compliance with HEP and demonstrate proper exercise technique to PT to show  competence with self management of condition.  4.Decrease pain by 25% during functional activities.     Long Term Goals (12 Weeks): in progress     1. Increase ROM to allow improved joint biomechanics during functional activities.   2.Increase trunk and upper extremity strength to within normal limits during functional activities.   3. Independent with home exercise program.   4. Full return to functional activities with manageable complaints.  5. Patient to demonstrate improved posture and body mechanics.  6. Decrease pain by 75% during functional activities.    PLAN     Continue per protocol for post operative cervical fusion. Continuation of scapular stabilization therex.     Cedric Degroot, PT, DPT  03/03/2023

## 2023-03-06 ENCOUNTER — OFFICE VISIT (OUTPATIENT)
Dept: NEUROSURGERY | Facility: CLINIC | Age: 63
End: 2023-03-06
Payer: MEDICAID

## 2023-03-06 ENCOUNTER — HOSPITAL ENCOUNTER (OUTPATIENT)
Dept: RADIOLOGY | Facility: HOSPITAL | Age: 63
Discharge: HOME | End: 2023-03-06
Attending: NURSE PRACTITIONER
Payer: MEDICAID

## 2023-03-06 VITALS — SYSTOLIC BLOOD PRESSURE: 129 MMHG | DIASTOLIC BLOOD PRESSURE: 76 MMHG | HEART RATE: 69 BPM

## 2023-03-06 DIAGNOSIS — Z98.1 STATUS POST CERVICAL SPINAL FUSION: Primary | ICD-10-CM

## 2023-03-06 DIAGNOSIS — Z98.1 STATUS POST CERVICAL SPINAL FUSION: ICD-10-CM

## 2023-03-06 PROCEDURE — 99999 PR PBB SHADOW E&M-EST. PATIENT-LVL II: ICD-10-PCS | Mod: PBBFAC,,, | Performed by: STUDENT IN AN ORGANIZED HEALTH CARE EDUCATION/TRAINING PROGRAM

## 2023-03-06 PROCEDURE — 1159F PR MEDICATION LIST DOCUMENTED IN MEDICAL RECORD: ICD-10-PCS | Mod: CPTII,,, | Performed by: STUDENT IN AN ORGANIZED HEALTH CARE EDUCATION/TRAINING PROGRAM

## 2023-03-06 PROCEDURE — 72125 CT NECK SPINE W/O DYE: CPT | Mod: 26,,, | Performed by: INTERNAL MEDICINE

## 2023-03-06 PROCEDURE — 3078F DIAST BP <80 MM HG: CPT | Mod: CPTII,,, | Performed by: STUDENT IN AN ORGANIZED HEALTH CARE EDUCATION/TRAINING PROGRAM

## 2023-03-06 PROCEDURE — 3074F PR MOST RECENT SYSTOLIC BLOOD PRESSURE < 130 MM HG: ICD-10-PCS | Mod: CPTII,,, | Performed by: STUDENT IN AN ORGANIZED HEALTH CARE EDUCATION/TRAINING PROGRAM

## 2023-03-06 PROCEDURE — 4010F PR ACE/ARB THEARPY RXD/TAKEN: ICD-10-PCS | Mod: CPTII,,, | Performed by: STUDENT IN AN ORGANIZED HEALTH CARE EDUCATION/TRAINING PROGRAM

## 2023-03-06 PROCEDURE — 99212 OFFICE O/P EST SF 10 MIN: CPT | Mod: PBBFAC,25 | Performed by: STUDENT IN AN ORGANIZED HEALTH CARE EDUCATION/TRAINING PROGRAM

## 2023-03-06 PROCEDURE — 1159F MED LIST DOCD IN RCRD: CPT | Mod: CPTII,,, | Performed by: STUDENT IN AN ORGANIZED HEALTH CARE EDUCATION/TRAINING PROGRAM

## 2023-03-06 PROCEDURE — 4010F ACE/ARB THERAPY RXD/TAKEN: CPT | Mod: CPTII,,, | Performed by: STUDENT IN AN ORGANIZED HEALTH CARE EDUCATION/TRAINING PROGRAM

## 2023-03-06 PROCEDURE — 99024 PR POST-OP FOLLOW-UP VISIT: ICD-10-PCS | Mod: S$PBB,,, | Performed by: STUDENT IN AN ORGANIZED HEALTH CARE EDUCATION/TRAINING PROGRAM

## 2023-03-06 PROCEDURE — 3074F SYST BP LT 130 MM HG: CPT | Mod: CPTII,,, | Performed by: STUDENT IN AN ORGANIZED HEALTH CARE EDUCATION/TRAINING PROGRAM

## 2023-03-06 PROCEDURE — 99999 PR PBB SHADOW E&M-EST. PATIENT-LVL II: CPT | Mod: PBBFAC,,, | Performed by: STUDENT IN AN ORGANIZED HEALTH CARE EDUCATION/TRAINING PROGRAM

## 2023-03-06 PROCEDURE — 3078F PR MOST RECENT DIASTOLIC BLOOD PRESSURE < 80 MM HG: ICD-10-PCS | Mod: CPTII,,, | Performed by: STUDENT IN AN ORGANIZED HEALTH CARE EDUCATION/TRAINING PROGRAM

## 2023-03-06 PROCEDURE — 72125 CT NECK SPINE W/O DYE: CPT | Mod: TC

## 2023-03-06 PROCEDURE — 72125 CT CERVICAL SPINE WITHOUT CONTRAST: ICD-10-PCS | Mod: 26,,, | Performed by: INTERNAL MEDICINE

## 2023-03-06 PROCEDURE — 99024 POSTOP FOLLOW-UP VISIT: CPT | Mod: S$PBB,,, | Performed by: STUDENT IN AN ORGANIZED HEALTH CARE EDUCATION/TRAINING PROGRAM
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
## 2023-03-07 ENCOUNTER — CLINICAL SUPPORT (OUTPATIENT)
Dept: REHABILITATION | Facility: HOSPITAL | Age: 63
End: 2023-03-07
Payer: MEDICAID

## 2023-03-07 DIAGNOSIS — M53.82 IMPAIRED RANGE OF MOTION OF CERVICAL SPINE: Primary | ICD-10-CM

## 2023-03-07 DIAGNOSIS — R29.898 IMPAIRED STRENGTH OF UPPER EXTREMITY: ICD-10-CM

## 2023-03-07 PROCEDURE — 97110 THERAPEUTIC EXERCISES: CPT | Mod: PN

## 2023-03-07 NOTE — PROGRESS NOTES
[facility]   Physical Therapy Treatment Note     Name: Israel De Jesus  Clinic Number: [id number]    Therapy Diagnosis:   Encounter Diagnoses   Name Primary?    Impaired range of motion of cervical spine Yes    Impaired strength of upper extremity        Physician: Guerline Verdin NP    Visit Date: 3/7/2023     Physician Orders: PT Eval and Treat   Medical Diagnosis from Referral: Z98.1 (ICD-10-CM) - Status post cervical spinal fusion  Evaluation Date: 1/24/2023  Plan of Care Expiration: 4/24/23     Authorization Period Expiration: 12/31/23  Visit # / Visits authorized: 3/ 20 + Eval      DOS: 12/7/22     Precautions: spinal precautions, C Collar (accept for bathing , eating and sleeping).  S/p cervical fusion    PTA Visit #: 0/5     Time In: 1100  Time Out: 1202    Total Billable Time: 55 minutes       SUBJECTIVE     Pt reports: follow up with MD with instructions to continue therapy and now able to return to work. States he has been d/c'd from collar.     Response to previous treatment: fair  Functional change: ongoing     Pain: 3/10   Location: R shoulder    OBJECTIVE     Objective Measures updated at progress report unless specified.        Treatment     Israel received the treatments listed below:      Israel received therapeutic exercises to develop strength, endurance, ROM, flexibility, posture and core stabilization for 55 minutes including:     Nu Step x 8 min  Seated pulleys scaption x 4 min  Scapular retraction 2 x 10  Standing SA slides at wall with pillow case 2 x 10  Corner stretch 15 sec x 5   YTB seated ER 3 x 10: hold  Standing RTB rows x 30   Standing extension RTB x 20  Seated horizonatal abduction  YTB 2 x 10  Supine wand flexion x 15  Supine wand press up 4lbs 3 x 10  Standing balance on airex feet together x 2 min  Curls with RTB 3 x 10    Patient Education and Home Exercises     Home Exercises Provided and Patient Education Provided     Education provided:   -  HEP review   -ASPEN collar positioning     Written Home Exercises Provided: Patient instructed to cont prior HEP. Exercises were reviewed and Israel was able to demonstrate them prior to the end of the session.  Israel demonstrated good  understanding of the education provided. See EMR under Patient Instructions for exercises provided during therapy sessions    ASSESSMENT     Pt requires decreased  cueing with postural correction with prescribed therex. Improved cervical AROM in all planes. No c/o increased discomfort with prescribed activities.  Good response to exercise progression consisting of cervicothoracic stabilization emphasis. Israel Is progressing well towards his goals.     Pt prognosis is Good.     Pt will continue to benefit from skilled outpatient physical therapy to address the deficits listed in the problem list box on initial evaluation, provide pt/family education and to maximize pt's level of independence in the home and community environment.     Pt's spiritual, cultural and educational needs considered and pt agreeable to plan of care and goals.     Anticipated barriers to physical therapy: none    Short Term Goals (4 Weeks):  Updated 3/3/23  MET     1.Pt to increase strength by a 1/2 grade of muscles test to allow for improvement in functional activities such as performing chores.  2.Pt to improve range of motion by 25% to allow for improved functional mobility to allow for improvement in IADLs.   3.Pt to report compliance with HEP and demonstrate proper exercise technique to PT to show competence with self management of condition.  4.Decrease pain by 25% during functional activities.     Long Term Goals (12 Weeks): in progress     1. Increase ROM to allow improved joint biomechanics during functional activities.   2.Increase trunk and upper extremity strength to within normal limits during functional activities.   3. Independent with home exercise program.   4. Full return to functional  activities with manageable complaints.  5. Patient to demonstrate improved posture and body mechanics.  6. Decrease pain by 75% during functional activities.    PLAN     Continue per protocol for post operative cervical fusion. Continuation of scapular stabilization and balance activities.     Cedric Degroot, PT, DPT  03/07/2023

## 2023-03-09 ENCOUNTER — NURSE TRIAGE (OUTPATIENT)
Dept: ADMINISTRATIVE | Facility: CLINIC | Age: 63
End: 2023-03-09
Payer: MEDICAID

## 2023-03-09 ENCOUNTER — PATIENT MESSAGE (OUTPATIENT)
Dept: NEUROSURGERY | Facility: CLINIC | Age: 63
End: 2023-03-09
Payer: MEDICAID

## 2023-03-09 NOTE — TELEPHONE ENCOUNTER
Spoke with Dory yesterday and was assisted with filing for unemployment. Says he received email with packet needing to be filled out by Dr. Storm's office. Requesting email address to forward packet to in order to have filled out. Informed pt no email address listed, would route message to clinic requesting callback today. Pt nemesio.     Reason for Disposition   Nursing judgment    Protocols used: Information Only Call - No Triage-A-[place]

## 2023-03-10 ENCOUNTER — PATIENT MESSAGE (OUTPATIENT)
Dept: NEUROSURGERY | Facility: CLINIC | Age: 63
End: 2023-03-10
Payer: MEDICAID

## 2023-03-10 ENCOUNTER — TELEPHONE (OUTPATIENT)
Dept: NEUROSURGERY | Facility: CLINIC | Age: 63
End: 2023-03-10
Payer: MEDICAID

## 2023-03-11 NOTE — TELEPHONE ENCOUNTER
----- Message from Meli Livingston MA sent at 3/8/2023 10:55 AM CST -----  Type:  Patient Returning Call    Who Called: pt  Does the patient know what this is regarding?: yes  Would the patient rather a call back or a response via [employer]? Call back  Best Call Back Number: [phone]  Additional Information:  pt requesting to speak with staff regarding letter needed to be filled out by provider and needs info on how to send it

## 2023-03-14 ENCOUNTER — CLINICAL SUPPORT (OUTPATIENT)
Dept: REHABILITATION | Facility: HOSPITAL | Age: 63
End: 2023-03-14
Payer: MEDICAID

## 2023-03-14 DIAGNOSIS — M53.82 IMPAIRED RANGE OF MOTION OF CERVICAL SPINE: Primary | ICD-10-CM

## 2023-03-14 DIAGNOSIS — R29.898 IMPAIRED STRENGTH OF UPPER EXTREMITY: ICD-10-CM

## 2023-03-14 PROCEDURE — 97110 THERAPEUTIC EXERCISES: CPT | Mod: PN

## 2023-03-14 NOTE — PROGRESS NOTES
[facility]   Physical Therapy Treatment Note     Name: Israel De Jesus  Clinic Number: [id number]    Therapy Diagnosis:   Encounter Diagnoses   Name Primary?    Impaired range of motion of cervical spine Yes    Impaired strength of upper extremity        Physician: Guerline Verdin NP    Visit Date: 3/14/2023     Physician Orders: PT Eval and Treat   Medical Diagnosis from Referral: Z98.1 (ICD-10-CM) - Status post cervical spinal fusion  Evaluation Date: 1/24/2023  Plan of Care Expiration: 4/24/23     Authorization Period Expiration: 12/31/23  Visit # / Visits authorized: 3/ 20 + Eval      DOS: 12/7/22     Precautions: spinal precautions, C Collar (accept for bathing , eating and sleeping).  S/p cervical fusion    PTA Visit #: 0/5     Time In: 1100  Time Out: 1202    Total Billable Time: 55 minutes       SUBJECTIVE     Pt reports: he continues to improve with therapy.  States he returns to work this afternoon.     Response to previous treatment: fair  Functional change: ongoing     Pain: 3/10   Location: R shoulder    OBJECTIVE     Objective Measures updated at progress report unless specified.        Treatment     Israel received the treatments listed below:      Israel received therapeutic exercises to develop strength, endurance, ROM, flexibility, posture and core stabilization for 55 minutes including:     Nu Step x 8 min  Seated pulleys scaption x 4 min  Standing SA slides at wall with pillow case 2 x 10  Corner stretch 15 sec x 5   Standing RTB rows x 30   Standing extension RTB x 20  Seated horizonatal abduction  YTB 2 x 10  Supine wand flexion x 15  Supine wand press up 4lbs 3 x 10  Standing balance on airex feet together x 2 min  Bicep curls with RTB 3 x 10  Matrix hip abd 20lbs 3 x 10  Matris knee ext 3 x 10 10lbs    Patient Education and Home Exercises     Home Exercises Provided and Patient Education Provided     Education provided:   - HEP review     Written Home  Exercises Provided: Patient instructed to cont prior HEP. Exercises were reviewed and Israel was able to demonstrate them prior to the end of the session.  Israel demonstrated good  understanding of the education provided. See EMR under Patient Instructions for exercises provided during therapy sessions    ASSESSMENT     Pt requires decreased cueing with postural correction with prescribed therex.  No c/o increased discomfort with prescribed activities.  Good response to exercise progression with implementation of LE strengthening and balance activities.     Pt prognosis is Good.     Pt will continue to benefit from skilled outpatient physical therapy to address the deficits listed in the problem list box on initial evaluation, provide pt/family education and to maximize pt's level of independence in the home and community environment.     Pt's spiritual, cultural and educational needs considered and pt agreeable to plan of care and goals.     Anticipated barriers to physical therapy: none    Short Term Goals (4 Weeks):  Updated 3/3/23  MET     1.Pt to increase strength by a 1/2 grade of muscles test to allow for improvement in functional activities such as performing chores.  2.Pt to improve range of motion by 25% to allow for improved functional mobility to allow for improvement in IADLs.   3.Pt to report compliance with HEP and demonstrate proper exercise technique to PT to show competence with self management of condition.  4.Decrease pain by 25% during functional activities.     Long Term Goals (12 Weeks): in progress     1. Increase ROM to allow improved joint biomechanics during functional activities.   2.Increase trunk and upper extremity strength to within normal limits during functional activities.   3. Independent with home exercise program.   4. Full return to functional activities with manageable complaints.  5. Patient to demonstrate improved posture and body mechanics.  6. Decrease pain by 75% during  functional activities.    PLAN     Continue per protocol for post operative cervical fusion. Continuation of scapular stabilization and balance activities.     Cedric Degroot, PT, DPT  03/14/2023

## 2023-03-21 ENCOUNTER — CLINICAL SUPPORT (OUTPATIENT)
Dept: REHABILITATION | Facility: HOSPITAL | Age: 63
End: 2023-03-21
Payer: MEDICAID

## 2023-03-21 DIAGNOSIS — R29.898 IMPAIRED STRENGTH OF UPPER EXTREMITY: ICD-10-CM

## 2023-03-21 DIAGNOSIS — M53.82 IMPAIRED RANGE OF MOTION OF CERVICAL SPINE: Primary | ICD-10-CM

## 2023-03-21 PROCEDURE — 97110 THERAPEUTIC EXERCISES: CPT | Mod: PN

## 2023-03-21 NOTE — PROGRESS NOTES
[facility]   Physical Therapy Treatment Note     Name: Israel De Jesus  Clinic Number: [id number]    Therapy Diagnosis:   Encounter Diagnoses   Name Primary?    Impaired range of motion of cervical spine Yes    Impaired strength of upper extremity        Physician: Guerline Verdin NP    Visit Date: 3/21/2023     Physician Orders: PT Eval and Treat   Medical Diagnosis from Referral: Z98.1 (ICD-10-CM) - Status post cervical spinal fusion  Evaluation Date: 1/24/2023  Plan of Care Expiration: 4/24/23     Authorization Period Expiration: 12/31/23  Visit # / Visits authorized: 3/ 20 + Eval      DOS: 12/7/22     Precautions: spinal precautions, C Collar (accept for bathing , eating and sleeping).  S/p cervical fusion    PTA Visit #: 0/5     Time In: 1055  Time Out: 1150    Total Billable Time: 55 minutes       SUBJECTIVE     Pt reports: he is much improved. States he has returned to work and had difficulty with prolonged looking up and down.     Response to previous treatment: fair  Functional change: ongoing     Pain: 3/10   Location: R shoulder    OBJECTIVE     Objective Measures updated at progress report unless specified.        Treatment     Israel received the treatments listed below:      Israel received therapeutic exercises to develop strength, endurance, ROM, flexibility, posture and core stabilization for 55 minutes including:     Nu Step x 8 min  Standing SA slides at wall with pillow case 2 x 10  Corner stretch 15 sec x 5   Standing GTB rows x 30   Standing extension GTB x 20  Standing balance on airex feet tandem x 5 min with overhead reaching  Modified SLS on soccer ball with OH reaching x 5 min  Bicep curls with GTB 3 x 10  Seated ball roll x 20  Seated thoracic ext vs towel roll x 20  Cervical ext w/ towel x 20      Not performed:   Matrix hip abd 20lbs 3 x 10  Matris knee ext 3 x 10 10lbs  Seated horizonatal abduction  YTB 2 x 10  Supine wand flexion x 15  Supine wand  press up 4lbs 3 x 10    Patient Education and Home Exercises     Home Exercises Provided and Patient Education Provided     Education provided:   - HEP review     Written Home Exercises Provided: Patient instructed to cont prior HEP. Exercises were reviewed and Israel was able to demonstrate them prior to the end of the session.  Israel demonstrated good  understanding of the education provided. See EMR under Patient Instructions for exercises provided during therapy sessions    ASSESSMENT     Pt requires decreased cueing with postural correction with prescribed therex.  No c/o increased discomfort with prescribed activities.  Good response to exercise progression including LE strengthening and balance activities. Demonstrates moderate limitations in cervical extension.     Pt prognosis is Good.     Pt will continue to benefit from skilled outpatient physical therapy to address the deficits listed in the problem list box on initial evaluation, provide pt/family education and to maximize pt's level of independence in the home and community environment.     Pt's spiritual, cultural and educational needs considered and pt agreeable to plan of care and goals.     Anticipated barriers to physical therapy: none    Short Term Goals (4 Weeks):  Updated 3/3/23  MET     1.Pt to increase strength by a 1/2 grade of muscles test to allow for improvement in functional activities such as performing chores.  2.Pt to improve range of motion by 25% to allow for improved functional mobility to allow for improvement in IADLs.   3.Pt to report compliance with HEP and demonstrate proper exercise technique to PT to show competence with self management of condition.  4.Decrease pain by 25% during functional activities.     Long Term Goals (12 Weeks): in progress     1. Increase ROM to allow improved joint biomechanics during functional activities.   2.Increase trunk and upper extremity strength to within normal limits during functional  activities.   3. Independent with home exercise program.   4. Full return to functional activities with manageable complaints.  5. Patient to demonstrate improved posture and body mechanics.  6. Decrease pain by 75% during functional activities.    PLAN     Continue per protocol for post operative cervical fusion. Continuation of scapular stabilization and balance activities.     Cedric Degroot, PT, DPT  03/21/2023

## 2023-04-06 ENCOUNTER — PATIENT MESSAGE (OUTPATIENT)
Dept: ADMINISTRATIVE | Facility: HOSPITAL | Age: 63
End: 2023-04-06
Payer: MEDICAID

## 2023-04-07 ENCOUNTER — PATIENT MESSAGE (OUTPATIENT)
Dept: ADMINISTRATIVE | Facility: OTHER | Age: 63
End: 2023-04-07
Payer: MEDICAID

## 2023-12-20 DIAGNOSIS — I10 HYPERTENSION: ICD-10-CM

## 2024-02-13 ENCOUNTER — HOSPITAL ENCOUNTER (INPATIENT)
Facility: HOSPITAL | Age: 64
LOS: 8 days | Discharge: NURSING, REHAB OR LONG-TERM CARE | DRG: 493 | End: 2024-02-21
Attending: EMERGENCY MEDICINE | Admitting: INTERNAL MEDICINE
Payer: MEDICAID

## 2024-02-13 DIAGNOSIS — R45.851 SUICIDAL IDEATION: ICD-10-CM

## 2024-02-13 DIAGNOSIS — M79.601 RIGHT ARM PAIN: ICD-10-CM

## 2024-02-13 DIAGNOSIS — F10.920 ALCOHOLIC INTOXICATION WITHOUT COMPLICATION: ICD-10-CM
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
## 2024-02-13 DIAGNOSIS — S44.21XA INJURY OF RADIAL NERVE AT RIGHT UPPER ARM LEVEL, INITIAL ENCOUNTER: ICD-10-CM

## 2024-02-13 DIAGNOSIS — S42.351A CLOSED DISPLACED COMMINUTED FRACTURE OF SHAFT OF RIGHT HUMERUS, INITIAL ENCOUNTER: Primary | ICD-10-CM

## 2024-02-13 DIAGNOSIS — Z01.810 PREOP CARDIOVASCULAR EXAM: ICD-10-CM

## 2024-02-13 LAB
ALBUMIN SERPL BCP-MCNC: 2.6 G/DL (ref 3.5–5.2)
ALP SERPL-CCNC: 126 U/L (ref 55–135)
ALT SERPL W/O P-5'-P-CCNC: 19 U/L (ref 10–44)
AMPHET+METHAMPHET UR QL: NEGATIVE
ANION GAP SERPL CALC-SCNC: 8 MMOL/L (ref 8–16)
APAP SERPL-MCNC: <3 UG/ML (ref 10–20)
AST SERPL-CCNC: 45 U/L (ref 10–40)
BACTERIA #/AREA URNS HPF: ABNORMAL /HPF
BARBITURATES UR QL SCN>200 NG/ML: NEGATIVE
BASOPHILS # BLD AUTO: 0.03 K/UL (ref 0–0.2)
BASOPHILS NFR BLD: 0.4 % (ref 0–1.9)
BENZODIAZ UR QL SCN>200 NG/ML: NEGATIVE
BILIRUB SERPL-MCNC: 0.3 MG/DL (ref 0.1–1)
BILIRUB UR QL STRIP: NEGATIVE
BUN SERPL-MCNC: 7 MG/DL (ref 8–23)
BZE UR QL SCN: NEGATIVE
CALCIUM SERPL-MCNC: 8.7 MG/DL (ref 8.7–10.5)
CANNABINOIDS UR QL SCN: NEGATIVE
CHLORIDE SERPL-SCNC: 109 MMOL/L (ref 95–110)
CLARITY UR: CLEAR
CO2 SERPL-SCNC: 24 MMOL/L (ref 23–29)
COLOR UR: YELLOW
CREAT SERPL-MCNC: 0.9 MG/DL (ref 0.5–1.4)
CREAT UR-MCNC: 190 MG/DL (ref 23–375)
DIFFERENTIAL METHOD BLD: ABNORMAL
EOSINOPHIL # BLD AUTO: 0 K/UL (ref 0–0.5)
EOSINOPHIL NFR BLD: 0.1 % (ref 0–8)
ERYTHROCYTE [DISTWIDTH] IN BLOOD BY AUTOMATED COUNT: 13.4 % (ref 11.5–14.5)
EST. GFR  (NO RACE VARIABLE): >60 ML/MIN/1.73 M^2
ETHANOL SERPL-MCNC: 312 MG/DL
GLUCOSE SERPL-MCNC: 92 MG/DL (ref 70–110)
GLUCOSE UR QL STRIP: NEGATIVE
HCT VFR BLD AUTO: 34.1 % (ref 40–54)
HGB BLD-MCNC: 11.6 G/DL (ref 14–18)
HGB UR QL STRIP: NEGATIVE
HYALINE CASTS #/AREA URNS LPF: 13 /LPF
IMM GRANULOCYTES # BLD AUTO: 0.02 K/UL (ref 0–0.04)
IMM GRANULOCYTES NFR BLD AUTO: 0.2 % (ref 0–0.5)
KETONES UR QL STRIP: NEGATIVE
LEUKOCYTE ESTERASE UR QL STRIP: NEGATIVE
LYMPHOCYTES # BLD AUTO: 1.9 K/UL (ref 1–4.8)
LYMPHOCYTES NFR BLD: 23.3 % (ref 18–48)
MAGNESIUM SERPL-MCNC: 2 MG/DL (ref 1.6–2.6)
MCH RBC QN AUTO: 35.9 PG (ref 27–31)
MCHC RBC AUTO-ENTMCNC: 34 G/DL (ref 32–36)
MCV RBC AUTO: 106 FL (ref 82–98)
METHADONE UR QL SCN>300 NG/ML: NEGATIVE
MICROSCOPIC COMMENT: ABNORMAL
MONOCYTES # BLD AUTO: 0.6 K/UL (ref 0.3–1)
MONOCYTES NFR BLD: 7.8 % (ref 4–15)
NEUTROPHILS # BLD AUTO: 5.6 K/UL (ref 1.8–7.7)
NEUTROPHILS NFR BLD: 68.2 % (ref 38–73)
NITRITE UR QL STRIP: NEGATIVE
NRBC BLD-RTO: 0 /100 WBC
OPIATES UR QL SCN: NEGATIVE
PCP UR QL SCN>25 NG/ML: NEGATIVE
PH UR STRIP: 6 [PH] (ref 5–8)
PLATELET # BLD AUTO: 292 K/UL (ref 150–450)
PMV BLD AUTO: 9.1 FL (ref 9.2–12.9)
POTASSIUM SERPL-SCNC: 3.2 MMOL/L (ref 3.5–5.1)
PROT SERPL-MCNC: 6.2 G/DL (ref 6–8.4)
PROT UR QL STRIP: ABNORMAL
RBC # BLD AUTO: 3.23 M/UL (ref 4.6–6.2)
RBC #/AREA URNS HPF: 1 /HPF (ref 0–4)
SODIUM SERPL-SCNC: 141 MMOL/L (ref 136–145)
SP GR UR STRIP: 1.01 (ref 1–1.03)
SQUAMOUS #/AREA URNS HPF: 2 /HPF
TOXICOLOGY INFORMATION: NORMAL
TSH SERPL DL<=0.005 MIU/L-ACNC: 0.85 UIU/ML (ref 0.4–4)
URN SPEC COLLECT METH UR: ABNORMAL
UROBILINOGEN UR STRIP-ACNC: NEGATIVE EU/DL
WBC # BLD AUTO: 8.2 K/UL (ref 3.9–12.7)
WBC #/AREA URNS HPF: 1 /HPF (ref 0–5)

## 2024-02-13 PROCEDURE — 83735 ASSAY OF MAGNESIUM: CPT | Performed by: EMERGENCY MEDICINE

## 2024-02-13 PROCEDURE — 96368 THER/DIAG CONCURRENT INF: CPT

## 2024-02-13 PROCEDURE — 96365 THER/PROPH/DIAG IV INF INIT: CPT

## 2024-02-13 PROCEDURE — 96367 TX/PROPH/DG ADDL SEQ IV INF: CPT

## 2024-02-13 PROCEDURE — 82077 ASSAY SPEC XCP UR&BREATH IA: CPT | Performed by: EMERGENCY MEDICINE

## 2024-02-13 PROCEDURE — 80053 COMPREHEN METABOLIC PANEL: CPT | Performed by: EMERGENCY MEDICINE

## 2024-02-13 PROCEDURE — 63600175 PHARM REV CODE 636 W HCPCS: Performed by: EMERGENCY MEDICINE

## 2024-02-13 PROCEDURE — 96366 THER/PROPH/DIAG IV INF ADDON: CPT

## 2024-02-13 PROCEDURE — 84443 ASSAY THYROID STIM HORMONE: CPT | Performed by: EMERGENCY MEDICINE

## 2024-02-13 PROCEDURE — 25000003 PHARM REV CODE 250: Performed by: EMERGENCY MEDICINE

## 2024-02-13 PROCEDURE — 93005 ELECTROCARDIOGRAM TRACING: CPT

## 2024-02-13 PROCEDURE — 85025 COMPLETE CBC W/AUTO DIFF WBC: CPT | Performed by: EMERGENCY MEDICINE

## 2024-02-13 PROCEDURE — 99285 EMERGENCY DEPT VISIT HI MDM: CPT | Mod: 25

## 2024-02-13 PROCEDURE — 81000 URINALYSIS NONAUTO W/SCOPE: CPT | Mod: 59 | Performed by: EMERGENCY MEDICINE

## 2024-02-13 PROCEDURE — 80307 DRUG TEST PRSMV CHEM ANLYZR: CPT | Performed by: EMERGENCY MEDICINE

## 2024-02-13 PROCEDURE — 93010 ELECTROCARDIOGRAM REPORT: CPT | Mod: ,,, | Performed by: INTERNAL MEDICINE

## 2024-02-13 PROCEDURE — 80143 DRUG ASSAY ACETAMINOPHEN: CPT | Performed by: EMERGENCY MEDICINE

## 2024-02-13 PROCEDURE — 12000002 HC ACUTE/MED SURGE SEMI-PRIVATE ROOM

## 2024-02-13 RX ORDER — AMOXICILLIN 250 MG
1 CAPSULE ORAL 2 TIMES DAILY
Status: DISCONTINUED | OUTPATIENT
Start: 2024-02-13 | End: 2024-02-21 | Stop reason: HOSPADM

## 2024-02-13 RX ORDER — IBUPROFEN 200 MG
1 TABLET ORAL DAILY
Status: DISCONTINUED | OUTPATIENT
Start: 2024-02-14 | End: 2024-02-21 | Stop reason: HOSPADM

## 2024-02-13 RX ORDER — TALC
6 POWDER (GRAM) TOPICAL NIGHTLY
Status: DISCONTINUED | OUTPATIENT
Start: 2024-02-13 | End: 2024-02-21 | Stop reason: HOSPADM

## 2024-02-13 RX ORDER — POTASSIUM CHLORIDE 7.45 MG/ML
10 INJECTION INTRAVENOUS
Status: COMPLETED | OUTPATIENT
Start: 2024-02-13 | End: 2024-02-13

## 2024-02-13 RX ORDER — TALC
6 POWDER (GRAM) TOPICAL NIGHTLY
Status: DISCONTINUED | OUTPATIENT
Start: 2024-02-13 | End: 2024-02-13

## 2024-02-13 RX ORDER — POTASSIUM CHLORIDE 14.9 MG/ML
20 INJECTION INTRAVENOUS
Status: DISCONTINUED | OUTPATIENT
Start: 2024-02-13 | End: 2024-02-13

## 2024-02-13 RX ORDER — ONDANSETRON HYDROCHLORIDE 2 MG/ML
4 INJECTION, SOLUTION INTRAVENOUS EVERY 6 HOURS PRN
Status: DISCONTINUED | OUTPATIENT
Start: 2024-02-13 | End: 2024-02-14

## 2024-02-13 RX ORDER — SODIUM CHLORIDE 9 MG/ML
INJECTION, SOLUTION INTRAVENOUS CONTINUOUS
Status: ACTIVE | OUTPATIENT
Start: 2024-02-14 | End: 2024-02-14

## 2024-02-13 RX ORDER — SODIUM CHLORIDE 0.9 % (FLUSH) 0.9 %
10 SYRINGE (ML) INJECTION
Status: DISCONTINUED | OUTPATIENT
Start: 2024-02-13 | End: 2024-02-21 | Stop reason: HOSPADM

## 2024-02-13 RX ORDER — POLYETHYLENE GLYCOL 3350 17 G/17G
17 POWDER, FOR SOLUTION ORAL DAILY
Status: DISCONTINUED | OUTPATIENT
Start: 2024-02-14 | End: 2024-02-14

## 2024-02-13 RX ORDER — SODIUM CHLORIDE 9 MG/ML
INJECTION, SOLUTION INTRAVENOUS CONTINUOUS
Status: ACTIVE | OUTPATIENT
Start: 2024-02-13 | End: 2024-02-13

## 2024-02-13 RX ORDER — MAGNESIUM SULFATE HEPTAHYDRATE 40 MG/ML
2 INJECTION, SOLUTION INTRAVENOUS ONCE
Status: COMPLETED | OUTPATIENT
Start: 2024-02-13 | End: 2024-02-13

## 2024-02-13 RX ORDER — DULOXETIN HYDROCHLORIDE 30 MG/1
30 CAPSULE, DELAYED RELEASE ORAL DAILY
Status: DISCONTINUED | OUTPATIENT
Start: 2024-02-14 | End: 2024-02-14

## 2024-02-13 RX ORDER — HYDROCODONE BITARTRATE AND ACETAMINOPHEN 5; 325 MG/1; MG/1
1 TABLET ORAL
Status: COMPLETED | OUTPATIENT
Start: 2024-02-13 | End: 2024-02-13

## 2024-02-13 RX ORDER — ENOXAPARIN SODIUM 100 MG/ML
40 INJECTION SUBCUTANEOUS EVERY 24 HOURS
Status: DISCONTINUED | OUTPATIENT
Start: 2024-02-13 | End: 2024-02-16

## 2024-02-13 RX ADMIN — FOLIC ACID 5 MG: 5 INJECTION, SOLUTION INTRAMUSCULAR; INTRAVENOUS; SUBCUTANEOUS at 07:02

## 2024-02-13 RX ADMIN — POTASSIUM CHLORIDE 10 MEQ: 7.46 INJECTION, SOLUTION INTRAVENOUS at 08:02

## 2024-02-13 RX ADMIN — HYDROCODONE BITARTRATE AND ACETAMINOPHEN 1 TABLET: 5; 325 TABLET ORAL at 09:02

## 2024-02-13 RX ADMIN — POTASSIUM CHLORIDE 10 MEQ: 7.46 INJECTION, SOLUTION INTRAVENOUS at 07:02

## 2024-02-13 RX ADMIN — THIAMINE HYDROCHLORIDE 100 MG: 100 INJECTION, SOLUTION INTRAMUSCULAR; INTRAVENOUS at 06:02

## 2024-02-13 RX ADMIN — MAGNESIUM SULFATE HEPTAHYDRATE 2 G: 40 INJECTION, SOLUTION INTRAVENOUS at 07:02

## 2024-02-13 NOTE — ED TRIAGE NOTES
"Pt reports to the ED BIB EMS with C/O right arm pain and swelling S/P pain last night and Suicidal ideation. Daughter at the bedside reports "he is a recovering alcohol and recently starting drinking again. Last night he fell when his kitten ran under his feet. He also was making threats today that he wanted to end it all." Pt currently denies any SI/HI or AH/VH. Pt with ETOH smell to breath. Pt with Right arm in sling with ice pack placed by EMS. Pt with redness and ecchymosis from the right wrist to the right shoulder. Pt with limited movement to the right arm. MD Daniels at bedside for assessment. ER workup in progress, Sitter at bedside. NAD Noted.   "
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
## 2024-02-14 PROBLEM — S42.309A HUMERUS FRACTURE: Status: ACTIVE | Noted: 2024-02-14

## 2024-02-14 PROBLEM — F10.10 ALCOHOL ABUSE: Status: ACTIVE | Noted: 2024-02-14

## 2024-02-14 PROBLEM — F06.30 DEPRESSIVE DISORDER DUE TO SEPARATE MEDICAL CONDITION: Status: ACTIVE | Noted: 2024-02-14

## 2024-02-14 PROBLEM — R45.851 SUICIDAL IDEATION: Status: ACTIVE | Noted: 2024-02-14

## 2024-02-14 PROBLEM — D53.9 MACROCYTIC ANEMIA: Status: ACTIVE | Noted: 2024-02-14

## 2024-02-14 LAB
ALBUMIN SERPL BCP-MCNC: 2.4 G/DL (ref 3.5–5.2)
ALP SERPL-CCNC: 133 U/L (ref 55–135)
ALT SERPL W/O P-5'-P-CCNC: 15 U/L (ref 10–44)
ANION GAP SERPL CALC-SCNC: 5 MMOL/L (ref 8–16)
AST SERPL-CCNC: 37 U/L (ref 10–40)
BASOPHILS # BLD AUTO: 0.03 K/UL (ref 0–0.2)
BASOPHILS NFR BLD: 0.3 % (ref 0–1.9)
BILIRUB SERPL-MCNC: 0.4 MG/DL (ref 0.1–1)
BUN SERPL-MCNC: 10 MG/DL (ref 8–23)
CALCIUM SERPL-MCNC: 8.1 MG/DL (ref 8.7–10.5)
CHLORIDE SERPL-SCNC: 111 MMOL/L (ref 95–110)
CO2 SERPL-SCNC: 23 MMOL/L (ref 23–29)
CREAT SERPL-MCNC: 0.8 MG/DL (ref 0.5–1.4)
DIFFERENTIAL METHOD BLD: ABNORMAL
EOSINOPHIL # BLD AUTO: 0 K/UL (ref 0–0.5)
EOSINOPHIL NFR BLD: 0.2 % (ref 0–8)
ERYTHROCYTE [DISTWIDTH] IN BLOOD BY AUTOMATED COUNT: 13.5 % (ref 11.5–14.5)
EST. GFR  (NO RACE VARIABLE): >60 ML/MIN/1.73 M^2
ETHANOL SERPL-MCNC: <10 MG/DL
GLUCOSE SERPL-MCNC: 110 MG/DL (ref 70–110)
HCT VFR BLD AUTO: 31.5 % (ref 40–54)
HGB BLD-MCNC: 10.5 G/DL (ref 14–18)
IMM GRANULOCYTES # BLD AUTO: 0.03 K/UL (ref 0–0.04)
IMM GRANULOCYTES NFR BLD AUTO: 0.3 % (ref 0–0.5)
INR PPP: 0.9 (ref 0.8–1.2)
LYMPHOCYTES # BLD AUTO: 1.1 K/UL (ref 1–4.8)
LYMPHOCYTES NFR BLD: 11.8 % (ref 18–48)
MAGNESIUM SERPL-MCNC: 2 MG/DL (ref 1.6–2.6)
MCH RBC QN AUTO: 34.7 PG (ref 27–31)
MCHC RBC AUTO-ENTMCNC: 33.3 G/DL (ref 32–36)
MCV RBC AUTO: 104 FL (ref 82–98)
MONOCYTES # BLD AUTO: 0.8 K/UL (ref 0.3–1)
MONOCYTES NFR BLD: 8.3 % (ref 4–15)
NEUTROPHILS # BLD AUTO: 7.4 K/UL (ref 1.8–7.7)
NEUTROPHILS NFR BLD: 79.1 % (ref 38–73)
NRBC BLD-RTO: 0 /100 WBC
PHOSPHATE SERPL-MCNC: 2.5 MG/DL (ref 2.7–4.5)
PLATELET # BLD AUTO: 229 K/UL (ref 150–450)
PMV BLD AUTO: 9.5 FL (ref 9.2–12.9)
POCT GLUCOSE: 97 MG/DL (ref 70–110)
POTASSIUM SERPL-SCNC: 3.8 MMOL/L (ref 3.5–5.1)
PROT SERPL-MCNC: 5.7 G/DL (ref 6–8.4)
PROTHROMBIN TIME: 10.2 SEC (ref 9–12.5)
RBC # BLD AUTO: 3.03 M/UL (ref 4.6–6.2)
SODIUM SERPL-SCNC: 139 MMOL/L (ref 136–145)
WBC # BLD AUTO: 9.3 K/UL (ref 3.9–12.7)

## 2024-02-14 PROCEDURE — 83735 ASSAY OF MAGNESIUM: CPT | Performed by: STUDENT IN AN ORGANIZED HEALTH CARE EDUCATION/TRAINING PROGRAM

## 2024-02-14 PROCEDURE — 80053 COMPREHEN METABOLIC PANEL: CPT | Performed by: STUDENT IN AN ORGANIZED HEALTH CARE EDUCATION/TRAINING PROGRAM

## 2024-02-14 PROCEDURE — 25000003 PHARM REV CODE 250: Performed by: FAMILY MEDICINE

## 2024-02-14 PROCEDURE — 25000003 PHARM REV CODE 250: Performed by: STUDENT IN AN ORGANIZED HEALTH CARE EDUCATION/TRAINING PROGRAM

## 2024-02-14 PROCEDURE — 99215 OFFICE O/P EST HI 40 MIN: CPT | Mod: 95,,, | Performed by: PSYCHIATRY & NEUROLOGY

## 2024-02-14 PROCEDURE — 85610 PROTHROMBIN TIME: CPT | Performed by: STUDENT IN AN ORGANIZED HEALTH CARE EDUCATION/TRAINING PROGRAM

## 2024-02-14 PROCEDURE — 63600175 PHARM REV CODE 636 W HCPCS: Performed by: STUDENT IN AN ORGANIZED HEALTH CARE EDUCATION/TRAINING PROGRAM

## 2024-02-14 PROCEDURE — 82077 ASSAY SPEC XCP UR&BREATH IA: CPT | Performed by: EMERGENCY MEDICINE

## 2024-02-14 PROCEDURE — 84100 ASSAY OF PHOSPHORUS: CPT | Performed by: STUDENT IN AN ORGANIZED HEALTH CARE EDUCATION/TRAINING PROGRAM

## 2024-02-14 PROCEDURE — 63600175 PHARM REV CODE 636 W HCPCS: Mod: JZ,JG | Performed by: FAMILY MEDICINE

## 2024-02-14 PROCEDURE — 85025 COMPLETE CBC W/AUTO DIFF WBC: CPT | Performed by: STUDENT IN AN ORGANIZED HEALTH CARE EDUCATION/TRAINING PROGRAM

## 2024-02-14 PROCEDURE — 11000001 HC ACUTE MED/SURG PRIVATE ROOM

## 2024-02-14 RX ORDER — FOLIC ACID 1 MG/1
1 TABLET ORAL DAILY
Status: DISCONTINUED | OUTPATIENT
Start: 2024-02-14 | End: 2024-02-21 | Stop reason: HOSPADM

## 2024-02-14 RX ORDER — HYDROCODONE BITARTRATE AND ACETAMINOPHEN 5; 325 MG/1; MG/1
1 TABLET ORAL EVERY 4 HOURS PRN
Status: DISCONTINUED | OUTPATIENT
Start: 2024-02-14 | End: 2024-02-21 | Stop reason: HOSPADM

## 2024-02-14 RX ORDER — CHLORDIAZEPOXIDE HYDROCHLORIDE 5 MG/1
10 CAPSULE, GELATIN COATED ORAL 4 TIMES DAILY
Status: DISCONTINUED | OUTPATIENT
Start: 2024-02-14 | End: 2024-02-14

## 2024-02-14 RX ORDER — LORAZEPAM 2 MG/ML
1 INJECTION INTRAMUSCULAR EVERY 4 HOURS PRN
Status: DISCONTINUED | OUTPATIENT
Start: 2024-02-14 | End: 2024-02-21 | Stop reason: HOSPADM

## 2024-02-14 RX ORDER — MULTIVITAMIN
1 TABLET ORAL DAILY
COMMUNITY

## 2024-02-14 RX ORDER — HYDROCODONE BITARTRATE AND ACETAMINOPHEN 5; 325 MG/1; MG/1
1 TABLET ORAL EVERY 4 HOURS PRN
Status: DISCONTINUED | OUTPATIENT
Start: 2024-02-14 | End: 2024-02-14

## 2024-02-14 RX ORDER — AMLODIPINE BESYLATE 5 MG/1
5 TABLET ORAL DAILY
Status: DISCONTINUED | OUTPATIENT
Start: 2024-02-14 | End: 2024-02-15

## 2024-02-14 RX ORDER — HYDROCODONE BITARTRATE AND ACETAMINOPHEN 10; 325 MG/1; MG/1
1 TABLET ORAL EVERY 6 HOURS PRN
Status: DISCONTINUED | OUTPATIENT
Start: 2024-02-14 | End: 2024-02-21 | Stop reason: HOSPADM

## 2024-02-14 RX ORDER — DIAZEPAM 5 MG/1
TABLET ORAL
Status: DISPENSED
Start: 2024-02-14 | End: 2024-02-14

## 2024-02-14 RX ORDER — ONDANSETRON HYDROCHLORIDE 2 MG/ML
8 INJECTION, SOLUTION INTRAVENOUS EVERY 8 HOURS PRN
Status: DISCONTINUED | OUTPATIENT
Start: 2024-02-14 | End: 2024-02-21 | Stop reason: HOSPADM

## 2024-02-14 RX ORDER — POLYETHYLENE GLYCOL 3350 17 G/17G
17 POWDER, FOR SOLUTION ORAL DAILY PRN
Status: DISCONTINUED | OUTPATIENT
Start: 2024-02-14 | End: 2024-02-21 | Stop reason: HOSPADM

## 2024-02-14 RX ORDER — HYDRALAZINE HYDROCHLORIDE 20 MG/ML
10 INJECTION INTRAMUSCULAR; INTRAVENOUS EVERY 6 HOURS PRN
Status: DISCONTINUED | OUTPATIENT
Start: 2024-02-14 | End: 2024-02-21 | Stop reason: HOSPADM

## 2024-02-14 RX ORDER — LANOLIN ALCOHOL/MO/W.PET/CERES
100 CREAM (GRAM) TOPICAL DAILY
Status: DISCONTINUED | OUTPATIENT
Start: 2024-02-14 | End: 2024-02-21 | Stop reason: HOSPADM

## 2024-02-14 RX ORDER — CHLORDIAZEPOXIDE HYDROCHLORIDE 5 MG/1
5 CAPSULE, GELATIN COATED ORAL EVERY 8 HOURS
Status: DISCONTINUED | OUTPATIENT
Start: 2024-02-14 | End: 2024-02-19

## 2024-02-14 RX ORDER — CHOLECALCIFEROL (VITAMIN D3) 25 MCG
1000 TABLET ORAL DAILY
Status: DISCONTINUED | OUTPATIENT
Start: 2024-02-14 | End: 2024-02-21 | Stop reason: HOSPADM

## 2024-02-14 RX ORDER — LORAZEPAM 2 MG/ML
2 INJECTION INTRAMUSCULAR EVERY 4 HOURS PRN
Status: DISCONTINUED | OUTPATIENT
Start: 2024-02-14 | End: 2024-02-14

## 2024-02-14 RX ORDER — METOPROLOL TARTRATE 25 MG/1
25 TABLET, FILM COATED ORAL EVERY 6 HOURS
Status: DISCONTINUED | OUTPATIENT
Start: 2024-02-14 | End: 2024-02-15

## 2024-02-14 RX ORDER — DIAZEPAM 5 MG/1
10 TABLET ORAL EVERY 6 HOURS PRN
Status: DISCONTINUED | OUTPATIENT
Start: 2024-02-14 | End: 2024-02-21 | Stop reason: HOSPADM

## 2024-02-14 RX ORDER — MORPHINE SULFATE 4 MG/ML
4 INJECTION, SOLUTION INTRAMUSCULAR; INTRAVENOUS EVERY 4 HOURS PRN
Status: DISCONTINUED | OUTPATIENT
Start: 2024-02-14 | End: 2024-02-21 | Stop reason: HOSPADM

## 2024-02-14 RX ADMIN — SODIUM CHLORIDE: 9 INJECTION, SOLUTION INTRAVENOUS at 11:02

## 2024-02-14 RX ADMIN — METOPROLOL TARTRATE 25 MG: 25 TABLET, FILM COATED ORAL at 05:02

## 2024-02-14 RX ADMIN — HYDROCODONE BITARTRATE AND ACETAMINOPHEN 1 TABLET: 5; 325 TABLET ORAL at 04:02

## 2024-02-14 RX ADMIN — SENNOSIDES AND DOCUSATE SODIUM 1 TABLET: 8.6; 5 TABLET ORAL at 08:02

## 2024-02-14 RX ADMIN — ENOXAPARIN SODIUM 40 MG: 40 INJECTION SUBCUTANEOUS at 05:02

## 2024-02-14 RX ADMIN — HYDROCODONE BITARTRATE AND ACETAMINOPHEN 1 TABLET: 5; 325 TABLET ORAL at 08:02

## 2024-02-14 RX ADMIN — CHLORDIAZEPOXIDE HYDROCHLORIDE 5 MG: 5 CAPSULE ORAL at 11:02

## 2024-02-14 RX ADMIN — METOPROLOL TARTRATE 25 MG: 25 TABLET, FILM COATED ORAL at 11:02

## 2024-02-14 RX ADMIN — AMLODIPINE BESYLATE 5 MG: 5 TABLET ORAL at 09:02

## 2024-02-14 RX ADMIN — THIAMINE HCL TAB 100 MG 100 MG: 100 TAB at 08:02

## 2024-02-14 RX ADMIN — MORPHINE SULFATE 4 MG: 4 INJECTION, SOLUTION INTRAMUSCULAR; INTRAVENOUS at 11:02

## 2024-02-14 RX ADMIN — DIAZEPAM 10 MG: 5 TABLET ORAL at 05:02

## 2024-02-14 RX ADMIN — Medication 1000 UNITS: at 08:02

## 2024-02-14 RX ADMIN — SODIUM CHLORIDE: 9 INJECTION, SOLUTION INTRAVENOUS at 02:02

## 2024-02-14 RX ADMIN — FOLIC ACID 1 MG: 1 TABLET ORAL at 08:02

## 2024-02-14 RX ADMIN — THERA TABS 1 TABLET: TAB at 08:02

## 2024-02-14 RX ADMIN — HYDROCODONE BITARTRATE AND ACETAMINOPHEN 1 TABLET: 5; 325 TABLET ORAL at 09:02

## 2024-02-14 NOTE — ASSESSMENT & PLAN NOTE
Nicotine patch ordered  4m counseling: The following was discussed concerning tobacco use:  Relevance of Quitting  Risk to Health  Long Term Risk  Risk for Others  Rewards of Quitting  Motivation Intervention to Quit

## 2024-02-14 NOTE — ED NOTES
Spoke with daughter and she would like to be updated regarding sx time and how long the recovery process.

## 2024-02-14 NOTE — PHARMACY MED REC
"Admission Medication History     The home medication history was taken by Tiffanie Isidro CPhT.    You may go to "Admission" then "Reconcile Home Medications" tabs to review and/or act upon these items.     The home medication list has been updated by the Pharmacy department.   Please read ALL comments highlighted in yellow.   Please address this information as you see fit.    Feel free to contact us if you have any questions or require assistance.      The medications listed below were removed from the home medication list. Please reorder if appropriate:  Patient reports no longer taking the following medication(s):  Tylenol 325 mg tab  Cymbalta 30 mg tab  Prinivil,Zestril 20 mg tab  Melatin 3 mg  Nicoderm CQ 14 mg/24 hr  Protonix 40 mg tab  Lyrica 75 mg tab      Medications listed below were obtained from: Patient/family, [employer], and Patient's pharmacy  (Not in a hospital admission)            Tiffanie Isidro CPhT.  [phone]                .          "

## 2024-02-14 NOTE — NURSING
Patient arrived to floor via stretcher via transporter from ED.  Patient transferred to bed via x1 person assist/independently.  AAOX4.  Patient was oriented to room, information on whiteboard, and medication regimen.  Bed low, adequate lighting provided, side rails x2 up, call bell within reach.  Admission assessment completed.  IVF started.  VSS. Patient denied having any acute distress azt this time.  None observed.  Will continue to monitor and follow treatment plan.  Family at bedside.      [facility] [place]  Nurses Note -- 4 Eyes      2/14/2024       Skin assessed on: Q Shift      [x] No Pressure Injuries Present    [x]Prevention Measures Documented    [] Yes LDA  for Pressure Injury Previously documented     [] Yes New Pressure Injury Discovered   [] LDA for New Pressure Injury Added      Attending RN:  Jay Gibson LPN     Second RN:  Tamika CANTU RN

## 2024-02-14 NOTE — ASSESSMENT & PLAN NOTE
Xray of humerus showed an acute displaced and foreshortened fracture of the proximal humerus metadiaphysis.  Ortho consulted  NPO at mn for procedure in am

## 2024-02-14 NOTE — ADMISSIONCARE
AdmissionCare    Guideline: Humerus Fracture, Inpt/Amb    Based on the indications selected for the patient, the bed status of Outpatient in a Bed was determined to be MET    The following indications were selected as present at the time of evaluation of the patient:      - Open (operative) treatment of open humerus fracture   - Open (operative) treatment of closed humerus fracture, as indicated by 1 or more of the following:    - Neurovascular damage    Operative Status Criteria selected: Ambulatory    AdmissionCare documentation entered by: PEPE Jaime    [facility], 27th edition, Copyright © 2023 [facility], [facility] All Rights Reserved.  [id number]:00

## 2024-02-14 NOTE — ASSESSMENT & PLAN NOTE
Patient states he was not depressed a week or month ago  That his depression and suicidal thoughts began when he broke his arm and thought about not being able to work and potentially losing his job.   He does not have a plan of suicide and has not imagined it  His S.I. seems to be stimulated directly from his injury, therefore, felt starting SSRI less appropriate and giving him time to process the event and make plans with his job would be best, initially.  PEC'd in ED  Tele-Psych consulted. Recs reviewed:  Continue PEC for IP stabilization.    Will transfer to IP psych once medically cleared

## 2024-02-14 NOTE — ASSESSMENT & PLAN NOTE
Was on duloxetine at one time but this script  months ago  Unclear if this was the time he started drinking again  This would argue to re-start SSRI/SNRI once EtOH clears and stabilizes   Psych consult

## 2024-02-14 NOTE — ASSESSMENT & PLAN NOTE
Mildly low Hb, however sig high MCV of 106  Will check B12 and folate  Suspect chronic alcoholism with low albumin of 2.6, mildly elevated AST at 45 (but also with muscle injury), and macrocytic anemia with  and with daughter confirming history- will add to his history  Will get US of liver to see if cirrhotic features  Thiamine

## 2024-02-14 NOTE — ED NOTES
Anemia Management Note  SUBJECTIVE/OBJECTIVE:  Referred by Dr. Alvin Varner on 2020  Primary Diagnosis: Anemia in Chronic Kidney Disease (N18.4, D63.1)     Secondary Diagnosis:  Chronic Kidney Disease, Stage 4 (N18.4)   Kidney Tx: , , 2020  Hgb goal range:  9-10  Epo/Darbo: Procrit 10,000 units weekly for Hgb <10. At home.   Iron regimen:  TBD  Labs : 2021  Recent DEB use, transfusion, IV iron: unknown  RX/TX plans : TBD     No history of stroke, MI and blood clots. Hx of CML in 2017     *Currently has Home Care Mon/Thur for labs.      Labs: [facility]  Ph # [phone]  Fax # [phone]        **ONLY call Ed if his Hgb <10 and he needs to dose.         Contact:            Ok to leave message regarding Scheduling and Medical Information per consent to communicate dated 12/10/2017                              OK to speak with Danay Blanton (friend) and Kaylin Lilly (mother) regarding Scheduling and Medical Information per consent to communicate dated 12/10/2017    Anemia Latest Ref Rng & Units 2020 12/10/2020 2020 2020 1/3/2021 2021 2021   DEB Dose - - 10,000 units 10,000 units 10,000 units 10,000 units - -   Hemoglobin 13.3 - 17.7 g/dL 9.5(A) - - - - 10.5(A) 10.4(A)   TSAT 15 - 46 % - - - - - - -   Ferritin ng/mL - - - - - 2,356 -     BP Readings from Last 3 Encounters:   20 115/74   20 120/71   20 (!) 143/93     Wt Readings from Last 2 Encounters:   20 64 kg (141 lb)   20 61.1 kg (134 lb 12.8 oz)           ASSESSMENT:  Hgb:Above goal - recommend hold dose  TSat: Due for labs Ferritin: Elevated (>1000ng/mL)    PLAN:  Hold Procrit and RTC for hgb then procrit if needed in 1 week(s)    Orders needed to be renewed (for next follow-up date) in LETTERS - for external labs: None    Iron labs due:  TSAT is due    Plan discussed with:  NO call per pt request  Plan provided by:  bigg    NEXT FOLLOW-UP DATE:   Dr. Gonzalez notified of HTN. New orders for home medications and Norco for pain.    1/28/21    Ana Castrejon RN   [facility]  [address] [address]   [email or web address]   Office : [phone]  Fax: [phone]

## 2024-02-14 NOTE — H&P
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    Hypertension  Hold BP meds in setting of anesthetics for procedure  Goal SBP inpt 140-180      VTE Risk Mitigation (From admission, onward)           Ordered     enoxaparin injection 40 mg  Daily         24     IP VTE LOW RISK PATIENT  Once         24                               AdmissionCare    Guideline: Humerus Fracture, Inpt/Amb    Based on the indications selected for the patient, the bed status of Outpatient in a Bed was determined to be MET    The following indications were selected as present at the time of evaluation of the patient:      - Open (operative) treatment of open humerus fracture   - Open (operative) treatment of closed humerus fracture, as indicated by 1 or more of the following:    - Neurovascular damage    Operative Status Criteria selected: Ambulatory    AdmissionCare documentation entered by: PEPE Jaime    [facility] [place] YouTube, 27th edition, Copyright ©  [facility] [place] [employer] [facility] All Rights Reserved.  [id number]    Odell Burgos MD  Department of Hospital Medicine  [facility] - Emergency Dept

## 2024-02-14 NOTE — ASSESSMENT & PLAN NOTE
Patient states he was not depressed a week or month ago  That his depression and suicidal thoughts began when he broke his arm and thought about not being able to work and potentially losing his job.   He does not have a plan of suicide and has not imagined it  His S.I. seems to be stimulated directly from his injury, therefore, felt starting SSRI less appropriate and giving him time to process the event and make plans with his job would be best, initially.  PEC'd in ED, continued until EtOH normalizes and cleared  Tele-Psych consulted

## 2024-02-14 NOTE — SUBJECTIVE & OBJECTIVE
Interval History: NAEON. Continues to have uncontrolled pain of R arm. Otherwise, no acute concerns.     Review of Systems   Respiratory:  Negative for shortness of breath.    Cardiovascular:  Negative for chest pain.   Gastrointestinal:  Negative for abdominal pain.   Genitourinary:  Negative for dysuria.   Musculoskeletal:  Positive for arthralgias (R arm).   Neurological:  Negative for headaches.     Objective:     Vital Signs (Most Recent):  Temp: 98.6 °F (37 °C) (02/14/24 1626)  Pulse: 110 (02/14/24 1626)  Resp: 18 (02/14/24 1626)  BP: (!) 167/90 (02/14/24 1626)  SpO2: 97 % (02/14/24 1626) Vital Signs (24h Range):  Temp:  [97.9 °F (36.6 °C)-99.1 °F (37.3 °C)] 98.6 °F (37 °C)  Pulse:  [] 110  Resp:  [13-20] 18  SpO2:  [95 %-100 %] 97 %  BP: (133-195)/() 167/90     Weight: 72.6 kg (160 lb)  Body mass index is 25.82 kg/m².    Intake/Output Summary (Last 24 hours) at 2/14/2024 1646  Last data filed at 2/14/2024 1512  Gross per 24 hour   Intake 440 ml   Output 400 ml   Net 40 ml         Physical Exam  Vitals and nursing note reviewed.   Constitutional:       General: He is not in acute distress.     Appearance: He is well-developed. He is obese.   HENT:      Head: Normocephalic and atraumatic.   Eyes:      Conjunctiva/sclera: Conjunctivae normal.   Neck:      Vascular: No JVD.   Cardiovascular:      Rate and Rhythm: Normal rate and regular rhythm.      Heart sounds: Normal heart sounds.   Pulmonary:      Effort: Pulmonary effort is normal.      Breath sounds: Normal breath sounds.   Abdominal:      General: Bowel sounds are normal. There is no distension.      Palpations: Abdomen is soft.      Tenderness: There is no abdominal tenderness.   Musculoskeletal:         General: Signs of injury (R arm in sling) present.      Cervical back: Neck supple.      Right lower leg: No edema.      Left lower leg: No edema.   Neurological:      Mental Status: He is alert.   Psychiatric:         Behavior: Behavior  normal.             Significant Labs: All pertinent labs within the past 24 hours have been reviewed.  CBC:   Recent Labs   Lab 02/13/24  1740 02/14/24  0512   WBC 8.20 9.30   HGB 11.6* 10.5*   HCT 34.1* 31.5*    229     CMP:   Recent Labs   Lab 02/13/24  1740 02/14/24  0512    139   K 3.2* 3.8    111*   CO2 24 23   GLU 92 110   BUN 7* 10   CREATININE 0.9 0.8   CALCIUM 8.7 8.1*   PROT 6.2 5.7*   ALBUMIN 2.6* 2.4*   BILITOT 0.3 0.4   ALKPHOS 126 133   AST 45* 37   ALT 19 15   ANIONGAP 8 5*       Significant Imaging: I have reviewed all pertinent imaging results/findings within the past 24 hours.

## 2024-02-14 NOTE — ED NOTES
Hospitalist made aware via secure chat of HR and concerns for onset of withdrawal symptoms due to anxiousness noted through CIWA assessment. Pt expresses no complaints despite insomnia and wishing to have something to drink, offered melatonin again to Pt but still refused and explained NPO status once again w/ verbalized understanding by Pt. Awaiting additional orders at this time.
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
## 2024-02-14 NOTE — ASSESSMENT & PLAN NOTE
Nicotine patch   4m counseling routinely: The following was discussed concerning tobacco use:  Relevance of Quitting  Risk to Health  Long Term Risk  Risk for Others  Rewards of Quitting  Motivation Intervention to Quit
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
## 2024-02-15 ENCOUNTER — ANESTHESIA EVENT (OUTPATIENT)
Dept: SURGERY | Facility: HOSPITAL | Age: 64
DRG: 493 | End: 2024-02-15
Payer: MEDICAID

## 2024-02-15 LAB
ABO + RH BLD: NORMAL
ALBUMIN SERPL BCP-MCNC: 2.2 G/DL (ref 3.5–5.2)
ALP SERPL-CCNC: 125 U/L (ref 55–135)
ALT SERPL W/O P-5'-P-CCNC: 11 U/L (ref 10–44)
ANION GAP SERPL CALC-SCNC: 5 MMOL/L (ref 8–16)
AST SERPL-CCNC: 29 U/L (ref 10–40)
BASOPHILS # BLD AUTO: 0.04 K/UL (ref 0–0.2)
BASOPHILS NFR BLD: 0.5 % (ref 0–1.9)
BILIRUB SERPL-MCNC: 0.7 MG/DL (ref 0.1–1)
BLD GP AB SCN CELLS X3 SERPL QL: NORMAL
BUN SERPL-MCNC: 9 MG/DL (ref 8–23)
CALCIUM SERPL-MCNC: 8.6 MG/DL (ref 8.7–10.5)
CHLORIDE SERPL-SCNC: 102 MMOL/L (ref 95–110)
CO2 SERPL-SCNC: 25 MMOL/L (ref 23–29)
CREAT SERPL-MCNC: 0.8 MG/DL (ref 0.5–1.4)
DIFFERENTIAL METHOD BLD: ABNORMAL
EOSINOPHIL # BLD AUTO: 0 K/UL (ref 0–0.5)
EOSINOPHIL NFR BLD: 0.2 % (ref 0–8)
ERYTHROCYTE [DISTWIDTH] IN BLOOD BY AUTOMATED COUNT: 13.2 % (ref 11.5–14.5)
EST. GFR  (NO RACE VARIABLE): >60 ML/MIN/1.73 M^2
GLUCOSE SERPL-MCNC: 113 MG/DL (ref 70–110)
HCT VFR BLD AUTO: 30.8 % (ref 40–54)
HGB BLD-MCNC: 9.9 G/DL (ref 14–18)
IMM GRANULOCYTES # BLD AUTO: 0.03 K/UL (ref 0–0.04)
IMM GRANULOCYTES NFR BLD AUTO: 0.3 % (ref 0–0.5)
INR PPP: 0.9 (ref 0.8–1.2)
LYMPHOCYTES # BLD AUTO: 1.3 K/UL (ref 1–4.8)
LYMPHOCYTES NFR BLD: 15 % (ref 18–48)
MAGNESIUM SERPL-MCNC: 1.7 MG/DL (ref 1.6–2.6)
MCH RBC QN AUTO: 33.7 PG (ref 27–31)
MCHC RBC AUTO-ENTMCNC: 32.1 G/DL (ref 32–36)
MCV RBC AUTO: 105 FL (ref 82–98)
MONOCYTES # BLD AUTO: 0.7 K/UL (ref 0.3–1)
MONOCYTES NFR BLD: 7.4 % (ref 4–15)
NEUTROPHILS # BLD AUTO: 6.8 K/UL (ref 1.8–7.7)
NEUTROPHILS NFR BLD: 76.6 % (ref 38–73)
NRBC BLD-RTO: 0 /100 WBC
PHOSPHATE SERPL-MCNC: 2.3 MG/DL (ref 2.7–4.5)
PLATELET # BLD AUTO: 222 K/UL (ref 150–450)
PMV BLD AUTO: 9.9 FL (ref 9.2–12.9)
POTASSIUM SERPL-SCNC: 4.1 MMOL/L (ref 3.5–5.1)
PROT SERPL-MCNC: 5.6 G/DL (ref 6–8.4)
PROTHROMBIN TIME: 10.4 SEC (ref 9–12.5)
RBC # BLD AUTO: 2.94 M/UL (ref 4.6–6.2)
SODIUM SERPL-SCNC: 132 MMOL/L (ref 136–145)
SPECIMEN OUTDATE: NORMAL
WBC # BLD AUTO: 8.82 K/UL (ref 3.9–12.7)

## 2024-02-15 PROCEDURE — S4991 NICOTINE PATCH NONLEGEND: HCPCS | Performed by: STUDENT IN AN ORGANIZED HEALTH CARE EDUCATION/TRAINING PROGRAM

## 2024-02-15 PROCEDURE — 63600175 PHARM REV CODE 636 W HCPCS: Performed by: STUDENT IN AN ORGANIZED HEALTH CARE EDUCATION/TRAINING PROGRAM

## 2024-02-15 PROCEDURE — 36415 COLL VENOUS BLD VENIPUNCTURE: CPT | Mod: XB | Performed by: ANESTHESIOLOGY

## 2024-02-15 PROCEDURE — 84100 ASSAY OF PHOSPHORUS: CPT | Performed by: STUDENT IN AN ORGANIZED HEALTH CARE EDUCATION/TRAINING PROGRAM

## 2024-02-15 PROCEDURE — 25000003 PHARM REV CODE 250: Performed by: FAMILY MEDICINE

## 2024-02-15 PROCEDURE — 83735 ASSAY OF MAGNESIUM: CPT | Performed by: STUDENT IN AN ORGANIZED HEALTH CARE EDUCATION/TRAINING PROGRAM

## 2024-02-15 PROCEDURE — 11000001 HC ACUTE MED/SURG PRIVATE ROOM

## 2024-02-15 PROCEDURE — 36415 COLL VENOUS BLD VENIPUNCTURE: CPT | Performed by: STUDENT IN AN ORGANIZED HEALTH CARE EDUCATION/TRAINING PROGRAM

## 2024-02-15 PROCEDURE — 86850 RBC ANTIBODY SCREEN: CPT | Performed by: ANESTHESIOLOGY

## 2024-02-15 PROCEDURE — 80053 COMPREHEN METABOLIC PANEL: CPT | Performed by: STUDENT IN AN ORGANIZED HEALTH CARE EDUCATION/TRAINING PROGRAM

## 2024-02-15 PROCEDURE — 25000003 PHARM REV CODE 250: Performed by: STUDENT IN AN ORGANIZED HEALTH CARE EDUCATION/TRAINING PROGRAM

## 2024-02-15 PROCEDURE — 63600175 PHARM REV CODE 636 W HCPCS: Performed by: FAMILY MEDICINE

## 2024-02-15 PROCEDURE — 85025 COMPLETE CBC W/AUTO DIFF WBC: CPT | Performed by: STUDENT IN AN ORGANIZED HEALTH CARE EDUCATION/TRAINING PROGRAM

## 2024-02-15 PROCEDURE — 85610 PROTHROMBIN TIME: CPT | Performed by: STUDENT IN AN ORGANIZED HEALTH CARE EDUCATION/TRAINING PROGRAM

## 2024-02-15 RX ORDER — METOPROLOL TARTRATE 50 MG/1
50 TABLET ORAL EVERY 6 HOURS
Status: DISCONTINUED | OUTPATIENT
Start: 2024-02-15 | End: 2024-02-21 | Stop reason: HOSPADM

## 2024-02-15 RX ORDER — AMLODIPINE BESYLATE 5 MG/1
10 TABLET ORAL DAILY
Status: DISCONTINUED | OUTPATIENT
Start: 2024-02-16 | End: 2024-02-21 | Stop reason: HOSPADM

## 2024-02-15 RX ORDER — AMLODIPINE BESYLATE 5 MG/1
5 TABLET ORAL ONCE
Status: COMPLETED | OUTPATIENT
Start: 2024-02-15 | End: 2024-02-15

## 2024-02-15 RX ORDER — KETOROLAC TROMETHAMINE 30 MG/ML
15 INJECTION, SOLUTION INTRAMUSCULAR; INTRAVENOUS EVERY 6 HOURS
Status: COMPLETED | OUTPATIENT
Start: 2024-02-15 | End: 2024-02-15

## 2024-02-15 RX ADMIN — AMLODIPINE BESYLATE 5 MG: 5 TABLET ORAL at 05:02

## 2024-02-15 RX ADMIN — THIAMINE HCL TAB 100 MG 100 MG: 100 TAB at 09:02

## 2024-02-15 RX ADMIN — HYDROCODONE BITARTRATE AND ACETAMINOPHEN 1 TABLET: 10; 325 TABLET ORAL at 06:02

## 2024-02-15 RX ADMIN — METOPROLOL TARTRATE 25 MG: 25 TABLET, FILM COATED ORAL at 11:02

## 2024-02-15 RX ADMIN — KETOROLAC TROMETHAMINE 15 MG: 30 INJECTION, SOLUTION INTRAMUSCULAR at 05:02

## 2024-02-15 RX ADMIN — Medication 1000 UNITS: at 09:02

## 2024-02-15 RX ADMIN — METOPROLOL TARTRATE 50 MG: 50 TABLET, FILM COATED ORAL at 05:02

## 2024-02-15 RX ADMIN — FOLIC ACID 1 MG: 1 TABLET ORAL at 09:02

## 2024-02-15 RX ADMIN — AMLODIPINE BESYLATE 5 MG: 5 TABLET ORAL at 09:02

## 2024-02-15 RX ADMIN — ENOXAPARIN SODIUM 40 MG: 40 INJECTION SUBCUTANEOUS at 05:02

## 2024-02-15 RX ADMIN — THERA TABS 1 TABLET: TAB at 09:02

## 2024-02-15 RX ADMIN — METOPROLOL TARTRATE 50 MG: 50 TABLET, FILM COATED ORAL at 11:02

## 2024-02-15 RX ADMIN — DIAZEPAM 10 MG: 5 TABLET ORAL at 05:02

## 2024-02-15 RX ADMIN — CHLORDIAZEPOXIDE HYDROCHLORIDE 5 MG: 5 CAPSULE ORAL at 06:02

## 2024-02-15 RX ADMIN — NICOTINE 1 PATCH: 14 PATCH TRANSDERMAL at 11:02

## 2024-02-15 RX ADMIN — KETOROLAC TROMETHAMINE 15 MG: 30 INJECTION, SOLUTION INTRAMUSCULAR at 11:02

## 2024-02-15 RX ADMIN — CHLORDIAZEPOXIDE HYDROCHLORIDE 5 MG: 5 CAPSULE ORAL at 11:02

## 2024-02-15 RX ADMIN — CHLORDIAZEPOXIDE HYDROCHLORIDE 5 MG: 5 CAPSULE ORAL at 02:02

## 2024-02-15 RX ADMIN — METOPROLOL TARTRATE 25 MG: 25 TABLET, FILM COATED ORAL at 06:02

## 2024-02-15 NOTE — PLAN OF CARE
Problem: Adult Inpatient Plan of Care  Goal: Plan of Care Review  Flowsheets (Taken 2/15/2024 0152)  Plan of Care Reviewed With: patient  Goal: Absence of Hospital-Acquired Illness or Injury  Intervention: Identify and Manage Fall Risk  Flowsheets (Taken 2/15/2024 0152)  Safety Promotion/Fall Prevention:   Fall Risk reviewed with patient/family   medications reviewed   room near unit station  Intervention: Prevent Skin Injury  Flowsheets (Taken 2/15/2024 0152)  Body Position: position changed independently  Intervention: Prevent and Manage VTE (Venous Thromboembolism) Risk  Flowsheets (Taken 2/15/2024 0152)  VTE Prevention/Management: ROM (active) performed  Range of Motion: active ROM (range of motion) encouraged  Goal: Optimal Comfort and Wellbeing  Intervention: Monitor Pain and Promote Comfort  Flowsheets (Taken 2/15/2024 0152)  Pain Management Interventions: pain management plan reviewed with patient/caregiver  Intervention: Provide Person-Centered Care  Flowsheets (Taken 2/15/2024 0152)  Trust Relationship/Rapport:   care explained   questions answered   questions encouraged   thoughts/feelings acknowledged

## 2024-02-15 NOTE — NURSING
[facility] [place]  Nurses Note -- 4 Eyes      2/14/2024       Skin assessed on: Q Shift      [x] No Pressure Injuries Present    [x]Prevention Measures Documented    [] Yes LDA  for Pressure Injury Previously documented     [] Yes New Pressure Injury Discovered   [] LDA for New Pressure Injury Added    Bruising to Right arm    Attending RN:  Susie Rosen RN     Second RN:  Jay Gibson LPN

## 2024-02-15 NOTE — NURSING
Per handoff received from Jay Gibson., LPN. Patient care assumed. Patients overall condition assessed and patient appears to be in NAD with no complaints of pain. 20g LAC and 20g LW PIV's are clean, dry, and intact. Patient with NS infusing at 100cc/hr. Sitter and patient instructed to inform the nurse if anything is needed. Patient stable and is continually being monitored.

## 2024-02-15 NOTE — ASSESSMENT & PLAN NOTE
Patient says he does not drink everyday; used to  Now has a beverage intermittently; last drink Tuesday evening.  Has about 2 or 3 mixed drinks; does not get drunk    Scheduled Librium; will taper   CIWA protocol
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
## 2024-02-15 NOTE — PLAN OF CARE
Case Management Assessment     PCP: Nuha Lynn  Pharmacy: [facility]    Patient Arrived From: HOME  Existing Help at Home: N/A Independent    Barriers to Discharge: none    Discharge Plan:    A. Home   B. Home with Family                  02/15/24 1312   Discharge Assessment   Assessment Type Discharge Planning Assessment   Confirmed/corrected address, phone number and insurance Yes   Confirmed Demographics Correct on Facesheet   Source of Information patient   Communicated BRANDON with patient/caregiver Date not available/Unable to determine   People in Home alone   Facility Arrived From: Home   Do you expect to return to your current living situation? Yes   Do you have help at home or someone to help you manage your care at home? No  (Independent)   Prior to hospitilization cognitive status: Alert/Oriented   Current cognitive status: Alert/Oriented   Equipment Currently Used at Home walker, standard   Readmission within 30 days? No   Patient currently being followed by outpatient case management? No   Do you currently have service(s) that help you manage your care at home? No   Do you take prescription medications? Yes   Is the patient taking medications as prescribed? yes   Who is going to help you get home at discharge? Daughter Melissa [phone]   How do you get to doctors appointments? car, drives self   Are you on dialysis? No   Do you take coumadin? No   Discharge Plan A Home   Discharge Plan B Home with family   DME Needed Upon Discharge  none   Discharge Plan discussed with: Patient   Transition of Care Barriers None

## 2024-02-15 NOTE — PROGRESS NOTES
[facility] Medicine  Progress Note    Patient Name: Israel De Jesus  MRN: [id number]  Patient Class: IP- Inpatient   Admission Date: 2/13/2024  Length of Stay: 2 days  Attending Physician: Yusra Gonzalez MD  Primary Care Provider: Nuha Lynn MD      Subjective:     Principal Problem:Humerus fracture      HPI:    Israel De Jesus is a 63 y.o. male who has a past medical history of Eye injury, Generalized anxiety disorder, Hypertension, and Sciatica, presented to the ED with CC of R arm pain after a Fall, and Suicidal Ideations.     Patient states that his cat was jumping all over him and caused a mechanical fall onto carpeted ground, resulting in his right arm pain. This happened yesterday about 330 p.m. and has waited about 24 hours before coming into hospital. Patient was PEC'd in ED for S.I. Patient states he was not depressed a week or month ago. That his depression and suicidal thoughts began when he broke his arm and thought about not being able to work and potentially losing his job (he works as  at [employer]). He does not have a plan of suicide and has not imagined it. EtOH is still 312 now, unclear if he was trying to dull the pain, or if alcohol is a chronic problem for him. He does have a a low albumin of 2.6, mildly elevated AST at 45 (but also with muscle injury), and macrocytic anemia with  - which are all consistent with chronic alcoholism (daughter later confirmed he is recovering alcoholic and has been drinking for past 4 months). Xray of humerus showed an acute displaced and foreshortened fracture of the proximal humerus metadiaphysis. Arm wrapped and slinged in ED. Case discussed with Orthopedic surgery, patient is NPO for likely procedure tomorrow morning.        Overview/Hospital Course:  No notes on file    Interval History: NAEON. Says pain is still not controlled. Pain medicine helping as long as he stays still.     Review of Systems    Respiratory:  Negative for shortness of breath.    Cardiovascular:  Negative for chest pain.   Gastrointestinal:  Negative for abdominal pain.   Genitourinary:  Negative for dysuria.   Musculoskeletal:  Positive for arthralgias (R arm).   Neurological:  Negative for headaches.     Objective:     Vital Signs (Most Recent):  Temp: 98.1 °F (36.7 °C) (02/15/24 1111)  Pulse: 87 (02/15/24 1210)  Resp: 18 (02/15/24 1111)  BP: (!) 184/94 (02/15/24 1210)  SpO2: 97 % (02/15/24 1111) Vital Signs (24h Range):  Temp:  [97.9 °F (36.6 °C)-99.2 °F (37.3 °C)] 98.1 °F (36.7 °C)  Pulse:  [] 87  Resp:  [18-20] 18  SpO2:  [95 %-97 %] 97 %  BP: (162-188)/() 184/94     Weight: 72.6 kg (160 lb)  Body mass index is 25.82 kg/m².    Intake/Output Summary (Last 24 hours) at 2/15/2024 1502  Last data filed at 2/15/2024 1233  Gross per 24 hour   Intake 1320 ml   Output 500 ml   Net 820 ml           Physical Exam  Vitals and nursing note reviewed.   Constitutional:       General: He is not in acute distress.     Appearance: He is well-developed. He is obese.   HENT:      Head: Normocephalic and atraumatic.   Eyes:      Conjunctiva/sclera: Conjunctivae normal.   Neck:      Vascular: No JVD.   Cardiovascular:      Rate and Rhythm: Normal rate and regular rhythm.      Heart sounds: Normal heart sounds.   Pulmonary:      Effort: Pulmonary effort is normal.      Breath sounds: Normal breath sounds.   Abdominal:      General: Bowel sounds are normal. There is no distension.      Palpations: Abdomen is soft.      Tenderness: There is no abdominal tenderness.   Musculoskeletal:         General: Signs of injury (R arm in sling) present.      Cervical back: Neck supple.      Right lower leg: No edema.      Left lower leg: No edema.   Neurological:      Mental Status: He is alert.   Psychiatric:         Behavior: Behavior normal.             Significant Labs: All pertinent labs within the past 24 hours have been reviewed.  CBC:   Recent Labs    Lab 02/13/24  1740 02/14/24  0512 02/15/24  0517   WBC 8.20 9.30 8.82   HGB 11.6* 10.5* 9.9*   HCT 34.1* 31.5* 30.8*    229 222       CMP:   Recent Labs   Lab 02/13/24  1740 02/14/24  0512 02/15/24  0517    139 132*   K 3.2* 3.8 4.1    111* 102   CO2 24 23 25   GLU 92 110 113*   BUN 7* 10 9   CREATININE 0.9 0.8 0.8   CALCIUM 8.7 8.1* 8.6*   PROT 6.2 5.7* 5.6*   ALBUMIN 2.6* 2.4* 2.2*   BILITOT 0.3 0.4 0.7   ALKPHOS 126 133 125   AST 45* 37 29   ALT 19 15 11   ANIONGAP 8 5* 5*         Significant Imaging: I have reviewed all pertinent imaging results/findings within the past 24 hours.    Assessment/Plan:      * Humerus fracture  S/p fall  Suspect cat jumping on him is a deflection of him being drunk and falling  Xray of humerus showed an acute displaced and foreshortened fracture of the proximal humerus metadiaphysis.  Ortho consulted. Recs reviewed:  noted to have radial nerve palsy prior to splinting. I agree that surgery for open reduction internal fixation is likely the best choice for him. Is not emergent and can be scheduled. He reports he has no medical coverage in his currently under pec. I think it is best to try to handle this on this admission. We will schedule him for surgery for Friday for ORIF of right humerus. 2-3 months before he is able to use his arm for heavy lifting.    Continue oral and IV narcotics. Added IV Toradol. Monitor response.    Alcohol abuse  Patient says he does not drink everyday; used to  Now has a beverage intermittently; last drink Tuesday evening.  Has about 2 or 3 mixed drinks; does not get drunk    Scheduled Librium; will taper   CIWA protocol       Macrocytic anemia  Stable. Trend.     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   He does not have a plan of suicide and has not imagined it  His S.I. seems to be stimulated directly from his injury, therefore, felt starting SSRI less appropriate and giving him time to process the event and make plans with his job would be best, initially.  PEC'd in ED  Tele-Psych consulted. Recs reviewed:  Continue PEC for IP stabilization.    Will transfer to IP psych once medically cleared       Generalized anxiety disorder  Was on Cymbalta, not taken in months  Monitor       Tobacco use disorder  Nicotine patch   4m counseling routinely: The following was discussed concerning tobacco use:  Relevance of Quitting  Risk to Health  Long Term Risk  Risk for Others  Rewards of Quitting  Motivation Intervention to Quit    Hypertension  Uncontrolled.   Home meds reconciled. Control pain.  Trend      VTE Risk Mitigation (From admission, onward)           Ordered     enoxaparin injection 40 mg  Daily         02/13/24 2218     IP VTE LOW RISK PATIENT  Once         02/13/24 2218                    Discharge Planning   BRANDON:      Code Status: Full Code   Is the patient medically ready for discharge?:     Reason for patient still in hospital (select all that apply): Patient trending condition, Treatment, and Consult recommendations  Discharge Plan A: Home          Yusra Gonzalez MD  Department of Hospital Medicine   [facility] Surg

## 2024-02-15 NOTE — SUBJECTIVE & OBJECTIVE
Interval History: MAYRALAURAON. Says pain is still not controlled. Pain medicine helping as long as he stays still.     Review of Systems   Respiratory:  Negative for shortness of breath.    Cardiovascular:  Negative for chest pain.   Gastrointestinal:  Negative for abdominal pain.   Genitourinary:  Negative for dysuria.   Musculoskeletal:  Positive for arthralgias (R arm).   Neurological:  Negative for headaches.     Objective:     Vital Signs (Most Recent):  Temp: 98.1 °F (36.7 °C) (02/15/24 1111)  Pulse: 87 (02/15/24 1210)  Resp: 18 (02/15/24 1111)  BP: (!) 184/94 (02/15/24 1210)  SpO2: 97 % (02/15/24 1111) Vital Signs (24h Range):  Temp:  [97.9 °F (36.6 °C)-99.2 °F (37.3 °C)] 98.1 °F (36.7 °C)  Pulse:  [] 87  Resp:  [18-20] 18  SpO2:  [95 %-97 %] 97 %  BP: (162-188)/() 184/94     Weight: 72.6 kg (160 lb)  Body mass index is 25.82 kg/m².    Intake/Output Summary (Last 24 hours) at 2/15/2024 1502  Last data filed at 2/15/2024 1233  Gross per 24 hour   Intake 1320 ml   Output 500 ml   Net 820 ml           Physical Exam  Vitals and nursing note reviewed.   Constitutional:       General: He is not in acute distress.     Appearance: He is well-developed. He is obese.   HENT:      Head: Normocephalic and atraumatic.   Eyes:      Conjunctiva/sclera: Conjunctivae normal.   Neck:      Vascular: No JVD.   Cardiovascular:      Rate and Rhythm: Normal rate and regular rhythm.      Heart sounds: Normal heart sounds.   Pulmonary:      Effort: Pulmonary effort is normal.      Breath sounds: Normal breath sounds.   Abdominal:      General: Bowel sounds are normal. There is no distension.      Palpations: Abdomen is soft.      Tenderness: There is no abdominal tenderness.   Musculoskeletal:         General: Signs of injury (R arm in sling) present.      Cervical back: Neck supple.      Right lower leg: No edema.      Left lower leg: No edema.   Neurological:      Mental Status: He is alert.   Psychiatric:         Behavior:  Behavior normal.             Significant Labs: All pertinent labs within the past 24 hours have been reviewed.  CBC:   Recent Labs   Lab 02/13/24  1740 02/14/24  0512 02/15/24  0517   WBC 8.20 9.30 8.82   HGB 11.6* 10.5* 9.9*   HCT 34.1* 31.5* 30.8*    229 222       CMP:   Recent Labs   Lab 02/13/24 1740 02/14/24  0512 02/15/24  0517    139 132*   K 3.2* 3.8 4.1    111* 102   CO2 24 23 25   GLU 92 110 113*   BUN 7* 10 9   CREATININE 0.9 0.8 0.8   CALCIUM 8.7 8.1* 8.6*   PROT 6.2 5.7* 5.6*   ALBUMIN 2.6* 2.4* 2.2*   BILITOT 0.3 0.4 0.7   ALKPHOS 126 133 125   AST 45* 37 29   ALT 19 15 11   ANIONGAP 8 5* 5*         Significant Imaging: I have reviewed all pertinent imaging results/findings within the past 24 hours.

## 2024-02-16 ENCOUNTER — ANESTHESIA (OUTPATIENT)
Dept: SURGERY | Facility: HOSPITAL | Age: 64
DRG: 493 | End: 2024-02-16
Payer: MEDICAID

## 2024-02-16 LAB
ALBUMIN SERPL BCP-MCNC: 2.1 G/DL (ref 3.5–5.2)
ALP SERPL-CCNC: 108 U/L (ref 55–135)
ALT SERPL W/O P-5'-P-CCNC: 13 U/L (ref 10–44)
ANION GAP SERPL CALC-SCNC: 7 MMOL/L (ref 8–16)
AST SERPL-CCNC: 37 U/L (ref 10–40)
BASOPHILS # BLD AUTO: 0.04 K/UL (ref 0–0.2)
BASOPHILS NFR BLD: 0.5 % (ref 0–1.9)
BILIRUB SERPL-MCNC: 0.7 MG/DL (ref 0.1–1)
BUN SERPL-MCNC: 10 MG/DL (ref 8–23)
CALCIUM SERPL-MCNC: 8.6 MG/DL (ref 8.7–10.5)
CHLORIDE SERPL-SCNC: 102 MMOL/L (ref 95–110)
CO2 SERPL-SCNC: 24 MMOL/L (ref 23–29)
CREAT SERPL-MCNC: 0.8 MG/DL (ref 0.5–1.4)
DIFFERENTIAL METHOD BLD: ABNORMAL
EOSINOPHIL # BLD AUTO: 0 K/UL (ref 0–0.5)
EOSINOPHIL NFR BLD: 0.3 % (ref 0–8)
ERYTHROCYTE [DISTWIDTH] IN BLOOD BY AUTOMATED COUNT: 12.8 % (ref 11.5–14.5)
EST. GFR  (NO RACE VARIABLE): >60 ML/MIN/1.73 M^2
GLUCOSE SERPL-MCNC: 92 MG/DL (ref 70–110)
HCT VFR BLD AUTO: 30.8 % (ref 40–54)
HGB BLD-MCNC: 10.2 G/DL (ref 14–18)
IMM GRANULOCYTES # BLD AUTO: 0.03 K/UL (ref 0–0.04)
IMM GRANULOCYTES NFR BLD AUTO: 0.3 % (ref 0–0.5)
INR PPP: 1 (ref 0.8–1.2)
LYMPHOCYTES # BLD AUTO: 1.1 K/UL (ref 1–4.8)
LYMPHOCYTES NFR BLD: 12.9 % (ref 18–48)
MAGNESIUM SERPL-MCNC: 1.6 MG/DL (ref 1.6–2.6)
MCH RBC QN AUTO: 35.1 PG (ref 27–31)
MCHC RBC AUTO-ENTMCNC: 33.1 G/DL (ref 32–36)
MCV RBC AUTO: 106 FL (ref 82–98)
MONOCYTES # BLD AUTO: 0.6 K/UL (ref 0.3–1)
MONOCYTES NFR BLD: 6.9 % (ref 4–15)
NEUTROPHILS # BLD AUTO: 6.9 K/UL (ref 1.8–7.7)
NEUTROPHILS NFR BLD: 79.1 % (ref 38–73)
NRBC BLD-RTO: 0 /100 WBC
PHOSPHATE SERPL-MCNC: 2.7 MG/DL (ref 2.7–4.5)
PLATELET # BLD AUTO: 218 K/UL (ref 150–450)
PMV BLD AUTO: 10 FL (ref 9.2–12.9)
POTASSIUM SERPL-SCNC: 4.1 MMOL/L (ref 3.5–5.1)
PROT SERPL-MCNC: 5.5 G/DL (ref 6–8.4)
PROTHROMBIN TIME: 10.5 SEC (ref 9–12.5)
RBC # BLD AUTO: 2.91 M/UL (ref 4.6–6.2)
SODIUM SERPL-SCNC: 133 MMOL/L (ref 136–145)
WBC # BLD AUTO: 8.73 K/UL (ref 3.9–12.7)

## 2024-02-16 PROCEDURE — 80053 COMPREHEN METABOLIC PANEL: CPT | Performed by: STUDENT IN AN ORGANIZED HEALTH CARE EDUCATION/TRAINING PROGRAM

## 2024-02-16 PROCEDURE — 27201423 OPTIME MED/SURG SUP & DEVICES STERILE SUPPLY: Performed by: ORTHOPAEDIC SURGERY

## 2024-02-16 PROCEDURE — 11000001 HC ACUTE MED/SURG PRIVATE ROOM

## 2024-02-16 PROCEDURE — 37000008 HC ANESTHESIA 1ST 15 MINUTES: Performed by: ORTHOPAEDIC SURGERY

## 2024-02-16 PROCEDURE — 84100 ASSAY OF PHOSPHORUS: CPT | Performed by: STUDENT IN AN ORGANIZED HEALTH CARE EDUCATION/TRAINING PROGRAM

## 2024-02-16 PROCEDURE — 0PSF04Z REPOSITION RIGHT HUMERAL SHAFT WITH INTERNAL FIXATION DEVICE, OPEN APPROACH: ICD-10-PCS | Performed by: ORTHOPAEDIC SURGERY

## 2024-02-16 PROCEDURE — 71000033 HC RECOVERY, INTIAL HOUR: Performed by: ORTHOPAEDIC SURGERY

## 2024-02-16 PROCEDURE — 25000003 PHARM REV CODE 250: Performed by: STUDENT IN AN ORGANIZED HEALTH CARE EDUCATION/TRAINING PROGRAM

## 2024-02-16 PROCEDURE — 25000003 PHARM REV CODE 250: Performed by: FAMILY MEDICINE

## 2024-02-16 PROCEDURE — 25000003 PHARM REV CODE 250: Performed by: ORTHOPAEDIC SURGERY

## 2024-02-16 PROCEDURE — 25000003 PHARM REV CODE 250: Performed by: NURSE ANESTHETIST, CERTIFIED REGISTERED

## 2024-02-16 PROCEDURE — 37000009 HC ANESTHESIA EA ADD 15 MINS: Performed by: ORTHOPAEDIC SURGERY

## 2024-02-16 PROCEDURE — C1713 ANCHOR/SCREW BN/BN,TIS/BN: HCPCS | Performed by: ORTHOPAEDIC SURGERY

## 2024-02-16 PROCEDURE — S4991 NICOTINE PATCH NONLEGEND: HCPCS | Performed by: STUDENT IN AN ORGANIZED HEALTH CARE EDUCATION/TRAINING PROGRAM

## 2024-02-16 PROCEDURE — 85025 COMPLETE CBC W/AUTO DIFF WBC: CPT | Performed by: STUDENT IN AN ORGANIZED HEALTH CARE EDUCATION/TRAINING PROGRAM

## 2024-02-16 PROCEDURE — 63600175 PHARM REV CODE 636 W HCPCS: Performed by: ORTHOPAEDIC SURGERY

## 2024-02-16 PROCEDURE — 63600175 PHARM REV CODE 636 W HCPCS: Performed by: NURSE ANESTHETIST, CERTIFIED REGISTERED

## 2024-02-16 PROCEDURE — D9220A PRA ANESTHESIA: Mod: CRNA,,, | Performed by: NURSE ANESTHETIST, CERTIFIED REGISTERED

## 2024-02-16 PROCEDURE — 36000710: Performed by: ORTHOPAEDIC SURGERY

## 2024-02-16 PROCEDURE — 85610 PROTHROMBIN TIME: CPT | Performed by: STUDENT IN AN ORGANIZED HEALTH CARE EDUCATION/TRAINING PROGRAM

## 2024-02-16 PROCEDURE — 83735 ASSAY OF MAGNESIUM: CPT | Performed by: STUDENT IN AN ORGANIZED HEALTH CARE EDUCATION/TRAINING PROGRAM

## 2024-02-16 PROCEDURE — 36415 COLL VENOUS BLD VENIPUNCTURE: CPT | Performed by: STUDENT IN AN ORGANIZED HEALTH CARE EDUCATION/TRAINING PROGRAM

## 2024-02-16 PROCEDURE — 36000711: Performed by: ORTHOPAEDIC SURGERY

## 2024-02-16 PROCEDURE — D9220A PRA ANESTHESIA: Mod: ANES,,, | Performed by: ANESTHESIOLOGY

## 2024-02-16 DEVICE — SCREW LOCKING 3.5MM X 45MM: Type: IMPLANTABLE DEVICE | Site: HUMERUS | Status: FUNCTIONAL

## 2024-02-16 DEVICE — SCREW LOCKING 3.5MM/32MM RECES: Type: IMPLANTABLE DEVICE | Site: HUMERUS | Status: FUNCTIONAL

## 2024-02-16 DEVICE — SCREW LOCKING 3.5MM X 34MM: Type: IMPLANTABLE DEVICE | Site: HUMERUS | Status: FUNCTIONAL

## 2024-02-16 DEVICE — SCREW LOCKING 3.5MM X 50MM: Type: IMPLANTABLE DEVICE | Site: HUMERUS | Status: FUNCTIONAL

## 2024-02-16 DEVICE — SCREW LOCKING 3.5MM X 28MM: Type: IMPLANTABLE DEVICE | Site: HUMERUS | Status: FUNCTIONAL

## 2024-02-16 DEVICE — SCREW CORTEX 3.5MM X 28MM: Type: IMPLANTABLE DEVICE | Site: HUMERUS | Status: FUNCTIONAL

## 2024-02-16 DEVICE — SCREW LOCKING 3.5MM X 38MM: Type: IMPLANTABLE DEVICE | Site: HUMERUS | Status: FUNCTIONAL

## 2024-02-16 DEVICE — SCREW CORTEX 3.5MM X 50MM: Type: IMPLANTABLE DEVICE | Site: HUMERUS | Status: FUNCTIONAL

## 2024-02-16 DEVICE — SCREW CORTEX 3.5 32M: Type: IMPLANTABLE DEVICE | Site: HUMERUS | Status: FUNCTIONAL

## 2024-02-16 RX ORDER — LIDOCAINE HYDROCHLORIDE 20 MG/ML
INJECTION INTRAVENOUS
Status: DISCONTINUED | OUTPATIENT
Start: 2024-02-16 | End: 2024-02-16

## 2024-02-16 RX ORDER — PHENYLEPHRINE HYDROCHLORIDE 10 MG/ML
INJECTION INTRAVENOUS
Status: DISCONTINUED | OUTPATIENT
Start: 2024-02-16 | End: 2024-02-16

## 2024-02-16 RX ORDER — DIPHENHYDRAMINE HYDROCHLORIDE 50 MG/ML
25 INJECTION INTRAMUSCULAR; INTRAVENOUS EVERY 6 HOURS PRN
Status: DISCONTINUED | OUTPATIENT
Start: 2024-02-16 | End: 2024-02-16 | Stop reason: HOSPADM

## 2024-02-16 RX ORDER — ROCURONIUM BROMIDE 10 MG/ML
INJECTION, SOLUTION INTRAVENOUS
Status: DISCONTINUED | OUTPATIENT
Start: 2024-02-16 | End: 2024-02-16

## 2024-02-16 RX ORDER — HALOPERIDOL 5 MG/ML
INJECTION INTRAMUSCULAR
Status: DISCONTINUED | OUTPATIENT
Start: 2024-02-16 | End: 2024-02-16

## 2024-02-16 RX ORDER — LORAZEPAM 2 MG/ML
0.25 INJECTION INTRAMUSCULAR ONCE AS NEEDED
Status: DISCONTINUED | OUTPATIENT
Start: 2024-02-16 | End: 2024-02-16 | Stop reason: HOSPADM

## 2024-02-16 RX ORDER — MEPERIDINE HYDROCHLORIDE 50 MG/ML
12.5 INJECTION INTRAMUSCULAR; INTRAVENOUS; SUBCUTANEOUS EVERY 10 MIN PRN
Status: DISCONTINUED | OUTPATIENT
Start: 2024-02-16 | End: 2024-02-16 | Stop reason: HOSPADM

## 2024-02-16 RX ORDER — ENOXAPARIN SODIUM 100 MG/ML
40 INJECTION SUBCUTANEOUS EVERY 24 HOURS
Status: DISCONTINUED | OUTPATIENT
Start: 2024-02-17 | End: 2024-02-21 | Stop reason: HOSPADM

## 2024-02-16 RX ORDER — DEXAMETHASONE SODIUM PHOSPHATE 4 MG/ML
INJECTION, SOLUTION INTRA-ARTICULAR; INTRALESIONAL; INTRAMUSCULAR; INTRAVENOUS; SOFT TISSUE
Status: DISCONTINUED | OUTPATIENT
Start: 2024-02-16 | End: 2024-02-16

## 2024-02-16 RX ORDER — PROPOFOL 10 MG/ML
VIAL (ML) INTRAVENOUS
Status: DISCONTINUED | OUTPATIENT
Start: 2024-02-16 | End: 2024-02-16

## 2024-02-16 RX ORDER — FENTANYL CITRATE 50 UG/ML
INJECTION, SOLUTION INTRAMUSCULAR; INTRAVENOUS
Status: DISCONTINUED | OUTPATIENT
Start: 2024-02-16 | End: 2024-02-16

## 2024-02-16 RX ORDER — ONDANSETRON HYDROCHLORIDE 2 MG/ML
INJECTION, SOLUTION INTRAVENOUS
Status: DISCONTINUED | OUTPATIENT
Start: 2024-02-16 | End: 2024-02-16

## 2024-02-16 RX ORDER — HYDROMORPHONE HYDROCHLORIDE 2 MG/ML
0.2 INJECTION, SOLUTION INTRAMUSCULAR; INTRAVENOUS; SUBCUTANEOUS EVERY 5 MIN PRN
Status: DISCONTINUED | OUTPATIENT
Start: 2024-02-16 | End: 2024-02-16 | Stop reason: HOSPADM

## 2024-02-16 RX ADMIN — GLYCOPYRROLATE 0.1 MG: 0.2 INJECTION, SOLUTION INTRAMUSCULAR; INTRAVITREAL at 10:02

## 2024-02-16 RX ADMIN — FENTANYL CITRATE 50 MCG: 0.05 INJECTION, SOLUTION INTRAMUSCULAR; INTRAVENOUS at 10:02

## 2024-02-16 RX ADMIN — PHENYLEPHRINE HYDROCHLORIDE 100 MCG: 10 INJECTION INTRAVENOUS at 11:02

## 2024-02-16 RX ADMIN — PHENYLEPHRINE HYDROCHLORIDE 100 MCG: 10 INJECTION INTRAVENOUS at 10:02

## 2024-02-16 RX ADMIN — Medication 6 MG: at 08:02

## 2024-02-16 RX ADMIN — PHENYLEPHRINE HYDROCHLORIDE 100 MCG: 10 INJECTION INTRAVENOUS at 12:02

## 2024-02-16 RX ADMIN — AMLODIPINE BESYLATE 10 MG: 5 TABLET ORAL at 09:02

## 2024-02-16 RX ADMIN — HALOPERIDOL LACTATE 2.5 MG: 5 INJECTION, SOLUTION INTRAMUSCULAR at 10:02

## 2024-02-16 RX ADMIN — FOLIC ACID 1 MG: 1 TABLET ORAL at 09:02

## 2024-02-16 RX ADMIN — THIAMINE HCL TAB 100 MG 100 MG: 100 TAB at 09:02

## 2024-02-16 RX ADMIN — ROCURONIUM BROMIDE 50 MG: 10 INJECTION, SOLUTION INTRAVENOUS at 10:02

## 2024-02-16 RX ADMIN — ROCURONIUM BROMIDE 15 MG: 10 INJECTION, SOLUTION INTRAVENOUS at 11:02

## 2024-02-16 RX ADMIN — SODIUM CHLORIDE, SODIUM LACTATE, POTASSIUM CHLORIDE, AND CALCIUM CHLORIDE: .6; .31; .03; .02 INJECTION, SOLUTION INTRAVENOUS at 12:02

## 2024-02-16 RX ADMIN — LIDOCAINE HYDROCHLORIDE 100 MG: 20 INJECTION, SOLUTION INTRAVENOUS at 10:02

## 2024-02-16 RX ADMIN — METOPROLOL TARTRATE 50 MG: 50 TABLET, FILM COATED ORAL at 05:02

## 2024-02-16 RX ADMIN — ROCURONIUM BROMIDE 5 MG: 10 INJECTION, SOLUTION INTRAVENOUS at 11:02

## 2024-02-16 RX ADMIN — PROPOFOL 170 MG: 10 INJECTION, EMULSION INTRAVENOUS at 10:02

## 2024-02-16 RX ADMIN — PHENYLEPHRINE HYDROCHLORIDE 200 MCG: 10 INJECTION INTRAVENOUS at 10:02

## 2024-02-16 RX ADMIN — METOPROLOL TARTRATE 50 MG: 50 TABLET, FILM COATED ORAL at 02:02

## 2024-02-16 RX ADMIN — ONDANSETRON 4 MG: 2 INJECTION, SOLUTION INTRAMUSCULAR; INTRAVENOUS at 12:02

## 2024-02-16 RX ADMIN — CHLORDIAZEPOXIDE HYDROCHLORIDE 5 MG: 5 CAPSULE ORAL at 02:02

## 2024-02-16 RX ADMIN — CEFAZOLIN 2 G: 2 INJECTION, POWDER, FOR SOLUTION INTRAMUSCULAR; INTRAVENOUS at 10:02

## 2024-02-16 RX ADMIN — SUGAMMADEX 200 MG: 100 INJECTION, SOLUTION INTRAVENOUS at 12:02

## 2024-02-16 RX ADMIN — CHLORDIAZEPOXIDE HYDROCHLORIDE 5 MG: 5 CAPSULE ORAL at 09:02

## 2024-02-16 RX ADMIN — SENNOSIDES AND DOCUSATE SODIUM 1 TABLET: 8.6; 5 TABLET ORAL at 09:02

## 2024-02-16 RX ADMIN — THERA TABS 1 TABLET: TAB at 09:02

## 2024-02-16 RX ADMIN — CHLORDIAZEPOXIDE HYDROCHLORIDE 5 MG: 5 CAPSULE ORAL at 06:02

## 2024-02-16 RX ADMIN — Medication 1000 UNITS: at 09:02

## 2024-02-16 RX ADMIN — PROPOFOL 30 MG: 10 INJECTION, EMULSION INTRAVENOUS at 11:02

## 2024-02-16 RX ADMIN — SODIUM CHLORIDE, SODIUM LACTATE, POTASSIUM CHLORIDE, AND CALCIUM CHLORIDE: .6; .31; .03; .02 INJECTION, SOLUTION INTRAVENOUS at 10:02

## 2024-02-16 RX ADMIN — DIAZEPAM 10 MG: 5 TABLET ORAL at 09:02

## 2024-02-16 RX ADMIN — METOPROLOL TARTRATE 50 MG: 50 TABLET, FILM COATED ORAL at 06:02

## 2024-02-16 RX ADMIN — NICOTINE 1 PATCH: 14 PATCH TRANSDERMAL at 09:02

## 2024-02-16 RX ADMIN — DEXAMETHASONE SODIUM PHOSPHATE 4 MG: 4 INJECTION, SOLUTION INTRAMUSCULAR; INTRAVENOUS at 10:02

## 2024-02-16 NOTE — NURSING
Ordered EKG completed and placed in chart. Min CONCEPCION NP notified. EKG not showing in EPIC. Result-Sinus tachycardia, Anterior infarct, age undetermined, abnormal ECG..rate 105. No new orders placed at this time.

## 2024-02-16 NOTE — NURSING
Pt woke up confused, attempted to get out of bed, assuming that he was at his house. Reoriented pt. Pt had incontinence episodes in bed. Ambulated pt to bathroom with assistance. Linens changed, mattress wiped with orange top wipes. [facility] bath provided for pt. Placed incontinence brief, instructed pt to let sitter know when need to go to bathroom, pt verbalizes understanding. Bed alarm set, sitter at bedside.

## 2024-02-16 NOTE — HOSPITAL COURSE
Patient admitted with fractured R humerus with radial nerve compromise. Ortho consulted. Underwent surgical repair on 2/16.  PEC/CEC'd in ER due to suicidal ideations. Patient states he has no more S.I.  Psych consulted and PEC/CEC removed.  PT/OT consulted and recommending inpatient Rehab.  SW/CM consulted.  He has remained afebrile and hemodynamically stable.  He will be discharged to inpatient rehab once arranged.  He will be referred to Internal Medicine, Orthopedics and Psychiatry for follow up.

## 2024-02-16 NOTE — PLAN OF CARE
Problem: Adult Inpatient Plan of Care  Goal: Plan of Care Review  Outcome: Ongoing, Progressing     Problem: Fall Injury Risk  Goal: Absence of Fall and Fall-Related Injury  Outcome: Ongoing, Progressing     Problem: Functional Ability Impaired (Orthopaedic Fracture)  Goal: Optimal Functional Ability  Outcome: Ongoing, Progressing     Problem: Confusion Acute  Goal: Optimal Cognitive Function  Outcome: Ongoing, Progressing

## 2024-02-16 NOTE — NURSING
Pt removing arm sling and wants to take off dressing to right arm. Pt easily redirectable and agrees to leave on dressing and sling.

## 2024-02-16 NOTE — PLAN OF CARE
Plan of care is ongoing.      Problem: Adult Inpatient Plan of Care  Goal: Plan of Care Review  Outcome: Ongoing, Progressing  Goal: Patient-Specific Goal (Individualized)  Outcome: Ongoing, Progressing  Goal: Absence of Hospital-Acquired Illness or Injury  Outcome: Ongoing, Progressing  Goal: Optimal Comfort and Wellbeing  Outcome: Ongoing, Progressing  Goal: Readiness for Transition of Care  Outcome: Ongoing, Progressing     Problem: Fall Injury Risk  Goal: Absence of Fall and Fall-Related Injury  Outcome: Ongoing, Progressing     Problem: Bleeding (Orthopaedic Fracture)  Goal: Absence of Bleeding  Outcome: Ongoing, Progressing     Problem: Embolism (Orthopaedic Fracture)  Goal: Absence of Embolism Signs and Symptoms  Outcome: Ongoing, Progressing     Problem: Fracture Stabilization and Management (Orthopaedic Fracture)  Goal: Fracture Stability  Outcome: Ongoing, Progressing     Problem: Functional Ability Impaired (Orthopaedic Fracture)  Goal: Optimal Functional Ability  Outcome: Ongoing, Progressing     Problem: Infection (Orthopaedic Fracture)  Goal: Absence of Infection Signs and Symptoms  Outcome: Ongoing, Progressing     Problem: Neurovascular Compromise (Orthopaedic Fracture)  Goal: Effective Tissue Perfusion  Outcome: Ongoing, Progressing     Problem: Pain (Orthopaedic Fracture)  Goal: Acceptable Pain Control  Outcome: Ongoing, Progressing     Problem: Respiratory Compromise (Orthopaedic Fracture)  Goal: Effective Oxygenation and Ventilation  Outcome: Ongoing, Progressing

## 2024-02-16 NOTE — ANESTHESIA POSTPROCEDURE EVALUATION
Anesthesia Post Evaluation    Patient: Israel De Jesus    Procedure(s) Performed: Procedure(s) (LRB):  ORIF, FRACTURE, HUMERUS (Right)    Final Anesthesia Type: general      Patient location during evaluation: PACU  Patient participation: Yes- Able to Participate  Level of consciousness: awake and alert  Post-procedure vital signs: reviewed and stable  Pain management: adequate  Airway patency: patent    PONV status at discharge: No PONV  Anesthetic complications: no      Respiratory status: spontaneous ventilation and room air  Hydration status: euvolemic  Follow-up not needed.              Vitals Value Taken Time   /78 02/16/24 1402   Temp 36.5 °C (97.7 °F) 02/16/24 1310   Pulse 108 02/16/24 1402   Resp 26 02/16/24 1402   SpO2 95 % 02/16/24 1402   Vitals shown include unvalidated device data.      No case tracking events are documented in the log.      Pain/Fannie Score: Pain Rating Prior to Med Admin: 9 (2/15/2024 11:13 PM)  Pain Rating Post Med Admin: 7 (2/15/2024 11:43 PM)  Fannie Score: 10 (2/16/2024  1:15 PM)

## 2024-02-16 NOTE — PROGRESS NOTES
[facility] Medicine  Progress Note    Patient Name: Israel De Jesus  MRN: [id number]  Patient Class: IP- Inpatient   Admission Date: 2/13/2024  Length of Stay: 3 days  Attending Physician: Odell Burgos MD  Primary Care Provider: Nuha Lynn MD        Subjective:     Principal Problem:Humerus fracture        HPI:    Israel De Jesus is a 63 y.o. male who has a past medical history of Eye injury, Generalized anxiety disorder, Hypertension, and Sciatica, presented to the ED with CC of R arm pain after a Fall, and Suicidal Ideations.     Patient states that his cat was jumping all over him and caused a mechanical fall onto carpeted ground, resulting in his right arm pain. This happened yesterday about 330 p.m. and has waited about 24 hours before coming into hospital. Patient was PEC'd in ED for S.I. Patient states he was not depressed a week or month ago. That his depression and suicidal thoughts began when he broke his arm and thought about not being able to work and potentially losing his job (he works as  at [employer]). He does not have a plan of suicide and has not imagined it. EtOH is still 312 now, unclear if he was trying to dull the pain, or if alcohol is a chronic problem for him. He does have a a low albumin of 2.6, mildly elevated AST at 45 (but also with muscle injury), and macrocytic anemia with  - which are all consistent with chronic alcoholism (daughter later confirmed he is recovering alcoholic and has been drinking for past 4 months). Xray of humerus showed an acute displaced and foreshortened fracture of the proximal humerus metadiaphysis. Arm wrapped and slinged in ED. Case discussed with Orthopedic surgery, patient is NPO for likely procedure tomorrow morning.        Overview/Hospital Course:  Patient admitted with fractured R humerus with radial nerve compromise. Ortho consulted in ED, Place for surgery today 2/16/24. PEC/CEC'd due to suicidal  ideations.     Interval History:  NAEON.  No new issues.   Denies complaints.  All questions answered and updates on care given.       ROS:  General: Negative for fevers or chills.  Cardiac: Negative for chest pain or orthopnea   Pulmonary: Negative for dyspnea or wheezing.  GI: Negative for abdominal distention or pain     Vitals:    02/15/24 1952 02/15/24 2313 02/16/24 0440 02/16/24 0608   BP: 126/68 (!) 156/93 (!) 151/86 (!) 151/86   BP Location: Left arm Left arm Left arm    Patient Position: Lying Lying Lying    Pulse: 98 101 93    Resp: 20 20 20    Temp: 99.4 °F (37.4 °C) 98.9 °F (37.2 °C) 99.4 °F (37.4 °C)    TempSrc: Oral Oral Oral    SpO2: 95% 96% (!) 94%    Weight:       Height:              Body mass index is 25.82 kg/m².      PHYSICAL EXAM:  GENERAL APPEARANCE: alert and cooperative, and appears to be in no acute distress.  HEENT:     HEAD: NC/AT     EYES: PERRL, EOMI.  Vision is grossly intact.  NECK: Neck supple, non-tender without LAD, masses or thyromegaly.  CARDIAC: There is no peripheral edema, cyanosis or pallor.   LUNGS: Clear to auscultation and percussion without rales or wheezing  ABDOMEN: Non-distended. No guarding.  MSK: No joint erythema or tenderness.   EXTREMITIES: No significant deformity or joint abnormality. No edema.   NEUROLOGICAL: CN II-XII grossly intact.   SKIN: Skin normal color, texture and turgor with no lesions or eruptions.  PSYCHIATRIC: Oriented. No tangential speech. No Hyperactive features.        Recent Results (from the past 24 hour(s))   Type & Screen    Collection Time: 02/15/24  5:06 PM   Result Value Ref Range    Group & Rh O POS     Indirect Cole NEG     Specimen Outdate 02/18/2024 23:59    Phosphorus    Collection Time: 02/16/24  4:50 AM   Result Value Ref Range    Phosphorus 2.7 2.7 - 4.5 mg/dL   Magnesium    Collection Time: 02/16/24  4:50 AM   Result Value Ref Range    Magnesium 1.6 1.6 - 2.6 mg/dL   Comprehensive Metabolic Panel    Collection Time: 02/16/24   4:50 AM   Result Value Ref Range    Sodium 133 (L) 136 - 145 mmol/L    Potassium 4.1 3.5 - 5.1 mmol/L    Chloride 102 95 - 110 mmol/L    CO2 24 23 - 29 mmol/L    Glucose 92 70 - 110 mg/dL    BUN 10 8 - 23 mg/dL    Creatinine 0.8 0.5 - 1.4 mg/dL    Calcium 8.6 (L) 8.7 - 10.5 mg/dL    Total Protein 5.5 (L) 6.0 - 8.4 g/dL    Albumin 2.1 (L) 3.5 - 5.2 g/dL    Total Bilirubin 0.7 0.1 - 1.0 mg/dL    Alkaline Phosphatase 108 55 - 135 U/L    AST 37 10 - 40 U/L    ALT 13 10 - 44 U/L    eGFR >60 >60 mL/min/1.73 m^2    Anion Gap 7 (L) 8 - 16 mmol/L   CBC Auto Differential    Collection Time: 02/16/24  4:50 AM   Result Value Ref Range    WBC 8.73 3.90 - 12.70 K/uL    RBC 2.91 (L) 4.60 - 6.20 M/uL    Hemoglobin 10.2 (L) 14.0 - 18.0 g/dL    Hematocrit 30.8 (L) 40.0 - 54.0 %     (H) 82 - 98 fL    MCH 35.1 (H) 27.0 - 31.0 pg    MCHC 33.1 32.0 - 36.0 g/dL    RDW 12.8 11.5 - 14.5 %    Platelets 218 150 - 450 K/uL    MPV 10.0 9.2 - 12.9 fL    Immature Granulocytes 0.3 0.0 - 0.5 %    Gran # (ANC) 6.9 1.8 - 7.7 K/uL    Immature Grans (Abs) 0.03 0.00 - 0.04 K/uL    Lymph # 1.1 1.0 - 4.8 K/uL    Mono # 0.6 0.3 - 1.0 K/uL    Eos # 0.0 0.0 - 0.5 K/uL    Baso # 0.04 0.00 - 0.20 K/uL    nRBC 0 0 /100 WBC    Gran % 79.1 (H) 38.0 - 73.0 %    Lymph % 12.9 (L) 18.0 - 48.0 %    Mono % 6.9 4.0 - 15.0 %    Eosinophil % 0.3 0.0 - 8.0 %    Basophil % 0.5 0.0 - 1.9 %    Differential Method Automated    Protime-INR    Collection Time: 02/16/24  4:50 AM   Result Value Ref Range    Prothrombin Time 10.5 9.0 - 12.5 sec    INR 1.0 0.8 - 1.2       Microbiology Results (last 7 days)       ** No results found for the last 168 hours. **             Imaging Results              CT Chest Without Contrast (Final result)  Result time 02/13/24 21:34:21      Final result by Rasheeda Junior MD (02/13/24 21:34:21)                   Impression:      No acute intrathoracic abnormality.  Mild emphysematous changes.    Acute traumatic fracture of the right  proximal humeral shaft.    Chronic bilateral rib, right coracoid, and right distal clavicular fractures.      Electronically signed by: Rasheeda Junior  Date:    02/13/2024  Time:    21:34               Narrative:    EXAMINATION:  CT CHEST WITHOUT CONTRAST    CLINICAL HISTORY:  Fall.  Right arm injury.  Sling in place.    TECHNIQUE:  Contiguous axial 5 mm images was obtained from the lung apices through the lung bases.  No intravenous contrast was given.  Coronal and sagittal reformatted images were provided.    COMPARISON:  None.    FINDINGS:  There is no acute rib fractures.  There are nonacute bilateral rib fractures.  There is no pneumothorax or pulmonary contusion.    There are mild emphysematous changes.  There is mild bibasilar atelectatic changes.    There is no evidence of mediastinal, hilar, or axillary adenopathy.    There is no pleural or pericardial effusion.    The heart size is within normal limits.    In the visualized upper abdomen, there is mild bilateral nonspecific perinephric stranding.  There is a an acute traumatic mildly comminuted fracture of the visualized right proximal humerus.  There are remote appearing fractures of the right coracoid and right distal clavicle.  There is posterior surgical hardware seen at the visualized lower cervical spine and T1.  Advanced degenerative changes are seen at the left shoulder.                                       CT Head Without Contrast (Final result)  Result time 02/13/24 21:15:36      Final result by Rasheeda Junior MD (02/13/24 21:15:36)                   Impression:      No acute intracranial abnormality detected.    Interval development of sinus disease.    No acute cervical fracture.  Remote fractures of C2 and T1 vertebral bodies.      Electronically signed by: Rasheeda Junior  Date:    02/13/2024  Time:    21:15               Narrative:    EXAMINATION:  CT OF THE HEAD WITHOUT AND CT CERVICAL SPINE    CLINICAL HISTORY:  intoxicated, fall;;  intoxicated fall;    TECHNIQUE:  5 mm unenhanced axial images were obtained from the skull base to the vertex.  1.25 mm axial images were obtained through the cervical spine.  Coronal and sagittal reformatted images were provided.    COMPARISON:  03/06/2023    FINDINGS:  CT head: The ventricles, basal cisterns, and cortical sulci are within normal limits for patient's stated age. There is no acute intracranial hemorrhage, territorial infarct or mass effect, or midline shift. In the visualized paranasal sinuses, there is near complete opacification of the left maxillary sinus.  There is mucoperiosteal thickening to a lesser extent in bilateral ethmoid, left inferior frontal, and right maxillary sinuses.    CT cervical spine: Posterior lumbar hardware seen extending from C1-T1.  No definite acute fractures are detected.  There are again seen remote fracture of the C2 and T1 vertebral bodies.  Additional prior fractures are not well appreciated.  The bones are diffusely osteopenic.  At the lung apices, there are mild emphysematous changes.                                       CT Cervical Spine Without Contrast (Final result)  Result time 02/13/24 21:15:36      Final result by Rasheeda Junior MD (02/13/24 21:15:36)                   Impression:      No acute intracranial abnormality detected.    Interval development of sinus disease.    No acute cervical fracture.  Remote fractures of C2 and T1 vertebral bodies.      Electronically signed by: Rasheeda Junior  Date:    02/13/2024  Time:    21:15               Narrative:    EXAMINATION:  CT OF THE HEAD WITHOUT AND CT CERVICAL SPINE    CLINICAL HISTORY:  intoxicated, fall;; intoxicated fall;    TECHNIQUE:  5 mm unenhanced axial images were obtained from the skull base to the vertex.  1.25 mm axial images were obtained through the cervical spine.  Coronal and sagittal reformatted images were provided.    COMPARISON:  03/06/2023    FINDINGS:  CT head: The ventricles,  basal cisterns, and cortical sulci are within normal limits for patient's stated age. There is no acute intracranial hemorrhage, territorial infarct or mass effect, or midline shift. In the visualized paranasal sinuses, there is near complete opacification of the left maxillary sinus.  There is mucoperiosteal thickening to a lesser extent in bilateral ethmoid, left inferior frontal, and right maxillary sinuses.    CT cervical spine: Posterior lumbar hardware seen extending from C1-T1.  No definite acute fractures are detected.  There are again seen remote fracture of the C2 and T1 vertebral bodies.  Additional prior fractures are not well appreciated.  The bones are diffusely osteopenic.  At the lung apices, there are mild emphysematous changes.                                       X-Ray Chest AP Portable (Final result)  Result time 02/13/24 19:13:11      Final result by Rasheeda Junior MD (02/13/24 19:13:11)                   Impression:      Blunting of the left costophrenic angle, which may suggest small left pleural effusion and/or pleural thickening.      Electronically signed by: Rasheeda Junior  Date:    02/13/2024  Time:    19:13               Narrative:    EXAMINATION:  AP PORTABLE CHEST    CLINICAL HISTORY:  Pain in right arm    TECHNIQUE:  AP portable chest radiograph was submitted.    COMPARISON:  10/18/2022    FINDINGS:  AP portable chest radiograph demonstrates a cardiac silhouette within normal limits.  There is no focal consolidation or pneumothorax.  There is blunting of left costophrenic angle.  There is redemonstration of a right clavicular fracture.  The bones are diffusely osteopenic.  There is cervicothoracic hardware, which was not present on the previous exam.  There appears to be nonacute fractures of the lower bilateral ribs.  Correlate with known history.                                       X-Ray Clavicle Right (Final result)  Result time 02/13/24 19:17:21      Final result by  Max Villatoro MD (02/13/24 19:17:21)                   Impression:      As above.      Electronically signed by: Max Villatoro MD  Date:    02/13/2024  Time:    19:17               Narrative:    EXAMINATION:  XR CLAVICLE RIGHT    CLINICAL HISTORY:  right clavicle injury;    TECHNIQUE:  Two views of the right clavicle were performed.    COMPARISON:  10/18/2022.    FINDINGS:  There are partially visualized postop changes in the cervical spine.  There is a chronic fracture of the right distal clavicle.    There is a partially visualized acute fracture of the right proximal humerus.    The visualized right hemithorax is within normal limits.                                       X-Ray Shoulder 1 View Right (Final result)  Result time 02/13/24 19:22:54      Final result by Max Villatoro MD (02/13/24 19:22:54)                   Impression:      Acute displaced and foreshortened fracture of the proximal humerus metadiaphysis as above.    No evidence of a dislocation.      Electronically signed by: Max Villatoro MD  Date:    02/13/2024  Time:    19:22               Narrative:    EXAMINATION:  XR SHOULDER 1 VIEW RIGHT; XR HUMERUS 2 VIEW RIGHT    CLINICAL HISTORY:  Pain in right arm    TECHNIQUE:  Single frontal view of the right shoulder.    Two x-ray views of the right humerus.    COMPARISON:  Chest x-ray dated 10/18/2022.    FINDINGS:  The bone mineralization is within normal limits.  There is an acute displaced spiral fracture of the proximal metadiaphysis of the right humerus.  There is lateral displacement of the distal fracture component along with foreshortening.  There is also a displaced 1.8 cm fragment overlying the proximal humerus.  There is about 2 cm detraction of the fracture components.    The glenohumeral articulation is maintained.  There is arthropathy of the acromioclavicular joint.  The coracoclavicular interval is within normal limits.    The visualized right hemithorax is unremarkable.    There are  partially visualized postoperative changes in the cervical spine.                                       X-Ray Elbow Complete Right (Final result)  Result time 02/13/24 19:15:46      Final result by Max Villatoro MD (02/13/24 19:15:46)                   Impression:      No acute process.      Electronically signed by: Max Villatoro MD  Date:    02/13/2024  Time:    19:15               Narrative:    EXAMINATION:  XR ELBOW COMPLETE 3 VIEW RIGHT    CLINICAL HISTORY:  Pain in right arm    TECHNIQUE:  Two x-ray views of the right elbow were performed.    COMPARISON:  None    FINDINGS:  The bone mineralization is within normal limits.  There is no cortical step-off.  There is no evidence of periostitis.    The joint spaces are maintained.  The soft tissues are unremarkable.  No radiopaque foreign body is identified.    There is no evidence of a fracture dislocation.                                       X-Ray Humerus 2 View Right (Final result)  Result time 02/13/24 19:22:54      Final result by Max Villatoro MD (02/13/24 19:22:54)                   Impression:      Acute displaced and foreshortened fracture of the proximal humerus metadiaphysis as above.    No evidence of a dislocation.      Electronically signed by: Max Villatoro MD  Date:    02/13/2024  Time:    19:22               Narrative:    EXAMINATION:  XR SHOULDER 1 VIEW RIGHT; XR HUMERUS 2 VIEW RIGHT    CLINICAL HISTORY:  Pain in right arm    TECHNIQUE:  Single frontal view of the right shoulder.    Two x-ray views of the right humerus.    COMPARISON:  Chest x-ray dated 10/18/2022.    FINDINGS:  The bone mineralization is within normal limits.  There is an acute displaced spiral fracture of the proximal metadiaphysis of the right humerus.  There is lateral displacement of the distal fracture component along with foreshortening.  There is also a displaced 1.8 cm fragment overlying the proximal humerus.  There is about 2 cm detraction of the fracture  components.    The glenohumeral articulation is maintained.  There is arthropathy of the acromioclavicular joint.  The coracoclavicular interval is within normal limits.    The visualized right hemithorax is unremarkable.    There are partially visualized postoperative changes in the cervical spine.                                             Assessment/Plan:      * Humerus fracture  S/p fall  Suspect cat jumping on him is a deflection of him being drunk and falling  Xray of humerus showed an acute displaced and foreshortened fracture of the proximal humerus metadiaphysis.  Ortho consulted. Recs reviewed:  noted to have radial nerve palsy prior to splinting. I agree that surgery for open reduction internal fixation is likely the best choice for him. Is not emergent and can be scheduled. He reports he has no medical coverage in his currently under pec. I think it is best to try to handle this on this admission. We will schedule him for surgery for Friday for ORIF of right humerus. 2-3 months before he is able to use his arm for heavy lifting.      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   He does not have a plan of suicide and has not imagined it  His S.I. seems to be stimulated directly from his injury, therefore, felt starting SSRI less appropriate and giving him time to process the event and make plans with his job would be best, initially.  PEC'd in ED  Tele-Psych consulted. Recs reviewed:  Continue PEC for IP stabilization.    Will transfer to IP psych once medically cleared       Suicidal ideation  See depression note      Macrocytic anemia  Stable. Trend.     Alcohol abuse  Patient says he does not drink everyday; used to  Now has a beverage intermittently; last drink Tuesday evening.  Has about 2  or 3 mixed drinks; does not get drunk    Scheduled Librium; will taper   CIWA protocol       Generalized anxiety disorder  Was on Cymbalta, not taken in months  Monitor       Tobacco use disorder  Nicotine patch   4m counseling routinely: The following was discussed concerning tobacco use:  Relevance of Quitting  Risk to Health  Long Term Risk  Risk for Others  Rewards of Quitting  Motivation Intervention to Quit    Hypertension  Uncontrolled.   Home meds reconciled. Control pain.  Trend      VTE Risk Mitigation (From admission, onward)           Ordered     enoxaparin injection 40 mg  Daily         02/13/24 2218     IP VTE LOW RISK PATIENT  Once         02/13/24 2218                    Discharge Planning   BRANDON:      Code Status: Full Code   Is the patient medically ready for discharge?:     Reason for patient still in hospital (select all that apply): Patient trending condition and Treatment  Discharge Plan A: Home                  Odell Burgos MD  Department of Hospital Medicine   [facility]

## 2024-02-16 NOTE — PLAN OF CARE
Plan of care is ongoing.      Problem: Adult Inpatient Plan of Care  Goal: Plan of Care Review  Outcome: Ongoing, Progressing  Goal: Patient-Specific Goal (Individualized)  Outcome: Ongoing, Progressing  Goal: Absence of Hospital-Acquired Illness or Injury  Outcome: Ongoing, Progressing  Goal: Optimal Comfort and Wellbeing  Outcome: Ongoing, Progressing  Goal: Readiness for Transition of Care  Outcome: Ongoing, Progressing     Problem: Fall Injury Risk  Goal: Absence of Fall and Fall-Related Injury  Outcome: Ongoing, Progressing     Problem: Bleeding (Orthopaedic Fracture)  Goal: Absence of Bleeding  Outcome: Ongoing, Progressing     Problem: Embolism (Orthopaedic Fracture)  Goal: Absence of Embolism Signs and Symptoms  Outcome: Ongoing, Progressing     Problem: Fracture Stabilization and Management (Orthopaedic Fracture)  Goal: Fracture Stability  Outcome: Ongoing, Progressing     Problem: Functional Ability Impaired (Orthopaedic Fracture)  Goal: Optimal Functional Ability  Outcome: Ongoing, Progressing     Problem: Infection (Orthopaedic Fracture)  Goal: Absence of Infection Signs and Symptoms  Outcome: Ongoing, Progressing     Problem: Neurovascular Compromise (Orthopaedic Fracture)  Goal: Effective Tissue Perfusion  Outcome: Ongoing, Progressing     Problem: Pain (Orthopaedic Fracture)  Goal: Acceptable Pain Control  Outcome: Ongoing, Progressing     Problem: Respiratory Compromise (Orthopaedic Fracture)  Goal: Effective Oxygenation and Ventilation  Outcome: Ongoing, Progressing     Problem: Confusion Acute  Goal: Optimal Cognitive Function  Outcome: Ongoing, Progressing

## 2024-02-16 NOTE — NURSING
[facility], [facility]  Nurses Note -- 4 Eyes      2/15/2024       Skin assessed on: Q Shift      [x] No Pressure Injuries Present    [x]Prevention Measures Documented    [] Yes LDA  for Pressure Injury Previously documented     [] Yes New Pressure Injury Discovered   [] LDA for New Pressure Injury Added      Attending RN:  Roxanna Arriaza RN     Second RN:  Dahlia MOREL PCT

## 2024-02-16 NOTE — NURSING
Patient escorted back on unit to procedural area. Patient escorted by [facility] security and safety sitter and PACU nurse, Katei. Patient awake and alert. RR even and unlabored. VSS. Right arm surgical dressing in place, CDI. Right arm sling secured. No acute distress noted. Will review and monitor POC through end of shift.

## 2024-02-16 NOTE — NURSING
Pt sitting up in bed, safety sitter at bedside. Right arm wrapped and in sling. Bed alarm set, instructed pt and sitter to notified staff for assistance.     [facility], [facility]  Nurses Note -- 4 Eyes      2/15/2024       Skin assessed on: Q Shift      [x] No Pressure Injuries Present    [x]Prevention Measures Documented    [] Yes LDA  for Pressure Injury Previously documented     [] Yes New Pressure Injury Discovered   [] LDA for New Pressure Injury Added      Attending RN:  Chula Camarillo RN     Second RN:  EMELY Mcknight

## 2024-02-16 NOTE — NURSING
Patient escorted off unit to procedural area. Patient escorted by [facility] security and safety sitter.   Surgery nurse, May notified patient has been NPO except meds and has received scheduled medications and PRN Valium for CIWA of 9.

## 2024-02-16 NOTE — PLAN OF CARE
Patient currently PEC/CEC dated 2/14/24. Spoke with patient's daughter, reviewed CEC and plan for discharge to inpatient psych once medically clear for discharge. Explained hold to be in place up to 15 days. Pt lives alone and dtr expressed concerns about discharge home. Discussed next levels of care differences for assisted living, SNF and [facility] NH placement. Pt's dtr inquired about social security/disability and stated she does not believe pt currently has short term disability with his work. Provide pt's dtr with resource for ss disability ([employer]) online application. CM to continue to follow to assist with dc needs.    02/16/24 9047   Discharge Reassessment   Assessment Type Discharge Planning Reassessment   Did the patient's condition or plan change since previous assessment? Yes   Discharge Plan discussed with: Adult children   Communicated BRANDON with patient/caregiver Date not available/Unable to determine   Discharge Plan A Psychiatric hospital   Discharge Plan B Home with family  (TBD)   DME Needed Upon Discharge  other (see comments)  (TBD)   Transition of Care Barriers Other (see comments)   Why the patient remains in the hospital Requires continued medical care   Post-Acute Status   Discharge Delays None known at this time

## 2024-02-16 NOTE — NURSING
Notified Dr. Gonzalez patient's BP went down but he is tachycardiac, 120s at this time. CIWA was 6. PRN Valium to be given as ordered. No new orders placed at this time. Will continue to with POC through end of shift.

## 2024-02-16 NOTE — ANESTHESIA PROCEDURE NOTES
Intubation    Date/Time: 2/16/2024 10:40 AM    Performed by: Genaro Bernabe CRNA  Authorized by: Stuart Quinn II, MD    Intubation:     Induction:  Intravenous    Intubated:  Postinduction    Mask Ventilation:  Easy with oral airway    Attempts:  1    Attempted By:  CRNA    Method of Intubation:  Video laryngoscopy    Blade:  Sousa 3    Laryngeal View Grade: Grade I - full view of cords      Difficult Airway Encountered?: No      Complications:  None    Airway Device:  Oral endotracheal tube    Airway Device Size:  7.5    Style/Cuff Inflation:  Cuffed (inflated to minimal occlusive pressure)    Inflation Amount (mL):  6    Tube secured:  22    Secured at:  The lips    Placement Verified By:  Capnometry    Complicating Factors:  None    Findings Post-Intubation:  BS equal bilateral and atraumatic/condition of teeth unchanged

## 2024-02-16 NOTE — OR NURSING
Pt released from PACU, Transported to room 431 with . Pt calm and comfortable, cooperative, fully oriented to current situation

## 2024-02-16 NOTE — NURSING
Notified  patient's BP is elevated.Patient is anxious at this time, easily redirectable. BP reassessed, 184/94. No new orderes placed at this time. Will continue qith POC.

## 2024-02-16 NOTE — PROGRESS NOTES
The patient is in bed.  He reports he is ready for surgery tomorrow.     Temp:  [97.9 °F (36.6 °C)-99.2 °F (37.3 °C)] 99 °F (37.2 °C)  Pulse:  [] 110  Resp:  [18-20] 20  SpO2:  [95 %-97 %] 96 %  BP: (150-188)/() 150/83    Physical examination:    Right arm reveals ecchymosis is present proximally.  Radial nerve palsy remains present.      Recent Labs   Lab 02/15/24  0517   WBC 8.82   RBC 2.94*   HGB 9.9*   HCT 30.8*      *   MCH 33.7*   MCHC 32.1         Current Facility-Administered Medications:     [START ON 2/16/2024] amLODIPine tablet 10 mg, 10 mg, Oral, Daily, Yusra Gonzalez MD    chlordiazepoxide capsule 5 mg, 5 mg, Oral, Q8H, Yusra Gonzalez MD, 5 mg at 02/15/24 1430    diazePAM tablet 10 mg, 10 mg, Oral, Q6H PRN, Odell Burgos MD, 10 mg at 02/15/24 1717    enoxaparin injection 40 mg, 40 mg, Subcutaneous, Daily, Odell Burgos MD, 40 mg at 02/15/24 1718    folic acid tablet 1 mg, 1 mg, Oral, Daily, Odell Burgos MD, 1 mg at 02/15/24 0912    hydrALAZINE injection 10 mg, 10 mg, Intravenous, Q6H PRN, Yusra Gonzalez MD    HYDROcodone-acetaminophen  mg per tablet 1 tablet, 1 tablet, Oral, Q6H PRN, Yusra Gonzalez MD, 1 tablet at 02/15/24 0653    HYDROcodone-acetaminophen 5-325 mg per tablet 1 tablet, 1 tablet, Oral, Q4H PRN, Yusra Gonzalez MD, 1 tablet at 02/14/24 2014    ketorolac injection 15 mg, 15 mg, Intravenous, Q6H, Yusra Gonzalez MD, 15 mg at 02/15/24 1717    LORazepam injection 1 mg, 1 mg, Intravenous, Q4H PRN, Yusra Gonzalez MD    melatonin tablet 6 mg, 6 mg, Oral, Nightly, Odell Burgos MD    metoprolol tartrate (LOPRESSOR) tablet 50 mg, 50 mg, Oral, Q6H, Yusra Goznalez MD, 50 mg at 02/15/24 1717    morphine injection 4 mg, 4 mg, Intravenous, Q4H PRN, Yusra Gonzalez MD, 4 mg at 02/14/24 2337    multivitamin tablet, 1 tablet, Oral, Daily, Odell Burgos MD, 1 tablet at 02/15/24 0912    nicotine 14 mg/24 hr 1 patch, 1 patch,  Transdermal, Daily, Odell Burgos MD, 1 patch at 02/15/24 1148    ondansetron injection 8 mg, 8 mg, Intravenous, Q8H PRN, Yusra Gonzalez MD    polyethylene glycol packet 17 g, 17 g, Oral, Daily PRN, Yusra Gonzalez MD    senna-docusate 8.6-50 mg per tablet 1 tablet, 1 tablet, Oral, BID, Odell Burgos MD, 1 tablet at 02/14/24 0822    sodium chloride 0.9% flush 10 mL, 10 mL, Intravenous, PRN, Odell Burgos MD    thiamine tablet 100 mg, 100 mg, Oral, Daily, Odell Burgos MD, 100 mg at 02/15/24 0912    vitamin D 1000 units tablet 1,000 Units, 1,000 Units, Oral, Daily, Odell Burgos MD, 1,000 Units at 02/15/24 0912    Facility-Administered Medications Ordered in Other Encounters:     mupirocin 2 % ointment, , Nasal, On Call Procedure, Colten Lujan MD, Given at 12/07/22 1241    Assessment and Plan:      63-year-old male presents following a fall with displaced right humeral shaft fracture.  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  I agree that surgery for open reduction internal fixation is likely the best choice for him.  Is not emergent and can be scheduled.  He reports he has no medical coverage in his currently under pec.  I think it is best to try to handle this on this admission.  Plan is to go to surgery tomorrow for ORIF of right humerus.     He asks over and over again about when he can go back to work and would like to go back to work tomorrow or the next day.  I have explained to him that we will be the 2-3 months before he is able to use his arm for heavy lifting.     NPO after midnight.      Ancef 2 g IV on-call to OR.    Yulissa Rich MD  [facility]  [phone]  This note has been transcribed with voice recognition software, but not reviewed and may contain unrecognized errors.

## 2024-02-16 NOTE — TRANSFER OF CARE
"Anesthesia Transfer of Care Note    Patient: Israel De Jesus    Procedure(s) Performed: Procedure(s) (LRB):  ORIF, FRACTURE, HUMERUS (Right)    Patient location: PACU    Anesthesia Type: general    Transport from OR: Transported from OR on room air with adequate spontaneous ventilation    Post pain: adequate analgesia    Post assessment: no apparent anesthetic complications and tolerated procedure well    Post vital signs: stable    Level of consciousness: awake    Nausea/Vomiting: no nausea/vomiting    Complications: none    Transfer of care protocol was followed    Last vitals: Visit Vitals  /81 (BP Location: Left arm, Patient Position: Lying)   Pulse 99   Temp 36.5 °C (97.7 °F) (Oral)   Resp 20   Ht 5' 6" (1.676 m)   Wt 72.6 kg (160 lb)   SpO2 96%   BMI 25.82 kg/m²     "

## 2024-02-16 NOTE — OP NOTE
Operative Note    Surgery Date: 2/16/2024     Surgeon:  Yulissa Rich III, MD    Assistant:  None    Pre-op Diagnosis:  Right humeral shaft fracture, right radial nerve palsy.    Post-op Diagnosis:  Same    Procedure:  Open reduction internal fixation right humeral shaft fracture.  Right radial nerve neuroplasty.    Indications:  63-year-old male with history of a fall suffering a right humeral shaft fracture with displacement.  He presented with radial nerve palsy.  He was admitted and plans were made for fixation.  He was found had suicidal ideations and was kept under CEC.    Procedure in Detail:  After informed consent was obtained from family from the daughter he was taken to surgery.  General anesthesia was induced.  He was placed in supine position the operating table.  The right arm was prepped and draped in the usual sterile fashion.  An anterolateral incision was carried out a deltopectoral approach to the proximal humerus was performed.  It was extended distally into an anterolateral approach.  The fracture fragments were identified and cleaned up.  The radial nerve was identified through the fracture fragments and neuroplasty was performed carefully with dissection with Metzenbaum scissors.  The nerve was inspected and found to have no evidence of laceration and was grossly intact.  It was followed back around the spiral groove of the humerus.  Fascial bands were released to make sure there was no undue pressure on the nerve.  The fracture was carefully reduced taking care to make sure that the radial nerve was out of the way and not impinge on by the fracture or fragments.  The fracture was held temporarily with a clamp and K-wire and then a 10 hole [employer] proximal humerus plate was applied anterolaterally and affixed with multiple locking and cortical screws.  Care was to make sure that the hardware was not prominent against the radial nerve posteriorly as well as making sure that the nerve was  not beneath the plate or around the plate distally.  Fluoroscopy was used to confirm proper reduction of fracture and placement of hardware.  The wound was thoroughly irrigated.  It was closed with 1. Vicryl 0 Vicryl 2-0 Vicryl and staples.  Was placed in sterile dressing and a sling and brought to recovery stable condition.    Estimated Blood Loss:  300 mL    Complications:  none         Pathology specimen:   Specimens (From admission, onward)      None              Yulissa Rich MD  [facility]  This note has been transcribed with voice recognition software, but not reviewed and may contain unrecognized errors.

## 2024-02-17 LAB
ALBUMIN SERPL BCP-MCNC: 1.9 G/DL (ref 3.5–5.2)
ALP SERPL-CCNC: 95 U/L (ref 55–135)
ALT SERPL W/O P-5'-P-CCNC: 11 U/L (ref 10–44)
ANION GAP SERPL CALC-SCNC: 6 MMOL/L (ref 8–16)
AST SERPL-CCNC: 47 U/L (ref 10–40)
BASOPHILS # BLD AUTO: 0.02 K/UL (ref 0–0.2)
BASOPHILS NFR BLD: 0.2 % (ref 0–1.9)
BILIRUB SERPL-MCNC: 0.5 MG/DL (ref 0.1–1)
BUN SERPL-MCNC: 24 MG/DL (ref 8–23)
CALCIUM SERPL-MCNC: 8.3 MG/DL (ref 8.7–10.5)
CHLORIDE SERPL-SCNC: 104 MMOL/L (ref 95–110)
CO2 SERPL-SCNC: 23 MMOL/L (ref 23–29)
CREAT SERPL-MCNC: 1 MG/DL (ref 0.5–1.4)
DIFFERENTIAL METHOD BLD: ABNORMAL
EOSINOPHIL # BLD AUTO: 0 K/UL (ref 0–0.5)
EOSINOPHIL NFR BLD: 0 % (ref 0–8)
ERYTHROCYTE [DISTWIDTH] IN BLOOD BY AUTOMATED COUNT: 13 % (ref 11.5–14.5)
EST. GFR  (NO RACE VARIABLE): >60 ML/MIN/1.73 M^2
GLUCOSE SERPL-MCNC: 127 MG/DL (ref 70–110)
HCT VFR BLD AUTO: 25.5 % (ref 40–54)
HGB BLD-MCNC: 8.5 G/DL (ref 14–18)
IMM GRANULOCYTES # BLD AUTO: 0.07 K/UL (ref 0–0.04)
IMM GRANULOCYTES NFR BLD AUTO: 0.7 % (ref 0–0.5)
INR PPP: 1 (ref 0.8–1.2)
LYMPHOCYTES # BLD AUTO: 0.9 K/UL (ref 1–4.8)
LYMPHOCYTES NFR BLD: 8.6 % (ref 18–48)
MAGNESIUM SERPL-MCNC: 1.7 MG/DL (ref 1.6–2.6)
MCH RBC QN AUTO: 34.8 PG (ref 27–31)
MCHC RBC AUTO-ENTMCNC: 33.3 G/DL (ref 32–36)
MCV RBC AUTO: 105 FL (ref 82–98)
MONOCYTES # BLD AUTO: 1 K/UL (ref 0.3–1)
MONOCYTES NFR BLD: 9 % (ref 4–15)
NEUTROPHILS # BLD AUTO: 8.7 K/UL (ref 1.8–7.7)
NEUTROPHILS NFR BLD: 81.5 % (ref 38–73)
NRBC BLD-RTO: 0 /100 WBC
PHOSPHATE SERPL-MCNC: 2.8 MG/DL (ref 2.7–4.5)
PLATELET # BLD AUTO: 225 K/UL (ref 150–450)
PMV BLD AUTO: 10.1 FL (ref 9.2–12.9)
POTASSIUM SERPL-SCNC: 4 MMOL/L (ref 3.5–5.1)
PROT SERPL-MCNC: 5 G/DL (ref 6–8.4)
PROTHROMBIN TIME: 10.6 SEC (ref 9–12.5)
RBC # BLD AUTO: 2.44 M/UL (ref 4.6–6.2)
SODIUM SERPL-SCNC: 133 MMOL/L (ref 136–145)
WBC # BLD AUTO: 10.7 K/UL (ref 3.9–12.7)

## 2024-02-17 PROCEDURE — 25000003 PHARM REV CODE 250: Performed by: ORTHOPAEDIC SURGERY

## 2024-02-17 PROCEDURE — 80053 COMPREHEN METABOLIC PANEL: CPT | Performed by: ORTHOPAEDIC SURGERY

## 2024-02-17 PROCEDURE — 83735 ASSAY OF MAGNESIUM: CPT | Performed by: ORTHOPAEDIC SURGERY

## 2024-02-17 PROCEDURE — 63600175 PHARM REV CODE 636 W HCPCS: Performed by: ORTHOPAEDIC SURGERY

## 2024-02-17 PROCEDURE — 36415 COLL VENOUS BLD VENIPUNCTURE: CPT | Performed by: ORTHOPAEDIC SURGERY

## 2024-02-17 PROCEDURE — 85610 PROTHROMBIN TIME: CPT | Performed by: ORTHOPAEDIC SURGERY

## 2024-02-17 PROCEDURE — S4991 NICOTINE PATCH NONLEGEND: HCPCS | Performed by: ORTHOPAEDIC SURGERY

## 2024-02-17 PROCEDURE — 11000001 HC ACUTE MED/SURG PRIVATE ROOM

## 2024-02-17 PROCEDURE — 85025 COMPLETE CBC W/AUTO DIFF WBC: CPT | Performed by: ORTHOPAEDIC SURGERY

## 2024-02-17 PROCEDURE — 84100 ASSAY OF PHOSPHORUS: CPT | Performed by: ORTHOPAEDIC SURGERY

## 2024-02-17 RX ADMIN — THIAMINE HCL TAB 100 MG 100 MG: 100 TAB at 09:02

## 2024-02-17 RX ADMIN — SENNOSIDES AND DOCUSATE SODIUM 1 TABLET: 8.6; 5 TABLET ORAL at 09:02

## 2024-02-17 RX ADMIN — CHLORDIAZEPOXIDE HYDROCHLORIDE 5 MG: 5 CAPSULE ORAL at 06:02

## 2024-02-17 RX ADMIN — Medication 6 MG: at 08:02

## 2024-02-17 RX ADMIN — CHLORDIAZEPOXIDE HYDROCHLORIDE 5 MG: 5 CAPSULE ORAL at 01:02

## 2024-02-17 RX ADMIN — ENOXAPARIN SODIUM 40 MG: 40 INJECTION SUBCUTANEOUS at 04:02

## 2024-02-17 RX ADMIN — METOPROLOL TARTRATE 50 MG: 50 TABLET, FILM COATED ORAL at 05:02

## 2024-02-17 RX ADMIN — AMLODIPINE BESYLATE 10 MG: 5 TABLET ORAL at 09:02

## 2024-02-17 RX ADMIN — CHLORDIAZEPOXIDE HYDROCHLORIDE 5 MG: 5 CAPSULE ORAL at 09:02

## 2024-02-17 RX ADMIN — FOLIC ACID 1 MG: 1 TABLET ORAL at 09:02

## 2024-02-17 RX ADMIN — METOPROLOL TARTRATE 50 MG: 50 TABLET, FILM COATED ORAL at 06:02

## 2024-02-17 RX ADMIN — METOPROLOL TARTRATE 50 MG: 50 TABLET, FILM COATED ORAL at 12:02

## 2024-02-17 RX ADMIN — THERA TABS 1 TABLET: TAB at 09:02

## 2024-02-17 RX ADMIN — Medication 1000 UNITS: at 09:02

## 2024-02-17 RX ADMIN — NICOTINE 1 PATCH: 14 PATCH TRANSDERMAL at 09:02

## 2024-02-17 NOTE — PLAN OF CARE
Patient alert and oriented x4. Mildly anxious. Respirations even and unlabored. No distress noted. Skin warm and dry. Iv x2 saline locked. No complications noted. Incision to right arm. Dressing intact. Mild drainage noted. Sling in place. Patient complains of some numbness to right hand. Pain denies need for pain medication. Pain medications available as needed. Patient assisted to bsc as needed. Patient has no complaints at this time. Bed in lowest position. Call bell within reach. Bed alarms audible. Sitter at bedside. Instructed to call for any needs.     Problem: Adult Inpatient Plan of Care  Goal: Plan of Care Review  Outcome: Ongoing, Progressing  Flowsheets (Taken 2/17/2024 0545)  Plan of Care Reviewed With: patient  Goal: Patient-Specific Goal (Individualized)  Outcome: Ongoing, Progressing  Goal: Absence of Hospital-Acquired Illness or Injury  Outcome: Ongoing, Progressing  Intervention: Identify and Manage Fall Risk  Flowsheets (Taken 2/17/2024 0545)  Safety Promotion/Fall Prevention:   assistive device/personal item within reach   bed alarm set  Intervention: Prevent Skin Injury  Flowsheets (Taken 2/17/2024 0545)  Body Position: position changed independently     Problem: Fall Injury Risk  Goal: Absence of Fall and Fall-Related Injury  Outcome: Ongoing, Progressing  Intervention: Identify and Manage Contributors  Flowsheets (Taken 2/17/2024 0545)  Self-Care Promotion:   independence encouraged   BADL personal objects within reach  Medication Review/Management:   medications reviewed   high-risk medications identified

## 2024-02-17 NOTE — PROGRESS NOTES
[facility] Medicine  Progress Note    Patient Name: Israel De Jesus  MRN: [id number]  Patient Class: IP- Inpatient   Admission Date: 2/13/2024  Length of Stay: 4 days  Attending Physician: Odell Burgos MD  Primary Care Provider: Nuha Lynn MD        Subjective:     Principal Problem:Humerus fracture        HPI:    Israel De Jesus is a 63 y.o. male who has a past medical history of Eye injury, Generalized anxiety disorder, Hypertension, and Sciatica, presented to the ED with CC of R arm pain after a Fall, and Suicidal Ideations.     Patient states that his cat was jumping all over him and caused a mechanical fall onto carpeted ground, resulting in his right arm pain. This happened yesterday about 330 p.m. and has waited about 24 hours before coming into hospital. Patient was PEC'd in ED for S.I. Patient states he was not depressed a week or month ago. That his depression and suicidal thoughts began when he broke his arm and thought about not being able to work and potentially losing his job (he works as  at [employer]). He does not have a plan of suicide and has not imagined it. EtOH is still 312 now, unclear if he was trying to dull the pain, or if alcohol is a chronic problem for him. He does have a a low albumin of 2.6, mildly elevated AST at 45 (but also with muscle injury), and macrocytic anemia with  - which are all consistent with chronic alcoholism (daughter later confirmed he is recovering alcoholic and has been drinking for past 4 months). Xray of humerus showed an acute displaced and foreshortened fracture of the proximal humerus metadiaphysis. Arm wrapped and slinged in ED. Case discussed with Orthopedic surgery, patient is NPO for likely procedure tomorrow morning.        Overview/Hospital Course:  Patient admitted with fractured R humerus with radial nerve compromise. Ortho consulted in ED, Place for surgery today 2/16/24. PEC/CEC'd due to suicidal  ideations.     Interval History:  NAEON.  No new issues.   S/p procedure  All questions answered and updates on care given.       ROS:  General: Negative for fevers or chills.  Cardiac: Negative for chest pain or orthopnea   Pulmonary: Negative for dyspnea or wheezing.  GI: Negative for abdominal distention or pain  R arm pain/injury     Vitals:    02/16/24 2345 02/17/24 0100 02/17/24 0404 02/17/24 0746   BP: 109/66  118/73 130/84   BP Location: Left arm  Left arm Left arm   Patient Position: Lying  Lying Lying   Pulse: 106  (!) 111 86   Resp: 18  18 16   Temp: 99.1 °F (37.3 °C)  98.6 °F (37 °C) 98.8 °F (37.1 °C)   TempSrc: Oral  Oral Oral   SpO2: (!) 94% (!) 94% 96% 97%   Weight:       Height:              Body mass index is 25.82 kg/m².      PHYSICAL EXAM:  GENERAL APPEARANCE: alert and cooperative, and appears to be in no acute distress.  HEENT:     HEAD: NC/AT     EYES: PERRL, EOMI.  Vision is grossly intact.  NECK: Neck supple, non-tender without LAD, masses or thyromegaly.  CARDIAC: There is no peripheral edema, cyanosis or pallor.   LUNGS: Clear to auscultation and percussion without rales or wheezing  ABDOMEN: Non-distended. No guarding.  MSK: No joint erythema or tenderness.   NEUROLOGICAL: CN II-XII grossly intact. Still moving all fingers, but weakened, blood flow and sensation normal  SKIN: Bruising of most of R arm, wrapped now  PSYCHIATRIC: Oriented. No tangential speech. No Hyperactive features.        Recent Results (from the past 24 hour(s))   Phosphorus    Collection Time: 02/17/24  5:02 AM   Result Value Ref Range    Phosphorus 2.8 2.7 - 4.5 mg/dL   Magnesium    Collection Time: 02/17/24  5:02 AM   Result Value Ref Range    Magnesium 1.7 1.6 - 2.6 mg/dL   Comprehensive Metabolic Panel    Collection Time: 02/17/24  5:02 AM   Result Value Ref Range    Sodium 133 (L) 136 - 145 mmol/L    Potassium 4.0 3.5 - 5.1 mmol/L    Chloride 104 95 - 110 mmol/L    CO2 23 23 - 29 mmol/L    Glucose 127 (H) 70 - 110  mg/dL    BUN 24 (H) 8 - 23 mg/dL    Creatinine 1.0 0.5 - 1.4 mg/dL    Calcium 8.3 (L) 8.7 - 10.5 mg/dL    Total Protein 5.0 (L) 6.0 - 8.4 g/dL    Albumin 1.9 (L) 3.5 - 5.2 g/dL    Total Bilirubin 0.5 0.1 - 1.0 mg/dL    Alkaline Phosphatase 95 55 - 135 U/L    AST 47 (H) 10 - 40 U/L    ALT 11 10 - 44 U/L    eGFR >60 >60 mL/min/1.73 m^2    Anion Gap 6 (L) 8 - 16 mmol/L   CBC Auto Differential    Collection Time: 02/17/24  5:02 AM   Result Value Ref Range    WBC 10.70 3.90 - 12.70 K/uL    RBC 2.44 (L) 4.60 - 6.20 M/uL    Hemoglobin 8.5 (L) 14.0 - 18.0 g/dL    Hematocrit 25.5 (L) 40.0 - 54.0 %     (H) 82 - 98 fL    MCH 34.8 (H) 27.0 - 31.0 pg    MCHC 33.3 32.0 - 36.0 g/dL    RDW 13.0 11.5 - 14.5 %    Platelets 225 150 - 450 K/uL    MPV 10.1 9.2 - 12.9 fL    Immature Granulocytes 0.7 (H) 0.0 - 0.5 %    Gran # (ANC) 8.7 (H) 1.8 - 7.7 K/uL    Immature Grans (Abs) 0.07 (H) 0.00 - 0.04 K/uL    Lymph # 0.9 (L) 1.0 - 4.8 K/uL    Mono # 1.0 0.3 - 1.0 K/uL    Eos # 0.0 0.0 - 0.5 K/uL    Baso # 0.02 0.00 - 0.20 K/uL    nRBC 0 0 /100 WBC    Gran % 81.5 (H) 38.0 - 73.0 %    Lymph % 8.6 (L) 18.0 - 48.0 %    Mono % 9.0 4.0 - 15.0 %    Eosinophil % 0.0 0.0 - 8.0 %    Basophil % 0.2 0.0 - 1.9 %    Differential Method Automated    Protime-INR    Collection Time: 02/17/24  5:02 AM   Result Value Ref Range    Prothrombin Time 10.6 9.0 - 12.5 sec    INR 1.0 0.8 - 1.2       Microbiology Results (last 7 days)       ** No results found for the last 168 hours. **             Imaging Results              CT Chest Without Contrast (Final result)  Result time 02/13/24 21:34:21      Final result by Rasheeda Junior MD (02/13/24 21:34:21)                   Impression:      No acute intrathoracic abnormality.  Mild emphysematous changes.    Acute traumatic fracture of the right proximal humeral shaft.    Chronic bilateral rib, right coracoid, and right distal clavicular fractures.      Electronically signed by: Rasheeda  Vernon  Date:    02/13/2024  Time:    21:34               Narrative:    EXAMINATION:  CT CHEST WITHOUT CONTRAST    CLINICAL HISTORY:  Fall.  Right arm injury.  Sling in place.    TECHNIQUE:  Contiguous axial 5 mm images was obtained from the lung apices through the lung bases.  No intravenous contrast was given.  Coronal and sagittal reformatted images were provided.    COMPARISON:  None.    FINDINGS:  There is no acute rib fractures.  There are nonacute bilateral rib fractures.  There is no pneumothorax or pulmonary contusion.    There are mild emphysematous changes.  There is mild bibasilar atelectatic changes.    There is no evidence of mediastinal, hilar, or axillary adenopathy.    There is no pleural or pericardial effusion.    The heart size is within normal limits.    In the visualized upper abdomen, there is mild bilateral nonspecific perinephric stranding.  There is a an acute traumatic mildly comminuted fracture of the visualized right proximal humerus.  There are remote appearing fractures of the right coracoid and right distal clavicle.  There is posterior surgical hardware seen at the visualized lower cervical spine and T1.  Advanced degenerative changes are seen at the left shoulder.                                       CT Head Without Contrast (Final result)  Result time 02/13/24 21:15:36      Final result by Rasheeda Junior MD (02/13/24 21:15:36)                   Impression:      No acute intracranial abnormality detected.    Interval development of sinus disease.    No acute cervical fracture.  Remote fractures of C2 and T1 vertebral bodies.      Electronically signed by: Rasheeda Junior  Date:    02/13/2024  Time:    21:15               Narrative:    EXAMINATION:  CT OF THE HEAD WITHOUT AND CT CERVICAL SPINE    CLINICAL HISTORY:  intoxicated, fall;; intoxicated fall;    TECHNIQUE:  5 mm unenhanced axial images were obtained from the skull base to the vertex.  1.25 mm axial images were  obtained through the cervical spine.  Coronal and sagittal reformatted images were provided.    COMPARISON:  03/06/2023    FINDINGS:  CT head: The ventricles, basal cisterns, and cortical sulci are within normal limits for patient's stated age. There is no acute intracranial hemorrhage, territorial infarct or mass effect, or midline shift. In the visualized paranasal sinuses, there is near complete opacification of the left maxillary sinus.  There is mucoperiosteal thickening to a lesser extent in bilateral ethmoid, left inferior frontal, and right maxillary sinuses.    CT cervical spine: Posterior lumbar hardware seen extending from C1-T1.  No definite acute fractures are detected.  There are again seen remote fracture of the C2 and T1 vertebral bodies.  Additional prior fractures are not well appreciated.  The bones are diffusely osteopenic.  At the lung apices, there are mild emphysematous changes.                                       CT Cervical Spine Without Contrast (Final result)  Result time 02/13/24 21:15:36      Final result by Rasheeda Junior MD (02/13/24 21:15:36)                   Impression:      No acute intracranial abnormality detected.    Interval development of sinus disease.    No acute cervical fracture.  Remote fractures of C2 and T1 vertebral bodies.      Electronically signed by: Rasheeda Junior  Date:    02/13/2024  Time:    21:15               Narrative:    EXAMINATION:  CT OF THE HEAD WITHOUT AND CT CERVICAL SPINE    CLINICAL HISTORY:  intoxicated, fall;; intoxicated fall;    TECHNIQUE:  5 mm unenhanced axial images were obtained from the skull base to the vertex.  1.25 mm axial images were obtained through the cervical spine.  Coronal and sagittal reformatted images were provided.    COMPARISON:  03/06/2023    FINDINGS:  CT head: The ventricles, basal cisterns, and cortical sulci are within normal limits for patient's stated age. There is no acute intracranial hemorrhage,  territorial infarct or mass effect, or midline shift. In the visualized paranasal sinuses, there is near complete opacification of the left maxillary sinus.  There is mucoperiosteal thickening to a lesser extent in bilateral ethmoid, left inferior frontal, and right maxillary sinuses.    CT cervical spine: Posterior lumbar hardware seen extending from C1-T1.  No definite acute fractures are detected.  There are again seen remote fracture of the C2 and T1 vertebral bodies.  Additional prior fractures are not well appreciated.  The bones are diffusely osteopenic.  At the lung apices, there are mild emphysematous changes.                                       X-Ray Chest AP Portable (Final result)  Result time 02/13/24 19:13:11      Final result by Rasheeda Junior MD (02/13/24 19:13:11)                   Impression:      Blunting of the left costophrenic angle, which may suggest small left pleural effusion and/or pleural thickening.      Electronically signed by: Rasheeda Junior  Date:    02/13/2024  Time:    19:13               Narrative:    EXAMINATION:  AP PORTABLE CHEST    CLINICAL HISTORY:  Pain in right arm    TECHNIQUE:  AP portable chest radiograph was submitted.    COMPARISON:  10/18/2022    FINDINGS:  AP portable chest radiograph demonstrates a cardiac silhouette within normal limits.  There is no focal consolidation or pneumothorax.  There is blunting of left costophrenic angle.  There is redemonstration of a right clavicular fracture.  The bones are diffusely osteopenic.  There is cervicothoracic hardware, which was not present on the previous exam.  There appears to be nonacute fractures of the lower bilateral ribs.  Correlate with known history.                                       X-Ray Clavicle Right (Final result)  Result time 02/13/24 19:17:21      Final result by Max Villatoro MD (02/13/24 19:17:21)                   Impression:      As above.      Electronically signed by: Max Villatoro  MD  Date:    02/13/2024  Time:    19:17               Narrative:    EXAMINATION:  XR CLAVICLE RIGHT    CLINICAL HISTORY:  right clavicle injury;    TECHNIQUE:  Two views of the right clavicle were performed.    COMPARISON:  10/18/2022.    FINDINGS:  There are partially visualized postop changes in the cervical spine.  There is a chronic fracture of the right distal clavicle.    There is a partially visualized acute fracture of the right proximal humerus.    The visualized right hemithorax is within normal limits.                                       X-Ray Shoulder 1 View Right (Final result)  Result time 02/13/24 19:22:54      Final result by Max Villatoro MD (02/13/24 19:22:54)                   Impression:      Acute displaced and foreshortened fracture of the proximal humerus metadiaphysis as above.    No evidence of a dislocation.      Electronically signed by: Max Villatoro MD  Date:    02/13/2024  Time:    19:22               Narrative:    EXAMINATION:  XR SHOULDER 1 VIEW RIGHT; XR HUMERUS 2 VIEW RIGHT    CLINICAL HISTORY:  Pain in right arm    TECHNIQUE:  Single frontal view of the right shoulder.    Two x-ray views of the right humerus.    COMPARISON:  Chest x-ray dated 10/18/2022.    FINDINGS:  The bone mineralization is within normal limits.  There is an acute displaced spiral fracture of the proximal metadiaphysis of the right humerus.  There is lateral displacement of the distal fracture component along with foreshortening.  There is also a displaced 1.8 cm fragment overlying the proximal humerus.  There is about 2 cm detraction of the fracture components.    The glenohumeral articulation is maintained.  There is arthropathy of the acromioclavicular joint.  The coracoclavicular interval is within normal limits.    The visualized right hemithorax is unremarkable.    There are partially visualized postoperative changes in the cervical spine.                                       X-Ray Elbow Complete Right  (Final result)  Result time 02/13/24 19:15:46      Final result by Max Villatoro MD (02/13/24 19:15:46)                   Impression:      No acute process.      Electronically signed by: Max Villatoro MD  Date:    02/13/2024  Time:    19:15               Narrative:    EXAMINATION:  XR ELBOW COMPLETE 3 VIEW RIGHT    CLINICAL HISTORY:  Pain in right arm    TECHNIQUE:  Two x-ray views of the right elbow were performed.    COMPARISON:  None    FINDINGS:  The bone mineralization is within normal limits.  There is no cortical step-off.  There is no evidence of periostitis.    The joint spaces are maintained.  The soft tissues are unremarkable.  No radiopaque foreign body is identified.    There is no evidence of a fracture dislocation.                                       X-Ray Humerus 2 View Right (Final result)  Result time 02/13/24 19:22:54      Final result by Max Villatoro MD (02/13/24 19:22:54)                   Impression:      Acute displaced and foreshortened fracture of the proximal humerus metadiaphysis as above.    No evidence of a dislocation.      Electronically signed by: Max Villatoro MD  Date:    02/13/2024  Time:    19:22               Narrative:    EXAMINATION:  XR SHOULDER 1 VIEW RIGHT; XR HUMERUS 2 VIEW RIGHT    CLINICAL HISTORY:  Pain in right arm    TECHNIQUE:  Single frontal view of the right shoulder.    Two x-ray views of the right humerus.    COMPARISON:  Chest x-ray dated 10/18/2022.    FINDINGS:  The bone mineralization is within normal limits.  There is an acute displaced spiral fracture of the proximal metadiaphysis of the right humerus.  There is lateral displacement of the distal fracture component along with foreshortening.  There is also a displaced 1.8 cm fragment overlying the proximal humerus.  There is about 2 cm detraction of the fracture components.    The glenohumeral articulation is maintained.  There is arthropathy of the acromioclavicular joint.  The coracoclavicular interval  is within normal limits.    The visualized right hemithorax is unremarkable.    There are partially visualized postoperative changes in the cervical spine.                                             Assessment/Plan:      * Humerus fracture  S/p fall  Suspect cat jumping on him is a deflection of him being drunk and falling  Xray of humerus showed an acute displaced and foreshortened fracture of the proximal humerus metadiaphysis.  Ortho consulted. Recs reviewed:  noted to have radial nerve palsy prior to splinting. I agree that surgery for open reduction internal fixation is likely the best choice for him. Is not emergent and can be scheduled. He reports he has no medical coverage in his currently under pec. I think it is best to try to handle this on this admission. We will schedule him for surgery for Friday for ORIF of right humerus. 2-3 months before he is able to use his arm for heavy lifting.      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   He does not have a plan of suicide and has not imagined it  His S.I. seems to be stimulated directly from his injury, therefore, felt starting SSRI less appropriate and giving him time to process the event and make plans with his job would be best, initially.  PEC'd in ED  Tele-Psych consulted. Recs reviewed:  Continue PEC for IP stabilization.    Will transfer to IP psych once medically cleared       Suicidal ideation  See depression note      Macrocytic anemia  Stable. Trend.     Alcohol abuse  Patient says he does not drink everyday; used to  Now has a beverage intermittently; last drink Tuesday evening.  Has about 2 or 3 mixed drinks; does not get drunk    Scheduled Librium; will taper   CIWA protocol       Generalized anxiety disorder  Was on Cymbalta, not  taken in months  Monitor       Tobacco use disorder  Nicotine patch   4m counseling routinely: The following was discussed concerning tobacco use:  Relevance of Quitting  Risk to Health  Long Term Risk  Risk for Others  Rewards of Quitting  Motivation Intervention to Quit    Hypertension  Uncontrolled.   Home meds reconciled. Control pain.  Trend      VTE Risk Mitigation (From admission, onward)           Ordered     enoxaparin injection 40 mg  Daily         02/16/24 1455     IP VTE LOW RISK PATIENT  Once         02/13/24 2218                    Discharge Planning   BRANDON:      Code Status: Full Code   Is the patient medically ready for discharge?:     Reason for patient still in hospital (select all that apply): Patient trending condition and Treatment  Discharge Plan A: Psychiatric hospital   Discharge Delays: None known at this time              Odell Burgos MD  Department of Hospital Medicine   [facility]

## 2024-02-17 NOTE — PLAN OF CARE
Problem: Adult Inpatient Plan of Care  Goal: Plan of Care Review  Outcome: Ongoing, Progressing  Goal: Patient-Specific Goal (Individualized)  Outcome: Ongoing, Progressing  Goal: Absence of Hospital-Acquired Illness or Injury  Outcome: Ongoing, Progressing  Goal: Optimal Comfort and Wellbeing  Outcome: Ongoing, Progressing  Goal: Readiness for Transition of Care  Outcome: Ongoing, Progressing     Problem: Fall Injury Risk  Goal: Absence of Fall and Fall-Related Injury  Outcome: Ongoing, Progressing     Problem: Pain (Orthopaedic Fracture)  Goal: Acceptable Pain Control  Outcome: Ongoing, Progressing     Problem: Respiratory Compromise (Orthopaedic Fracture)  Goal: Effective Oxygenation and Ventilation  Outcome: Ongoing, Progressing

## 2024-02-17 NOTE — NURSING
[facility] [place]  Nurses Note -- 4 Eyes      2/16/24       Skin assessed on: Q Shift      [x] No Pressure Injuries Present    [x]Prevention Measures Documented    [] Yes LDA  for Pressure Injury Previously documented     [] Yes New Pressure Injury Discovered   [] LDA for New Pressure Injury Added      Attending RN:  Toro Allen RN     Second RN:  Roxanna Arriaza Rn

## 2024-02-17 NOTE — PROGRESS NOTES
POD 1 ORIF Right humerus  No complaints    Pt removed dressing on right arm    AFVSS  Labs stable    Pt has radial nerve pasly present pre op.  Incision with no drainage    New dressing ordered. Splint to right wrist.  Consult OT for radial nerve palsy  Pt could go home from Ortho point

## 2024-02-18 LAB
ALBUMIN SERPL BCP-MCNC: 1.9 G/DL (ref 3.5–5.2)
ALP SERPL-CCNC: 83 U/L (ref 55–135)
ALT SERPL W/O P-5'-P-CCNC: 11 U/L (ref 10–44)
ANION GAP SERPL CALC-SCNC: 8 MMOL/L (ref 8–16)
AST SERPL-CCNC: 32 U/L (ref 10–40)
BASOPHILS # BLD AUTO: 0.04 K/UL (ref 0–0.2)
BASOPHILS NFR BLD: 0.4 % (ref 0–1.9)
BILIRUB SERPL-MCNC: 0.5 MG/DL (ref 0.1–1)
BUN SERPL-MCNC: 15 MG/DL (ref 8–23)
CALCIUM SERPL-MCNC: 8.4 MG/DL (ref 8.7–10.5)
CHLORIDE SERPL-SCNC: 104 MMOL/L (ref 95–110)
CO2 SERPL-SCNC: 24 MMOL/L (ref 23–29)
CREAT SERPL-MCNC: 0.8 MG/DL (ref 0.5–1.4)
DIFFERENTIAL METHOD BLD: ABNORMAL
EOSINOPHIL # BLD AUTO: 0.1 K/UL (ref 0–0.5)
EOSINOPHIL NFR BLD: 0.5 % (ref 0–8)
ERYTHROCYTE [DISTWIDTH] IN BLOOD BY AUTOMATED COUNT: 13.4 % (ref 11.5–14.5)
EST. GFR  (NO RACE VARIABLE): >60 ML/MIN/1.73 M^2
GLUCOSE SERPL-MCNC: 106 MG/DL (ref 70–110)
HCT VFR BLD AUTO: 24.5 % (ref 40–54)
HGB BLD-MCNC: 8.1 G/DL (ref 14–18)
IMM GRANULOCYTES # BLD AUTO: 0.07 K/UL (ref 0–0.04)
IMM GRANULOCYTES NFR BLD AUTO: 0.7 % (ref 0–0.5)
INR PPP: 0.9 (ref 0.8–1.2)
LYMPHOCYTES # BLD AUTO: 1.5 K/UL (ref 1–4.8)
LYMPHOCYTES NFR BLD: 15.4 % (ref 18–48)
MAGNESIUM SERPL-MCNC: 1.7 MG/DL (ref 1.6–2.6)
MCH RBC QN AUTO: 35.5 PG (ref 27–31)
MCHC RBC AUTO-ENTMCNC: 33.1 G/DL (ref 32–36)
MCV RBC AUTO: 108 FL (ref 82–98)
MONOCYTES # BLD AUTO: 1.1 K/UL (ref 0.3–1)
MONOCYTES NFR BLD: 11.2 % (ref 4–15)
NEUTROPHILS # BLD AUTO: 7.1 K/UL (ref 1.8–7.7)
NEUTROPHILS NFR BLD: 71.8 % (ref 38–73)
NRBC BLD-RTO: 0 /100 WBC
PHOSPHATE SERPL-MCNC: 3.1 MG/DL (ref 2.7–4.5)
PLATELET # BLD AUTO: 249 K/UL (ref 150–450)
PMV BLD AUTO: 10.2 FL (ref 9.2–12.9)
POTASSIUM SERPL-SCNC: 4.1 MMOL/L (ref 3.5–5.1)
PROT SERPL-MCNC: 5.1 G/DL (ref 6–8.4)
PROTHROMBIN TIME: 10.2 SEC (ref 9–12.5)
RBC # BLD AUTO: 2.28 M/UL (ref 4.6–6.2)
SODIUM SERPL-SCNC: 136 MMOL/L (ref 136–145)
WBC # BLD AUTO: 9.85 K/UL (ref 3.9–12.7)

## 2024-02-18 PROCEDURE — 97165 OT EVAL LOW COMPLEX 30 MIN: CPT

## 2024-02-18 PROCEDURE — 63600175 PHARM REV CODE 636 W HCPCS: Performed by: ORTHOPAEDIC SURGERY

## 2024-02-18 PROCEDURE — 25000003 PHARM REV CODE 250: Performed by: ORTHOPAEDIC SURGERY

## 2024-02-18 PROCEDURE — S4991 NICOTINE PATCH NONLEGEND: HCPCS | Performed by: ORTHOPAEDIC SURGERY

## 2024-02-18 PROCEDURE — 36415 COLL VENOUS BLD VENIPUNCTURE: CPT | Performed by: ORTHOPAEDIC SURGERY

## 2024-02-18 PROCEDURE — 94761 N-INVAS EAR/PLS OXIMETRY MLT: CPT

## 2024-02-18 PROCEDURE — 25000003 PHARM REV CODE 250: Performed by: HOSPITALIST

## 2024-02-18 PROCEDURE — 83735 ASSAY OF MAGNESIUM: CPT | Performed by: ORTHOPAEDIC SURGERY

## 2024-02-18 PROCEDURE — 84100 ASSAY OF PHOSPHORUS: CPT | Performed by: ORTHOPAEDIC SURGERY

## 2024-02-18 PROCEDURE — 97535 SELF CARE MNGMENT TRAINING: CPT

## 2024-02-18 PROCEDURE — 97110 THERAPEUTIC EXERCISES: CPT

## 2024-02-18 PROCEDURE — 85610 PROTHROMBIN TIME: CPT | Performed by: ORTHOPAEDIC SURGERY

## 2024-02-18 PROCEDURE — 85025 COMPLETE CBC W/AUTO DIFF WBC: CPT | Performed by: ORTHOPAEDIC SURGERY

## 2024-02-18 PROCEDURE — 11000001 HC ACUTE MED/SURG PRIVATE ROOM

## 2024-02-18 PROCEDURE — 80053 COMPREHEN METABOLIC PANEL: CPT | Performed by: ORTHOPAEDIC SURGERY

## 2024-02-18 RX ORDER — GUAIFENESIN AND DEXTROMETHORPHAN HYDROBROMIDE 10; 100 MG/5ML; MG/5ML
10 SYRUP ORAL EVERY 4 HOURS PRN
Status: DISCONTINUED | OUTPATIENT
Start: 2024-02-18 | End: 2024-02-21 | Stop reason: HOSPADM

## 2024-02-18 RX ADMIN — THIAMINE HCL TAB 100 MG 100 MG: 100 TAB at 09:02

## 2024-02-18 RX ADMIN — FOLIC ACID 1 MG: 1 TABLET ORAL at 09:02

## 2024-02-18 RX ADMIN — METOPROLOL TARTRATE 50 MG: 50 TABLET, FILM COATED ORAL at 11:02

## 2024-02-18 RX ADMIN — HYDROCODONE BITARTRATE AND ACETAMINOPHEN 1 TABLET: 10; 325 TABLET ORAL at 05:02

## 2024-02-18 RX ADMIN — Medication 1000 UNITS: at 09:02

## 2024-02-18 RX ADMIN — HYPROMELLOSE 2910 1 DROP: 5 SOLUTION OPHTHALMIC at 11:02

## 2024-02-18 RX ADMIN — SENNOSIDES AND DOCUSATE SODIUM 1 TABLET: 8.6; 5 TABLET ORAL at 09:02

## 2024-02-18 RX ADMIN — METOPROLOL TARTRATE 50 MG: 50 TABLET, FILM COATED ORAL at 12:02

## 2024-02-18 RX ADMIN — METOPROLOL TARTRATE 50 MG: 50 TABLET, FILM COATED ORAL at 05:02

## 2024-02-18 RX ADMIN — Medication 6 MG: at 09:02

## 2024-02-18 RX ADMIN — AMLODIPINE BESYLATE 10 MG: 5 TABLET ORAL at 09:02

## 2024-02-18 RX ADMIN — GUAIFENESIN AND DEXTROMETHORPHAN 10 ML: 100; 10 SYRUP ORAL at 09:02

## 2024-02-18 RX ADMIN — CHLORDIAZEPOXIDE HYDROCHLORIDE 5 MG: 5 CAPSULE ORAL at 02:02

## 2024-02-18 RX ADMIN — ENOXAPARIN SODIUM 40 MG: 40 INJECTION SUBCUTANEOUS at 05:02

## 2024-02-18 RX ADMIN — CHLORDIAZEPOXIDE HYDROCHLORIDE 5 MG: 5 CAPSULE ORAL at 05:02

## 2024-02-18 RX ADMIN — NICOTINE 1 PATCH: 14 PATCH TRANSDERMAL at 09:02

## 2024-02-18 RX ADMIN — CHLORDIAZEPOXIDE HYDROCHLORIDE 5 MG: 5 CAPSULE ORAL at 09:02

## 2024-02-18 RX ADMIN — THERA TABS 1 TABLET: TAB at 09:02

## 2024-02-18 NOTE — PLAN OF CARE
Patient alert and oriented x4. Mildly anxious. Respirations even and unlabored. No distress noted. Skin warm and dry. Iv x2 saline locked. No complications noted. Incision to right arm. Dressing intact. Mild drainage noted. Sling in place. Patient complains of some numbness to right hand. Pain denies need for pain medication. Pain medications available as needed. Patient assisted to bsc as needed. Patient has no complaints at this time. Bed in lowest position. Call bell within reach. Bed alarms audible. Sitter at bedside. Instructed to call for any needs.          Problem: Adult Inpatient Plan of Care  Goal: Plan of Care Review  Outcome: Ongoing, Progressing  Flowsheets (Taken 2/18/2024 0429)  Plan of Care Reviewed With: patient  Goal: Patient-Specific Goal (Individualized)  Outcome: Ongoing, Progressing  Goal: Absence of Hospital-Acquired Illness or Injury  Outcome: Ongoing, Progressing  Intervention: Identify and Manage Fall Risk  Flowsheets (Taken 2/18/2024 0429)  Safety Promotion/Fall Prevention:   assistive device/personal item within reach   bed alarm set  Intervention: Prevent Skin Injury  Flowsheets (Taken 2/18/2024 0429)  Body Position: position changed independently     Problem: Fall Injury Risk  Goal: Absence of Fall and Fall-Related Injury  Outcome: Ongoing, Progressing  Intervention: Identify and Manage Contributors  Flowsheets (Taken 2/18/2024 0429)  Self-Care Promotion:   independence encouraged   BADL personal objects within reach  Medication Review/Management:   medications reviewed   high-risk medications identified

## 2024-02-18 NOTE — PLAN OF CARE
Problem: Occupational Therapy  Goal: Occupational Therapy Goal  Description: Goals to be met by: 3/3/2024     Patient will increase functional independence with ADLs by performing:    Feeding with Modified [place].  UE Dressing with Modified [place].  LE Dressing with Modified [place].  Grooming while standing at sink with Modified [place] and Assistive Devices as needed.  Toileting from toilet with Modified [place] and Assistive Devices as needed for hygiene and clothing management.   Supine to sit with Modified [place].  Step transfer with Modified [place]  Toilet transfer to toilet with Modified [place].  Upper extremity exercise program x15 reps per handout, with independence.    Outcome: Ongoing, Progressing   The patient will benefit from LIT.

## 2024-02-18 NOTE — PROGRESS NOTES
[facility] Medicine  Progress Note    Patient Name: Israel De Jesus  MRN: [id number]  Patient Class: IP- Inpatient   Admission Date: 2/13/2024  Length of Stay: 5 days  Attending Physician: Odell Burgos MD  Primary Care Provider: Nuha Lynn MD        Subjective:     Principal Problem:Humerus fracture        HPI:    Israel De Jesus is a 63 y.o. male who has a past medical history of Eye injury, Generalized anxiety disorder, Hypertension, and Sciatica, presented to the ED with CC of R arm pain after a Fall, and Suicidal Ideations.     Patient states that his cat was jumping all over him and caused a mechanical fall onto carpeted ground, resulting in his right arm pain. This happened yesterday about 330 p.m. and has waited about 24 hours before coming into hospital. Patient was PEC'd in ED for S.I. Patient states he was not depressed a week or month ago. That his depression and suicidal thoughts began when he broke his arm and thought about not being able to work and potentially losing his job (he works as  at "[employer] He does not have a plan of suicide and has not imagined it. EtOH is still 312 now, unclear if he was trying to dull the pain, or if alcohol is a chronic problem for him. He does have a a low albumin of 2.6, mildly elevated AST at 45 (but also with muscle injury), and macrocytic anemia with  - which are all consistent with chronic alcoholism (daughter later confirmed he is recovering alcoholic and has been drinking for past 4 months). Xray of humerus showed an acute displaced and foreshortened fracture of the proximal humerus metadiaphysis. Arm wrapped and slinged in ED. Case discussed with Orthopedic surgery, patient is NPO for likely procedure tomorrow morning.        Overview/Hospital Course:  Patient admitted with fractured R humerus with radial nerve compromise. Ortho consulted in ED, Place for surgery today 2/16/24. PEC/CEC'd due to suicidal  ideations. Patient states he has no more S.I. Will ask for formal psych evazl to remove CEC, if able.    Interval History:  NAEON.  Denies SI  S/p procedure  All questions answered and updates on care given.       ROS:  General: Negative for fevers or chills.  Cardiac: Negative for chest pain or orthopnea   Pulmonary: Negative for dyspnea or wheezing.  GI: Negative for abdominal distention or pain  R Arm pain    Vitals:    02/17/24 2342 02/18/24 0017 02/18/24 0418 02/18/24 0513   BP: 132/70 132/70 (!) 147/77 (!) 147/77   BP Location: Left arm  Left arm    Patient Position: Lying  Lying    Pulse: 92  75 75   Resp: 18  18    Temp: 99.4 °F (37.4 °C)  98.4 °F (36.9 °C)    TempSrc: Oral  Oral    SpO2: (!) 94%  95%    Weight:       Height:              Body mass index is 25.82 kg/m².      PHYSICAL EXAM:  GENERAL APPEARANCE: alert and cooperative, and appears to be in no acute distress.  HEENT:     HEAD: NC/AT     EYES: PERRL, EOMI.  Vision is grossly intact.  NECK: Neck supple, non-tender without LAD, masses or thyromegaly.  CARDIAC: There is no peripheral edema, cyanosis or pallor.   LUNGS: Clear to auscultation and percussion without rales or wheezing  ABDOMEN: Non-distended. No guarding.  MSK: No joint erythema or tenderness.   NEUROLOGICAL: CN II-XII grossly intact. Still moving all fingers, but weakened, blood flow and sensation normal  SKIN: Bruising of most of R arm, wrapped now  PSYCHIATRIC: Oriented. No tangential speech. No Hyperactive features.        Recent Results (from the past 24 hour(s))   Phosphorus    Collection Time: 02/18/24  3:40 AM   Result Value Ref Range    Phosphorus 3.1 2.7 - 4.5 mg/dL   Magnesium    Collection Time: 02/18/24  3:40 AM   Result Value Ref Range    Magnesium 1.7 1.6 - 2.6 mg/dL   Comprehensive Metabolic Panel    Collection Time: 02/18/24  3:40 AM   Result Value Ref Range    Sodium 136 136 - 145 mmol/L    Potassium 4.1 3.5 - 5.1 mmol/L    Chloride 104 95 - 110 mmol/L    CO2 24 23 -  29 mmol/L    Glucose 106 70 - 110 mg/dL    BUN 15 8 - 23 mg/dL    Creatinine 0.8 0.5 - 1.4 mg/dL    Calcium 8.4 (L) 8.7 - 10.5 mg/dL    Total Protein 5.1 (L) 6.0 - 8.4 g/dL    Albumin 1.9 (L) 3.5 - 5.2 g/dL    Total Bilirubin 0.5 0.1 - 1.0 mg/dL    Alkaline Phosphatase 83 55 - 135 U/L    AST 32 10 - 40 U/L    ALT 11 10 - 44 U/L    eGFR >60 >60 mL/min/1.73 m^2    Anion Gap 8 8 - 16 mmol/L   CBC Auto Differential    Collection Time: 02/18/24  3:40 AM   Result Value Ref Range    WBC 9.85 3.90 - 12.70 K/uL    RBC 2.28 (L) 4.60 - 6.20 M/uL    Hemoglobin 8.1 (L) 14.0 - 18.0 g/dL    Hematocrit 24.5 (L) 40.0 - 54.0 %     (H) 82 - 98 fL    MCH 35.5 (H) 27.0 - 31.0 pg    MCHC 33.1 32.0 - 36.0 g/dL    RDW 13.4 11.5 - 14.5 %    Platelets 249 150 - 450 K/uL    MPV 10.2 9.2 - 12.9 fL    Immature Granulocytes 0.7 (H) 0.0 - 0.5 %    Gran # (ANC) 7.1 1.8 - 7.7 K/uL    Immature Grans (Abs) 0.07 (H) 0.00 - 0.04 K/uL    Lymph # 1.5 1.0 - 4.8 K/uL    Mono # 1.1 (H) 0.3 - 1.0 K/uL    Eos # 0.1 0.0 - 0.5 K/uL    Baso # 0.04 0.00 - 0.20 K/uL    nRBC 0 0 /100 WBC    Gran % 71.8 38.0 - 73.0 %    Lymph % 15.4 (L) 18.0 - 48.0 %    Mono % 11.2 4.0 - 15.0 %    Eosinophil % 0.5 0.0 - 8.0 %    Basophil % 0.4 0.0 - 1.9 %    Differential Method Automated    Protime-INR    Collection Time: 02/18/24  3:40 AM   Result Value Ref Range    Prothrombin Time 10.2 9.0 - 12.5 sec    INR 0.9 0.8 - 1.2       Microbiology Results (last 7 days)       ** No results found for the last 168 hours. **             Imaging Results              CT Chest Without Contrast (Final result)  Result time 02/13/24 21:34:21      Final result by Rasheeda Junior MD (02/13/24 21:34:21)                   Impression:      No acute intrathoracic abnormality.  Mild emphysematous changes.    Acute traumatic fracture of the right proximal humeral shaft.    Chronic bilateral rib, right coracoid, and right distal clavicular fractures.      Electronically signed by: Rasheeda  Vernon  Date:    02/13/2024  Time:    21:34               Narrative:    EXAMINATION:  CT CHEST WITHOUT CONTRAST    CLINICAL HISTORY:  Fall.  Right arm injury.  Sling in place.    TECHNIQUE:  Contiguous axial 5 mm images was obtained from the lung apices through the lung bases.  No intravenous contrast was given.  Coronal and sagittal reformatted images were provided.    COMPARISON:  None.    FINDINGS:  There is no acute rib fractures.  There are nonacute bilateral rib fractures.  There is no pneumothorax or pulmonary contusion.    There are mild emphysematous changes.  There is mild bibasilar atelectatic changes.    There is no evidence of mediastinal, hilar, or axillary adenopathy.    There is no pleural or pericardial effusion.    The heart size is within normal limits.    In the visualized upper abdomen, there is mild bilateral nonspecific perinephric stranding.  There is a an acute traumatic mildly comminuted fracture of the visualized right proximal humerus.  There are remote appearing fractures of the right coracoid and right distal clavicle.  There is posterior surgical hardware seen at the visualized lower cervical spine and T1.  Advanced degenerative changes are seen at the left shoulder.                                       CT Head Without Contrast (Final result)  Result time 02/13/24 21:15:36      Final result by Rasheeda Junior MD (02/13/24 21:15:36)                   Impression:      No acute intracranial abnormality detected.    Interval development of sinus disease.    No acute cervical fracture.  Remote fractures of C2 and T1 vertebral bodies.      Electronically signed by: Rasheeda Junior  Date:    02/13/2024  Time:    21:15               Narrative:    EXAMINATION:  CT OF THE HEAD WITHOUT AND CT CERVICAL SPINE    CLINICAL HISTORY:  intoxicated, fall;; intoxicated fall;    TECHNIQUE:  5 mm unenhanced axial images were obtained from the skull base to the vertex.  1.25 mm axial images were  obtained through the cervical spine.  Coronal and sagittal reformatted images were provided.    COMPARISON:  03/06/2023    FINDINGS:  CT head: The ventricles, basal cisterns, and cortical sulci are within normal limits for patient's stated age. There is no acute intracranial hemorrhage, territorial infarct or mass effect, or midline shift. In the visualized paranasal sinuses, there is near complete opacification of the left maxillary sinus.  There is mucoperiosteal thickening to a lesser extent in bilateral ethmoid, left inferior frontal, and right maxillary sinuses.    CT cervical spine: Posterior lumbar hardware seen extending from C1-T1.  No definite acute fractures are detected.  There are again seen remote fracture of the C2 and T1 vertebral bodies.  Additional prior fractures are not well appreciated.  The bones are diffusely osteopenic.  At the lung apices, there are mild emphysematous changes.                                       CT Cervical Spine Without Contrast (Final result)  Result time 02/13/24 21:15:36      Final result by Rasheeda Junior MD (02/13/24 21:15:36)                   Impression:      No acute intracranial abnormality detected.    Interval development of sinus disease.    No acute cervical fracture.  Remote fractures of C2 and T1 vertebral bodies.      Electronically signed by: Rasheeda Junior  Date:    02/13/2024  Time:    21:15               Narrative:    EXAMINATION:  CT OF THE HEAD WITHOUT AND CT CERVICAL SPINE    CLINICAL HISTORY:  intoxicated, fall;; intoxicated fall;    TECHNIQUE:  5 mm unenhanced axial images were obtained from the skull base to the vertex.  1.25 mm axial images were obtained through the cervical spine.  Coronal and sagittal reformatted images were provided.    COMPARISON:  03/06/2023    FINDINGS:  CT head: The ventricles, basal cisterns, and cortical sulci are within normal limits for patient's stated age. There is no acute intracranial hemorrhage,  territorial infarct or mass effect, or midline shift. In the visualized paranasal sinuses, there is near complete opacification of the left maxillary sinus.  There is mucoperiosteal thickening to a lesser extent in bilateral ethmoid, left inferior frontal, and right maxillary sinuses.    CT cervical spine: Posterior lumbar hardware seen extending from C1-T1.  No definite acute fractures are detected.  There are again seen remote fracture of the C2 and T1 vertebral bodies.  Additional prior fractures are not well appreciated.  The bones are diffusely osteopenic.  At the lung apices, there are mild emphysematous changes.                                       X-Ray Chest AP Portable (Final result)  Result time 02/13/24 19:13:11      Final result by Rasheeda Junior MD (02/13/24 19:13:11)                   Impression:      Blunting of the left costophrenic angle, which may suggest small left pleural effusion and/or pleural thickening.      Electronically signed by: Rasheeda Junior  Date:    02/13/2024  Time:    19:13               Narrative:    EXAMINATION:  AP PORTABLE CHEST    CLINICAL HISTORY:  Pain in right arm    TECHNIQUE:  AP portable chest radiograph was submitted.    COMPARISON:  10/18/2022    FINDINGS:  AP portable chest radiograph demonstrates a cardiac silhouette within normal limits.  There is no focal consolidation or pneumothorax.  There is blunting of left costophrenic angle.  There is redemonstration of a right clavicular fracture.  The bones are diffusely osteopenic.  There is cervicothoracic hardware, which was not present on the previous exam.  There appears to be nonacute fractures of the lower bilateral ribs.  Correlate with known history.                                       X-Ray Clavicle Right (Final result)  Result time 02/13/24 19:17:21      Final result by Max Villatoro MD (02/13/24 19:17:21)                   Impression:      As above.      Electronically signed by: Max Villatoro  MD  Date:    02/13/2024  Time:    19:17               Narrative:    EXAMINATION:  XR CLAVICLE RIGHT    CLINICAL HISTORY:  right clavicle injury;    TECHNIQUE:  Two views of the right clavicle were performed.    COMPARISON:  10/18/2022.    FINDINGS:  There are partially visualized postop changes in the cervical spine.  There is a chronic fracture of the right distal clavicle.    There is a partially visualized acute fracture of the right proximal humerus.    The visualized right hemithorax is within normal limits.                                       X-Ray Shoulder 1 View Right (Final result)  Result time 02/13/24 19:22:54      Final result by Max Villatoro MD (02/13/24 19:22:54)                   Impression:      Acute displaced and foreshortened fracture of the proximal humerus metadiaphysis as above.    No evidence of a dislocation.      Electronically signed by: Max Villatoro MD  Date:    02/13/2024  Time:    19:22               Narrative:    EXAMINATION:  XR SHOULDER 1 VIEW RIGHT; XR HUMERUS 2 VIEW RIGHT    CLINICAL HISTORY:  Pain in right arm    TECHNIQUE:  Single frontal view of the right shoulder.    Two x-ray views of the right humerus.    COMPARISON:  Chest x-ray dated 10/18/2022.    FINDINGS:  The bone mineralization is within normal limits.  There is an acute displaced spiral fracture of the proximal metadiaphysis of the right humerus.  There is lateral displacement of the distal fracture component along with foreshortening.  There is also a displaced 1.8 cm fragment overlying the proximal humerus.  There is about 2 cm detraction of the fracture components.    The glenohumeral articulation is maintained.  There is arthropathy of the acromioclavicular joint.  The coracoclavicular interval is within normal limits.    The visualized right hemithorax is unremarkable.    There are partially visualized postoperative changes in the cervical spine.                                       X-Ray Elbow Complete Right  (Final result)  Result time 02/13/24 19:15:46      Final result by Max Villatoro MD (02/13/24 19:15:46)                   Impression:      No acute process.      Electronically signed by: Max Villatoro MD  Date:    02/13/2024  Time:    19:15               Narrative:    EXAMINATION:  XR ELBOW COMPLETE 3 VIEW RIGHT    CLINICAL HISTORY:  Pain in right arm    TECHNIQUE:  Two x-ray views of the right elbow were performed.    COMPARISON:  None    FINDINGS:  The bone mineralization is within normal limits.  There is no cortical step-off.  There is no evidence of periostitis.    The joint spaces are maintained.  The soft tissues are unremarkable.  No radiopaque foreign body is identified.    There is no evidence of a fracture dislocation.                                       X-Ray Humerus 2 View Right (Final result)  Result time 02/13/24 19:22:54      Final result by Max Villatoro MD (02/13/24 19:22:54)                   Impression:      Acute displaced and foreshortened fracture of the proximal humerus metadiaphysis as above.    No evidence of a dislocation.      Electronically signed by: Max Villatoro MD  Date:    02/13/2024  Time:    19:22               Narrative:    EXAMINATION:  XR SHOULDER 1 VIEW RIGHT; XR HUMERUS 2 VIEW RIGHT    CLINICAL HISTORY:  Pain in right arm    TECHNIQUE:  Single frontal view of the right shoulder.    Two x-ray views of the right humerus.    COMPARISON:  Chest x-ray dated 10/18/2022.    FINDINGS:  The bone mineralization is within normal limits.  There is an acute displaced spiral fracture of the proximal metadiaphysis of the right humerus.  There is lateral displacement of the distal fracture component along with foreshortening.  There is also a displaced 1.8 cm fragment overlying the proximal humerus.  There is about 2 cm detraction of the fracture components.    The glenohumeral articulation is maintained.  There is arthropathy of the acromioclavicular joint.  The coracoclavicular interval  is within normal limits.    The visualized right hemithorax is unremarkable.    There are partially visualized postoperative changes in the cervical spine.                                             Assessment/Plan:      * Humerus fracture  S/p fall  Suspect cat jumping on him is a deflection of him being drunk and falling  Xray of humerus showed an acute displaced and foreshortened fracture of the proximal humerus metadiaphysis.  Ortho consulted. Recs reviewed:  noted to have radial nerve palsy prior to splinting. I agree that surgery for open reduction internal fixation is likely the best choice for him. Is not emergent and can be scheduled. He reports he has no medical coverage in his currently under pec. I think it is best to try to handle this on this admission. We will schedule him for surgery for Friday for ORIF of right humerus. 2-3 months before he is able to use his arm for heavy lifting.      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   He does not have a plan of suicide and has not imagined it  His S.I. seems to be stimulated directly from his injury, therefore, felt starting SSRI less appropriate and giving him time to process the event and make plans with his job would be best, initially.  PEC'd in ED  Tele-Psych consulted. Recs reviewed:  Continue PEC for IP stabilization.    Will transfer to IP psych once medically cleared       Suicidal ideation  See depression note      Macrocytic anemia  Stable. Trend.     Alcohol abuse  Patient says he does not drink everyday; used to  Now has a beverage intermittently; last drink Tuesday evening.  Has about 2 or 3 mixed drinks; does not get drunk    Scheduled Librium; will taper   CIWA protocol       Generalized anxiety disorder  Was on Cymbalta, not  taken in months  Monitor       Tobacco use disorder  Nicotine patch   4m counseling routinely: The following was discussed concerning tobacco use:  Relevance of Quitting  Risk to Health  Long Term Risk  Risk for Others  Rewards of Quitting  Motivation Intervention to Quit    Hypertension  Uncontrolled.   Home meds reconciled. Control pain.  Trend      VTE Risk Mitigation (From admission, onward)           Ordered     enoxaparin injection 40 mg  Daily         02/16/24 1455     IP VTE LOW RISK PATIENT  Once         02/13/24 2218                    Discharge Planning   BRANDON:      Code Status: Full Code   Is the patient medically ready for discharge?:     Reason for patient still in hospital (select all that apply): Patient trending condition and Treatment  Discharge Plan A: Psychiatric hospital   Discharge Delays: None known at this time              Odell Burgos MD  Department of Hospital Medicine   [facility]

## 2024-02-18 NOTE — PT/OT/SLP EVAL
Occupational Therapy Evaluation and Treatment    Name: Israel De Jesus  MRN: [id number]  Admitting Diagnosis: Humerus fracture  Recent Surgery: Procedure(s) (LRB):  ORIF, FRACTURE, HUMERUS (Right) 2 Days Post-Op    Recommendations:     Discharge Recommendations: Low Intensity Therapy  Level of Assistance Recommended: 24 hours light assistance  Discharge Equipment Recommendations:  (TBD)  Barriers to discharge:  (will need follow up care for right shoukder fracture repair)    Assessment:     Israel De Jesus is a 63 y.o. male with a medical diagnosis of Humerus fracture. He presents with performance deficits affecting function including weakness, impaired endurance, impaired self care skills, impaired sensation, impaired functional mobility, gait instability, impaired balance, decreased coordination, decreased upper extremity function, decreased safety awareness, pain, decreased ROM, impaired fine motor, impaired skin, edema, impaired cardiopulmonary response to activity, orthopedic precautions.   The patient was cooperative with OT eval and tx with min c/o pain. The patient demo some initiation of right wrist ext, pro/supination and finger ext that patient reports ios improving. The patient tolerated standing at the sink to perform grooming tasls with his left hand. The patient will benefit from cont OT toward care plan goals.    Rehab Prognosis: Good; patient would benefit from acute OT services to address these deficits and reach maximum level of function.    Plan:     Patient to be seen 5 x/week to address the above listed problems via self-care/home management, therapeutic activities, therapeutic exercises  Plan of Care Expires: 03/03/24  Plan of Care Reviewed with: patient    Subjective     Chief Complaint: right shoulder hurts  Patient Comments/Goals: agreeable to OT. The patient demo AAROM to the right hand  Pain/Comfort:  Pain Rating 1:  (yes-did not rate)  Location - Side 1: Right  Location -  Orientation 1: proximal  Location 1: shoulder  Pain Addressed 1: Reposition, Distraction, Cessation of Activity    Patients cultural, spiritual, Druze conflicts given the current situation: no    Social History:  Living Environment: Patient lives alone in an floor apartment   Prior Level of Function: Prior to admission, patient was independent  Roles and Routines: Patient was working as  at [employer]  prior to admission.  Equipment Used at Home: none  DME owned (not currently used): none  Assistance Upon Discharge:  has a daughter    Objective:     Communicated with PCT prior to session. Patient found HOB elevated with peripheral IV, bed alarm (hospital safety sitter for PEC) ice pack to the right shoulder upon OT entry to room.    General Precautions: Standard,     Orthopedic Precautions:     Braces: UE Sling    Respiratory Status: Room air    Occupational Performance    Gait belt applied - Yes    Bed Mobility:   Scooting anteriorly to EOB to have both feet planted on floor: stand by assistance  Supine to sit from right side of bed with contact guard assistance and HOB elevated    Functional Mobility/Transfers:  Sit <> Stand Transfer with contact guard assistance with no AD and hand-held assist  Functional Mobility: The patient amb without AD with CGA from the bed to the sink to chair with no <LOB or c/o dizziness.    Activities of Daily Living:  Grooming: set up assistance and stand by assistance  Upper Body Dressing: back gown was draped to allow placement of a gait belt (Unable to wear hospital gown 2* PEC   Lower Body Dressing: dependence  Toileting: modified independence to use urinal in bed      Cognitive/Visual Perceptual:  Cognitive/Psychosocial Skills:    -     Oriented to: Person, Place, and Situation  -     Follows Commands/attention: Follows one-step commands and Follows two-step commands  -     Communication: clear/fluent  -     Memory: No Deficits noted  -     Safety awareness/insight to  disability: impaired  -     Mood/Affect/Coping skills/emotional control: Appropriate to situation  Visual/Perceptual:    -     Wears glasses    Physical Exam:  Balance:    -     Sitting: modified independence and supervision  -     Standing: stand by assistance and contact guard assistance  Postural examination/scapula alignment:    -       No postural abnormalities identified  Skin integrity: Bruising of right shoulder and arm  Edema:  Moderate Right shoulder>elbow  Sensation:    -       Impaired  RUE feels numb  Dominant hand: Right  Upper Extremity Range of Motion:     -       Right Upper Extremity: shoulder N/T limited rebecca/supination, wrist and hand is WFL   -       Left Upper Extremity: WFL  Upper Extremity Strength:    -       Right Upper Extremity: N/T  -       Left Upper Extremity: WFL   Strength:    -       Left Upper Extremity: WFL    AMPAC 6 Click ADL:  AMPAC Total Score: 20    Treatment & Education:  Patient educated on role of OT, POC, and goals for therapy  Performed self care and functional mobility as noted above  Educated the patient re: the bend=fits of sitting in the chair with nursing assistance  The patient performed AAROM to the right elbow with available ROM, limited by edema , pronation/supination, wrist and hand. The patient with pasrtial active motion throughout.     Patient clear to ambulate to/from bathroom with RN/PCT, assist x1 .    Patient left up in chair , reclined with all lines intact, call button in reach, and PCT/safety sitter present.    GOALS:   Multidisciplinary Problems       Occupational Therapy Goals          Problem: Occupational Therapy    Goal Priority Disciplines Outcome Interventions   Occupational Therapy Goal     OT, PT/OT Ongoing, Progressing    Description: Goals to be met by: 3/3/2024     Patient will increase functional independence with ADLs by performing:    Feeding with Modified [place].  UE Dressing with Modified [place].  LE Dressing with  Modified [place].  Grooming while standing at sink with Modified [place] and Assistive Devices as needed.  Toileting from toilet with Modified [place] and Assistive Devices as needed for hygiene and clothing management.   Supine to sit with Modified [place].  Step transfer with Modified [place]  Toilet transfer to toilet with Modified [place].  Upper extremity exercise program x15 reps per handout, with independence.                         History:     Past Medical History:   Diagnosis Date    Eye injury 09/01/1980    ? eye hot metal, and burn eyes ou     Generalized anxiety disorder 03/03/2023    Hypertension     Macrocytic anemia 02/14/2024    Macrocytic anemia 2/14/2024    Sciatica 12/28/2020         Past Surgical History:   Procedure Laterality Date    FUSION OF CERVICAL SPINE BY POSTERIOR APPROACH N/A 10/12/2022    Procedure: POSTERIOR CERVICAL FUSION, SPINE C1-C4 PCF ;  Surgeon: Ike Storm DO;  Location: [facility] OR [address] [place] [address];  Service: Neurosurgery;  Laterality: N/A;    FUSION OF CERVICAL SPINE BY POSTERIOR APPROACH N/A 12/7/2022    Procedure: FUSION,SPINE,CERVICAL,POSTERIOR APPROACH C1-T1;  Surgeon: Ike Storm DO;  Location: [facility] OR [address] [place] [address];  Service: Neurosurgery;  Laterality: N/A;  GENERAL/ TYPE & SCREEN/ EMG/ SEP/ MEP/ MIAMI/ REGULAR BED, REVERSED/ BROWN/ PRONE/ C-ARM/ DEPUY/ COLEMAN/ PACU/ ASSISTANT SURGEON DR. MAYURI LEMOS    KNEE ARTHROPLASTY      LAMINECTOMY N/A 10/12/2022    Procedure: LAMINECTOMY, SPINE, C-1;  Surgeon: Ike Storm DO;  Location: [facility] OR [address] [place] [address];  Service: Neurosurgery;  Laterality: N/A;       Time Tracking:     OT Date of Treatment: 02/18/24  OT Start Time: 1438  OT Stop Time: 1516  OT Total Time (min): 38 min    Billable Minutes: Evaluation 15, Self Care/Home Management 15, and Therapeutic Exercise 8    2/18/2024

## 2024-02-19 LAB
ALBUMIN SERPL BCP-MCNC: 2 G/DL (ref 3.5–5.2)
ALP SERPL-CCNC: 86 U/L (ref 55–135)
ALT SERPL W/O P-5'-P-CCNC: 12 U/L (ref 10–44)
ANION GAP SERPL CALC-SCNC: 5 MMOL/L (ref 8–16)
AST SERPL-CCNC: 24 U/L (ref 10–40)
BASOPHILS # BLD AUTO: 0.04 K/UL (ref 0–0.2)
BASOPHILS NFR BLD: 0.4 % (ref 0–1.9)
BILIRUB SERPL-MCNC: 0.5 MG/DL (ref 0.1–1)
BUN SERPL-MCNC: 13 MG/DL (ref 8–23)
CALCIUM SERPL-MCNC: 8.9 MG/DL (ref 8.7–10.5)
CHLORIDE SERPL-SCNC: 103 MMOL/L (ref 95–110)
CO2 SERPL-SCNC: 28 MMOL/L (ref 23–29)
CREAT SERPL-MCNC: 0.8 MG/DL (ref 0.5–1.4)
DIFFERENTIAL METHOD BLD: ABNORMAL
EOSINOPHIL # BLD AUTO: 0.1 K/UL (ref 0–0.5)
EOSINOPHIL NFR BLD: 1 % (ref 0–8)
ERYTHROCYTE [DISTWIDTH] IN BLOOD BY AUTOMATED COUNT: 13.2 % (ref 11.5–14.5)
EST. GFR  (NO RACE VARIABLE): >60 ML/MIN/1.73 M^2
GLUCOSE SERPL-MCNC: 101 MG/DL (ref 70–110)
HCT VFR BLD AUTO: 26.8 % (ref 40–54)
HGB BLD-MCNC: 8.6 G/DL (ref 14–18)
IMM GRANULOCYTES # BLD AUTO: 0.08 K/UL (ref 0–0.04)
IMM GRANULOCYTES NFR BLD AUTO: 0.9 % (ref 0–0.5)
INR PPP: 0.9 (ref 0.8–1.2)
LYMPHOCYTES # BLD AUTO: 1.3 K/UL (ref 1–4.8)
LYMPHOCYTES NFR BLD: 14.6 % (ref 18–48)
MAGNESIUM SERPL-MCNC: 1.8 MG/DL (ref 1.6–2.6)
MCH RBC QN AUTO: 34.3 PG (ref 27–31)
MCHC RBC AUTO-ENTMCNC: 32.1 G/DL (ref 32–36)
MCV RBC AUTO: 107 FL (ref 82–98)
MONOCYTES # BLD AUTO: 1 K/UL (ref 0.3–1)
MONOCYTES NFR BLD: 10.9 % (ref 4–15)
NEUTROPHILS # BLD AUTO: 6.6 K/UL (ref 1.8–7.7)
NEUTROPHILS NFR BLD: 72.2 % (ref 38–73)
NRBC BLD-RTO: 0 /100 WBC
PHOSPHATE SERPL-MCNC: 3.1 MG/DL (ref 2.7–4.5)
PLATELET # BLD AUTO: 268 K/UL (ref 150–450)
PMV BLD AUTO: 9.5 FL (ref 9.2–12.9)
POCT GLUCOSE: 120 MG/DL (ref 70–110)
POTASSIUM SERPL-SCNC: 4.1 MMOL/L (ref 3.5–5.1)
PROT SERPL-MCNC: 5.5 G/DL (ref 6–8.4)
PROTHROMBIN TIME: 10 SEC (ref 9–12.5)
RBC # BLD AUTO: 2.51 M/UL (ref 4.6–6.2)
SODIUM SERPL-SCNC: 136 MMOL/L (ref 136–145)
WBC # BLD AUTO: 9.09 K/UL (ref 3.9–12.7)

## 2024-02-19 PROCEDURE — 97110 THERAPEUTIC EXERCISES: CPT

## 2024-02-19 PROCEDURE — 85610 PROTHROMBIN TIME: CPT | Performed by: ORTHOPAEDIC SURGERY

## 2024-02-19 PROCEDURE — 80053 COMPREHEN METABOLIC PANEL: CPT | Performed by: ORTHOPAEDIC SURGERY

## 2024-02-19 PROCEDURE — 25000003 PHARM REV CODE 250: Performed by: ORTHOPAEDIC SURGERY

## 2024-02-19 PROCEDURE — 97161 PT EVAL LOW COMPLEX 20 MIN: CPT

## 2024-02-19 PROCEDURE — 25000003 PHARM REV CODE 250: Performed by: STUDENT IN AN ORGANIZED HEALTH CARE EDUCATION/TRAINING PROGRAM

## 2024-02-19 PROCEDURE — 97116 GAIT TRAINING THERAPY: CPT

## 2024-02-19 PROCEDURE — 36415 COLL VENOUS BLD VENIPUNCTURE: CPT | Performed by: ORTHOPAEDIC SURGERY

## 2024-02-19 PROCEDURE — 97530 THERAPEUTIC ACTIVITIES: CPT

## 2024-02-19 PROCEDURE — 63600175 PHARM REV CODE 636 W HCPCS: Performed by: ORTHOPAEDIC SURGERY

## 2024-02-19 PROCEDURE — 97535 SELF CARE MNGMENT TRAINING: CPT

## 2024-02-19 PROCEDURE — 11000001 HC ACUTE MED/SURG PRIVATE ROOM

## 2024-02-19 PROCEDURE — S4991 NICOTINE PATCH NONLEGEND: HCPCS | Performed by: ORTHOPAEDIC SURGERY

## 2024-02-19 PROCEDURE — 83735 ASSAY OF MAGNESIUM: CPT | Performed by: ORTHOPAEDIC SURGERY

## 2024-02-19 PROCEDURE — 85025 COMPLETE CBC W/AUTO DIFF WBC: CPT | Performed by: ORTHOPAEDIC SURGERY

## 2024-02-19 PROCEDURE — 84100 ASSAY OF PHOSPHORUS: CPT | Performed by: ORTHOPAEDIC SURGERY

## 2024-02-19 PROCEDURE — 99233 SBSQ HOSP IP/OBS HIGH 50: CPT | Mod: 95,,, | Performed by: PSYCHIATRY & NEUROLOGY

## 2024-02-19 RX ORDER — LANOLIN ALCOHOL/MO/W.PET/CERES
100 CREAM (GRAM) TOPICAL DAILY
Start: 2024-02-19

## 2024-02-19 RX ORDER — CHLORDIAZEPOXIDE HYDROCHLORIDE 5 MG/1
5 CAPSULE, GELATIN COATED ORAL 2 TIMES DAILY
Status: COMPLETED | OUTPATIENT
Start: 2024-02-19 | End: 2024-02-20

## 2024-02-19 RX ADMIN — METOPROLOL TARTRATE 50 MG: 50 TABLET, FILM COATED ORAL at 12:02

## 2024-02-19 RX ADMIN — AMLODIPINE BESYLATE 10 MG: 5 TABLET ORAL at 09:02

## 2024-02-19 RX ADMIN — METOPROLOL TARTRATE 50 MG: 50 TABLET, FILM COATED ORAL at 05:02

## 2024-02-19 RX ADMIN — CHLORDIAZEPOXIDE HYDROCHLORIDE 5 MG: 5 CAPSULE ORAL at 08:02

## 2024-02-19 RX ADMIN — ENOXAPARIN SODIUM 40 MG: 40 INJECTION SUBCUTANEOUS at 05:02

## 2024-02-19 RX ADMIN — CHLORDIAZEPOXIDE HYDROCHLORIDE 5 MG: 5 CAPSULE ORAL at 05:02

## 2024-02-19 RX ADMIN — Medication 6 MG: at 08:02

## 2024-02-19 RX ADMIN — Medication 1000 UNITS: at 09:02

## 2024-02-19 RX ADMIN — HYPROMELLOSE 2910 1 DROP: 5 SOLUTION OPHTHALMIC at 08:02

## 2024-02-19 RX ADMIN — FOLIC ACID 1 MG: 1 TABLET ORAL at 09:02

## 2024-02-19 RX ADMIN — NICOTINE 1 PATCH: 14 PATCH TRANSDERMAL at 09:02

## 2024-02-19 RX ADMIN — SENNOSIDES AND DOCUSATE SODIUM 1 TABLET: 8.6; 5 TABLET ORAL at 09:02

## 2024-02-19 RX ADMIN — THIAMINE HCL TAB 100 MG 100 MG: 100 TAB at 09:02

## 2024-02-19 RX ADMIN — THERA TABS 1 TABLET: TAB at 09:02

## 2024-02-19 NOTE — SIGNIFICANT EVENT
Given patient is no longer requiring inpt psych, PT/OT rec inpatient REHAB.        Odell Burgos MD  Board-Certified Internal Medicine Attending  Section Head of [facility]

## 2024-02-19 NOTE — PLAN OF CARE
RE-ASSESSMENT    Patient from home. CEC recinded by psychiatrist, patient no longer a danger to self or others. Will need follow up with outpatient psychiatry. SW to provide information for follow up in community. Patient cleared to discharge from Ortho perspective. Will need post op appointment with Dr. Rich in 2 weeks. Patient will discharge tomorrow, tapering off of benzo.

## 2024-02-19 NOTE — NURSING
[facility]  Nurses Note -- 4 Eyes      2/18/2024       Skin assessed on: Q Shift      [x] No Pressure Injuries Present    [x]Prevention Measures Documented    [] Yes LDA  for Pressure Injury Previously documented     [] Yes New Pressure Injury Discovered   [] LDA for New Pressure Injury Added      Attending RN:  Jay Gibson LPN     Second RN:  Toro Murphy RN

## 2024-02-19 NOTE — PT/OT/SLP EVAL
Physical Therapy Evaluation    Patient Name:  Israel De Jesus   MRN:  [id number]    Recommendations:     Discharge Recommendations: High Intensity Therapy   Discharge Equipment Recommendations: to be determined by next level of care   Barriers to discharge:  High Intensity Therapy recommended as pt is not functioning at Independent, ambulatory, employed, baseline following fall at home.  Pt with R UE Sx/Ortho precautions and gait instability that could benefit from an aggressive multidisciplinary approach in order to restore pt's functional [place]    Assessment:     Israel De Jesus is a 63 y.o. male admitted with a medical diagnosis of Humerus fracture.  He presents with the following impairments/functional limitations: weakness, impaired balance, decreased safety awareness, impaired skin, impaired endurance, pain, impaired self care skills, decreased coordination, decreased ROM, impaired functional mobility, decreased upper extremity function, impaired coordination, gait instability, orthopedic precautions .    Rehab Prognosis: Good; patient would benefit from acute skilled PT services to address these deficits and reach maximum level of function.    Recent Surgery: Procedure(s) (LRB):  ORIF, FRACTURE, HUMERUS (Right) 3 Days Post-Op    Plan:     During this hospitalization, patient to be seen  (5-6x/wk) to address the identified rehab impairments via gait training, therapeutic activities, therapeutic exercises, neuromuscular re-education, wheelchair management/training and progress toward the following goals:    Plan of Care Expires:  03/04/24    Subjective     Chief Complaint: R UE pain, pain in L knee with ambulation, weakness and shaking in LE's   Patient/Family Comments/goals: Pt agreeable to eval, PLOF, return to work  Pain/Comfort:  Pain Rating 1:  (Not rated)  Location - Orientation 1:  (R shoulder and L Knee with ambulation)  Pain Addressed 1: Pre-medicate for activity, Reposition, Distraction,  "Cessation of Activity    Patients cultural, spiritual, Yarsani conflicts given the current situation: no    Living Environment:  Pt lives alone in an apt  Prior to admission, patients level of function was Independent without AD, working at [employer].  Equipment used at home: none.  DME owned (not currently used): none.  Upon discharge, patient will have assistance from Daughter?.    Objective:     Communicated with nsg prior to session.  Patient found HOB elevated with  (safety sitter)  upon PT entry to room.    General Precautions: Standard, fall  Orthopedic Precautions:RUE non weight bearing   Braces: UE Sling (R wrist brace)  Respiratory Status: Room air    Exams:  Cognitive Exam:  Patient is oriented to Person, Place, Time, and Situation  Gross Motor Coordination:  mildly impaired 2/2 weakness  Postural Exam:  Patient presented with the following abnormalities:    -       Rounded shoulders  -       Forward head  Sensation:    -       Intact  light/touch B LE's  Skin Integrity/Edema:      -       Skin integrity: R UE with Sx dressings intact  -       Edema: None noted   RLE ROM: WFL  RLE Strength: WFL  LLE ROM: knee ext approx 20*  LLE Strength: WFL    Functional Mobility:  Bed Mobility:     Scooting: minimum assistance  Supine to Sit: minimum assistance  Transfers:     Sit to Stand:  minimum assistance with straight cane  Gait: Pt unstable upon standing without AD.  Gait training with SPC approx 60' with Min A for balance and Mod A for LOB x 3.  Pt required VC/TC for sequencing and safety with SPC.  Pt's LE's shaking and required seated rest break.   Balance: Fair+ sit, FAir/Fair- stand      AM-PAC 6 CLICK MOBILITY  Total Score:16       Treatment & Education:  Adjusted pt's sling and educated pt on fit.  Gait training as above.  Educated pt on PT POC, to "call before you fall", and to perform B AP's, TKE's, and GS while up in chair.  Handout provided.      Patient left up in chair with all lines intact, call " "button in reach, and nsg notified and safety sitter present.    GOALS:   Multidisciplinary Problems       Physical Therapy Goals          Problem: Physical Therapy    Goal Priority Disciplines Outcome Goal Variances Interventions   Physical Therapy Goal     PT, PT/OT Ongoing, Progressing     Description: Goals to be met by: 3/18/24     Patient will increase functional independence with mobility by performin. Supine to sit with Modified [place]  2. Sit to stand transfer with Modified [place]  3. Gait  x 150 feet with Modified [place] using Single-point Cane .   4. Ascend/descend 1 stair with  Modified [place] using Single-point Cane .   5. Lower extremity exercise program x10 reps per handout, with independence  6. Propel W/C in Level surfaces with B LE"s x ' Mod I                        History:     Past Medical History:   Diagnosis Date    Eye injury 1980    ? eye hot metal, and burn eyes ou     Generalized anxiety disorder 2023    Hypertension     Macrocytic anemia 2024    Macrocytic anemia 2024    Sciatica 2020       Past Surgical History:   Procedure Laterality Date    FUSION OF CERVICAL SPINE BY POSTERIOR APPROACH N/A 10/12/2022    Procedure: POSTERIOR CERVICAL FUSION, SPINE C1-C4 PCF ;  Surgeon: Ike Storm DO;  Location: [facility] OR [address] [place] [address];  Service: Neurosurgery;  Laterality: N/A;    FUSION OF CERVICAL SPINE BY POSTERIOR APPROACH N/A 2022    Procedure: FUSION,SPINE,CERVICAL,POSTERIOR APPROACH C1-T1;  Surgeon: Ike Storm DO;  Location: [facility] OR [address] [place] [address];  Service: Neurosurgery;  Laterality: N/A;  GENERAL/ TYPE & SCREEN/ EMG/ SEP/ MEP/ MIAMI/ REGULAR BED, REVERSED/ BROWN/ PRONE/ C-ARM/ DEPUY/ COLEMAN/ PACU/ ASSISTANT SURGEON DR. MAYURI LEMOS    KNEE ARTHROPLASTY      LAMINECTOMY N/A 10/12/2022    Procedure: LAMINECTOMY, SPINE, C-1;  Surgeon: Ike Storm DO;  [facility];  Service: Neurosurgery;  Laterality: N/A; "       Time Tracking:     PT Received On: 02/19/24  PT Start Time: 1005     PT Stop Time: 1035  PT Total Time (min): 30 min     Billable Minutes: Evaluation 15 and Gait Training 15      02/19/2024

## 2024-02-19 NOTE — PLAN OF CARE
Problem: Adult Inpatient Plan of Care  Goal: Plan of Care Review  Outcome: Ongoing, Progressing  Goal: Patient-Specific Goal (Individualized)  Outcome: Ongoing, Progressing  Goal: Absence of Hospital-Acquired Illness or Injury  Outcome: Ongoing, Progressing  Goal: Optimal Comfort and Wellbeing  Outcome: Ongoing, Progressing  Goal: Readiness for Transition of Care  Outcome: Ongoing, Progressing     Problem: Fall Injury Risk  Goal: Absence of Fall and Fall-Related Injury  Outcome: Ongoing, Progressing     Problem: Bleeding (Orthopaedic Fracture)  Goal: Absence of Bleeding  Outcome: Ongoing, Progressing     Problem: Embolism (Orthopaedic Fracture)  Goal: Absence of Embolism Signs and Symptoms  Outcome: Ongoing, Progressing     Problem: Fracture Stabilization and Management (Orthopaedic Fracture)  Goal: Fracture Stability  Outcome: Ongoing, Progressing     Problem: Functional Ability Impaired (Orthopaedic Fracture)  Goal: Optimal Functional Ability  Outcome: Ongoing, Progressing     Problem: Infection (Orthopaedic Fracture)  Goal: Absence of Infection Signs and Symptoms  Outcome: Ongoing, Progressing     Problem: Neurovascular Compromise (Orthopaedic Fracture)  Goal: Effective Tissue Perfusion  Outcome: Ongoing, Progressing     Problem: Pain (Orthopaedic Fracture)  Goal: Acceptable Pain Control  Outcome: Ongoing, Progressing     Problem: Respiratory Compromise (Orthopaedic Fracture)  Goal: Effective Oxygenation and Ventilation  Outcome: Ongoing, Progressing     Problem: Confusion Acute  Goal: Optimal Cognitive Function  Outcome: Ongoing, Progressing

## 2024-02-19 NOTE — PLAN OF CARE
Problem: Functional Ability Impaired (Orthopaedic Fracture)  Goal: Optimal Functional Ability  Outcome: Ongoing, Progressing  Intervention: Optimize Functional Ability  Flowsheets (Taken 2/19/2024 [id number])  Activity Management:   Arm raise - L1   Sitting at edge of bed - L2  Activity Assistance Provided: assistance, 1 person

## 2024-02-19 NOTE — PROGRESS NOTES
[facility]  Progress Note    Patient Name: Israel De Jesus  MRN: [id number]  Patient Class: IP- Inpatient   Admission Date: 2/13/2024  Length of Stay: 6 days  Attending Physician: Odell Burgos MD  Primary Care Provider: Nuha Lynn MD        Subjective:     Principal Problem:Humerus fracture        HPI:    Israel De Jesus is a 63 y.o. male who has a past medical history of Eye injury, Generalized anxiety disorder, Hypertension, and Sciatica, presented to the ED with CC of R arm pain after a Fall, and Suicidal Ideations.     Patient states that his cat was jumping all over him and caused a mechanical fall onto carpeted ground, resulting in his right arm pain. This happened yesterday about 330 p.m. and has waited about 24 hours before coming into hospital. Patient was PEC'd in ED for S.I. Patient states he was not depressed a week or month ago. That his depression and suicidal thoughts began when he broke his arm and thought about not being able to work and potentially losing his job (he works as  at [employer]). He does not have a plan of suicide and has not imagined it. EtOH is still 312 now, unclear if he was trying to dull the pain, or if alcohol is a chronic problem for him. He does have a a low albumin of 2.6, mildly elevated AST at 45 (but also with muscle injury), and macrocytic anemia with  - which are all consistent with chronic alcoholism (daughter later confirmed he is recovering alcoholic and has been drinking for past 4 months). Xray of humerus showed an acute displaced and foreshortened fracture of the proximal humerus metadiaphysis. Arm wrapped and slinged in ED. Case discussed with Orthopedic surgery, patient is NPO for likely procedure tomorrow morning.        Overview/Hospital Course:  Patient admitted with fractured R humerus with radial nerve compromise. Ortho consulted in ED, Place for surgery today 2/16/24. PEC/CEC'd due to suicidal  ideations. Patient states he has no more S.I. Will ask for formal psych evazl to remove CEC, if able. Psych did not assess over weekend, awaiting for removal of CEC, medically ready for home with home health if so. Just tapering benzo.    Interval History:  NAEON.  Denies SI  S/p procedure  All questions answered and updates on care given.       ROS:  General: Negative for fevers or chills.  Cardiac: Negative for chest pain or orthopnea   Pulmonary: Negative for dyspnea or wheezing.  GI: Negative for abdominal distention or pain  R Arm pain    Vitals:    02/18/24 2341 02/19/24 0459 02/19/24 0548 02/19/24 0712   BP: 127/71 (!) 152/81 (!) 152/81 119/63   BP Location:  Left arm     Patient Position:  Lying  Lying   Pulse:  84  80   Resp:  18  18   Temp:  98.4 °F (36.9 °C)  97.6 °F (36.4 °C)   TempSrc:  Oral  Oral   SpO2:  (!) 93%  98%   Weight:       Height:              Body mass index is 25.82 kg/m².      PHYSICAL EXAM:  GENERAL APPEARANCE: alert and cooperative, and appears to be in no acute distress.  HEENT:     HEAD: NC/AT     EYES: PERRL, EOMI.  Vision is grossly intact.  NECK: Neck supple, non-tender without LAD, masses or thyromegaly.  CARDIAC: There is no peripheral edema, cyanosis or pallor.   LUNGS: Clear to auscultation and percussion without rales or wheezing  ABDOMEN: Non-distended. No guarding.  MSK: No joint erythema or tenderness.   NEUROLOGICAL: CN II-XII grossly intact. Still moving all fingers, but weakened, blood flow and sensation normal  SKIN: Bruising of most of R arm, wrapped now  PSYCHIATRIC: Oriented. No tangential speech. No Hyperactive features.        Recent Results (from the past 24 hour(s))   Phosphorus    Collection Time: 02/19/24  5:17 AM   Result Value Ref Range    Phosphorus 3.1 2.7 - 4.5 mg/dL   Magnesium    Collection Time: 02/19/24  5:17 AM   Result Value Ref Range    Magnesium 1.8 1.6 - 2.6 mg/dL   Comprehensive Metabolic Panel    Collection Time: 02/19/24  5:17 AM   Result Value  Ref Range    Sodium 136 136 - 145 mmol/L    Potassium 4.1 3.5 - 5.1 mmol/L    Chloride 103 95 - 110 mmol/L    CO2 28 23 - 29 mmol/L    Glucose 101 70 - 110 mg/dL    BUN 13 8 - 23 mg/dL    Creatinine 0.8 0.5 - 1.4 mg/dL    Calcium 8.9 8.7 - 10.5 mg/dL    Total Protein 5.5 (L) 6.0 - 8.4 g/dL    Albumin 2.0 (L) 3.5 - 5.2 g/dL    Total Bilirubin 0.5 0.1 - 1.0 mg/dL    Alkaline Phosphatase 86 55 - 135 U/L    AST 24 10 - 40 U/L    ALT 12 10 - 44 U/L    eGFR >60 >60 mL/min/1.73 m^2    Anion Gap 5 (L) 8 - 16 mmol/L   CBC Auto Differential    Collection Time: 02/19/24  5:17 AM   Result Value Ref Range    WBC 9.09 3.90 - 12.70 K/uL    RBC 2.51 (L) 4.60 - 6.20 M/uL    Hemoglobin 8.6 (L) 14.0 - 18.0 g/dL    Hematocrit 26.8 (L) 40.0 - 54.0 %     (H) 82 - 98 fL    MCH 34.3 (H) 27.0 - 31.0 pg    MCHC 32.1 32.0 - 36.0 g/dL    RDW 13.2 11.5 - 14.5 %    Platelets 268 150 - 450 K/uL    MPV 9.5 9.2 - 12.9 fL    Immature Granulocytes 0.9 (H) 0.0 - 0.5 %    Gran # (ANC) 6.6 1.8 - 7.7 K/uL    Immature Grans (Abs) 0.08 (H) 0.00 - 0.04 K/uL    Lymph # 1.3 1.0 - 4.8 K/uL    Mono # 1.0 0.3 - 1.0 K/uL    Eos # 0.1 0.0 - 0.5 K/uL    Baso # 0.04 0.00 - 0.20 K/uL    nRBC 0 0 /100 WBC    Gran % 72.2 38.0 - 73.0 %    Lymph % 14.6 (L) 18.0 - 48.0 %    Mono % 10.9 4.0 - 15.0 %    Eosinophil % 1.0 0.0 - 8.0 %    Basophil % 0.4 0.0 - 1.9 %    Differential Method Automated    Protime-INR    Collection Time: 02/19/24  5:17 AM   Result Value Ref Range    Prothrombin Time 10.0 9.0 - 12.5 sec    INR 0.9 0.8 - 1.2       Microbiology Results (last 7 days)       ** No results found for the last 168 hours. **             Imaging Results              CT Chest Without Contrast (Final result)  Result time 02/13/24 21:34:21      Final result by Rasheeda Junior MD (02/13/24 21:34:21)                   Impression:      No acute intrathoracic abnormality.  Mild emphysematous changes.    Acute traumatic fracture of the right proximal humeral  shaft.    Chronic bilateral rib, right coracoid, and right distal clavicular fractures.      Electronically signed by: Rasheeda Junior  Date:    02/13/2024  Time:    21:34               Narrative:    EXAMINATION:  CT CHEST WITHOUT CONTRAST    CLINICAL HISTORY:  Fall.  Right arm injury.  Sling in place.    TECHNIQUE:  Contiguous axial 5 mm images was obtained from the lung apices through the lung bases.  No intravenous contrast was given.  Coronal and sagittal reformatted images were provided.    COMPARISON:  None.    FINDINGS:  There is no acute rib fractures.  There are nonacute bilateral rib fractures.  There is no pneumothorax or pulmonary contusion.    There are mild emphysematous changes.  There is mild bibasilar atelectatic changes.    There is no evidence of mediastinal, hilar, or axillary adenopathy.    There is no pleural or pericardial effusion.    The heart size is within normal limits.    In the visualized upper abdomen, there is mild bilateral nonspecific perinephric stranding.  There is a an acute traumatic mildly comminuted fracture of the visualized right proximal humerus.  There are remote appearing fractures of the right coracoid and right distal clavicle.  There is posterior surgical hardware seen at the visualized lower cervical spine and T1.  Advanced degenerative changes are seen at the left shoulder.                                       CT Head Without Contrast (Final result)  Result time 02/13/24 21:15:36      Final result by Rasheeda Junior MD (02/13/24 21:15:36)                   Impression:      No acute intracranial abnormality detected.    Interval development of sinus disease.    No acute cervical fracture.  Remote fractures of C2 and T1 vertebral bodies.      Electronically signed by: Rasheeda Junior  Date:    02/13/2024  Time:    21:15               Narrative:    EXAMINATION:  CT OF THE HEAD WITHOUT AND CT CERVICAL SPINE    CLINICAL HISTORY:  intoxicated, fall;; intoxicated  fall;    TECHNIQUE:  5 mm unenhanced axial images were obtained from the skull base to the vertex.  1.25 mm axial images were obtained through the cervical spine.  Coronal and sagittal reformatted images were provided.    COMPARISON:  03/06/2023    FINDINGS:  CT head: The ventricles, basal cisterns, and cortical sulci are within normal limits for patient's stated age. There is no acute intracranial hemorrhage, territorial infarct or mass effect, or midline shift. In the visualized paranasal sinuses, there is near complete opacification of the left maxillary sinus.  There is mucoperiosteal thickening to a lesser extent in bilateral ethmoid, left inferior frontal, and right maxillary sinuses.    CT cervical spine: Posterior lumbar hardware seen extending from C1-T1.  No definite acute fractures are detected.  There are again seen remote fracture of the C2 and T1 vertebral bodies.  Additional prior fractures are not well appreciated.  The bones are diffusely osteopenic.  At the lung apices, there are mild emphysematous changes.                                       CT Cervical Spine Without Contrast (Final result)  Result time 02/13/24 21:15:36      Final result by Rasheeda Junior MD (02/13/24 21:15:36)                   Impression:      No acute intracranial abnormality detected.    Interval development of sinus disease.    No acute cervical fracture.  Remote fractures of C2 and T1 vertebral bodies.      Electronically signed by: Rasheeda Junior  Date:    02/13/2024  Time:    21:15               Narrative:    EXAMINATION:  CT OF THE HEAD WITHOUT AND CT CERVICAL SPINE    CLINICAL HISTORY:  intoxicated, fall;; intoxicated fall;    TECHNIQUE:  5 mm unenhanced axial images were obtained from the skull base to the vertex.  1.25 mm axial images were obtained through the cervical spine.  Coronal and sagittal reformatted images were provided.    COMPARISON:  03/06/2023    FINDINGS:  CT head: The ventricles, basal  cisterns, and cortical sulci are within normal limits for patient's stated age. There is no acute intracranial hemorrhage, territorial infarct or mass effect, or midline shift. In the visualized paranasal sinuses, there is near complete opacification of the left maxillary sinus.  There is mucoperiosteal thickening to a lesser extent in bilateral ethmoid, left inferior frontal, and right maxillary sinuses.    CT cervical spine: Posterior lumbar hardware seen extending from C1-T1.  No definite acute fractures are detected.  There are again seen remote fracture of the C2 and T1 vertebral bodies.  Additional prior fractures are not well appreciated.  The bones are diffusely osteopenic.  At the lung apices, there are mild emphysematous changes.                                       X-Ray Chest AP Portable (Final result)  Result time 02/13/24 19:13:11      Final result by Rasheeda Junior MD (02/13/24 19:13:11)                   Impression:      Blunting of the left costophrenic angle, which may suggest small left pleural effusion and/or pleural thickening.      Electronically signed by: Rasheeda Junior  Date:    02/13/2024  Time:    19:13               Narrative:    EXAMINATION:  AP PORTABLE CHEST    CLINICAL HISTORY:  Pain in right arm    TECHNIQUE:  AP portable chest radiograph was submitted.    COMPARISON:  10/18/2022    FINDINGS:  AP portable chest radiograph demonstrates a cardiac silhouette within normal limits.  There is no focal consolidation or pneumothorax.  There is blunting of left costophrenic angle.  There is redemonstration of a right clavicular fracture.  The bones are diffusely osteopenic.  There is cervicothoracic hardware, which was not present on the previous exam.  There appears to be nonacute fractures of the lower bilateral ribs.  Correlate with known history.                                       X-Ray Clavicle Right (Final result)  Result time 02/13/24 19:17:21      Final result by Irving  MD Max (02/13/24 19:17:21)                   Impression:      As above.      Electronically signed by: Max Villatoro MD  Date:    02/13/2024  Time:    19:17               Narrative:    EXAMINATION:  XR CLAVICLE RIGHT    CLINICAL HISTORY:  right clavicle injury;    TECHNIQUE:  Two views of the right clavicle were performed.    COMPARISON:  10/18/2022.    FINDINGS:  There are partially visualized postop changes in the cervical spine.  There is a chronic fracture of the right distal clavicle.    There is a partially visualized acute fracture of the right proximal humerus.    The visualized right hemithorax is within normal limits.                                       X-Ray Shoulder 1 View Right (Final result)  Result time 02/13/24 19:22:54      Final result by Max Villatoro MD (02/13/24 19:22:54)                   Impression:      Acute displaced and foreshortened fracture of the proximal humerus metadiaphysis as above.    No evidence of a dislocation.      Electronically signed by: Max Villatoro MD  Date:    02/13/2024  Time:    19:22               Narrative:    EXAMINATION:  XR SHOULDER 1 VIEW RIGHT; XR HUMERUS 2 VIEW RIGHT    CLINICAL HISTORY:  Pain in right arm    TECHNIQUE:  Single frontal view of the right shoulder.    Two x-ray views of the right humerus.    COMPARISON:  Chest x-ray dated 10/18/2022.    FINDINGS:  The bone mineralization is within normal limits.  There is an acute displaced spiral fracture of the proximal metadiaphysis of the right humerus.  There is lateral displacement of the distal fracture component along with foreshortening.  There is also a displaced 1.8 cm fragment overlying the proximal humerus.  There is about 2 cm detraction of the fracture components.    The glenohumeral articulation is maintained.  There is arthropathy of the acromioclavicular joint.  The coracoclavicular interval is within normal limits.    The visualized right hemithorax is unremarkable.    There are partially  visualized postoperative changes in the cervical spine.                                       X-Ray Elbow Complete Right (Final result)  Result time 02/13/24 19:15:46      Final result by Max Villatoro MD (02/13/24 19:15:46)                   Impression:      No acute process.      Electronically signed by: Max Villatoro MD  Date:    02/13/2024  Time:    19:15               Narrative:    EXAMINATION:  XR ELBOW COMPLETE 3 VIEW RIGHT    CLINICAL HISTORY:  Pain in right arm    TECHNIQUE:  Two x-ray views of the right elbow were performed.    COMPARISON:  None    FINDINGS:  The bone mineralization is within normal limits.  There is no cortical step-off.  There is no evidence of periostitis.    The joint spaces are maintained.  The soft tissues are unremarkable.  No radiopaque foreign body is identified.    There is no evidence of a fracture dislocation.                                       X-Ray Humerus 2 View Right (Final result)  Result time 02/13/24 19:22:54      Final result by Max Villatoro MD (02/13/24 19:22:54)                   Impression:      Acute displaced and foreshortened fracture of the proximal humerus metadiaphysis as above.    No evidence of a dislocation.      Electronically signed by: Max Villatoro MD  Date:    02/13/2024  Time:    19:22               Narrative:    EXAMINATION:  XR SHOULDER 1 VIEW RIGHT; XR HUMERUS 2 VIEW RIGHT    CLINICAL HISTORY:  Pain in right arm    TECHNIQUE:  Single frontal view of the right shoulder.    Two x-ray views of the right humerus.    COMPARISON:  Chest x-ray dated 10/18/2022.    FINDINGS:  The bone mineralization is within normal limits.  There is an acute displaced spiral fracture of the proximal metadiaphysis of the right humerus.  There is lateral displacement of the distal fracture component along with foreshortening.  There is also a displaced 1.8 cm fragment overlying the proximal humerus.  There is about 2 cm detraction of the fracture components.    The  glenohumeral articulation is maintained.  There is arthropathy of the acromioclavicular joint.  The coracoclavicular interval is within normal limits.    The visualized right hemithorax is unremarkable.    There are partially visualized postoperative changes in the cervical spine.                                             Assessment/Plan:      * Humerus fracture  S/p fall  Suspect cat jumping on him is a deflection of him being drunk and falling  Xray of humerus showed an acute displaced and foreshortened fracture of the proximal humerus metadiaphysis.  Ortho consulted. Recs reviewed:  noted to have radial nerve palsy prior to splinting. I agree that surgery for open reduction internal fixation is likely the best choice for him. Is not emergent and can be scheduled. He reports he has no medical coverage in his currently under pec. I think it is best to try to handle this on this admission. We will schedule him for surgery for Friday for ORIF of right humerus. 2-3 months before he is able to use his arm for heavy lifting.      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   He does not have a plan of suicide and has not imagined it  His S.I. seems to be stimulated directly from his injury, therefore, felt starting SSRI less appropriate and giving him time to process the event and make plans with his job would be best, initially.  PEC'd in ED  Tele-Psych consulted. Recs reviewed:  Continue PEC for IP stabilization.    Will transfer to IP psych once medically cleared       Suicidal ideation  See depression note      Macrocytic anemia  Stable. Trend.     Alcohol abuse  Patient says he does not drink everyday; used to  Now has a beverage intermittently; last drink Tuesday evening.  Has about 2 or 3 mixed drinks;  does not get drunk    Scheduled Librium; will taper   CIWA protocol       Generalized anxiety disorder  Was on Cymbalta, not taken in months  Monitor       Tobacco use disorder  Nicotine patch   4m counseling routinely: The following was discussed concerning tobacco use:  Relevance of Quitting  Risk to Health  Long Term Risk  Risk for Others  Rewards of Quitting  Motivation Intervention to Quit    Hypertension  Uncontrolled.   Home meds reconciled. Control pain.  Trend      VTE Risk Mitigation (From admission, onward)           Ordered     enoxaparin injection 40 mg  Daily         02/16/24 1455     IP VTE LOW RISK PATIENT  Once         02/13/24 2218                    Discharge Planning   BRANDON:      Code Status: Full Code   Is the patient medically ready for discharge?:     Reason for patient still in hospital (select all that apply): Patient trending condition and Treatment  Discharge Plan A: Psychiatric hospital   Discharge Delays: None known at this time              Odell Burgos MD  Department of Hospital Medicine   [facility] Surg

## 2024-02-19 NOTE — PLAN OF CARE
Problem: Physical Therapy  Goal: Physical Therapy Goal  Description: Goals to be met by: 3/18/24     Patient will increase functional independence with mobility by performin. Supine to sit with Modified [place]  2. Sit to stand transfer with Modified [place]  3. Gait  x 150 feet with Modified [place] using Single-point Cane .   4. Ascend/descend 1 stair with  Modified [place] using Single-point Cane .   5. Lower extremity exercise program x10 reps per handout, with independence  6. Propel W/C on level surfaces with B Le's ' Mod I    Outcome: Ongoing, Progressing   Initial PT evaluation performed today.  Pt could benefit from skilled PT services 5-6x/wk in order to maximize function prior to D/C.  High Intensity Therapy recommended as pt is not functioning at Independent, ambulatory, employed, baseline following fall at home.  Pt now with R UE Fx S/P Sx with ortho precautions as well as Gait instability requiring an aggressive Multidisciplinary to help restore pt's functional [place].

## 2024-02-19 NOTE — NURSING
[facility]  Nurses Note -- 4 Eyes      2/18/2024       Skin assessed on: Q Shift      [x] No Pressure Injuries Present    [x]Prevention Measures Documented    [] Yes LDA  for Pressure Injury Previously documented     [] Yes New Pressure Injury Discovered   [] LDA for New Pressure Injury Added      Attending RN:  Toro Allen RN     Second RN:  Jay Gibson Rn
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
## 2024-02-19 NOTE — DISCHARGE INSTRUCTIONS
MENTAL HEALTH/ADDICTIVE DISORDERS  REFERRAL RECOMMENDATIONS    [facility] [phone], After hours, nights, and weekends, you can reach a primary care on-call provider at [phone] and a behavioral health mobile crisis line at [phone].   [facility]  [address]  [address]  [place]  [address]   [phone]  Hours: Monday - Friday  7:00 a.m. - 5:00 p.m.      *In case of suicidal thinking, return to ED and/or call or text the COPE LINE at 988*  AA ([phone]) [email or web address].[email or web address]/meetings/ or NA ([phone])    - Places for Outpatient Addictive Disorders and Mental Health Treatment in [facility]:    [facility] (Edna Davis, Kasey Anand; accepts Medicaid, commercial insurance) [phone]    [facility] ([place]) [phone], [address] [facility] [phone]; Crisis [phone] - Call for options A-E:    ? [facility], [address] [place] [address]    ? [facility], [address] [place] [address]    ? [facility] [place], [address] [place] [address],    ? [facility], 2nd floor [address] [place] [address]    ? [facility] [place], [address], [facility] [place] [address]    ? [facility], [address], [place] [address]    [facility], [address] [facility], [phone]    [facility], Naya/St. [place]ao/Dannielle/Susan/NERIS [phone]    [facility] (Mental & General), [facility], [phone]    [facility] (Mental & General Health, not only HIV+, [address] [facility] locations) [phone]    [facility] [phone], After hours, nights, and weekends, you can reach a primary care on-call provider at [phone] and a behavioral health mobile crisis line at [phone].   [facility], [address] [facility] [place], [address], [phone]  [facility], [address] [facility] [place], [phone], [phone]    [facility] Int[facility] [phone], option 2 (no medicaid)    Behavioral Health Group (Methadone Maintenance)    ? [address] [place] [address], [phone]    ? [place] [address] [phone]    - Addiction and Mental Health Treatment in [facility]:    [facility], [address] [facility] [place] [place], [phone]    [facility], [phone], [address] [facility] [address] [facility], [address], [phone]    [facility]. [address] [place] [address], [phone]    [facility], [address] [place] [address], [phone]    [email or web address]/mental-[email or web address]-[email or web address]-health/adult-mental-and-behavioral-health-services/    [email or web address]://[email or web address]/medical-[email or web address]/[email or web address]-health/[email or web address]-therapy/    [facility]   Crisis Line Phone: [phone] or "211" or call 911 and ask for Crisis Intervention Team (CIT) Officers    ? [facility], [address] [facility], [phone] ([facility])    ? [facility], [address] [facility], [phone] ([facility])    ? [facility], [address], [place], [phone] ([place], [place], and [facility])    ? [facility], [address] [place], [phone] ([facility])    [facility] Hotline [phone], [phone]    ? [facility], [address] [facility]    ? [facility], [address] [facility], [place]    ? [facility], [address] [place]    ? [facility], [address] [facility] [address] [place] " [facility]    ? [facility], [address] [place] [address] Human Services, [address], [place] [phone]    [facility], [address] [facility] [phone]    [facility], [address] [facility], [phone]    [facility], [address] [facility], [phone]    [facility]. ([facility])  Address: [address] [place] [address]. Phone Number: [phone]    [address] [address] [phone]    [facility] ([facility]) [phone], [address] [place] [address]?[place] [address]; The Phone [place] - 24-hour crisis counselling and emotional support line: [phone]    [facility] [phone] or [phone], [address] [place] [address] [email or web address]    [facility] ([place]) [phone] or [phone], [address] [place], [place] [address] [facility], Address: [address] [place] [address]. , Phone Number: [phone].    [facility] - Outpatient mental health services to adults and children. [address] [place] [address] [phone]    [facility]   Provides behavioral health and developmental disability services for the residents of [facility], [place], [place], [place], [facility], [place] and [facility]  [address] [place] [address].  [place] [address]  phone: [phone]   https://cld.org  Mobile Crisis Team--call 24-Hour Crisis Helpline to receive local help: 1-318-996-5594    HUMBERTO CLARK  [address] [place] [address]  Phone:  [phone]  Clinic Manager:  CLEM Buckley  - Fri 8:00 am - 4:30 pm    [facility]  [address] [place]  [address]  Phone:  [phone]  Clinic Manager:  Shauna Peña LCSW  Mon - Thurs 8:00 am - 6:30 pm    [facility] [address]  [address]  Phone:  [phone]  Clinic Manager:  Carolann Posey RN  Mon - Fri 8:00 am - 4:30 pm    [facility]  [address] [place]  [address]  Phone:  [phone]  Clinic Manager:  Gretta Ortiz LCSW  Monday and Wednesday 8:00 am - 4:30 pm    DEVELOPMENTAL DISABILITY OFFICE  [address] [place] [address], [address]  Phone:  [phone]    - Residential Addictive Disorders Treatment (call every day until you get in):    [facility] [place] [address] [facility], [phone]    [facility] [place] (men only) [address] [facility], [phone]    [facility], [address] [place] [address], mens program [phone], womens program [phone]    [facility], [address] [facility], [phone]    [facility] [place] (Female only) [address] [facility], [phone]    [facility] (IOP, residential, low cost, MCaid) [address] [place] [address] [facility], [phone]    [facility] (Men only, [phone]), [address] [facility], [place]    [facility] [place] (Pregnant/women with or without children, [phone])    [facility] (Women only), [address] [facility], [phone]    [facility] (men only)[facility] [phone]    - Inpatient Rehabs (out of area)    [place], [place], [place], [phone]     [facility], [phone]    [facility], [phone]    [place], [place] [phone])    [facility] (nr [place]) [phone]

## 2024-02-19 NOTE — PT/OT/SLP PROGRESS
Occupational Therapy   Treatment    Name: Israel De Jesus  MRN: [id number]  Admitting Diagnosis:  Humerus fracture  3 Days Post-Op    Recommendations:     Discharge Recommendations: High Intensity Therapy  Discharge Equipment Recommendations:  to be determined by next level of care  Barriers to discharge:   (The pateitn is not at his functional baseline of (I) ADL and working. The patient is unastaedy and a fall risk)    Assessment:     Israel De Jesus is a 63 y.o. male with a medical diagnosis of Humerus fracture.   Performance deficits affecting function are weakness, impaired endurance, impaired self care skills, impaired sensation, impaired functional mobility, gait instability, impaired balance, decreased coordination, decreased upper extremity function, decreased safety awareness, pain, decreased ROM, impaired fine motor, impaired skin, edema, impaired cardiopulmonary response to activity, orthopedic precautions.   The patient tolerated UE therex sitting on the EOB. The patient amb using a SC to the sink with min assist for balance 2* unsteady gait. The patient brushed his teeth using his left hand with LUE tremor present and patient becoming weak and needed to sit.    Rehab Prognosis:  Good and Fair; patient would benefit from acute skilled OT services to address these deficits and reach maximum level of function.       Plan:     Patient to be seen 5 x/week to address the above listed problems via self-care/home management, therapeutic activities, therapeutic exercises  Plan of Care Expires: 03/03/24  Plan of Care Reviewed with: patient    Subjective     Chief Complaint: feels shaky  Patient/Family Comments/goals: agreeable to OT  Pain/Comfort:  Pain Rating 1: 0/10    Objective:     Communicated with: nurse prior to session.  Patient found HOB elevated with peripheral IV (hospital safety sitter) upon OT entry to room. Right upper arm IV was found out and lying on the bed, nurse notified.    General  Precautions: Standard, fall    Orthopedic Precautions:RUE non weight bearing  Braces: UE Sling (right wrist brace)  Respiratory Status: Room air     Occupational Performance:     Bed Mobility:    Patient completed Scooting/Bridging with stand by assistance  Patient completed Supine to Sit with stand by assistance and verbal cues with HOB elevated      Functional Mobility/Transfers:  Patient completed Sit <> Stand Transfer with contact guard assistance and minimum assistance  with  straight cane   Functional Mobility: The patient stood from the bed x2 trials with CGA/min assist and VC for cane use and min assist for balance while the patient amb to the sink ~10'. The patient stood at the sink with CGA and became shaky and weak and needed to sit. The patient amb ~5' from the sink and sat on the EOB and then amb to the chair ~5' with min assist using a cane and B+VC for hand placement on the transfer surface.    Activities of Daily Living:  Grooming: minimum assistance assist needed to apply paste to the toothbrush and then was able to brush his teeth using his left hand  Upper Body Dressing: gown was draped on the patient's back to apply gait belt while ambulating.         [facility] 6 Click ADL: 20    Treatment & Education:  Participated in self care and functional mobility as noted above  Educated the patient re: don of the RUE sling and wrist brace. The patient required max assist to don the sling and mod assist and verbal cues to don the wrist brace  The patient performed AAROM to the right forearm, wrist and hand. The patient demo wrist ext to neutral and some finger ext.  The patient was educated re: need to call nurse to assist the patient to return to bed      Patient left up in chair with all lines intact, call button in reach, nurse, Robert notified, and safety sitter present    GOALS:   Multidisciplinary Problems       Occupational Therapy Goals          Problem: Occupational Therapy    Goal Priority Disciplines  Outcome Interventions   Occupational Therapy Goal     OT, PT/OT Ongoing, Progressing    Description: Goals to be met by: 3/3/2024     Patient will increase functional independence with ADLs by performing:    Feeding with Modified [place].  UE Dressing with Modified [place].  LE Dressing with Modified [place].  Grooming while standing at sink with Modified [place] and Assistive Devices as needed.  Toileting from toilet with Modified [place] and Assistive Devices as needed for hygiene and clothing management.   Supine to sit with Modified [place].  Step transfer with Modified [place]  Toilet transfer to toilet with Modified [place].  Upper extremity exercise program x15 reps per handout, with independence.                         Time Tracking:     OT Date of Treatment: 02/19/24  OT Start Time: 1525  OT Stop Time: 1600  OT Total Time (min): 35 min    Billable Minutes:Self Care/Home Management 15  Therapeutic Activity 10  Therapeutic Exercise 10  Total Time 35    OT/SONA: OT          2/19/2024

## 2024-02-20 LAB
OHS QRS DURATION: 74 MS
OHS QTC CALCULATION: 470 MS

## 2024-02-20 PROCEDURE — S4991 NICOTINE PATCH NONLEGEND: HCPCS | Performed by: ORTHOPAEDIC SURGERY

## 2024-02-20 PROCEDURE — 25000003 PHARM REV CODE 250: Performed by: ORTHOPAEDIC SURGERY

## 2024-02-20 PROCEDURE — 25000003 PHARM REV CODE 250: Performed by: HOSPITALIST

## 2024-02-20 PROCEDURE — 25000003 PHARM REV CODE 250: Performed by: STUDENT IN AN ORGANIZED HEALTH CARE EDUCATION/TRAINING PROGRAM

## 2024-02-20 PROCEDURE — 97110 THERAPEUTIC EXERCISES: CPT

## 2024-02-20 PROCEDURE — 63600175 PHARM REV CODE 636 W HCPCS: Performed by: ORTHOPAEDIC SURGERY

## 2024-02-20 PROCEDURE — 97116 GAIT TRAINING THERAPY: CPT

## 2024-02-20 PROCEDURE — 97535 SELF CARE MNGMENT TRAINING: CPT

## 2024-02-20 PROCEDURE — 11000001 HC ACUTE MED/SURG PRIVATE ROOM

## 2024-02-20 RX ORDER — LIDOCAINE 50 MG/G
1 PATCH TOPICAL
Status: DISCONTINUED | OUTPATIENT
Start: 2024-02-20 | End: 2024-02-21 | Stop reason: HOSPADM

## 2024-02-20 RX ADMIN — METOPROLOL TARTRATE 50 MG: 50 TABLET, FILM COATED ORAL at 12:02

## 2024-02-20 RX ADMIN — AMLODIPINE BESYLATE 10 MG: 5 TABLET ORAL at 09:02

## 2024-02-20 RX ADMIN — CHLORDIAZEPOXIDE HYDROCHLORIDE 5 MG: 5 CAPSULE ORAL at 09:02

## 2024-02-20 RX ADMIN — SENNOSIDES AND DOCUSATE SODIUM 1 TABLET: 8.6; 5 TABLET ORAL at 09:02

## 2024-02-20 RX ADMIN — Medication 1000 UNITS: at 09:02

## 2024-02-20 RX ADMIN — CHLORDIAZEPOXIDE HYDROCHLORIDE 5 MG: 5 CAPSULE ORAL at 10:02

## 2024-02-20 RX ADMIN — FOLIC ACID 1 MG: 1 TABLET ORAL at 09:02

## 2024-02-20 RX ADMIN — THIAMINE HCL TAB 100 MG 100 MG: 100 TAB at 09:02

## 2024-02-20 RX ADMIN — NICOTINE 1 PATCH: 14 PATCH TRANSDERMAL at 09:02

## 2024-02-20 RX ADMIN — METOPROLOL TARTRATE 50 MG: 50 TABLET, FILM COATED ORAL at 11:02

## 2024-02-20 RX ADMIN — THERA TABS 1 TABLET: TAB at 09:02

## 2024-02-20 RX ADMIN — Medication 6 MG: at 09:02

## 2024-02-20 RX ADMIN — ENOXAPARIN SODIUM 40 MG: 40 INJECTION SUBCUTANEOUS at 04:02

## 2024-02-20 RX ADMIN — METOPROLOL TARTRATE 50 MG: 50 TABLET, FILM COATED ORAL at 05:02

## 2024-02-20 RX ADMIN — LIDOCAINE 5% 1 PATCH: 700 PATCH TOPICAL at 02:02

## 2024-02-20 NOTE — ASSESSMENT & PLAN NOTE
Patient states he was not depressed a week or month ago  That his depression and suicidal thoughts began when he broke his arm and thought about not being able to work and potentially losing his job.   He does not have a plan of suicide and has not imagined it  His S.I. seems to be stimulated directly from his injury, therefore, felt starting SSRI less appropriate and giving him time to process the event and make plans with his job would be best, initially.  PEC'd in ED  Tele-Psych consulted. Recs reviewed:  Ok to stop PEC/CEC

## 2024-02-20 NOTE — NURSING
[facility]  Nurses Note -- 4 Eyes      2/20/2024     Q Shift  Skin assessed on: Q Shift      [x] No Pressure Injuries Present    [x]Prevention Measures Documented    [] Yes LDA  for Pressure Injury Previously documented     [] Yes New Pressure Injury Discovered   [] LDA for New Pressure Injury Added      Attending RN:  Jay Gibson LPN     Second RN:  Nadine Bonner RN

## 2024-02-20 NOTE — PT/OT/SLP PROGRESS
Occupational Therapy   Treatment    Name: Israel De Jesus  MRN: [id number]  Admitting Diagnosis:  Humerus fracture  4 Days Post-Op    Recommendations:     Discharge Recommendations: High Intensity Therapy  Discharge Equipment Recommendations:  to be determined by next level of care  Barriers to discharge:   (The pateitn is not at his functional baseline of (I) ADL and working. The patient is unsteady and a fall risk)    Assessment:     Israel De Jesus is a 63 y.o. male with a medical diagnosis of Humerus fracture.  Performance deficits affecting function are weakness, impaired sensation, impaired self care skills, impaired functional mobility, gait instability, impaired balance, decreased coordination, decreased upper extremity function, decreased safety awareness, pain, decreased ROM, impaired fine motor, impaired skin, edema, orthopedic precautions.   The patient is cooperative OT and motivated to perform self care and RUE therex. The patient requires max assist for UB dressing and sling management, min assist to don/doff wrist brace. The patient is motivated to participated in OT to improve functional skills to be able to return to (I) living and his job at [employer].    Rehab Prognosis:  Good; patient would benefit from acute skilled OT services to address these deficits and reach maximum level of function.       Plan:     Patient to be seen 5 x/week to address the above listed problems via self-care/home management, therapeutic activities, therapeutic exercises  Plan of Care Expires: 03/03/24  Plan of Care Reviewed with: patient    Subjective     Chief Complaint: none  Patient/Family Comments/goals: wants to go to rehab   Pain/Comfort:  Pain Rating 1: 0/10    Objective:     Communicated with: nurse,  prior to session.  Patient found HOB elevated with peripheral IV (hospital safety sitter) upon OT entry to room.    General Precautions: Standard, fall    Orthopedic Precautions:RUE non weight bearing  Braces: UE  Sling (right wrist brace)  Respiratory Status: Room air     Occupational Performance:     Bed Mobility:    Patient completed Scooting/Bridging with stand by assistance with verbal cues to use LUE on bed rail and BLE to scoot to the top of the bed in supine  Patient completed Supine to Sit with stand by assistance, with side rail, and HOB elevate     Functional Mobility/Transfers:  Patient completed Sit <> Stand Transfer with contact guard assistance  with  straight cane   Patient completed Bed <> Chair Transfer using Step Transfer technique with contact guard assistance with straight cane  Functional Mobility: The patient sat on the EOB ~20 min with (S)/SBA to perform self care and RUE therex with RUE supported on a pillow    Activities of Daily Living:  Upper Body Dressing: maximal assistance and dependence to doff soiled front gown and don clean gown. The patient was educated re: UB dressing technique with NWB to the RUE.   The patient required max assist to doff RUE sling and was dependent to don sling after performing exercises. The patient don/doffed the right wrist brace with CGA/min assist.    [facility] 6 Click ADL: 20    Treatment & Education:  Performed self care and functional mobility as noted above  Performed Right forearm, wrist and hand AAROM x15 reps.  The patient reports RUE is numb.     Patient left up in chair with all lines intact, call button in reach, nurse notified, and safety sitter present    GOALS:   Multidisciplinary Problems       Occupational Therapy Goals          Problem: Occupational Therapy    Goal Priority Disciplines Outcome Interventions   Occupational Therapy Goal     OT, PT/OT Ongoing, Progressing    Description: Goals to be met by: 3/3/2024     Patient will increase functional independence with ADLs by performing:    Feeding with Modified [place].  UE Dressing with Modified [place].  LE Dressing with Modified [place].  Grooming while standing at sink with Modified  [place] and Assistive Devices as needed.  Toileting from toilet with Modified [place] and Assistive Devices as needed for hygiene and clothing management.   Supine to sit with Modified [place].  Step transfer with Modified [place]  Toilet transfer to toilet with Modified [place].  Upper extremity exercise program x15 reps per handout, with independence.                         Time Tracking:     OT Date of Treatment: 02/20/24  OT Start Time: 1131  OT Stop Time: 1157  OT Total Time (min): 26 min    Billable Minutes:Self Care/Home Management 15  Therapeutic Exercise 11  Total Time 26    OT/SONA: OT          2/20/2024

## 2024-02-20 NOTE — PLAN OF CARE
"  Problem: Physical Therapy  Goal: Physical Therapy Goal  Description: Goals to be met by: 3/18/24     Patient will increase functional independence with mobility by performin. Supine to sit with Modified [place]  2. Sit to stand transfer with Modified [place]  3. Gait  x 150 feet with Modified [place] using Single-point Cane .   4. Ascend/descend 1 stair with  Modified [place] using Single-point Cane .   5. Lower extremity exercise program x10 reps per handout, with independence  6. Propel W/C in Level surfaces with B LE"s x ' Mod I   Outcome: Ongoing, Progressing     "

## 2024-02-20 NOTE — PROGRESS NOTES
[facility] Medicine  Progress Note    Patient Name: Israel De Jesus  MRN: [id number]  Patient Class: IP- Inpatient   Admission Date: 2/13/2024  Length of Stay: 7 days  Attending Physician: Basil Beatty MD  Primary Care Provider: Nuha Lynn MD        Subjective:     Principal Problem:Humerus fracture        HPI:    Israel De Jesus is a 63 y.o. male who has a past medical history of Eye injury, Generalized anxiety disorder, Hypertension, and Sciatica, presented to the ED with CC of R arm pain after a Fall, and Suicidal Ideations.     Patient states that his cat was jumping all over him and caused a mechanical fall onto carpeted ground, resulting in his right arm pain. This happened yesterday about 330 p.m. and has waited about 24 hours before coming into hospital. Patient was PEC'd in ED for S.I. Patient states he was not depressed a week or month ago. That his depression and suicidal thoughts began when he broke his arm and thought about not being able to work and potentially losing his job (he works as  at [email or web address]). He does not have a plan of suicide and has not imagined it. EtOH is still 312 now, unclear if he was trying to dull the pain, or if alcohol is a chronic problem for him. He does have a a low albumin of 2.6, mildly elevated AST at 45 (but also with muscle injury), and macrocytic anemia with  - which are all consistent with chronic alcoholism (daughter later confirmed he is recovering alcoholic and has been drinking for past 4 months). Xray of humerus showed an acute displaced and foreshortened fracture of the proximal humerus metadiaphysis. Arm wrapped and slinged in ED. Case discussed with Orthopedic surgery, patient is NPO for likely procedure tomorrow morning.        Overview/Hospital Course:  Patient admitted with fractured R humerus with radial nerve compromise. Ortho consulted. Underwent surgical repair on 2/16.  PEC/CEC'd due to suicidal  ideations. Patient states he has no more S.I.  Psych consulted and PEC/CEC removed.  PT/OT consulted and recommending inpatient Rehab.  SW/CM consulted.    Interval History: doing and feeling well today.    Review of Systems   HENT:  Negative for ear discharge and ear pain.    Eyes:  Negative for discharge and itching.   Endocrine: Negative for cold intolerance and heat intolerance.   Neurological:  Negative for seizures and syncope.     Objective:     Vital Signs (Most Recent):  Temp: 97.7 °F (36.5 °C) (02/20/24 1058)  Pulse: 100 (02/20/24 1058)  Resp: 18 (02/20/24 1058)  BP: 122/64 (02/20/24 1058)  SpO2: 96 % (02/20/24 1058) Vital Signs (24h Range):  Temp:  [97.5 °F (36.4 °C)-98.4 °F (36.9 °C)] 97.7 °F (36.5 °C)  Pulse:  [] 100  Resp:  [18] 18  SpO2:  [93 %-97 %] 96 %  BP: (118-143)/(63-73) 122/64     Weight: 72.6 kg (160 lb)  Body mass index is 25.82 kg/m².    Intake/Output Summary (Last 24 hours) at 2/20/2024 1544  Last data filed at 2/20/2024 1323  Gross per 24 hour   Intake 600 ml   Output 0 ml   Net 600 ml         Physical Exam  Vitals and nursing note reviewed.   Constitutional:       General: He is not in acute distress.     Appearance: He is well-developed. He is obese.   HENT:      Head: Normocephalic and atraumatic.   Eyes:      Conjunctiva/sclera: Conjunctivae normal.   Neck:      Vascular: No JVD.   Cardiovascular:      Rate and Rhythm: Normal rate and regular rhythm.      Heart sounds: Normal heart sounds.   Pulmonary:      Effort: Pulmonary effort is normal.      Breath sounds: Normal breath sounds.   Abdominal:      General: Bowel sounds are normal. There is no distension.      Palpations: Abdomen is soft.      Tenderness: There is no abdominal tenderness.   Musculoskeletal:         General: Signs of injury (R arm in sling) present.      Cervical back: Neck supple.      Right lower leg: No edema.      Left lower leg: No edema.   Neurological:      Mental Status: He is alert.   Psychiatric:          Behavior: Behavior normal.             Significant Labs: All pertinent labs within the past 24 hours have been reviewed.  BMP:   Recent Labs   Lab 02/19/24  0517         K 4.1      CO2 28   BUN 13   CREATININE 0.8   CALCIUM 8.9   MG 1.8     CBC:   Recent Labs   Lab 02/19/24  0517   WBC 9.09   HGB 8.6*   HCT 26.8*          Significant Imaging: I have reviewed all pertinent imaging results/findings within the past 24 hours.    Assessment/Plan:      * Humerus fracture  S/p fall  Suspect cat jumping on him is a deflection of him being drunk and falling  Xray of humerus showed an acute displaced and foreshortened fracture of the proximal humerus metadiaphysis.  Ortho consulted. Recs reviewed:  noted to have radial nerve palsy prior to splinting. I agree that surgery for open reduction internal fixation is likely the best choice for him. Is not emergent and can be scheduled. He reports he has no medical coverage in his currently under pec. I think it is best to try to handle this on this admission. We will schedule him for surgery for Friday for ORIF of right humerus. 2-3 months before he is able to use his arm for heavy lifting.  PT/OT consulted and recommending inpatient Rehab.  SW/CM consulted.      Alcohol abuse  Patient says he does not drink everyday; used to  Now has a beverage intermittently; last drink Tuesday evening.  Has about 2 or 3 mixed drinks; does not get drunk    Scheduled Librium; will taper   CIWA protocol       Macrocytic anemia  With anemia of acute blood loss as expected from surgery.    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   He does not have a plan of suicide and has not imagined it  His S.I. seems to be  stimulated directly from his injury, therefore, felt starting SSRI less appropriate and giving him time to process the event and make plans with his job would be best, initially.  PEC'd in ED  Tele-Psych consulted. Recs reviewed:  Ok to stop PEC/CEC      Generalized anxiety disorder  Was on Cymbalta, not taken in months  Monitor       Tobacco use disorder  Nicotine patch   4m counseling routinely: The following was discussed concerning tobacco use:  Relevance of Quitting  Risk to Health  Long Term Risk  Risk for Others  Rewards of Quitting  Motivation Intervention to Quit    Hypertension  Continue current management.      VTE Risk Mitigation (From admission, onward)           Ordered     enoxaparin injection 40 mg  Daily         02/16/24 1455     IP VTE LOW RISK PATIENT  Once         02/13/24 2218                    Discharge Planning   BRANDON:      Code Status: Full Code   Is the patient medically ready for discharge?:     Reason for patient still in hospital (select all that apply): Pending disposition  Discharge Plan A: Rehab   Discharge Delays: (!) Post-Acute Set-up              Basil Beatty MD  Department of Hospital Medicine   [facility]

## 2024-02-20 NOTE — PROGRESS NOTES
"The patient is in bed.  He reports he is getting ready to go to rehab.    Temp:  [97.5 °F (36.4 °C)-98.4 °F (36.9 °C)] 97.7 °F (36.5 °C)  Pulse:  [] 100  Resp:  [18] 18  SpO2:  [93 %-97 %] 96 %  BP: (118-143)/(63-73) 122/64    Physical examination:    Right arm incision is intact.  Staples are in place.  There is no drainage.  Radial palsy remains as expected.      No results for input(s): "WBC", "RBC", "HGB", "HCT", "PLT", "MCV", "MCH", "MCHC" in the last 24 hours.      Current Facility-Administered Medications:     amLODIPine tablet 10 mg, 10 mg, Oral, Daily, Yulissa Rich III, MD, 10 mg at 02/20/24 0958    artificial tears 0.5 % ophthalmic solution 1 drop, 1 drop, Both Eyes, PRN, Min Marinelli Jr., NP, 1 drop at 02/19/24 2018    chlordiazepoxide capsule 5 mg, 5 mg, Oral, BID, Odell Burgos MD, 5 mg at 02/20/24 1003    dextromethorphan-guaiFENesin  mg/5 ml liquid 10 mL, 10 mL, Oral, Q4H PRN, Min Marinelli Jr., NP, 10 mL at 02/18/24 2110    diazePAM tablet 10 mg, 10 mg, Oral, Q6H PRN, Yulissa Rich III, MD, 10 mg at 02/16/24 0905    enoxaparin injection 40 mg, 40 mg, Subcutaneous, Daily, Yulissa Rich III, MD, 40 mg at 02/19/24 1702    folic acid tablet 1 mg, 1 mg, Oral, Daily, Yulissa Rich III, MD, 1 mg at 02/20/24 0958    hydrALAZINE injection 10 mg, 10 mg, Intravenous, Q6H PRN, Yulissa Rich III, MD    HYDROcodone-acetaminophen  mg per tablet 1 tablet, 1 tablet, Oral, Q6H PRN, Yulissa Rich III, MD, 1 tablet at 02/18/24 1751    HYDROcodone-acetaminophen 5-325 mg per tablet 1 tablet, 1 tablet, Oral, Q4H PRN, Yulissa Rich III, MD, 1 tablet at 02/14/24 2014    LIDOcaine 5 % patch 1 patch, 1 patch, Transdermal, Q24H, Basil Beatty MD, 1 patch at 02/20/24 1439    LORazepam injection 1 mg, 1 mg, Intravenous, Q4H PRN, Yulissa Rich III, MD    melatonin tablet 6 mg, 6 mg, Oral, Nightly, Yulissa Rich III, MD, 6 mg at 02/19/24 2016    metoprolol tartrate " (LOPRESSOR) tablet 50 mg, 50 mg, Oral, Q6H, Yulissa Rich III, MD, 50 mg at 02/20/24 1159    morphine injection 4 mg, 4 mg, Intravenous, Q4H PRN, Yulissa Rich III, MD, 4 mg at 02/14/24 2337    multivitamin tablet, 1 tablet, Oral, Daily, Yulissa Rich III, MD, 1 tablet at 02/20/24 0959    nicotine 14 mg/24 hr 1 patch, 1 patch, Transdermal, Daily, Yulissa Rich III, MD, 1 patch at 02/20/24 0958    ondansetron injection 8 mg, 8 mg, Intravenous, Q8H PRN, Yulissa Rich III, MD    polyethylene glycol packet 17 g, 17 g, Oral, Daily PRN, Yulissa Rich III, MD    senna-docusate 8.6-50 mg per tablet 1 tablet, 1 tablet, Oral, BID, Yulissa Rich III, MD, 1 tablet at 02/20/24 0958    sodium chloride 0.9% flush 10 mL, 10 mL, Intravenous, PRN, Yulissa Rich III, MD    thiamine tablet 100 mg, 100 mg, Oral, Daily, Yulissa Rich III, MD, 100 mg at 02/20/24 0958    vitamin D 1000 units tablet 1,000 Units, 1,000 Units, Oral, Daily, Yulissa Rich III, MD, 1,000 Units at 02/20/24 0958    Facility-Administered Medications Ordered in Other Encounters:     mupirocin 2 % ointment, , Nasal, On Call Procedure, Colten Lujan MD, Given at 12/07/22 1241    Assessment and Plan:      63-year-old male presents following a fall with displaced right humeral shaft fracture.  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  Status post open reduction internal fixation of right humeral fracture with exploration of radial nerve found to be without laceration 2/16.     Continue with sling and wrist brace right wrist.       Recommend he have cessation of all nicotine products including patch, smoking, vaping, gum, etc. for fracture healing.     PT/OT -no weight-bearing right arm     Ready for discharge from orthopedic standpoint.  Plans made for inpatient rehab.     Plan follow up with Dr. Rich 2 weeks.    Yulissa Rich MD  [facility]  [phone]  This note has been transcribed with  voice recognition software, but not reviewed and may contain unrecognized errors.

## 2024-02-20 NOTE — PLAN OF CARE
Discussed discharge planning with patient. Informed that PT/OT recommended inpatient rehab. SW discussed benefits of inpatient rehab in comparison to outpatient physical therapy. Patient concerned about returning to work. SW explained that it depends on the doctor and his progress. Informed patient that home health is not an option due to insurance. Patient instructed SW to speak to daughter and expressed that he will do what she suggests.     Daughter prefers inpatient rehab. Daughter explained that patient went to [facility] after first surgery and then outpatient physical therapy after second. Daughter brought patient to outpatient physical therapy in the past but is unable to do it again because she works two jobs. Daughter understands that patient is concerned about maintaining employment and expressed that he needs rehab to be able to do his job.     I provided the patient a choice of post acute providers and offered a list of CMS rated Inpatient Rehab.  Patient has declined to select a preferred provider and elects placement with the first accepting in network provider that is available to provide services as ordered by the physician.     Received call from Teresa at [facility]. Patient evaluated and accepted. Patient to discharge to facility tomorrow. Daughter notified and agreed with placement.        02/20/24 1018   Post-Acute Status   Post-Acute Authorization Placement  (Rehab)   Post-Acute Placement Status Pending post-acute provider review/more information requested   Discharge Delays (!) Post-Acute Set-up   Discharge Plan   Discharge Plan A Rehab   Discharge Plan B Home

## 2024-02-20 NOTE — ASSESSMENT & PLAN NOTE
S/p fall  Suspect cat jumping on him is a deflection of him being drunk and falling  Xray of humerus showed an acute displaced and foreshortened fracture of the proximal humerus metadiaphysis.  Ortho consulted. Recs reviewed:  noted to have radial nerve palsy prior to splinting. I agree that surgery for open reduction internal fixation is likely the best choice for him. Is not emergent and can be scheduled. He reports he has no medical coverage in his currently under pec. I think it is best to try to handle this on this admission. We will schedule him for surgery for Friday for ORIF of right humerus. 2-3 months before he is able to use his arm for heavy lifting.  PT/OT consulted and recommending inpatient Rehab.  SW/CM consulted.

## 2024-02-20 NOTE — SUBJECTIVE & OBJECTIVE
Interval History: doing and feeling well today.    Review of Systems   HENT:  Negative for ear discharge and ear pain.    Eyes:  Negative for discharge and itching.   Endocrine: Negative for cold intolerance and heat intolerance.   Neurological:  Negative for seizures and syncope.     Objective:     Vital Signs (Most Recent):  Temp: 97.7 °F (36.5 °C) (02/20/24 1058)  Pulse: 100 (02/20/24 1058)  Resp: 18 (02/20/24 1058)  BP: 122/64 (02/20/24 1058)  SpO2: 96 % (02/20/24 1058) Vital Signs (24h Range):  Temp:  [97.5 °F (36.4 °C)-98.4 °F (36.9 °C)] 97.7 °F (36.5 °C)  Pulse:  [] 100  Resp:  [18] 18  SpO2:  [93 %-97 %] 96 %  BP: (118-143)/(63-73) 122/64     Weight: 72.6 kg (160 lb)  Body mass index is 25.82 kg/m².    Intake/Output Summary (Last 24 hours) at 2/20/2024 1544  Last data filed at 2/20/2024 1323  Gross per 24 hour   Intake 600 ml   Output 0 ml   Net 600 ml         Physical Exam  Vitals and nursing note reviewed.   Constitutional:       General: He is not in acute distress.     Appearance: He is well-developed. He is obese.   HENT:      Head: Normocephalic and atraumatic.   Eyes:      Conjunctiva/sclera: Conjunctivae normal.   Neck:      Vascular: No JVD.   Cardiovascular:      Rate and Rhythm: Normal rate and regular rhythm.      Heart sounds: Normal heart sounds.   Pulmonary:      Effort: Pulmonary effort is normal.      Breath sounds: Normal breath sounds.   Abdominal:      General: Bowel sounds are normal. There is no distension.      Palpations: Abdomen is soft.      Tenderness: There is no abdominal tenderness.   Musculoskeletal:         General: Signs of injury (R arm in sling) present.      Cervical back: Neck supple.      Right lower leg: No edema.      Left lower leg: No edema.   Neurological:      Mental Status: He is alert.   Psychiatric:         Behavior: Behavior normal.             Significant Labs: All pertinent labs within the past 24 hours have been reviewed.  BMP:   Recent Labs   Lab  02/19/24  0517         K 4.1      CO2 28   BUN 13   CREATININE 0.8   CALCIUM 8.9   MG 1.8     CBC:   Recent Labs   Lab 02/19/24  0517   WBC 9.09   HGB 8.6*   HCT 26.8*          Significant Imaging: I have reviewed all pertinent imaging results/findings within the past 24 hours.

## 2024-02-20 NOTE — PLAN OF CARE
Patient alert and oriented x4. Respirations even and unlabored. No distress noted. Skin warm and dry. Iv x2 saline locked. No complications noted. Incision to right arm. Dressing intact. Mild drainage noted. Sling in place. Patient complains of some numbness to right hand. Patient denies need for pain medication. Pain medications available as needed. Patient assisted to bsc as needed. Patient has no complaints at this time. Bed in lowest position. Call bell within reach. Bed alarms audible. Sitter at bedside. Instructed to call for any needs.           Problem: Adult Inpatient Plan of Care  Goal: Plan of Care Review  Outcome: Ongoing, Progressing  Goal: Patient-Specific Goal (Individualized)  Outcome: Ongoing, Progressing  Goal: Absence of Hospital-Acquired Illness or Injury  Outcome: Ongoing, Progressing  Intervention: Identify and Manage Fall Risk  Flowsheets (Taken 2/20/2024 8845)  Safety Promotion/Fall Prevention:   assistive device/personal item within reach   bed alarm set   high risk medications identified   medications reviewed   nonskid shoes/socks when out of bed     Problem: Fall Injury Risk  Goal: Absence of Fall and Fall-Related Injury  Outcome: Ongoing, Progressing  Intervention: Identify and Manage Contributors  Flowsheets (Taken 2/20/2024 2024)  Self-Care Promotion:   independence encouraged   BADL personal objects within reach  Medication Review/Management:   medications reviewed   high-risk medications identified

## 2024-02-20 NOTE — NURSING
Patient's dressing changed at this time per md orders. Patient tolerated dressing change. Patient has no complaints at this time. Instructed to call for any needs.

## 2024-02-20 NOTE — NURSING
[facility] [place]  Nurses Note -- 4 Eyes      2/19/2024       Skin assessed on: Q Shift      [x] No Pressure Injuries Present    [x]Prevention Measures Documented    [] Yes LDA  for Pressure Injury Previously documented     [] Yes New Pressure Injury Discovered   [] LDA for New Pressure Injury Added      Attending RN:  Toro Allen RN     Second RN:  Robert Walton Rn

## 2024-02-21 VITALS
BODY MASS INDEX: 25.71 KG/M2 | TEMPERATURE: 99 F | HEART RATE: 92 BPM | OXYGEN SATURATION: 94 % | DIASTOLIC BLOOD PRESSURE: 66 MMHG | RESPIRATION RATE: 18 BRPM | WEIGHT: 160 LBS | SYSTOLIC BLOOD PRESSURE: 120 MMHG | HEIGHT: 66 IN

## 2024-02-21 PROBLEM — R45.851 SUICIDAL IDEATION: Status: RESOLVED | Noted: 2024-02-14 | Resolved: 2024-02-21

## 2024-02-21 PROCEDURE — 25000003 PHARM REV CODE 250: Performed by: ORTHOPAEDIC SURGERY

## 2024-02-21 PROCEDURE — S4991 NICOTINE PATCH NONLEGEND: HCPCS | Performed by: ORTHOPAEDIC SURGERY

## 2024-02-21 RX ORDER — LIDOCAINE 50 MG/G
1 PATCH TOPICAL DAILY
Qty: 30 PATCH | Refills: 0 | Status: SHIPPED | OUTPATIENT
Start: 2024-02-21

## 2024-02-21 RX ADMIN — NICOTINE 1 PATCH: 14 PATCH TRANSDERMAL at 08:02

## 2024-02-21 RX ADMIN — AMLODIPINE BESYLATE 10 MG: 5 TABLET ORAL at 08:02

## 2024-02-21 RX ADMIN — Medication 1000 UNITS: at 08:02

## 2024-02-21 RX ADMIN — FOLIC ACID 1 MG: 1 TABLET ORAL at 08:02

## 2024-02-21 RX ADMIN — THERA TABS 1 TABLET: TAB at 08:02

## 2024-02-21 RX ADMIN — METOPROLOL TARTRATE 50 MG: 50 TABLET, FILM COATED ORAL at 11:02

## 2024-02-21 RX ADMIN — METOPROLOL TARTRATE 50 MG: 50 TABLET, FILM COATED ORAL at 05:02

## 2024-02-21 RX ADMIN — THIAMINE HCL TAB 100 MG 100 MG: 100 TAB at 08:02

## 2024-02-21 NOTE — PLAN OF CARE
Case Management Final Discharge Note      Discharge Disposition: [facility]    New DME ordered / company name: None    Relevant SDOH / Transition of Care Barriers:  None    Primary Caretaker and contact information:     Scheduled followup appointment:     Referrals placed:     Transportation: SW requested transportation.      ADT 30 order placed for [employer] Transportation.  Requested  time:  If transportation does not arrive at ETA time nurse will be instructed to follow protocol for transportation below: 12:35 pm [facility]  How can I get in touch directly with dispatch, if needed?                 Non-emergent dispatch: [phone] option 6    +++NURSING:  If Van does not arrive at requested time please call the above Non Emergent Dispatcher.  If issue not resolved please escalate to your charge nurse for further instructions.          Patient and family educated on discharge services and updated on DC plan. Bedside RN notified, patient clear to discharge from Case Management Perspective.      02/21/24 1220   Final Note   Assessment Type Final Discharge Note   Anticipated Discharge Disposition Rehab   What phone number can be called within the next 1-3 days to see how you are doing after discharge? [phone]   Post-Acute Status   Post-Acute Authorization Placement  (Rehab)   Post-Acute Placement Status Set-up Complete/Auth obtained   Discharge Delays None known at this time

## 2024-02-21 NOTE — NURSING
[facility] [place]  Nurses Note -- 4 Eyes      2/21/2024       Skin assessed on: Q Shift      [x] No Pressure Injuries Present    []Prevention Measures Documented    [] Yes LDA  for Pressure Injury Previously documented     [] Yes New Pressure Injury Discovered   [] LDA for New Pressure Injury Added      Attending RN:  Cris Chris, EMELY     Second RN:  Dario RN

## 2024-02-21 NOTE — NURSING
[facility] [place]  Nurses Note -- 4 Eyes      2/20/2024       Skin assessed on: Q Shift      [x] No Pressure Injuries Present    [x]Prevention Measures Documented    [] Yes LDA  for Pressure Injury Previously documented     [] Yes New Pressure Injury Discovered   [] LDA for New Pressure Injury Added      Attending RN:  Audrey Gtz RN     Second RN:  NAHOMI Thompson

## 2024-02-21 NOTE — PLAN OF CARE
Received a message from Teresa at [facility] stating that medical director denied patient for placement because he does not have a medical need for Rehab. FLORESITA followed up with Bella at [facility]. Bella said that their medical director accepted patient and he come today. FLORESITA notified Dr. Beatty and patient's daughter Melissa. Melissa agreed with change in facility.        02/21/24 1018   Discharge Reassessment   Assessment Type Discharge Planning Reassessment   Did the patient's condition or plan change since previous assessment? No   Discharge Plan discussed with: Adult children   Communicated BRANDON with patient/caregiver Yes   Discharge Plan A Rehab   Discharge Plan B Home   DME Needed Upon Discharge  none   Transition of Care Barriers None   Why the patient remains in the hospital Requires continued medical care   Post-Acute Status   Post-Acute Authorization Placement  (Rehab)   Post-Acute Placement Status Set-up Complete/Auth obtained   Discharge Delays None known at this time

## 2024-02-21 NOTE — PLAN OF CARE
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]  Yes    Macrocytic anemia [D53.9]  Yes    Alcohol abuse [F10.10]  Yes    Generalized anxiety disorder [F41.1]  Yes    Tobacco use disorder [F17.200]  Yes    Hypertension [I10]  Yes     Chronic      Resolved Hospital Problems    Diagnosis Date Resolved POA    Suicidal ideation [R45.851] 02/21/2024 Not Applicable       Allergies:Review of patient's allergies indicates:  No Known Allergies    Vitals: Per Rehab protocol    Diet: adult regular       Activity:   - Up in a chair each morning as tolerated   - Ambulate with assistance to bathroom   - May ambulate independently   - Weight bearing: no weight-bearing right arm      Nursing Precautions:    - Fall precautions   - Decubitus precautions:        -  for positioning   - Pressure reducing foam mattress   - Turn patient every two hours. Use wedge pillows to anchor patient        CONSULTS:      PT to evaluate and treat - five times a week     OT to evaluate and treat - five times a week      Medications: Discontinue all previous medication orders, if any. See new list below.     Medication List        LIDOcaine 5 %  Commonly known as: LIDODERM  Place 1 patch onto the skin once daily. Apply to back of neck.  Remove & Discard patch within 12 hours or as directed by MD     thiamine 100 MG tablet  Take 1 tablet (100 mg total) by mouth once daily.         acetaminophen 500 MG tablet  Commonly known as: TYLENOL  Take 2 tabs po every 8 hours prn pain.     amLODIPine 5 MG tablet  Commonly known as: NORVASC  Take 1 tablet (5 mg total) by mouth once daily.     metoprolol tartrate 25 MG tablet  Commonly known as:  LOPRESSOR  Take 1 tablet (25 mg total) by mouth every 6 (six) hours.     ONE DAILY MULTIVITAMIN per tablet  Generic drug: multivitamin  Take 1 tab po daily     pregabalin 75 MG capsule  Commonly known as: LYRICA  Take 1 capsule (75 mg total) by mouth 2 (two) times daily.           _________________________________  Basil Beatty MD  02/21/2024

## 2024-02-21 NOTE — NURSING
Left via wheelchair with [facility] transport, no distress noted.[facility]  Nurses Note -- 4 Eyes      2/21/2024       Skin assessed on: Discharge      [x] No Pressure Injuries Present    []Prevention Measures Documented    [] Yes LDA  for Pressure Injury Previously documented     [] Yes New Pressure Injury Discovered   [] LDA for New Pressure Injury Added      Attending RN:  Cris Chris RN     Second RN:  Rachel RN

## 2024-02-21 NOTE — PT/OT/SLP PROGRESS
Physical Therapy Treatment    Patient Name:  Israel De Jesus   MRN:  [id number]    Recommendations:     Discharge Recommendations: High Intensity Therapy  Discharge Equipment Recommendations: to be determined by next level of care  Barriers to discharge:  Pt is not functioning at Independent, ambulatory, employed baseline and can only benefit from High Intensity Therapy to help restore his functional [place]    Assessment:     Israel De Jesus is a 63 y.o. male admitted with a medical diagnosis of Humerus fracture.  He presents with the following impairments/functional limitations: weakness, impaired balance, decreased safety awareness, impaired skin, impaired endurance, impaired self care skills, decreased coordination, decreased ROM, impaired functional mobility, decreased upper extremity function, impaired coordination, gait instability, orthopedic precautions .    Rehab Prognosis: Good; patient would benefit from acute skilled PT services to address these deficits and reach maximum level of function.    Recent Surgery: Procedure(s) (LRB):  ORIF, FRACTURE, HUMERUS (Right) 4 Days Post-Op    Plan:     During this hospitalization, patient to be seen  (5-6x/wk) to address the identified rehab impairments via gait training, therapeutic activities, therapeutic exercises, neuromuscular re-education and progress toward the following goals:    Plan of Care Expires:  03/04/24    Subjective     Chief Complaint: No c/o  Patient/Family Comments/goals: Pt agreeable to MONIQUE george  Pain/Comfort:  Pain Rating 1: 0/10      Objective:     Communicated with nsg prior to session.  Patient found HOB elevated with  (safety sitter) upon PT entry to room.     General Precautions: Standard, fall PEC  Orthopedic Precautions: RUE non weight bearing  Braces: UE Sling  Respiratory Status: Room air     Functional Mobility:  Bed Mobility:     Scooting: stand by assistance  Supine to Sit: contact guard assistance  Transfers:     Sit to  "Stand:  contact guard assistance and with vc/TC for SPC use and forward weight shift over JOSEPH with straight cane  Gait: Gait training with SPC and CGA with VC/TC for increased trunk/cervical ext, sequencing, and safety approx 50'  Balance: FAir+ sit, FAir stand      AM-PAC 6 CLICK MOBILITY  Turning over in bed (including adjusting bedclothes, sheets and blankets)?: 3  Sitting down on and standing up from a chair with arms (e.g., wheelchair, bedside commode, etc.): 3  Moving from lying on back to sitting on the side of the bed?: 3  Moving to and from a bed to a chair (including a wheelchair)?: 3  Need to walk in hospital room?: 3  Climbing 3-5 steps with a railing?: 2  Basic Mobility Total Score: 17       Treatment & Education:  Adjusted pt's sling for fit.  Pt performed B AP's, TKE's, Modified bridge/GS x 10 reps.  Pt sat at EOB with SBA and performed B FAQ's x 10 reps.  Transfer and gait training as above.  Educated pt to continue to perform TE as above while up in recliner    Patient left up in chair with all lines intact, call button in reach, and nsg notified..and safety sitter present    GOALS:   Multidisciplinary Problems       Physical Therapy Goals          Problem: Physical Therapy    Goal Priority Disciplines Outcome Goal Variances Interventions   Physical Therapy Goal     PT, PT/OT Ongoing, Progressing     Description: Goals to be met by: 3/18/24     Patient will increase functional independence with mobility by performin. Supine to sit with Modified [place]  2. Sit to stand transfer with Modified [place]  3. Gait  x 150 feet with Modified [place] using Single-point Cane .   4. Ascend/descend 1 stair with  Modified [place] using Single-point Cane .   5. Lower extremity exercise program x10 reps per handout, with independence  6. Propel W/C in Level surfaces with B LE"s x ' Mod I                        Time Tracking:     PT Received On: 24  PT Start Time: 1505     PT " Stop Time: 1530  PT Total Time (min): 25 min     Billable Minutes: Gait Training 10 and Therapeutic Exercise 15    Treatment Type: Treatment  PT/PTA: PT     Number of PTA visits since last PT visit: 0     02/20/2024

## 2024-02-21 NOTE — DISCHARGE SUMMARY
[facility]  Discharge Summary      Patient Name: Israel De Jesus  MRN: [id number]  YOUNG: [id number]  Patient Class: IP- Inpatient  Admission Date: 2/13/2024  Hospital Length of Stay: 8 days  Discharge Date and Time:  02/21/2024 9:09 AM  Attending Physician: Basil Beatty MD   Discharging Provider: Basil Beatty MD  Primary Care Provider: Nuha Lynn MD    Primary Care Team: Networked reference to record PCT     HPI:     Israel De Jesus is a 63 y.o. male who has a past medical history of Eye injury, Generalized anxiety disorder, Hypertension, and Sciatica, presented to the ED with CC of R arm pain after a Fall, and Suicidal Ideations.     Patient states that his cat was jumping all over him and caused a mechanical fall onto carpeted ground, resulting in his right arm pain. This happened yesterday about 330 p.m. and has waited about 24 hours before coming into hospital. Patient was PEC'd in ED for S.I. Patient states he was not depressed a week or month ago. That his depression and suicidal thoughts began when he broke his arm and thought about not being able to work and potentially losing his job (he works as  at [employer]). He does not have a plan of suicide and has not imagined it. EtOH is still 312 now, unclear if he was trying to dull the pain, or if alcohol is a chronic problem for him. He does have a a low albumin of 2.6, mildly elevated AST at 45 (but also with muscle injury), and macrocytic anemia with  - which are all consistent with chronic alcoholism (daughter later confirmed he is recovering alcoholic and has been drinking for past 4 months). Xray of humerus showed an acute displaced and foreshortened fracture of the proximal humerus metadiaphysis. Arm wrapped and slinged in ED. Case discussed with Orthopedic surgery, patient is NPO for likely procedure tomorrow morning.        Procedure(s) (LRB):  ORIF, FRACTURE, HUMERUS (Right)      Hospital  Course:   Patient admitted with fractured R humerus with radial nerve compromise. Ortho consulted. Underwent surgical repair on 2/16.  PEC/CEC'd in ER due to suicidal ideations. Patient states he has no more S.I.  Psych consulted and PEC/CEC removed.  PT/OT consulted and recommending inpatient Rehab.  SW/CM consulted.  He has remained afebrile and hemodynamically stable.  He will be discharged to inpatient rehab once arranged.  He will be referred to Internal Medicine, Orthopedics and Psychiatry for follow up.     Goals of Care Treatment Preferences:  Code Status: Full Code      Consults:   Consults (From admission, onward)          Status Ordering Provider     Inpatient consult to Telemedicine - Psych  Once        Provider:  Rory Devries MD    Completed ROMAN CHACON     Inpatient consult to Orthopedic Surgery  Once        Provider:  Brianne Espinal MD    Completed TEMO LANDRUM            No new Assessment & Plan notes have been filed under this hospital service since the last note was generated.  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    Specialty: Family Medicine  Contact information:  [address] [place] [id number] [phone]                            Patient Instructions:      Ambulatory referral/consult to Internal Medicine   Standing Status: Future   Referral Priority: Routine Referral Type: Consultation   Referral Reason: Specialty Services Required   Requested Specialty: Internal Medicine   Number of Visits Requested: 1     Ambulatory referral/consult to Orthopedics   Standing Status: Future   Referral Priority: Routine Referral Type: Consultation   Requested Specialty: Orthopedic Surgery   Number of Visits Requested: 1     Ambulatory referral/consult to Psychiatry   Standing Status: Future   Referral Priority: Routine Referral Type: Psychiatric   Referral Reason: Specialty Services Required   Requested Specialty: Psychiatry   Number of Visits Requested: 1     Ambulatory referral/consult to Smoking Cessation Program   Standing Status: Future   Referral Priority: Routine Referral Type: Consultation   Referral Reason: Specialty Services Required   Requested Specialty: CTTS   Number of Visits Requested: 1     Diet Cardiac     Notify your health care provider if you experience any of the following:  temperature >100.4     Notify your health care provider if you experience any of the following:  persistent nausea and vomiting or diarrhea     Notify your health care provider if you experience any of the following:  severe uncontrolled pain     Notify your health care provider if you experience any of the following:  difficulty breathing or increased cough     Notify your health care provider if you experience any of the following:  persistent dizziness, light-headedness, or visual disturbances     Notify your health care provider if you experience any of the following:  increased confusion or weakness     Reason for not Prescribing Nicotine Replacement     Order Specific Question Answer Comments   Reason for not Prescribing: Patient refused      Activity as tolerated       Significant Diagnostic Studies: N/A    Pending Diagnostic Studies:       None            Medications:  Reconciled Home Medications:      Medication List        START taking these medications      LIDOcaine 5 %  Commonly known as: LIDODERM  Place 1 patch onto the skin once daily. Apply to back of neck.  Remove & Discard patch within 12 hours or as directed by MD     thiamine 100 MG tablet  Take 1 tablet (100 mg total) by mouth once daily.            CONTINUE taking these medications      acetaminophen 500 MG tablet  Commonly known as: TYLENOL  Take 500 mg by mouth daily as needed.     amLODIPine 5 MG tablet  Commonly known as: NORVASC  Take 1 tablet (5 mg total) by mouth once daily.     metoprolol tartrate 25 MG tablet  Commonly known as: LOPRESSOR  Take 1 tablet (25 mg total) by mouth every 6 (six) hours.     ONE DAILY MULTIVITAMIN per tablet  Generic drug: multivitamin  Take 1 tablet by mouth once daily.     pregabalin 75 MG capsule  Commonly known as: LYRICA  Take 1 capsule (75 mg total) by mouth 2 (two) times daily.              Indwelling Lines/Drains at time of discharge:   Lines/Drains/Airways       None                   Time spent on the discharge of patient: >31 minutes         Basil Beatty MD  Department of Hospital Medicine  [facility]

## 2024-03-04 PROBLEM — S42.201D CLOSED FRACTURE OF PROXIMAL END OF RIGHT HUMERUS WITH ROUTINE HEALING: Status: ACTIVE | Noted: 2024-02-21

## 2024-07-09 ENCOUNTER — PATIENT MESSAGE (OUTPATIENT)
Dept: ADMINISTRATIVE | Facility: HOSPITAL | Age: 64
End: 2024-07-09
Payer: MEDICAID

## 2024-07-23 ENCOUNTER — OFFICE VISIT (OUTPATIENT)
Facility: CLINIC | Age: 64
End: 2024-07-23
Payer: MEDICAID

## 2024-07-23 VITALS
HEART RATE: 77 BPM | WEIGHT: 147.5 LBS | HEIGHT: 66 IN | TEMPERATURE: 99 F | RESPIRATION RATE: 18 BRPM | SYSTOLIC BLOOD PRESSURE: 162 MMHG | OXYGEN SATURATION: 99 % | BODY MASS INDEX: 23.7 KG/M2 | DIASTOLIC BLOOD PRESSURE: 94 MMHG

## 2024-07-23 DIAGNOSIS — F06.30 DEPRESSIVE DISORDER DUE TO SEPARATE MEDICAL CONDITION: ICD-10-CM

## 2024-07-23 DIAGNOSIS — F10.10 ALCOHOL ABUSE: ICD-10-CM

## 2024-07-23 DIAGNOSIS — F41.1 GENERALIZED ANXIETY DISORDER: Primary | ICD-10-CM

## 2024-07-23 DIAGNOSIS — S42.291D OTHER CLOSED DISPLACED FRACTURE OF PROXIMAL END OF RIGHT HUMERUS WITH ROUTINE HEALING, SUBSEQUENT ENCOUNTER: ICD-10-CM

## 2024-07-23 DIAGNOSIS — Z12.11 SCREENING FOR MALIGNANT NEOPLASM OF COLON: ICD-10-CM

## 2024-07-23 DIAGNOSIS — H61.23 BILATERAL IMPACTED CERUMEN: ICD-10-CM

## 2024-07-23 DIAGNOSIS — R41.3 MEMORY CHANGE: ICD-10-CM

## 2024-07-23 DIAGNOSIS — I10 PRIMARY HYPERTENSION: ICD-10-CM

## 2024-07-23 PROCEDURE — 1159F MED LIST DOCD IN RCRD: CPT | Mod: CPTII,,, | Performed by: STUDENT IN AN ORGANIZED HEALTH CARE EDUCATION/TRAINING PROGRAM

## 2024-07-23 PROCEDURE — 99215 OFFICE O/P EST HI 40 MIN: CPT | Mod: PBBFAC,PN | Performed by: STUDENT IN AN ORGANIZED HEALTH CARE EDUCATION/TRAINING PROGRAM

## 2024-07-23 PROCEDURE — 3080F DIAST BP >= 90 MM HG: CPT | Mod: CPTII,,, | Performed by: STUDENT IN AN ORGANIZED HEALTH CARE EDUCATION/TRAINING PROGRAM

## 2024-07-23 PROCEDURE — 69209 REMOVE IMPACTED EAR WAX UNI: CPT | Mod: PBBFAC,PN | Performed by: STUDENT IN AN ORGANIZED HEALTH CARE EDUCATION/TRAINING PROGRAM

## 2024-07-23 PROCEDURE — 99999 PR PBB SHADOW E&M-EST. PATIENT-LVL V: CPT | Mod: PBBFAC,,, | Performed by: STUDENT IN AN ORGANIZED HEALTH CARE EDUCATION/TRAINING PROGRAM

## 2024-07-23 PROCEDURE — 1160F RVW MEDS BY RX/DR IN RCRD: CPT | Mod: CPTII,,, | Performed by: STUDENT IN AN ORGANIZED HEALTH CARE EDUCATION/TRAINING PROGRAM

## 2024-07-23 PROCEDURE — 3077F SYST BP >= 140 MM HG: CPT | Mod: CPTII,,, | Performed by: STUDENT IN AN ORGANIZED HEALTH CARE EDUCATION/TRAINING PROGRAM

## 2024-07-23 PROCEDURE — 99214 OFFICE O/P EST MOD 30 MIN: CPT | Mod: S$PBB,25,, | Performed by: STUDENT IN AN ORGANIZED HEALTH CARE EDUCATION/TRAINING PROGRAM

## 2024-07-23 PROCEDURE — 3008F BODY MASS INDEX DOCD: CPT | Mod: CPTII,,, | Performed by: STUDENT IN AN ORGANIZED HEALTH CARE EDUCATION/TRAINING PROGRAM

## 2024-07-23 PROCEDURE — 69210 REMOVE IMPACTED EAR WAX UNI: CPT | Mod: S$PBB,,, | Performed by: STUDENT IN AN ORGANIZED HEALTH CARE EDUCATION/TRAINING PROGRAM

## 2024-07-23 RX ORDER — AMLODIPINE BESYLATE 10 MG/1
10 TABLET ORAL DAILY
Qty: 90 TABLET | Refills: 3 | Status: SHIPPED | OUTPATIENT
Start: 2024-07-23 | End: 2025-07-23

## 2024-07-23 NOTE — PROGRESS NOTES
SUBJECTIVE     Chief Complaint   Patient presents with    Hand Injury     Follow up       HPI  Israel De Jesus is a 63 y.o. male with multiple medical diagnoses as listed in the medical history and problem list that presents for follow-up after radial fx and for chronic medical problems.    Pt presents with ex-wife today. She reports concerns about his memory.    Pt w/ radial fx after mechanical fall and ethanol intoxication. S/p ORIF and rehab. No weight bearing R arm. Pt reports overall still doing well since discharge.      Pt was sober for 1 year, had started drinking again which was factor in accident. Sober since February 13th.      GAD7 score 15 at last visit.     Walking for exercise.   Still smoking 0.8ppd.    PRE PROCEDURE DIAGNOSIS: Cerumen impaction  POST PROCEDURE DIAGNOSIS: Same  PROCEDURE: Cerumen disimpaction  Performing Physician: Dr. Nuha Lynn  MA: Lisa Kaiser    Informed Consent and Counseling: The procedure, alternative treatment options, risks, and benefits were thoroughly explained to the patient and informed verbal consent was obtained.  Appropriate equipment and medications were set up.    PROCEDURE:  The ear was prepped by instilling 5-10 drops carbamide peroxide in the bilateral ear canal until cerumen began to soften.     The ear was irrigated with 90% warm water and 10% hydrogen peroxide.     Cerumen was removed using a plastic cerumen loop from bilateral ear canal in order to visual the tympanic membrane.     The tympanic membrane was subsequently visualized.    Anticipatory guidance, as well as standard post-procedure care, was explained. Return precautions were given. The patient tolerated the procedure well without complications. Follow-up visit recommended.       PAST MEDICAL HISTORY:  Past Medical History:   Diagnosis Date    Eye injury 09/01/1980    ? eye hot metal, and burn eyes ou     Generalized anxiety disorder 03/03/2023    Hypertension     Macrocytic anemia 02/14/2024     Macrocytic anemia 2/14/2024    Sciatica 12/28/2020       ALLERGIES AND MEDICATIONS: updated and reviewed.  Review of patient's allergies indicates:  No Known Allergies  Current Outpatient Medications   Medication Sig Dispense Refill    acetaminophen (TYLENOL) 500 MG tablet Take 500 mg by mouth daily as needed.      DULoxetine (CYMBALTA) 60 MG capsule Take 1 capsule (60 mg total) by mouth once daily. 90 capsule 3    metoprolol tartrate (LOPRESSOR) 50 MG tablet Take 1 tablet (50 mg total) by mouth 2 (two) times daily. 180 tablet 3    multivitamin (ONE DAILY MULTIVITAMIN) per tablet Take 1 tablet by mouth once daily.      pregabalin (LYRICA) 75 MG capsule Take 1 capsule (75 mg total) by mouth 2 (two) times daily. 60 capsule 6    thiamine 100 MG tablet Take 1 tablet (100 mg total) by mouth once daily.      amLODIPine (NORVASC) 10 MG tablet Take 1 tablet (10 mg total) by mouth once daily. 90 tablet 3    LIDOcaine (LIDODERM) 5 % Place 1 patch onto the skin once daily. Apply to back of neck.  Remove & Discard patch within 12 hours or as directed by MD (Patient not taking: Reported on 7/23/2024) 30 patch 0     No current facility-administered medications for this visit.     Facility-Administered Medications Ordered in Other Visits   Medication Dose Route Frequency Provider Last Rate Last Admin    mupirocin 2 % ointment   Nasal On Call Procedure Colten Lujan MD   Given at 12/07/22 1241       ROS  Review of Systems   Constitutional:  Negative for chills, fatigue and fever.   HENT:  Negative for rhinorrhea and sore throat.    Respiratory:  Negative for cough and shortness of breath.    Cardiovascular:  Negative for chest pain and palpitations.   Gastrointestinal:  Negative for constipation, diarrhea, nausea and vomiting.   Genitourinary:  Negative for dysuria.   Musculoskeletal:  Negative for joint swelling.   Skin:  Negative for rash and wound.   Neurological:  Negative for light-headedness and headaches.  "  Psychiatric/Behavioral:  Negative for dysphoric mood and sleep disturbance. The patient is not nervous/anxious.          OBJECTIVE     Physical Exam  Vitals:    07/23/24 1133   BP: (!) 162/94   Pulse: 77   Resp: 18   Temp: 98.5 °F (36.9 °C)    Body mass index is 23.81 kg/m².  Weight: 66.9 kg (147 lb 7.8 oz)   Height: 5' 6" (167.6 cm)     Physical Exam  Vitals reviewed.   Constitutional:       General: He is not in acute distress.  HENT:      Right Ear: External ear normal.      Left Ear: External ear normal.      Nose: Nose normal.      Mouth/Throat:      Mouth: Mucous membranes are moist.   Eyes:      Extraocular Movements: Extraocular movements intact.      Conjunctiva/sclera: Conjunctivae normal.      Pupils: Pupils are equal, round, and reactive to light.   Pulmonary:      Effort: Pulmonary effort is normal.   Abdominal:      General: There is no distension.      Palpations: Abdomen is soft.   Musculoskeletal:         General: No swelling. Normal range of motion.      Cervical back: Normal range of motion.   Skin:     General: Skin is warm and dry.      Findings: No rash.   Neurological:      General: No focal deficit present.      Mental Status: He is alert and oriented to person, place, and time.   Psychiatric:         Mood and Affect: Mood normal.         Behavior: Behavior normal.           Health Maintenance         Date Due Completion Date    Colorectal Cancer Screening Never done ---    Shingles Vaccine (1 of 2) Never done ---    Pneumococcal Vaccines (Age 0-64) (2 of 2 - PCV) 07/29/2017 7/29/2016    RSV Vaccine (Age 60+ and Pregnant patients) (1 - 1-dose 60+ series) Never done ---    COVID-19 Vaccine (1 - 2023-24 season) Never done ---    Influenza Vaccine (1) 09/01/2024 ---    TETANUS VACCINE 07/29/2026 7/29/2016    Lipid Panel 09/30/2027 9/30/2022              ASSESSMENT     63 y.o. male with     1. Generalized anxiety disorder    2. 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 Other " closed displaced fracture of proximal end of right humerus with routine healing, subsequent encounter    4. Primary hypertension    5. Alcohol abuse    6. Bilateral impacted cerumen    7. Memory change    8. Hypertension, unspecified type    9. Screening for malignant neoplasm of colon        PLAN:     1. Generalized anxiety disorder  - Stable, continue current regimen. Follow up 6 weeks.    2. Depressive disorder due to separate medical condition  - Stable, continue current regimen. Follow up 6 weeks.    3. Other closed displaced fracture of proximal end of right humerus with routine healing, subsequent encounter  - Stable, continue current regimen. Follow up 6 weeks.    4. Primary hypertension  - Uncontrolled. Increase norvasc. F/u 6 weeks.  - amLODIPine (NORVASC) 10 MG tablet; Take 1 tablet (10 mg total) by mouth once daily.  Dispense: 90 tablet; Refill: 3    5. Alcohol abuse  - Stable, continue current regimen. Follow up 6 weeks.    6. Bilateral impacted cerumen  - Removed today. F/u 6 weeks.    7. Memory change  - Worsening. Refer to neuropsych for eval. F/u 6 weeks.  - Ambulatory referral/consult to Adult Neuropsychology; Future    8. Screening for malignant neoplasm of colon  - Stable, continue current regimen. Follow up 6 weeks.  - Cologuard Screening (Multitarget Stool DNA); Future  - Cologuard Screening (Multitarget Stool DNA)        Nuha Lynn MD  07/23/2024 11:45 AM        Follow up in about 6 weeks (around 9/3/2024).

## 2024-09-08 ENCOUNTER — HOSPITAL ENCOUNTER (INPATIENT)
Facility: HOSPITAL | Age: 64
LOS: 19 days | Discharge: HOME | DRG: 896 | End: 2024-09-27
Attending: EMERGENCY MEDICINE | Admitting: HOSPITALIST
Payer: MEDICAID

## 2024-09-08 DIAGNOSIS — F32.A DEPRESSION WITH SUICIDAL IDEATION: ICD-10-CM

## 2024-09-08 DIAGNOSIS — R07.9 CHEST PAIN: ICD-10-CM

## 2024-09-08 DIAGNOSIS — R45.851 DEPRESSION WITH SUICIDAL IDEATION: ICD-10-CM

## 2024-09-08 DIAGNOSIS — Z00.8 MEDICAL CLEARANCE FOR PSYCHIATRIC ADMISSION: ICD-10-CM

## 2024-09-08 DIAGNOSIS — R53.81 DEBILITY: ICD-10-CM

## 2024-09-08 DIAGNOSIS — R05.9 COUGH: ICD-10-CM

## 2024-09-08 DIAGNOSIS — G25.2 COARSE TREMORS: ICD-10-CM

## 2024-09-08 DIAGNOSIS — F10.930 ALCOHOL WITHDRAWAL SYNDROME WITHOUT COMPLICATION: Primary | ICD-10-CM

## 2024-09-08 DIAGNOSIS — I26.99 PULMONARY EMBOLISM: ICD-10-CM

## 2024-09-08 DIAGNOSIS — R26.2 DIFFICULTY WALKING: ICD-10-CM

## 2024-09-08 DIAGNOSIS — E51.11: ICD-10-CM

## 2024-09-08 LAB
ALBUMIN SERPL BCP-MCNC: 3.2 G/DL (ref 3.5–5.2)
ALP SERPL-CCNC: 128 U/L (ref 55–135)
ALT SERPL W/O P-5'-P-CCNC: 37 U/L (ref 10–44)
AMPHET+METHAMPHET UR QL: NEGATIVE
ANION GAP SERPL CALC-SCNC: 15 MMOL/L (ref 8–16)
APAP SERPL-MCNC: <3 UG/ML (ref 10–20)
AST SERPL-CCNC: 66 U/L (ref 10–40)
BACTERIA #/AREA URNS HPF: ABNORMAL /HPF
BARBITURATES UR QL SCN>200 NG/ML: NEGATIVE
BASOPHILS # BLD AUTO: 0.05 K/UL (ref 0–0.2)
BASOPHILS NFR BLD: 0.6 % (ref 0–1.9)
BENZODIAZ UR QL SCN>200 NG/ML: NEGATIVE
BILIRUB SERPL-MCNC: 0.4 MG/DL (ref 0.1–1)
BILIRUB UR QL STRIP: NEGATIVE
BUN SERPL-MCNC: 8 MG/DL (ref 8–23)
BZE UR QL SCN: NEGATIVE
CALCIUM SERPL-MCNC: 9.1 MG/DL (ref 8.7–10.5)
CANNABINOIDS UR QL SCN: ABNORMAL
CHLORIDE SERPL-SCNC: 110 MMOL/L (ref 95–110)
CLARITY UR: CLEAR
CO2 SERPL-SCNC: 19 MMOL/L (ref 23–29)
COLOR UR: YELLOW
CREAT SERPL-MCNC: 1 MG/DL (ref 0.5–1.4)
CREAT UR-MCNC: 351.6 MG/DL (ref 23–375)
CTP QC/QA: YES
DIFFERENTIAL METHOD BLD: ABNORMAL
EOSINOPHIL # BLD AUTO: 0 K/UL (ref 0–0.5)
EOSINOPHIL NFR BLD: 0.1 % (ref 0–8)
ERYTHROCYTE [DISTWIDTH] IN BLOOD BY AUTOMATED COUNT: 18.8 % (ref 11.5–14.5)
EST. GFR  (NO RACE VARIABLE): >60 ML/MIN/1.73 M^2
ETHANOL SERPL-MCNC: 138 MG/DL
ETHANOL SERPL-MCNC: 342 MG/DL
GLUCOSE SERPL-MCNC: 99 MG/DL (ref 70–110)
GLUCOSE UR QL STRIP: NEGATIVE
HCT VFR BLD AUTO: 44.4 % (ref 40–54)
HGB BLD-MCNC: 15.3 G/DL (ref 14–18)
HGB UR QL STRIP: ABNORMAL
HYALINE CASTS #/AREA URNS LPF: 5 /LPF
IMM GRANULOCYTES # BLD AUTO: 0.02 K/UL (ref 0–0.04)
IMM GRANULOCYTES NFR BLD AUTO: 0.2 % (ref 0–0.5)
KETONES UR QL STRIP: NEGATIVE
LEUKOCYTE ESTERASE UR QL STRIP: NEGATIVE
LYMPHOCYTES # BLD AUTO: 2.5 K/UL (ref 1–4.8)
LYMPHOCYTES NFR BLD: 29.2 % (ref 18–48)
MCH RBC QN AUTO: 33.5 PG (ref 27–31)
MCHC RBC AUTO-ENTMCNC: 34.5 G/DL (ref 32–36)
MCV RBC AUTO: 97 FL (ref 82–98)
METHADONE UR QL SCN>300 NG/ML: NEGATIVE
MICROSCOPIC COMMENT: ABNORMAL
MONOCYTES # BLD AUTO: 0.4 K/UL (ref 0.3–1)
MONOCYTES NFR BLD: 4.6 % (ref 4–15)
NEUTROPHILS # BLD AUTO: 5.7 K/UL (ref 1.8–7.7)
NEUTROPHILS NFR BLD: 65.3 % (ref 38–73)
NITRITE UR QL STRIP: NEGATIVE
NRBC BLD-RTO: 0 /100 WBC
OPIATES UR QL SCN: NEGATIVE
PCP UR QL SCN>25 NG/ML: NEGATIVE
PH UR STRIP: 6 [PH] (ref 5–8)
PLATELET # BLD AUTO: 187 K/UL (ref 150–450)
PMV BLD AUTO: 9.5 FL (ref 9.2–12.9)
POTASSIUM SERPL-SCNC: 3.6 MMOL/L (ref 3.5–5.1)
PROT SERPL-MCNC: 7 G/DL (ref 6–8.4)
PROT UR QL STRIP: ABNORMAL
RBC # BLD AUTO: 4.57 M/UL (ref 4.6–6.2)
RBC #/AREA URNS HPF: 1 /HPF (ref 0–4)
SARS-COV-2 RDRP RESP QL NAA+PROBE: NEGATIVE
SODIUM SERPL-SCNC: 144 MMOL/L (ref 136–145)
SP GR UR STRIP: >1.03 (ref 1–1.03)
SQUAMOUS #/AREA URNS HPF: 3 /HPF
TOXICOLOGY INFORMATION: ABNORMAL
TSH SERPL DL<=0.005 MIU/L-ACNC: 1.7 UIU/ML (ref 0.4–4)
URN SPEC COLLECT METH UR: ABNORMAL
UROBILINOGEN UR STRIP-ACNC: ABNORMAL EU/DL
WBC # BLD AUTO: 8.69 K/UL (ref 3.9–12.7)
WBC #/AREA URNS HPF: 5 /HPF (ref 0–5)

## 2024-09-08 PROCEDURE — 81000 URINALYSIS NONAUTO W/SCOPE: CPT | Performed by: EMERGENCY MEDICINE

## 2024-09-08 PROCEDURE — 80307 DRUG TEST PRSMV CHEM ANLYZR: CPT | Performed by: EMERGENCY MEDICINE

## 2024-09-08 PROCEDURE — 84443 ASSAY THYROID STIM HORMONE: CPT | Performed by: EMERGENCY MEDICINE

## 2024-09-08 PROCEDURE — 80053 COMPREHEN METABOLIC PANEL: CPT | Performed by: EMERGENCY MEDICINE

## 2024-09-08 PROCEDURE — 93005 ELECTROCARDIOGRAM TRACING: CPT

## 2024-09-08 PROCEDURE — 99285 EMERGENCY DEPT VISIT HI MDM: CPT | Mod: 25

## 2024-09-08 PROCEDURE — 87635 SARS-COV-2 COVID-19 AMP PRB: CPT | Performed by: EMERGENCY MEDICINE

## 2024-09-08 PROCEDURE — 93010 ELECTROCARDIOGRAM REPORT: CPT | Mod: ,,, | Performed by: INTERNAL MEDICINE

## 2024-09-08 PROCEDURE — 85025 COMPLETE CBC W/AUTO DIFF WBC: CPT | Performed by: EMERGENCY MEDICINE

## 2024-09-08 PROCEDURE — 12000002 HC ACUTE/MED SURGE SEMI-PRIVATE ROOM

## 2024-09-08 PROCEDURE — 96374 THER/PROPH/DIAG INJ IV PUSH: CPT

## 2024-09-08 PROCEDURE — 63600175 PHARM REV CODE 636 W HCPCS: Performed by: EMERGENCY MEDICINE

## 2024-09-08 PROCEDURE — 82077 ASSAY SPEC XCP UR&BREATH IA: CPT | Performed by: EMERGENCY MEDICINE

## 2024-09-08 PROCEDURE — 80143 DRUG ASSAY ACETAMINOPHEN: CPT | Performed by: EMERGENCY MEDICINE

## 2024-09-08 RX ORDER — DIAZEPAM 10 MG/2ML
10 INJECTION INTRAMUSCULAR
Status: DISCONTINUED | OUTPATIENT
Start: 2024-09-08 | End: 2024-09-08

## 2024-09-08 RX ORDER — LORAZEPAM 2 MG/ML
2 INJECTION INTRAMUSCULAR ONCE
Status: COMPLETED | OUTPATIENT
Start: 2024-09-08 | End: 2024-09-08

## 2024-09-08 RX ADMIN — LORAZEPAM 2 MG: 2 INJECTION INTRAMUSCULAR; INTRAVENOUS at 10:09

## 2024-09-08 NOTE — ED PROVIDER NOTES
"Encounter Date: 9/8/2024       History     Chief Complaint   Patient presents with    Suicidal     Pt [employer] officer after receiving an call stating pt was on Facebook stating he was going to kill himself. Officer went to 's house and pt stated "yes" he wants to kill himself. Pt states he was not going to stab himself with a knife and he does not have access to a gun. Pt states his plan was just to lie on his couch and go to sleep. Pt states he has been drinking daily for a year. Pt states that today he has had Patrizia and Rum mixed and has consumed about 6 glasses of the mix. 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 Officer went to 's house and pt stated "yes" he wants to kill himself. Pt states he was not going to stab himself with a knife and he does not have access to a gun. Pt states his plan was just to lie on his couch and go to sleep. Pt states he has been drinking daily for a year. Pt states that today he has had Patrizia and Rum mixed and has consumed about 6 glasses of the mix. Pt denies any pain at this time.  Denies being on any medication or previously being on any psychiatric medication.    Further history obtained from patient's daughter with the patient's permission.  Daughter reports the patient has had longstanding depression.  She reports he has made multiple threats towards suicidality including posting them on Facebook.  She reports this usually happens when he drinks.  The patient has denied drinking to the patient's daughter.  He is supposed to be on antidepressant medications, however it appears he is not currently taking them.  Further she expresses frustration with him frequently lying about his alcohol use and unwillingness to take his depression medications.      Review of patient's allergies indicates:  No Known Allergies  Past Medical History:   Diagnosis Date    Eye injury 09/01/1980    " ? eye hot metal, and burn eyes ou     Generalized anxiety disorder 03/03/2023    Hypertension     Macrocytic anemia 02/14/2024    Macrocytic anemia 2/14/2024    Sciatica 12/28/2020     Past Surgical History:   Procedure Laterality Date    FUSION OF CERVICAL SPINE BY POSTERIOR APPROACH N/A 10/12/2022    Procedure: POSTERIOR CERVICAL FUSION, SPINE C1-C4 PCF ;  Surgeon: Ike Storm DO;  Location: [facility] OR 2ND FLR;  Service: Neurosurgery;  Laterality: N/A;    FUSION OF CERVICAL SPINE BY POSTERIOR APPROACH N/A 12/7/2022    Procedure: FUSION,SPINE,CERVICAL,POSTERIOR APPROACH C1-T1;  Surgeon: Ike Storm DO;  Location: [facility] OR 2ND FLR;  Service: Neurosurgery;  Laterality: N/A;  GENERAL/ TYPE & SCREEN/ EMG/ SEP/ MEP/ MIAMI/ REGULAR BED, REVERSED/ BROWN/ PRONE/ C-ARM/ DEPUY/ COLEMAN/ PACU/ ASSISTANT SURGEON DR. MAYURI LEMOS    KNEE ARTHROPLASTY      LAMINECTOMY N/A 10/12/2022    Procedure: LAMINECTOMY, SPINE, C-1;  Surgeon: Ike Storm DO;  Location: [facility] OR 2ND FLR;  Service: Neurosurgery;  Laterality: N/A;    ORIF HUMERUS FRACTURE Right 2/16/2024    Procedure: ORIF, FRACTURE, HUMERUS;  Surgeon: Yulissa Rich III, MD;  Location: [facility] OR;  Service: Orthopedics;  Laterality: Right;  SYNTHES LUKE [phone] CALLED BETY ON 2/15/2024-     Family History   Problem Relation Name Age of Onset    Hypertension Mother      Stroke Maternal Grandmother      Cancer Maternal Grandmother      Amblyopia Neg Hx      Blindness Neg Hx      Cataracts Neg Hx      Diabetes Neg Hx      Glaucoma Neg Hx      Macular degeneration Neg Hx      Retinal detachment Neg Hx      Strabismus Neg Hx      Thyroid disease Neg Hx       Social History     Tobacco Use    Smoking status: Every Day     Current packs/day: 1.00     Types: Cigarettes    Smokeless tobacco: Never   Substance Use Topics    Alcohol use: Not Currently    Drug use: No     Review of Systems   Constitutional:  Negative for chills and fever.   HENT:  Negative for  sore throat.    Respiratory:  Negative for shortness of breath.    Cardiovascular:  Negative for chest pain.   Gastrointestinal:  Negative for nausea and vomiting.   Genitourinary:  Negative for dysuria.   Musculoskeletal:  Negative for back pain.   Skin:  Negative for rash.   Neurological:  Negative for weakness.   Psychiatric/Behavioral:  Positive for behavioral problems, dysphoric mood, sleep disturbance and suicidal ideas. Negative for hallucinations. The patient is nervous/anxious.        Physical Exam     Initial Vitals [09/08/24 1426]   BP Pulse Resp Temp SpO2   (!) 167/113 (!) 134 20 98 °F (36.7 °C) 97 %      MAP       --         Physical Exam    Nursing note and vitals reviewed.  Constitutional: He appears well-developed and well-nourished. He is not diaphoretic. No distress.   HENT:   Head: Normocephalic and atraumatic.   Eyes: Conjunctivae and EOM are normal. Pupils are equal, round, and reactive to light. No scleral icterus.   Neck: Neck supple.   Normal range of motion.  Cardiovascular:  Normal rate, regular rhythm, normal heart sounds and intact distal pulses.     Exam reveals no gallop and no friction rub.       No murmur heard.  Pulmonary/Chest: Breath sounds normal. No stridor. No respiratory distress. He has no wheezes. He has no rhonchi. He has no rales.   Abdominal: Abdomen is soft. Bowel sounds are normal. He exhibits no distension. There is no abdominal tenderness. There is no rebound and no guarding.   Musculoskeletal:         General: No tenderness or edema. Normal range of motion.      Cervical back: Normal range of motion and neck supple.     Neurological: He is alert and oriented to person, place, and time. He has normal strength. No cranial nerve deficit. GCS score is 15. GCS eye subscore is 4. GCS verbal subscore is 5. GCS motor subscore is 6.   Skin: Skin is warm and dry. No rash noted.   Psychiatric: He has a normal mood and affect. His behavior is normal.         ED Course   Critical  Care    Date/Time: 9/9/2024 3:48 AM    Performed by: Anoop Hooks MD  Authorized by: Anoop Hooks MD  Direct patient critical care time: 15 minutes  Additional history critical care time: 5 minutes  Ordering / reviewing critical care time: 5 minutes  Documentation critical care time: 15 minutes  Consulting other physicians critical care time: 5 minutes  Total critical care time (exclusive of procedural time) : 45 minutes  Critical care was necessary to treat or prevent imminent or life-threatening deterioration of the following conditions: circulatory failure.  Critical care was time spent personally by me on the following activities: ordering and performing treatments and interventions, ordering and review of laboratory studies, ordering and review of radiographic studies, pulse oximetry, re-evaluation of patient's condition and review of old charts.        Labs Reviewed   CBC W/ AUTO DIFFERENTIAL - Abnormal       Result Value    WBC 8.69      RBC 4.57 (*)     Hemoglobin 15.3      Hematocrit 44.4      MCV 97      MCH 33.5 (*)     MCHC 34.5      RDW 18.8 (*)     Platelets 187      MPV 9.5      Immature Granulocytes 0.2      Gran # (ANC) 5.7      Immature Grans (Abs) 0.02      Lymph # 2.5      Mono # 0.4      Eos # 0.0      Baso # 0.05      nRBC 0      Gran % 65.3      Lymph % 29.2      Mono % 4.6      Eosinophil % 0.1      Basophil % 0.6      Differential Method Automated     COMPREHENSIVE METABOLIC PANEL - Abnormal    Sodium 144      Potassium 3.6      Chloride 110      CO2 19 (*)     Glucose 99      BUN 8      Creatinine 1.0      Calcium 9.1      Total Protein 7.0      Albumin 3.2 (*)     Total Bilirubin 0.4      Alkaline Phosphatase 128      AST 66 (*)     ALT 37      eGFR >60      Anion Gap 15     URINALYSIS, REFLEX TO URINE CULTURE - Abnormal    Specimen UA Urine, Clean Catch      Color, UA Yellow      Appearance, UA Clear      pH, UA 6.0      Specific Gravity, UA >1.030 (*)     Protein, UA 2+ (*)      Glucose, UA Negative      Ketones, UA Negative      Bilirubin (UA) Negative      Occult Blood UA 1+ (*)     Nitrite, UA Negative      Urobilinogen, UA 2.0-3.0 (*)     Leukocytes, UA Negative      Narrative:     Specimen Source->Urine   DRUG SCREEN PANEL, URINE EMERGENCY - Abnormal    Benzodiazepines Negative      Methadone metabolites Negative      Cocaine (Metab.) Negative      Opiate Scrn, Ur Negative      Barbiturate Screen, Ur Negative      Amphetamine Screen, Ur Negative      THC Presumptive Positive (*)     Phencyclidine Negative      Creatinine, Urine 351.6      Toxicology Information SEE COMMENT      Narrative:     Specimen Source->Urine   ALCOHOL,MEDICAL (ETHANOL) - Abnormal    Alcohol, Serum 342 (*)     Narrative:     ALCOHOL   critical result(s) called and verbal readback obtained from   RATNA PADILLA. by LM1 09/08/2024 15:29   ACETAMINOPHEN LEVEL - Abnormal    Acetaminophen (Tylenol), Serum <3.0 (*)    URINALYSIS MICROSCOPIC - Abnormal    RBC, UA 1      WBC, UA 5      Bacteria Rare      Squam Epithel, UA 3      Hyaline Casts, UA 5 (*)     Microscopic Comment SEE COMMENT      Narrative:     Specimen Source->Urine   ALCOHOL,MEDICAL (ETHANOL) - Abnormal    Alcohol, Serum 138 (*)    TSH    TSH 1.695     B-TYPE NATRIURETIC PEPTIDE   MAGNESIUM   PHOSPHORUS   SARS-COV-2 RDRP GENE    POC Rapid COVID Negative       Acceptable Yes       EKG Readings: (Independently Interpreted)   Initial Reading: No STEMI. Rhythm: Normal Sinus Rhythm. Heart Rate: 104. Ectopy: PVCs.   No ST segment elevation or depression concerning for acute ischemia.          Imaging Results    None          Medications   thiamine (B-1) 500 mg in D5W 100 mL IVPB (has no administration in time range)   diazePAM injection 10 mg (has no administration in time range)   LORazepam injection 2 mg (2 mg Intravenous Given 9/8/24 2222)   chlordiazepoxide capsule 50 mg (50 mg Oral Given 9/9/24 0017)   diazePAM tablet 5 mg (5 mg Oral  Given 9/9/24 0017)   ondansetron disintegrating tablet 4 mg (4 mg Oral Given 9/9/24 0016)   diazePAM tablet 10 mg (10 mg Oral Given 9/9/24 0128)   labetaloL injection 10 mg (10 mg Intravenous Given 9/9/24 0130)     Medical Decision Making  Amount and/or Complexity of Data Reviewed  Labs: ordered. Decision-making details documented in ED Course.  Radiology: ordered.    Risk  Prescription drug management.  Decision regarding hospitalization.               ED Course as of 09/09/24 0348   Mon Sep 09, 2024   0324 Ethanol(!) [BS]   0324 POCT COVID-19 Rapid Screening [BS]   0324 Urinalysis Microscopic(!) [BS]   0324 Drug screen panel, emergency(!) [BS]   0324 Acetaminophen level(!) [BS]   0324 Urinalysis, Reflex to Urine Culture Urine, Clean Catch(!) [BS]   0324 TSH [BS]   0325 Comprehensive metabolic panel(!) [BS]   0325 CBC auto differential(!) [BS]   0325 EMS arrived take the patient to [facility], where he had placement for suicidal ideation.  However, patient was unable to ambulate.  He was tremulous, unsteady with a wide-based gait.  Given he was hypertensive and tachycardic and tremulous, concern for alcohol withdrawal versus thiamine deficiency.  I called [facility] and they do not have a doctor overnight, so transport was delayed.  After further treatment with benzodiazepines, patient is still unable to ambulate with a wide-based gait.  Given concern for thiamine deficiency, worsening alcohol withdrawal symptoms will cancel placement at [facility] and seek ICU admission here. [BS]      ED Course User Index  [BS] Anoop Hooks MD               Medical Decision Making:   Initial Assessment:   63-year-old male presenting with depression, suicidal ideation, making suicidal threats on Facebook.  On exam he is well-appearing and in no acute distress.  Patient appears somewhat intoxicated.  Patient's daughter called at the patient's request.  The patient would like in the daughter would like to  be updated on the patient's status when he receives placement or for any critical updates.  Patient does admit to drinking alcohol.  Per daughter, it sounds like his depression and occasional suicidal references have been ongoing for some time.  She is concerned about her father what is also frustrated at his lack of participation.  She reports he was PEC'd once before and admitted to the hospital but then discharge without ever getting significant psychiatric treatment.  Overall given the ongoing depression and suicidality, I am placing the patient under pec.  Will transfer him to a mental health facility when he is medically clear and his blood alcohol level has metabolized appropriately.  ED Management:  Patient is medically cleared for transfer to psych facility.             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

## 2024-09-08 NOTE — Clinical Note
Diagnosis: Alcohol withdrawal syndrome without complication [[id number]]   Future Attending Provider: KENJI DUCKWORTH [[id number]]   Reason for IP Medical Treatment  (Clinical interventions that can only be accomplished in the IP setting? ) :: alcohol withdrawal   Plans for Post-Acute care--if anticipated (pick the single best option):: E. LTAC Placement   Special Needs:: Suicide Precaution [25]

## 2024-09-08 NOTE — ED TRIAGE NOTES
"Pt BIB by Nancy PIMENTEL.  Pt had posted "Suicide" on facebook today.  His daughter then called for wellness check.  PD transported patient to ed.  Pt reports hopelessness.  He smokes and drinks daily.    "

## 2024-09-09 PROBLEM — D69.6 THROMBOCYTOPENIA: Status: ACTIVE | Noted: 2024-09-09

## 2024-09-09 PROBLEM — R00.0 TACHYCARDIA: Status: RESOLVED | Noted: 2022-10-13 | Resolved: 2024-09-09

## 2024-09-09 PROBLEM — R19.09 RIGHT GROIN MASS: Status: ACTIVE | Noted: 2024-09-09

## 2024-09-09 PROBLEM — S42.309A HUMERUS FRACTURE: Status: RESOLVED | Noted: 2024-02-14 | Resolved: 2024-09-09

## 2024-09-09 PROBLEM — F10.930 ALCOHOL WITHDRAWAL SYNDROME WITHOUT COMPLICATION: Status: ACTIVE | Noted: 2024-02-14

## 2024-09-09 PROBLEM — H61.20 IMPACTED CERUMEN: Status: RESOLVED | Noted: 2017-11-09 | Resolved: 2024-09-09

## 2024-09-09 PROBLEM — S42.201D CLOSED FRACTURE OF PROXIMAL END OF RIGHT HUMERUS WITH ROUTINE HEALING: Status: RESOLVED | Noted: 2024-02-21 | Resolved: 2024-09-09

## 2024-09-09 PROBLEM — E83.42 HYPOMAGNESEMIA: Status: ACTIVE | Noted: 2024-09-09

## 2024-09-09 PROBLEM — F17.200 TOBACCO DEPENDENCY: Status: ACTIVE | Noted: 2024-09-09

## 2024-09-09 PROBLEM — M54.50 LOW BACK PAIN: Status: RESOLVED | Noted: 2021-01-03 | Resolved: 2024-09-09

## 2024-09-09 LAB
ALBUMIN SERPL BCP-MCNC: 2.8 G/DL (ref 3.5–5.2)
ALP SERPL-CCNC: 114 U/L (ref 55–135)
ALT SERPL W/O P-5'-P-CCNC: 37 U/L (ref 10–44)
ANION GAP SERPL CALC-SCNC: 12 MMOL/L (ref 8–16)
AST SERPL-CCNC: 74 U/L (ref 10–40)
BASOPHILS # BLD AUTO: 0.03 K/UL (ref 0–0.2)
BASOPHILS NFR BLD: 0.3 % (ref 0–1.9)
BILIRUB SERPL-MCNC: 0.9 MG/DL (ref 0.1–1)
BNP SERPL-MCNC: 85 PG/ML (ref 0–99)
BUN SERPL-MCNC: 7 MG/DL (ref 8–23)
CALCIUM SERPL-MCNC: 8.8 MG/DL (ref 8.7–10.5)
CHLORIDE SERPL-SCNC: 103 MMOL/L (ref 95–110)
CO2 SERPL-SCNC: 23 MMOL/L (ref 23–29)
CREAT SERPL-MCNC: 0.9 MG/DL (ref 0.5–1.4)
DIFFERENTIAL METHOD BLD: ABNORMAL
EOSINOPHIL # BLD AUTO: 0 K/UL (ref 0–0.5)
EOSINOPHIL NFR BLD: 0.1 % (ref 0–8)
ERYTHROCYTE [DISTWIDTH] IN BLOOD BY AUTOMATED COUNT: 18.6 % (ref 11.5–14.5)
EST. GFR  (NO RACE VARIABLE): >60 ML/MIN/1.73 M^2
FOLATE SERPL-MCNC: 18.1 NG/ML (ref 4–24)
GLUCOSE SERPL-MCNC: 124 MG/DL (ref 70–110)
HCT VFR BLD AUTO: 37.7 % (ref 40–54)
HGB BLD-MCNC: 13 G/DL (ref 14–18)
IMM GRANULOCYTES # BLD AUTO: 0.03 K/UL (ref 0–0.04)
IMM GRANULOCYTES NFR BLD AUTO: 0.3 % (ref 0–0.5)
LYMPHOCYTES # BLD AUTO: 0.9 K/UL (ref 1–4.8)
LYMPHOCYTES NFR BLD: 9.1 % (ref 18–48)
MAGNESIUM SERPL-MCNC: 1.4 MG/DL (ref 1.6–2.6)
MCH RBC QN AUTO: 33.7 PG (ref 27–31)
MCHC RBC AUTO-ENTMCNC: 34.5 G/DL (ref 32–36)
MCV RBC AUTO: 98 FL (ref 82–98)
MONOCYTES # BLD AUTO: 0.4 K/UL (ref 0.3–1)
MONOCYTES NFR BLD: 4.1 % (ref 4–15)
NEUTROPHILS # BLD AUTO: 8.1 K/UL (ref 1.8–7.7)
NEUTROPHILS NFR BLD: 86.1 % (ref 38–73)
NRBC BLD-RTO: 0 /100 WBC
PHOSPHATE SERPL-MCNC: 2.6 MG/DL (ref 2.7–4.5)
PLATELET # BLD AUTO: 131 K/UL (ref 150–450)
PMV BLD AUTO: 10.3 FL (ref 9.2–12.9)
POTASSIUM SERPL-SCNC: 3.5 MMOL/L (ref 3.5–5.1)
PROT SERPL-MCNC: 5.9 G/DL (ref 6–8.4)
RBC # BLD AUTO: 3.86 M/UL (ref 4.6–6.2)
SODIUM SERPL-SCNC: 138 MMOL/L (ref 136–145)
VIT B12 SERPL-MCNC: 399 PG/ML (ref 210–950)
WBC # BLD AUTO: 9.44 K/UL (ref 3.9–12.7)

## 2024-09-09 PROCEDURE — 25000003 PHARM REV CODE 250: Performed by: INTERNAL MEDICINE

## 2024-09-09 PROCEDURE — 63600175 PHARM REV CODE 636 W HCPCS: Performed by: INTERNAL MEDICINE

## 2024-09-09 PROCEDURE — 96375 TX/PRO/DX INJ NEW DRUG ADDON: CPT

## 2024-09-09 PROCEDURE — 84100 ASSAY OF PHOSPHORUS: CPT | Performed by: STUDENT IN AN ORGANIZED HEALTH CARE EDUCATION/TRAINING PROGRAM

## 2024-09-09 PROCEDURE — 63600175 PHARM REV CODE 636 W HCPCS: Performed by: STUDENT IN AN ORGANIZED HEALTH CARE EDUCATION/TRAINING PROGRAM

## 2024-09-09 PROCEDURE — 82607 VITAMIN B-12: CPT | Performed by: INTERNAL MEDICINE

## 2024-09-09 PROCEDURE — 83735 ASSAY OF MAGNESIUM: CPT | Performed by: STUDENT IN AN ORGANIZED HEALTH CARE EDUCATION/TRAINING PROGRAM

## 2024-09-09 PROCEDURE — 25000003 PHARM REV CODE 250: Performed by: HOSPITALIST

## 2024-09-09 PROCEDURE — 25000003 PHARM REV CODE 250: Performed by: STUDENT IN AN ORGANIZED HEALTH CARE EDUCATION/TRAINING PROGRAM

## 2024-09-09 PROCEDURE — 82746 ASSAY OF FOLIC ACID SERUM: CPT | Performed by: INTERNAL MEDICINE

## 2024-09-09 PROCEDURE — 85025 COMPLETE CBC W/AUTO DIFF WBC: CPT | Performed by: INTERNAL MEDICINE

## 2024-09-09 PROCEDURE — 20000000 HC ICU ROOM

## 2024-09-09 PROCEDURE — 99233 SBSQ HOSP IP/OBS HIGH 50: CPT | Mod: AF,HB,95, | Performed by: PSYCHIATRY & NEUROLOGY

## 2024-09-09 PROCEDURE — 83880 ASSAY OF NATRIURETIC PEPTIDE: CPT | Performed by: STUDENT IN AN ORGANIZED HEALTH CARE EDUCATION/TRAINING PROGRAM

## 2024-09-09 PROCEDURE — 80053 COMPREHEN METABOLIC PANEL: CPT | Performed by: INTERNAL MEDICINE

## 2024-09-09 PROCEDURE — 25500020 PHARM REV CODE 255: Performed by: HOSPITALIST

## 2024-09-09 PROCEDURE — S4991 NICOTINE PATCH NONLEGEND: HCPCS | Performed by: INTERNAL MEDICINE

## 2024-09-09 RX ORDER — PANTOPRAZOLE SODIUM 40 MG/1
40 TABLET, DELAYED RELEASE ORAL DAILY
Status: DISCONTINUED | OUTPATIENT
Start: 2024-09-10 | End: 2024-09-27 | Stop reason: HOSPADM

## 2024-09-09 RX ORDER — DIAZEPAM 5 MG/1
5 TABLET ORAL EVERY 8 HOURS
Status: DISCONTINUED | OUTPATIENT
Start: 2024-09-11 | End: 2024-09-09

## 2024-09-09 RX ORDER — ONDANSETRON HYDROCHLORIDE 2 MG/ML
4 INJECTION, SOLUTION INTRAVENOUS EVERY 6 HOURS PRN
Status: DISCONTINUED | OUTPATIENT
Start: 2024-09-09 | End: 2024-09-09

## 2024-09-09 RX ORDER — LABETALOL HYDROCHLORIDE 5 MG/ML
10 INJECTION, SOLUTION INTRAVENOUS ONCE
Status: COMPLETED | OUTPATIENT
Start: 2024-09-09 | End: 2024-09-09

## 2024-09-09 RX ORDER — IPRATROPIUM BROMIDE AND ALBUTEROL SULFATE 2.5; .5 MG/3ML; MG/3ML
3 SOLUTION RESPIRATORY (INHALATION) EVERY 4 HOURS PRN
Status: DISCONTINUED | OUTPATIENT
Start: 2024-09-09 | End: 2024-09-24

## 2024-09-09 RX ORDER — ALUMINUM HYDROXIDE, MAGNESIUM HYDROXIDE, AND SIMETHICONE 1200; 120; 1200 MG/30ML; MG/30ML; MG/30ML
30 SUSPENSION ORAL 4 TIMES DAILY PRN
Status: DISCONTINUED | OUTPATIENT
Start: 2024-09-09 | End: 2024-09-27 | Stop reason: HOSPADM

## 2024-09-09 RX ORDER — ONDANSETRON HYDROCHLORIDE 2 MG/ML
8 INJECTION, SOLUTION INTRAVENOUS EVERY 6 HOURS PRN
Status: DISCONTINUED | OUTPATIENT
Start: 2024-09-09 | End: 2024-09-27 | Stop reason: HOSPADM

## 2024-09-09 RX ORDER — ONDANSETRON 4 MG/1
4 TABLET, ORALLY DISINTEGRATING ORAL
Status: COMPLETED | OUTPATIENT
Start: 2024-09-09 | End: 2024-09-09

## 2024-09-09 RX ORDER — ACETAMINOPHEN 325 MG/1
650 TABLET ORAL EVERY 4 HOURS PRN
Status: DISCONTINUED | OUTPATIENT
Start: 2024-09-09 | End: 2024-09-27 | Stop reason: HOSPADM

## 2024-09-09 RX ORDER — LABETALOL HYDROCHLORIDE 5 MG/ML
10 INJECTION, SOLUTION INTRAVENOUS EVERY 6 HOURS PRN
Status: DISCONTINUED | OUTPATIENT
Start: 2024-09-09 | End: 2024-09-27 | Stop reason: HOSPADM

## 2024-09-09 RX ORDER — DIAZEPAM 5 MG/1
10 TABLET ORAL
Status: COMPLETED | OUTPATIENT
Start: 2024-09-09 | End: 2024-09-09

## 2024-09-09 RX ORDER — LOPERAMIDE HYDROCHLORIDE 2 MG/1
2 CAPSULE ORAL 4 TIMES DAILY PRN
Status: DISCONTINUED | OUTPATIENT
Start: 2024-09-09 | End: 2024-09-09

## 2024-09-09 RX ORDER — METOPROLOL TARTRATE 50 MG/1
50 TABLET ORAL 2 TIMES DAILY
Status: DISCONTINUED | OUTPATIENT
Start: 2024-09-09 | End: 2024-09-09

## 2024-09-09 RX ORDER — DIPHENHYDRAMINE HYDROCHLORIDE 50 MG/ML
12.5 INJECTION INTRAMUSCULAR; INTRAVENOUS EVERY 6 HOURS PRN
Status: DISCONTINUED | OUTPATIENT
Start: 2024-09-09 | End: 2024-09-09

## 2024-09-09 RX ORDER — AMLODIPINE BESYLATE 5 MG/1
10 TABLET ORAL DAILY
Status: DISCONTINUED | OUTPATIENT
Start: 2024-09-09 | End: 2024-09-17

## 2024-09-09 RX ORDER — LORAZEPAM 2 MG/ML
2 INJECTION INTRAMUSCULAR
Status: COMPLETED | OUTPATIENT
Start: 2024-09-09 | End: 2024-09-09

## 2024-09-09 RX ORDER — DIAZEPAM 5 MG/1
5 TABLET ORAL
Status: COMPLETED | OUTPATIENT
Start: 2024-09-09 | End: 2024-09-09

## 2024-09-09 RX ORDER — DIAZEPAM 5 MG/1
10 TABLET ORAL EVERY 4 HOURS
Status: DISCONTINUED | OUTPATIENT
Start: 2024-09-09 | End: 2024-09-10

## 2024-09-09 RX ORDER — DIAZEPAM 5 MG/1
10 TABLET ORAL EVERY 6 HOURS
Status: DISCONTINUED | OUTPATIENT
Start: 2024-09-09 | End: 2024-09-09

## 2024-09-09 RX ORDER — SODIUM CHLORIDE 0.9 % (FLUSH) 0.9 %
10 SYRINGE (ML) INJECTION EVERY 12 HOURS PRN
Status: DISCONTINUED | OUTPATIENT
Start: 2024-09-09 | End: 2024-09-27 | Stop reason: HOSPADM

## 2024-09-09 RX ORDER — DULOXETIN HYDROCHLORIDE 30 MG/1
60 CAPSULE, DELAYED RELEASE ORAL DAILY
Status: DISCONTINUED | OUTPATIENT
Start: 2024-09-09 | End: 2024-09-27 | Stop reason: HOSPADM

## 2024-09-09 RX ORDER — DIAZEPAM 5 MG/1
5 TABLET ORAL
Status: DISCONTINUED | OUTPATIENT
Start: 2024-09-09 | End: 2024-09-09

## 2024-09-09 RX ORDER — LANOLIN ALCOHOL/MO/W.PET/CERES
100 CREAM (GRAM) TOPICAL EVERY 8 HOURS
Status: DISCONTINUED | OUTPATIENT
Start: 2024-09-11 | End: 2024-09-09

## 2024-09-09 RX ORDER — HYDRALAZINE HYDROCHLORIDE 20 MG/ML
10 INJECTION INTRAMUSCULAR; INTRAVENOUS EVERY 6 HOURS PRN
Status: DISCONTINUED | OUTPATIENT
Start: 2024-09-09 | End: 2024-09-27 | Stop reason: HOSPADM

## 2024-09-09 RX ORDER — NICOTINE 7MG/24HR
1 PATCH, TRANSDERMAL 24 HOURS TRANSDERMAL DAILY
Status: DISCONTINUED | OUTPATIENT
Start: 2024-09-09 | End: 2024-09-27 | Stop reason: HOSPADM

## 2024-09-09 RX ORDER — MAGNESIUM SULFATE HEPTAHYDRATE 40 MG/ML
2 INJECTION, SOLUTION INTRAVENOUS ONCE
Status: COMPLETED | OUTPATIENT
Start: 2024-09-09 | End: 2024-09-09

## 2024-09-09 RX ORDER — PANTOPRAZOLE SODIUM 40 MG/10ML
40 INJECTION, POWDER, LYOPHILIZED, FOR SOLUTION INTRAVENOUS ONCE
Status: COMPLETED | OUTPATIENT
Start: 2024-09-09 | End: 2024-09-09

## 2024-09-09 RX ORDER — AMLODIPINE BESYLATE 5 MG/1
10 TABLET ORAL DAILY
Status: DISCONTINUED | OUTPATIENT
Start: 2024-09-09 | End: 2024-09-09

## 2024-09-09 RX ORDER — POLYETHYLENE GLYCOL 3350 17 G/17G
17 POWDER, FOR SOLUTION ORAL 2 TIMES DAILY PRN
Status: DISCONTINUED | OUTPATIENT
Start: 2024-09-09 | End: 2024-09-27 | Stop reason: HOSPADM

## 2024-09-09 RX ORDER — METOPROLOL TARTRATE 50 MG/1
50 TABLET ORAL 2 TIMES DAILY
Status: DISCONTINUED | OUTPATIENT
Start: 2024-09-09 | End: 2024-09-17

## 2024-09-09 RX ORDER — CHLORDIAZEPOXIDE HYDROCHLORIDE 25 MG/1
50 CAPSULE, GELATIN COATED ORAL ONCE
Status: COMPLETED | OUTPATIENT
Start: 2024-09-09 | End: 2024-09-09

## 2024-09-09 RX ORDER — SIMETHICONE 80 MG
1 TABLET,CHEWABLE ORAL 4 TIMES DAILY PRN
Status: DISCONTINUED | OUTPATIENT
Start: 2024-09-09 | End: 2024-09-27 | Stop reason: HOSPADM

## 2024-09-09 RX ORDER — DICYCLOMINE HYDROCHLORIDE 10 MG/ML
10 INJECTION INTRAMUSCULAR EVERY 6 HOURS PRN
Status: DISCONTINUED | OUTPATIENT
Start: 2024-09-09 | End: 2024-09-09

## 2024-09-09 RX ORDER — ONDANSETRON HYDROCHLORIDE 2 MG/ML
4 INJECTION, SOLUTION INTRAVENOUS EVERY 6 HOURS
Status: DISCONTINUED | OUTPATIENT
Start: 2024-09-09 | End: 2024-09-09

## 2024-09-09 RX ORDER — DIAZEPAM 5 MG/1
5 TABLET ORAL EVERY 6 HOURS
Status: DISCONTINUED | OUTPATIENT
Start: 2024-09-09 | End: 2024-09-09

## 2024-09-09 RX ORDER — DIAZEPAM 10 MG/2ML
10 INJECTION INTRAMUSCULAR
Status: DISCONTINUED | OUTPATIENT
Start: 2024-09-09 | End: 2024-09-09

## 2024-09-09 RX ORDER — LORAZEPAM 2 MG/ML
2 INJECTION INTRAMUSCULAR
Status: COMPLETED | OUTPATIENT
Start: 2024-09-09 | End: 2024-09-10

## 2024-09-09 RX ORDER — TRAZODONE HYDROCHLORIDE 50 MG/1
100 TABLET ORAL NIGHTLY PRN
Status: DISCONTINUED | OUTPATIENT
Start: 2024-09-09 | End: 2024-09-27 | Stop reason: HOSPADM

## 2024-09-09 RX ADMIN — AMLODIPINE BESYLATE 10 MG: 5 TABLET ORAL at 04:09

## 2024-09-09 RX ADMIN — PREGABALIN 75 MG: 50 CAPSULE ORAL at 09:09

## 2024-09-09 RX ADMIN — CHLORDIAZEPOXIDE HYDROCHLORIDE 50 MG: 25 CAPSULE ORAL at 12:09

## 2024-09-09 RX ADMIN — FOLIC ACID: 5 INJECTION, SOLUTION INTRAMUSCULAR; INTRAVENOUS; SUBCUTANEOUS at 09:09

## 2024-09-09 RX ADMIN — LABETALOL HYDROCHLORIDE 10 MG: 5 INJECTION INTRAVENOUS at 01:09

## 2024-09-09 RX ADMIN — DIAZEPAM 5 MG: 5 TABLET ORAL at 12:09

## 2024-09-09 RX ADMIN — DIAZEPAM 10 MG: 5 TABLET ORAL at 09:09

## 2024-09-09 RX ADMIN — IOHEXOL 75 ML: 350 INJECTION, SOLUTION INTRAVENOUS at 04:09

## 2024-09-09 RX ADMIN — NICOTINE 1 PATCH: 7 PATCH, EXTENDED RELEASE TRANSDERMAL at 05:09

## 2024-09-09 RX ADMIN — METOPROLOL TARTRATE 50 MG: 50 TABLET, FILM COATED ORAL at 09:09

## 2024-09-09 RX ADMIN — LORAZEPAM 2 MG: 2 INJECTION INTRAMUSCULAR; INTRAVENOUS at 08:09

## 2024-09-09 RX ADMIN — MAGNESIUM SULFATE HEPTAHYDRATE 2 G: 40 INJECTION, SOLUTION INTRAVENOUS at 06:09

## 2024-09-09 RX ADMIN — ONDANSETRON 4 MG: 2 INJECTION INTRAMUSCULAR; INTRAVENOUS at 06:09

## 2024-09-09 RX ADMIN — THIAMINE HYDROCHLORIDE 500 MG: 100 INJECTION, SOLUTION INTRAMUSCULAR; INTRAVENOUS at 04:09

## 2024-09-09 RX ADMIN — ONDANSETRON 4 MG: 4 TABLET, ORALLY DISINTEGRATING ORAL at 12:09

## 2024-09-09 RX ADMIN — DIAZEPAM 10 MG: 5 TABLET ORAL at 02:09

## 2024-09-09 RX ADMIN — LORAZEPAM 2 MG: 2 INJECTION INTRAMUSCULAR; INTRAVENOUS at 04:09

## 2024-09-09 RX ADMIN — FOLIC ACID: 5 INJECTION, SOLUTION INTRAMUSCULAR; INTRAVENOUS; SUBCUTANEOUS at 05:09

## 2024-09-09 RX ADMIN — DIAZEPAM 10 MG: 5 TABLET ORAL at 06:09

## 2024-09-09 RX ADMIN — METOPROLOL TARTRATE 50 MG: 50 TABLET, FILM COATED ORAL at 04:09

## 2024-09-09 RX ADMIN — IOHEXOL 15 ML: 300 INJECTION, SOLUTION INTRAVENOUS at 04:09

## 2024-09-09 RX ADMIN — DIAZEPAM 10 MG: 5 TABLET ORAL at 01:09

## 2024-09-09 RX ADMIN — DIAZEPAM 10 MG: 5 TABLET ORAL at 12:09

## 2024-09-09 RX ADMIN — HYDRALAZINE HYDROCHLORIDE 10 MG: 20 INJECTION INTRAMUSCULAR; INTRAVENOUS at 07:09

## 2024-09-09 RX ADMIN — PANTOPRAZOLE SODIUM 40 MG: 40 INJECTION, POWDER, LYOPHILIZED, FOR SOLUTION INTRAVENOUS at 04:09

## 2024-09-09 RX ADMIN — TRAZODONE HYDROCHLORIDE 100 MG: 50 TABLET ORAL at 10:09

## 2024-09-09 RX ADMIN — PREGABALIN 75 MG: 50 CAPSULE ORAL at 08:09

## 2024-09-09 RX ADMIN — DULOXETINE HYDROCHLORIDE 60 MG: 30 CAPSULE, DELAYED RELEASE ORAL at 08:09

## 2024-09-09 NOTE — PLAN OF CARE
Patient received from Ed and remain in ICU, on room air for oxygen, AA but confused, get scheduled valium as per order, no any acute injury during a shift.   Problem: Adult Inpatient Plan of Care  Goal: Plan of Care Review  Outcome: Progressing  Goal: Patient-Specific Goal (Individualized)  Outcome: Progressing  Goal: Absence of Hospital-Acquired Illness or Injury  Outcome: Progressing  Goal: Optimal Comfort and Wellbeing  Outcome: Progressing  Goal: Readiness for Transition of Care  Outcome: Progressing     Problem: Suicide Risk  Goal: Absence of Self-Harm  Outcome: Progressing

## 2024-09-09 NOTE — CARE UPDATE
Provider Transfer    Assumed care of Mr Israel De Jesus who was admitted with suicidal ideation and alcohol withdrawal. PEC'd. Tele-Psych consulted. Started valium 10mg Q6 and PRN ativan for alcohol withdrawal. He is tremulous. Continue to monitor CIWA. May be able to step down in afternoon pending control of alcohol withdrawal.     Delia Soto MD  09/09/2024 9:30 AM     Arrived to ICU. No tremors. Hypertensive and tachycardic. Noted large R sided groin mass. Suspect inguinal hernia. No tenderness on exam. CT confirmed, not strangulated.     Delia Soto MD  09/09/2024 11:52 AM       Still requiring benzo's, will need to be weaned prior to transfer to psych facility  -Odell Bugros MD

## 2024-09-09 NOTE — ASSESSMENT & PLAN NOTE
Noted h/o macrocytic anemia but H/H and MCV stable. ER concerned about possible sever thiamine deficiency and will f/u on b12 and folate levels. Repeat CBC ordered. He has been rx thiamine per home med.

## 2024-09-09 NOTE — NURSING
[facility], [facility]  Nurses Note -- 4 Eyes      9/9/2024       Skin assessed on: Admit      [x] No Pressure Injuries Present    [x]Prevention Measures Documented    [] Yes LDA  for Pressure Injury Previously documented     [] Yes New Pressure Injury Discovered   [] LDA for New Pressure Injury Added      Attending RN:  Brii BONNER RN     Second RN:  EMELY Chang

## 2024-09-09 NOTE — HPI
63 y.o.  man with h/o generalized anxiety d/o, essential HTN, suspect depression, and ETOH abuse presents to the ER with ALBA after reports from family that he was threatening to kill himself. Apparently posted on his Facebook page SI as well as reported to day time Emergency room physician and RNS. To me he denies an SI but he is PEC by ED. While awaiting a bed he started to become more and more tremulous and showing signs of withdrawal. Started on PO valium and Ativan.     Labs overall unremarkable and UDS with THC only. ETOH level 342. Pt. Denies any h/o Withdrawals, does drink daily but I feel is under reporting what he drinks to me. Drinks hard liquor.   COVID negative.     We have been consulted for further management of his withdrawals and admission to the ICU.     Because this patient's clinical condition is critically guarded and PEC he  requires close monitoring of his vitals and withdrawal symptoms, care in an alternative location isn't clinically appropriate for the reasons stated above.

## 2024-09-09 NOTE — CONSULTS
"[facility]  Psychiatry  Telepsychiatry Consult Note    Please see previous notes:    Patient agreeable to consultation via telepsychiatry.    Tele-Consultation from Psychiatry started: 9/9/2024 at 11:58 AM  The chief complaint leading to psychiatric consultation is: SI  This consultation was requested by Dr Soto, the attending physician.  The location of the consulting psychiatrist is [place].  The patient location is  [facility] ICU   The patient was admitted 9/8/2024  2:34 PM     Also present with the patient at the time of the consultation: RN    Patient Identification:   Israel De Jesus is a 63 y.o. male.    Patient information was obtained from patient, past medical records, ER records, and primary team.  Patient presented voluntarily to the Emergency Department by police    Inpatient consult to Telemedicine - Psych  Consult performed by: Candis Quijano MD  Consult ordered by: Delia Soto MD        Teleconsult Time Documentation  Subjective:     History of Present Illness:  Per medicine H&P 09/09/2024: "Principal Problem:Alcohol withdrawal syndrome without complication     Chief Complaint:        Chief Complaint   Patient presents with    Suicidal       Pt BIB [place] officer after receiving an call stating pt was on Facebook stating he was going to kill himself. Officer went to pt's house and pt stated "yes" he wants to kill himself. Pt states he was not going to stab himself with a knife and he does not have access to a gun. Pt states his plan was just to lie on his couch and go to sleep. Pt states he has been drinking daily for a year. Pt states that today he has had Patrizia and Rum mixed and has consumed about 6 glasses of the mix. Pt denies any pain at          HPI: 63 y.o.  man with h/o generalized anxiety d/o, essential HTN, suspect depression, and ETOH abuse presents to the ER with JPSO after reports from family that he was threatening to kill himself. Apparently posted on " "his Facebook page SI as well as reported to day time Emergency room physician and RNS. To me he denies an SI but he is PEC by ED. While awaiting a bed he started to become more and more tremulous and showing signs of withdrawal. Started on PO valium and Ativan.      Labs overall unremarkable and UDS with THC only. ETOH level 342. Pt. Denies any h/o Withdrawals, does drink daily but I feel is under reporting what he drinks to me. Drinks hard liquor.   COVID negative.      We have been consulted for further management of his withdrawals and admission to the ICU.      Because this patient's clinical condition is critically guarded and PEC he  requires close monitoring of his vitals and withdrawal symptoms, care in an alternative location isn't clinically appropriate for the reasons stated above. "    Pt is a 62 yo male with PMH as above and past psychiatric hx BECK, Alcohol use d/o and Unspecified depressive d/o vs adjustment d/o per chart currently admitted to medicine as above. Pt known to me from previous evaluation. Chart reviewed, Etoh 342 and UDS+THC on admission; previously seen by telepsychiatry in Feb 2024, was PEC'd after making passive suicidal statements while intoxicated, was admitted to medicine for alcohol WD and surgery for broken arm, PEC rescinded and outpt f/u arranged. On exam, pt CAM ICU +, says states he's doing "alright;" discussed interval since hospitalization, says he never returned to work, is filing for disability, on EBT/SNAP. Asked marital status given last PCP note July 2024 mentions he was brought in by ex wife who was concerned about pt's memory and in Feb 2024 reported he was , says "what ex wife," asked if he was  says "no," asked who would have brought him to PCP appt says "I don't know." Pt denies any memory issues. Asked about alcohol use says "every once in a while I step out and have a cocktail and pour it to here [gestures in air] so I still got a little bit left." " "Asked about mood, says he was suicidal when police were called, denies SI since arrival to hospital. Denies HI/AVH. Spends time at home "just doing chores," no hobbies, appetite good-gets meals on wheels, sleep is 2 hr increments, asked what wakes him up says "a cat running around the house." No other complaints or questions at this time.    Per chart review with updates where applicable:  Psychiatric History:   Previous Psychiatric Hospitalizations: No   Previous Medication Trials: No   Previous Suicide Attempts: no   History of Violence: no  History of Depression: yes  History of Zuleyma: no  History of Auditory/Visual Hallucination no  History of Delusions: no  Outpatient psychiatrist (current & past): No     Substance Abuse History:  Tobacco:Yes  Alcohol: Yes heavy  Illicit Substances:THC  Detox/Rehab: No     Legal History: Past charges/incarcerations: No      Family Psychiatric History: denies    Social History:  Developmental/Childhood:Achieved all developmental milestones timely  *Education:High School Diploma  Employment Status/Finances:Employed   Relationship Status/Sexual Orientation:   Children: 1  Housing Status: Home w 2 cats   history:  NO  Access to gun: NO  Hindu:Spiritual without formal affiliation  Recreational activities:Time with family    Psychiatric Mental Status Exam:  Arousal: alert  Sensorium/Orientation: oriented to person, place, day of week, month of year, year; disoriented to situation, date  Behavior/Cooperation: cooperative, UE slightly tremulous   Speech: normal tone, normal pitch, normal volume, increased latency of response  Language: grossly intact  Mood: " alright "   Affect: appropriate  Thought Process: goal-directed  Thought Content:   Auditory hallucinations: NO  Visual hallucinations: NO  Paranoia: NO  Delusions:  NO  Suicidal ideation: NO  Homicidal ideation: NO  Attention/Concentration:  unable to spell "HOUSE" backwards  Memory:    Recent:  " Decreased   Remote: Decreased  Fund of Knowledge: Impaired - unable to name current president or any former presidents  Abstract reasoning: CAM ICU + for delirium  Insight: limited awareness of illness  Judgment: limited      Past Medical History:   Past Medical History:   Diagnosis Date    Eye injury 09/01/1980    ? eye hot metal, and burn eyes ou     Generalized anxiety disorder 03/03/2023    Hypertension     Macrocytic anemia 02/14/2024    Macrocytic anemia 2/14/2024    Sciatica 12/28/2020      Laboratory Data:   Labs Reviewed   CBC W/ AUTO DIFFERENTIAL - Abnormal       Result Value    WBC 8.69      RBC 4.57 (*)     Hemoglobin 15.3      Hematocrit 44.4      MCV 97      MCH 33.5 (*)     MCHC 34.5      RDW 18.8 (*)     Platelets 187      MPV 9.5      Immature Granulocytes 0.2      Gran # (ANC) 5.7      Immature Grans (Abs) 0.02      Lymph # 2.5      Mono # 0.4      Eos # 0.0      Baso # 0.05      nRBC 0      Gran % 65.3      Lymph % 29.2      Mono % 4.6      Eosinophil % 0.1      Basophil % 0.6      Differential Method Automated     COMPREHENSIVE METABOLIC PANEL - Abnormal    Sodium 144      Potassium 3.6      Chloride 110      CO2 19 (*)     Glucose 99      BUN 8      Creatinine 1.0      Calcium 9.1      Total Protein 7.0      Albumin 3.2 (*)     Total Bilirubin 0.4      Alkaline Phosphatase 128      AST 66 (*)     ALT 37      eGFR >60      Anion Gap 15     URINALYSIS, REFLEX TO URINE CULTURE - Abnormal    Specimen UA Urine, Clean Catch      Color, UA Yellow      Appearance, UA Clear      pH, UA 6.0      Specific Gravity, UA >1.030 (*)     Protein, UA 2+ (*)     Glucose, UA Negative      Ketones, UA Negative      Bilirubin (UA) Negative      Occult Blood UA 1+ (*)     Nitrite, UA Negative      Urobilinogen, UA 2.0-3.0 (*)     Leukocytes, UA Negative      Narrative:     Specimen Source->Urine   DRUG SCREEN PANEL, URINE EMERGENCY - Abnormal    Benzodiazepines Negative      Methadone metabolites Negative      Cocaine  (Metab.) Negative      Opiate Scrn, Ur Negative      Barbiturate Screen, Ur Negative      Amphetamine Screen, Ur Negative      THC Presumptive Positive (*)     Phencyclidine Negative      Creatinine, Urine 351.6      Toxicology Information SEE COMMENT      Narrative:     Specimen Source->Urine   ALCOHOL,MEDICAL (ETHANOL) - Abnormal    Alcohol, Serum 342 (*)     Narrative:     ALCOHOL   critical result(s) called and verbal readback obtained from   RATNA PADILLA. by LM1 09/08/2024 15:29   ACETAMINOPHEN LEVEL - Abnormal    Acetaminophen (Tylenol), Serum <3.0 (*)    URINALYSIS MICROSCOPIC - Abnormal    RBC, UA 1      WBC, UA 5      Bacteria Rare      Squam Epithel, UA 3      Hyaline Casts, UA 5 (*)     Microscopic Comment SEE COMMENT      Narrative:     Specimen Source->Urine   ALCOHOL,MEDICAL (ETHANOL) - Abnormal    Alcohol, Serum 138 (*)    MAGNESIUM - Abnormal    Magnesium 1.4 (*)    PHOSPHORUS - Abnormal    Phosphorus 2.6 (*)    COMPREHENSIVE METABOLIC PANEL - Abnormal    Sodium 138      Potassium 3.5      Chloride 103      CO2 23      Glucose 124 (*)     BUN 7 (*)     Creatinine 0.9      Calcium 8.8      Total Protein 5.9 (*)     Albumin 2.8 (*)     Total Bilirubin 0.9      Alkaline Phosphatase 114      AST 74 (*)     ALT 37      eGFR >60      Anion Gap 12     CBC W/ AUTO DIFFERENTIAL - Abnormal    WBC 9.44      RBC 3.86 (*)     Hemoglobin 13.0 (*)     Hematocrit 37.7 (*)     MCV 98      MCH 33.7 (*)     MCHC 34.5      RDW 18.6 (*)     Platelets 131 (*)     MPV 10.3      Immature Granulocytes 0.3      Gran # (ANC) 8.1 (*)     Immature Grans (Abs) 0.03      Lymph # 0.9 (*)     Mono # 0.4      Eos # 0.0      Baso # 0.03      nRBC 0      Gran % 86.1 (*)     Lymph % 9.1 (*)     Mono % 4.1      Eosinophil % 0.1      Basophil % 0.3      Differential Method Automated     TSH    TSH 1.695     B-TYPE NATRIURETIC PEPTIDE   VITAMIN B12   FOLATE   B-TYPE NATRIURETIC PEPTIDE    BNP 85     FOLATE   VITAMIN B12   SARS-COV-2  RDRP GENE    POC Rapid COVID Negative       Acceptable Yes         Neurological History:  Seizures: No  Head trauma: No    Allergies:   Review of patient's allergies indicates:  No Known Allergies    Medications in ER:   Medications   sodium chloride 0.9% flush 10 mL (has no administration in time range)   acetaminophen tablet 650 mg (has no administration in time range)   polyethylene glycol packet 17 g (has no administration in time range)   albuterol-ipratropium 2.5 mg-0.5 mg/3 mL nebulizer solution 3 mL (has no administration in time range)   traZODone tablet 100 mg (has no administration in time range)   simethicone chewable tablet 80 mg (has no administration in time range)   aluminum-magnesium hydroxide-simethicone 200-200-20 mg/5 mL suspension 30 mL (has no administration in time range)   hydrALAZINE injection 10 mg (10 mg Intravenous Given 9/9/24 0744)   pantoprazole EC tablet 40 mg (has no administration in time range)   LORazepam injection 2 mg (2 mg Intravenous Given 9/9/24 0832)   0.9% NaCl 1,000 mL with mvi, (ADULT) no.4 with vit K 3,300 unit- 150 mcg/10 mL 10 mL, thiamine 100 mg, folic acid 1 mg infusion ( Intravenous New Bag 9/9/24 0507)   DULoxetine DR capsule 60 mg (60 mg Oral Given 9/9/24 0831)   pregabalin capsule 75 mg (75 mg Oral Given 9/9/24 0831)   labetaloL injection 10 mg (has no administration in time range)   amLODIPine tablet 10 mg (10 mg Oral Given 9/9/24 0458)   metoprolol tartrate (LOPRESSOR) tablet 50 mg (50 mg Oral Given 9/9/24 0458)   nicotine 7 mg/24 hr 1 patch (1 patch Transdermal Patch Applied 9/9/24 0508)   diazePAM tablet 10 mg (has no administration in time range)   ondansetron injection 8 mg (has no administration in time range)   LORazepam injection 2 mg (2 mg Intravenous Given 9/8/24 2222)   chlordiazepoxide capsule 50 mg (50 mg Oral Given 9/9/24 0017)   diazePAM tablet 5 mg (5 mg Oral Given 9/9/24 0017)   ondansetron disintegrating tablet 4 mg (4 mg Oral  Given 9/9/24 0016)   diazePAM tablet 10 mg (10 mg Oral Given 9/9/24 0128)   labetaloL injection 10 mg (10 mg Intravenous Given 9/9/24 0130)   thiamine (B-1) 500 mg in D5W 100 mL IVPB (0 mg Intravenous Stopped 9/9/24 0445)   LORazepam injection 2 mg (2 mg Intravenous Given 9/9/24 0412)   pantoprazole injection 40 mg (40 mg Intravenous Given 9/9/24 0412)   magnesium sulfate 2g in water 50mL IVPB (premix) (0 g Intravenous Stopped 9/9/24 0820)       Medications at home:   Current Discharge Medication List        CONTINUE these medications which have NOT CHANGED    Details   acetaminophen (TYLENOL) 500 MG tablet Take 500 mg by mouth daily as needed.    Associated Diagnoses: Closed displaced fracture of second cervical vertebra with delayed healing, unspecified fracture morphology, subsequent encounter      amLODIPine (NORVASC) 10 MG tablet Take 1 tablet (10 mg total) by mouth once daily.  Qty: 90 tablet, Refills: 3    Comments: .  Associated Diagnoses: Primary hypertension      DULoxetine (CYMBALTA) 60 MG capsule Take 1 capsule (60 mg total) by mouth once daily.  Qty: 90 capsule, Refills: 3    Associated Diagnoses: Generalized anxiety disorder; Depressive disorder due to separate medical condition      metoprolol tartrate (LOPRESSOR) 50 MG tablet Take 1 tablet (50 mg total) by mouth 2 (two) times daily.  Qty: 180 tablet, Refills: 3    Comments: .  Associated Diagnoses: Primary hypertension      multivitamin (ONE DAILY MULTIVITAMIN) per tablet Take 1 tablet by mouth once daily.      pregabalin (LYRICA) 75 MG capsule Take 1 capsule (75 mg total) by mouth 2 (two) times daily.  Qty: 60 capsule, Refills: 6      thiamine 100 MG tablet Take 1 tablet (100 mg total) by mouth once daily.           Per [place] :  Prescriptions  Total: 6  Private Pay: 2  Showing 1-6 of 6 Items  1 of 1   Filled  Written  ID  Drug  QTY  Days  Prescriber  RX #  Dispenser  Refill  Daily Dose*  Pymt Type      02/29/2024 02/29/2024 3 Pregabalin 75 Mg  Capsule 60.00 30 Da snf [id number] [facility] ([id number]) 0 1.00 LME Medicaid [place]   02/29/2024 02/29/2024 3 Hydrocodone-Acetamin 5-325 Mg 7.00 7 Da snf [id number] [facility] ([id number]) 0 5.00 MME [place]   12/12/2022 12/12/2022 2 Oxycodone Hcl (Ir) 5 Mg Tablet 40.00 7 Ca David [id number] [facility] ([id number]) 0 42.86 MME Comm Ins [place]   12/12/2022 12/12/2022 2 Diazepam 5 Mg Tablet 15.00 5 Ca David [id number] [facility] ([id number]) 0 1.50 LME Comm Ins [place]   11/04/2022 11/03/2022 1 Oxycodone Hcl (Ir) 5 Mg Tablet 10.00 3 Vi [facility] [id number] [facility] ([id number]) 0 25.00 MME Private Pay [place]   11/03/2022 11/03/2022 1 Pregabalin 75 Mg Capsule 60.00 30 Vi Alu [id number] [facility] ([id number]) 0 1.00 LME Private Pay [place]       Assessment - Diagnosis - Goals:     IMPRESSION:   Acute encephalopathy -- CAM ICU +, likely 2/2 alcohol WD given most recent VS  Alcohol WD  Alcohol use d/o  Unspecified depressive d/o  R/o dementia -- pt with memory deficits on exam and memory impairment noted in most recent outpt visit    RECOMMENDATIONS:     DISPOSITION: Once medically cleared;   Seek Involuntary Inpatient Psychiatric admission for stabilization of acute psychiatric symptoms and until a safe disposition plan is enacted.      PSYCHIATRIC MEDICATIONS  Scheduled-defer to inpatient psychiatry; agree with valium taper, thiamine, folate, continue Cymbalta  PRN-  For breakthrough alcohol WD sxs recommend Ativan 2 mg PO/IM/IV PRN SBP>160, DBP>105 and HR >105 (must meet 2 criteria, not anxiety)  Haldol 0.5mg IV q 6 for psychotic agitation associated with delirium. Recheck EKG if given--Qtc 473 on 9/8/24.    LEGAL  Continue PEC because pt is in imminent danger of hurting self. Please provide with 1:1 sitter.     OTHER  Obtain collateral  Consider checking CTH if encephalopathy not improved when withdrawal resoves  Recommend EKG to check Qtc if not already done      Total time including chart review, time with patient, obtaining collateral info[if necessary/possible]: 50        More than 50% of the time was spent  counseling/coordinating care    Consulting clinician was informed of the encounter and consult note.    Consultation ended: 9/9/2024 at 12:48 PM      Candis Quijano MD   Psychiatry  [facility]

## 2024-09-09 NOTE — ED NOTES
Pt is unable to walk at this time, had extreme tremors while ambulance was here to pick him up for transfer. Dr. Hooks notified. Dr. Hooks at bedside assessing pt. MD also called to [facility] and updates about his condition at present. Plan to keep pt in ED for now and reassessing his condition. [facility] will hold bed until 7 am.

## 2024-09-09 NOTE — ED NOTES
Patient noted to have frequent runs of PVCs and short runs of Bigeminy on the cardiac monitor at nurses station, w/ drop in SpO2 from 92-88% on RA. Went in to assess Pt, endorses some dyspnea placed on 2L via NC. MD made aware and came to bedside to assess Pt. Concerns made aware for possible worsening DT's and electrolyte insufficiency d/t multiple occurrences of diarrhea.

## 2024-09-09 NOTE — H&P
"  [facility] Emergency Dept  [facility] Medicine  History & Physical    Patient Name: Israel De Jesus  MRN: [id number]  Patient Class: IP- Inpatient  Admission Date: 9/8/2024  Attending Physician: Dr. Yessenia Glover   Primary Care Provider: Nuha Lynn MD         Patient information was obtained from patient, past medical records, and ER records.     Subjective:     Principal Problem:Alcohol withdrawal syndrome without complication    Chief Complaint:   Chief Complaint   Patient presents with    Suicidal     Pt BIB [employer] officer after receiving an call stating pt was on Facebook stating he was going to kill himself. Officer went to pt's house and pt stated "yes" he wants to kill himself. Pt states he was not going to stab himself with a knife and he does not have access to a gun. Pt states his plan was just to lie on his couch and go to sleep. Pt states he has been drinking daily for a year. Pt states that today he has had Patrizia and Rum mixed and has consumed about 6 glasses of the mix. Pt denies any pain at         HPI: 63 y.o.  man with h/o generalized anxiety d/o, essential HTN, suspect depression, and ETOH abuse presents to the ER with [employer] after reports from family that he was threatening to kill himself. Apparently posted on his Facebook page SI as well as reported to day time Emergency room physician and RNS. To me he denies an SI but he is PEC by ED. While awaiting a bed he started to become more and more tremulous and showing signs of withdrawal. Started on PO valium and Ativan.     Labs overall unremarkable and UDS with THC only. ETOH level 342. Pt. Denies any h/o Withdrawals, does drink daily but I feel is under reporting what he drinks to me. Drinks hard liquor.   COVID negative.     We have been consulted for further management of his withdrawals and admission to the ICU.     Because this patient's clinical condition is critically guarded and PEC he  requires close monitoring of his " vitals and withdrawal symptoms, care in an alternative location isn't clinically appropriate for the reasons stated above.              Past Medical History:   Diagnosis Date    Eye injury 09/01/1980    ? eye hot metal, and burn eyes ou     Generalized anxiety disorder 03/03/2023    Hypertension     Macrocytic anemia 02/14/2024    Macrocytic anemia 2/14/2024    Sciatica 12/28/2020       Past Surgical History:   Procedure Laterality Date    FUSION OF CERVICAL SPINE BY POSTERIOR APPROACH N/A 10/12/2022    Procedure: POSTERIOR CERVICAL FUSION, SPINE C1-C4 PCF ;  Surgeon: Ike Storm DO;  Location: [facility] OR [address] [place] [address];  Service: Neurosurgery;  Laterality: N/A;    FUSION OF CERVICAL SPINE BY POSTERIOR APPROACH N/A 12/7/2022    Procedure: FUSION,SPINE,CERVICAL,POSTERIOR APPROACH C1-T1;  Surgeon: Ike Storm DO;  Location: [facility] OR [address] [place] [address];  Service: Neurosurgery;  Laterality: N/A;  GENERAL/ TYPE & SCREEN/ EMG/ SEP/ MEP/ MIAMI/ REGULAR BED, REVERSED/ BROWN/ PRONE/ C-ARM/ DEPUY/ COLEMAN/ PACU/ ASSISTANT SURGEON DR. MAYURI LEMOS    KNEE ARTHROPLASTY      LAMINECTOMY N/A 10/12/2022    Procedure: LAMINECTOMY, SPINE, C-1;  Surgeon: Ike Storm DO;  [facility];  Service: Neurosurgery;  Laterality: N/A;    ORIF HUMERUS FRACTURE Right 2/16/2024    Procedure: ORIF, FRACTURE, HUMERUS;  Surgeon: Yulissa Rich III, MD;  Location: [facility] OR;  Service: Orthopedics;  Laterality: Right;  SYNTHES LUKE [phone] CALLED BETY ON 2/15/2024-       Review of patient's allergies indicates:  No Known Allergies    Current Facility-Administered Medications on File Prior to Encounter   Medication    mupirocin 2 % ointment     Current Outpatient Medications on File Prior to Encounter   Medication Sig    acetaminophen (TYLENOL) 500 MG tablet Take 500 mg by mouth daily as needed.    amLODIPine (NORVASC) 10 MG tablet Take 1 tablet (10 mg total) by mouth once daily.    DULoxetine (CYMBALTA) 60 MG capsule Take 1  capsule (60 mg total) by mouth once daily.    LIDOcaine (LIDODERM) 5 % Place 1 patch onto the skin once daily. Apply to back of neck.  Remove & Discard patch within 12 hours or as directed by MD (Patient not taking: Reported on 7/23/2024)    metoprolol tartrate (LOPRESSOR) 50 MG tablet Take 1 tablet (50 mg total) by mouth 2 (two) times daily.    multivitamin (ONE DAILY MULTIVITAMIN) per tablet Take 1 tablet by mouth once daily.    pregabalin (LYRICA) 75 MG capsule Take 1 capsule (75 mg total) by mouth 2 (two) times daily.    thiamine 100 MG tablet Take 1 tablet (100 mg total) by mouth once daily.     Family History       Problem Relation (Age of Onset)    Cancer Maternal Grandmother    Hypertension Mother    Stroke Maternal Grandmother          Tobacco Use    Smoking status: Every Day     Current packs/day: 1.00     Types: Cigarettes    Smokeless tobacco: Never   Substance and Sexual Activity    Alcohol use: Not Currently    Drug use: No    Sexual activity: Not Currently     Review of Systems   Unable to perform ROS: Acuity of condition (in active withdrawls)   Objective:     Vital Signs (Most Recent):  Temp: 98 °F (36.7 °C) (09/09/24 0121)  Pulse: 100 (09/09/24 0429)  Resp: (!) 25 (09/09/24 0429)  BP: (!) 171/87 (09/09/24 0429)  SpO2: (!) 92 % (09/09/24 0429) Vital Signs (24h Range):  Temp:  [97.9 °F (36.6 °C)-98 °F (36.7 °C)] 98 °F (36.7 °C)  Pulse:  [] 100  Resp:  [19-25] 25  SpO2:  [90 %-98 %] 92 %  BP: (151-186)/() 171/87     Weight: 63.9 kg (140 lb 12.2 oz)  Body mass index is 22.72 kg/m².     Physical Exam  Vitals and nursing note reviewed.   Constitutional:       Comments: Malnourished, tremulous. Doesn't talk much. Getting IV valium now.    HENT:      Head: Normocephalic and atraumatic.      Nose: No congestion or rhinorrhea.      Mouth/Throat:      Mouth: Mucous membranes are dry.      Pharynx: No oropharyngeal exudate or posterior oropharyngeal erythema.      Comments: Poor dentitia  Eyes:       General: No scleral icterus.     Extraocular Movements: Extraocular movements intact.      Conjunctiva/sclera: Conjunctivae normal.      Pupils: Pupils are equal, round, and reactive to light.   Cardiovascular:      Rate and Rhythm: Regular rhythm. Tachycardia present.      Pulses: Normal pulses.      Heart sounds: Murmur heard.      No friction rub. No gallop.   Pulmonary:      Effort: Pulmonary effort is normal. No respiratory distress.      Breath sounds: Normal breath sounds. No wheezing, rhonchi or rales.   Abdominal:      General: Bowel sounds are normal. There is no distension.      Palpations: Abdomen is soft.      Tenderness: There is no abdominal tenderness. There is no guarding or rebound.   Musculoskeletal:         General: No swelling. Normal range of motion.      Cervical back: Normal range of motion and neck supple.      Right lower leg: No edema.      Left lower leg: No edema.   Skin:     General: Skin is warm and dry.      Capillary Refill: Capillary refill takes less than 2 seconds.      Coloration: Skin is not pale.   Neurological:      General: No focal deficit present.      Mental Status: He is alert.      Cranial Nerves: No cranial nerve deficit.      Motor: Weakness present.      Coordination: Coordination abnormal.      Comments: A&Ox 2 but not sure why he is here. Says ALBA brought him but denies SI.    Psychiatric:         Mood and Affect: Mood normal.         Behavior: Behavior normal.              CRANIAL NERVES     CN III, IV, VI   Pupils are equal, round, and reactive to light.       Recent Results (from the past 24 hour(s))   CBC auto differential    Collection Time: 09/08/24  2:48 PM   Result Value Ref Range    WBC 8.69 3.90 - 12.70 K/uL    RBC 4.57 (L) 4.60 - 6.20 M/uL    Hemoglobin 15.3 14.0 - 18.0 g/dL    Hematocrit 44.4 40.0 - 54.0 %    MCV 97 82 - 98 fL    MCH 33.5 (H) 27.0 - 31.0 pg    MCHC 34.5 32.0 - 36.0 g/dL    RDW 18.8 (H) 11.5 - 14.5 %    Platelets 187 150 - 450 K/uL     MPV 9.5 9.2 - 12.9 fL    Immature Granulocytes 0.2 0.0 - 0.5 %    Gran # (ANC) 5.7 1.8 - 7.7 K/uL    Immature Grans (Abs) 0.02 0.00 - 0.04 K/uL    Lymph # 2.5 1.0 - 4.8 K/uL    Mono # 0.4 0.3 - 1.0 K/uL    Eos # 0.0 0.0 - 0.5 K/uL    Baso # 0.05 0.00 - 0.20 K/uL    nRBC 0 0 /100 WBC    Gran % 65.3 38.0 - 73.0 %    Lymph % 29.2 18.0 - 48.0 %    Mono % 4.6 4.0 - 15.0 %    Eosinophil % 0.1 0.0 - 8.0 %    Basophil % 0.6 0.0 - 1.9 %    Differential Method Automated    Comprehensive metabolic panel    Collection Time: 09/08/24  2:48 PM   Result Value Ref Range    Sodium 144 136 - 145 mmol/L    Potassium 3.6 3.5 - 5.1 mmol/L    Chloride 110 95 - 110 mmol/L    CO2 19 (L) 23 - 29 mmol/L    Glucose 99 70 - 110 mg/dL    BUN 8 8 - 23 mg/dL    Creatinine 1.0 0.5 - 1.4 mg/dL    Calcium 9.1 8.7 - 10.5 mg/dL    Total Protein 7.0 6.0 - 8.4 g/dL    Albumin 3.2 (L) 3.5 - 5.2 g/dL    Total Bilirubin 0.4 0.1 - 1.0 mg/dL    Alkaline Phosphatase 128 55 - 135 U/L    AST 66 (H) 10 - 40 U/L    ALT 37 10 - 44 U/L    eGFR >60 >60 mL/min/1.73 m^2    Anion Gap 15 8 - 16 mmol/L   TSH    Collection Time: 09/08/24  2:48 PM   Result Value Ref Range    TSH 1.695 0.400 - 4.000 uIU/mL   Ethanol    Collection Time: 09/08/24  2:48 PM   Result Value Ref Range    Alcohol, Serum 342 (HH) <10 mg/dL   Acetaminophen level    Collection Time: 09/08/24  2:48 PM   Result Value Ref Range    Acetaminophen (Tylenol), Serum <3.0 (L) 10.0 - 20.0 ug/mL   Urinalysis, Reflex to Urine Culture Urine, Clean Catch    Collection Time: 09/08/24  6:03 PM    Specimen: Urine   Result Value Ref Range    Specimen UA Urine, Clean Catch     Color, UA Yellow Yellow, Straw, Tamiko    Appearance, UA Clear Clear    pH, UA 6.0 5.0 - 8.0    Specific Gravity, UA >1.030 (A) 1.005 - 1.030    Protein, UA 2+ (A) Negative    Glucose, UA Negative Negative    Ketones, UA Negative Negative    Bilirubin (UA) Negative Negative    Occult Blood UA 1+ (A) Negative    Nitrite, UA Negative Negative     Urobilinogen, UA 2.0-3.0 (A) <2.0 EU/dL    Leukocytes, UA Negative Negative   Drug screen panel, emergency    Collection Time: 09/08/24  6:03 PM   Result Value Ref Range    Benzodiazepines Negative Negative    Methadone metabolites Negative Negative    Cocaine (Metab.) Negative Negative    Opiate Scrn, Ur Negative Negative    Barbiturate Screen, Ur Negative Negative    Amphetamine Screen, Ur Negative Negative    THC Presumptive Positive (A) Negative    Phencyclidine Negative Negative    Creatinine, Urine 351.6 23.0 - 375.0 mg/dL    Toxicology Information SEE COMMENT    Urinalysis Microscopic    Collection Time: 09/08/24  6:03 PM   Result Value Ref Range    RBC, UA 1 0 - 4 /hpf    WBC, UA 5 0 - 5 /hpf    Bacteria Rare None-Occ /hpf    Squam Epithel, UA 3 /hpf    Hyaline Casts, UA 5 (A) 0-1/lpf /lpf    Microscopic Comment SEE COMMENT    POCT COVID-19 Rapid Screening    Collection Time: 09/08/24  9:21 PM   Result Value Ref Range    POC Rapid COVID Negative Negative     Acceptable Yes    Ethanol    Collection Time: 09/08/24 10:01 PM   Result Value Ref Range    Alcohol, Serum 138 (H) <10 mg/dL   Magnesium    Collection Time: 09/09/24  3:40 AM   Result Value Ref Range    Magnesium 1.4 (L) 1.6 - 2.6 mg/dL   Phosphorus    Collection Time: 09/09/24  3:40 AM   Result Value Ref Range    Phosphorus 2.6 (L) 2.7 - 4.5 mg/dL   BNP    Collection Time: 09/09/24  3:40 AM   Result Value Ref Range    BNP 85 0 - 99 pg/mL       Microbiology Results (last 7 days)       ** No results found for the last 168 hours. **            Imaging Results              X-Ray Chest AP Portable (In process)                       Assessment/Plan:     * Alcohol withdrawal syndrome without complication  Will cont. With PO vailum taper and PRN ativan for now. Other PRN meds for symptoms of withdrawal are ordered. May need to start precedex but for now appears clam. PEC and sitter at bedside. IVF started.       Suicidal ideation  PEC and will  cont. To monitor for now. Will need Psych clearance when stable.       Macrocytic anemia  Noted h/o macrocytic anemia but H/H and MCV stable. ER concerned about possible sever thiamine deficiency and will f/u on b12 and folate levels. Repeat CBC ordered. He has been rx thiamine per home med.     Generalized anxiety disorder  Resume home meds and cont with PRN ativan and scheduled valium        Tobacco use disorder  Nicotine patch ordered daily       Hypertension  Chronic, uncontrolled and suspect due to ETOH withdrawals.  Latest blood pressure and vitals reviewed-     Temp:  [97.9 °F (36.6 °C)-98 °F (36.7 °C)]   Pulse:  []   Resp:  [19-25]   BP: (151-186)/()   SpO2:  [90 %-98 %] .   Home meds for hypertension were reviewed and noted below.   Hypertension Medications               amLODIPine (NORVASC) 10 MG tablet Take 1 tablet (10 mg total) by mouth once daily.    metoprolol tartrate (LOPRESSOR) 50 MG tablet Take 1 tablet (50 mg total) by mouth 2 (two) times daily.            While in the hospital, will manage blood pressure as follows; Continue home antihypertensive regimen    Will utilize p.r.n. blood pressure medication only if patient's blood pressure greater than  180/90  and he develops symptoms such as worsening chest pain or shortness of breath.      VTE Risk Mitigation (From admission, onward)           Ordered     IP VTE LOW RISK PATIENT  Once         09/09/24 0350     Place sequential compression device  Until discontinued         09/09/24 0350                   Critical care time spent on the evaluation and treatment of severe organ dysfunction, review of pertinent labs and imaging studies, discussions with consulting providers and discussions with patient/family: 60 minutes.      CODE STATUS: FULL CODE                Yessenia Glover MD  Department of Hospital Medicine  [facility] [place] - Emergency Dept

## 2024-09-09 NOTE — SUBJECTIVE & OBJECTIVE
Past Medical History:   Diagnosis Date    Eye injury 09/01/1980    ? eye hot metal, and burn eyes ou     Generalized anxiety disorder 03/03/2023    Hypertension     Macrocytic anemia 02/14/2024    Macrocytic anemia 2/14/2024    Sciatica 12/28/2020       Past Surgical History:   Procedure Laterality Date    FUSION OF CERVICAL SPINE BY POSTERIOR APPROACH N/A 10/12/2022    Procedure: POSTERIOR CERVICAL FUSION, SPINE C1-C4 PCF ;  Surgeon: Ike Storm DO;  Location: [facility] OR [facility] FLR;  Service: Neurosurgery;  Laterality: N/A;    FUSION OF CERVICAL SPINE BY POSTERIOR APPROACH N/A 12/7/2022    Procedure: FUSION,SPINE,CERVICAL,POSTERIOR APPROACH C1-T1;  Surgeon: Ike Storm DO;  Location: [facility] OR 2ND FLR;  Service: Neurosurgery;  Laterality: N/A;  GENERAL/ TYPE & SCREEN/ EMG/ SEP/ MEP/ MIAMI/ REGULAR BED, REVERSED/ BROWN/ PRONE/ C-ARM/ DEPUY/ COLEMAN/ PACU/ ASSISTANT SURGEON DR. MAYURI LEMOS    KNEE ARTHROPLASTY      LAMINECTOMY N/A 10/12/2022    Procedure: LAMINECTOMY, SPINE, C-1;  Surgeon: Ike Storm DO;  Location: [facility] OR 2ND FLR;  Service: Neurosurgery;  Laterality: N/A;    ORIF HUMERUS FRACTURE Right 2/16/2024    Procedure: ORIF, FRACTURE, HUMERUS;  Surgeon: Yulissa Rich III, MD;  Location: [facility] OR;  Service: Orthopedics;  Laterality: Right;  SYNTHES LUKE [phone] CALLED BETY ON 2/15/2024-       Review of patient's allergies indicates:  No Known Allergies    Current Facility-Administered Medications on File Prior to Encounter   Medication    mupirocin 2 % ointment     Current Outpatient Medications on File Prior to Encounter   Medication Sig    acetaminophen (TYLENOL) 500 MG tablet Take 500 mg by mouth daily as needed.    amLODIPine (NORVASC) 10 MG tablet Take 1 tablet (10 mg total) by mouth once daily.    DULoxetine (CYMBALTA) 60 MG capsule Take 1 capsule (60 mg total) by mouth once daily.    LIDOcaine (LIDODERM) 5 % Place 1 patch onto the skin once daily. Apply to back of neck.   Remove & Discard patch within 12 hours or as directed by MD (Patient not taking: Reported on 7/23/2024)    metoprolol tartrate (LOPRESSOR) 50 MG tablet Take 1 tablet (50 mg total) by mouth 2 (two) times daily.    multivitamin (ONE DAILY MULTIVITAMIN) per tablet Take 1 tablet by mouth once daily.    pregabalin (LYRICA) 75 MG capsule Take 1 capsule (75 mg total) by mouth 2 (two) times daily.    thiamine 100 MG tablet Take 1 tablet (100 mg total) by mouth once daily.     Family History       Problem Relation (Age of Onset)    Cancer Maternal Grandmother    Hypertension Mother    Stroke Maternal Grandmother          Tobacco Use    Smoking status: Every Day     Current packs/day: 1.00     Types: Cigarettes    Smokeless tobacco: Never   Substance and Sexual Activity    Alcohol use: Not Currently    Drug use: No    Sexual activity: Not Currently     Review of Systems   Unable to perform ROS: Acuity of condition (in active withdrawls)   Objective:     Vital Signs (Most Recent):  Temp: 98 °F (36.7 °C) (09/09/24 0121)  Pulse: 100 (09/09/24 0429)  Resp: (!) 25 (09/09/24 0429)  BP: (!) 171/87 (09/09/24 0429)  SpO2: (!) 92 % (09/09/24 0429) Vital Signs (24h Range):  Temp:  [97.9 °F (36.6 °C)-98 °F (36.7 °C)] 98 °F (36.7 °C)  Pulse:  [] 100  Resp:  [19-25] 25  SpO2:  [90 %-98 %] 92 %  BP: (151-186)/() 171/87     Weight: 63.9 kg (140 lb 12.2 oz)  Body mass index is 22.72 kg/m².     Physical Exam  Vitals and nursing note reviewed.   Constitutional:       Comments: Malnourished, tremulous. Doesn't talk much. Getting IV valium now.    HENT:      Head: Normocephalic and atraumatic.      Nose: No congestion or rhinorrhea.      Mouth/Throat:      Mouth: Mucous membranes are dry.      Pharynx: No oropharyngeal exudate or posterior oropharyngeal erythema.      Comments: Poor dentitia  Eyes:      General: No scleral icterus.     Extraocular Movements: Extraocular movements intact.      Conjunctiva/sclera: Conjunctivae normal.       Pupils: Pupils are equal, round, and reactive to light.   Cardiovascular:      Rate and Rhythm: Regular rhythm. Tachycardia present.      Pulses: Normal pulses.      Heart sounds: Murmur heard.      No friction rub. No gallop.   Pulmonary:      Effort: Pulmonary effort is normal. No respiratory distress.      Breath sounds: Normal breath sounds. No wheezing, rhonchi or rales.   Abdominal:      General: Bowel sounds are normal. There is no distension.      Palpations: Abdomen is soft.      Tenderness: There is no abdominal tenderness. There is no guarding or rebound.   Musculoskeletal:         General: No swelling. Normal range of motion.      Cervical back: Normal range of motion and neck supple.      Right lower leg: No edema.      Left lower leg: No edema.   Skin:     General: Skin is warm and dry.      Capillary Refill: Capillary refill takes less than 2 seconds.      Coloration: Skin is not pale.   Neurological:      General: No focal deficit present.      Mental Status: He is alert.      Cranial Nerves: No cranial nerve deficit.      Motor: Weakness present.      Coordination: Coordination abnormal.      Comments: A&Ox 2 but not sure why he is here. Says ALBA brought him but denies SI.    Psychiatric:         Mood and Affect: Mood normal.         Behavior: Behavior normal.              CRANIAL NERVES     CN III, IV, VI   Pupils are equal, round, and reactive to light.       Recent Results (from the past 24 hour(s))   CBC auto differential    Collection Time: 09/08/24  2:48 PM   Result Value Ref Range    WBC 8.69 3.90 - 12.70 K/uL    RBC 4.57 (L) 4.60 - 6.20 M/uL    Hemoglobin 15.3 14.0 - 18.0 g/dL    Hematocrit 44.4 40.0 - 54.0 %    MCV 97 82 - 98 fL    MCH 33.5 (H) 27.0 - 31.0 pg    MCHC 34.5 32.0 - 36.0 g/dL    RDW 18.8 (H) 11.5 - 14.5 %    Platelets 187 150 - 450 K/uL    MPV 9.5 9.2 - 12.9 fL    Immature Granulocytes 0.2 0.0 - 0.5 %    Gran # (ANC) 5.7 1.8 - 7.7 K/uL    Immature Grans (Abs) 0.02 0.00 -  0.04 K/uL    Lymph # 2.5 1.0 - 4.8 K/uL    Mono # 0.4 0.3 - 1.0 K/uL    Eos # 0.0 0.0 - 0.5 K/uL    Baso # 0.05 0.00 - 0.20 K/uL    nRBC 0 0 /100 WBC    Gran % 65.3 38.0 - 73.0 %    Lymph % 29.2 18.0 - 48.0 %    Mono % 4.6 4.0 - 15.0 %    Eosinophil % 0.1 0.0 - 8.0 %    Basophil % 0.6 0.0 - 1.9 %    Differential Method Automated    Comprehensive metabolic panel    Collection Time: 09/08/24  2:48 PM   Result Value Ref Range    Sodium 144 136 - 145 mmol/L    Potassium 3.6 3.5 - 5.1 mmol/L    Chloride 110 95 - 110 mmol/L    CO2 19 (L) 23 - 29 mmol/L    Glucose 99 70 - 110 mg/dL    BUN 8 8 - 23 mg/dL    Creatinine 1.0 0.5 - 1.4 mg/dL    Calcium 9.1 8.7 - 10.5 mg/dL    Total Protein 7.0 6.0 - 8.4 g/dL    Albumin 3.2 (L) 3.5 - 5.2 g/dL    Total Bilirubin 0.4 0.1 - 1.0 mg/dL    Alkaline Phosphatase 128 55 - 135 U/L    AST 66 (H) 10 - 40 U/L    ALT 37 10 - 44 U/L    eGFR >60 >60 mL/min/1.73 m^2    Anion Gap 15 8 - 16 mmol/L   TSH    Collection Time: 09/08/24  2:48 PM   Result Value Ref Range    TSH 1.695 0.400 - 4.000 uIU/mL   Ethanol    Collection Time: 09/08/24  2:48 PM   Result Value Ref Range    Alcohol, Serum 342 (HH) <10 mg/dL   Acetaminophen level    Collection Time: 09/08/24  2:48 PM   Result Value Ref Range    Acetaminophen (Tylenol), Serum <3.0 (L) 10.0 - 20.0 ug/mL   Urinalysis, Reflex to Urine Culture Urine, Clean Catch    Collection Time: 09/08/24  6:03 PM    Specimen: Urine   Result Value Ref Range    Specimen UA Urine, Clean Catch     Color, UA Yellow Yellow, Straw, Tamiko    Appearance, UA Clear Clear    pH, UA 6.0 5.0 - 8.0    Specific Gravity, UA >1.030 (A) 1.005 - 1.030    Protein, UA 2+ (A) Negative    Glucose, UA Negative Negative    Ketones, UA Negative Negative    Bilirubin (UA) Negative Negative    Occult Blood UA 1+ (A) Negative    Nitrite, UA Negative Negative    Urobilinogen, UA 2.0-3.0 (A) <2.0 EU/dL    Leukocytes, UA Negative Negative   Drug screen panel, emergency    Collection Time: 09/08/24   6:03 PM   Result Value Ref Range    Benzodiazepines Negative Negative    Methadone metabolites Negative Negative    Cocaine (Metab.) Negative Negative    Opiate Scrn, Ur Negative Negative    Barbiturate Screen, Ur Negative Negative    Amphetamine Screen, Ur Negative Negative    THC Presumptive Positive (A) Negative    Phencyclidine Negative Negative    Creatinine, Urine 351.6 23.0 - 375.0 mg/dL    Toxicology Information SEE COMMENT    Urinalysis Microscopic    Collection Time: 09/08/24  6:03 PM   Result Value Ref Range    RBC, UA 1 0 - 4 /hpf    WBC, UA 5 0 - 5 /hpf    Bacteria Rare None-Occ /hpf    Squam Epithel, UA 3 /hpf    Hyaline Casts, UA 5 (A) 0-1/lpf /lpf    Microscopic Comment SEE COMMENT    POCT COVID-19 Rapid Screening    Collection Time: 09/08/24  9:21 PM   Result Value Ref Range    POC Rapid COVID Negative Negative     Acceptable Yes    Ethanol    Collection Time: 09/08/24 10:01 PM   Result Value Ref Range    Alcohol, Serum 138 (H) <10 mg/dL   Magnesium    Collection Time: 09/09/24  3:40 AM   Result Value Ref Range    Magnesium 1.4 (L) 1.6 - 2.6 mg/dL   Phosphorus    Collection Time: 09/09/24  3:40 AM   Result Value Ref Range    Phosphorus 2.6 (L) 2.7 - 4.5 mg/dL   BNP    Collection Time: 09/09/24  3:40 AM   Result Value Ref Range    BNP 85 0 - 99 pg/mL       Microbiology Results (last 7 days)       ** No results found for the last 168 hours. **            Imaging Results              X-Ray Chest AP Portable (In process)

## 2024-09-09 NOTE — ASSESSMENT & PLAN NOTE
Chronic, uncontrolled and suspect due to ETOH withdrawals.  Latest blood pressure and vitals reviewed-     Temp:  [97.9 °F (36.6 °C)-98 °F (36.7 °C)]   Pulse:  []   Resp:  [19-25]   BP: (151-186)/()   SpO2:  [90 %-98 %] .   Home meds for hypertension were reviewed and noted below.   Hypertension Medications               amLODIPine (NORVASC) 10 MG tablet Take 1 tablet (10 mg total) by mouth once daily.    metoprolol tartrate (LOPRESSOR) 50 MG tablet Take 1 tablet (50 mg total) by mouth 2 (two) times daily.            While in the hospital, will manage blood pressure as follows; Continue home antihypertensive regimen    Will utilize p.r.n. blood pressure medication only if patient's blood pressure greater than  180/90  and he develops symptoms such as worsening chest pain or shortness of breath.

## 2024-09-09 NOTE — ASSESSMENT & PLAN NOTE
Will cont. With PO vailum taper and PRN ativan for now. Other PRN meds for symptoms of withdrawal are ordered. May need to start precedex but for now appears clam. PEC and sitter at bedside. IVF started.

## 2024-09-10 LAB
ALBUMIN SERPL BCP-MCNC: 2.6 G/DL (ref 3.5–5.2)
ALP SERPL-CCNC: 118 U/L (ref 55–135)
ALT SERPL W/O P-5'-P-CCNC: 57 U/L (ref 10–44)
ANION GAP SERPL CALC-SCNC: 12 MMOL/L (ref 8–16)
AST SERPL-CCNC: 123 U/L (ref 10–40)
BASOPHILS # BLD AUTO: 0.02 K/UL (ref 0–0.2)
BASOPHILS NFR BLD: 0.3 % (ref 0–1.9)
BILIRUB SERPL-MCNC: 0.9 MG/DL (ref 0.1–1)
BUN SERPL-MCNC: 8 MG/DL (ref 8–23)
CALCIUM SERPL-MCNC: 8.8 MG/DL (ref 8.7–10.5)
CHLORIDE SERPL-SCNC: 101 MMOL/L (ref 95–110)
CO2 SERPL-SCNC: 23 MMOL/L (ref 23–29)
CREAT SERPL-MCNC: 0.9 MG/DL (ref 0.5–1.4)
DIFFERENTIAL METHOD BLD: ABNORMAL
EOSINOPHIL # BLD AUTO: 0.1 K/UL (ref 0–0.5)
EOSINOPHIL NFR BLD: 1.1 % (ref 0–8)
ERYTHROCYTE [DISTWIDTH] IN BLOOD BY AUTOMATED COUNT: 18.5 % (ref 11.5–14.5)
EST. GFR  (NO RACE VARIABLE): >60 ML/MIN/1.73 M^2
GLUCOSE SERPL-MCNC: 75 MG/DL (ref 70–110)
HCT VFR BLD AUTO: 39.9 % (ref 40–54)
HGB BLD-MCNC: 13.6 G/DL (ref 14–18)
IMM GRANULOCYTES # BLD AUTO: 0.02 K/UL (ref 0–0.04)
IMM GRANULOCYTES NFR BLD AUTO: 0.3 % (ref 0–0.5)
LYMPHOCYTES # BLD AUTO: 0.8 K/UL (ref 1–4.8)
LYMPHOCYTES NFR BLD: 11.9 % (ref 18–48)
MAGNESIUM SERPL-MCNC: 1.8 MG/DL (ref 1.6–2.6)
MCH RBC QN AUTO: 33.9 PG (ref 27–31)
MCHC RBC AUTO-ENTMCNC: 34.1 G/DL (ref 32–36)
MCV RBC AUTO: 100 FL (ref 82–98)
MONOCYTES # BLD AUTO: 0.3 K/UL (ref 0.3–1)
MONOCYTES NFR BLD: 4 % (ref 4–15)
NEUTROPHILS # BLD AUTO: 5.4 K/UL (ref 1.8–7.7)
NEUTROPHILS NFR BLD: 82.4 % (ref 38–73)
NRBC BLD-RTO: 0 /100 WBC
OHS QRS DURATION: 86 MS
OHS QTC CALCULATION: 473 MS
PHOSPHATE SERPL-MCNC: 2.5 MG/DL (ref 2.7–4.5)
PLATELET # BLD AUTO: 101 K/UL (ref 150–450)
PMV BLD AUTO: 10.6 FL (ref 9.2–12.9)
POTASSIUM SERPL-SCNC: 3.5 MMOL/L (ref 3.5–5.1)
PROT SERPL-MCNC: 5.8 G/DL (ref 6–8.4)
RBC # BLD AUTO: 4.01 M/UL (ref 4.6–6.2)
SODIUM SERPL-SCNC: 136 MMOL/L (ref 136–145)
WBC # BLD AUTO: 6.54 K/UL (ref 3.9–12.7)

## 2024-09-10 PROCEDURE — 83735 ASSAY OF MAGNESIUM: CPT | Performed by: HOSPITALIST

## 2024-09-10 PROCEDURE — 94761 N-INVAS EAR/PLS OXIMETRY MLT: CPT

## 2024-09-10 PROCEDURE — 36415 COLL VENOUS BLD VENIPUNCTURE: CPT | Performed by: HOSPITALIST

## 2024-09-10 PROCEDURE — 84100 ASSAY OF PHOSPHORUS: CPT | Performed by: HOSPITALIST

## 2024-09-10 PROCEDURE — 93005 ELECTROCARDIOGRAM TRACING: CPT

## 2024-09-10 PROCEDURE — 93010 ELECTROCARDIOGRAM REPORT: CPT | Mod: ,,, | Performed by: INTERNAL MEDICINE

## 2024-09-10 PROCEDURE — S4991 NICOTINE PATCH NONLEGEND: HCPCS | Performed by: INTERNAL MEDICINE

## 2024-09-10 PROCEDURE — 11000001 HC ACUTE MED/SURG PRIVATE ROOM

## 2024-09-10 PROCEDURE — 85025 COMPLETE CBC W/AUTO DIFF WBC: CPT | Performed by: HOSPITALIST

## 2024-09-10 PROCEDURE — 63600175 PHARM REV CODE 636 W HCPCS: Performed by: INTERNAL MEDICINE

## 2024-09-10 PROCEDURE — 25000003 PHARM REV CODE 250: Performed by: HOSPITALIST

## 2024-09-10 PROCEDURE — 25000003 PHARM REV CODE 250: Performed by: STUDENT IN AN ORGANIZED HEALTH CARE EDUCATION/TRAINING PROGRAM

## 2024-09-10 PROCEDURE — 25000003 PHARM REV CODE 250: Performed by: INTERNAL MEDICINE

## 2024-09-10 PROCEDURE — 80053 COMPREHEN METABOLIC PANEL: CPT | Performed by: HOSPITALIST

## 2024-09-10 RX ORDER — ENOXAPARIN SODIUM 100 MG/ML
40 INJECTION SUBCUTANEOUS EVERY 24 HOURS
Status: DISCONTINUED | OUTPATIENT
Start: 2024-09-10 | End: 2024-09-11

## 2024-09-10 RX ORDER — LORAZEPAM 2 MG/ML
2 INJECTION INTRAMUSCULAR
Status: DISCONTINUED | OUTPATIENT
Start: 2024-09-11 | End: 2024-09-11

## 2024-09-10 RX ORDER — DIAZEPAM 5 MG/1
5 TABLET ORAL EVERY 4 HOURS
Status: DISCONTINUED | OUTPATIENT
Start: 2024-09-10 | End: 2024-09-10

## 2024-09-10 RX ORDER — DIAZEPAM 5 MG/1
10 TABLET ORAL EVERY 4 HOURS
Status: DISCONTINUED | OUTPATIENT
Start: 2024-09-10 | End: 2024-09-11

## 2024-09-10 RX ADMIN — METOPROLOL TARTRATE 50 MG: 50 TABLET, FILM COATED ORAL at 08:09

## 2024-09-10 RX ADMIN — DULOXETINE HYDROCHLORIDE 60 MG: 30 CAPSULE, DELAYED RELEASE ORAL at 09:09

## 2024-09-10 RX ADMIN — DIAZEPAM 10 MG: 5 TABLET ORAL at 05:09

## 2024-09-10 RX ADMIN — AMLODIPINE BESYLATE 10 MG: 5 TABLET ORAL at 09:09

## 2024-09-10 RX ADMIN — DIAZEPAM 10 MG: 5 TABLET ORAL at 01:09

## 2024-09-10 RX ADMIN — FOLIC ACID: 5 INJECTION, SOLUTION INTRAMUSCULAR; INTRAVENOUS; SUBCUTANEOUS at 08:09

## 2024-09-10 RX ADMIN — DIAZEPAM 10 MG: 5 TABLET ORAL at 02:09

## 2024-09-10 RX ADMIN — PREGABALIN 75 MG: 50 CAPSULE ORAL at 08:09

## 2024-09-10 RX ADMIN — DIAZEPAM 10 MG: 5 TABLET ORAL at 09:09

## 2024-09-10 RX ADMIN — PANTOPRAZOLE SODIUM 40 MG: 40 TABLET, DELAYED RELEASE ORAL at 09:09

## 2024-09-10 RX ADMIN — ENOXAPARIN SODIUM 40 MG: 40 INJECTION SUBCUTANEOUS at 05:09

## 2024-09-10 RX ADMIN — LORAZEPAM 2 MG: 2 INJECTION INTRAMUSCULAR; INTRAVENOUS at 03:09

## 2024-09-10 RX ADMIN — PREGABALIN 75 MG: 50 CAPSULE ORAL at 09:09

## 2024-09-10 RX ADMIN — NICOTINE 1 PATCH: 7 PATCH, EXTENDED RELEASE TRANSDERMAL at 09:09

## 2024-09-10 RX ADMIN — LORAZEPAM 2 MG: 2 INJECTION INTRAMUSCULAR; INTRAVENOUS at 11:09

## 2024-09-10 RX ADMIN — METOPROLOL TARTRATE 50 MG: 50 TABLET, FILM COATED ORAL at 09:09

## 2024-09-10 NOTE — PLAN OF CARE
Patient remains in the ICU free from falls, injury, and breakdown. Patient is PEC'd with with sitter at bed side, VS remain stable. Patient tachycardic. All meds given per MAR. PRN Lorazepam given once for increased agitation. Moderate relief obtained. Patient remained disoriented of place, time and situation. Daughter and patient updated on POC. Transfer orders in    Problem: Adult Inpatient Plan of Care  Goal: Plan of Care Review  Outcome: Progressing  Goal: Patient-Specific Goal (Individualized)  Outcome: Progressing  Goal: Absence of Hospital-Acquired Illness or Injury  Outcome: Progressing  Goal: Optimal Comfort and Wellbeing  Outcome: Progressing  Goal: Readiness for Transition of Care  Outcome: Progressing     Problem: Suicide Risk  Goal: Absence of Self-Harm  Outcome: Progressing     Problem: Skin Injury Risk Increased  Goal: Skin Health and Integrity  Outcome: Progressing

## 2024-09-10 NOTE — PLAN OF CARE
[place] - Intensive Care  Initial Discharge Assessment       Primary Care Provider: Nuha Lynn MD  Case Management Assessment     PCP: See above ([facility])  Pharmacy: [facility]    Patient Arrived From: home alone  Existing Help at Home: depends on neighbors.  Tenous relationship with family.    Barriers to Discharge: substance use    Discharge Plan:    A. psychiatric hospital   B. home      Discharge planning assessment completed with assistance from patient's daughter, Melissa, via telephone.  Daughter reported that  patient lives alone and has a history of alcohol use.  Daughter further reports that patient has a history of self neglect in that he has poor hygiene and does not take his medication as prescribed.  Patient does not drive and depends on neighbors for assistance and secures food via delivery service.  When medically cleared, psych placement needed.         Admission Diagnosis: Cough [R05.9]  Coarse tremors [G25.2]  Difficulty walking [R26.2]  Chest pain [R07.9]  Medical clearance for psychiatric admission [Z00.8]  Depression with suicidal ideation [F32.A, R45.851]  Alcohol withdrawal syndrome without complication [F10.930]  Beriberi with dietary thiamine deficiency [E51.11]    Admission Date: 9/8/2024  Expected Discharge Date: 9/13/2024    Transition of Care Barriers: Substance Abuse, Transportation    Payor: MEDICAID / Plan: [employer] [facility] ([facility]) / Product Type: Managed Medicaid /     Extended Emergency Contact Information  Primary Emergency Contact: Melissa De Jesus   [facility]  Home Phone: [phone]  Mobile Phone: [phone]  Relation: Daughter    Discharge Plan A: Psychiatric hospital  Discharge Plan B: Home      [id number] - [place] - [address]  [address]  [place] [address]  Phone: [phone] Fax: [phone]      Initial Assessment (most recent)       Adult Discharge Assessment - 09/10/24 5791           Discharge Assessment    Assessment Type Discharge Planning Assessment     Confirmed/corrected address, phone number and insurance Yes     Confirmed Demographics Correct on Facesheet     Source of Information family     If unable to respond/provide information was family/caregiver contacted? Yes     Contact Name/Number Daughter:  Melissa Lynn;  [phone]     Communicated BRANDON with patient/caregiver Other (see comments)   Not yet medically cleared for psych placement.    Reason For Admission Cough/Threat of self harm     People in Home alone     Facility Arrived From: Home alone     Do you expect to return to your current living situation? Yes     Do you have help at home or someone to help you manage your care at home? No     Prior to hospitilization cognitive status: Unable to Assess     Current cognitive status: Unable to Assess     Walking or Climbing Stairs Difficulty no     Dressing/Bathing Difficulty no   Hx of poor  self care    Equipment Currently Used at Home none     Readmission within 30 days? No     Patient currently being followed by outpatient case management? No     Do you currently have service(s) that help you manage your care at home? No     Do you have prescription coverage? Yes     Coverage Payor: MEDICAID - [facility] ([facility]) -     Do you have any problems affording any of your prescribed medications? TBD     Who is going to help you get home at discharge? TBD     How do you get to doctors appointments? family or friend will provide     Are you on dialysis? No     Do you take coumadin? No     Discharge Plan A Psychiatric hospital     Discharge Plan B Home     DME Needed Upon Discharge  none     Discharge Plan discussed with: Adult children     Transition of Care Barriers Substance Abuse;Transportation

## 2024-09-10 NOTE — PROGRESS NOTES
[facility] Care  [facility] Medicine  Progress Note    Patient Name: Israel De Jesus  MRN: [id number]  Patient Class: IP- Inpatient   Admission Date: 9/8/2024  Length of Stay: 1 days  Attending Physician: Odell Burgos MD  Primary Care Provider: Nuha Lynn MD        Subjective:     Principal Problem:Alcohol withdrawal syndrome without complication        HPI:  63 y.o.  man with h/o generalized anxiety d/o, essential HTN, suspect depression, and ETOH abuse presents to the ER with JPSO after reports from family that he was threatening to kill himself. Apparently posted on his Facebook page SI as well as reported to day time Emergency room physician and RNS. To me he denies an SI but he is PEC by ED. While awaiting a bed he started to become more and more tremulous and showing signs of withdrawal. Started on PO valium and Ativan.     Labs overall unremarkable and UDS with THC only. ETOH level 342. Pt. Denies any h/o Withdrawals, does drink daily but I feel is under reporting what he drinks to me. Drinks hard liquor.   COVID negative.     We have been consulted for further management of his withdrawals and admission to the ICU.     Because this patient's clinical condition is critically guarded and PEC he  requires close monitoring of his vitals and withdrawal symptoms, care in an alternative location isn't clinically appropriate for the reasons stated above.              Overview/Hospital Course:  62yo M admitted with suicidial ideation and alcohol withdrawal. On valium 10mg Q4 with PRN ativan for control. PEC'd. Has a R groin hernia- CT large bowel containing right inguinal hernia, without proximal bowel obstruction or acute inflammatory changes.     Interval History:  NAEON.  No new issues. Wants to go home.  CC- tremors   All questions answered and updates on care given.       ROS:  General: Negative for fevers   Cardiac: Negative for chest pain   Pulmonary: Negative for wheezing  GI:  Negative for abdominal distention   +tremors, anxiety     Vitals:    09/10/24 0812 09/10/24 0815 09/10/24 0830 09/10/24 0845   BP:       Pulse: 106 102 (!) 117 106   Resp: 18 18 (!) 40 (!) 21   Temp:       TempSrc:       SpO2: 96% 95% 97% 95%   Weight:       Height:              Body mass index is 22.72 kg/m².      PHYSICAL EXAM:  GENERAL APPEARANCE: alert and cooperative.      HEAD: NC/AT  CARDIAC: There is no cyanosis or pallor. Tachycardic even while sleeping on benzo   LUNGS: No apparent wheezing or stridor.  ABDOMEN: Non-distended. No guarding.  MSK: No joint erythema or tenderness.   EXTREMITIES: No significant new deformity or new joint abnormality.   NEUROLOGICAL: CN II-XII grossly intact. Generally weak. +tremors  SKIN: No lesions or eruptions.  PSYCHIATRIC: No tangential speech. No Hyperactive features.        Recent Results (from the past 24 hour(s))   CBC Auto Differential    Collection Time: 09/10/24  4:43 AM   Result Value Ref Range    WBC 6.54 3.90 - 12.70 K/uL    RBC 4.01 (L) 4.60 - 6.20 M/uL    Hemoglobin 13.6 (L) 14.0 - 18.0 g/dL    Hematocrit 39.9 (L) 40.0 - 54.0 %     (H) 82 - 98 fL    MCH 33.9 (H) 27.0 - 31.0 pg    MCHC 34.1 32.0 - 36.0 g/dL    RDW 18.5 (H) 11.5 - 14.5 %    Platelets 101 (L) 150 - 450 K/uL    MPV 10.6 9.2 - 12.9 fL    Immature Granulocytes 0.3 0.0 - 0.5 %    Gran # (ANC) 5.4 1.8 - 7.7 K/uL    Immature Grans (Abs) 0.02 0.00 - 0.04 K/uL    Lymph # 0.8 (L) 1.0 - 4.8 K/uL    Mono # 0.3 0.3 - 1.0 K/uL    Eos # 0.1 0.0 - 0.5 K/uL    Baso # 0.02 0.00 - 0.20 K/uL    nRBC 0 0 /100 WBC    Gran % 82.4 (H) 38.0 - 73.0 %    Lymph % 11.9 (L) 18.0 - 48.0 %    Mono % 4.0 4.0 - 15.0 %    Eosinophil % 1.1 0.0 - 8.0 %    Basophil % 0.3 0.0 - 1.9 %    Differential Method Automated    Comprehensive Metabolic Panel    Collection Time: 09/10/24  4:43 AM   Result Value Ref Range    Sodium 136 136 - 145 mmol/L    Potassium 3.5 3.5 - 5.1 mmol/L    Chloride 101 95 - 110 mmol/L    CO2 23 23 - 29  mmol/L    Glucose 75 70 - 110 mg/dL    BUN 8 8 - 23 mg/dL    Creatinine 0.9 0.5 - 1.4 mg/dL    Calcium 8.8 8.7 - 10.5 mg/dL    Total Protein 5.8 (L) 6.0 - 8.4 g/dL    Albumin 2.6 (L) 3.5 - 5.2 g/dL    Total Bilirubin 0.9 0.1 - 1.0 mg/dL    Alkaline Phosphatase 118 55 - 135 U/L     (H) 10 - 40 U/L    ALT 57 (H) 10 - 44 U/L    eGFR >60 >60 mL/min/1.73 m^2    Anion Gap 12 8 - 16 mmol/L   Magnesium    Collection Time: 09/10/24  4:43 AM   Result Value Ref Range    Magnesium 1.8 1.6 - 2.6 mg/dL   Phosphorus    Collection Time: 09/10/24  4:43 AM   Result Value Ref Range    Phosphorus 2.5 (L) 2.7 - 4.5 mg/dL       Microbiology Results (last 7 days)       ** No results found for the last 168 hours. **             Imaging Results              X-Ray Chest AP Portable (Final result)  Result time 09/09/24 05:28:24      Final result by Melissa Tolentino MD (09/09/24 05:28:24)                   Impression:      Please see above.      Electronically signed by: Melissa Tolentino MD  Date:    09/09/2024  Time:    05:28               Narrative:    EXAMINATION:  XR CHEST AP PORTABLE    CLINICAL HISTORY:  Cough, unspecified    TECHNIQUE:  Single frontal view of the chest was performed.    COMPARISON:  02/13/2024    FINDINGS:  Cardiac monitoring leads overlie the chest.  The cardiomediastinal silhouette is unchanged no regular a shin.  Mediastinal structures are midline.  Lungs are symmetrically expanded with diffuse coarse lung markings/interstitial attenuation could relate to underlying chronic interstitial change/emphysematous change.  No large region of confluent airspace consolidation, substantial volume of pleural fluid or pneumothorax identified.  Osseous structures intact with degenerative change and postoperative change of the right shoulder and cervical spine.                                             Assessment/Plan:      * Alcohol withdrawal syndrome without complication  Will cont. With PO vailum taper and PRN ativan for  now. Other PRN meds for symptoms of withdrawal are ordered. May need to start precedex but for now appears clam. PEC and sitter at bedside. IVF started.       Suicidal ideation  PEC and will cont. To monitor for now. Will need Psych clearance when stable.       Macrocytic anemia  Noted h/o macrocytic anemia but H/H and MCV stable. ER concerned about possible sever thiamine deficiency and will f/u on b12 and folate levels. Repeat CBC ordered. He has been rx thiamine per home med.     Generalized anxiety disorder  Resume home meds and cont with PRN ativan and scheduled valium        Tobacco use disorder  Nicotine patch ordered daily       Hypertension  Chronic, uncontrolled and suspect due to ETOH withdrawals.  Latest blood pressure and vitals reviewed-     Temp:  [97.9 °F (36.6 °C)-98 °F (36.7 °C)]   Pulse:  []   Resp:  [19-25]   BP: (151-186)/()   SpO2:  [90 %-98 %] .   Home meds for hypertension were reviewed and noted below.   Hypertension Medications               amLODIPine (NORVASC) 10 MG tablet Take 1 tablet (10 mg total) by mouth once daily.    metoprolol tartrate (LOPRESSOR) 50 MG tablet Take 1 tablet (50 mg total) by mouth 2 (two) times daily.            While in the hospital, will manage blood pressure as follows; Continue home antihypertensive regimen    Will utilize p.r.n. blood pressure medication only if patient's blood pressure greater than  180/90  and he develops symptoms such as worsening chest pain or shortness of breath.      VTE Risk Mitigation (From admission, onward)           Ordered     IP VTE LOW RISK PATIENT  Once         09/09/24 0350     Place sequential compression device  Until discontinued         09/09/24 0350                    Discharge Planning   BRANDON:      Code Status: Full Code   Is the patient medically ready for discharge?:     Reason for patient still in hospital (select all that apply): Patient trending condition and Treatment               Critical care time  spent on the evaluation and treatment of severe organ dysfunction, review of pertinent labs and imaging studies, discussions with consulting providers and discussions with patient/family: 35 minutes.      Odell Burgos MD  Department of Hospital Medicine   [facility] - [place], [place] - Intensive Care

## 2024-09-10 NOTE — HOSPITAL COURSE
62yo M admitted with suicidial ideation and alcohol withdrawal.  Due to patient being very drowsy and sleeping throughout the day Valium was stopped, continue p.r.n. IV Ativan based on CIWA protocol.  Has a R groin hernia- CT large bowel containing right inguinal hernia, without proximal bowel obstruction or acute inflammatory changes.  Lovenox stopped due to thrombocytopenia.  Once medically clear plan to discharge to inpatient psych.  Patient is more awake and alert today and has not required Ativan for 72. Patient still very weak worse in lower extremity. He was evaluated by PT and unable to ambulate on his own. MRI brain ordered which was neg. PT/OT recommending SNF.  Unable to go to SNF as patient PEC.  Fevers and tachycardia on 9/17.  UA consistent with UTI.  Started on Rocephin.  Persistent tachycardia and noted to be slightly hypoxic.  CTA chest showing bilateral lower lobe PE with no evidence of heart strain.  Started on therapeutic Lovenox.  Transitioned to Eliquis.  Psych reconsulted and patient not suicidal.  PEC stopped and no longer needs inpatient psych.  Plan for SNF on discharge.  Patient is now medically ready for discharge and awaiting SNF placement.  Pending SNF placement.

## 2024-09-10 NOTE — NURSING
[facility]  Nurses Note -- 4 Eyes      9/10/2024       Skin assessed on: Q Shift      [x] No Pressure Injuries Present    [x]Prevention Measures Documented    [] Yes LDA  for Pressure Injury Previously documented     [] Yes New Pressure Injury Discovered   [] LDA for New Pressure Injury Added      Attending RN:  Waylon Crawford RN     Second RN:  Zaida RN

## 2024-09-10 NOTE — PLAN OF CARE
Problem: Adult Inpatient Plan of Care  Goal: Plan of Care Review  Outcome: Progressing     Patient is awake alert confused He is CEC'd with sitter at bedside. Clear breath sounds bilaterally, Vital signs stable and within limits. Scheduled valium was given as order. Patient has noticeable tremors to bilateral upper extremities.  He did tried to get up off bed several times last night. Reoriented  several times and Trazadone PRN was given as ordered.

## 2024-09-10 NOTE — CARE UPDATE
[facility]  Nurses Note -- 4 Eyes      9/9/2024       Skin assessed on: Q Shift      [x] No Pressure Injuries Present    [x]Prevention Measures Documented    [] Yes LDA  for Pressure Injury Previously documented     [] Yes New Pressure Injury Discovered   [] LDA for New Pressure Injury Added      Attending RN:  Ron Sapp Jr., RN     Second RN:  Brii LANDAVERDE RN

## 2024-09-10 NOTE — PLAN OF CARE
09/10/24 0930   Rounds   Attendance Provider;;Assigned nurse;Charge nurse;Physical therapist;Pharmacist   Discharge Plan A Psychiatric hospital  (Patient is currently PEC'd)   Why the patient remains in the hospital Requires continued medical care   Transition of Care Barriers Substance Abuse     Patient is currently PEC'd.  He is not yet medically stable for discharge to next level of care.  He may stepdown from ICU. Anticiapted discharge is in 2-3 days.

## 2024-09-11 LAB
ALBUMIN SERPL BCP-MCNC: 2.6 G/DL (ref 3.5–5.2)
ALLENS TEST: ABNORMAL
ALP SERPL-CCNC: 115 U/L (ref 55–135)
ALT SERPL W/O P-5'-P-CCNC: 53 U/L (ref 10–44)
ANION GAP SERPL CALC-SCNC: 12 MMOL/L (ref 8–16)
AST SERPL-CCNC: 84 U/L (ref 10–40)
BASOPHILS # BLD AUTO: 0.04 K/UL (ref 0–0.2)
BASOPHILS NFR BLD: 0.5 % (ref 0–1.9)
BILIRUB SERPL-MCNC: 0.7 MG/DL (ref 0.1–1)
BUN SERPL-MCNC: 9 MG/DL (ref 8–23)
CALCIUM SERPL-MCNC: 8.9 MG/DL (ref 8.7–10.5)
CHLORIDE SERPL-SCNC: 104 MMOL/L (ref 95–110)
CO2 SERPL-SCNC: 20 MMOL/L (ref 23–29)
CREAT SERPL-MCNC: 0.9 MG/DL (ref 0.5–1.4)
DIFFERENTIAL METHOD BLD: ABNORMAL
EOSINOPHIL # BLD AUTO: 0.2 K/UL (ref 0–0.5)
EOSINOPHIL NFR BLD: 2.3 % (ref 0–8)
ERYTHROCYTE [DISTWIDTH] IN BLOOD BY AUTOMATED COUNT: 18.3 % (ref 11.5–14.5)
EST. GFR  (NO RACE VARIABLE): >60 ML/MIN/1.73 M^2
GLUCOSE SERPL-MCNC: 70 MG/DL (ref 70–110)
HCO3 UR-SCNC: 23.5 MMOL/L (ref 24–28)
HCT VFR BLD AUTO: 41.3 % (ref 40–54)
HGB BLD-MCNC: 13.9 G/DL (ref 14–18)
IMM GRANULOCYTES # BLD AUTO: 0.03 K/UL (ref 0–0.04)
IMM GRANULOCYTES NFR BLD AUTO: 0.4 % (ref 0–0.5)
LYMPHOCYTES # BLD AUTO: 1.3 K/UL (ref 1–4.8)
LYMPHOCYTES NFR BLD: 16.5 % (ref 18–48)
MAGNESIUM SERPL-MCNC: 1.7 MG/DL (ref 1.6–2.6)
MCH RBC QN AUTO: 33.9 PG (ref 27–31)
MCHC RBC AUTO-ENTMCNC: 33.7 G/DL (ref 32–36)
MCV RBC AUTO: 101 FL (ref 82–98)
MONOCYTES # BLD AUTO: 0.5 K/UL (ref 0.3–1)
MONOCYTES NFR BLD: 5.8 % (ref 4–15)
NEUTROPHILS # BLD AUTO: 5.8 K/UL (ref 1.8–7.7)
NEUTROPHILS NFR BLD: 74.5 % (ref 38–73)
NRBC BLD-RTO: 0 /100 WBC
PCO2 BLDA: 42.8 MMHG (ref 35–45)
PH SMN: 7.35 [PH] (ref 7.35–7.45)
PLATELET # BLD AUTO: 81 K/UL (ref 150–450)
PMV BLD AUTO: 11.2 FL (ref 9.2–12.9)
PO2 BLDA: 29 MMHG (ref 40–60)
POC BE: -2 MMOL/L
POC SATURATED O2: 52 % (ref 95–100)
POC TCO2: 25 MMOL/L (ref 24–29)
POTASSIUM SERPL-SCNC: 3.3 MMOL/L (ref 3.5–5.1)
PROT SERPL-MCNC: 6 G/DL (ref 6–8.4)
RBC # BLD AUTO: 4.1 M/UL (ref 4.6–6.2)
SAMPLE: ABNORMAL
SITE: ABNORMAL
SODIUM SERPL-SCNC: 136 MMOL/L (ref 136–145)
WBC # BLD AUTO: 7.75 K/UL (ref 3.9–12.7)

## 2024-09-11 PROCEDURE — 85025 COMPLETE CBC W/AUTO DIFF WBC: CPT | Performed by: HOSPITALIST

## 2024-09-11 PROCEDURE — 25000003 PHARM REV CODE 250: Performed by: STUDENT IN AN ORGANIZED HEALTH CARE EDUCATION/TRAINING PROGRAM

## 2024-09-11 PROCEDURE — 63600175 PHARM REV CODE 636 W HCPCS: Performed by: STUDENT IN AN ORGANIZED HEALTH CARE EDUCATION/TRAINING PROGRAM

## 2024-09-11 PROCEDURE — S4991 NICOTINE PATCH NONLEGEND: HCPCS | Performed by: INTERNAL MEDICINE

## 2024-09-11 PROCEDURE — 36415 COLL VENOUS BLD VENIPUNCTURE: CPT | Performed by: HOSPITALIST

## 2024-09-11 PROCEDURE — 99900035 HC TECH TIME PER 15 MIN (STAT)

## 2024-09-11 PROCEDURE — 25000003 PHARM REV CODE 250: Performed by: INTERNAL MEDICINE

## 2024-09-11 PROCEDURE — 82803 BLOOD GASES ANY COMBINATION: CPT

## 2024-09-11 PROCEDURE — 83735 ASSAY OF MAGNESIUM: CPT | Performed by: HOSPITALIST

## 2024-09-11 PROCEDURE — 80053 COMPREHEN METABOLIC PANEL: CPT | Performed by: HOSPITALIST

## 2024-09-11 PROCEDURE — 11000001 HC ACUTE MED/SURG PRIVATE ROOM

## 2024-09-11 PROCEDURE — 63600175 PHARM REV CODE 636 W HCPCS: Performed by: INTERNAL MEDICINE

## 2024-09-11 RX ORDER — LORAZEPAM 2 MG/ML
2 INJECTION INTRAMUSCULAR
Status: DISCONTINUED | OUTPATIENT
Start: 2024-09-11 | End: 2024-09-27 | Stop reason: HOSPADM

## 2024-09-11 RX ORDER — FOLIC ACID 1 MG/1
1 TABLET ORAL DAILY
Status: DISCONTINUED | OUTPATIENT
Start: 2024-09-11 | End: 2024-09-27 | Stop reason: HOSPADM

## 2024-09-11 RX ORDER — POTASSIUM CHLORIDE 20 MEQ/1
40 TABLET, EXTENDED RELEASE ORAL ONCE
Status: COMPLETED | OUTPATIENT
Start: 2024-09-11 | End: 2024-09-11

## 2024-09-11 RX ORDER — DIAZEPAM 5 MG/1
10 TABLET ORAL EVERY 6 HOURS
Status: DISCONTINUED | OUTPATIENT
Start: 2024-09-11 | End: 2024-09-11

## 2024-09-11 RX ORDER — CHLORDIAZEPOXIDE HYDROCHLORIDE 25 MG/1
50 CAPSULE, GELATIN COATED ORAL EVERY 6 HOURS
Status: DISCONTINUED | OUTPATIENT
Start: 2024-09-11 | End: 2024-09-12

## 2024-09-11 RX ORDER — LANOLIN ALCOHOL/MO/W.PET/CERES
100 CREAM (GRAM) TOPICAL DAILY
Status: DISCONTINUED | OUTPATIENT
Start: 2024-09-11 | End: 2024-09-27 | Stop reason: HOSPADM

## 2024-09-11 RX ADMIN — PREGABALIN 75 MG: 50 CAPSULE ORAL at 08:09

## 2024-09-11 RX ADMIN — LORAZEPAM 2 MG: 2 INJECTION INTRAMUSCULAR; INTRAVENOUS at 09:09

## 2024-09-11 RX ADMIN — THERA TABS 1 TABLET: TAB at 08:09

## 2024-09-11 RX ADMIN — POTASSIUM CHLORIDE 40 MEQ: 1500 TABLET, EXTENDED RELEASE ORAL at 08:09

## 2024-09-11 RX ADMIN — ACETAMINOPHEN 650 MG: 325 TABLET ORAL at 08:09

## 2024-09-11 RX ADMIN — THIAMINE HCL TAB 100 MG 100 MG: 100 TAB at 08:09

## 2024-09-11 RX ADMIN — TRAZODONE HYDROCHLORIDE 100 MG: 50 TABLET ORAL at 08:09

## 2024-09-11 RX ADMIN — FOLIC ACID 1 MG: 1 TABLET ORAL at 08:09

## 2024-09-11 RX ADMIN — DULOXETINE HYDROCHLORIDE 60 MG: 30 CAPSULE, DELAYED RELEASE ORAL at 08:09

## 2024-09-11 RX ADMIN — DIAZEPAM 10 MG: 5 TABLET ORAL at 02:09

## 2024-09-11 RX ADMIN — PANTOPRAZOLE SODIUM 40 MG: 40 TABLET, DELAYED RELEASE ORAL at 08:09

## 2024-09-11 RX ADMIN — CHLORDIAZEPOXIDE HYDROCHLORIDE 50 MG: 25 CAPSULE ORAL at 11:09

## 2024-09-11 RX ADMIN — METOPROLOL TARTRATE 50 MG: 50 TABLET, FILM COATED ORAL at 08:09

## 2024-09-11 RX ADMIN — NICOTINE 1 PATCH: 7 PATCH, EXTENDED RELEASE TRANSDERMAL at 08:09

## 2024-09-11 RX ADMIN — DIAZEPAM 10 MG: 5 TABLET ORAL at 05:09

## 2024-09-11 NOTE — NURSING
[facility]  Nurses Note -- 4 Eyes      9/11/2024       Skin assessed on: Q Shift      [x] No Pressure Injuries Present    [x]Prevention Measures Documented    [] Yes LDA  for Pressure Injury Previously documented     [] Yes New Pressure Injury Discovered   [] LDA for New Pressure Injury Added      Attending RN:  Bill Kahn RN     Second RN: EMELY Baugh

## 2024-09-11 NOTE — PLAN OF CARE
Problem: Adult Inpatient Plan of Care  Goal: Plan of Care Review  Outcome: Progressing  Goal: Patient-Specific Goal (Individualized)  Outcome: Progressing  Goal: Absence of Hospital-Acquired Illness or Injury  Outcome: Progressing  Goal: Optimal Comfort and Wellbeing  Outcome: Progressing  Goal: Readiness for Transition of Care  Outcome: Progressing     Problem: Suicide Risk  Goal: Absence of Self-Harm  Outcome: Progressing     Problem: Skin Injury Risk Increased  Goal: Skin Health and Integrity  Outcome: Progressing

## 2024-09-11 NOTE — NURSING
[facility]  Nurses Note -- 4 Eyes      9/10/2024       Skin assessed on: Q Shift      [x] No Pressure Injuries Present    [x]Prevention Measures Documented    [] Yes LDA  for Pressure Injury Previously documented     [] Yes New Pressure Injury Discovered   [] LDA for New Pressure Injury Added      Attending RN:  Lupis Abernathy RN     Second RN:  EMELY Connors

## 2024-09-11 NOTE — PLAN OF CARE
Patient remains in ICU on room air, alert but disoriented to situation. Patient was anxious and restless, gave Inj Ativan, stayed calm after that. External catheter in place. Free of falls and skin injury during the shift.  Problem: Adult Inpatient Plan of Care  Goal: Plan of Care Review  Outcome: Progressing  Goal: Patient-Specific Goal (Individualized)  Outcome: Progressing  Goal: Absence of Hospital-Acquired Illness or Injury  Outcome: Progressing  Goal: Optimal Comfort and Wellbeing  Outcome: Progressing  Goal: Readiness for Transition of Care  Outcome: Progressing     Problem: Suicide Risk  Goal: Absence of Self-Harm  Outcome: Progressing     Problem: Skin Injury Risk Increased  Goal: Skin Health and Integrity  Outcome: Progressing

## 2024-09-12 LAB
ALBUMIN SERPL BCP-MCNC: 2.7 G/DL (ref 3.5–5.2)
ALP SERPL-CCNC: 115 U/L (ref 55–135)
ALT SERPL W/O P-5'-P-CCNC: 43 U/L (ref 10–44)
ANION GAP SERPL CALC-SCNC: 11 MMOL/L (ref 8–16)
ANION GAP SERPL CALC-SCNC: 15 MMOL/L (ref 8–16)
AST SERPL-CCNC: 57 U/L (ref 10–40)
BASOPHILS # BLD AUTO: 0.04 K/UL (ref 0–0.2)
BASOPHILS NFR BLD: 0.5 % (ref 0–1.9)
BILIRUB SERPL-MCNC: 0.7 MG/DL (ref 0.1–1)
BUN SERPL-MCNC: 12 MG/DL (ref 8–23)
BUN SERPL-MCNC: 14 MG/DL (ref 8–23)
CALCIUM SERPL-MCNC: 9.3 MG/DL (ref 8.7–10.5)
CALCIUM SERPL-MCNC: 9.8 MG/DL (ref 8.7–10.5)
CHLORIDE SERPL-SCNC: 104 MMOL/L (ref 95–110)
CHLORIDE SERPL-SCNC: 105 MMOL/L (ref 95–110)
CO2 SERPL-SCNC: 16 MMOL/L (ref 23–29)
CO2 SERPL-SCNC: 21 MMOL/L (ref 23–29)
CREAT SERPL-MCNC: 0.8 MG/DL (ref 0.5–1.4)
CREAT SERPL-MCNC: 0.9 MG/DL (ref 0.5–1.4)
DIFFERENTIAL METHOD BLD: ABNORMAL
EOSINOPHIL # BLD AUTO: 0.2 K/UL (ref 0–0.5)
EOSINOPHIL NFR BLD: 2.4 % (ref 0–8)
ERYTHROCYTE [DISTWIDTH] IN BLOOD BY AUTOMATED COUNT: 18.3 % (ref 11.5–14.5)
EST. GFR  (NO RACE VARIABLE): >60 ML/MIN/1.73 M^2
EST. GFR  (NO RACE VARIABLE): >60 ML/MIN/1.73 M^2
GLUCOSE SERPL-MCNC: 114 MG/DL (ref 70–110)
GLUCOSE SERPL-MCNC: 65 MG/DL (ref 70–110)
HCT VFR BLD AUTO: 45 % (ref 40–54)
HGB BLD-MCNC: 15 G/DL (ref 14–18)
IMM GRANULOCYTES # BLD AUTO: 0.05 K/UL (ref 0–0.04)
IMM GRANULOCYTES NFR BLD AUTO: 0.6 % (ref 0–0.5)
LYMPHOCYTES # BLD AUTO: 1.5 K/UL (ref 1–4.8)
LYMPHOCYTES NFR BLD: 18.7 % (ref 18–48)
MAGNESIUM SERPL-MCNC: 1.7 MG/DL (ref 1.6–2.6)
MCH RBC QN AUTO: 34.2 PG (ref 27–31)
MCHC RBC AUTO-ENTMCNC: 33.3 G/DL (ref 32–36)
MCV RBC AUTO: 103 FL (ref 82–98)
MONOCYTES # BLD AUTO: 0.7 K/UL (ref 0.3–1)
MONOCYTES NFR BLD: 8.4 % (ref 4–15)
NEUTROPHILS # BLD AUTO: 5.6 K/UL (ref 1.8–7.7)
NEUTROPHILS NFR BLD: 69.4 % (ref 38–73)
NRBC BLD-RTO: 0 /100 WBC
OHS QRS DURATION: 78 MS
OHS QTC CALCULATION: 479 MS
PLATELET # BLD AUTO: 80 K/UL (ref 150–450)
PMV BLD AUTO: 11.6 FL (ref 9.2–12.9)
POTASSIUM SERPL-SCNC: 3.6 MMOL/L (ref 3.5–5.1)
POTASSIUM SERPL-SCNC: 4.7 MMOL/L (ref 3.5–5.1)
PROT SERPL-MCNC: 6.7 G/DL (ref 6–8.4)
RBC # BLD AUTO: 4.38 M/UL (ref 4.6–6.2)
SODIUM SERPL-SCNC: 136 MMOL/L (ref 136–145)
SODIUM SERPL-SCNC: 136 MMOL/L (ref 136–145)
WBC # BLD AUTO: 8.02 K/UL (ref 3.9–12.7)

## 2024-09-12 PROCEDURE — 94761 N-INVAS EAR/PLS OXIMETRY MLT: CPT

## 2024-09-12 PROCEDURE — 85025 COMPLETE CBC W/AUTO DIFF WBC: CPT | Performed by: HOSPITALIST

## 2024-09-12 PROCEDURE — 36415 COLL VENOUS BLD VENIPUNCTURE: CPT | Performed by: HOSPITALIST

## 2024-09-12 PROCEDURE — 25000003 PHARM REV CODE 250: Performed by: INTERNAL MEDICINE

## 2024-09-12 PROCEDURE — 11000001 HC ACUTE MED/SURG PRIVATE ROOM

## 2024-09-12 PROCEDURE — 36415 COLL VENOUS BLD VENIPUNCTURE: CPT | Performed by: STUDENT IN AN ORGANIZED HEALTH CARE EDUCATION/TRAINING PROGRAM

## 2024-09-12 PROCEDURE — 99900035 HC TECH TIME PER 15 MIN (STAT)

## 2024-09-12 PROCEDURE — 83735 ASSAY OF MAGNESIUM: CPT | Performed by: HOSPITALIST

## 2024-09-12 PROCEDURE — S4991 NICOTINE PATCH NONLEGEND: HCPCS | Performed by: INTERNAL MEDICINE

## 2024-09-12 PROCEDURE — 99900031 HC PATIENT EDUCATION (STAT)

## 2024-09-12 PROCEDURE — 80048 BASIC METABOLIC PNL TOTAL CA: CPT | Mod: XB | Performed by: STUDENT IN AN ORGANIZED HEALTH CARE EDUCATION/TRAINING PROGRAM

## 2024-09-12 PROCEDURE — 63600175 PHARM REV CODE 636 W HCPCS: Performed by: INTERNAL MEDICINE

## 2024-09-12 PROCEDURE — 63600175 PHARM REV CODE 636 W HCPCS: Performed by: STUDENT IN AN ORGANIZED HEALTH CARE EDUCATION/TRAINING PROGRAM

## 2024-09-12 PROCEDURE — 80053 COMPREHEN METABOLIC PANEL: CPT | Performed by: HOSPITALIST

## 2024-09-12 PROCEDURE — 86022 PLATELET ANTIBODIES: CPT | Performed by: STUDENT IN AN ORGANIZED HEALTH CARE EDUCATION/TRAINING PROGRAM

## 2024-09-12 PROCEDURE — 25000003 PHARM REV CODE 250: Performed by: STUDENT IN AN ORGANIZED HEALTH CARE EDUCATION/TRAINING PROGRAM

## 2024-09-12 RX ORDER — CHLORDIAZEPOXIDE HYDROCHLORIDE 25 MG/1
50 CAPSULE, GELATIN COATED ORAL 4 TIMES DAILY PRN
Status: DISCONTINUED | OUTPATIENT
Start: 2024-09-12 | End: 2024-09-27 | Stop reason: HOSPADM

## 2024-09-12 RX ADMIN — METOPROLOL TARTRATE 50 MG: 50 TABLET, FILM COATED ORAL at 08:09

## 2024-09-12 RX ADMIN — THIAMINE HCL TAB 100 MG 100 MG: 100 TAB at 08:09

## 2024-09-12 RX ADMIN — AMLODIPINE BESYLATE 10 MG: 5 TABLET ORAL at 08:09

## 2024-09-12 RX ADMIN — THERA TABS 1 TABLET: TAB at 08:09

## 2024-09-12 RX ADMIN — HYDRALAZINE HYDROCHLORIDE 10 MG: 20 INJECTION INTRAMUSCULAR; INTRAVENOUS at 04:09

## 2024-09-12 RX ADMIN — LORAZEPAM 2 MG: 2 INJECTION INTRAMUSCULAR; INTRAVENOUS at 12:09

## 2024-09-12 RX ADMIN — PREGABALIN 75 MG: 50 CAPSULE ORAL at 08:09

## 2024-09-12 RX ADMIN — TRAZODONE HYDROCHLORIDE 100 MG: 50 TABLET ORAL at 11:09

## 2024-09-12 RX ADMIN — NICOTINE 1 PATCH: 7 PATCH, EXTENDED RELEASE TRANSDERMAL at 08:09

## 2024-09-12 RX ADMIN — DULOXETINE HYDROCHLORIDE 60 MG: 30 CAPSULE, DELAYED RELEASE ORAL at 08:09

## 2024-09-12 RX ADMIN — PREGABALIN 75 MG: 50 CAPSULE ORAL at 11:09

## 2024-09-12 RX ADMIN — FOLIC ACID 1 MG: 1 TABLET ORAL at 08:09

## 2024-09-12 RX ADMIN — PANTOPRAZOLE SODIUM 40 MG: 40 TABLET, DELAYED RELEASE ORAL at 08:09

## 2024-09-12 RX ADMIN — METOPROLOL TARTRATE 50 MG: 50 TABLET, FILM COATED ORAL at 11:09

## 2024-09-12 RX ADMIN — CHLORDIAZEPOXIDE HYDROCHLORIDE 50 MG: 25 CAPSULE ORAL at 12:09

## 2024-09-12 NOTE — ASSESSMENT & PLAN NOTE
-Resume home meds and cont with PRN ativan per CIWA protocol  -scheduled valium stopped due to lethargy

## 2024-09-12 NOTE — NURSING
[facility] [place]  Nurses Note -- 4 Eyes      9/12/2024       Skin assessed on: Q Shift      [x] No Pressure Injuries Present    [x]Prevention Measures Documented    [] Yes LDA  for Pressure Injury Previously documented     [] Yes New Pressure Injury Discovered   [] LDA for New Pressure Injury Added      Attending RN:  Brii BONNER RN     Second RN:  EMELY Stanley

## 2024-09-12 NOTE — ASSESSMENT & PLAN NOTE
Chronic, uncontrolled and suspect due to ETOH withdrawals.  Latest blood pressure and vitals reviewed-     Temp:  [97.3 °F (36.3 °C)-98.7 °F (37.1 °C)]   Pulse:  []   Resp:  [16-45]   BP: ()/()   SpO2:  [93 %-97 %] .   Home meds for hypertension were reviewed and noted below.   Hypertension Medications               amLODIPine (NORVASC) 10 MG tablet Take 1 tablet (10 mg total) by mouth once daily.    metoprolol tartrate (LOPRESSOR) 50 MG tablet Take 1 tablet (50 mg total) by mouth 2 (two) times daily.            While in the hospital, will manage blood pressure as follows; Continue home antihypertensive regimen    Will utilize p.r.n. blood pressure medication only if patient's blood pressure greater than  180/90  and he develops symptoms such as worsening chest pain or shortness of breath.

## 2024-09-12 NOTE — PROGRESS NOTES
[facility] Medicine  Progress Note    Patient Name: Israel De Jesus  MRN: [id number]  Patient Class: IP- Inpatient   Admission Date: 9/8/2024  Length of Stay: 2 days  Attending Physician: Bin Garcia MD  Primary Care Provider: Nuha Lynn MD        Subjective:     Principal Problem:Alcohol withdrawal syndrome without complication        HPI:  63 y.o.  man with h/o generalized anxiety d/o, essential HTN, suspect depression, and ETOH abuse presents to the ER with JPSO after reports from family that he was threatening to kill himself. Apparently posted on his Facebook page SI as well as reported to day time Emergency room physician and RNS. To me he denies an SI but he is PEC by ED. While awaiting a bed he started to become more and more tremulous and showing signs of withdrawal. Started on PO valium and Ativan.     Labs overall unremarkable and UDS with THC only. ETOH level 342. Pt. Denies any h/o Withdrawals, does drink daily but I feel is under reporting what he drinks to me. Drinks hard liquor.   COVID negative.     We have been consulted for further management of his withdrawals and admission to the ICU.     Because this patient's clinical condition is critically guarded and PEC he  requires close monitoring of his vitals and withdrawal symptoms, care in an alternative location isn't clinically appropriate for the reasons stated above.              Overview/Hospital Course:  64yo M admitted with suicidial ideation and alcohol withdrawal.  Due to patient being very drowsy and sleeping throughout the day Valium was stopped mobile continue p.r.n. IV Ativan based on CIWA protocol.  Has a R groin hernia- CT large bowel containing right inguinal hernia, without proximal bowel obstruction or acute inflammatory changes.  Lovenox stopped due to thrombocytopenia.  Once medically clear plan to discharge to inpatient psych.    Interval History:  Patient seen examined at bedside.   Patient very sleepy and lethargic.  Denies any complaints.    Review of Systems    Twelve point review of systems reviewed and negative except noted in HPI  Objective:     Vital Signs (Most Recent):  Temp: 98.7 °F (37.1 °C) (09/11/24 1915)  Pulse: (!) 125 (09/11/24 2030)  Resp: (!) 25 (09/11/24 2030)  BP: (!) 149/97 (09/11/24 2000)  SpO2: (!) 93 % (09/11/24 2030) Vital Signs (24h Range):  Temp:  [97.3 °F (36.3 °C)-98.7 °F (37.1 °C)] 98.7 °F (37.1 °C)  Pulse:  [] 125  Resp:  [16-45] 25  SpO2:  [93 %-97 %] 93 %  BP: ()/() 149/97     Weight: 63.9 kg (140 lb 12.2 oz)  Body mass index is 22.72 kg/m².    Intake/Output Summary (Last 24 hours) at 9/11/2024 2042  Last data filed at 9/11/2024 1839  Gross per 24 hour   Intake 1500.79 ml   Output 675 ml   Net 825.79 ml         Physical Exam      General: Well-developed, well-nourished, no distress noted.  HEENT: Normocephalic, PERRL, hearing within normal limits, normal nose, normal dentition, oral mucosa is moist.  Neck: Good range of motion, nontender.  Cardiovascular: Regular rate and rhythm, no edema.  Respiratory: Breath sounds are clear, no tachypnea, no dyspnea.  GI/: Abdomen is soft, bowel sounds are present, mild diffuse tenderness, no guarding, flank tenderness.  Musculoskeletal: Moves all extremities, good range of motion.  Skin: Normal color, normal temperature, no rashes.  Neuro/psych: Alert and oriented x 3,  generalized weakness, lethargic No facial droop, able to follow commands, normal mood. No focal deficits      Significant Labs:     Recent Results (from the past 24 hour(s))   CBC Auto Differential    Collection Time: 09/11/24  4:10 AM   Result Value Ref Range    WBC 7.75 3.90 - 12.70 K/uL    RBC 4.10 (L) 4.60 - 6.20 M/uL    Hemoglobin 13.9 (L) 14.0 - 18.0 g/dL    Hematocrit 41.3 40.0 - 54.0 %     (H) 82 - 98 fL    MCH 33.9 (H) 27.0 - 31.0 pg    MCHC 33.7 32.0 - 36.0 g/dL    RDW 18.3 (H) 11.5 - 14.5 %    Platelets 81 (L) 150 - 450  K/uL    MPV 11.2 9.2 - 12.9 fL    Immature Granulocytes 0.4 0.0 - 0.5 %    Gran # (ANC) 5.8 1.8 - 7.7 K/uL    Immature Grans (Abs) 0.03 0.00 - 0.04 K/uL    Lymph # 1.3 1.0 - 4.8 K/uL    Mono # 0.5 0.3 - 1.0 K/uL    Eos # 0.2 0.0 - 0.5 K/uL    Baso # 0.04 0.00 - 0.20 K/uL    nRBC 0 0 /100 WBC    Gran % 74.5 (H) 38.0 - 73.0 %    Lymph % 16.5 (L) 18.0 - 48.0 %    Mono % 5.8 4.0 - 15.0 %    Eosinophil % 2.3 0.0 - 8.0 %    Basophil % 0.5 0.0 - 1.9 %    Differential Method Automated    Comprehensive Metabolic Panel    Collection Time: 09/11/24  4:10 AM   Result Value Ref Range    Sodium 136 136 - 145 mmol/L    Potassium 3.3 (L) 3.5 - 5.1 mmol/L    Chloride 104 95 - 110 mmol/L    CO2 20 (L) 23 - 29 mmol/L    Glucose 70 70 - 110 mg/dL    BUN 9 8 - 23 mg/dL    Creatinine 0.9 0.5 - 1.4 mg/dL    Calcium 8.9 8.7 - 10.5 mg/dL    Total Protein 6.0 6.0 - 8.4 g/dL    Albumin 2.6 (L) 3.5 - 5.2 g/dL    Total Bilirubin 0.7 0.1 - 1.0 mg/dL    Alkaline Phosphatase 115 55 - 135 U/L    AST 84 (H) 10 - 40 U/L    ALT 53 (H) 10 - 44 U/L    eGFR >60 >60 mL/min/1.73 m^2    Anion Gap 12 8 - 16 mmol/L   Magnesium    Collection Time: 09/11/24  4:10 AM   Result Value Ref Range    Magnesium 1.7 1.6 - 2.6 mg/dL   ISTAT PROCEDURE    Collection Time: 09/11/24  1:22 PM   Result Value Ref Range    POC PH 7.348 (L) 7.35 - 7.45    POC PCO2 42.8 35 - 45 mmHg    POC PO2 29 (LL) 40 - 60 mmHg    POC HCO3 23.5 (L) 24 - 28 mmol/L    POC BE -2 -2 to 2 mmol/L    POC SATURATED O2 52 95 - 100 %    POC TCO2 25 24 - 29 mmol/L    Sample VENOUS     Site Other     Allens Test N/A        Microbiology Results (last 7 days)       ** No results found for the last 168 hours. **            Imaging Results              X-Ray Chest AP Portable (Final result)  Result time 09/09/24 05:28:24      Final result by Melissa Tolentino MD (09/09/24 05:28:24)                   Impression:      Please see above.      Electronically signed by: Melissa Tolentino  MD  Date:    09/09/2024  Time:    05:28               Narrative:    EXAMINATION:  XR CHEST AP PORTABLE    CLINICAL HISTORY:  Cough, unspecified    TECHNIQUE:  Single frontal view of the chest was performed.    COMPARISON:  02/13/2024    FINDINGS:  Cardiac monitoring leads overlie the chest.  The cardiomediastinal silhouette is unchanged no regular a shin.  Mediastinal structures are midline.  Lungs are symmetrically expanded with diffuse coarse lung markings/interstitial attenuation could relate to underlying chronic interstitial change/emphysematous change.  No large region of confluent airspace consolidation, substantial volume of pleural fluid or pneumothorax identified.  Osseous structures intact with degenerative change and postoperative change of the right shoulder and cervical spine.                                             Assessment/Plan:      * Alcohol withdrawal syndrome without complication  Will cont. PRN ativan for now. Per [facility] Other PRN meds for symptoms of withdrawal are ordered. May need to start precedex but for now appears clam. PEC and sitter at bedside. IVF given. Started multivitamin, folic acid, and thiamine      Suicidal ideation  PEC and will cont. To monitor for now. Will need Psych clearance when stable.       Macrocytic anemia  Noted h/o macrocytic anemia but H/H and MCV stable. ER concerned about possible sever thiamine deficiency and will f/u on b12 and folate levels. Repeat CBC ordered. He has been rx thiamine per home med.     Generalized anxiety disorder  -Resume home meds and cont with PRN ativan per CIWA protocol  -scheduled valium stopped due to lethargy        Tobacco use disorder  Nicotine patch ordered daily       Hypertension  Chronic, uncontrolled and suspect due to ETOH withdrawals.  Latest blood pressure and vitals reviewed-     Temp:  [97.3 °F (36.3 °C)-98.7 °F (37.1 °C)]   Pulse:  []   Resp:  [16-45]   BP: ()/()   SpO2:  [93 %-97 %] .   Home meds for  hypertension were reviewed and noted below.   Hypertension Medications               amLODIPine (NORVASC) 10 MG tablet Take 1 tablet (10 mg total) by mouth once daily.    metoprolol tartrate (LOPRESSOR) 50 MG tablet Take 1 tablet (50 mg total) by mouth 2 (two) times daily.            While in the hospital, will manage blood pressure as follows; Continue home antihypertensive regimen    Will utilize p.r.n. blood pressure medication only if patient's blood pressure greater than  180/90  and he develops symptoms such as worsening chest pain or shortness of breath.      VTE Risk Mitigation (From admission, onward)           Ordered     Place sequential compression device  Until discontinued         09/11/24 0729     IP VTE LOW RISK PATIENT  Once         09/09/24 0350     Place sequential compression device  Until discontinued         09/09/24 0350                    Discharge Planning   BRANDON: 9/13/2024     Code Status: Full Code   Is the patient medically ready for discharge?:     Reason for patient still in hospital (select all that apply): Patient trending condition  Discharge Plan A: [facility]            Critical care time spent on the evaluation and treatment of severe organ dysfunction, review of pertinent labs and imaging studies, discussions with consulting providers and discussions with patient/family: 74 minutes.      Bin Garcia MD  Department of Hospital Medicine   [facility] - [place] - Intensive Care

## 2024-09-12 NOTE — SUBJECTIVE & OBJECTIVE
Interval History:  Patient seen examined at bedside.  Patient very sleepy and lethargic.  Denies any complaints.    Review of Systems    Twelve point review of systems reviewed and negative except noted in HPI  Objective:     Vital Signs (Most Recent):  Temp: 98.7 °F (37.1 °C) (09/11/24 1915)  Pulse: (!) 125 (09/11/24 2030)  Resp: (!) 25 (09/11/24 2030)  BP: (!) 149/97 (09/11/24 2000)  SpO2: (!) 93 % (09/11/24 2030) Vital Signs (24h Range):  Temp:  [97.3 °F (36.3 °C)-98.7 °F (37.1 °C)] 98.7 °F (37.1 °C)  Pulse:  [] 125  Resp:  [16-45] 25  SpO2:  [93 %-97 %] 93 %  BP: ()/() 149/97     Weight: 63.9 kg (140 lb 12.2 oz)  Body mass index is 22.72 kg/m².    Intake/Output Summary (Last 24 hours) at 9/11/2024 2042  Last data filed at 9/11/2024 1839  Gross per 24 hour   Intake 1500.79 ml   Output 675 ml   Net 825.79 ml         Physical Exam      General: Well-developed, well-nourished, no distress noted.  HEENT: Normocephalic, PERRL, hearing within normal limits, normal nose, normal dentition, oral mucosa is moist.  Neck: Good range of motion, nontender.  Cardiovascular: Regular rate and rhythm, no edema.  Respiratory: Breath sounds are clear, no tachypnea, no dyspnea.  GI/: Abdomen is soft, bowel sounds are present, mild diffuse tenderness, no guarding, flank tenderness.  Musculoskeletal: Moves all extremities, good range of motion.  Skin: Normal color, normal temperature, no rashes.  Neuro/psych: Alert and oriented x 3,  generalized weakness, lethargic No facial droop, able to follow commands, normal mood. No focal deficits      Significant Labs:     Recent Results (from the past 24 hour(s))   CBC Auto Differential    Collection Time: 09/11/24  4:10 AM   Result Value Ref Range    WBC 7.75 3.90 - 12.70 K/uL    RBC 4.10 (L) 4.60 - 6.20 M/uL    Hemoglobin 13.9 (L) 14.0 - 18.0 g/dL    Hematocrit 41.3 40.0 - 54.0 %     (H) 82 - 98 fL    MCH 33.9 (H) 27.0 - 31.0 pg    MCHC 33.7 32.0 - 36.0 g/dL    RDW  18.3 (H) 11.5 - 14.5 %    Platelets 81 (L) 150 - 450 K/uL    MPV 11.2 9.2 - 12.9 fL    Immature Granulocytes 0.4 0.0 - 0.5 %    Gran # (ANC) 5.8 1.8 - 7.7 K/uL    Immature Grans (Abs) 0.03 0.00 - 0.04 K/uL    Lymph # 1.3 1.0 - 4.8 K/uL    Mono # 0.5 0.3 - 1.0 K/uL    Eos # 0.2 0.0 - 0.5 K/uL    Baso # 0.04 0.00 - 0.20 K/uL    nRBC 0 0 /100 WBC    Gran % 74.5 (H) 38.0 - 73.0 %    Lymph % 16.5 (L) 18.0 - 48.0 %    Mono % 5.8 4.0 - 15.0 %    Eosinophil % 2.3 0.0 - 8.0 %    Basophil % 0.5 0.0 - 1.9 %    Differential Method Automated    Comprehensive Metabolic Panel    Collection Time: 09/11/24  4:10 AM   Result Value Ref Range    Sodium 136 136 - 145 mmol/L    Potassium 3.3 (L) 3.5 - 5.1 mmol/L    Chloride 104 95 - 110 mmol/L    CO2 20 (L) 23 - 29 mmol/L    Glucose 70 70 - 110 mg/dL    BUN 9 8 - 23 mg/dL    Creatinine 0.9 0.5 - 1.4 mg/dL    Calcium 8.9 8.7 - 10.5 mg/dL    Total Protein 6.0 6.0 - 8.4 g/dL    Albumin 2.6 (L) 3.5 - 5.2 g/dL    Total Bilirubin 0.7 0.1 - 1.0 mg/dL    Alkaline Phosphatase 115 55 - 135 U/L    AST 84 (H) 10 - 40 U/L    ALT 53 (H) 10 - 44 U/L    eGFR >60 >60 mL/min/1.73 m^2    Anion Gap 12 8 - 16 mmol/L   Magnesium    Collection Time: 09/11/24  4:10 AM   Result Value Ref Range    Magnesium 1.7 1.6 - 2.6 mg/dL   ISTAT PROCEDURE    Collection Time: 09/11/24  1:22 PM   Result Value Ref Range    POC PH 7.348 (L) 7.35 - 7.45    POC PCO2 42.8 35 - 45 mmHg    POC PO2 29 (LL) 40 - 60 mmHg    POC HCO3 23.5 (L) 24 - 28 mmol/L    POC BE -2 -2 to 2 mmol/L    POC SATURATED O2 52 95 - 100 %    POC TCO2 25 24 - 29 mmol/L    Sample VENOUS     Site Other     Allens Test N/A        Microbiology Results (last 7 days)       ** No results found for the last 168 hours. **            Imaging Results              X-Ray Chest AP Portable (Final result)  Result time 09/09/24 05:28:24      Final result by Melissa Tolentino MD (09/09/24 05:28:24)                   Impression:      Please see above.      Electronically  signed by: Melissa Tolentino MD  Date:    09/09/2024  Time:    05:28               Narrative:    EXAMINATION:  XR CHEST AP PORTABLE    CLINICAL HISTORY:  Cough, unspecified    TECHNIQUE:  Single frontal view of the chest was performed.    COMPARISON:  02/13/2024    FINDINGS:  Cardiac monitoring leads overlie the chest.  The cardiomediastinal silhouette is unchanged no regular a shin.  Mediastinal structures are midline.  Lungs are symmetrically expanded with diffuse coarse lung markings/interstitial attenuation could relate to underlying chronic interstitial change/emphysematous change.  No large region of confluent airspace consolidation, substantial volume of pleural fluid or pneumothorax identified.  Osseous structures intact with degenerative change and postoperative change of the right shoulder and cervical spine.

## 2024-09-12 NOTE — NURSING
[facility] [place]  Nurses Note -- 4 Eyes      9/11/2024       Skin assessed on: Q Shift      [x] No Pressure Injuries Present    [x]Prevention Measures Documented    [] Yes LDA  for Pressure Injury Previously documented     [] Yes New Pressure Injury Discovered   [] LDA for New Pressure Injury Added      Attending RN:  Lizeth Saldivar RN     Second RN:  EMELY Dela Cruz

## 2024-09-12 NOTE — ASSESSMENT & PLAN NOTE
Will cont. PRN ativan for now. Per [facility] Other PRN meds for symptoms of withdrawal are ordered. May need to start precedex but for now appears clam. PEC and sitter at bedside. IVF given. Started multivitamin, folic acid, and thiamine

## 2024-09-13 LAB
ALBUMIN SERPL BCP-MCNC: 2.4 G/DL (ref 3.5–5.2)
ALP SERPL-CCNC: 105 U/L (ref 55–135)
ALT SERPL W/O P-5'-P-CCNC: 30 U/L (ref 10–44)
ANION GAP SERPL CALC-SCNC: 12 MMOL/L (ref 8–16)
AST SERPL-CCNC: 30 U/L (ref 10–40)
BASOPHILS # BLD AUTO: 0.02 K/UL (ref 0–0.2)
BASOPHILS NFR BLD: 0.3 % (ref 0–1.9)
BILIRUB SERPL-MCNC: 0.5 MG/DL (ref 0.1–1)
BUN SERPL-MCNC: 17 MG/DL (ref 8–23)
CALCIUM SERPL-MCNC: 9.4 MG/DL (ref 8.7–10.5)
CHLORIDE SERPL-SCNC: 105 MMOL/L (ref 95–110)
CO2 SERPL-SCNC: 21 MMOL/L (ref 23–29)
CREAT SERPL-MCNC: 0.9 MG/DL (ref 0.5–1.4)
DIFFERENTIAL METHOD BLD: ABNORMAL
EOSINOPHIL # BLD AUTO: 0.1 K/UL (ref 0–0.5)
EOSINOPHIL NFR BLD: 1.7 % (ref 0–8)
ERYTHROCYTE [DISTWIDTH] IN BLOOD BY AUTOMATED COUNT: 18.3 % (ref 11.5–14.5)
EST. GFR  (NO RACE VARIABLE): >60 ML/MIN/1.73 M^2
GLUCOSE SERPL-MCNC: 113 MG/DL (ref 70–110)
HCT VFR BLD AUTO: 41.5 % (ref 40–54)
HGB BLD-MCNC: 13.7 G/DL (ref 14–18)
IMM GRANULOCYTES # BLD AUTO: 0.05 K/UL (ref 0–0.04)
IMM GRANULOCYTES NFR BLD AUTO: 0.6 % (ref 0–0.5)
LYMPHOCYTES # BLD AUTO: 1.4 K/UL (ref 1–4.8)
LYMPHOCYTES NFR BLD: 17.5 % (ref 18–48)
MAGNESIUM SERPL-MCNC: 1.7 MG/DL (ref 1.6–2.6)
MCH RBC QN AUTO: 33.7 PG (ref 27–31)
MCHC RBC AUTO-ENTMCNC: 33 G/DL (ref 32–36)
MCV RBC AUTO: 102 FL (ref 82–98)
MONOCYTES # BLD AUTO: 0.8 K/UL (ref 0.3–1)
MONOCYTES NFR BLD: 10.8 % (ref 4–15)
NEUTROPHILS # BLD AUTO: 5.4 K/UL (ref 1.8–7.7)
NEUTROPHILS NFR BLD: 69.1 % (ref 38–73)
NRBC BLD-RTO: 0 /100 WBC
PF4 HEPARIN CMPLX AB SER QL: 0.33 OD (ref 0–0.4)
PLATELET # BLD AUTO: 92 K/UL (ref 150–450)
PMV BLD AUTO: 10.7 FL (ref 9.2–12.9)
POTASSIUM SERPL-SCNC: 3.7 MMOL/L (ref 3.5–5.1)
PROT SERPL-MCNC: 5.9 G/DL (ref 6–8.4)
RBC # BLD AUTO: 4.07 M/UL (ref 4.6–6.2)
SODIUM SERPL-SCNC: 138 MMOL/L (ref 136–145)
WBC # BLD AUTO: 7.81 K/UL (ref 3.9–12.7)

## 2024-09-13 PROCEDURE — 36415 COLL VENOUS BLD VENIPUNCTURE: CPT | Performed by: HOSPITALIST

## 2024-09-13 PROCEDURE — 25000003 PHARM REV CODE 250: Performed by: INTERNAL MEDICINE

## 2024-09-13 PROCEDURE — 25000003 PHARM REV CODE 250: Performed by: STUDENT IN AN ORGANIZED HEALTH CARE EDUCATION/TRAINING PROGRAM

## 2024-09-13 PROCEDURE — 83735 ASSAY OF MAGNESIUM: CPT | Performed by: HOSPITALIST

## 2024-09-13 PROCEDURE — 11000001 HC ACUTE MED/SURG PRIVATE ROOM

## 2024-09-13 PROCEDURE — S4991 NICOTINE PATCH NONLEGEND: HCPCS | Performed by: INTERNAL MEDICINE

## 2024-09-13 PROCEDURE — 21400001 HC TELEMETRY ROOM

## 2024-09-13 PROCEDURE — 99900035 HC TECH TIME PER 15 MIN (STAT)

## 2024-09-13 PROCEDURE — 94761 N-INVAS EAR/PLS OXIMETRY MLT: CPT

## 2024-09-13 PROCEDURE — 80053 COMPREHEN METABOLIC PANEL: CPT | Performed by: HOSPITALIST

## 2024-09-13 PROCEDURE — 85025 COMPLETE CBC W/AUTO DIFF WBC: CPT | Performed by: HOSPITALIST

## 2024-09-13 PROCEDURE — 27000221 HC OXYGEN, UP TO 24 HOURS

## 2024-09-13 RX ORDER — GUAIFENESIN 100 MG/5ML
100 SOLUTION ORAL EVERY 4 HOURS PRN
Status: DISCONTINUED | OUTPATIENT
Start: 2024-09-13 | End: 2024-09-27 | Stop reason: HOSPADM

## 2024-09-13 RX ADMIN — METOPROLOL TARTRATE 50 MG: 50 TABLET, FILM COATED ORAL at 08:09

## 2024-09-13 RX ADMIN — PANTOPRAZOLE SODIUM 40 MG: 40 TABLET, DELAYED RELEASE ORAL at 08:09

## 2024-09-13 RX ADMIN — THERA TABS 1 TABLET: TAB at 08:09

## 2024-09-13 RX ADMIN — AMLODIPINE BESYLATE 10 MG: 5 TABLET ORAL at 08:09

## 2024-09-13 RX ADMIN — THIAMINE HCL TAB 100 MG 100 MG: 100 TAB at 08:09

## 2024-09-13 RX ADMIN — PREGABALIN 75 MG: 50 CAPSULE ORAL at 08:09

## 2024-09-13 RX ADMIN — FOLIC ACID 1 MG: 1 TABLET ORAL at 08:09

## 2024-09-13 RX ADMIN — DULOXETINE HYDROCHLORIDE 60 MG: 30 CAPSULE, DELAYED RELEASE ORAL at 08:09

## 2024-09-13 RX ADMIN — GUAIFENESIN 100 MG: 200 SOLUTION ORAL at 09:09

## 2024-09-13 RX ADMIN — PREGABALIN 75 MG: 50 CAPSULE ORAL at 09:09

## 2024-09-13 RX ADMIN — METOPROLOL TARTRATE 50 MG: 50 TABLET, FILM COATED ORAL at 09:09

## 2024-09-13 RX ADMIN — NICOTINE 1 PATCH: 7 PATCH, EXTENDED RELEASE TRANSDERMAL at 08:09

## 2024-09-13 NOTE — ASSESSMENT & PLAN NOTE
Will cont. PRN ativan for now. Per [facility] Other PRN meds for symptoms of withdrawal are ordered. IVF given. Started multivitamin, folic acid, and thiamine

## 2024-09-13 NOTE — PROGRESS NOTES
[facility]  [facility] Medicine  Progress Note    Patient Name: Israel De Jesus  MRN: [id number]  Patient Class: IP- Inpatient   Admission Date: 9/8/2024  Length of Stay: 4 days  Attending Physician: Bin Garcia MD  Primary Care Provider: Nuha Lynn MD        Subjective:     Principal Problem:Alcohol withdrawal syndrome without complication        HPI:  63 y.o.  man with h/o generalized anxiety d/o, essential HTN, suspect depression, and ETOH abuse presents to the ER with JPSO after reports from family that he was threatening to kill himself. Apparently posted on his Facebook page SI as well as reported to day time Emergency room physician and RNS. To me he denies an SI but he is PEC by ED. While awaiting a bed he started to become more and more tremulous and showing signs of withdrawal. Started on PO valium and Ativan.     Labs overall unremarkable and UDS with THC only. ETOH level 342. Pt. Denies any h/o Withdrawals, does drink daily but I feel is under reporting what he drinks to me. Drinks hard liquor.   COVID negative.     We have been consulted for further management of his withdrawals and admission to the ICU.     Because this patient's clinical condition is critically guarded and PEC he  requires close monitoring of his vitals and withdrawal symptoms, care in an alternative location isn't clinically appropriate for the reasons stated above.              Overview/Hospital Course:  64yo M admitted with suicidial ideation and alcohol withdrawal.  Due to patient being very drowsy and sleeping throughout the day Valium was stopped mobile continue p.r.n. IV Ativan based on CIWA protocol.  Has a R groin hernia- CT large bowel containing right inguinal hernia, without proximal bowel obstruction or acute inflammatory changes.  Lovenox stopped due to thrombocytopenia.  Once medically clear plan to discharge to inpatient psych.  Patient is more awake and alert today and requiring  less IV Ativan.  Still drowsy and weak so ordered PT/OT today.    Interval History:  Patient seen and examined at bedside.  More awake and alert today.  Oriented x2    Review of Systems   All other systems reviewed and are negative.    Objective:     Vital Signs (Most Recent):  Temp: 98.6 °F (37 °C) (09/13/24 1501)  Pulse: 99 (09/13/24 1500)  Resp: 18 (09/13/24 1500)  BP: 128/81 (09/13/24 1501)  SpO2: 99 % (09/13/24 1500) Vital Signs (24h Range):  Temp:  [97.6 °F (36.4 °C)-98.6 °F (37 °C)] 98.6 °F (37 °C)  Pulse:  [] 99  Resp:  [14-33] 18  SpO2:  [84 %-100 %] 99 %  BP: ()/(60-95) 128/81     Weight: 63.9 kg (140 lb 12.2 oz)  Body mass index is 22.72 kg/m².    Intake/Output Summary (Last 24 hours) at 9/13/2024 1621  Last data filed at 9/12/2024 2345  Gross per 24 hour   Intake --   Output 300 ml   Net -300 ml         Physical Exam     General: Well-developed, well-nourished, no distress noted.  HEENT: Normocephalic, PERRL, hearing within normal limits, normal nose, normal dentition, oral mucosa is moist.  Neck: Good range of motion, nontender.  Cardiovascular: Regular rate and rhythm, no edema.  Respiratory: Breath sounds are clear, no tachypnea, no dyspnea.  GI/: Abdomen is soft, bowel sounds are present, mild diffuse tenderness, no guarding, flank tenderness.  Musculoskeletal: Moves all extremities, good range of motion.  Skin: Normal color, normal temperature, no rashes.  Neuro/psych: Alert and oriented x 2,   generalized weakness    Significant Labs: All pertinent labs within the past 24 hours have been reviewed.    Recent Results (from the past 24 hour(s))   Basic metabolic panel    Collection Time: 09/12/24  7:55 PM   Result Value Ref Range    Sodium 136 136 - 145 mmol/L    Potassium 3.6 3.5 - 5.1 mmol/L    Chloride 104 95 - 110 mmol/L    CO2 21 (L) 23 - 29 mmol/L    Glucose 114 (H) 70 - 110 mg/dL    BUN 14 8 - 23 mg/dL    Creatinine 0.9 0.5 - 1.4 mg/dL    Calcium 9.8 8.7 - 10.5 mg/dL    Anion Gap  11 8 - 16 mmol/L    eGFR >60 >60 mL/min/1.73 m^2   Comprehensive Metabolic Panel    Collection Time: 09/13/24  4:10 AM   Result Value Ref Range    Sodium 138 136 - 145 mmol/L    Potassium 3.7 3.5 - 5.1 mmol/L    Chloride 105 95 - 110 mmol/L    CO2 21 (L) 23 - 29 mmol/L    Glucose 113 (H) 70 - 110 mg/dL    BUN 17 8 - 23 mg/dL    Creatinine 0.9 0.5 - 1.4 mg/dL    Calcium 9.4 8.7 - 10.5 mg/dL    Total Protein 5.9 (L) 6.0 - 8.4 g/dL    Albumin 2.4 (L) 3.5 - 5.2 g/dL    Total Bilirubin 0.5 0.1 - 1.0 mg/dL    Alkaline Phosphatase 105 55 - 135 U/L    AST 30 10 - 40 U/L    ALT 30 10 - 44 U/L    eGFR >60 >60 mL/min/1.73 m^2    Anion Gap 12 8 - 16 mmol/L   Magnesium    Collection Time: 09/13/24  4:10 AM   Result Value Ref Range    Magnesium 1.7 1.6 - 2.6 mg/dL   CBC Auto Differential    Collection Time: 09/13/24  4:11 AM   Result Value Ref Range    WBC 7.81 3.90 - 12.70 K/uL    RBC 4.07 (L) 4.60 - 6.20 M/uL    Hemoglobin 13.7 (L) 14.0 - 18.0 g/dL    Hematocrit 41.5 40.0 - 54.0 %     (H) 82 - 98 fL    MCH 33.7 (H) 27.0 - 31.0 pg    MCHC 33.0 32.0 - 36.0 g/dL    RDW 18.3 (H) 11.5 - 14.5 %    Platelets 92 (L) 150 - 450 K/uL    MPV 10.7 9.2 - 12.9 fL    Immature Granulocytes 0.6 (H) 0.0 - 0.5 %    Gran # (ANC) 5.4 1.8 - 7.7 K/uL    Immature Grans (Abs) 0.05 (H) 0.00 - 0.04 K/uL    Lymph # 1.4 1.0 - 4.8 K/uL    Mono # 0.8 0.3 - 1.0 K/uL    Eos # 0.1 0.0 - 0.5 K/uL    Baso # 0.02 0.00 - 0.20 K/uL    nRBC 0 0 /100 WBC    Gran % 69.1 38.0 - 73.0 %    Lymph % 17.5 (L) 18.0 - 48.0 %    Mono % 10.8 4.0 - 15.0 %    Eosinophil % 1.7 0.0 - 8.0 %    Basophil % 0.3 0.0 - 1.9 %    Differential Method Automated        Microbiology Results (last 7 days)       ** No results found for the last 168 hours. **            Imaging Results              X-Ray Chest AP Portable (Final result)  Result time 09/09/24 05:28:24      Final result by Melissa Tolentino MD (09/09/24 05:28:24)                   Impression:      Please see  above.      Electronically signed by: Melissa Tolentino MD  Date:    09/09/2024  Time:    05:28               Narrative:    EXAMINATION:  XR CHEST AP PORTABLE    CLINICAL HISTORY:  Cough, unspecified    TECHNIQUE:  Single frontal view of the chest was performed.    COMPARISON:  02/13/2024    FINDINGS:  Cardiac monitoring leads overlie the chest.  The cardiomediastinal silhouette is unchanged no regular a shin.  Mediastinal structures are midline.  Lungs are symmetrically expanded with diffuse coarse lung markings/interstitial attenuation could relate to underlying chronic interstitial change/emphysematous change.  No large region of confluent airspace consolidation, substantial volume of pleural fluid or pneumothorax identified.  Osseous structures intact with degenerative change and postoperative change of the right shoulder and cervical spine.                                       Significant Imaging: I have reviewed all pertinent imaging results/findings within the past 24 hours.    Assessment/Plan:      * Alcohol withdrawal syndrome without complication  Will cont. PRN ativan for now. Per CIWA Other PRN meds for symptoms of withdrawal are ordered. IVF given. Started multivitamin, folic acid, and thiamine      Suicidal ideation  PEC and will cont. To monitor for now. Will need Psych clearance when stable.       Macrocytic anemia  Noted h/o macrocytic anemia but H/H and MCV stable. ER concerned about possible sever thiamine deficiency and will f/u on b12 and folate levels. Repeat CBC ordered. He has been rx thiamine per home med.     Generalized anxiety disorder  -Resume home meds and cont with PRN ativan per CIWA protocol  -scheduled valium stopped due to lethargy        Tobacco use disorder  Nicotine patch ordered daily       Hypertension  -improving        VTE Risk Mitigation (From admission, onward)           Ordered     Place sequential compression device  Until discontinued         09/11/24 0729     IP VTE LOW  RISK PATIENT  Once         09/09/24 0350     Place sequential compression device  Until discontinued         09/09/24 0350                    Discharge Planning   BRANDON: 9/13/2024     Code Status: Full Code   Is the patient medically ready for discharge?:     Reason for patient still in hospital (select all that apply): Patient trending condition  Discharge Plan A: [facility]            Critical care time spent on the evaluation and treatment of severe organ dysfunction, review of pertinent labs and imaging studies, discussions with consulting providers and discussions with patient/family: 57 minutes.      Bin Garcia MD  Department of Hospital Medicine   [facility] [place] [facility] [place] [facility]

## 2024-09-13 NOTE — PROGRESS NOTES
[facility] Care  [facility] Medicine  Progress Note    Patient Name: Israel De Jesus  MRN: [id number]  Patient Class: IP- Inpatient   Admission Date: 9/8/2024  Length of Stay: 3 days  Attending Physician: Bin Garcia MD  Primary Care Provider: Nuha Lynn MD        Subjective:     Principal Problem:Alcohol withdrawal syndrome without complication        HPI:  63 y.o.  man with h/o generalized anxiety d/o, essential HTN, suspect depression, and ETOH abuse presents to the ER with JPSO after reports from family that he was threatening to kill himself. Apparently posted on his Facebook page SI as well as reported to day time Emergency room physician and RNS. To me he denies an SI but he is PEC by ED. While awaiting a bed he started to become more and more tremulous and showing signs of withdrawal. Started on PO valium and Ativan.     Labs overall unremarkable and UDS with THC only. ETOH level 342. Pt. Denies any h/o Withdrawals, does drink daily but I feel is under reporting what he drinks to me. Drinks hard liquor.   COVID negative.     We have been consulted for further management of his withdrawals and admission to the ICU.     Because this patient's clinical condition is critically guarded and PEC he  requires close monitoring of his vitals and withdrawal symptoms, care in an alternative location isn't clinically appropriate for the reasons stated above.              Overview/Hospital Course:  64yo M admitted with suicidial ideation and alcohol withdrawal.  Due to patient being very drowsy and sleeping throughout the day Valium was stopped mobile continue p.r.n. IV Ativan based on CIWA protocol.  Has a R groin hernia- CT large bowel containing right inguinal hernia, without proximal bowel obstruction or acute inflammatory changes.  Lovenox stopped due to thrombocytopenia.  Once medically clear plan to discharge to inpatient psych.    Interval History:  Patient seen and examined at  bedside, little more awake but still sleeping throughout the day.    Review of Systems  Objective:     Vital Signs (Most Recent):  Temp: 98.4 °F (36.9 °C) (09/12/24 1501)  Pulse: 110 (09/12/24 1901)  Resp: (!) 21 (09/12/24 1901)  BP: 135/74 (09/12/24 1901)  SpO2: (!) 90 % (09/12/24 1901) Vital Signs (24h Range):  Temp:  [97.7 °F (36.5 °C)-98.7 °F (37.1 °C)] 98.4 °F (36.9 °C)  Pulse:  [] 110  Resp:  [15-34] 21  SpO2:  [90 %-100 %] 90 %  BP: (104-178)/() 135/74     Weight: 63.9 kg (140 lb 12.2 oz)  Body mass index is 22.72 kg/m².    Intake/Output Summary (Last 24 hours) at 9/12/2024 2004  Last data filed at 9/12/2024 1814  Gross per 24 hour   Intake --   Output 650 ml   Net -650 ml         Physical Exam      General: Well-developed, well-nourished, no distress noted.  HEENT: Normocephalic, PERRL, hearing within normal limits, normal nose, normal dentition, oral mucosa is moist.  Neck: Good range of motion, nontender.  Cardiovascular: Regular rate and rhythm, no edema.  Respiratory: Breath sounds are clear, no tachypnea, no dyspnea.  GI/: Abdomen is soft, bowel sounds are present, mild diffuse tenderness, no guarding, flank tenderness.  Musculoskeletal: Moves all extremities, good range of motion.  Skin: Normal color, normal temperature, no rashes.  Neuro/psych:  Lethargic, generalized weakness     Significant Labs: All pertinent labs within the past 24 hours have been reviewed.    Significant Imaging: I have reviewed all pertinent imaging results/findings within the past 24 hours.    Assessment/Plan:      * Alcohol withdrawal syndrome without complication  Will cont. PRN ativan for now. Per CIWA Other PRN meds for symptoms of withdrawal are ordered. IVF given. Started multivitamin, folic acid, and thiamine      Suicidal ideation  PEC and will cont. To monitor for now. Will need Psych clearance when stable.       Macrocytic anemia  Noted h/o macrocytic anemia but H/H and MCV stable. ER concerned about  possible sever thiamine deficiency and will f/u on b12 and folate levels. Repeat CBC ordered. He has been rx thiamine per home med.     Generalized anxiety disorder  -Resume home meds and cont with PRN ativan per [facility] protocol  -scheduled valium stopped due to lethargy        Tobacco use disorder  Nicotine patch ordered daily       Hypertension  Chronic, uncontrolled and suspect due to ETOH withdrawals.  Latest blood pressure and vitals reviewed-     Temp:  [97.3 °F (36.3 °C)-98.7 °F (37.1 °C)]   Pulse:  []   Resp:  [16-45]   BP: ()/()   SpO2:  [93 %-97 %] .   Home meds for hypertension were reviewed and noted below.   Hypertension Medications               amLODIPine (NORVASC) 10 MG tablet Take 1 tablet (10 mg total) by mouth once daily.    metoprolol tartrate (LOPRESSOR) 50 MG tablet Take 1 tablet (50 mg total) by mouth 2 (two) times daily.            While in the hospital, will manage blood pressure as follows; Continue home antihypertensive regimen    Will utilize p.r.n. blood pressure medication only if patient's blood pressure greater than  180/90  and he develops symptoms such as worsening chest pain or shortness of breath.      VTE Risk Mitigation (From admission, onward)           Ordered     Place sequential compression device  Until discontinued         09/11/24 0729     IP VTE LOW RISK PATIENT  Once         09/09/24 0350     Place sequential compression device  Until discontinued         09/09/24 0350                    Discharge Planning   BRANDON: 9/13/2024     Code Status: Full Code   Is the patient medically ready for discharge?:     Reason for patient still in hospital (select all that apply): Patient trending condition  Discharge Plan A: Psychiatric hospital            Critical care time spent on the evaluation and treatment of severe organ dysfunction, review of pertinent labs and imaging studies, discussions with consulting providers and discussions with patient/family: 36  minutes.      Bin Garcia MD  Department of Hospital Medicine   [facility] - Intensive Care

## 2024-09-13 NOTE — PLAN OF CARE
Problem: Adult Inpatient Plan of Care  Goal: Plan of Care Review  Outcome: Progressing  Goal: Patient-Specific Goal (Individualized)  Outcome: Progressing  Goal: Absence of Hospital-Acquired Illness or Injury  Outcome: Progressing  Goal: Optimal Comfort and Wellbeing  Outcome: Progressing  Goal: Readiness for Transition of Care  Outcome: Progressing     Problem: Suicide Risk  Goal: Absence of Self-Harm  Outcome: Progressing     Problem: Skin Injury Risk Increased  Goal: Skin Health and Integrity  Outcome: Progressing     Problem: Fall Injury Risk  Goal: Absence of Fall and Fall-Related Injury  Outcome: Progressing     Problem: Confusion Acute  Goal: Optimal Cognitive Function  Outcome: Progressing     Problem: Alcohol Withdrawal  Goal: Alcohol Withdrawal Symptom Control  Outcome: Progressing  Goal: Optimal Neurologic Function  Outcome: Progressing  Goal: Readiness for Change Identified  Outcome: Progressing

## 2024-09-13 NOTE — SUBJECTIVE & OBJECTIVE
Interval History:  Patient seen and examined at bedside.  More awake and alert today.  Oriented x2    Review of Systems   All other systems reviewed and are negative.    Objective:     Vital Signs (Most Recent):  Temp: 98.6 °F (37 °C) (09/13/24 1501)  Pulse: 99 (09/13/24 1500)  Resp: 18 (09/13/24 1500)  BP: 128/81 (09/13/24 1501)  SpO2: 99 % (09/13/24 1500) Vital Signs (24h Range):  Temp:  [97.6 °F (36.4 °C)-98.6 °F (37 °C)] 98.6 °F (37 °C)  Pulse:  [] 99  Resp:  [14-33] 18  SpO2:  [84 %-100 %] 99 %  BP: ()/(60-95) 128/81     Weight: 63.9 kg (140 lb 12.2 oz)  Body mass index is 22.72 kg/m².    Intake/Output Summary (Last 24 hours) at 9/13/2024 1621  Last data filed at 9/12/2024 2345  Gross per 24 hour   Intake --   Output 300 ml   Net -300 ml         Physical Exam     General: Well-developed, well-nourished, no distress noted.  HEENT: Normocephalic, PERRL, hearing within normal limits, normal nose, normal dentition, oral mucosa is moist.  Neck: Good range of motion, nontender.  Cardiovascular: Regular rate and rhythm, no edema.  Respiratory: Breath sounds are clear, no tachypnea, no dyspnea.  GI/: Abdomen is soft, bowel sounds are present, mild diffuse tenderness, no guarding, flank tenderness.  Musculoskeletal: Moves all extremities, good range of motion.  Skin: Normal color, normal temperature, no rashes.  Neuro/psych: Alert and oriented x 2,   generalized weakness    Significant Labs: All pertinent labs within the past 24 hours have been reviewed.    Recent Results (from the past 24 hour(s))   Basic metabolic panel    Collection Time: 09/12/24  7:55 PM   Result Value Ref Range    Sodium 136 136 - 145 mmol/L    Potassium 3.6 3.5 - 5.1 mmol/L    Chloride 104 95 - 110 mmol/L    CO2 21 (L) 23 - 29 mmol/L    Glucose 114 (H) 70 - 110 mg/dL    BUN 14 8 - 23 mg/dL    Creatinine 0.9 0.5 - 1.4 mg/dL    Calcium 9.8 8.7 - 10.5 mg/dL    Anion Gap 11 8 - 16 mmol/L    eGFR >60 >60 mL/min/1.73 m^2   Comprehensive  Metabolic Panel    Collection Time: 09/13/24  4:10 AM   Result Value Ref Range    Sodium 138 136 - 145 mmol/L    Potassium 3.7 3.5 - 5.1 mmol/L    Chloride 105 95 - 110 mmol/L    CO2 21 (L) 23 - 29 mmol/L    Glucose 113 (H) 70 - 110 mg/dL    BUN 17 8 - 23 mg/dL    Creatinine 0.9 0.5 - 1.4 mg/dL    Calcium 9.4 8.7 - 10.5 mg/dL    Total Protein 5.9 (L) 6.0 - 8.4 g/dL    Albumin 2.4 (L) 3.5 - 5.2 g/dL    Total Bilirubin 0.5 0.1 - 1.0 mg/dL    Alkaline Phosphatase 105 55 - 135 U/L    AST 30 10 - 40 U/L    ALT 30 10 - 44 U/L    eGFR >60 >60 mL/min/1.73 m^2    Anion Gap 12 8 - 16 mmol/L   Magnesium    Collection Time: 09/13/24  4:10 AM   Result Value Ref Range    Magnesium 1.7 1.6 - 2.6 mg/dL   CBC Auto Differential    Collection Time: 09/13/24  4:11 AM   Result Value Ref Range    WBC 7.81 3.90 - 12.70 K/uL    RBC 4.07 (L) 4.60 - 6.20 M/uL    Hemoglobin 13.7 (L) 14.0 - 18.0 g/dL    Hematocrit 41.5 40.0 - 54.0 %     (H) 82 - 98 fL    MCH 33.7 (H) 27.0 - 31.0 pg    MCHC 33.0 32.0 - 36.0 g/dL    RDW 18.3 (H) 11.5 - 14.5 %    Platelets 92 (L) 150 - 450 K/uL    MPV 10.7 9.2 - 12.9 fL    Immature Granulocytes 0.6 (H) 0.0 - 0.5 %    Gran # (ANC) 5.4 1.8 - 7.7 K/uL    Immature Grans (Abs) 0.05 (H) 0.00 - 0.04 K/uL    Lymph # 1.4 1.0 - 4.8 K/uL    Mono # 0.8 0.3 - 1.0 K/uL    Eos # 0.1 0.0 - 0.5 K/uL    Baso # 0.02 0.00 - 0.20 K/uL    nRBC 0 0 /100 WBC    Gran % 69.1 38.0 - 73.0 %    Lymph % 17.5 (L) 18.0 - 48.0 %    Mono % 10.8 4.0 - 15.0 %    Eosinophil % 1.7 0.0 - 8.0 %    Basophil % 0.3 0.0 - 1.9 %    Differential Method Automated        Microbiology Results (last 7 days)       ** No results found for the last 168 hours. **            Imaging Results              X-Ray Chest AP Portable (Final result)  Result time 09/09/24 05:28:24      Final result by Melissa Tolentino MD (09/09/24 05:28:24)                   Impression:      Please see above.      Electronically signed by: Melissa Tolentino  MD  Date:    09/09/2024  Time:    05:28               Narrative:    EXAMINATION:  XR CHEST AP PORTABLE    CLINICAL HISTORY:  Cough, unspecified    TECHNIQUE:  Single frontal view of the chest was performed.    COMPARISON:  02/13/2024    FINDINGS:  Cardiac monitoring leads overlie the chest.  The cardiomediastinal silhouette is unchanged no regular a shin.  Mediastinal structures are midline.  Lungs are symmetrically expanded with diffuse coarse lung markings/interstitial attenuation could relate to underlying chronic interstitial change/emphysematous change.  No large region of confluent airspace consolidation, substantial volume of pleural fluid or pneumothorax identified.  Osseous structures intact with degenerative change and postoperative change of the right shoulder and cervical spine.                                       Significant Imaging: I have reviewed all pertinent imaging results/findings within the past 24 hours.

## 2024-09-13 NOTE — NURSING
[facility], [facility]  Nurses Note -- 4 Eyes      9/13/2024       Skin assessed on: Q Shift      [x] No Pressure Injuries Present    [x]Prevention Measures Documented    [] Yes LDA  for Pressure Injury Previously documented     [] Yes New Pressure Injury Discovered   [] LDA for New Pressure Injury Added      Attending RN:  Bill Kahn RN     Second RN: EMELY Peck

## 2024-09-13 NOTE — NURSING
[facility]  [place]es [place] -- 4 [place]      9/13/2024       Skin assessed on: Q Shift      [x] No Pressure Injuries Present    []Prevention Measures Documented    [] Yes LDA  for Pressure Injury Previously documented     [] Yes New Pressure Injury Discovered   [] LDA for New Pressure Injury Added      Attending RN:  Cisco Mayberry, RN     Second RN:  Bill

## 2024-09-13 NOTE — PLAN OF CARE
"  Problem: Adult Inpatient Plan of Care  Goal: Optimal Comfort and Wellbeing  Outcome: Progressing     Problem: Suicide Risk  Goal: Absence of Self-Harm  9/13/2024 0436 by Cisco Mayberry RN  Outcome: Progressing  9/13/2024 0436 by Cisco Mayberry RN  Outcome: Progressing     Problem: Skin Injury Risk Increased  Goal: Skin Health and Integrity  9/13/2024 0436 by Cisco Mayberry RN  Outcome: Progressing  9/13/2024 0436 by Cisco Mayberry RN  Outcome: Progressing     Problem: Fall Injury Risk  Goal: Absence of Fall and Fall-Related Injury  Outcome: Progressing     Problem: Confusion Acute  Goal: Optimal Cognitive Function  Outcome: Progressing     Problem: Alcohol Withdrawal  Goal: Alcohol Withdrawal Symptom Control  Outcome: Progressing  Goal: Optimal Neurologic Function  Outcome: Progressing   Pt denies SI/HI, states he only feels suicidal when "stressed out". He reports auditory and visual hallucinations. He is unable to describe what the voices are saying or what visual hallucinations are that he is having. Sometimes he thinks he is at home, other times he thinks he is at work at [employer]. He has the tendency to ramble, appears to have disorganized thought process and some thought blocking. He has remained calm and compliant with some restlessness prior to receiving night time medications and trazodone. He slept soundly for approx 6 hrs. He received a full bath. Sats appear in the upper 80s/low 90s, he denies SOB. His pulse ox has a difficult time picking up a good waveform and has been changed to different sites multiple times. Pt placed on 1l nc and stats remained % while sleeping.   "

## 2024-09-13 NOTE — SUBJECTIVE & OBJECTIVE
Interval History:  Patient seen and examined at bedside, little more awake but still sleeping throughout the day.    Review of Systems  Objective:     Vital Signs (Most Recent):  Temp: 98.4 °F (36.9 °C) (09/12/24 1501)  Pulse: 110 (09/12/24 1901)  Resp: (!) 21 (09/12/24 1901)  BP: 135/74 (09/12/24 1901)  SpO2: (!) 90 % (09/12/24 1901) Vital Signs (24h Range):  Temp:  [97.7 °F (36.5 °C)-98.7 °F (37.1 °C)] 98.4 °F (36.9 °C)  Pulse:  [] 110  Resp:  [15-34] 21  SpO2:  [90 %-100 %] 90 %  BP: (104-178)/() 135/74     Weight: 63.9 kg (140 lb 12.2 oz)  Body mass index is 22.72 kg/m².    Intake/Output Summary (Last 24 hours) at 9/12/2024 2004  Last data filed at 9/12/2024 1814  Gross per 24 hour   Intake --   Output 650 ml   Net -650 ml         Physical Exam      General: Well-developed, well-nourished, no distress noted.  HEENT: Normocephalic, PERRL, hearing within normal limits, normal nose, normal dentition, oral mucosa is moist.  Neck: Good range of motion, nontender.  Cardiovascular: Regular rate and rhythm, no edema.  Respiratory: Breath sounds are clear, no tachypnea, no dyspnea.  GI/: Abdomen is soft, bowel sounds are present, mild diffuse tenderness, no guarding, flank tenderness.  Musculoskeletal: Moves all extremities, good range of motion.  Skin: Normal color, normal temperature, no rashes.  Neuro/psych:  Lethargic, generalized weakness     Significant Labs: All pertinent labs within the past 24 hours have been reviewed.    Significant Imaging: I have reviewed all pertinent imaging results/findings within the past 24 hours.

## 2024-09-13 NOTE — PROGRESS NOTES
[facility] Medicine  Progress Note    Patient Name: Israel De Jesus  MRN: [id number]  Patient Class: IP- Inpatient   Admission Date: 9/8/2024  Length of Stay: 3 days  Attending Physician: Bin Garcia MD  Primary Care Provider: Nuha Lynn MD        Subjective:     Principal Problem:Alcohol withdrawal syndrome without complication        HPI:  63 y.o.  man with h/o generalized anxiety d/o, essential HTN, suspect depression, and ETOH abuse presents to the ER with JPSO after reports from family that he was threatening to kill himself. Apparently posted on his Facebook page SI as well as reported to day time Emergency room physician and RNS. To me he denies an SI but he is PEC by ED. While awaiting a bed he started to become more and more tremulous and showing signs of withdrawal. Started on PO valium and Ativan.     Labs overall unremarkable and UDS with THC only. ETOH level 342. Pt. Denies any h/o Withdrawals, does drink daily but I feel is under reporting what he drinks to me. Drinks hard liquor.   COVID negative.     We have been consulted for further management of his withdrawals and admission to the ICU.     Because this patient's clinical condition is critically guarded and PEC he  requires close monitoring of his vitals and withdrawal symptoms, care in an alternative location isn't clinically appropriate for the reasons stated above.              Overview/Hospital Course:  64yo M admitted with suicidial ideation and alcohol withdrawal.  Due to patient being very drowsy and sleeping throughout the day Valium was stopped mobile continue p.r.n. IV Ativan based on CIWA protocol.  Has a R groin hernia- CT large bowel containing right inguinal hernia, without proximal bowel obstruction or acute inflammatory changes.  Lovenox stopped due to thrombocytopenia.  Once medically clear plan to discharge to inpatient psych.    No new subjective & objective note has been filed  under this hospital service since the last note was generated.      Assessment/Plan:      * Alcohol withdrawal syndrome without complication  Will cont. PRN ativan for now. Per [facility] Other PRN meds for symptoms of withdrawal are ordered. Started multivitamin, folic acid, and thiamine      Suicidal ideation  PEC and will cont. To monitor for now. Will need Psych clearance when stable.       Macrocytic anemia  Noted h/o macrocytic anemia but H/H and MCV stable. ER concerned about possible sever thiamine deficiency and will f/u on b12 and folate levels. Repeat CBC ordered. He has been rx thiamine per home med.     Generalized anxiety disorder  -Resume home meds and cont with PRN ativan per [facility] protocol  -scheduled valium stopped due to lethargy        Tobacco use disorder  Nicotine patch ordered daily       Hypertension  Chronic, uncontrolled and suspect due to ETOH withdrawals.  Latest blood pressure and vitals reviewed-     Temp:  [97.3 °F (36.3 °C)-98.7 °F (37.1 °C)]   Pulse:  []   Resp:  [16-45]   BP: ()/()   SpO2:  [93 %-97 %] .   Home meds for hypertension were reviewed and noted below.   Hypertension Medications               amLODIPine (NORVASC) 10 MG tablet Take 1 tablet (10 mg total) by mouth once daily.    metoprolol tartrate (LOPRESSOR) 50 MG tablet Take 1 tablet (50 mg total) by mouth 2 (two) times daily.            While in the hospital, will manage blood pressure as follows; Continue home antihypertensive regimen    Will utilize p.r.n. blood pressure medication only if patient's blood pressure greater than  180/90  and he develops symptoms such as worsening chest pain or shortness of breath.      VTE Risk Mitigation (From admission, onward)           Ordered     Place sequential compression device  Until discontinued         09/11/24 0746     IP VTE LOW RISK PATIENT  Once         09/09/24 0350     Place sequential compression device  Until discontinued         09/09/24 0350                     Discharge Planning   BRANDON: 9/13/2024     Code Status: Full Code   Is the patient medically ready for discharge?:     Reason for patient still in hospital (select all that apply): Patient trending condition  Discharge Plan A: [facility]            Critical care time spent on the evaluation and treatment of severe organ dysfunction, review of pertinent labs and imaging studies, discussions with consulting providers and discussions with patient/family: 74 minutes.      Bin Garcia MD  Department of Hospital Medicine   [facility] [place]

## 2024-09-14 LAB
ALBUMIN SERPL BCP-MCNC: 2.5 G/DL (ref 3.5–5.2)
ALP SERPL-CCNC: 101 U/L (ref 55–135)
ALT SERPL W/O P-5'-P-CCNC: 25 U/L (ref 10–44)
ANION GAP SERPL CALC-SCNC: 8 MMOL/L (ref 8–16)
AST SERPL-CCNC: 19 U/L (ref 10–40)
BASOPHILS # BLD AUTO: 0.03 K/UL (ref 0–0.2)
BASOPHILS NFR BLD: 0.4 % (ref 0–1.9)
BILIRUB SERPL-MCNC: 0.3 MG/DL (ref 0.1–1)
BUN SERPL-MCNC: 20 MG/DL (ref 8–23)
CALCIUM SERPL-MCNC: 9.7 MG/DL (ref 8.7–10.5)
CHLORIDE SERPL-SCNC: 106 MMOL/L (ref 95–110)
CO2 SERPL-SCNC: 24 MMOL/L (ref 23–29)
CREAT SERPL-MCNC: 1 MG/DL (ref 0.5–1.4)
DIFFERENTIAL METHOD BLD: ABNORMAL
EOSINOPHIL # BLD AUTO: 0.1 K/UL (ref 0–0.5)
EOSINOPHIL NFR BLD: 1.3 % (ref 0–8)
ERYTHROCYTE [DISTWIDTH] IN BLOOD BY AUTOMATED COUNT: 18.4 % (ref 11.5–14.5)
EST. GFR  (NO RACE VARIABLE): >60 ML/MIN/1.73 M^2
GLUCOSE SERPL-MCNC: 108 MG/DL (ref 70–110)
HCT VFR BLD AUTO: 39.3 % (ref 40–54)
HGB BLD-MCNC: 13 G/DL (ref 14–18)
IMM GRANULOCYTES # BLD AUTO: 0.08 K/UL (ref 0–0.04)
IMM GRANULOCYTES NFR BLD AUTO: 0.9 % (ref 0–0.5)
LYMPHOCYTES # BLD AUTO: 1.5 K/UL (ref 1–4.8)
LYMPHOCYTES NFR BLD: 17.6 % (ref 18–48)
MAGNESIUM SERPL-MCNC: 1.6 MG/DL (ref 1.6–2.6)
MCH RBC QN AUTO: 34.2 PG (ref 27–31)
MCHC RBC AUTO-ENTMCNC: 33.1 G/DL (ref 32–36)
MCV RBC AUTO: 103 FL (ref 82–98)
MONOCYTES # BLD AUTO: 1.2 K/UL (ref 0.3–1)
MONOCYTES NFR BLD: 13.5 % (ref 4–15)
NEUTROPHILS # BLD AUTO: 5.6 K/UL (ref 1.8–7.7)
NEUTROPHILS NFR BLD: 66.3 % (ref 38–73)
NRBC BLD-RTO: 0 /100 WBC
PLATELET # BLD AUTO: 127 K/UL (ref 150–450)
PLATELET BLD QL SMEAR: ABNORMAL
PMV BLD AUTO: 10.8 FL (ref 9.2–12.9)
POCT GLUCOSE: 165 MG/DL (ref 70–110)
POTASSIUM SERPL-SCNC: 3.7 MMOL/L (ref 3.5–5.1)
PROT SERPL-MCNC: 5.9 G/DL (ref 6–8.4)
RBC # BLD AUTO: 3.8 M/UL (ref 4.6–6.2)
SODIUM SERPL-SCNC: 138 MMOL/L (ref 136–145)
WBC # BLD AUTO: 8.49 K/UL (ref 3.9–12.7)

## 2024-09-14 PROCEDURE — S4991 NICOTINE PATCH NONLEGEND: HCPCS | Performed by: INTERNAL MEDICINE

## 2024-09-14 PROCEDURE — 97530 THERAPEUTIC ACTIVITIES: CPT

## 2024-09-14 PROCEDURE — 85025 COMPLETE CBC W/AUTO DIFF WBC: CPT | Performed by: HOSPITALIST

## 2024-09-14 PROCEDURE — 25000003 PHARM REV CODE 250: Performed by: STUDENT IN AN ORGANIZED HEALTH CARE EDUCATION/TRAINING PROGRAM

## 2024-09-14 PROCEDURE — 25000003 PHARM REV CODE 250: Performed by: INTERNAL MEDICINE

## 2024-09-14 PROCEDURE — 83735 ASSAY OF MAGNESIUM: CPT | Performed by: HOSPITALIST

## 2024-09-14 PROCEDURE — 21400001 HC TELEMETRY ROOM

## 2024-09-14 PROCEDURE — 80053 COMPREHEN METABOLIC PANEL: CPT | Performed by: HOSPITALIST

## 2024-09-14 PROCEDURE — 36415 COLL VENOUS BLD VENIPUNCTURE: CPT | Performed by: HOSPITALIST

## 2024-09-14 PROCEDURE — 97161 PT EVAL LOW COMPLEX 20 MIN: CPT

## 2024-09-14 RX ADMIN — METOPROLOL TARTRATE 50 MG: 50 TABLET, FILM COATED ORAL at 09:09

## 2024-09-14 RX ADMIN — PREGABALIN 75 MG: 50 CAPSULE ORAL at 08:09

## 2024-09-14 RX ADMIN — PANTOPRAZOLE SODIUM 40 MG: 40 TABLET, DELAYED RELEASE ORAL at 08:09

## 2024-09-14 RX ADMIN — THERA TABS 1 TABLET: TAB at 08:09

## 2024-09-14 RX ADMIN — METOPROLOL TARTRATE 50 MG: 50 TABLET, FILM COATED ORAL at 08:09

## 2024-09-14 RX ADMIN — GUAIFENESIN 100 MG: 200 SOLUTION ORAL at 09:09

## 2024-09-14 RX ADMIN — ACETAMINOPHEN 650 MG: 325 TABLET ORAL at 09:09

## 2024-09-14 RX ADMIN — FOLIC ACID 1 MG: 1 TABLET ORAL at 08:09

## 2024-09-14 RX ADMIN — THIAMINE HCL TAB 100 MG 100 MG: 100 TAB at 08:09

## 2024-09-14 RX ADMIN — DULOXETINE HYDROCHLORIDE 60 MG: 30 CAPSULE, DELAYED RELEASE ORAL at 08:09

## 2024-09-14 RX ADMIN — ACETAMINOPHEN 650 MG: 325 TABLET ORAL at 03:09

## 2024-09-14 RX ADMIN — PREGABALIN 75 MG: 50 CAPSULE ORAL at 09:09

## 2024-09-14 RX ADMIN — NICOTINE 1 PATCH: 7 PATCH, EXTENDED RELEASE TRANSDERMAL at 08:09

## 2024-09-14 NOTE — NURSING
[facility]  Nurses Note -- 4 Eyes      9/14/2024       Skin assessed on: Q Shift      [x] No Pressure Injuries Present    [x]Prevention Measures Documented    [] Yes LDA  for Pressure Injury Previously documented     [] Yes New Pressure Injury Discovered   [] LDA for New Pressure Injury Added      Attending RN:  Cornelia Brush RN     Second RN:  Radha BETTENCOURT RN

## 2024-09-14 NOTE — NURSING
[facility], [facility]  Nurses Note -- 4 Eyes      9/13/2024       Skin assessed on: Q Shift      [x] No Pressure Injuries Present    [x]Prevention Measures Documented    [] Yes LDA  for Pressure Injury Previously documented     [] Yes New Pressure Injury Discovered   [] LDA for New Pressure Injury Added      Attending RN:  Lizeth Saldivar RN     Second RN:  EMELY Dela Cruz

## 2024-09-14 NOTE — NURSING
Transfer note:  Received pt from ICU, pt was transferred by nurse, tech and security.  Pt is alert and oriented to person, confused to time and place.  Saline lock patent. Pt is a CEC and has a sitter at the bedside.  Pt is calm with mild tremors noted.  Denies needs at this time. Four eyes done with ICU nurse, Lizeth Saldivar,  NO pressure injuries  noted.

## 2024-09-14 NOTE — NURSING TRANSFER
Nursing Transfer Note      9/13/2024   10:24 PM    Nurse giving handoff: Lizeth   Nurse receiving handoff: Radha    Reason patient is being transferred: step down    Transfer To:     Transfer via bed    Transfer with n/a     Transported by nurse, sitter, transporter, security    Transfer Vital Signs:  Blood Pressure:137/77    Heart Rate:105  O2:99  Temperature:97 F  Respirations:28    Telemetry:   Order for Tele Monitor? No    Additional Lines:     4eyes on Skin: yes    Medicines sent: none    Any special needs or follow-up needed: none    Patient belongings transferred with patient: Yes    Chart send with patient: Yes    Notified:     Patient reassessed at: 9/13 2219 (date, time)  1  Upon arrival to floor: patient oriented to room, call bell in reach, and bed in lowest position

## 2024-09-14 NOTE — NURSING
[facility], [facility]  Nurses Note -- 4 Eyes      9/14/2024       Skin assessed on: Transfer  From ICU    [x] No Pressure Injuries Present    []Prevention Measures Documented    [] Yes LDA  for Pressure Injury Previously documented     [] Yes New Pressure Injury Discovered   [] LDA for New Pressure Injury Added      Attending RN:  Radha Villanueva RN     Second RN:  Vira        Yes

## 2024-09-14 NOTE — PLAN OF CARE
Problem: Physical Therapy  Goal: Physical Therapy Goal  Description: Goals to be met by: 24     Patient will increase functional independence with mobility by performin. Supine to sit with Modified [place]  2. Sit to supine with Modified [place]  3. Rolling to Left and Right with Modified [place].  4. Sit to stand transfer with Modified [place]  5. Bed to chair transfer with Modified [place] using Rolling Walker  6. Gait  x 50 feet with Modified [place] using Rolling Walker.   7. Stand for 5 minutes with Modified [place] using Rolling Walker  8. Lower extremity exercise program x30 reps per handout, with independence    Outcome: Progressing     Pt would benefit from Moderate intensity w/ caregiver assistant at current time.

## 2024-09-14 NOTE — NURSING
End of shift summary:  Pt rested well.  Prn tylenol given.  Pt is now CEC.  Sitter remains present.  Voiced no c/o pain nor discomforts.

## 2024-09-15 LAB
ALBUMIN SERPL BCP-MCNC: 2.7 G/DL (ref 3.5–5.2)
ALP SERPL-CCNC: 102 U/L (ref 55–135)
ALT SERPL W/O P-5'-P-CCNC: 23 U/L (ref 10–44)
ANION GAP SERPL CALC-SCNC: 8 MMOL/L (ref 8–16)
AST SERPL-CCNC: 19 U/L (ref 10–40)
BASOPHILS # BLD AUTO: 0.03 K/UL (ref 0–0.2)
BASOPHILS NFR BLD: 0.5 % (ref 0–1.9)
BILIRUB SERPL-MCNC: 0.3 MG/DL (ref 0.1–1)
BUN SERPL-MCNC: 22 MG/DL (ref 8–23)
CALCIUM SERPL-MCNC: 9.7 MG/DL (ref 8.7–10.5)
CHLORIDE SERPL-SCNC: 107 MMOL/L (ref 95–110)
CO2 SERPL-SCNC: 25 MMOL/L (ref 23–29)
CREAT SERPL-MCNC: 1.1 MG/DL (ref 0.5–1.4)
DIFFERENTIAL METHOD BLD: ABNORMAL
EOSINOPHIL # BLD AUTO: 0.1 K/UL (ref 0–0.5)
EOSINOPHIL NFR BLD: 2.2 % (ref 0–8)
ERYTHROCYTE [DISTWIDTH] IN BLOOD BY AUTOMATED COUNT: 18.4 % (ref 11.5–14.5)
EST. GFR  (NO RACE VARIABLE): >60 ML/MIN/1.73 M^2
GLUCOSE SERPL-MCNC: 94 MG/DL (ref 70–110)
HCT VFR BLD AUTO: 42.5 % (ref 40–54)
HGB BLD-MCNC: 13.3 G/DL (ref 14–18)
IMM GRANULOCYTES # BLD AUTO: 0.06 K/UL (ref 0–0.04)
IMM GRANULOCYTES NFR BLD AUTO: 1 % (ref 0–0.5)
LYMPHOCYTES # BLD AUTO: 1.3 K/UL (ref 1–4.8)
LYMPHOCYTES NFR BLD: 21.3 % (ref 18–48)
MAGNESIUM SERPL-MCNC: 1.6 MG/DL (ref 1.6–2.6)
MCH RBC QN AUTO: 32.9 PG (ref 27–31)
MCHC RBC AUTO-ENTMCNC: 31.3 G/DL (ref 32–36)
MCV RBC AUTO: 105 FL (ref 82–98)
MONOCYTES # BLD AUTO: 0.9 K/UL (ref 0.3–1)
MONOCYTES NFR BLD: 13.8 % (ref 4–15)
NEUTROPHILS # BLD AUTO: 3.8 K/UL (ref 1.8–7.7)
NEUTROPHILS NFR BLD: 61.2 % (ref 38–73)
NRBC BLD-RTO: 0 /100 WBC
PLATELET # BLD AUTO: 167 K/UL (ref 150–450)
PMV BLD AUTO: 11 FL (ref 9.2–12.9)
POTASSIUM SERPL-SCNC: 4.1 MMOL/L (ref 3.5–5.1)
PROT SERPL-MCNC: 6.5 G/DL (ref 6–8.4)
RBC # BLD AUTO: 4.04 M/UL (ref 4.6–6.2)
SODIUM SERPL-SCNC: 140 MMOL/L (ref 136–145)
WBC # BLD AUTO: 6.24 K/UL (ref 3.9–12.7)

## 2024-09-15 PROCEDURE — 85025 COMPLETE CBC W/AUTO DIFF WBC: CPT | Performed by: HOSPITALIST

## 2024-09-15 PROCEDURE — 21400001 HC TELEMETRY ROOM

## 2024-09-15 PROCEDURE — 36415 COLL VENOUS BLD VENIPUNCTURE: CPT | Performed by: HOSPITALIST

## 2024-09-15 PROCEDURE — 83735 ASSAY OF MAGNESIUM: CPT | Performed by: HOSPITALIST

## 2024-09-15 PROCEDURE — 80053 COMPREHEN METABOLIC PANEL: CPT | Performed by: HOSPITALIST

## 2024-09-15 PROCEDURE — 25000003 PHARM REV CODE 250: Performed by: STUDENT IN AN ORGANIZED HEALTH CARE EDUCATION/TRAINING PROGRAM

## 2024-09-15 PROCEDURE — 25000003 PHARM REV CODE 250: Performed by: INTERNAL MEDICINE

## 2024-09-15 PROCEDURE — 25000242 PHARM REV CODE 250 ALT 637 W/ HCPCS: Performed by: STUDENT IN AN ORGANIZED HEALTH CARE EDUCATION/TRAINING PROGRAM

## 2024-09-15 PROCEDURE — S4991 NICOTINE PATCH NONLEGEND: HCPCS | Performed by: INTERNAL MEDICINE

## 2024-09-15 RX ORDER — FLUTICASONE PROPIONATE 50 MCG
2 SPRAY, SUSPENSION (ML) NASAL 2 TIMES DAILY
Status: DISCONTINUED | OUTPATIENT
Start: 2024-09-15 | End: 2024-09-27 | Stop reason: HOSPADM

## 2024-09-15 RX ADMIN — FLUTICASONE PROPIONATE 100 MCG: 50 SPRAY, METERED NASAL at 08:09

## 2024-09-15 RX ADMIN — NICOTINE 1 PATCH: 7 PATCH, EXTENDED RELEASE TRANSDERMAL at 09:09

## 2024-09-15 RX ADMIN — TRAZODONE HYDROCHLORIDE 100 MG: 50 TABLET ORAL at 08:09

## 2024-09-15 RX ADMIN — METOPROLOL TARTRATE 50 MG: 50 TABLET, FILM COATED ORAL at 09:09

## 2024-09-15 RX ADMIN — PREGABALIN 75 MG: 50 CAPSULE ORAL at 09:09

## 2024-09-15 RX ADMIN — FLUTICASONE PROPIONATE 100 MCG: 50 SPRAY, METERED NASAL at 01:09

## 2024-09-15 RX ADMIN — DULOXETINE HYDROCHLORIDE 60 MG: 30 CAPSULE, DELAYED RELEASE ORAL at 09:09

## 2024-09-15 RX ADMIN — THIAMINE HCL TAB 100 MG 100 MG: 100 TAB at 09:09

## 2024-09-15 RX ADMIN — METOPROLOL TARTRATE 50 MG: 50 TABLET, FILM COATED ORAL at 08:09

## 2024-09-15 RX ADMIN — THERA TABS 1 TABLET: TAB at 09:09

## 2024-09-15 RX ADMIN — AMLODIPINE BESYLATE 10 MG: 5 TABLET ORAL at 09:09

## 2024-09-15 RX ADMIN — PANTOPRAZOLE SODIUM 40 MG: 40 TABLET, DELAYED RELEASE ORAL at 09:09

## 2024-09-15 RX ADMIN — PREGABALIN 75 MG: 50 CAPSULE ORAL at 08:09

## 2024-09-15 RX ADMIN — FOLIC ACID 1 MG: 1 TABLET ORAL at 09:09

## 2024-09-15 NOTE — PROGRESS NOTES
[facility] Medicine  Progress Note    Patient Name: Israel De Jesus  MRN: [id number]  Patient Class: IP- Inpatient   Admission Date: 9/8/2024  Length of Stay: 5 days  Attending Physician: Bin Garcia MD  Primary Care Provider: Nuha Lynn MD        Subjective:     Principal Problem:Alcohol withdrawal syndrome without complication        HPI:  63 y.o.  man with h/o generalized anxiety d/o, essential HTN, suspect depression, and ETOH abuse presents to the ER with JPSO after reports from family that he was threatening to kill himself. Apparently posted on his Facebook page SI as well as reported to day time Emergency room physician and RNS. To me he denies an SI but he is PEC by ED. While awaiting a bed he started to become more and more tremulous and showing signs of withdrawal. Started on PO valium and Ativan.     Labs overall unremarkable and UDS with THC only. ETOH level 342. Pt. Denies any h/o Withdrawals, does drink daily but I feel is under reporting what he drinks to me. Drinks hard liquor.   COVID negative.     We have been consulted for further management of his withdrawals and admission to the ICU.     Because this patient's clinical condition is critically guarded and PEC he  requires close monitoring of his vitals and withdrawal symptoms, care in an alternative location isn't clinically appropriate for the reasons stated above.              Overview/Hospital Course:  62yo M admitted with suicidial ideation and alcohol withdrawal.  Due to patient being very drowsy and sleeping throughout the day Valium was stopped mobile continue p.r.n. IV Ativan based on CIWA protocol.  Has a R groin hernia- CT large bowel containing right inguinal hernia, without proximal bowel obstruction or acute inflammatory changes.  Lovenox stopped due to thrombocytopenia.  Once medically clear plan to discharge to inpatient psych.  Patient is more awake and alert today and requiring less  IV Ativan.  Still drowsy and weak so ordered PT/OT today. Patient still very weak and will need to become more independent before discharging to Inpatient psych.    Interval History: Drowsy but more awake and alert. Less tremors    Review of Systems   All other systems reviewed and are negative.    Objective:     Vital Signs (Most Recent):  Temp: 98.1 °F (36.7 °C) (09/14/24 1958)  Pulse: 97 (09/14/24 1958)  Resp: 20 (09/14/24 1958)  BP: 126/84 (09/14/24 1958)  SpO2: 96 % (09/14/24 1958) Vital Signs (24h Range):  Temp:  [97 °F (36.1 °C)-98.1 °F (36.7 °C)] 98.1 °F (36.7 °C)  Pulse:  [] 97  Resp:  [13-20] 20  SpO2:  [93 %-96 %] 96 %  BP: (109-130)/(70-86) 126/84     Weight: 63.9 kg (140 lb 12.2 oz)  Body mass index is 22.72 kg/m².    Intake/Output Summary (Last 24 hours) at 9/14/2024 2212  Last data filed at 9/14/2024 1830  Gross per 24 hour   Intake 360 ml   Output 300 ml   Net 60 ml         Physical Exam      General: Well-developed, well-nourished, no distress noted.  HEENT: Normocephalic, PERRL, hearing within normal limits, normal nose, normal dentition, oral mucosa is moist.  Neck: Good range of motion, nontender.  Cardiovascular: Regular rate and rhythm, no edema.  Respiratory: Breath sounds are clear, no tachypnea, no dyspnea.  GI/: Abdomen is soft, bowel sounds are present, no tenderness, no guarding, no flank tenderness.  Musculoskeletal: Moves all extremities, good range of motion.  Skin: Normal color, normal temperature, no rashes.  Neuro/psych: Alert and oriented x 2, generalized weakness    Significant Labs: All pertinent labs within the past 24 hours have been reviewed.    Significant Imaging: I have reviewed all pertinent imaging results/findings within the past 24 hours.    Recent Results (from the past 24 hour(s))   CBC Auto Differential    Collection Time: 09/14/24  4:54 AM   Result Value Ref Range    WBC 8.49 3.90 - 12.70 K/uL    RBC 3.80 (L) 4.60 - 6.20 M/uL    Hemoglobin 13.0 (L) 14.0 -  18.0 g/dL    Hematocrit 39.3 (L) 40.0 - 54.0 %     (H) 82 - 98 fL    MCH 34.2 (H) 27.0 - 31.0 pg    MCHC 33.1 32.0 - 36.0 g/dL    RDW 18.4 (H) 11.5 - 14.5 %    Platelets 127 (L) 150 - 450 K/uL    MPV 10.8 9.2 - 12.9 fL    Immature Granulocytes 0.9 (H) 0.0 - 0.5 %    Gran # (ANC) 5.6 1.8 - 7.7 K/uL    Immature Grans (Abs) 0.08 (H) 0.00 - 0.04 K/uL    Lymph # 1.5 1.0 - 4.8 K/uL    Mono # 1.2 (H) 0.3 - 1.0 K/uL    Eos # 0.1 0.0 - 0.5 K/uL    Baso # 0.03 0.00 - 0.20 K/uL    nRBC 0 0 /100 WBC    Gran % 66.3 38.0 - 73.0 %    Lymph % 17.6 (L) 18.0 - 48.0 %    Mono % 13.5 4.0 - 15.0 %    Eosinophil % 1.3 0.0 - 8.0 %    Basophil % 0.4 0.0 - 1.9 %    Platelet Estimate Decreased (A)     Differential Method Automated    Comprehensive Metabolic Panel    Collection Time: 09/14/24  4:54 AM   Result Value Ref Range    Sodium 138 136 - 145 mmol/L    Potassium 3.7 3.5 - 5.1 mmol/L    Chloride 106 95 - 110 mmol/L    CO2 24 23 - 29 mmol/L    Glucose 108 70 - 110 mg/dL    BUN 20 8 - 23 mg/dL    Creatinine 1.0 0.5 - 1.4 mg/dL    Calcium 9.7 8.7 - 10.5 mg/dL    Total Protein 5.9 (L) 6.0 - 8.4 g/dL    Albumin 2.5 (L) 3.5 - 5.2 g/dL    Total Bilirubin 0.3 0.1 - 1.0 mg/dL    Alkaline Phosphatase 101 55 - 135 U/L    AST 19 10 - 40 U/L    ALT 25 10 - 44 U/L    eGFR >60 >60 mL/min/1.73 m^2    Anion Gap 8 8 - 16 mmol/L   Magnesium    Collection Time: 09/14/24  4:54 AM   Result Value Ref Range    Magnesium 1.6 1.6 - 2.6 mg/dL   POCT glucose    Collection Time: 09/14/24  8:28 PM   Result Value Ref Range    POCT Glucose 165 (H) 70 - 110 mg/dL       Microbiology Results (last 7 days)       ** No results found for the last 168 hours. **            Imaging Results              X-Ray Chest AP Portable (Final result)  Result time 09/09/24 05:28:24      Final result by Melissa Tolentino MD (09/09/24 05:28:24)                   Impression:      Please see above.      Electronically signed by: Melissa Tolentino MD  Date:    09/09/2024  Time:    05:28                Narrative:    EXAMINATION:  XR CHEST AP PORTABLE    CLINICAL HISTORY:  Cough, unspecified    TECHNIQUE:  Single frontal view of the chest was performed.    COMPARISON:  02/13/2024    FINDINGS:  Cardiac monitoring leads overlie the chest.  The cardiomediastinal silhouette is unchanged no regular a shin.  Mediastinal structures are midline.  Lungs are symmetrically expanded with diffuse coarse lung markings/interstitial attenuation could relate to underlying chronic interstitial change/emphysematous change.  No large region of confluent airspace consolidation, substantial volume of pleural fluid or pneumothorax identified.  Osseous structures intact with degenerative change and postoperative change of the right shoulder and cervical spine.                                          Assessment/Plan:      * Alcohol withdrawal syndrome without complication  Will cont. PRN ativan for now. Per CIWA Other PRN meds for symptoms of withdrawal are ordered. IVF given. Started multivitamin, folic acid, and thiamine      Suicidal ideation  PEC and will cont. To monitor for now. Will need Psych clearance when stable.       Macrocytic anemia  Noted h/o macrocytic anemia but H/H and MCV stable. ER concerned about possible sever thiamine deficiency and will f/u on b12 and folate levels. Repeat CBC ordered. He has been rx thiamine per home med.     Generalized anxiety disorder  -Resume home meds and cont with PRN ativan per CIWA protocol  -scheduled valium stopped due to lethargy        Tobacco use disorder  Nicotine patch ordered daily       Hypertension  -improving        VTE Risk Mitigation (From admission, onward)           Ordered     Place sequential compression device  Until discontinued         09/11/24 0729     IP VTE LOW RISK PATIENT  Once         09/09/24 0350     Place sequential compression device  Until discontinued         09/09/24 0350                    Discharge Planning   BRANDON: 9/13/2024     Code Status:  Full Code   Is the patient medically ready for discharge?:     Reason for patient still in hospital (select all that apply): Patient trending condition  Discharge Plan A: [facility]                  Binrosa Garcia MD  Department of Hospital Medicine   [facility] [place] [facility]

## 2024-09-15 NOTE — SUBJECTIVE & OBJECTIVE
Interval History:  Patient continues to improve.  See anymore awake and alert and more talkative today.    Review of Systems   All other systems reviewed and are negative.    Objective:     Vital Signs (Most Recent):  Temp: 97.4 °F (36.3 °C) (09/15/24 1111)  Pulse: 95 (09/15/24 1111)  Resp: 18 (09/15/24 1111)  BP: 135/81 (09/15/24 1111)  SpO2: 95 % (09/15/24 1111) Vital Signs (24h Range):  Temp:  [97.3 °F (36.3 °C)-98.6 °F (37 °C)] 97.4 °F (36.3 °C)  Pulse:  [79-98] 95  Resp:  [15-20] 18  SpO2:  [93 %-96 %] 95 %  BP: (109-157)/() 135/81     Weight: 63.9 kg (140 lb 12.2 oz)  Body mass index is 22.72 kg/m².    Intake/Output Summary (Last 24 hours) at 9/15/2024 1250  Last data filed at 9/15/2024 0401  Gross per 24 hour   Intake 480 ml   Output 800 ml   Net -320 ml         Physical Exam      General: Well-developed, well-nourished, no distress noted.  HEENT: Normocephalic, PERRL, hearing within normal limits, normal nose, normal dentition, oral mucosa is moist.  Neck: Good range of motion, nontender.  Cardiovascular: Regular rate and rhythm, no edema.  Respiratory: Breath sounds are clear, no tachypnea, no dyspnea.  GI/: Abdomen is soft, bowel sounds are present, no tenderness, no guarding, no flank tenderness.  Musculoskeletal: Moves all extremities, good range of motion.  Skin: Normal color, normal temperature, no rashes.  Neuro/psych: Alert and oriented x 3,   No facial droop, able to follow commands, normal mood. No focal deficits      Significant Labs: All pertinent labs within the past 24 hours have been reviewed.    Significant Imaging: I have reviewed all pertinent imaging results/findings within the past 24 hours.    Recent Results (from the past 24 hour(s))   POCT glucose    Collection Time: 09/14/24  8:28 PM   Result Value Ref Range    POCT Glucose 165 (H) 70 - 110 mg/dL   CBC Auto Differential    Collection Time: 09/15/24  4:29 AM   Result Value Ref Range    WBC 6.24 3.90 - 12.70 K/uL    RBC 4.04 (L)  4.60 - 6.20 M/uL    Hemoglobin 13.3 (L) 14.0 - 18.0 g/dL    Hematocrit 42.5 40.0 - 54.0 %     (H) 82 - 98 fL    MCH 32.9 (H) 27.0 - 31.0 pg    MCHC 31.3 (L) 32.0 - 36.0 g/dL    RDW 18.4 (H) 11.5 - 14.5 %    Platelets 167 150 - 450 K/uL    MPV 11.0 9.2 - 12.9 fL    Immature Granulocytes 1.0 (H) 0.0 - 0.5 %    Gran # (ANC) 3.8 1.8 - 7.7 K/uL    Immature Grans (Abs) 0.06 (H) 0.00 - 0.04 K/uL    Lymph # 1.3 1.0 - 4.8 K/uL    Mono # 0.9 0.3 - 1.0 K/uL    Eos # 0.1 0.0 - 0.5 K/uL    Baso # 0.03 0.00 - 0.20 K/uL    nRBC 0 0 /100 WBC    Gran % 61.2 38.0 - 73.0 %    Lymph % 21.3 18.0 - 48.0 %    Mono % 13.8 4.0 - 15.0 %    Eosinophil % 2.2 0.0 - 8.0 %    Basophil % 0.5 0.0 - 1.9 %    Differential Method Automated    Comprehensive Metabolic Panel    Collection Time: 09/15/24  4:29 AM   Result Value Ref Range    Sodium 140 136 - 145 mmol/L    Potassium 4.1 3.5 - 5.1 mmol/L    Chloride 107 95 - 110 mmol/L    CO2 25 23 - 29 mmol/L    Glucose 94 70 - 110 mg/dL    BUN 22 8 - 23 mg/dL    Creatinine 1.1 0.5 - 1.4 mg/dL    Calcium 9.7 8.7 - 10.5 mg/dL    Total Protein 6.5 6.0 - 8.4 g/dL    Albumin 2.7 (L) 3.5 - 5.2 g/dL    Total Bilirubin 0.3 0.1 - 1.0 mg/dL    Alkaline Phosphatase 102 55 - 135 U/L    AST 19 10 - 40 U/L    ALT 23 10 - 44 U/L    eGFR >60 >60 mL/min/1.73 m^2    Anion Gap 8 8 - 16 mmol/L   Magnesium    Collection Time: 09/15/24  4:29 AM   Result Value Ref Range    Magnesium 1.6 1.6 - 2.6 mg/dL       Microbiology Results (last 7 days)       ** No results found for the last 168 hours. **            Imaging Results              X-Ray Chest AP Portable (Final result)  Result time 09/09/24 05:28:24      Final result by Melissa Tolentino MD (09/09/24 05:28:24)                   Impression:      Please see above.      Electronically signed by: Melissa Tolentino MD  Date:    09/09/2024  Time:    05:28               Narrative:    EXAMINATION:  XR CHEST AP PORTABLE    CLINICAL HISTORY:  Cough, unspecified    TECHNIQUE:  Single  frontal view of the chest was performed.    COMPARISON:  02/13/2024    FINDINGS:  Cardiac monitoring leads overlie the chest.  The cardiomediastinal silhouette is unchanged no regular a shin.  Mediastinal structures are midline.  Lungs are symmetrically expanded with diffuse coarse lung markings/interstitial attenuation could relate to underlying chronic interstitial change/emphysematous change.  No large region of confluent airspace consolidation, substantial volume of pleural fluid or pneumothorax identified.  Osseous structures intact with degenerative change and postoperative change of the right shoulder and cervical spine.

## 2024-09-15 NOTE — PROGRESS NOTES
[facility] Medicine  Progress Note    Patient Name: Israel De Jesus  MRN: [id number]  Patient Class: IP- Inpatient   Admission Date: 9/8/2024  Length of Stay: 6 days  Attending Physician: Bin Garcia MD  Primary Care Provider: Nuha Lynn MD        Subjective:     Principal Problem:Alcohol withdrawal syndrome without complication        HPI:  63 y.o.  man with h/o generalized anxiety d/o, essential HTN, suspect depression, and ETOH abuse presents to the ER with JPSO after reports from family that he was threatening to kill himself. Apparently posted on his Facebook page SI as well as reported to day time Emergency room physician and RNS. To me he denies an SI but he is PEC by ED. While awaiting a bed he started to become more and more tremulous and showing signs of withdrawal. Started on PO valium and Ativan.     Labs overall unremarkable and UDS with THC only. ETOH level 342. Pt. Denies any h/o Withdrawals, does drink daily but I feel is under reporting what he drinks to me. Drinks hard liquor.   COVID negative.     We have been consulted for further management of his withdrawals and admission to the ICU.     Because this patient's clinical condition is critically guarded and PEC he  requires close monitoring of his vitals and withdrawal symptoms, care in an alternative location isn't clinically appropriate for the reasons stated above.              Overview/Hospital Course:  64yo M admitted with suicidial ideation and alcohol withdrawal.  Due to patient being very drowsy and sleeping throughout the day Valium was stopped, continue p.r.n. IV Ativan based on CIWA protocol.  Has a R groin hernia- CT large bowel containing right inguinal hernia, without proximal bowel obstruction or acute inflammatory changes.  Lovenox stopped due to thrombocytopenia.  Once medically clear plan to discharge to inpatient psych.  Patient is more awake and alert today and requiring less IV  Ativan.  Still drowsy and weak so ordered PT/OT today. Patient still very weak and will need to become more independent before discharging to Inpatient psych.    Interval History:  Patient continues to improve.  See anymore awake and alert and more talkative today.    Review of Systems   All other systems reviewed and are negative.    Objective:     Vital Signs (Most Recent):  Temp: 97.4 °F (36.3 °C) (09/15/24 1111)  Pulse: 95 (09/15/24 1111)  Resp: 18 (09/15/24 1111)  BP: 135/81 (09/15/24 1111)  SpO2: 95 % (09/15/24 1111) Vital Signs (24h Range):  Temp:  [97.3 °F (36.3 °C)-98.6 °F (37 °C)] 97.4 °F (36.3 °C)  Pulse:  [79-98] 95  Resp:  [15-20] 18  SpO2:  [93 %-96 %] 95 %  BP: (109-157)/() 135/81     Weight: 63.9 kg (140 lb 12.2 oz)  Body mass index is 22.72 kg/m².    Intake/Output Summary (Last 24 hours) at 9/15/2024 1250  Last data filed at 9/15/2024 0401  Gross per 24 hour   Intake 480 ml   Output 800 ml   Net -320 ml         Physical Exam      General: Well-developed, well-nourished, no distress noted.  HEENT: Normocephalic, PERRL, hearing within normal limits, normal nose, normal dentition, oral mucosa is moist.  Neck: Good range of motion, nontender.  Cardiovascular: Regular rate and rhythm, no edema.  Respiratory: Breath sounds are clear, no tachypnea, no dyspnea.  GI/: Abdomen is soft, bowel sounds are present, no tenderness, no guarding, no flank tenderness.  Musculoskeletal: Moves all extremities, good range of motion.  Skin: Normal color, normal temperature, no rashes.  Neuro/psych: Alert and oriented x 3,   No facial droop, able to follow commands, normal mood. No focal deficits      Significant Labs: All pertinent labs within the past 24 hours have been reviewed.    Significant Imaging: I have reviewed all pertinent imaging results/findings within the past 24 hours.    Recent Results (from the past 24 hour(s))   POCT glucose    Collection Time: 09/14/24  8:28 PM   Result Value Ref Range    POCT  Glucose 165 (H) 70 - 110 mg/dL   CBC Auto Differential    Collection Time: 09/15/24  4:29 AM   Result Value Ref Range    WBC 6.24 3.90 - 12.70 K/uL    RBC 4.04 (L) 4.60 - 6.20 M/uL    Hemoglobin 13.3 (L) 14.0 - 18.0 g/dL    Hematocrit 42.5 40.0 - 54.0 %     (H) 82 - 98 fL    MCH 32.9 (H) 27.0 - 31.0 pg    MCHC 31.3 (L) 32.0 - 36.0 g/dL    RDW 18.4 (H) 11.5 - 14.5 %    Platelets 167 150 - 450 K/uL    MPV 11.0 9.2 - 12.9 fL    Immature Granulocytes 1.0 (H) 0.0 - 0.5 %    Gran # (ANC) 3.8 1.8 - 7.7 K/uL    Immature Grans (Abs) 0.06 (H) 0.00 - 0.04 K/uL    Lymph # 1.3 1.0 - 4.8 K/uL    Mono # 0.9 0.3 - 1.0 K/uL    Eos # 0.1 0.0 - 0.5 K/uL    Baso # 0.03 0.00 - 0.20 K/uL    nRBC 0 0 /100 WBC    Gran % 61.2 38.0 - 73.0 %    Lymph % 21.3 18.0 - 48.0 %    Mono % 13.8 4.0 - 15.0 %    Eosinophil % 2.2 0.0 - 8.0 %    Basophil % 0.5 0.0 - 1.9 %    Differential Method Automated    Comprehensive Metabolic Panel    Collection Time: 09/15/24  4:29 AM   Result Value Ref Range    Sodium 140 136 - 145 mmol/L    Potassium 4.1 3.5 - 5.1 mmol/L    Chloride 107 95 - 110 mmol/L    CO2 25 23 - 29 mmol/L    Glucose 94 70 - 110 mg/dL    BUN 22 8 - 23 mg/dL    Creatinine 1.1 0.5 - 1.4 mg/dL    Calcium 9.7 8.7 - 10.5 mg/dL    Total Protein 6.5 6.0 - 8.4 g/dL    Albumin 2.7 (L) 3.5 - 5.2 g/dL    Total Bilirubin 0.3 0.1 - 1.0 mg/dL    Alkaline Phosphatase 102 55 - 135 U/L    AST 19 10 - 40 U/L    ALT 23 10 - 44 U/L    eGFR >60 >60 mL/min/1.73 m^2    Anion Gap 8 8 - 16 mmol/L   Magnesium    Collection Time: 09/15/24  4:29 AM   Result Value Ref Range    Magnesium 1.6 1.6 - 2.6 mg/dL       Microbiology Results (last 7 days)       ** No results found for the last 168 hours. **            Imaging Results              X-Ray Chest AP Portable (Final result)  Result time 09/09/24 05:28:24      Final result by Melissa Tolentino MD (09/09/24 05:28:24)                   Impression:      Please see above.      Electronically signed by: Melissa Tolentino  MD  Date:    09/09/2024  Time:    05:28               Narrative:    EXAMINATION:  XR CHEST AP PORTABLE    CLINICAL HISTORY:  Cough, unspecified    TECHNIQUE:  Single frontal view of the chest was performed.    COMPARISON:  02/13/2024    FINDINGS:  Cardiac monitoring leads overlie the chest.  The cardiomediastinal silhouette is unchanged no regular a shin.  Mediastinal structures are midline.  Lungs are symmetrically expanded with diffuse coarse lung markings/interstitial attenuation could relate to underlying chronic interstitial change/emphysematous change.  No large region of confluent airspace consolidation, substantial volume of pleural fluid or pneumothorax identified.  Osseous structures intact with degenerative change and postoperative change of the right shoulder and cervical spine.                                          Assessment/Plan:      * Alcohol withdrawal syndrome without complication  Will cont. PRN ativan for now. Per CIWA Other PRN meds for symptoms of withdrawal are ordered. IVF given. Started multivitamin, folic acid, and thiamine      Suicidal ideation  PEC and will cont. To monitor for now. Will need Psych clearance when stable.       Right groin mass  -etiology due to right inguinal hernia  -repeat CT abdominal pelvis shows no strangulation or obstruction  -recommend following up outpatient for an elective repair      Thrombocytopenia  -improved  -held Lovenox  -HIT panel negative    Tobacco dependency  -discussed cessation  -nicotine patch    Macrocytic anemia  Noted h/o macrocytic anemia but H/H and MCV stable. ER concerned about possible sever thiamine deficiency and will f/u on b12 and folate levels. Repeat CBC ordered. He has been rx thiamine per home med.     Generalized anxiety disorder  -Resume home meds and cont with PRN ativan per CIWA protocol  -scheduled valium stopped due to lethargy        Tobacco use disorder  Nicotine patch ordered daily        Hypertension  -improving        VTE Risk Mitigation (From admission, onward)           Ordered     Place sequential compression device  Until discontinued         09/11/24 0729     IP VTE LOW RISK PATIENT  Once         09/09/24 0350     Place sequential compression device  Until discontinued         09/09/24 0350                    Discharge Planning   BRANDON: 9/13/2024     Code Status: Full Code   Is the patient medically ready for discharge?:     Reason for patient still in hospital (select all that apply): Patient trending condition  Discharge Plan A: [facility]                  Binrosa Garcia MD  Department of [facility] Medicine   [facility] - Observation

## 2024-09-15 NOTE — SUBJECTIVE & OBJECTIVE
Interval History: Drowsy but more awake and alert. Less tremors    Review of Systems   All other systems reviewed and are negative.    Objective:     Vital Signs (Most Recent):  Temp: 98.1 °F (36.7 °C) (09/14/24 1958)  Pulse: 97 (09/14/24 1958)  Resp: 20 (09/14/24 1958)  BP: 126/84 (09/14/24 1958)  SpO2: 96 % (09/14/24 1958) Vital Signs (24h Range):  Temp:  [97 °F (36.1 °C)-98.1 °F (36.7 °C)] 98.1 °F (36.7 °C)  Pulse:  [] 97  Resp:  [13-20] 20  SpO2:  [93 %-96 %] 96 %  BP: (109-130)/(70-86) 126/84     Weight: 63.9 kg (140 lb 12.2 oz)  Body mass index is 22.72 kg/m².    Intake/Output Summary (Last 24 hours) at 9/14/2024 2212  Last data filed at 9/14/2024 1830  Gross per 24 hour   Intake 360 ml   Output 300 ml   Net 60 ml         Physical Exam      General: Well-developed, well-nourished, no distress noted.  HEENT: Normocephalic, PERRL, hearing within normal limits, normal nose, normal dentition, oral mucosa is moist.  Neck: Good range of motion, nontender.  Cardiovascular: Regular rate and rhythm, no edema.  Respiratory: Breath sounds are clear, no tachypnea, no dyspnea.  GI/: Abdomen is soft, bowel sounds are present, no tenderness, no guarding, no flank tenderness.  Musculoskeletal: Moves all extremities, good range of motion.  Skin: Normal color, normal temperature, no rashes.  Neuro/psych: Alert and oriented x 2, generalized weakness    Significant Labs: All pertinent labs within the past 24 hours have been reviewed.    Significant Imaging: I have reviewed all pertinent imaging results/findings within the past 24 hours.    Recent Results (from the past 24 hour(s))   CBC Auto Differential    Collection Time: 09/14/24  4:54 AM   Result Value Ref Range    WBC 8.49 3.90 - 12.70 K/uL    RBC 3.80 (L) 4.60 - 6.20 M/uL    Hemoglobin 13.0 (L) 14.0 - 18.0 g/dL    Hematocrit 39.3 (L) 40.0 - 54.0 %     (H) 82 - 98 fL    MCH 34.2 (H) 27.0 - 31.0 pg    MCHC 33.1 32.0 - 36.0 g/dL    RDW 18.4 (H) 11.5 - 14.5 %     Platelets 127 (L) 150 - 450 K/uL    MPV 10.8 9.2 - 12.9 fL    Immature Granulocytes 0.9 (H) 0.0 - 0.5 %    Gran # (ANC) 5.6 1.8 - 7.7 K/uL    Immature Grans (Abs) 0.08 (H) 0.00 - 0.04 K/uL    Lymph # 1.5 1.0 - 4.8 K/uL    Mono # 1.2 (H) 0.3 - 1.0 K/uL    Eos # 0.1 0.0 - 0.5 K/uL    Baso # 0.03 0.00 - 0.20 K/uL    nRBC 0 0 /100 WBC    Gran % 66.3 38.0 - 73.0 %    Lymph % 17.6 (L) 18.0 - 48.0 %    Mono % 13.5 4.0 - 15.0 %    Eosinophil % 1.3 0.0 - 8.0 %    Basophil % 0.4 0.0 - 1.9 %    Platelet Estimate Decreased (A)     Differential Method Automated    Comprehensive Metabolic Panel    Collection Time: 09/14/24  4:54 AM   Result Value Ref Range    Sodium 138 136 - 145 mmol/L    Potassium 3.7 3.5 - 5.1 mmol/L    Chloride 106 95 - 110 mmol/L    CO2 24 23 - 29 mmol/L    Glucose 108 70 - 110 mg/dL    BUN 20 8 - 23 mg/dL    Creatinine 1.0 0.5 - 1.4 mg/dL    Calcium 9.7 8.7 - 10.5 mg/dL    Total Protein 5.9 (L) 6.0 - 8.4 g/dL    Albumin 2.5 (L) 3.5 - 5.2 g/dL    Total Bilirubin 0.3 0.1 - 1.0 mg/dL    Alkaline Phosphatase 101 55 - 135 U/L    AST 19 10 - 40 U/L    ALT 25 10 - 44 U/L    eGFR >60 >60 mL/min/1.73 m^2    Anion Gap 8 8 - 16 mmol/L   Magnesium    Collection Time: 09/14/24  4:54 AM   Result Value Ref Range    Magnesium 1.6 1.6 - 2.6 mg/dL   POCT glucose    Collection Time: 09/14/24  8:28 PM   Result Value Ref Range    POCT Glucose 165 (H) 70 - 110 mg/dL       Microbiology Results (last 7 days)       ** No results found for the last 168 hours. **            Imaging Results              X-Ray Chest AP Portable (Final result)  Result time 09/09/24 05:28:24      Final result by Melissa Tolentino MD (09/09/24 05:28:24)                   Impression:      Please see above.      Electronically signed by: Melissa Tolentino MD  Date:    09/09/2024  Time:    05:28               Narrative:    EXAMINATION:  XR CHEST AP PORTABLE    CLINICAL HISTORY:  Cough, unspecified    TECHNIQUE:  Single frontal view of the chest was  performed.    COMPARISON:  02/13/2024    FINDINGS:  Cardiac monitoring leads overlie the chest.  The cardiomediastinal silhouette is unchanged no regular a shin.  Mediastinal structures are midline.  Lungs are symmetrically expanded with diffuse coarse lung markings/interstitial attenuation could relate to underlying chronic interstitial change/emphysematous change.  No large region of confluent airspace consolidation, substantial volume of pleural fluid or pneumothorax identified.  Osseous structures intact with degenerative change and postoperative change of the right shoulder and cervical spine.

## 2024-09-15 NOTE — ASSESSMENT & PLAN NOTE
-etiology due to right inguinal hernia  -repeat CT abdominal pelvis shows no strangulation or obstruction  -recommend following up outpatient for an elective repair

## 2024-09-15 NOTE — NURSING
[facility], [facility] [place]  Nurses Note -- 4 Eyes      9/15/2024       Skin assessed on: Q Shift      [x] No Pressure Injuries Present    [x]Prevention Measures Documented    [] Yes LDA  for Pressure Injury Previously documented     [] Yes New Pressure Injury Discovered   [] LDA for New Pressure Injury Added      Attending RN:  Cornelia Brush RN     Second RN:  Radha BETTENCOURT RN

## 2024-09-16 LAB
ALBUMIN SERPL BCP-MCNC: 2.4 G/DL (ref 3.5–5.2)
ALP SERPL-CCNC: 82 U/L (ref 55–135)
ALT SERPL W/O P-5'-P-CCNC: 18 U/L (ref 10–44)
ANION GAP SERPL CALC-SCNC: 9 MMOL/L (ref 8–16)
AST SERPL-CCNC: 17 U/L (ref 10–40)
BASOPHILS # BLD AUTO: 0.04 K/UL (ref 0–0.2)
BASOPHILS NFR BLD: 0.5 % (ref 0–1.9)
BILIRUB SERPL-MCNC: 0.3 MG/DL (ref 0.1–1)
BUN SERPL-MCNC: 16 MG/DL (ref 8–23)
CALCIUM SERPL-MCNC: 9.2 MG/DL (ref 8.7–10.5)
CHLORIDE SERPL-SCNC: 106 MMOL/L (ref 95–110)
CO2 SERPL-SCNC: 24 MMOL/L (ref 23–29)
CREAT SERPL-MCNC: 0.9 MG/DL (ref 0.5–1.4)
DIFFERENTIAL METHOD BLD: ABNORMAL
EOSINOPHIL # BLD AUTO: 0.1 K/UL (ref 0–0.5)
EOSINOPHIL NFR BLD: 1.3 % (ref 0–8)
ERYTHROCYTE [DISTWIDTH] IN BLOOD BY AUTOMATED COUNT: 18.1 % (ref 11.5–14.5)
EST. GFR  (NO RACE VARIABLE): >60 ML/MIN/1.73 M^2
GLUCOSE SERPL-MCNC: 92 MG/DL (ref 70–110)
HCT VFR BLD AUTO: 38.5 % (ref 40–54)
HGB BLD-MCNC: 12.7 G/DL (ref 14–18)
IMM GRANULOCYTES # BLD AUTO: 0.05 K/UL (ref 0–0.04)
IMM GRANULOCYTES NFR BLD AUTO: 0.6 % (ref 0–0.5)
LYMPHOCYTES # BLD AUTO: 1.1 K/UL (ref 1–4.8)
LYMPHOCYTES NFR BLD: 13.3 % (ref 18–48)
MAGNESIUM SERPL-MCNC: 1.4 MG/DL (ref 1.6–2.6)
MCH RBC QN AUTO: 34.3 PG (ref 27–31)
MCHC RBC AUTO-ENTMCNC: 33 G/DL (ref 32–36)
MCV RBC AUTO: 104 FL (ref 82–98)
MONOCYTES # BLD AUTO: 1.5 K/UL (ref 0.3–1)
MONOCYTES NFR BLD: 19 % (ref 4–15)
NEUTROPHILS # BLD AUTO: 5.2 K/UL (ref 1.8–7.7)
NEUTROPHILS NFR BLD: 65.3 % (ref 38–73)
NRBC BLD-RTO: 0 /100 WBC
PLATELET # BLD AUTO: 177 K/UL (ref 150–450)
PMV BLD AUTO: 10.3 FL (ref 9.2–12.9)
POTASSIUM SERPL-SCNC: 3.8 MMOL/L (ref 3.5–5.1)
PROT SERPL-MCNC: 5.8 G/DL (ref 6–8.4)
RBC # BLD AUTO: 3.7 M/UL (ref 4.6–6.2)
SODIUM SERPL-SCNC: 139 MMOL/L (ref 136–145)
WBC # BLD AUTO: 7.96 K/UL (ref 3.9–12.7)

## 2024-09-16 PROCEDURE — 99900031 HC PATIENT EDUCATION (STAT)

## 2024-09-16 PROCEDURE — 94761 N-INVAS EAR/PLS OXIMETRY MLT: CPT

## 2024-09-16 PROCEDURE — 99900035 HC TECH TIME PER 15 MIN (STAT)

## 2024-09-16 PROCEDURE — 25000003 PHARM REV CODE 250: Performed by: INTERNAL MEDICINE

## 2024-09-16 PROCEDURE — 80053 COMPREHEN METABOLIC PANEL: CPT | Performed by: HOSPITALIST

## 2024-09-16 PROCEDURE — 25000003 PHARM REV CODE 250: Performed by: STUDENT IN AN ORGANIZED HEALTH CARE EDUCATION/TRAINING PROGRAM

## 2024-09-16 PROCEDURE — S4991 NICOTINE PATCH NONLEGEND: HCPCS | Performed by: INTERNAL MEDICINE

## 2024-09-16 PROCEDURE — 83735 ASSAY OF MAGNESIUM: CPT | Performed by: HOSPITALIST

## 2024-09-16 PROCEDURE — 97110 THERAPEUTIC EXERCISES: CPT | Mod: CQ

## 2024-09-16 PROCEDURE — 36415 COLL VENOUS BLD VENIPUNCTURE: CPT | Performed by: HOSPITALIST

## 2024-09-16 PROCEDURE — 85025 COMPLETE CBC W/AUTO DIFF WBC: CPT | Performed by: HOSPITALIST

## 2024-09-16 PROCEDURE — 21400001 HC TELEMETRY ROOM

## 2024-09-16 PROCEDURE — 97530 THERAPEUTIC ACTIVITIES: CPT | Mod: CQ

## 2024-09-16 RX ADMIN — PREGABALIN 75 MG: 50 CAPSULE ORAL at 08:09

## 2024-09-16 RX ADMIN — PANTOPRAZOLE SODIUM 40 MG: 40 TABLET, DELAYED RELEASE ORAL at 08:09

## 2024-09-16 RX ADMIN — THIAMINE HCL TAB 100 MG 100 MG: 100 TAB at 08:09

## 2024-09-16 RX ADMIN — GUAIFENESIN 100 MG: 200 SOLUTION ORAL at 08:09

## 2024-09-16 RX ADMIN — FOLIC ACID 1 MG: 1 TABLET ORAL at 08:09

## 2024-09-16 RX ADMIN — METOPROLOL TARTRATE 50 MG: 50 TABLET, FILM COATED ORAL at 08:09

## 2024-09-16 RX ADMIN — FLUTICASONE PROPIONATE 100 MCG: 50 SPRAY, METERED NASAL at 08:09

## 2024-09-16 RX ADMIN — DULOXETINE HYDROCHLORIDE 60 MG: 30 CAPSULE, DELAYED RELEASE ORAL at 08:09

## 2024-09-16 RX ADMIN — THERA TABS 1 TABLET: TAB at 08:09

## 2024-09-16 RX ADMIN — NICOTINE 1 PATCH: 7 PATCH, EXTENDED RELEASE TRANSDERMAL at 08:09

## 2024-09-16 RX ADMIN — AMLODIPINE BESYLATE 10 MG: 5 TABLET ORAL at 08:09

## 2024-09-16 RX ADMIN — TRAZODONE HYDROCHLORIDE 100 MG: 50 TABLET ORAL at 08:09

## 2024-09-16 NOTE — PT/OT/SLP PROGRESS
"Physical Therapy Treatment    Patient Name:  Israel De Jesus   MRN:  [id number]    Recommendations:     Discharge Recommendations: Moderate Intensity Therapy  Discharge Equipment Recommendations: to be determined by next level of care  Barriers to discharge:  Pt unable to ambulate at this time, decreased functional mobility, increased fall risk    Assessment:     Israel De Jesus is a 63 y.o. male admitted with a medical diagnosis of Alcohol withdrawal syndrome without complication.  He presents with the following impairments/functional limitations: weakness, impaired endurance, impaired self care skills, impaired functional mobility, gait instability, impaired balance, decreased coordination, decreased safety awareness, decreased lower extremity function.    Rehab Prognosis: Fair; patient would benefit from acute skilled PT services to address these deficits and reach maximum level of function.    Recent Surgery: * No surgery found *      Plan:     During this hospitalization, patient to be seen 4 x/week (3-4x per week) to address the identified rehab impairments via gait training, therapeutic activities, therapeutic exercises, neuromuscular re-education and progress toward the following goals:    Plan of Care Expires:  09/21/24    Subjective     Chief Complaint: "I want to walk"  Patient/Family Comments/goals: Pt agreed to therapy  Pain/Comfort:  Pain Rating 1:  (pt did not rat)  Location 1: neck  Pain Addressed 1: Distraction, Cessation of Activity      Objective:     Communicated with nursing prior to session.  Patient found HOB elevated with bed alarm, Condom Catheter, peripheral IV, telemetry upon PT entry to room.     General Precautions: Standard, fall  Orthopedic Precautions: N/A  Braces: N/A  Respiratory Status: Room air     Functional Mobility:  Bed Mobility:     Rolling Left:  contact guard assistance  Scooting: minimum assistance  Supine to Sit: minimum assistance  Sit to Supine: minimum " assistance  Transfers: Gait belt donned, x3 trials from EOB    Sit to Stand:  moderate assistance with rolling walker  Gait: Pt unable to ambulate at this time, pt took one step with R foot, unable to take sidesteps despite max cueing and encouragement.   Balance: Pt with Fair-/poor standing balance      AM-PAC 6 CLICK MOBILITY  Turning over in bed (including adjusting bedclothes, sheets and blankets)?: 3  Sitting down on and standing up from a chair with arms (e.g., wheelchair, bedside commode, etc.): 2  Moving from lying on back to sitting on the side of the bed?: 3  Moving to and from a bed to a chair (including a wheelchair)?: 2  Need to walk in hospital room?: 2  Climbing 3-5 steps with a railing?: 2  Basic Mobility Total Score: 14       Treatment & Education:  Pt performed supine SLR, heel slides, QS 1x10 each    Patient left HOB elevated with all lines intact, call button in reach, bed alarm on, nursing notified, and sitter present..    GOALS:   Multidisciplinary Problems       Physical Therapy Goals          Problem: Physical Therapy    Goal Priority Disciplines Outcome Goal Variances Interventions   Physical Therapy Goal     PT, PT/OT Progressing     Description: Goals to be met by: 24     Patient will increase functional independence with mobility by performin. Supine to sit with Modified [place]  2. Sit to supine with Modified [place]  3. Rolling to Left and Right with Modified [place].  4. Sit to stand transfer with Modified [place]  5. Bed to chair transfer with Modified [place] using Rolling Walker  6. Gait  x 50 feet with Modified [place] using Rolling Walker.   7. Stand for 5 minutes with Modified [place] using Rolling Walker  8. Lower extremity exercise program x30 reps per handout, with independence                         Time Tracking:     PT Received On: 24  PT Start Time: 1149     PT Stop Time: 1215  PT Total Time (min): 26 min     Billable  Minutes: Therapeutic Activity 17 and Therapeutic Exercise 9    Treatment Type: Treatment  PT/PTA: PTA     Number of PTA visits since last PT visit: 1 09/16/2024

## 2024-09-16 NOTE — PLAN OF CARE
Problem: Physical Therapy  Goal: Physical Therapy Goal  Description: Goals to be met by: 24     Patient will increase functional independence with mobility by performin. Supine to sit with Modified [place]  2. Sit to supine with Modified [place]  3. Rolling to Left and Right with Modified [place].  4. Sit to stand transfer with Modified [place]  5. Bed to chair transfer with Modified [place] using Rolling Walker  6. Gait  x 50 feet with Modified [place] using Rolling Walker.   7. Stand for 5 minutes with Modified [place] using Rolling Walker  8. Lower extremity exercise program x30 reps per handout, with independence    Outcome: Progressing

## 2024-09-16 NOTE — NURSING
[facility], [facility]  Nurses Note -- 4 Eyes      9/16/2024       Skin assessed on: Q Shift      [x] No Pressure Injuries Present    [x]Prevention Measures Documented    [] Yes LDA  for Pressure Injury Previously documented     [] Yes New Pressure Injury Discovered   [] LDA for New Pressure Injury Added      Attending RN:  Cornelia Brush, RN     Second RN:  Radha BETTENCOURT RN

## 2024-09-17 PROBLEM — D69.6 THROMBOCYTOPENIA: Status: RESOLVED | Noted: 2024-09-09 | Resolved: 2024-09-17

## 2024-09-17 LAB
ALBUMIN SERPL BCP-MCNC: 2.5 G/DL (ref 3.5–5.2)
ALP SERPL-CCNC: 92 U/L (ref 55–135)
ALT SERPL W/O P-5'-P-CCNC: 16 U/L (ref 10–44)
ANION GAP SERPL CALC-SCNC: 10 MMOL/L (ref 8–16)
ANION GAP SERPL CALC-SCNC: 11 MMOL/L (ref 8–16)
AST SERPL-CCNC: 14 U/L (ref 10–40)
BACTERIA #/AREA URNS HPF: ABNORMAL /HPF
BASOPHILS # BLD AUTO: 0.03 K/UL (ref 0–0.2)
BASOPHILS # BLD AUTO: 0.04 K/UL (ref 0–0.2)
BASOPHILS NFR BLD: 0.3 % (ref 0–1.9)
BASOPHILS NFR BLD: 0.5 % (ref 0–1.9)
BILIRUB SERPL-MCNC: 0.4 MG/DL (ref 0.1–1)
BILIRUB UR QL STRIP: NEGATIVE
BUN SERPL-MCNC: 17 MG/DL (ref 8–23)
BUN SERPL-MCNC: 23 MG/DL (ref 8–23)
CALCIUM SERPL-MCNC: 8.7 MG/DL (ref 8.7–10.5)
CALCIUM SERPL-MCNC: 9.2 MG/DL (ref 8.7–10.5)
CHLORIDE SERPL-SCNC: 102 MMOL/L (ref 95–110)
CHLORIDE SERPL-SCNC: 103 MMOL/L (ref 95–110)
CLARITY UR: ABNORMAL
CO2 SERPL-SCNC: 21 MMOL/L (ref 23–29)
CO2 SERPL-SCNC: 22 MMOL/L (ref 23–29)
COLOR UR: YELLOW
CREAT SERPL-MCNC: 1 MG/DL (ref 0.5–1.4)
CREAT SERPL-MCNC: 1.8 MG/DL (ref 0.5–1.4)
DIFFERENTIAL METHOD BLD: ABNORMAL
DIFFERENTIAL METHOD BLD: ABNORMAL
EOSINOPHIL # BLD AUTO: 0 K/UL (ref 0–0.5)
EOSINOPHIL # BLD AUTO: 0.1 K/UL (ref 0–0.5)
EOSINOPHIL NFR BLD: 0.1 % (ref 0–8)
EOSINOPHIL NFR BLD: 0.5 % (ref 0–8)
ERYTHROCYTE [DISTWIDTH] IN BLOOD BY AUTOMATED COUNT: 17.4 % (ref 11.5–14.5)
ERYTHROCYTE [DISTWIDTH] IN BLOOD BY AUTOMATED COUNT: 17.5 % (ref 11.5–14.5)
EST. GFR  (NO RACE VARIABLE): 42 ML/MIN/1.73 M^2
EST. GFR  (NO RACE VARIABLE): >60 ML/MIN/1.73 M^2
GLUCOSE SERPL-MCNC: 107 MG/DL (ref 70–110)
GLUCOSE SERPL-MCNC: 97 MG/DL (ref 70–110)
GLUCOSE UR QL STRIP: NEGATIVE
HCT VFR BLD AUTO: 34.5 % (ref 40–54)
HCT VFR BLD AUTO: 36.9 % (ref 40–54)
HGB BLD-MCNC: 11.2 G/DL (ref 14–18)
HGB BLD-MCNC: 12.5 G/DL (ref 14–18)
HGB UR QL STRIP: ABNORMAL
HYALINE CASTS #/AREA URNS LPF: 0 /LPF
IMM GRANULOCYTES # BLD AUTO: 0.04 K/UL (ref 0–0.04)
IMM GRANULOCYTES # BLD AUTO: 0.06 K/UL (ref 0–0.04)
IMM GRANULOCYTES NFR BLD AUTO: 0.4 % (ref 0–0.5)
IMM GRANULOCYTES NFR BLD AUTO: 0.7 % (ref 0–0.5)
KETONES UR QL STRIP: NEGATIVE
LACTATE SERPL-SCNC: 1.3 MMOL/L (ref 0.5–2.2)
LEUKOCYTE ESTERASE UR QL STRIP: ABNORMAL
LYMPHOCYTES # BLD AUTO: 0.5 K/UL (ref 1–4.8)
LYMPHOCYTES # BLD AUTO: 1.4 K/UL (ref 1–4.8)
LYMPHOCYTES NFR BLD: 13 % (ref 18–48)
LYMPHOCYTES NFR BLD: 6 % (ref 18–48)
MAGNESIUM SERPL-MCNC: 1.2 MG/DL (ref 1.6–2.6)
MAGNESIUM SERPL-MCNC: 1.3 MG/DL (ref 1.6–2.6)
MCH RBC QN AUTO: 32.7 PG (ref 27–31)
MCH RBC QN AUTO: 33.9 PG (ref 27–31)
MCHC RBC AUTO-ENTMCNC: 32.5 G/DL (ref 32–36)
MCHC RBC AUTO-ENTMCNC: 33.9 G/DL (ref 32–36)
MCV RBC AUTO: 100 FL (ref 82–98)
MCV RBC AUTO: 101 FL (ref 82–98)
MICROSCOPIC COMMENT: ABNORMAL
MONOCYTES # BLD AUTO: 0.7 K/UL (ref 0.3–1)
MONOCYTES # BLD AUTO: 2 K/UL (ref 0.3–1)
MONOCYTES NFR BLD: 18.3 % (ref 4–15)
MONOCYTES NFR BLD: 8.4 % (ref 4–15)
NEUTROPHILS # BLD AUTO: 7.3 K/UL (ref 1.8–7.7)
NEUTROPHILS # BLD AUTO: 7.5 K/UL (ref 1.8–7.7)
NEUTROPHILS NFR BLD: 67.5 % (ref 38–73)
NEUTROPHILS NFR BLD: 84.3 % (ref 38–73)
NITRITE UR QL STRIP: POSITIVE
NRBC BLD-RTO: 0 /100 WBC
NRBC BLD-RTO: 0 /100 WBC
PH UR STRIP: 6 [PH] (ref 5–8)
PHOSPHATE SERPL-MCNC: 2.5 MG/DL (ref 2.7–4.5)
PLATELET # BLD AUTO: 228 K/UL (ref 150–450)
PLATELET # BLD AUTO: 236 K/UL (ref 150–450)
PMV BLD AUTO: 10.6 FL (ref 9.2–12.9)
PMV BLD AUTO: 10.8 FL (ref 9.2–12.9)
POTASSIUM SERPL-SCNC: 3.6 MMOL/L (ref 3.5–5.1)
POTASSIUM SERPL-SCNC: 3.7 MMOL/L (ref 3.5–5.1)
PROCALCITONIN SERPL IA-MCNC: 1.09 NG/ML
PROT SERPL-MCNC: 6.2 G/DL (ref 6–8.4)
PROT UR QL STRIP: ABNORMAL
RBC # BLD AUTO: 3.42 M/UL (ref 4.6–6.2)
RBC # BLD AUTO: 3.69 M/UL (ref 4.6–6.2)
RBC #/AREA URNS HPF: 18 /HPF (ref 0–4)
SARS-COV-2 RDRP RESP QL NAA+PROBE: NEGATIVE
SODIUM SERPL-SCNC: 134 MMOL/L (ref 136–145)
SODIUM SERPL-SCNC: 135 MMOL/L (ref 136–145)
SP GR UR STRIP: 1.01 (ref 1–1.03)
URN SPEC COLLECT METH UR: ABNORMAL
UROBILINOGEN UR STRIP-ACNC: NEGATIVE EU/DL
WBC # BLD AUTO: 11.04 K/UL (ref 3.9–12.7)
WBC # BLD AUTO: 8.7 K/UL (ref 3.9–12.7)
WBC #/AREA URNS HPF: >100 /HPF (ref 0–5)
WBC CLUMPS URNS QL MICRO: ABNORMAL

## 2024-09-17 PROCEDURE — 83735 ASSAY OF MAGNESIUM: CPT | Mod: 91 | Performed by: INTERNAL MEDICINE

## 2024-09-17 PROCEDURE — 85025 COMPLETE CBC W/AUTO DIFF WBC: CPT | Performed by: HOSPITALIST

## 2024-09-17 PROCEDURE — 25000003 PHARM REV CODE 250: Performed by: INTERNAL MEDICINE

## 2024-09-17 PROCEDURE — 94761 N-INVAS EAR/PLS OXIMETRY MLT: CPT

## 2024-09-17 PROCEDURE — U0002 COVID-19 LAB TEST NON-CDC: HCPCS | Performed by: INTERNAL MEDICINE

## 2024-09-17 PROCEDURE — 36415 COLL VENOUS BLD VENIPUNCTURE: CPT | Performed by: HOSPITALIST

## 2024-09-17 PROCEDURE — 87086 URINE CULTURE/COLONY COUNT: CPT | Performed by: INTERNAL MEDICINE

## 2024-09-17 PROCEDURE — 85025 COMPLETE CBC W/AUTO DIFF WBC: CPT | Mod: 91 | Performed by: INTERNAL MEDICINE

## 2024-09-17 PROCEDURE — 87088 URINE BACTERIA CULTURE: CPT | Performed by: INTERNAL MEDICINE

## 2024-09-17 PROCEDURE — 87040 BLOOD CULTURE FOR BACTERIA: CPT | Performed by: HOSPITALIST

## 2024-09-17 PROCEDURE — 63600175 PHARM REV CODE 636 W HCPCS: Performed by: HOSPITALIST

## 2024-09-17 PROCEDURE — 63600175 PHARM REV CODE 636 W HCPCS: Performed by: INTERNAL MEDICINE

## 2024-09-17 PROCEDURE — 87186 SC STD MICRODIL/AGAR DIL: CPT | Mod: 59 | Performed by: INTERNAL MEDICINE

## 2024-09-17 PROCEDURE — 21400001 HC TELEMETRY ROOM

## 2024-09-17 PROCEDURE — 99233 SBSQ HOSP IP/OBS HIGH 50: CPT | Mod: 95,AF,HB, | Performed by: PSYCHIATRY & NEUROLOGY

## 2024-09-17 PROCEDURE — 84145 PROCALCITONIN (PCT): CPT | Performed by: INTERNAL MEDICINE

## 2024-09-17 PROCEDURE — 87077 CULTURE AEROBIC IDENTIFY: CPT | Performed by: INTERNAL MEDICINE

## 2024-09-17 PROCEDURE — 80048 BASIC METABOLIC PNL TOTAL CA: CPT | Mod: XB | Performed by: INTERNAL MEDICINE

## 2024-09-17 PROCEDURE — 99900035 HC TECH TIME PER 15 MIN (STAT)

## 2024-09-17 PROCEDURE — 83735 ASSAY OF MAGNESIUM: CPT | Performed by: HOSPITALIST

## 2024-09-17 PROCEDURE — 25000003 PHARM REV CODE 250: Performed by: STUDENT IN AN ORGANIZED HEALTH CARE EDUCATION/TRAINING PROGRAM

## 2024-09-17 PROCEDURE — 83605 ASSAY OF LACTIC ACID: CPT | Performed by: INTERNAL MEDICINE

## 2024-09-17 PROCEDURE — 80053 COMPREHEN METABOLIC PANEL: CPT | Performed by: HOSPITALIST

## 2024-09-17 PROCEDURE — 84100 ASSAY OF PHOSPHORUS: CPT | Performed by: INTERNAL MEDICINE

## 2024-09-17 PROCEDURE — 81000 URINALYSIS NONAUTO W/SCOPE: CPT | Performed by: INTERNAL MEDICINE

## 2024-09-17 PROCEDURE — 25000003 PHARM REV CODE 250: Performed by: HOSPITALIST

## 2024-09-17 PROCEDURE — S4991 NICOTINE PATCH NONLEGEND: HCPCS | Performed by: INTERNAL MEDICINE

## 2024-09-17 RX ORDER — DIAZEPAM 5 MG/1
10 TABLET ORAL NIGHTLY
Status: DISCONTINUED | OUTPATIENT
Start: 2024-09-17 | End: 2024-09-17

## 2024-09-17 RX ORDER — MAGNESIUM SULFATE HEPTAHYDRATE 40 MG/ML
2 INJECTION, SOLUTION INTRAVENOUS ONCE
Status: COMPLETED | OUTPATIENT
Start: 2024-09-17 | End: 2024-09-17

## 2024-09-17 RX ORDER — ENOXAPARIN SODIUM 100 MG/ML
40 INJECTION SUBCUTANEOUS EVERY 24 HOURS
Status: DISCONTINUED | OUTPATIENT
Start: 2024-09-17 | End: 2024-09-17

## 2024-09-17 RX ORDER — LEVOFLOXACIN 5 MG/ML
750 INJECTION, SOLUTION INTRAVENOUS
Status: DISCONTINUED | OUTPATIENT
Start: 2024-09-17 | End: 2024-09-18

## 2024-09-17 RX ORDER — AMLODIPINE BESYLATE 5 MG/1
5 TABLET ORAL DAILY
Status: DISCONTINUED | OUTPATIENT
Start: 2024-09-18 | End: 2024-09-27 | Stop reason: HOSPADM

## 2024-09-17 RX ORDER — SODIUM CHLORIDE 9 MG/ML
INJECTION, SOLUTION INTRAVENOUS CONTINUOUS
Status: ACTIVE | OUTPATIENT
Start: 2024-09-17 | End: 2024-09-18

## 2024-09-17 RX ADMIN — FLUTICASONE PROPIONATE 100 MCG: 50 SPRAY, METERED NASAL at 09:09

## 2024-09-17 RX ADMIN — SIMETHICONE 80 MG: 80 TABLET, CHEWABLE ORAL at 06:09

## 2024-09-17 RX ADMIN — ENOXAPARIN SODIUM 40 MG: 40 INJECTION SUBCUTANEOUS at 04:09

## 2024-09-17 RX ADMIN — PREGABALIN 75 MG: 50 CAPSULE ORAL at 09:09

## 2024-09-17 RX ADMIN — PANTOPRAZOLE SODIUM 40 MG: 40 TABLET, DELAYED RELEASE ORAL at 09:09

## 2024-09-17 RX ADMIN — ACETAMINOPHEN 650 MG: 325 TABLET ORAL at 04:09

## 2024-09-17 RX ADMIN — THERA TABS 1 TABLET: TAB at 09:09

## 2024-09-17 RX ADMIN — DULOXETINE HYDROCHLORIDE 60 MG: 30 CAPSULE, DELAYED RELEASE ORAL at 09:09

## 2024-09-17 RX ADMIN — NICOTINE 1 PATCH: 7 PATCH, EXTENDED RELEASE TRANSDERMAL at 09:09

## 2024-09-17 RX ADMIN — THIAMINE HCL TAB 100 MG 100 MG: 100 TAB at 09:09

## 2024-09-17 RX ADMIN — SODIUM CHLORIDE: 9 INJECTION, SOLUTION INTRAVENOUS at 05:09

## 2024-09-17 RX ADMIN — MAGNESIUM SULFATE HEPTAHYDRATE 2 G: 40 INJECTION, SOLUTION INTRAVENOUS at 09:09

## 2024-09-17 RX ADMIN — AMLODIPINE BESYLATE 10 MG: 5 TABLET ORAL at 09:09

## 2024-09-17 RX ADMIN — CEFTRIAXONE 1 G: 1 INJECTION, POWDER, FOR SOLUTION INTRAMUSCULAR; INTRAVENOUS at 09:09

## 2024-09-17 RX ADMIN — LEVOFLOXACIN 750 MG: 750 INJECTION, SOLUTION INTRAVENOUS at 05:09

## 2024-09-17 RX ADMIN — FOLIC ACID 1 MG: 1 TABLET ORAL at 09:09

## 2024-09-17 RX ADMIN — METOPROLOL TARTRATE 50 MG: 50 TABLET, FILM COATED ORAL at 09:09

## 2024-09-17 NOTE — ASSESSMENT & PLAN NOTE
Continue replacement with MVI, Thiamine and Folate.  No evidence of DT's at this time.  Valium stopped secondary to excessive sedation.  Patient is improving and medically stable.  PT/OT recommending SNF.  He would not be able to go to SNF as long as he is PEC.  Will reconsult Psych to reevaluate need of PEC and inpatient psych.

## 2024-09-17 NOTE — SUBJECTIVE & OBJECTIVE
Interval History: no new complaints.  Wants to get stronger.    Review of Systems   HENT:  Negative for ear discharge and ear pain.    Eyes:  Negative for discharge and itching.   Endocrine: Negative for cold intolerance and heat intolerance.   Neurological:  Negative for seizures and syncope.     Objective:     Vital Signs (Most Recent):  Temp: 98.3 °F (36.8 °C) (09/17/24 1126)  Pulse: (!) 112 (09/17/24 1126)  Resp: 18 (09/17/24 1126)  BP: 138/81 (09/17/24 1126)  SpO2: 95 % (09/17/24 1126) Vital Signs (24h Range):  Temp:  [97.7 °F (36.5 °C)-99.4 °F (37.4 °C)] 98.3 °F (36.8 °C)  Pulse:  [] 112  Resp:  [17-20] 18  SpO2:  [91 %-96 %] 95 %  BP: (132-157)/(18-94) 138/81     Weight: 67.2 kg (148 lb 2.4 oz) ([facility])  Body mass index is 23.91 kg/m².    Intake/Output Summary (Last 24 hours) at 9/17/2024 1344  Last data filed at 9/17/2024 0800  Gross per 24 hour   Intake 360 ml   Output 750 ml   Net -390 ml         Physical Exam  Constitutional:       General: He is not in acute distress.     Appearance: He is not toxic-appearing.   HENT:      Head: Normocephalic and atraumatic.      Mouth/Throat:      Mouth: Mucous membranes are dry.      Pharynx: No oropharyngeal exudate or posterior oropharyngeal erythema.   Cardiovascular:      Rate and Rhythm: Regular rhythm. Tachycardia present.   Pulmonary:      Effort: No respiratory distress.      Breath sounds: Normal breath sounds.   Abdominal:      General: Bowel sounds are normal.      Palpations: Abdomen is soft.   Musculoskeletal:         General: No deformity or signs of injury.   Skin:     General: Skin is warm and dry.   Neurological:      Comments: Awakes to verbal stimuli.  Slow to respond, but answering questions and following commands.  Oriented x3             Significant Labs: All pertinent labs within the past 24 hours have been reviewed.  BMP:   Recent Labs   Lab 09/17/24  0523   GLU 97   *   K 3.7      CO2 22*   BUN 17   CREATININE 1.0   CALCIUM  9.2   MG 1.3*     CBC:   Recent Labs   Lab 09/16/24  0449 09/17/24  0523   WBC 7.96 11.04   HGB 12.7* 12.5*   HCT 38.5* 36.9*    228       Significant Imaging: I have reviewed all pertinent imaging results/findings within the past 24 hours.

## 2024-09-17 NOTE — PT/OT/SLP PROGRESS
Physical Therapy      Patient Name:  Israel De Jesus   MRN:  [id number]    Patient not seen today secondary to Other (tachy with HR in the 130's at rest ). Will follow-up as appropriate .

## 2024-09-17 NOTE — NURSING
Call from tele bunker regarding patients elevated HR in 130's. Patient asymptomatic and lying in bed, Dr. Beatty notified, no new orders given. Will continue to monitor.
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
## 2024-09-17 NOTE — SUBJECTIVE & OBJECTIVE
Interval History: Patient doing well. More awake and alert today.     Review of Systems   All other systems reviewed and are negative.    Objective:     Vital Signs (Most Recent):  Temp: 98.1 °F (36.7 °C) (09/16/24 2003)  Pulse: 104 (09/16/24 2053)  Resp: 20 (09/16/24 2003)  BP: (!) 151/87 (09/16/24 2053)  SpO2: (!) 91 % (09/16/24 2003) Vital Signs (24h Range):  Temp:  [97.2 °F (36.2 °C)-99.4 °F (37.4 °C)] 98.1 °F (36.7 °C)  Pulse:  [] 104  Resp:  [18-20] 20  SpO2:  [91 %-96 %] 91 %  BP: (119-157)/(65-99) 151/87     Weight: 67.2 kg (148 lb 2.4 oz) ([facility])  Body mass index is 23.91 kg/m².    Intake/Output Summary (Last 24 hours) at 9/16/2024 2145  Last data filed at 9/16/2024 1841  Gross per 24 hour   Intake 720 ml   Output 900 ml   Net -180 ml         Physical Exam    General: Well-developed, well-nourished, no distress noted.  HEENT: Normocephalic, PERRL, hearing within normal limits, normal nose, normal dentition, oral mucosa is moist.  Neck: Good range of motion, nontender.  Cardiovascular: Regular rate and rhythm, no edema.  Respiratory: Breath sounds are clear, no tachypnea, no dyspnea.  GI/: Abdomen is soft, bowel sounds are present, no tenderness, no guarding, no flank tenderness.  Musculoskeletal: Moves all extremities, good range of motion.  Skin: Normal color, normal temperature, no rashes.  Neuro/psych: Alert and oriented x 2,   generalized weakness      Significant Labs: All pertinent labs within the past 24 hours have been reviewed.    Significant Imaging: I have reviewed all pertinent imaging results/findings within the past 24 hours.    Recent Results (from the past 24 hour(s))   CBC Auto Differential    Collection Time: 09/16/24  4:49 AM   Result Value Ref Range    WBC 7.96 3.90 - 12.70 K/uL    RBC 3.70 (L) 4.60 - 6.20 M/uL    Hemoglobin 12.7 (L) 14.0 - 18.0 g/dL    Hematocrit 38.5 (L) 40.0 - 54.0 %     (H) 82 - 98 fL    MCH 34.3 (H) 27.0 - 31.0 pg    MCHC 33.0 32.0 - 36.0 g/dL     RDW 18.1 (H) 11.5 - 14.5 %    Platelets 177 150 - 450 K/uL    MPV 10.3 9.2 - 12.9 fL    Immature Granulocytes 0.6 (H) 0.0 - 0.5 %    Gran # (ANC) 5.2 1.8 - 7.7 K/uL    Immature Grans (Abs) 0.05 (H) 0.00 - 0.04 K/uL    Lymph # 1.1 1.0 - 4.8 K/uL    Mono # 1.5 (H) 0.3 - 1.0 K/uL    Eos # 0.1 0.0 - 0.5 K/uL    Baso # 0.04 0.00 - 0.20 K/uL    nRBC 0 0 /100 WBC    Gran % 65.3 38.0 - 73.0 %    Lymph % 13.3 (L) 18.0 - 48.0 %    Mono % 19.0 (H) 4.0 - 15.0 %    Eosinophil % 1.3 0.0 - 8.0 %    Basophil % 0.5 0.0 - 1.9 %    Differential Method Automated    Comprehensive Metabolic Panel    Collection Time: 09/16/24  4:49 AM   Result Value Ref Range    Sodium 139 136 - 145 mmol/L    Potassium 3.8 3.5 - 5.1 mmol/L    Chloride 106 95 - 110 mmol/L    CO2 24 23 - 29 mmol/L    Glucose 92 70 - 110 mg/dL    BUN 16 8 - 23 mg/dL    Creatinine 0.9 0.5 - 1.4 mg/dL    Calcium 9.2 8.7 - 10.5 mg/dL    Total Protein 5.8 (L) 6.0 - 8.4 g/dL    Albumin 2.4 (L) 3.5 - 5.2 g/dL    Total Bilirubin 0.3 0.1 - 1.0 mg/dL    Alkaline Phosphatase 82 55 - 135 U/L    AST 17 10 - 40 U/L    ALT 18 10 - 44 U/L    eGFR >60 >60 mL/min/1.73 m^2    Anion Gap 9 8 - 16 mmol/L   Magnesium    Collection Time: 09/16/24  4:49 AM   Result Value Ref Range    Magnesium 1.4 (L) 1.6 - 2.6 mg/dL       Microbiology Results (last 7 days)       ** No results found for the last 168 hours. **            Imaging Results              X-Ray Chest AP Portable (Final result)  Result time 09/09/24 05:28:24      Final result by Melissa Tolentino MD (09/09/24 05:28:24)                   Impression:      Please see above.      Electronically signed by: Melissa Tolentino MD  Date:    09/09/2024  Time:    05:28               Narrative:    EXAMINATION:  XR CHEST AP PORTABLE    CLINICAL HISTORY:  Cough, unspecified    TECHNIQUE:  Single frontal view of the chest was performed.    COMPARISON:  02/13/2024    FINDINGS:  Cardiac monitoring leads overlie the chest.  The cardiomediastinal silhouette is  unchanged no regular a shin.  Mediastinal structures are midline.  Lungs are symmetrically expanded with diffuse coarse lung markings/interstitial attenuation could relate to underlying chronic interstitial change/emphysematous change.  No large region of confluent airspace consolidation, substantial volume of pleural fluid or pneumothorax identified.  Osseous structures intact with degenerative change and postoperative change of the right shoulder and cervical spine.

## 2024-09-17 NOTE — NURSING
Notified Dr Beatty regarding elevation in HR. Patient asymptomatic, lying in bed. Per Dr Beatty, increase metoprolol. Will continue to monitor.

## 2024-09-17 NOTE — PROGRESS NOTES
[facility] Medicine  Progress Note    Patient Name: Israel De Jesus  MRN: [id number]  Patient Class: IP- Inpatient   Admission Date: 9/8/2024  Length of Stay: 8 days  Attending Physician: Basil Beatty MD  Primary Care Provider: Nuha Lynn MD        Subjective:     Principal Problem:Alcohol withdrawal syndrome without complication        HPI:  63 y.o.  man with h/o generalized anxiety d/o, essential HTN, suspect depression, and ETOH abuse presents to the ER with JPSO after reports from family that he was threatening to kill himself. Apparently posted on his Facebook page SI as well as reported to day time Emergency room physician and RNS. To me he denies an SI but he is PEC by ED. While awaiting a bed he started to become more and more tremulous and showing signs of withdrawal. Started on PO valium and Ativan.     Labs overall unremarkable and UDS with THC only. ETOH level 342. Pt. Denies any h/o Withdrawals, does drink daily but I feel is under reporting what he drinks to me. Drinks hard liquor.   COVID negative.     We have been consulted for further management of his withdrawals and admission to the ICU.     Because this patient's clinical condition is critically guarded and PEC he  requires close monitoring of his vitals and withdrawal symptoms, care in an alternative location isn't clinically appropriate for the reasons stated above.              Overview/Hospital Course:  64yo M admitted with suicidial ideation and alcohol withdrawal.  Due to patient being very drowsy and sleeping throughout the day Valium was stopped, continue p.r.n. IV Ativan based on CIWA protocol.  Has a R groin hernia- CT large bowel containing right inguinal hernia, without proximal bowel obstruction or acute inflammatory changes.  Lovenox stopped due to thrombocytopenia.  Once medically clear plan to discharge to inpatient psych.  Patient is more awake and alert today and has not required  Ativan for 72. Patient still very weak worse in lower extremity. He was evaluated by PT and unable to ambulate on his own. MRI brain ordered which was neg. PT/OT recommending SNF.      Interval History: no new complaints.  Wants to get stronger.    Review of Systems   HENT:  Negative for ear discharge and ear pain.    Eyes:  Negative for discharge and itching.   Endocrine: Negative for cold intolerance and heat intolerance.   Neurological:  Negative for seizures and syncope.     Objective:     Vital Signs (Most Recent):  Temp: 98.3 °F (36.8 °C) (09/17/24 1126)  Pulse: (!) 112 (09/17/24 1126)  Resp: 18 (09/17/24 1126)  BP: 138/81 (09/17/24 1126)  SpO2: 95 % (09/17/24 1126) Vital Signs (24h Range):  Temp:  [97.7 °F (36.5 °C)-99.4 °F (37.4 °C)] 98.3 °F (36.8 °C)  Pulse:  [] 112  Resp:  [17-20] 18  SpO2:  [91 %-96 %] 95 %  BP: (132-157)/(18-94) 138/81     Weight: 67.2 kg (148 lb 2.4 oz) ([facility])  Body mass index is 23.91 kg/m².    Intake/Output Summary (Last 24 hours) at 9/17/2024 1344  Last data filed at 9/17/2024 0800  Gross per 24 hour   Intake 360 ml   Output 750 ml   Net -390 ml         Physical Exam  Constitutional:       General: He is not in acute distress.     Appearance: He is not toxic-appearing.   HENT:      Head: Normocephalic and atraumatic.      Mouth/Throat:      Mouth: Mucous membranes are dry.      Pharynx: No oropharyngeal exudate or posterior oropharyngeal erythema.   Cardiovascular:      Rate and Rhythm: Regular rhythm. Tachycardia present.   Pulmonary:      Effort: No respiratory distress.      Breath sounds: Normal breath sounds.   Abdominal:      General: Bowel sounds are normal.      Palpations: Abdomen is soft.   Musculoskeletal:         General: No deformity or signs of injury.   Skin:     General: Skin is warm and dry.   Neurological:      Comments: Awakes to verbal stimuli.  Slow to respond, but answering questions and following commands.  Oriented x3             Significant Labs:  All pertinent labs within the past 24 hours have been reviewed.  BMP:   Recent Labs   Lab 09/17/24  0523   GLU 97   *   K 3.7      CO2 22*   BUN 17   CREATININE 1.0   CALCIUM 9.2   MG 1.3*     CBC:   Recent Labs   Lab 09/16/24  0449 09/17/24  0523   WBC 7.96 11.04   HGB 12.7* 12.5*   HCT 38.5* 36.9*    228       Significant Imaging: I have reviewed all pertinent imaging results/findings within the past 24 hours.    Assessment/Plan:      * Alcohol withdrawal syndrome without complication  Continue replacement with MVI, Thiamine and Folate.  No evidence of DT's at this time.  Valium stopped secondary to excessive sedation.  Patient is improving and medically stable.  PT/OT recommending SNF.  He would not be able to go to SNF as long as he is PEC.  Will reconsult Psych to reevaluate need of PEC and inpatient psych.      Right groin mass  -etiology due to right inguinal hernia  -repeat CT abdominal pelvis shows no strangulation or obstruction  -recommend following up outpatient for an elective repair      Hypomagnesemia  Replace as needed.    Tobacco dependency  -discussed cessation  -nicotine patch    Macrocytic anemia  Noted h/o macrocytic anemia but H/H and MCV stable    Suicidal ideation  PEC and will cont.  Patient currently denies any suicide ideations.  Will have Psych reassess need for PEC and inpatient psychiatry.      Generalized anxiety disorder  -Resume home meds and cont with PRN ativan per CIWA protocol  -scheduled valium stopped due to lethargy        Tobacco use disorder  Nicotine patch ordered daily       Hypertension  Continue current management.         VTE Risk Mitigation (From admission, onward)           Ordered     enoxaparin injection 40 mg  Every 24 hours         09/17/24 1352     Place sequential compression device  Until discontinued         09/11/24 0729     IP VTE LOW RISK PATIENT  Once         09/09/24 0350     Place sequential compression device  Until discontinued          09/09/24 0350                    Discharge Planning   BRANDON: 9/13/2024     Code Status: Full Code   Is the patient medically ready for discharge?:     Reason for patient still in hospital (select all that apply): Pending disposition  Discharge Plan A: [facility]                  Basil Beatty MD  Department of Hospital Medicine   [facility]

## 2024-09-17 NOTE — PROGRESS NOTES
[facility] [place] [facility]  Adult Nutrition  Progress Note    SUMMARY     Recommendations  Recommendation:   1. Consider cardiac diet   2. Continue multivitamin   3. Consider Boost Plus BID   4. Monitor weight and labs    Goals: Pt will consume 50-75% of meals by RD follow up    Nutrition Goal Status: new  Communication of RD Recs:  (POC)    Assessment and Plan  Nutrition Problem  Inadequate B vitamin intake     Related to (etiology):   Malabsorption     Signs and Symptoms (as evidenced by):   Alcohol withdrawal syndrome without complication    Interventions:  Collaboration with other providers   Mippin    Nutrition Diagnosis Status:   New    Malnutrition Assessment  Weight Loss (Malnutrition):  (Noted 12 lb weight loss in 7 months)   Orbital Region (Subcutaneous Fat Loss): well nourished  Upper Arm Region (Subcutaneous Fat Loss): well nourished  Thoracic and Lumbar Region: well nourished   Oriental orthodox Region (Muscle Loss): well nourished  Clavicle Bone Region (Muscle Loss): well nourished  Clavicle and Acromion Bone Region (Muscle Loss): well nourished  Scapular Bone Region (Muscle Loss): well nourished  Dorsal Hand (Muscle Loss): well nourished  Patellar Region (Muscle Loss): well nourished  Anterior Thigh Region (Muscle Loss): well nourished  Posterior Calf Region (Muscle Loss): well nourished     Reason for Assessment  Reason For Assessment: length of stay  Diagnosis: other (see comments) (alcohol withdrawal syndrome without complications)  Relevant Medical History: HTN. macrocytic anemia  Interdisciplinary Rounds: did not attend  General Information Comments: Pt is currently on a regular diet . Rigo-17/skin intact. Noted 50% meal intake. Pt admitted with suicidal ideations and alcohol withdrawals. CT large bowel containing right inguinal hernia w/o obstruction or acute inflammatory changes. Pt continues to work with PT until he is more dependent so he can discharge to inpatient [facility]. Noted 12 lb weight  "loss in 7 months. Pt appears nourished 24.  Nutrition Discharge Planning: d/c on cardiac diet    Nutrition Risk Screen  Nutrition Risk Screen: no indicators present    Nutrition/Diet History  Patient Reported Diet/Restrictions/Preferences: heart healthy  Food Preferences: NEELA  Spiritual, Cultural Beliefs, Sikh Practices, Values that Affect Care: no  Factors Affecting Nutritional Intake: impaired cognitive status/motor control    Nutrition Related Social Determinants of Health: SDOH: Unable to assess at this time.     Anthropometrics  Temp: 98.3 °F (36.8 °C)  Height Method: Stated  Height: 5' 6" (167.6 cm)  Height (inches): 66 in  Weight Method: Standard Scale  Weight: 67.2 kg (148 lb 2.4 oz)  Weight (lb): 148.15 lb  Ideal Body Weight (IBW), Male: 142 lb  % Ideal Body Weight, Male (lb): 104.33 %  BMI (Calculated): 23.9  BMI Grade: 18.5-24.9 - normal  Usual Body Weight (UBW), k.6 kg (24)  % Usual Body Weight: 92.76  % Weight Change From Usual Weight: -7.44 %     Lab/Procedures/Meds  Pertinent Labs Reviewed: reviewed  Pertinent Labs Comments: Na 135, CO2 22, Phos 2.5, Magnesium 1.3  Pertinent Medications Reviewed: reviewed  Pertinent Medications Comments: folic acid, multivitamin, nicotine, pantoprazole, pregabalin, thiamine    Estimated/Assessed Needs  Weight Used For Calorie Calculations: 67.2 kg (148 lb 2.4 oz)  Energy Calorie Requirements (kcal): 2016 kcals (30 kcals/kg)  Energy Need Method: Kcal/kg  Protein Requirements: 80g (1.2g/kg)  Weight Used For Protein Calculations: 67.2 kg (148 lb 2.4 oz)     Estimated Fluid Requirement Method: RDA Method  RDA Method (mL): 2016     Nutrition Prescription Ordered  Current Diet Order: Regular Diet    Evaluation of Received Nutrient/Fluid Intake  I/O: 600/1250  Energy Calories Required: not meeting needs  Protein Required: not meeting needs  Fluid Required: not meeting needs  Comments: LBM-24  Tolerance: tolerating  % Intake of Estimated Energy Needs: " 50 - 75 %  % Meal Intake: 50 - 75 %    Nutrition Risk  Level of Risk/Frequency of Follow-up: moderate (2x/weekly)     Monitor and Evaluation  Food and Nutrient Intake: energy intake, food and beverage intake  Food and Nutrient Adminstration: diet order  Anthropometric Measurements: weight, weight change, body mass index  Biochemical Data, Medical Tests and Procedures: electrolyte and renal panel, gastrointestinal profile, inflammatory profile, lipid profile  Nutrition-Focused Physical Findings: overall appearance     Nutrition Follow-Up  RD Follow-up?: Yes

## 2024-09-17 NOTE — PLAN OF CARE
Recommendations  Recommendation:   1. Consider cardiac diet   2. Continue multivitamin   3. Consider Boost Plus BID   4. Monitor weight and labs    Goals: Pt will consume 50-75% of meals by RD follow up    Nutrition Goal Status: new  Communication of RD Recs:  (POC)

## 2024-09-17 NOTE — NURSING
[facility] [place]  Nurses Note -- 4 Eyes      9/17/2024       Skin assessed on: Q Shift      [x] No Pressure Injuries Present    [x]Prevention Measures Documented    [] Yes LDA  for Pressure Injury Previously documented     [] Yes New Pressure Injury Discovered   [] LDA for New Pressure Injury Added      Attending RN:  Annabel Tinoco RN     Second RN:  EMELY Garcia

## 2024-09-17 NOTE — NURSING
PER handoff received from EMELY Garcia     Pt resting in bed quietly. NAD noted. No c/o pain.     Fall and safety precautions maintained. Bed alarm activated and audible.. Bed locked in lowest position, with side rails up x2. Call bell and personal items within reach

## 2024-09-17 NOTE — PROGRESS NOTES
[facility] Medicine  Progress Note    Patient Name: Israel De Jesus  MRN: [id number]  Patient Class: IP- Inpatient   Admission Date: 9/8/2024  Length of Stay: 7 days  Attending Physician: Bin Garcia MD  Primary Care Provider: Nuha Lynn MD        Subjective:     Principal Problem:Alcohol withdrawal syndrome without complication        HPI:  63 y.o.  man with h/o generalized anxiety d/o, essential HTN, suspect depression, and ETOH abuse presents to the ER with JPSO after reports from family that he was threatening to kill himself. Apparently posted on his Facebook page SI as well as reported to day time Emergency room physician and RNS. To me he denies an SI but he is PEC by ED. While awaiting a bed he started to become more and more tremulous and showing signs of withdrawal. Started on PO valium and Ativan.     Labs overall unremarkable and UDS with THC only. ETOH level 342. Pt. Denies any h/o Withdrawals, does drink daily but I feel is under reporting what he drinks to me. Drinks hard liquor.   COVID negative.     We have been consulted for further management of his withdrawals and admission to the ICU.     Because this patient's clinical condition is critically guarded and PEC he  requires close monitoring of his vitals and withdrawal symptoms, care in an alternative location isn't clinically appropriate for the reasons stated above.              Overview/Hospital Course:  64yo M admitted with suicidial ideation and alcohol withdrawal.  Due to patient being very drowsy and sleeping throughout the day Valium was stopped, continue p.r.n. IV Ativan based on CIWA protocol.  Has a R groin hernia- CT large bowel containing right inguinal hernia, without proximal bowel obstruction or acute inflammatory changes.  Lovenox stopped due to thrombocytopenia.  Once medically clear plan to discharge to inpatient psych.  Patient is more awake and alert today and has not required  Ativan for 72. Patient still very weak worse in lower extremity. He was evaluated by PT and unable to ambulate on his own. MRI brain ordered which was neg. Will continue to work with PT until he is more independent so he can discharge to inpatient psych.    Interval History: Patient doing well. More awake and alert today.     Review of Systems   All other systems reviewed and are negative.    Objective:     Vital Signs (Most Recent):  Temp: 98.1 °F (36.7 °C) (09/16/24 2003)  Pulse: 104 (09/16/24 2053)  Resp: 20 (09/16/24 2003)  BP: (!) 151/87 (09/16/24 2053)  SpO2: (!) 91 % (09/16/24 2003) Vital Signs (24h Range):  Temp:  [97.2 °F (36.2 °C)-99.4 °F (37.4 °C)] 98.1 °F (36.7 °C)  Pulse:  [] 104  Resp:  [18-20] 20  SpO2:  [91 %-96 %] 91 %  BP: (119-157)/(65-99) 151/87     Weight: 67.2 kg (148 lb 2.4 oz) ([facility])  Body mass index is 23.91 kg/m².    Intake/Output Summary (Last 24 hours) at 9/16/2024 2145  Last data filed at 9/16/2024 1841  Gross per 24 hour   Intake 720 ml   Output 900 ml   Net -180 ml         Physical Exam    General: Well-developed, well-nourished, no distress noted.  HEENT: Normocephalic, PERRL, hearing within normal limits, normal nose, normal dentition, oral mucosa is moist.  Neck: Good range of motion, nontender.  Cardiovascular: Regular rate and rhythm, no edema.  Respiratory: Breath sounds are clear, no tachypnea, no dyspnea.  GI/: Abdomen is soft, bowel sounds are present, no tenderness, no guarding, no flank tenderness.  Musculoskeletal: Moves all extremities, good range of motion.  Skin: Normal color, normal temperature, no rashes.  Neuro/psych: Alert and oriented x 2,   generalized weakness      Significant Labs: All pertinent labs within the past 24 hours have been reviewed.    Significant Imaging: I have reviewed all pertinent imaging results/findings within the past 24 hours.    Recent Results (from the past 24 hour(s))   CBC Auto Differential    Collection Time: 09/16/24  4:49  AM   Result Value Ref Range    WBC 7.96 3.90 - 12.70 K/uL    RBC 3.70 (L) 4.60 - 6.20 M/uL    Hemoglobin 12.7 (L) 14.0 - 18.0 g/dL    Hematocrit 38.5 (L) 40.0 - 54.0 %     (H) 82 - 98 fL    MCH 34.3 (H) 27.0 - 31.0 pg    MCHC 33.0 32.0 - 36.0 g/dL    RDW 18.1 (H) 11.5 - 14.5 %    Platelets 177 150 - 450 K/uL    MPV 10.3 9.2 - 12.9 fL    Immature Granulocytes 0.6 (H) 0.0 - 0.5 %    Gran # (ANC) 5.2 1.8 - 7.7 K/uL    Immature Grans (Abs) 0.05 (H) 0.00 - 0.04 K/uL    Lymph # 1.1 1.0 - 4.8 K/uL    Mono # 1.5 (H) 0.3 - 1.0 K/uL    Eos # 0.1 0.0 - 0.5 K/uL    Baso # 0.04 0.00 - 0.20 K/uL    nRBC 0 0 /100 WBC    Gran % 65.3 38.0 - 73.0 %    Lymph % 13.3 (L) 18.0 - 48.0 %    Mono % 19.0 (H) 4.0 - 15.0 %    Eosinophil % 1.3 0.0 - 8.0 %    Basophil % 0.5 0.0 - 1.9 %    Differential Method Automated    Comprehensive Metabolic Panel    Collection Time: 09/16/24  4:49 AM   Result Value Ref Range    Sodium 139 136 - 145 mmol/L    Potassium 3.8 3.5 - 5.1 mmol/L    Chloride 106 95 - 110 mmol/L    CO2 24 23 - 29 mmol/L    Glucose 92 70 - 110 mg/dL    BUN 16 8 - 23 mg/dL    Creatinine 0.9 0.5 - 1.4 mg/dL    Calcium 9.2 8.7 - 10.5 mg/dL    Total Protein 5.8 (L) 6.0 - 8.4 g/dL    Albumin 2.4 (L) 3.5 - 5.2 g/dL    Total Bilirubin 0.3 0.1 - 1.0 mg/dL    Alkaline Phosphatase 82 55 - 135 U/L    AST 17 10 - 40 U/L    ALT 18 10 - 44 U/L    eGFR >60 >60 mL/min/1.73 m^2    Anion Gap 9 8 - 16 mmol/L   Magnesium    Collection Time: 09/16/24  4:49 AM   Result Value Ref Range    Magnesium 1.4 (L) 1.6 - 2.6 mg/dL       Microbiology Results (last 7 days)       ** No results found for the last 168 hours. **            Imaging Results              X-Ray Chest AP Portable (Final result)  Result time 09/09/24 05:28:24      Final result by Melissa Tolentino MD (09/09/24 05:28:24)                   Impression:      Please see above.      Electronically signed by: Melissa Tolentino MD  Date:    09/09/2024  Time:    05:28               Narrative:     EXAMINATION:  XR CHEST AP PORTABLE    CLINICAL HISTORY:  Cough, unspecified    TECHNIQUE:  Single frontal view of the chest was performed.    COMPARISON:  02/13/2024    FINDINGS:  Cardiac monitoring leads overlie the chest.  The cardiomediastinal silhouette is unchanged no regular a shin.  Mediastinal structures are midline.  Lungs are symmetrically expanded with diffuse coarse lung markings/interstitial attenuation could relate to underlying chronic interstitial change/emphysematous change.  No large region of confluent airspace consolidation, substantial volume of pleural fluid or pneumothorax identified.  Osseous structures intact with degenerative change and postoperative change of the right shoulder and cervical spine.                                          Assessment/Plan:      * Alcohol withdrawal syndrome without complication  Will cont. PRN ativan for now. Per CIWA Other PRN meds for symptoms of withdrawal are ordered. IVF given. Started multivitamin, folic acid, and thiamine      Suicidal ideation  PEC and will cont. To monitor for now. Will need Psych clearance when stable.       Right groin mass  -etiology due to right inguinal hernia  -repeat CT abdominal pelvis shows no strangulation or obstruction  -recommend following up outpatient for an elective repair      Thrombocytopenia  -improved  -held Lovenox  -HIT panel negative    Tobacco dependency  -discussed cessation  -nicotine patch    Macrocytic anemia  Noted h/o macrocytic anemia but H/H and MCV stable. ER concerned about possible sever thiamine deficiency and will f/u on b12 and folate levels. Repeat CBC ordered. He has been rx thiamine per home med.     Generalized anxiety disorder  -Resume home meds and cont with PRN ativan per CIWA protocol  -scheduled valium stopped due to lethargy        Tobacco use disorder  Nicotine patch ordered daily       Hypertension  -improving        VTE Risk Mitigation (From admission, onward)           Ordered      Place sequential compression device  Until discontinued         09/11/24 0729     IP VTE LOW RISK PATIENT  Once         09/09/24 0350     Place sequential compression device  Until discontinued         09/09/24 0350                    Discharge Planning   BRANDON: 9/13/2024     Code Status: Full Code   Is the patient medically ready for discharge?:     Reason for patient still in hospital (select all that apply): Patient trending condition  Discharge Plan A: [facility] NANDO Garcia MD  Department of Hospital Medicine   [facility]

## 2024-09-17 NOTE — ASSESSMENT & PLAN NOTE
PEC and will cont.  Patient currently denies any suicide ideations.  Will have Psych reassess need for PEC and inpatient psychiatry.

## 2024-09-18 PROBLEM — N17.9 AKI (ACUTE KIDNEY INJURY): Status: ACTIVE | Noted: 2024-09-18

## 2024-09-18 LAB
ALBUMIN SERPL BCP-MCNC: 2.1 G/DL (ref 3.5–5.2)
ALP SERPL-CCNC: 90 U/L (ref 55–135)
ALT SERPL W/O P-5'-P-CCNC: 13 U/L (ref 10–44)
ANION GAP SERPL CALC-SCNC: 10 MMOL/L (ref 8–16)
AST SERPL-CCNC: 13 U/L (ref 10–40)
BASOPHILS # BLD AUTO: ABNORMAL K/UL (ref 0–0.2)
BASOPHILS NFR BLD: 0 % (ref 0–1.9)
BILIRUB SERPL-MCNC: 0.4 MG/DL (ref 0.1–1)
BUN SERPL-MCNC: 27 MG/DL (ref 8–23)
CALCIUM SERPL-MCNC: 9 MG/DL (ref 8.7–10.5)
CHLORIDE SERPL-SCNC: 102 MMOL/L (ref 95–110)
CO2 SERPL-SCNC: 22 MMOL/L (ref 23–29)
CREAT SERPL-MCNC: 1.8 MG/DL (ref 0.5–1.4)
DIFFERENTIAL METHOD BLD: ABNORMAL
EOSINOPHIL # BLD AUTO: ABNORMAL K/UL (ref 0–0.5)
EOSINOPHIL NFR BLD: 1 % (ref 0–8)
ERYTHROCYTE [DISTWIDTH] IN BLOOD BY AUTOMATED COUNT: 17.7 % (ref 11.5–14.5)
EST. GFR  (NO RACE VARIABLE): 42 ML/MIN/1.73 M^2
GLUCOSE SERPL-MCNC: 140 MG/DL (ref 70–110)
HCT VFR BLD AUTO: 35.6 % (ref 40–54)
HGB BLD-MCNC: 12 G/DL (ref 14–18)
IMM GRANULOCYTES # BLD AUTO: ABNORMAL K/UL (ref 0–0.04)
IMM GRANULOCYTES NFR BLD AUTO: ABNORMAL % (ref 0–0.5)
LYMPHOCYTES # BLD AUTO: ABNORMAL K/UL (ref 1–4.8)
LYMPHOCYTES NFR BLD: 5 % (ref 18–48)
MCH RBC QN AUTO: 34.3 PG (ref 27–31)
MCHC RBC AUTO-ENTMCNC: 33.7 G/DL (ref 32–36)
MCV RBC AUTO: 102 FL (ref 82–98)
MONOCYTES # BLD AUTO: ABNORMAL K/UL (ref 0.3–1)
MONOCYTES NFR BLD: 7 % (ref 4–15)
NEUTROPHILS NFR BLD: 62 % (ref 38–73)
NEUTS BAND NFR BLD MANUAL: 25 %
NRBC BLD-RTO: 0 /100 WBC
PLATELET # BLD AUTO: 303 K/UL (ref 150–450)
PMV BLD AUTO: 10.8 FL (ref 9.2–12.9)
POTASSIUM SERPL-SCNC: 3.3 MMOL/L (ref 3.5–5.1)
PROT SERPL-MCNC: 5.8 G/DL (ref 6–8.4)
RBC # BLD AUTO: 3.5 M/UL (ref 4.6–6.2)
SODIUM SERPL-SCNC: 134 MMOL/L (ref 136–145)
TOXIC GRANULES BLD QL SMEAR: PRESENT
WBC # BLD AUTO: 10.38 K/UL (ref 3.9–12.7)

## 2024-09-18 PROCEDURE — 21400001 HC TELEMETRY ROOM

## 2024-09-18 PROCEDURE — 97530 THERAPEUTIC ACTIVITIES: CPT | Mod: CQ

## 2024-09-18 PROCEDURE — 97116 GAIT TRAINING THERAPY: CPT | Mod: CQ

## 2024-09-18 PROCEDURE — 63600175 PHARM REV CODE 636 W HCPCS: Performed by: INTERNAL MEDICINE

## 2024-09-18 PROCEDURE — 97535 SELF CARE MNGMENT TRAINING: CPT

## 2024-09-18 PROCEDURE — 25000003 PHARM REV CODE 250: Performed by: INTERNAL MEDICINE

## 2024-09-18 PROCEDURE — 36415 COLL VENOUS BLD VENIPUNCTURE: CPT | Performed by: HOSPITALIST

## 2024-09-18 PROCEDURE — 85007 BL SMEAR W/DIFF WBC COUNT: CPT | Performed by: HOSPITALIST

## 2024-09-18 PROCEDURE — 97166 OT EVAL MOD COMPLEX 45 MIN: CPT

## 2024-09-18 PROCEDURE — S4991 NICOTINE PATCH NONLEGEND: HCPCS | Performed by: INTERNAL MEDICINE

## 2024-09-18 PROCEDURE — 80053 COMPREHEN METABOLIC PANEL: CPT | Performed by: HOSPITALIST

## 2024-09-18 PROCEDURE — 85027 COMPLETE CBC AUTOMATED: CPT | Performed by: HOSPITALIST

## 2024-09-18 PROCEDURE — 25000003 PHARM REV CODE 250: Performed by: STUDENT IN AN ORGANIZED HEALTH CARE EDUCATION/TRAINING PROGRAM

## 2024-09-18 PROCEDURE — 97530 THERAPEUTIC ACTIVITIES: CPT

## 2024-09-18 RX ADMIN — FLUTICASONE PROPIONATE 100 MCG: 50 SPRAY, METERED NASAL at 08:09

## 2024-09-18 RX ADMIN — FOLIC ACID 1 MG: 1 TABLET ORAL at 08:09

## 2024-09-18 RX ADMIN — PREGABALIN 75 MG: 50 CAPSULE ORAL at 08:09

## 2024-09-18 RX ADMIN — DULOXETINE HYDROCHLORIDE 60 MG: 30 CAPSULE, DELAYED RELEASE ORAL at 08:09

## 2024-09-18 RX ADMIN — PANTOPRAZOLE SODIUM 40 MG: 40 TABLET, DELAYED RELEASE ORAL at 08:09

## 2024-09-18 RX ADMIN — NICOTINE 1 PATCH: 7 PATCH, EXTENDED RELEASE TRANSDERMAL at 08:09

## 2024-09-18 RX ADMIN — THIAMINE HCL TAB 100 MG 100 MG: 100 TAB at 08:09

## 2024-09-18 RX ADMIN — CEFTRIAXONE 1 G: 1 INJECTION, POWDER, FOR SOLUTION INTRAMUSCULAR; INTRAVENOUS at 08:09

## 2024-09-18 RX ADMIN — THERA TABS 1 TABLET: TAB at 08:09

## 2024-09-18 NOTE — PT/OT/SLP PROGRESS
Physical Therapy Treatment    Patient Name:  Israel De Jesus   MRN:  [id number]    Recommendations:     Discharge Recommendations: Moderate Intensity Therapy  Discharge Equipment Recommendations: to be determined by next level of care  Barriers to discharge:     Assessment:     Israel De Jesus is a 63 y.o. male admitted with a medical diagnosis of Alcohol withdrawal syndrome without complication.  He presents with the following impairments/functional limitations: weakness, impaired endurance, impaired self care skills, impaired functional mobility, gait instability, impaired balance, decreased coordination, decreased safety awareness, decreased lower extremity function.    Pt took ~5-6 sidesteps today using RW and Max A x2 persons    Rehab Prognosis: Good; patient would benefit from acute skilled PT services to address these deficits and reach maximum level of function.    Recent Surgery: * No surgery found *      Plan:     During this hospitalization, patient to be seen 4 x/week (3-4x per week) to address the identified rehab impairments via gait training, therapeutic activities, therapeutic exercises, neuromuscular re-education and progress toward the following goals:    Plan of Care Expires:  09/21/24    Subjective     Chief Complaint: none stated  Patient/Family Comments/goals: Pt agreed to therapy  Pain/Comfort:  Pain Rating 1: 0/10      Objective:     Communicated with nursing prior to session.  Patient found HOB elevated with blood pressure cuff, Condom Catheter, peripheral IV, telemetry (sitter) upon PT entry to room.     General Precautions: Standard, fall  Orthopedic Precautions: N/A  Braces: N/A  Respiratory Status: Room air     Functional Mobility:  Bed Mobility:     Rolling Left <> Right: moderate assistance for diaper change and   Scooting: minimum assistance  Supine to Sit: minimum assistance x1 trial and moderate assistance x1 trial from supine position   Sit to Supine: moderate  assistance  Transfers: Gait belt donned, x2 trials from EOB  Sit to Stand:  moderate assistance and of 2 persons with rolling walker  Gait: Pt took ~5-6 steps using RW and Max A x2 persons. Pt with flex trunk, decreased bo and step length. Pt required max verbal cueing for LE advancement and Max A for RW management.  Balance: Pt with Fair- seated balance, pt required SBA-Mod A to maintain seated balance, pt with R lateral lean at times      AM-PAC 6 CLICK MOBILITY  Turning over in bed (including adjusting bedclothes, sheets and blankets)?: 3  Sitting down on and standing up from a chair with arms (e.g., wheelchair, bedside commode, etc.): 3  Moving from lying on back to sitting on the side of the bed?: 3  Moving to and from a bed to a chair (including a wheelchair)?: 3  Need to walk in hospital room?: 2  Climbing 3-5 steps with a railing?: 1  Basic Mobility Total Score: 15       Treatment & Education:  Pt instructed to perform LAQ, HR 1x10 each leg    Patient left HOB elevated with all lines intact, call button in reach, nursing notified, and sitter present..    GOALS:   Multidisciplinary Problems       Physical Therapy Goals          Problem: Physical Therapy    Goal Priority Disciplines Outcome Goal Variances Interventions   Physical Therapy Goal     PT, PT/OT Progressing     Description: Goals to be met by: 24     Patient will increase functional independence with mobility by performin. Supine to sit with Modified Lydia  2. Sit to supine with Modified Lydia  3. Rolling to Left and Right with Modified Lydia.  4. Sit to stand transfer with Modified Lydia  5. Bed to chair transfer with Modified Lydia using Rolling Walker  6. Gait  x 50 feet with Modified Lydia using Rolling Walker.   7. Stand for 5 minutes with Modified Lydia using Rolling Walker  8. Lower extremity exercise program x30 reps per handout, with independence                         Time  Tracking:     PT Received On: 09/18/24  PT Start Time: 0857     PT Stop Time: 0926  PT Total Time (min): 29 min     Billable Minutes: Gait Training 9 and Therapeutic Activity 20    Treatment Type: Treatment  PT/PTA: PTA     Number of PTA visits since last PT visit: 2     09/18/2024

## 2024-09-18 NOTE — PLAN OF CARE
"Changes in medical condition or discharge plan:     Per MD 9/17, "Once medically clear plan to discharge to inpatient psych. Patient is more awake and alert today and has not required Ativan for 72. Patient still very weak worse in lower extremity. He was evaluated by PT and unable to ambulate on his own. MRI brain ordered which was neg. PT/OT recommending SNF."    Per psychiatry 9/17, "Continue PEC and inpt treatment when medically stable - pt would continue to benefit from mental health and substance abuse treatment, pt unable to give when last SI thoughts were."    Does patient need new DME? TBD    Follow up appts needed: TBD    Medically stable: Not at this time. Per MD, pt still having fevers and requiring fluids.    Estimated Discharge Date: 24-48 hours        09/18/24 1116   Discharge Reassessment   Assessment Type Discharge Planning Reassessment   Did the patient's condition or plan change since previous assessment? Yes   Discharge Plan discussed with: Patient   Communicated BRANDON with patient/caregiver Date not available/Unable to determine   Discharge Plan A Psychiatric hospital   Discharge Plan B Skilled Nursing Facility   DME Needed Upon Discharge  other (see comments)  (TBD)   Transition of Care Barriers Mobility   Why the patient remains in the hospital Requires continued medical care   Post-Acute Status   Discharge Delays None known at this time       "

## 2024-09-18 NOTE — PROGRESS NOTES
[facility] Medicine  Progress Note    Patient Name: Israel De Jesus  MRN: [id number]  Patient Class: IP- Inpatient   Admission Date: 9/8/2024  Length of Stay: 9 days  Attending Physician: Basil Beatty MD  Primary Care Provider: Nuha Lynn MD        Subjective:     Principal Problem:Alcohol withdrawal syndrome without complication        HPI:  63 y.o.  man with h/o generalized anxiety d/o, essential HTN, suspect depression, and ETOH abuse presents to the ER with JPSO after reports from family that he was threatening to kill himself. Apparently posted on his Facebook page SI as well as reported to day time Emergency room physician and RNS. To me he denies an SI but he is PEC by ED. While awaiting a bed he started to become more and more tremulous and showing signs of withdrawal. Started on PO valium and Ativan.     Labs overall unremarkable and UDS with THC only. ETOH level 342. Pt. Denies any h/o Withdrawals, does drink daily but I feel is under reporting what he drinks to me. Drinks hard liquor.   COVID negative.     We have been consulted for further management of his withdrawals and admission to the ICU.     Because this patient's clinical condition is critically guarded and PEC he  requires close monitoring of his vitals and withdrawal symptoms, care in an alternative location isn't clinically appropriate for the reasons stated above.              Overview/Hospital Course:  64yo M admitted with suicidial ideation and alcohol withdrawal.  Due to patient being very drowsy and sleeping throughout the day Valium was stopped, continue p.r.n. IV Ativan based on CIWA protocol.  Has a R groin hernia- CT large bowel containing right inguinal hernia, without proximal bowel obstruction or acute inflammatory changes.  Lovenox stopped due to thrombocytopenia.  Once medically clear plan to discharge to inpatient psych.  Patient is more awake and alert today and has not required  Ativan for 72. Patient still very weak worse in lower extremity. He was evaluated by PT and unable to ambulate on his own. MRI brain ordered which was neg. PT/OT recommending SNF.  Unable to go to SNF as patient PEC.  Fevers and tachycardia on 9/17.  UA consistent with UTI.  Started on Rocephin.    Interval History: febrile yesterday.    Review of Systems   HENT:  Negative for ear discharge and ear pain.    Eyes:  Negative for discharge and itching.   Endocrine: Negative for cold intolerance and heat intolerance.   Neurological:  Negative for seizures and syncope.     Objective:     Vital Signs (Most Recent):  Temp: 97.9 °F (36.6 °C) (09/18/24 1059)  Pulse: (!) 121 (09/18/24 1500)  Resp: 19 (09/18/24 1059)  BP: 102/62 (09/18/24 1059)  SpO2: 95 % (09/18/24 1059) Vital Signs (24h Range):  Temp:  [97.5 °F (36.4 °C)-101.7 °F (38.7 °C)] 97.9 °F (36.6 °C)  Pulse:  [] 121  Resp:  [18-23] 19  SpO2:  [92 %-97 %] 95 %  BP: ()/(51-80) 102/62     Weight: 67.2 kg (148 lb 2.4 oz)  Body mass index is 23.91 kg/m².    Intake/Output Summary (Last 24 hours) at 9/18/2024 1505  Last data filed at 9/18/2024 1431  Gross per 24 hour   Intake 360 ml   Output 700 ml   Net -340 ml         Physical Exam  Constitutional:       General: He is not in acute distress.     Appearance: He is not toxic-appearing.   HENT:      Head: Normocephalic and atraumatic.      Mouth/Throat:      Mouth: Mucous membranes are dry.      Pharynx: No oropharyngeal exudate or posterior oropharyngeal erythema.   Cardiovascular:      Rate and Rhythm: Regular rhythm. Tachycardia present.   Pulmonary:      Effort: No respiratory distress.      Breath sounds: Normal breath sounds.   Abdominal:      General: Bowel sounds are normal.      Palpations: Abdomen is soft.   Musculoskeletal:         General: No deformity or signs of injury.   Skin:     General: Skin is warm and dry.   Neurological:      Comments: Awakes to verbal stimuli.  Slow to respond, but answering  questions and following commands.  Oriented x3             Significant Labs: All pertinent labs within the past 24 hours have been reviewed.  BMP:   Recent Labs   Lab 09/17/24 2009 09/18/24  0932    140*   * 134*   K 3.6 3.3*    102   CO2 21* 22*   BUN 23 27*   CREATININE 1.8* 1.8*   CALCIUM 8.7 9.0   MG 1.2*  --      CBC:   Recent Labs   Lab 09/17/24  0523 09/17/24 2009 09/18/24  0932   WBC 11.04 8.70 10.38   HGB 12.5* 11.2* 12.0*   HCT 36.9* 34.5* 35.6*    236 303       Significant Imaging: I have reviewed all pertinent imaging results/findings within the past 24 hours.    Assessment/Plan:      * Alcohol withdrawal syndrome without complication  Continue replacement with MVI, Thiamine and Folate.  No evidence of DT's at this time.  Valium stopped secondary to excessive sedation.  Still slightly tachycardic with mild tremors.  Will order Valium at night.  PT/OT recommending SNF.  He would not be able to go to SNF as long as he is PEC.  Will reconsult Psych to reevaluate need of PEC and inpatient psych.  Psych still recommending inpatient psychiatry and PEC.  Febrile yesterday.  UA consistent with UTI.  Started on Rocephin.  Doubt fever related to withdrawal.      JINA (acute kidney injury)  JINA is likely due to pre-renal azotemia due to intravascular volume depletion. Baseline creatinine is  1 . Most recent creatinine and eGFR are listed below.  Recent Labs     09/17/24 0523 09/17/24 2009 09/18/24  0932   CREATININE 1.0 1.8* 1.8*   EGFRNORACEVR >60 42* 42*      Plan  - JINA is stable  - Avoid nephrotoxins and renally dose meds for GFR listed above  - Monitor urine output, serial BMP, and adjust therapy as needed  - IVF hydration.    Right groin mass  -etiology due to right inguinal hernia  -repeat CT abdominal pelvis shows no strangulation or obstruction  -recommend following up outpatient for an elective repair      Hypomagnesemia  Replace as needed.    Tobacco dependency  -discussed  cessation  -nicotine patch    Macrocytic anemia  Noted h/o macrocytic anemia but H/H and MCV stable    Suicidal ideation  PEC and will cont.  Patient currently denies any suicide ideations.  Will have Psych reassess need for PEC and inpatient psychiatry.  Psych still recommending PEC and inpatient psych.      Generalized anxiety disorder  -Resume home meds and cont with PRN ativan per [facility] protocol  -scheduled valium stopped due to lethargy  Still tachycardic.  Will use Valium at night.        Tobacco use disorder  Nicotine patch ordered daily       Hypertension  Continue current management.         VTE Risk Mitigation (From admission, onward)           Ordered     Place sequential compression device  Until discontinued         09/11/24 0729     IP VTE LOW RISK PATIENT  Once         09/09/24 0350     Place sequential compression device  Until discontinued         09/09/24 0350                    Discharge Planning   BRANDON: 9/13/2024     Code Status: Full Code   Is the patient medically ready for discharge?:     Reason for patient still in hospital (select all that apply): Patient trending condition  Discharge Plan A: Psychiatric hospital   Discharge Delays: None known at this time              Basil Beatty MD  Department of Hospital Medicine   [facility]

## 2024-09-18 NOTE — PLAN OF CARE
Pt remained free of falls during current shift. Pt appeared to be in no distress and did not receive any prn pain medications. IV fluids was given per MD order. Plan of care and fall precautions reviewed with pt and verbalized understanding. Bed locked, lowered, SR up x2 and call light placed within reach.          Problem: Adult Inpatient Plan of Care  Goal: Plan of Care Review  Outcome: Progressing     Problem: Suicide Risk  Goal: Absence of Self-Harm  Outcome: Progressing     Problem: Skin Injury Risk Increased  Goal: Skin Health and Integrity  Outcome: Progressing     Problem: Confusion Acute  Goal: Optimal Cognitive Function  Outcome: Progressing     Problem: Alcohol Withdrawal  Goal: Alcohol Withdrawal Symptom Control  Outcome: Progressing     Problem: Acute Kidney Injury/Impairment  Goal: Fluid and Electrolyte Balance  Outcome: Progressing

## 2024-09-18 NOTE — ASSESSMENT & PLAN NOTE
Continue replacement with MVI, Thiamine and Folate.  No evidence of DT's at this time.  Valium stopped secondary to excessive sedation.  Still slightly tachycardic with mild tremors.  Will order Valium at night.  PT/OT recommending SNF.  He would not be able to go to SNF as long as he is PEC.  Will reconsult Psych to reevaluate need of PEC and inpatient psych.  Psych still recommending inpatient psychiatry and PEC.  Febrile yesterday.  UA consistent with UTI.  Started on Rocephin.  Doubt fever related to withdrawal.

## 2024-09-18 NOTE — PLAN OF CARE
Problem: Occupational Therapy  Goal: Occupational Therapy Goal  Description: Goals to be met by: 10/02/2024     Patient will increase functional independence with ADLs by performing:    Feeding with [place].  UE Dressing with [place].  LE Dressing with Modified [place].  Grooming while seated with [place].  Toileting from bedside commode with Modified [place] for hygiene and clothing management.   Sitting at edge of bed x8 minutes with [place].  Toilet transfer to bedside commode with Stand-by Assistance.    Outcome: Progressing     OT initial eval completed. Pt requiring increased assist from baseline. Skilled acute OT to follow. Recommend [facility]ANDEN once medically stable.

## 2024-09-18 NOTE — ASSESSMENT & PLAN NOTE
PEC and will cont.  Patient currently denies any suicide ideations.  Will have Psych reassess need for PEC and inpatient psychiatry.  Psych still recommending PEC and inpatient psych.

## 2024-09-18 NOTE — PLAN OF CARE
Problem: Physical Therapy  Goal: Physical Therapy Goal  Description: Goals to be met by: 24     Patient will increase functional independence with mobility by performin. Supine to sit with Modified [place]  2. Sit to supine with Modified [place]  3. Rolling to Left and Right with Modified [place].  4. Sit to stand transfer with Modified [place]  5. Bed to chair transfer with Modified [place] using Rolling Walker  6. Gait  x 50 feet with Modified [place] using Rolling Walker.   7. Stand for 5 minutes with Modified [place] using Rolling Walker  8. Lower extremity exercise program x30 reps per handout, with independence    Outcome: Progressing         Pt took ~5-6 sidesteps today using RW and Max A x2 persons

## 2024-09-18 NOTE — SUBJECTIVE & OBJECTIVE
Interval History: febrile yesterday.    Review of Systems   HENT:  Negative for ear discharge and ear pain.    Eyes:  Negative for discharge and itching.   Endocrine: Negative for cold intolerance and heat intolerance.   Neurological:  Negative for seizures and syncope.     Objective:     Vital Signs (Most Recent):  Temp: 97.9 °F (36.6 °C) (09/18/24 1059)  Pulse: (!) 121 (09/18/24 1500)  Resp: 19 (09/18/24 1059)  BP: 102/62 (09/18/24 1059)  SpO2: 95 % (09/18/24 1059) Vital Signs (24h Range):  Temp:  [97.5 °F (36.4 °C)-101.7 °F (38.7 °C)] 97.9 °F (36.6 °C)  Pulse:  [] 121  Resp:  [18-23] 19  SpO2:  [92 %-97 %] 95 %  BP: ()/(51-80) 102/62     Weight: 67.2 kg (148 lb 2.4 oz)  Body mass index is 23.91 kg/m².    Intake/Output Summary (Last 24 hours) at 9/18/2024 1505  Last data filed at 9/18/2024 1431  Gross per 24 hour   Intake 360 ml   Output 700 ml   Net -340 ml         Physical Exam  Constitutional:       General: He is not in acute distress.     Appearance: He is not toxic-appearing.   HENT:      Head: Normocephalic and atraumatic.      Mouth/Throat:      Mouth: Mucous membranes are dry.      Pharynx: No oropharyngeal exudate or posterior oropharyngeal erythema.   Cardiovascular:      Rate and Rhythm: Regular rhythm. Tachycardia present.   Pulmonary:      Effort: No respiratory distress.      Breath sounds: Normal breath sounds.   Abdominal:      General: Bowel sounds are normal.      Palpations: Abdomen is soft.   Musculoskeletal:         General: No deformity or signs of injury.   Skin:     General: Skin is warm and dry.   Neurological:      Comments: Awakes to verbal stimuli.  Slow to respond, but answering questions and following commands.  Oriented x3             Significant Labs: All pertinent labs within the past 24 hours have been reviewed.  BMP:   Recent Labs   Lab 09/17/24 2009 09/18/24  0932    140*   * 134*   K 3.6 3.3*    102   CO2 21* 22*   BUN 23 27*   CREATININE 1.8*  1.8*   CALCIUM 8.7 9.0   MG 1.2*  --      CBC:   Recent Labs   Lab 09/17/24  0523 09/17/24 2009 09/18/24  0932   WBC 11.04 8.70 10.38   HGB 12.5* 11.2* 12.0*   HCT 36.9* 34.5* 35.6*    236 303       Significant Imaging: I have reviewed all pertinent imaging results/findings within the past 24 hours.

## 2024-09-18 NOTE — NURSING
Bedside Report given to night nurse EMELY Jay.  Walking rounds completed. Visualized and assessed patient NAD noted. Safety precautions maintained and call light within reach.     Chart check completed.   Pt to ED for right abdominal/flank pain since yesterday. Pt denies any pain with urination, has nausea.

## 2024-09-18 NOTE — ASSESSMENT & PLAN NOTE
-Resume home meds and cont with PRN ativan per CIWA protocol  -scheduled valium stopped due to lethargy  Still tachycardic.  Will use Valium at night.

## 2024-09-18 NOTE — ASSESSMENT & PLAN NOTE
JINA is likely due to pre-renal azotemia due to intravascular volume depletion. Baseline creatinine is  1 . Most recent creatinine and eGFR are listed below.  Recent Labs     09/17/24  0523 09/17/24 2009 09/18/24  0932   CREATININE 1.0 1.8* 1.8*   EGFRNORACEVR >60 42* 42*      Plan  - JINA is stable  - Avoid nephrotoxins and renally dose meds for GFR listed above  - Monitor urine output, serial BMP, and adjust therapy as needed  - IVF hydration.
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
## 2024-09-19 PROBLEM — N39.0 UTI (URINARY TRACT INFECTION): Status: ACTIVE | Noted: 2024-09-19

## 2024-09-19 LAB
ANION GAP SERPL CALC-SCNC: 8 MMOL/L (ref 8–16)
ASCENDING AORTA: 3.05 CM
AV INDEX (PROSTH): 0.83
AV MEAN GRADIENT: 5 MMHG
AV PEAK GRADIENT: 8 MMHG
AV VALVE AREA BY VELOCITY RATIO: 2.36 CM²
AV VALVE AREA: 2.53 CM²
AV VELOCITY RATIO: 0.77
BACTERIA UR CULT: ABNORMAL
BACTERIA UR CULT: ABNORMAL
BSA FOR ECHO PROCEDURE: 1.77 M2
BUN SERPL-MCNC: 29 MG/DL (ref 8–23)
CALCIUM SERPL-MCNC: 9.2 MG/DL (ref 8.7–10.5)
CHLORIDE SERPL-SCNC: 104 MMOL/L (ref 95–110)
CO2 SERPL-SCNC: 23 MMOL/L (ref 23–29)
CREAT SERPL-MCNC: 1.4 MG/DL (ref 0.5–1.4)
CV ECHO LV RWT: 0.76 CM
DOP CALC AO PEAK VEL: 1.41 M/S
DOP CALC AO VTI: 27.8 CM
DOP CALC LVOT AREA: 3 CM2
DOP CALC LVOT DIAMETER: 1.97 CM
DOP CALC LVOT PEAK VEL: 1.09 M/S
DOP CALC LVOT STROKE VOLUME: 70.37 CM3
DOP CALC MV VTI: 19.1 CM
DOP CALCLVOT PEAK VEL VTI: 23.1 CM
E WAVE DECELERATION TIME: 196.88 MSEC
E/A RATIO: 0.89
E/E' RATIO: 11.71 M/S
ECHO LV POSTERIOR WALL: 1.54 CM (ref 0.6–1.1)
EST. GFR  (NO RACE VARIABLE): 56 ML/MIN/1.73 M^2
FRACTIONAL SHORTENING: 28 % (ref 28–44)
GLUCOSE SERPL-MCNC: 134 MG/DL (ref 70–110)
INTERVENTRICULAR SEPTUM: 1.62 CM (ref 0.6–1.1)
IVC DIAMETER: 1.95 CM
LA MAJOR: 5.7 CM
LA MINOR: 4.49 CM
LA WIDTH: 4.5 CM
LEFT ATRIUM SIZE: 3.65 CM
LEFT ATRIUM VOLUME INDEX: 39.8 ML/M2
LEFT ATRIUM VOLUME: 70.13 CM3
LEFT INTERNAL DIMENSION IN SYSTOLE: 2.92 CM (ref 2.1–4)
LEFT VENTRICLE DIASTOLIC VOLUME INDEX: 40.37 ML/M2
LEFT VENTRICLE DIASTOLIC VOLUME: 71.05 ML
LEFT VENTRICLE MASS INDEX: 145 G/M2
LEFT VENTRICLE SYSTOLIC VOLUME INDEX: 18.6 ML/M2
LEFT VENTRICLE SYSTOLIC VOLUME: 32.71 ML
LEFT VENTRICULAR INTERNAL DIMENSION IN DIASTOLE: 4.03 CM (ref 3.5–6)
LEFT VENTRICULAR MASS: 255.4 G
LV LATERAL E/E' RATIO: 9.11 M/S
LV SEPTAL E/E' RATIO: 16.4 M/S
LVED V (TEICH): 71.05 ML
LVES V (TEICH): 32.71 ML
LVOT MG: 3.23 MMHG
LVOT MV: 0.86 CM/S
MV MEAN GRADIENT: 2 MMHG
MV PEAK A VEL: 0.92 M/S
MV PEAK E VEL: 0.82 M/S
MV PEAK GRADIENT: 4 MMHG
MV STENOSIS PRESSURE HALF TIME: 57.1 MS
MV VALVE AREA BY CONTINUITY EQUATION: 3.68 CM2
MV VALVE AREA P 1/2 METHOD: 3.85 CM2
OHS CV RV/LV RATIO: 0.82 CM
PISA TR MAX VEL: 2.48 M/S
POTASSIUM SERPL-SCNC: 3.3 MMOL/L (ref 3.5–5.1)
PV PEAK GRADIENT: 5 MMHG
PV PEAK VELOCITY: 1.08 M/S
RA MAJOR: 5.06 CM
RA PRESSURE ESTIMATED: 15 MMHG
RA WIDTH: 3.17 CM
RIGHT VENTRICLE DIASTOLIC BASEL DIMENSION: 3.3 CM
RIGHT VENTRICULAR END-DIASTOLIC DIMENSION: 3.29 CM
RV TB RVSP: 17 MMHG
SINUS: 3.42 CM
SODIUM SERPL-SCNC: 135 MMOL/L (ref 136–145)
STJ: 3 CM
TDI LATERAL: 0.09 M/S
TDI SEPTAL: 0.05 M/S
TDI: 0.07 M/S
TR MAX PG: 25 MMHG
TV REST PULMONARY ARTERY PRESSURE: 40 MMHG
Z-SCORE OF LEFT VENTRICULAR DIMENSION IN END DIASTOLE: -1.89
Z-SCORE OF LEFT VENTRICULAR DIMENSION IN END SYSTOLE: -0.24

## 2024-09-19 PROCEDURE — 25000003 PHARM REV CODE 250: Performed by: HOSPITALIST

## 2024-09-19 PROCEDURE — 80048 BASIC METABOLIC PNL TOTAL CA: CPT | Performed by: HOSPITALIST

## 2024-09-19 PROCEDURE — 25500020 PHARM REV CODE 255: Performed by: HOSPITALIST

## 2024-09-19 PROCEDURE — 63600175 PHARM REV CODE 636 W HCPCS: Performed by: INTERNAL MEDICINE

## 2024-09-19 PROCEDURE — 25000003 PHARM REV CODE 250: Performed by: STUDENT IN AN ORGANIZED HEALTH CARE EDUCATION/TRAINING PROGRAM

## 2024-09-19 PROCEDURE — 36415 COLL VENOUS BLD VENIPUNCTURE: CPT | Performed by: HOSPITALIST

## 2024-09-19 PROCEDURE — 94761 N-INVAS EAR/PLS OXIMETRY MLT: CPT

## 2024-09-19 PROCEDURE — 99232 SBSQ HOSP IP/OBS MODERATE 35: CPT | Mod: 95,AF,HB, | Performed by: PSYCHIATRY & NEUROLOGY

## 2024-09-19 PROCEDURE — 25000003 PHARM REV CODE 250: Performed by: INTERNAL MEDICINE

## 2024-09-19 PROCEDURE — 21400001 HC TELEMETRY ROOM

## 2024-09-19 PROCEDURE — S4991 NICOTINE PATCH NONLEGEND: HCPCS | Performed by: INTERNAL MEDICINE

## 2024-09-19 PROCEDURE — 63600175 PHARM REV CODE 636 W HCPCS: Performed by: HOSPITALIST

## 2024-09-19 PROCEDURE — 99900035 HC TECH TIME PER 15 MIN (STAT)

## 2024-09-19 RX ORDER — METOPROLOL TARTRATE 50 MG/1
100 TABLET ORAL 2 TIMES DAILY
Status: DISCONTINUED | OUTPATIENT
Start: 2024-09-19 | End: 2024-09-27 | Stop reason: HOSPADM

## 2024-09-19 RX ORDER — HEPARIN SODIUM 5000 [USP'U]/ML
5000 INJECTION, SOLUTION INTRAVENOUS; SUBCUTANEOUS EVERY 8 HOURS
Status: DISCONTINUED | OUTPATIENT
Start: 2024-09-19 | End: 2024-09-19

## 2024-09-19 RX ORDER — ENOXAPARIN SODIUM 100 MG/ML
1 INJECTION SUBCUTANEOUS EVERY 12 HOURS
Status: DISCONTINUED | OUTPATIENT
Start: 2024-09-19 | End: 2024-09-20

## 2024-09-19 RX ADMIN — THERA TABS 1 TABLET: TAB at 08:09

## 2024-09-19 RX ADMIN — DULOXETINE HYDROCHLORIDE 60 MG: 30 CAPSULE, DELAYED RELEASE ORAL at 08:09

## 2024-09-19 RX ADMIN — ENOXAPARIN SODIUM 70 MG: 80 INJECTION SUBCUTANEOUS at 10:09

## 2024-09-19 RX ADMIN — METOPROLOL TARTRATE 100 MG: 50 TABLET, FILM COATED ORAL at 08:09

## 2024-09-19 RX ADMIN — ENOXAPARIN SODIUM 70 MG: 80 INJECTION SUBCUTANEOUS at 11:09

## 2024-09-19 RX ADMIN — FOLIC ACID 1 MG: 1 TABLET ORAL at 08:09

## 2024-09-19 RX ADMIN — NICOTINE 1 PATCH: 7 PATCH, EXTENDED RELEASE TRANSDERMAL at 08:09

## 2024-09-19 RX ADMIN — IOHEXOL 75 ML: 350 INJECTION, SOLUTION INTRAVENOUS at 09:09

## 2024-09-19 RX ADMIN — FLUTICASONE PROPIONATE 100 MCG: 50 SPRAY, METERED NASAL at 08:09

## 2024-09-19 RX ADMIN — AMLODIPINE BESYLATE 5 MG: 5 TABLET ORAL at 08:09

## 2024-09-19 RX ADMIN — THIAMINE HCL TAB 100 MG 100 MG: 100 TAB at 08:09

## 2024-09-19 RX ADMIN — GUAIFENESIN 100 MG: 200 SOLUTION ORAL at 05:09

## 2024-09-19 RX ADMIN — PREGABALIN 75 MG: 50 CAPSULE ORAL at 08:09

## 2024-09-19 RX ADMIN — PANTOPRAZOLE SODIUM 40 MG: 40 TABLET, DELAYED RELEASE ORAL at 08:09

## 2024-09-19 RX ADMIN — CEFTRIAXONE 1 G: 1 INJECTION, POWDER, FOR SOLUTION INTRAMUSCULAR; INTRAVENOUS at 08:09

## 2024-09-19 NOTE — PROGRESS NOTES
[facility] Medicine  Progress Note    Patient Name: Israel De Jesus  MRN: [id number]  Patient Class: IP- Inpatient   Admission Date: 9/8/2024  Length of Stay: 10 days  Attending Physician: Basil Beatty MD  Primary Care Provider: Nuha Lynn MD        Subjective:     Principal Problem:Alcohol withdrawal syndrome without complication        HPI:  63 y.o.  man with h/o generalized anxiety d/o, essential HTN, suspect depression, and ETOH abuse presents to the ER with JPSO after reports from family that he was threatening to kill himself. Apparently posted on his Facebook page SI as well as reported to day time Emergency room physician and RNS. To me he denies an SI but he is PEC by ED. While awaiting a bed he started to become more and more tremulous and showing signs of withdrawal. Started on PO valium and Ativan.     Labs overall unremarkable and UDS with THC only. ETOH level 342. Pt. Denies any h/o Withdrawals, does drink daily but I feel is under reporting what he drinks to me. Drinks hard liquor.   COVID negative.     We have been consulted for further management of his withdrawals and admission to the ICU.     Because this patient's clinical condition is critically guarded and PEC he  requires close monitoring of his vitals and withdrawal symptoms, care in an alternative location isn't clinically appropriate for the reasons stated above.              Overview/Hospital Course:  64yo M admitted with suicidial ideation and alcohol withdrawal.  Due to patient being very drowsy and sleeping throughout the day Valium was stopped, continue p.r.n. IV Ativan based on CIWA protocol.  Has a R groin hernia- CT large bowel containing right inguinal hernia, without proximal bowel obstruction or acute inflammatory changes.  Lovenox stopped due to thrombocytopenia.  Once medically clear plan to discharge to inpatient psych.  Patient is more awake and alert today and has not required  Ativan for 72. Patient still very weak worse in lower extremity. He was evaluated by PT and unable to ambulate on his own. MRI brain ordered which was neg. PT/OT recommending SNF.  Unable to go to SNF as patient PEC.  Fevers and tachycardia on 9/17.  UA consistent with UTI.  Started on Rocephin.    Interval History: feeling tired. Low sats today and placed on oxygen.    Review of Systems   HENT:  Negative for ear discharge and ear pain.    Eyes:  Negative for discharge and itching.   Endocrine: Negative for cold intolerance and heat intolerance.   Neurological:  Negative for seizures and syncope.     Objective:     Vital Signs (Most Recent):  Temp: 98.5 °F (36.9 °C) (09/19/24 1125)  Pulse: 97 (09/19/24 1125)  Resp: 18 (09/19/24 1125)  BP: 115/70 (09/19/24 1125)  SpO2: 97 % (09/19/24 1125) Vital Signs (24h Range):  Temp:  [98 °F (36.7 °C)-98.8 °F (37.1 °C)] 98.5 °F (36.9 °C)  Pulse:  [] 97  Resp:  [18-19] 18  SpO2:  [88 %-97 %] 97 %  BP: (104-146)/(63-70) 115/70     Weight: 67.2 kg (148 lb 2.4 oz)  Body mass index is 23.91 kg/m².    Intake/Output Summary (Last 24 hours) at 9/19/2024 1447  Last data filed at 9/19/2024 1221  Gross per 24 hour   Intake 720 ml   Output 801 ml   Net -81 ml         Physical Exam  Constitutional:       General: He is not in acute distress.     Appearance: He is not toxic-appearing.   HENT:      Head: Normocephalic and atraumatic.      Mouth/Throat:      Mouth: Mucous membranes are dry.      Pharynx: No oropharyngeal exudate or posterior oropharyngeal erythema.   Cardiovascular:      Rate and Rhythm: Regular rhythm. Tachycardia present.   Pulmonary:      Effort: No respiratory distress.      Breath sounds: Normal breath sounds.   Abdominal:      General: Bowel sounds are normal.      Palpations: Abdomen is soft.   Musculoskeletal:         General: No deformity or signs of injury.   Skin:     General: Skin is warm and dry.   Neurological:      Comments: Awakes to verbal stimuli.  Slow  to respond, but answering questions and following commands.  Oriented x3             Significant Labs: All pertinent labs within the past 24 hours have been reviewed.  BMP:   Recent Labs   Lab 09/17/24 2009 09/18/24  0932 09/19/24  0806      < > 134*   *   < > 135*   K 3.6   < > 3.3*      < > 104   CO2 21*   < > 23   BUN 23   < > 29*   CREATININE 1.8*   < > 1.4   CALCIUM 8.7   < > 9.2   MG 1.2*  --   --     < > = values in this interval not displayed.     CBC:   Recent Labs   Lab 09/17/24 2009 09/18/24  0932   WBC 8.70 10.38   HGB 11.2* 12.0*   HCT 34.5* 35.6*    303       Significant Imaging: I have reviewed all pertinent imaging results/findings within the past 24 hours.    Assessment/Plan:      * Alcohol withdrawal syndrome without complication  Continue replacement with MVI, Thiamine and Folate.  No evidence of DT's at this time.  Valium stopped secondary to excessive sedation.  Still slightly tachycardic with mild tremors.  Will order Valium at night.  PT/OT recommending SNF.  He would not be able to go to SNF as long as he is PEC.  Will reconsult Psych to reevaluate need of PEC and inpatient psych.  Psych still recommending inpatient psychiatry and PEC.  Patient still tachycardic.  Now hypoxic this morning.  Will check CTA of chest to rule out PE.    UTI (urinary tract infection)  Started on Rocephin.      JINA (acute kidney injury)  JINA is likely due to pre-renal azotemia due to intravascular volume depletion. Baseline creatinine is  1 . Most recent creatinine and eGFR are listed below.  Recent Labs     09/17/24 2009 09/18/24 0932 09/19/24  0806   CREATININE 1.8* 1.8* 1.4   EGFRNORACEVR 42* 42* 56*        Plan  - JINA is stable  - Avoid nephrotoxins and renally dose meds for GFR listed above  - Monitor urine output, serial BMP, and adjust therapy as needed  - Improving Creat.    Right groin mass  -etiology due to right inguinal hernia  -repeat CT abdominal pelvis shows no  strangulation or obstruction  -recommend following up outpatient for an elective repair      Hypomagnesemia  Replace as needed.    Tobacco dependency  -discussed cessation  -nicotine patch    Macrocytic anemia  Noted h/o macrocytic anemia but H/H and MCV stable    Suicidal ideation  PEC and will cont.  Patient currently denies any suicide ideations.  Will have Psych reassess need for PEC and inpatient psychiatry.  Psych still recommending PEC and inpatient psych.      Generalized anxiety disorder  -Resume home meds and cont with PRN ativan per CIWA protocol  -scheduled valium stopped due to lethargy        Tobacco use disorder  Nicotine patch ordered daily       Hypertension  Continue current management.         VTE Risk Mitigation (From admission, onward)           Ordered     enoxaparin injection 70 mg  Every 12 hours         09/19/24 1036     Place sequential compression device  Until discontinued         09/11/24 0729     IP VTE LOW RISK PATIENT  Once         09/09/24 0350     Place sequential compression device  Until discontinued         09/09/24 0350                    Discharge Planning   BRANDON: 9/13/2024     Code Status: Full Code   Is the patient medically ready for discharge?:     Reason for patient still in hospital (select all that apply): Patient trending condition and Treatment  Discharge Plan A: Psychiatric hospital   Discharge Delays: None known at this time              Basil Beatty MD  Department of Hospital Medicine   [facility] - Observation

## 2024-09-19 NOTE — SUBJECTIVE & OBJECTIVE
Interval History: feeling tired. Low sats today and placed on oxygen.    Review of Systems   HENT:  Negative for ear discharge and ear pain.    Eyes:  Negative for discharge and itching.   Endocrine: Negative for cold intolerance and heat intolerance.   Neurological:  Negative for seizures and syncope.     Objective:     Vital Signs (Most Recent):  Temp: 98.5 °F (36.9 °C) (09/19/24 1125)  Pulse: 97 (09/19/24 1125)  Resp: 18 (09/19/24 1125)  BP: 115/70 (09/19/24 1125)  SpO2: 97 % (09/19/24 1125) Vital Signs (24h Range):  Temp:  [98 °F (36.7 °C)-98.8 °F (37.1 °C)] 98.5 °F (36.9 °C)  Pulse:  [] 97  Resp:  [18-19] 18  SpO2:  [88 %-97 %] 97 %  BP: (104-146)/(63-70) 115/70     Weight: 67.2 kg (148 lb 2.4 oz)  Body mass index is 23.91 kg/m².    Intake/Output Summary (Last 24 hours) at 9/19/2024 1447  Last data filed at 9/19/2024 1221  Gross per 24 hour   Intake 720 ml   Output 801 ml   Net -81 ml         Physical Exam  Constitutional:       General: He is not in acute distress.     Appearance: He is not toxic-appearing.   HENT:      Head: Normocephalic and atraumatic.      Mouth/Throat:      Mouth: Mucous membranes are dry.      Pharynx: No oropharyngeal exudate or posterior oropharyngeal erythema.   Cardiovascular:      Rate and Rhythm: Regular rhythm. Tachycardia present.   Pulmonary:      Effort: No respiratory distress.      Breath sounds: Normal breath sounds.   Abdominal:      General: Bowel sounds are normal.      Palpations: Abdomen is soft.   Musculoskeletal:         General: No deformity or signs of injury.   Skin:     General: Skin is warm and dry.   Neurological:      Comments: Awakes to verbal stimuli.  Slow to respond, but answering questions and following commands.  Oriented x3             Significant Labs: All pertinent labs within the past 24 hours have been reviewed.  BMP:   Recent Labs   Lab 09/17/24  2009 09/18/24  0932 09/19/24  0806      < > 134*   *   < > 135*   K 3.6   < > 3.3*       < > 104   CO2 21*   < > 23   BUN 23   < > 29*   CREATININE 1.8*   < > 1.4   CALCIUM 8.7   < > 9.2   MG 1.2*  --   --     < > = values in this interval not displayed.     CBC:   Recent Labs   Lab 09/17/24 2009 09/18/24  0932   WBC 8.70 10.38   HGB 11.2* 12.0*   HCT 34.5* 35.6*    303       Significant Imaging: I have reviewed all pertinent imaging results/findings within the past 24 hours.

## 2024-09-19 NOTE — ASSESSMENT & PLAN NOTE
JINA is likely due to pre-renal azotemia due to intravascular volume depletion. Baseline creatinine is  1 . Most recent creatinine and eGFR are listed below.  Recent Labs     09/17/24 2009 09/18/24  0932 09/19/24  0806   CREATININE 1.8* 1.8* 1.4   EGFRNORACEVR 42* 42* 56*        Plan  - JINA is stable  - Avoid nephrotoxins and renally dose meds for GFR listed above  - Monitor urine output, serial BMP, and adjust therapy as needed  - Improving Creat.

## 2024-09-19 NOTE — NURSING
[facility]  Nurses Note -- 4 Eyes      9/18/2024       Skin assessed on: Q Shift      [x] No Pressure Injuries Present    []Prevention Measures Documented    [] Yes LDA  for Pressure Injury Previously documented     [] Yes New Pressure Injury Discovered   [] LDA for New Pressure Injury Added      Attending RN:  Dennis Gtz RN     Second RN:  Misty Sandoval RN

## 2024-09-19 NOTE — PT/OT/SLP PROGRESS
Occupational Therapy      Patient Name:  Israel De Jesus   MRN:  [id number]    Patient not seen today secondary to Testing/imaging (xray/CT/MRI) (in CT scan (PE studies)). Will follow-up as appropriate.    9/19/2024   no

## 2024-09-19 NOTE — PLAN OF CARE
Problem: Adult Inpatient Plan of Care  Goal: Plan of Care Review  Outcome: Progressing     Problem: Adult Inpatient Plan of Care  Goal: Patient-Specific Goal (Individualized)  Outcome: Progressing     Problem: Adult Inpatient Plan of Care  Goal: Absence of Hospital-Acquired Illness or Injury  Outcome: Progressing     Problem: Adult Inpatient Plan of Care  Goal: Optimal Comfort and Wellbeing  Outcome: Progressing     Problem: Adult Inpatient Plan of Care  Goal: Readiness for Transition of Care  Outcome: Progressing     Problem: Suicide Risk  Goal: Absence of Self-Harm  Outcome: Progressing     Problem: Skin Injury Risk Increased  Goal: Skin Health and Integrity  Outcome: Progressing     Problem: Fall Injury Risk  Goal: Absence of Fall and Fall-Related Injury  Outcome: Progressing     Problem: Alcohol Withdrawal  Goal: Alcohol Withdrawal Symptom Control  Outcome: Progressing     Problem: Acute Kidney Injury/Impairment  Goal: Improved Oral Intake  Outcome: Progressing    Bedrest promoted no complaints of discomfort

## 2024-09-19 NOTE — NURSING
[facility] MEWS TRIGGER FOLLOW UP       MEWS Monitoring, Score is: 4  Indication for review: LOC charted as responds to voice; pt is alert      Charge Nurse, Susie contacted, no concerns verbalized at this time, instructed to call [phone] for further concerns or assistance.

## 2024-09-19 NOTE — PT/OT/SLP PROGRESS
Physical Therapy      Patient Name:  Israel De Jesus   MRN:  [id number]    Patient not seen today secondary to Off the floor for CT scan ( PE studies) .  Will follow-up as appropriate  .

## 2024-09-19 NOTE — NURSING
Patient found to be 87-88% on room air, at rest. Placed on 2.5L nc for 94%. Patient  bpm. Gave p.o. metoprolol 100mg.   /67

## 2024-09-19 NOTE — ASSESSMENT & PLAN NOTE
Continue replacement with MVI, Thiamine and Folate.  No evidence of DT's at this time.  Valium stopped secondary to excessive sedation.  Still slightly tachycardic with mild tremors.  Will order Valium at night.  PT/OT recommending SNF.  He would not be able to go to SNF as long as he is PEC.  Will reconsult Psych to reevaluate need of PEC and inpatient psych.  Psych still recommending inpatient psychiatry and PEC.  Patient still tachycardic.  Now hypoxic this morning.  Will check CTA of chest to rule out PE.

## 2024-09-20 PROBLEM — I26.99 ACUTE PULMONARY EMBOLISM WITHOUT ACUTE COR PULMONALE: Status: ACTIVE | Noted: 2024-09-20

## 2024-09-20 LAB
ANION GAP SERPL CALC-SCNC: 10 MMOL/L (ref 8–16)
BASOPHILS # BLD AUTO: 0.09 K/UL (ref 0–0.2)
BASOPHILS NFR BLD: 0.8 % (ref 0–1.9)
BUN SERPL-MCNC: 20 MG/DL (ref 8–23)
CALCIUM SERPL-MCNC: 8.9 MG/DL (ref 8.7–10.5)
CHLORIDE SERPL-SCNC: 105 MMOL/L (ref 95–110)
CO2 SERPL-SCNC: 22 MMOL/L (ref 23–29)
CREAT SERPL-MCNC: 0.9 MG/DL (ref 0.5–1.4)
DIFFERENTIAL METHOD BLD: ABNORMAL
EOSINOPHIL # BLD AUTO: 0.1 K/UL (ref 0–0.5)
EOSINOPHIL NFR BLD: 0.9 % (ref 0–8)
ERYTHROCYTE [DISTWIDTH] IN BLOOD BY AUTOMATED COUNT: 17.8 % (ref 11.5–14.5)
EST. GFR  (NO RACE VARIABLE): >60 ML/MIN/1.73 M^2
GLUCOSE SERPL-MCNC: 81 MG/DL (ref 70–110)
HCT VFR BLD AUTO: 33.3 % (ref 40–54)
HGB BLD-MCNC: 10.6 G/DL (ref 14–18)
IMM GRANULOCYTES # BLD AUTO: 0.16 K/UL (ref 0–0.04)
IMM GRANULOCYTES NFR BLD AUTO: 1.4 % (ref 0–0.5)
LYMPHOCYTES # BLD AUTO: 1.5 K/UL (ref 1–4.8)
LYMPHOCYTES NFR BLD: 13 % (ref 18–48)
MCH RBC QN AUTO: 32.9 PG (ref 27–31)
MCHC RBC AUTO-ENTMCNC: 31.8 G/DL (ref 32–36)
MCV RBC AUTO: 103 FL (ref 82–98)
MONOCYTES # BLD AUTO: 1 K/UL (ref 0.3–1)
MONOCYTES NFR BLD: 9.1 % (ref 4–15)
NEUTROPHILS # BLD AUTO: 8.4 K/UL (ref 1.8–7.7)
NEUTROPHILS NFR BLD: 74.8 % (ref 38–73)
NRBC BLD-RTO: 0 /100 WBC
PLATELET # BLD AUTO: 368 K/UL (ref 150–450)
PMV BLD AUTO: 10.9 FL (ref 9.2–12.9)
POTASSIUM SERPL-SCNC: 3.4 MMOL/L (ref 3.5–5.1)
RBC # BLD AUTO: 3.22 M/UL (ref 4.6–6.2)
SODIUM SERPL-SCNC: 137 MMOL/L (ref 136–145)
WBC # BLD AUTO: 11.24 K/UL (ref 3.9–12.7)

## 2024-09-20 PROCEDURE — 21400001 HC TELEMETRY ROOM

## 2024-09-20 PROCEDURE — 25000003 PHARM REV CODE 250: Performed by: HOSPITALIST

## 2024-09-20 PROCEDURE — 25000003 PHARM REV CODE 250: Performed by: INTERNAL MEDICINE

## 2024-09-20 PROCEDURE — 85025 COMPLETE CBC W/AUTO DIFF WBC: CPT | Performed by: HOSPITALIST

## 2024-09-20 PROCEDURE — 25000003 PHARM REV CODE 250: Performed by: STUDENT IN AN ORGANIZED HEALTH CARE EDUCATION/TRAINING PROGRAM

## 2024-09-20 PROCEDURE — 97530 THERAPEUTIC ACTIVITIES: CPT | Mod: CQ

## 2024-09-20 PROCEDURE — 80048 BASIC METABOLIC PNL TOTAL CA: CPT | Performed by: HOSPITALIST

## 2024-09-20 PROCEDURE — 97530 THERAPEUTIC ACTIVITIES: CPT

## 2024-09-20 PROCEDURE — 97110 THERAPEUTIC EXERCISES: CPT | Mod: CQ

## 2024-09-20 PROCEDURE — 63600175 PHARM REV CODE 636 W HCPCS: Performed by: STUDENT IN AN ORGANIZED HEALTH CARE EDUCATION/TRAINING PROGRAM

## 2024-09-20 PROCEDURE — S4991 NICOTINE PATCH NONLEGEND: HCPCS | Performed by: INTERNAL MEDICINE

## 2024-09-20 PROCEDURE — 97535 SELF CARE MNGMENT TRAINING: CPT

## 2024-09-20 RX ORDER — LEVOFLOXACIN 750 MG/1
750 TABLET ORAL DAILY
Status: DISCONTINUED | OUTPATIENT
Start: 2024-09-20 | End: 2024-09-23

## 2024-09-20 RX ADMIN — FOLIC ACID 1 MG: 1 TABLET ORAL at 08:09

## 2024-09-20 RX ADMIN — TRAZODONE HYDROCHLORIDE 100 MG: 50 TABLET ORAL at 08:09

## 2024-09-20 RX ADMIN — FLUTICASONE PROPIONATE 100 MCG: 50 SPRAY, METERED NASAL at 08:09

## 2024-09-20 RX ADMIN — LEVOFLOXACIN 750 MG: 750 TABLET, FILM COATED ORAL at 12:09

## 2024-09-20 RX ADMIN — METOPROLOL TARTRATE 100 MG: 50 TABLET, FILM COATED ORAL at 08:09

## 2024-09-20 RX ADMIN — APIXABAN 10 MG: 5 TABLET, FILM COATED ORAL at 08:09

## 2024-09-20 RX ADMIN — PANTOPRAZOLE SODIUM 40 MG: 40 TABLET, DELAYED RELEASE ORAL at 08:09

## 2024-09-20 RX ADMIN — LORAZEPAM 2 MG: 2 INJECTION INTRAMUSCULAR; INTRAVENOUS at 03:09

## 2024-09-20 RX ADMIN — THERA TABS 1 TABLET: TAB at 08:09

## 2024-09-20 RX ADMIN — AMLODIPINE BESYLATE 5 MG: 5 TABLET ORAL at 08:09

## 2024-09-20 RX ADMIN — DULOXETINE HYDROCHLORIDE 60 MG: 30 CAPSULE, DELAYED RELEASE ORAL at 08:09

## 2024-09-20 RX ADMIN — THIAMINE HCL TAB 100 MG 100 MG: 100 TAB at 08:09

## 2024-09-20 RX ADMIN — APIXABAN 10 MG: 5 TABLET, FILM COATED ORAL at 09:09

## 2024-09-20 RX ADMIN — NICOTINE 1 PATCH: 7 PATCH, EXTENDED RELEASE TRANSDERMAL at 08:09

## 2024-09-20 NOTE — ASSESSMENT & PLAN NOTE
PEC and will cont.  Patient currently denies any suicide ideations.  Will have Psych reassess need for PEC and inpatient psychiatry.  Patient reevaluated by Psych last night.  No suicidal ideations.  Ok to remove PEC and no need for inpatient psych on discharge.

## 2024-09-20 NOTE — PROGRESS NOTES
[facility] [place] - Observation  Adult Nutrition  Progress Note    SUMMARY     Recommendations  Recommendation:   1. Continue caridac diet   2. Continue multivitamin   3. Monitor need for ONS   4. Monitor weight and labs    Goals: Pt will consume 50-75% of meals by RD follow up    Nutrition Goal Status: new  Communication of RD Recs:  (POC)    Assessment and Plan  Nutrition Problem  Inadequate B vitamin intake     Related to (etiology):   Malabsorption     Signs and Symptoms (as evidenced by):   Alcohol withdrawal syndrome without complications     Interventions:  Collaboration with other providers     Nutrition Diagnosis Status:   Continues    Malnutrition Assessment  Weight Loss (Malnutrition):  (Noted 12 lb weight loss in 7 months)   Orbital Region (Subcutaneous Fat Loss): well nourished  Upper Arm Region (Subcutaneous Fat Loss): well nourished  Thoracic and Lumbar Region: well nourished   [place] Region (Muscle Loss): well nourished  Clavicle Bone Region (Muscle Loss): well nourished  Clavicle and Acromion Bone Region (Muscle Loss): well nourished  Scapular Bone Region (Muscle Loss): well nourished  Dorsal Hand (Muscle Loss): well nourished  Patellar Region (Muscle Loss): well nourished  Anterior Thigh Region (Muscle Loss): well nourished  Posterior Calf Region (Muscle Loss): well nourished     Reason for Assessment  Reason For Assessment: RD follow-up  Diagnosis: other (see comments) (alcohol withdrawal syndrome without complications)  Relevant Medical History: HTN. macrocytic anemia  Interdisciplinary Rounds: did not attend  General Information Comments: Pt is currently on a cardiac diet. Rigo-16/skin intact. Noted 100% meal intake. Admitted with suidicidal ideations and alcohol withdrawal. CT showing right inguinal hernia, w/o proximal bowel obstruction or acute imflammatory changes. Unable to go to SNF as pt PEC. Noted 12 lb weight loss in 7 months. Pt appears nourished 9/20/24  Nutrition Discharge Planning: d/c on  "cardiac diet    Nutrition Risk Screen  Nutrition Risk Screen: no indicators present    Nutrition/Diet History  Patient Reported Diet/Restrictions/Preferences: heart healthy  Food Preferences: NEELA  Spiritual, Cultural Beliefs, [employer] Practices, Values that Affect Care: no  Factors Affecting Nutritional Intake: impaired cognitive status/motor control    Nutrition Related Social Determinants of Health: SDOH: Unable to assess at this time.     Anthropometrics  Temp: 99.7 °F (37.6 °C)  Height Method: Stated  Height: 5' 6" (167.6 cm)  Height (inches): 66 in  Weight Method: Standard Scale  Weight: 67.2 kg (148 lb 2.4 oz)  Weight (lb): 148.15 lb  Ideal Body Weight (IBW), Male: 142 lb  % Ideal Body Weight, Male (lb): 104.33 %  BMI (Calculated): 23.9  BMI Grade: 18.5-24.9 - normal  Usual Body Weight (UBW), k.6 kg (24)  % Usual Body Weight: 92.76  % Weight Change From Usual Weight: -7.44 %     Lab/Procedures/Meds  Pertinent Labs Reviewed: reviewed  Pertinent Labs Comments: K 3.4, CO2 22, Phos 2.5, magnesium 1.2  Pertinent Medications Reviewed: reviewed  Pertinent Medications Comments: folic acid, multivitamin, nicotine, pantoprazole, pregabalin, thiamine    Estimated/Assessed Needs  Weight Used For Calorie Calculations: 67.2 kg (148 lb 2.4 oz)  Energy Calorie Requirements (kcal): 2016 kcals (30 kcals/kg)  Energy Need Method: Kcal/kg  Protein Requirements: 80g (1.2g/kg)  Weight Used For Protein Calculations: 67.2 kg (148 lb 2.4 oz)     Estimated Fluid Requirement Method: RDA Method  RDA Method (mL): 2016     Nutrition Prescription Ordered  Current Diet Order: Cardiac diet    Evaluation of Received Nutrient/Fluid Intake  I/O: 120/0  Energy Calories Required: meeting needs  Protein Required: meeting needs  Fluid Required: meeting needs  Comments: PARTH-23  Tolerance: tolerating  % Intake of Estimated Energy Needs: 75 - 100 %  % Meal Intake: 75 - 100 %    Nutrition Risk  Level of Risk/Frequency of Follow-up: " moderate (2x/weekly)     Monitor and Evaluation  Food and Nutrient Intake: energy intake, food and beverage intake  Food and Nutrient Adminstration: diet order  Anthropometric Measurements: weight, weight change, body mass index  Biochemical Data, Medical Tests and Procedures: electrolyte and renal panel, gastrointestinal profile, inflammatory profile, lipid profile  Nutrition-Focused Physical Findings: overall appearance     Nutrition Follow-Up  RD Follow-up?: Yes

## 2024-09-20 NOTE — PROGRESS NOTES
[facility] Medicine  Progress Note    Patient Name: Israel De Jesus  MRN: [id number]  Patient Class: IP- Inpatient   Admission Date: 9/8/2024  Length of Stay: 11 days  Attending Physician: Basil Beatty MD  Primary Care Provider: Nuha Lynn MD        Subjective:     Principal Problem:Alcohol withdrawal syndrome without complication        HPI:  63 y.o.  man with h/o generalized anxiety d/o, essential HTN, suspect depression, and ETOH abuse presents to the ER with JPSO after reports from family that he was threatening to kill himself. Apparently posted on his Facebook page SI as well as reported to day time Emergency room physician and RNS. To me he denies an SI but he is PEC by ED. While awaiting a bed he started to become more and more tremulous and showing signs of withdrawal. Started on PO valium and Ativan.     Labs overall unremarkable and UDS with THC only. ETOH level 342. Pt. Denies any h/o Withdrawals, does drink daily but I feel is under reporting what he drinks to me. Drinks hard liquor.   COVID negative.     We have been consulted for further management of his withdrawals and admission to the ICU.     Because this patient's clinical condition is critically guarded and PEC he  requires close monitoring of his vitals and withdrawal symptoms, care in an alternative location isn't clinically appropriate for the reasons stated above.              Overview/Hospital Course:  62yo M admitted with suicidial ideation and alcohol withdrawal.  Due to patient being very drowsy and sleeping throughout the day Valium was stopped, continue p.r.n. IV Ativan based on CIWA protocol.  Has a R groin hernia- CT large bowel containing right inguinal hernia, without proximal bowel obstruction or acute inflammatory changes.  Lovenox stopped due to thrombocytopenia.  Once medically clear plan to discharge to inpatient psych.  Patient is more awake and alert today and has not required  Ativan for 72. Patient still very weak worse in lower extremity. He was evaluated by PT and unable to ambulate on his own. MRI brain ordered which was neg. PT/OT recommending SNF.  Unable to go to SNF as patient PEC.  Fevers and tachycardia on 9/17.  UA consistent with UTI.  Started on Rocephin.  Persistent tachycardia and noted to be slightly hypoxic.  CTA chest showing bilateral lower lobe PE with no evidence of heart strain.  Started on therapeutic Lovenox.    Interval History: no new complaints.  Tired and concerned about paying his bills.    Review of Systems   HENT:  Negative for ear discharge and ear pain.    Eyes:  Negative for discharge and itching.   Endocrine: Negative for cold intolerance and heat intolerance.   Neurological:  Negative for seizures and syncope.     Objective:     Vital Signs (Most Recent):  Temp: 97.8 °F (36.6 °C) (09/20/24 0736)  Pulse: 104 (09/20/24 0826)  Resp: 18 (09/20/24 0736)  BP: (!) 140/69 (09/20/24 0736)  SpO2: 97 % (09/20/24 0736) Vital Signs (24h Range):  Temp:  [97.8 °F (36.6 °C)-98.5 °F (36.9 °C)] 97.8 °F (36.6 °C)  Pulse:  [] 104  Resp:  [18-20] 18  SpO2:  [94 %-98 %] 97 %  BP: (112-140)/(60-76) 140/69     Weight: 67.2 kg (148 lb 2.4 oz)  Body mass index is 23.91 kg/m².    Intake/Output Summary (Last 24 hours) at 9/20/2024 1056  Last data filed at 9/20/2024 0819  Gross per 24 hour   Intake 360 ml   Output --   Net 360 ml         Physical Exam  Constitutional:       General: He is not in acute distress.     Appearance: He is not toxic-appearing.   HENT:      Head: Normocephalic and atraumatic.      Mouth/Throat:      Mouth: Mucous membranes are dry.      Pharynx: No oropharyngeal exudate or posterior oropharyngeal erythema.   Cardiovascular:      Rate and Rhythm: Regular rhythm. Tachycardia present.   Pulmonary:      Effort: No respiratory distress.      Breath sounds: Normal breath sounds.   Abdominal:      General: Bowel sounds are normal.      Palpations: Abdomen  is soft.   Musculoskeletal:         General: No deformity or signs of injury.   Skin:     General: Skin is warm and dry.   Neurological:      Comments: Awakes and alert.  Slow to respond, but answering questions and following commands.  Oriented to person and place.  Confused about date of the month.             Significant Labs: All pertinent labs within the past 24 hours have been reviewed.  BMP:   Recent Labs   Lab 09/20/24 0427   GLU 81      K 3.4*      CO2 22*   BUN 20   CREATININE 0.9   CALCIUM 8.9     CBC:   Recent Labs   Lab 09/20/24 0427   WBC 11.24   HGB 10.6*   HCT 33.3*          Significant Imaging: I have reviewed all pertinent imaging results/findings within the past 24 hours.    Assessment/Plan:      * Alcohol withdrawal syndrome without complication  Continue replacement with MVI, Thiamine and Folate.  No evidence of DT's at this time.  Valium stopped secondary to excessive sedation.  Still slightly tachycardic with mild tremors.  Will order Valium at night.  PT/OT recommending SNF.  He would not be able to go to SNF as long as he is PEC.  Will reconsult Psych to reevaluate need of PEC and inpatient psych.  Psych still recommending inpatient psychiatry and PEC.  Patient still tachycardic and slightly hypoxic.  CTA does show bilateral PE.  Started anticoagulation with improvement.  Will monitor over the weekend.  PT/OT recommending SNF.  Hopefully SNF early next week.    Acute pulmonary embolism without acute cor pulmonale  Patient with persistent tachycardia.  Noted to be hypoxic.  CTA showing bilateral lower lobe PE.  No evidence of right heart strain.  Started on therapeutic Lovenox.  Hemodynamically stable.  Transition to Eliquis.      UTI (urinary tract infection)  Started on Rocephin.  Transition to oral ABX's.      JINA (acute kidney injury)  JINA is likely due to pre-renal azotemia due to intravascular volume depletion. Baseline creatinine is  1 . Most recent creatinine and  eGFR are listed below.  Recent Labs     09/18/24  0932 09/19/24  0806 09/20/24  0427   CREATININE 1.8* 1.4 0.9   EGFRNORACEVR 42* 56* >60        Plan  - JINA is stable  - Avoid nephrotoxins and renally dose meds for GFR listed above  - Monitor urine output, serial BMP, and adjust therapy as needed  - Improving Creat.  Now resolved.    Right groin mass  -etiology due to right inguinal hernia  -repeat CT abdominal pelvis shows no strangulation or obstruction  -recommend following up outpatient for an elective repair      Hypomagnesemia  Replace as needed.    Tobacco dependency  -discussed cessation  -nicotine patch    Macrocytic anemia  Noted h/o macrocytic anemia but H/H and MCV stable    Suicidal ideation  PEC and will cont.  Patient currently denies any suicide ideations.  Will have Psych reassess need for PEC and inpatient psychiatry.  Patient reevaluated by Psych last night.  No suicidal ideations.  Ok to remove PEC and no need for inpatient psych on discharge.      Generalized anxiety disorder  -Resume home meds and cont with PRN ativan per CIWA protocol  -scheduled valium stopped due to lethargy        Tobacco use disorder  Nicotine patch ordered daily       Hypertension  Continue current management.         VTE Risk Mitigation (From admission, onward)           Ordered     apixaban tablet 10 mg  2 times daily         09/20/24 0825     Place sequential compression device  Until discontinued         09/11/24 0729     IP VTE LOW RISK PATIENT  Once         09/09/24 0350     Place sequential compression device  Until discontinued         09/09/24 0350                    Discharge Planning   BRANDON: 9/13/2024     Code Status: Full Code   Is the patient medically ready for discharge?:     Reason for patient still in hospital (select all that apply): Patient trending condition and Treatment  Discharge Plan A: Psychiatric hospital   Discharge Delays: None known at this time              Basil Beatty MD  Department of  [facility] - Observation

## 2024-09-20 NOTE — ASSESSMENT & PLAN NOTE
Patient with persistent tachycardia.  Noted to be hypoxic.  CTA showing bilateral lower lobe PE.  No evidence of right heart strain.  Started on therapeutic Lovenox.  Hemodynamically stable.  Transition to Eliquis.

## 2024-09-20 NOTE — ASSESSMENT & PLAN NOTE
JINA is likely due to pre-renal azotemia due to intravascular volume depletion. Baseline creatinine is  1 . Most recent creatinine and eGFR are listed below.  Recent Labs     09/18/24  0932 09/19/24  0806 09/20/24  0427   CREATININE 1.8* 1.4 0.9   EGFRNORACEVR 42* 56* >60        Plan  - JINA is stable  - Avoid nephrotoxins and renally dose meds for GFR listed above  - Monitor urine output, serial BMP, and adjust therapy as needed  - Improving Creat.  Now resolved.

## 2024-09-20 NOTE — PT/OT/SLP PROGRESS
Physical Therapy Treatment    Patient Name:  Israel De Jesus   MRN:  [id number]    Recommendations:     Discharge Recommendations: Moderate Intensity Therapy  Discharge Equipment Recommendations: to be determined by next level of care  Barriers to discharge: None    Assessment:     Israel D eJesus is a 63 y.o. male admitted with a medical diagnosis of Alcohol withdrawal syndrome without complication.  He presents with the following impairments/functional limitations: weakness, impaired endurance, impaired self care skills, impaired functional mobility, gait instability, decreased lower extremity function, decreased upper extremity function, decreased coordination, decreased ROM, decreased safety awareness, impaired balance, pain, impaired cardiopulmonary response to activity, impaired cognition, impaired coordination .    Rehab Prognosis: Good; patient would benefit from acute skilled PT services to address these deficits and reach maximum level of function.    Recent Surgery: * No surgery found *      Plan:     During this hospitalization, patient to be seen 4 x/week (3-4x per week) to address the identified rehab impairments via gait training, therapeutic activities, therapeutic exercises, neuromuscular re-education and progress toward the following goals:    Plan of Care Expires:  09/21/24    Subjective     Chief Complaint: weakness   Patient/Family Comments/goals: pt is pleasant and agreeable to therapy   Pain/Comfort:  Pain Rating 1: 0/10  Pain Rating Post-Intervention 1: 0/10      Objective:     Communicated with nurse  prior to session.  Patient found HOB elevated with telemetry, bed alarm, peripheral IV, Condom Catheter, pressure relief boots upon PT entry to room.     General Precautions: Standard, fall, DT, respiratory  Orthopedic Precautions: N/A  Braces: N/A  Respiratory Status: Room air   SpO2 : 93% - 95% on RA,  - 113 with exertions   Functional Mobility:  Bed Mobility:     Rolling Left:   contact guard assistance and minimum assistance  Scooting: minimum assistance to scoot anteriorly EOB , moderate assistance and of 2 persons to scoot along EOB , CGA to scoot to HOB with bed in trendelenburg position.   Supine to Sit: moderate assistance, HOB elevated , bedside rail   Sit to Supine: moderate assistance  Transfers:  V/T cues for safety technique and pt required B knees blocked to prevent buckling.   Sit <>  Stand: x 2 trials with MOD A x 2 with RW , 1 trial maximal assistance and of 2 persons with B  hand-held assist   Performed 5 sit<> stand x 2 trials from bed with BUE push off from bedside rails, min/mod A x 2 .   Gait:  Patient ambulated 2-3 side steps and 2 side steps on level tile with Rolling Walker and B HHA  with Max Assistance  x 2 .  Pt with demonstarting an unsteady gait with increased body tremors/shakiness, widen JOSEPH, posterior leaning, decreased bo, increased time in double stance, decreased velocity of limb motion, decreased step length, decreased swing-to-stance ratio, decreased toe-to-floor clearance and decreased weight-shifting ability.Impairments contributing to gait deviations include impaired balance, decreased flexibility, pain, impaired postural control, decreased ROM and decreased strength. V/T cues for safety technique and walker management.   Balance: fair-/fair  in sitting, poor in standing         AM-PAC 6 CLICK MOBILITY  Turning over in bed (including adjusting bedclothes, sheets and blankets)?: 3  Sitting down on and standing up from a chair with arms (e.g., wheelchair, bedside commode, etc.): 2  Moving from lying on back to sitting on the side of the bed?: 2  Moving to and from a bed to a chair (including a wheelchair)?: 2  Need to walk in hospital room?: 2  Climbing 3-5 steps with a railing?: 1  Basic Mobility Total Score: 12       Treatment & Education:  Lower Extremity Exercises.    Patient educated on: Purpose and Benefits of Therapeutic Exercise(s).     Patient verbalized acceptance/understanding of instruction(s), expectation(s), and limitation(s) (for safety).  Patient performed: 2 sets of 10 reps (each) of B LE Ther Ex: AP, LAQ, HSC , Hip flexion while sitting up on EOB.       Patient required  verbal cues/tactile cues to ensure correct sequence, to maintain proper form, and to allow for self-correction.  Patient required increase Reaction Time to initiate movements and a Sitting Rest Break between set(s) to recover.      Patient left with bed in chair position with pressure relief boots  all lines intact, call button in reach, bed alarm on, and nurse notified..    GOALS:   Multidisciplinary Problems       Physical Therapy Goals          Problem: Physical Therapy    Goal Priority Disciplines Outcome Goal Variances Interventions   Physical Therapy Goal     PT, PT/OT Progressing     Description: Goals to be met by: 24     Patient will increase functional independence with mobility by performin. Supine to sit with Modified [place]  2. Sit to supine with Modified [place]  3. Rolling to Left and Right with Modified [place].  4. Sit to stand transfer with Modified [place]  5. Bed to chair transfer with Modified [place] using Rolling Walker  6. Gait  x 50 feet with Modified [place] using Rolling Walker.   7. Stand for 5 minutes with Modified [place] using Rolling Walker  8. Lower extremity exercise program x30 reps per handout, with independence                         Time Tracking:     PT Received On: 24  PT Start Time: 1353     PT Stop Time: 1420  PT Total Time (min): 27 min     Billable Minutes: Therapeutic Activity 15 and Therapeutic Exercise 12 with OT     Treatment Type: Treatment  PT/PTA: PTA     Number of PTA visits since last PT visit: 2     2024

## 2024-09-20 NOTE — PLAN OF CARE
Recommendations  Recommendation:   1. Continue caridac diet   2. Continue multivitamin   3. Monitor need for ONS   4. Monitor weight and labs    Goals: Pt will consume 50-75% of meals by RD follow up    Nutrition Goal Status: new  Communication of RD Recs:  (POC)

## 2024-09-20 NOTE — PT/OT/SLP PROGRESS
Occupational Therapy   Treatment    Name: Israel De Jesus  MRN: [id number]  Admitting Diagnosis:  Alcohol withdrawal syndrome without complication       Recommendations:     Discharge Recommendations: Moderate Intensity Therapy  Discharge Equipment Recommendations:  to be determined by next level of care  Barriers to discharge:  Decreased caregiver support    Assessment:     Israel De Jesus is a 63 y.o. male with a medical diagnosis of Alcohol withdrawal syndrome without complication.  He presents with The primary encounter diagnosis was Alcohol withdrawal syndrome without complication. Diagnoses of Medical clearance for psychiatric admission, Depression with suicidal ideation, Difficulty walking, Coarse tremors, Beriberi with dietary thiamine deficiency, Cough, Chest pain, and Pulmonary embolism were also pertinent to this visit. . Performance deficits affecting function are weakness, impaired endurance, impaired self care skills, impaired functional mobility, impaired balance, impaired cognition, decreased lower extremity function.     Rehab Prognosis:  Good; patient would benefit from acute skilled OT services to address these deficits and reach maximum level of function.       Plan:     Patient to be seen 4 x/week to address the above listed problems via self-care/home management, therapeutic activities, therapeutic exercises, cognitive retraining  Plan of Care Expires: 10/02/24  Plan of Care Reviewed with: patient    Subjective     Chief Complaint: weakness  Patient/Family Comments/goals: pt agreeable to stand  Pain/Comfort:  Pain Rating 1: 0/10  Pain Addressed 1: Reposition  Pain Rating Post-Intervention 1: 0/10    Objective:     Communicated with: RN prior to session.  Patient found HOB elevated with bed alarm, Condom Catheter, pressure relief boots, telemetry upon OT entry to room.    General Precautions: Standard, fall, DT, respiratory    Orthopedic Precautions:N/A  Braces: N/A  Respiratory Status:  Room air     Occupational Performance:     Bed Mobility:    Patient completed Scooting/Bridging with moderate assistance  Patient completed Supine to Sit with moderate assistance  Patient completed Sit to Supine with moderate assistance     Functional Mobility/Transfers:  Patient completed Sit <> Stand Transfer with minimum assistance and moderate assistance  with  hand-held assist and rolling walker   Functional Mobility: Pt performed initial sit<>stand with RW and Mod Ax2 with B knee buckling and blocking provided. Side steps with Total Ax2 and RW. Seated rest break provided. Pt performed 2 sets of 5x Sit<>stands with seated rest break between with bedrail use with Min/Mod Ax2 and HHA and Max Ax2. Posterior lean noted in sit and stand with vc/tcs/assist for anterior weightshift/nose over toes. OT providing intermittent Min-Mod assist for trunk control for pt seated eob during PT guided therex.    Activities of Daily Living:  Feeding:  contact guard assistance for steadying to drink from cup; tremors noted and increased time and effort  Grooming: stand by assistance with B hand use on comb for steadying; tremors noted and increased time and effort  Upper Body Dressing: minimum assistance to don/doff gown with tremors noted  Lower Body Dressing: maximal assistance to don sock seated; backward chaining used with tremors noted      [facility] 6 Click ADL: 15    Treatment & Education:  ADL/functional mobility training as above. SpO2 monitored and maintaining 93-95% RA.  Overlap with PTA for portions of session due to complex nature of patient and for safety with mobility to decrease fall risk for patient and caregiver injury requiring two skilled therapists to provide different interventions.     Patient left HOB elevated with pressure relief boots donned, door left open, all lines intact, call button in reach, bed alarm on, and RN notified    GOALS:   Multidisciplinary Problems       Occupational Therapy Goals           Problem: Occupational Therapy    Goal Priority Disciplines Outcome Interventions   Occupational Therapy Goal     OT, PT/OT Progressing    Description: Goals to be met by: 10/02/2024     Patient will increase functional independence with ADLs by performing:    Feeding with [place].  UE Dressing with [place].  LE Dressing with Modified [place].  Grooming while seated with [place].  Toileting from bedside commode with Modified [place] for hygiene and clothing management.   Sitting at edge of bed x8 minutes with [place].  Toilet transfer to bedside commode with Stand-by Assistance.                         Time Tracking:     OT Date of Treatment: 09/20/24  OT Start Time: 1353  OT Stop Time: 1420  OT Total Time (min): 27 min total time; PTA cotreat    Billable Minutes:Self Care/Home Management 11  Therapeutic Activity 13    OT/SONA: OT          9/20/2024

## 2024-09-20 NOTE — NURSING
[facility] [place]  Nurses Note -- 4 Eyes      9/19/2024       Skin assessed on: Q Shift      [x] No Pressure Injuries Present    []Prevention Measures Documented    [] Yes LDA  for Pressure Injury Previously documented     [] Yes New Pressure Injury Discovered   [] LDA for New Pressure Injury Added      Attending RN:  Dennis Gtz RN     Second RN:  Misty Sandoval RN

## 2024-09-20 NOTE — PLAN OF CARE
09/20/24 1523   Post-Acute Status   Post-Acute Authorization Placement   Post-Acute Placement Status Referrals Sent   Coverage MEDICAID - [employer] [place] ([facility])   Discharge Delays None known at this time   Discharge Plan   Discharge Plan A Skilled Nursing Facility   Discharge Plan B Skilled Nursing Facility     New referral. FLORESITA met with pt at bedside to discuss SNF placement for PT/OT. Pt is in agreement. SW provided pt with a SNF choice list. Pt stated he wanted to stay nearby to his home and chose [facility] in [place], [place] and [facility] in [place], [place]. FLORESITA faxed pt's referral via [employer] and [employer]. FLORESITA pending response.     FLORESITA to complete LOCET/PASR.     LOCET completed. PASR requires MD signature for hospital exemptions. FLORESITA discussed the matter with Attending and forms will be placed in pt's box on the floor for signature in AM.

## 2024-09-20 NOTE — SUBJECTIVE & OBJECTIVE
Interval History: no new complaints.  Tired and concerned about paying his bills.    Review of Systems   HENT:  Negative for ear discharge and ear pain.    Eyes:  Negative for discharge and itching.   Endocrine: Negative for cold intolerance and heat intolerance.   Neurological:  Negative for seizures and syncope.     Objective:     Vital Signs (Most Recent):  Temp: 97.8 °F (36.6 °C) (09/20/24 0736)  Pulse: 104 (09/20/24 0826)  Resp: 18 (09/20/24 0736)  BP: (!) 140/69 (09/20/24 0736)  SpO2: 97 % (09/20/24 0736) Vital Signs (24h Range):  Temp:  [97.8 °F (36.6 °C)-98.5 °F (36.9 °C)] 97.8 °F (36.6 °C)  Pulse:  [] 104  Resp:  [18-20] 18  SpO2:  [94 %-98 %] 97 %  BP: (112-140)/(60-76) 140/69     Weight: 67.2 kg (148 lb 2.4 oz)  Body mass index is 23.91 kg/m².    Intake/Output Summary (Last 24 hours) at 9/20/2024 1056  Last data filed at 9/20/2024 0819  Gross per 24 hour   Intake 360 ml   Output --   Net 360 ml         Physical Exam  Constitutional:       General: He is not in acute distress.     Appearance: He is not toxic-appearing.   HENT:      Head: Normocephalic and atraumatic.      Mouth/Throat:      Mouth: Mucous membranes are dry.      Pharynx: No oropharyngeal exudate or posterior oropharyngeal erythema.   Cardiovascular:      Rate and Rhythm: Regular rhythm. Tachycardia present.   Pulmonary:      Effort: No respiratory distress.      Breath sounds: Normal breath sounds.   Abdominal:      General: Bowel sounds are normal.      Palpations: Abdomen is soft.   Musculoskeletal:         General: No deformity or signs of injury.   Skin:     General: Skin is warm and dry.   Neurological:      Comments: Awakes and alert.  Slow to respond, but answering questions and following commands.  Oriented to person and place.  Confused about date of the month.             Significant Labs: All pertinent labs within the past 24 hours have been reviewed.  BMP:   Recent Labs   Lab 09/20/24  0427   GLU 81      K 3.4*   CL  105   CO2 22*   BUN 20   CREATININE 0.9   CALCIUM 8.9     CBC:   Recent Labs   Lab 09/20/24  0427   WBC 11.24   HGB 10.6*   HCT 33.3*          Significant Imaging: I have reviewed all pertinent imaging results/findings within the past 24 hours.

## 2024-09-20 NOTE — PLAN OF CARE
Problem: Adult Inpatient Plan of Care  Goal: Plan of Care Review  Outcome: Progressing     Problem: Adult Inpatient Plan of Care  Goal: Patient-Specific Goal (Individualized)  Outcome: Progressing     Problem: Adult Inpatient Plan of Care  Goal: Absence of Hospital-Acquired Illness or Injury  Outcome: Progressing     Problem: Adult Inpatient Plan of Care  Goal: Optimal Comfort and Wellbeing  Outcome: Progressing     Problem: Adult Inpatient Plan of Care  Goal: Readiness for Transition of Care  Outcome: Progressing     Problem: Suicide Risk  Goal: Absence of Self-Harm  Outcome: Progressing     Problem: Skin Injury Risk Increased  Goal: Skin Health and Integrity  Outcome: Progressing     Problem: Fall Injury Risk  Goal: Absence of Fall and Fall-Related Injury  Outcome: Progressing     Problem: Acute Kidney Injury/Impairment  Goal: Fluid and Electrolyte Balance  Outcome: Progressing     Problem: Acute Kidney Injury/Impairment  Goal: Improved Oral Intake  Outcome: Progressing     Problem: Acute Kidney Injury/Impairment  Goal: Effective Renal Function  Outcome: Progressing   Bedrest promoted, no self harm within the shift, kept dry and comfortable

## 2024-09-20 NOTE — CONSULTS
[facility]  Psychiatry  Telepsychiatry Consult Note  Please see previous notes:     Patient agreeable to consultation via telepsychiatry.     Tele-Consultation from Psychiatry started: 9/19/2024 at 2331  The chief complaint leading to psychiatric consultation is: re-eval  This consultation was requested by attending physician.  The location of the consulting psychiatrist is  [facility] .  The patient location is  [facility] OBSERVATION UNIT     Also present with the patient at the time of the consultation: rn     Patient Identification:   Israel De Jesus is a 63 y.o. male.     Patient information was obtained from patient and primary team.     Consults  Teleconsult Time Documentation  Subjective:      History of Present Illness:  No notes on file 63 y.o.  man with h/o generalized anxiety d/o, essential HTN, suspect depression, and ETOH abuse presents to the ER with ALBA after reports from family that he was threatening to kill himself. Apparently posted on his Facebook page SI as well as reported to day time Emergency room physician and RNS. To me he denies an SI but he is PEC by ED. While awaiting a bed he started to become more and more tremulous and showing signs of withdrawal. Started on PO valium and Ativan.      Psychiatric History:   Previous Psychiatric Hospitalizations: Yes   Previous Medication Trials: Yes   Previous Suicide Attempts: no   History of Violence: no  History of Depression: yes  History of Zuleyma: no  History of Auditory/Visual Hallucination no  History of Delusions: no  Outpatient psychiatrist (current & past): No     Substance Abuse History:  Tobacco:No  Alcohol: Yes  Illicit Substances:No  Detox/Rehab: No     Legal History: Past charges/incarcerations: No      Family Psychiatric History: denies        Social History:  Developmental/Childhood:Achieved all developmental milestones timely  *Education:High School Diploma  Employment Status/Finances:Retired   Relationship  "Status/Sexual Orientation: Single:  Relationship strained  Children:  unk  Housing Status: Home    history:  NO  Access to gun: NO  Rastafari:Spiritual without formal affiliation  Recreational activities:Time with family     Psychiatric Mental Status Exam:  Arousal: alert  Sensorium/Orientation: oriented to grossly intact  Behavior/Cooperation: normal, cooperative   Speech: normal tone, normal rate, normal pitch, normal volume  Language: grossly intact  Mood: " ok "   Affect: flat  Thought Process: illogical  Thought Content:   Auditory hallucinations: NO  Visual hallucinations: NO  Paranoia: NO  Delusions:  NO  Suicidal ideation: NO  Homicidal ideation: NO  Attention/Concentration:  intact  Memory:               Recent:  Intact              Remote: Intact              3/3 immediate, 3/3 at 5 min  Fund of Knowledge: Aware of current events   Abstract reasoning: proverbs were abstract  Insight: intact  Judgment: behavior is adequate to circumstances    Past Medical History:   Past Medical History:   Diagnosis Date    Eye injury 09/01/1980    ? eye hot metal, and burn eyes ou     Generalized anxiety disorder 03/03/2023    Hypertension     Macrocytic anemia 02/14/2024    Macrocytic anemia 2/14/2024    Sciatica 12/28/2020      Laboratory Data:   Labs Reviewed   CBC W/ AUTO DIFFERENTIAL - Abnormal       Result Value    WBC 8.69      RBC 4.57 (*)     Hemoglobin 15.3      Hematocrit 44.4      MCV 97      MCH 33.5 (*)     MCHC 34.5      RDW 18.8 (*)     Platelets 187      MPV 9.5      Immature Granulocytes 0.2      Gran # (ANC) 5.7      Immature Grans (Abs) 0.02      Lymph # 2.5      Mono # 0.4      Eos # 0.0      Baso # 0.05      nRBC 0      Gran % 65.3      Lymph % 29.2      Mono % 4.6      Eosinophil % 0.1      Basophil % 0.6      Differential Method Automated     COMPREHENSIVE METABOLIC PANEL - Abnormal    Sodium 144      Potassium 3.6      Chloride 110      CO2 19 (*)     Glucose 99      BUN 8      Creatinine " 1.0      Calcium 9.1      Total Protein 7.0      Albumin 3.2 (*)     Total Bilirubin 0.4      Alkaline Phosphatase 128      AST 66 (*)     ALT 37      eGFR >60      Anion Gap 15     URINALYSIS, REFLEX TO URINE CULTURE - Abnormal    Specimen UA Urine, Clean Catch      Color, UA Yellow      Appearance, UA Clear      pH, UA 6.0      Specific Gravity, UA >1.030 (*)     Protein, UA 2+ (*)     Glucose, UA Negative      Ketones, UA Negative      Bilirubin (UA) Negative      Occult Blood UA 1+ (*)     Nitrite, UA Negative      Urobilinogen, UA 2.0-3.0 (*)     Leukocytes, UA Negative      Narrative:     Specimen Source->Urine   DRUG SCREEN PANEL, URINE EMERGENCY - Abnormal    Benzodiazepines Negative      Methadone metabolites Negative      Cocaine (Metab.) Negative      Opiate Scrn, Ur Negative      Barbiturate Screen, Ur Negative      Amphetamine Screen, Ur Negative      THC Presumptive Positive (*)     Phencyclidine Negative      Creatinine, Urine 351.6      Toxicology Information SEE COMMENT      Narrative:     Specimen Source->Urine   ALCOHOL,MEDICAL (ETHANOL) - Abnormal    Alcohol, Serum 342 (*)     Narrative:     ALCOHOL   critical result(s) called and verbal readback obtained from   RATNA PADILLA. by Mariah 09/08/2024 15:29   ACETAMINOPHEN LEVEL - Abnormal    Acetaminophen (Tylenol), Serum <3.0 (*)    URINALYSIS MICROSCOPIC - Abnormal    RBC, UA 1      WBC, UA 5      Bacteria Rare      Squam Epithel, UA 3      Hyaline Casts, UA 5 (*)     Microscopic Comment SEE COMMENT      Narrative:     Specimen Source->Urine   ALCOHOL,MEDICAL (ETHANOL) - Abnormal    Alcohol, Serum 138 (*)    MAGNESIUM - Abnormal    Magnesium 1.4 (*)    PHOSPHORUS - Abnormal    Phosphorus 2.6 (*)    COMPREHENSIVE METABOLIC PANEL - Abnormal    Sodium 138      Potassium 3.5      Chloride 103      CO2 23      Glucose 124 (*)     BUN 7 (*)     Creatinine 0.9      Calcium 8.8      Total Protein 5.9 (*)     Albumin 2.8 (*)     Total Bilirubin 0.9       Alkaline Phosphatase 114      AST 74 (*)     ALT 37      eGFR >60      Anion Gap 12     CBC W/ AUTO DIFFERENTIAL - Abnormal    WBC 9.44      RBC 3.86 (*)     Hemoglobin 13.0 (*)     Hematocrit 37.7 (*)     MCV 98      MCH 33.7 (*)     MCHC 34.5      RDW 18.6 (*)     Platelets 131 (*)     MPV 10.3      Immature Granulocytes 0.3      Gran # (ANC) 8.1 (*)     Immature Grans (Abs) 0.03      Lymph # 0.9 (*)     Mono # 0.4      Eos # 0.0      Baso # 0.03      nRBC 0      Gran % 86.1 (*)     Lymph % 9.1 (*)     Mono % 4.1      Eosinophil % 0.1      Basophil % 0.3      Differential Method Automated     TSH    TSH 1.695     B-TYPE NATRIURETIC PEPTIDE   VITAMIN B12   FOLATE   B-TYPE NATRIURETIC PEPTIDE    BNP 85     SARS-COV-2 RDRP GENE    POC Rapid COVID Negative       Acceptable Yes         Neurological History:  Seizures: No  Head trauma: No    Allergies:   Review of patient's allergies indicates:  No Known Allergies    Medications in ER:   Medications   sodium chloride 0.9% flush 10 mL (has no administration in time range)   acetaminophen tablet 650 mg (650 mg Oral Given 9/17/24 1649)   polyethylene glycol packet 17 g (has no administration in time range)   albuterol-ipratropium 2.5 mg-0.5 mg/3 mL nebulizer solution 3 mL (has no administration in time range)   traZODone tablet 100 mg (100 mg Oral Given 9/16/24 2053)   simethicone chewable tablet 80 mg (80 mg Oral Given 9/17/24 1802)   aluminum-magnesium hydroxide-simethicone 200-200-20 mg/5 mL suspension 30 mL (has no administration in time range)   hydrALAZINE injection 10 mg (10 mg Intravenous Given 9/12/24 0408)   pantoprazole EC tablet 40 mg (40 mg Oral Given 9/19/24 0814)   DULoxetine DR capsule 60 mg (60 mg Oral Given 9/19/24 0814)   labetaloL injection 10 mg (has no administration in time range)   nicotine 7 mg/24 hr 1 patch (1 patch Transdermal Patch Removed 9/19/24 0822)   ondansetron injection 8 mg (has no administration in time range)    multivitamin tablet (1 tablet Oral Given 9/19/24 0815)   folic acid tablet 1 mg (1 mg Oral Given 9/19/24 0815)   thiamine tablet 100 mg (100 mg Oral Given 9/19/24 0815)   LORazepam injection 2 mg (2 mg Intravenous Given 9/12/24 0052)   chlordiazepoxide capsule 50 mg (has no administration in time range)   guaiFENesin 100 mg/5 ml syrup 100 mg (100 mg Oral Given 9/19/24 1739)   fluticasone propionate 50 mcg/actuation nasal spray 100 mcg (100 mcg Each Nostril Given 9/19/24 2028)   0.9%  NaCl infusion ( Intravenous New Bag 9/17/24 1757)   cefTRIAXone (Rocephin) 1 g in D5W 100 mL IVPB (MB+) (0 g Intravenous Stopped 9/19/24 2058)   amLODIPine tablet 5 mg (5 mg Oral Given 9/19/24 0814)   metoprolol tartrate (LOPRESSOR) tablet 100 mg (100 mg Oral Given 9/19/24 2029)   enoxaparin injection 70 mg (70 mg Subcutaneous Given 9/19/24 2234)   LORazepam injection 2 mg (2 mg Intravenous Given 9/8/24 2222)   chlordiazepoxide capsule 50 mg (50 mg Oral Given 9/9/24 0017)   diazePAM tablet 5 mg (5 mg Oral Given 9/9/24 0017)   ondansetron disintegrating tablet 4 mg (4 mg Oral Given 9/9/24 0016)   diazePAM tablet 10 mg (10 mg Oral Given 9/9/24 0128)   labetaloL injection 10 mg (10 mg Intravenous Given 9/9/24 0130)   thiamine (B-1) 500 mg in D5W 100 mL IVPB (0 mg Intravenous Stopped 9/9/24 0445)   LORazepam injection 2 mg (2 mg Intravenous Given 9/9/24 0412)   pantoprazole injection 40 mg (40 mg Intravenous Given 9/9/24 0412)   LORazepam injection 2 mg (2 mg Intravenous Given 9/10/24 1519)   0.9% NaCl 1,000 mL with mvi, (ADULT) no.4 with vit K 3,300 unit- 150 mcg/10 mL 10 mL, thiamine 100 mg, folic acid 1 mg infusion ( Intravenous Verify Only 9/11/24 0200)   magnesium sulfate 2g in water 50mL IVPB (premix) (0 g Intravenous Stopped 9/9/24 0820)   iohexoL (OMNIPAQUE 300) injection 15 mL (15 mLs Oral Given 9/9/24 1620)   iohexoL (OMNIPAQUE 350) injection 75 mL (75 mLs Intravenous Given 9/9/24 1621)   potassium chloride SA CR tablet 40 mEq  (40 mEq Oral Given 9/11/24 0815)   magnesium sulfate 2g in water 50mL IVPB (premix) (0 g Intravenous Stopped 9/17/24 8213)   iohexoL (OMNIPAQUE 350) injection 75 mL (75 mLs Intravenous Given 9/19/24 0954)       Medications at home: cymbalta    No new subjective & objective note has been filed under this hospital service since the last note was generated.      Assessment - Diagnosis - Goals:     Diagnosis/Impression: unspecified depressive disorder, alcohol use disorder severe    Rec: discontinue PEC, pt states has be SI free for over 48 hours and has good insight into his mental illness and substance use disorder.      Plan of Care communicated to: primary team    Time with patient: 22 min      More than 50% of the time was spent counseling/coordinating care    Consulting clinician was informed of the encounter and consult note.    Consultation ended: 9/20/24 0011    Jose J Cota MD   Psychiatry  [facility]

## 2024-09-20 NOTE — ASSESSMENT & PLAN NOTE
Continue replacement with MVI, Thiamine and Folate.  No evidence of DT's at this time.  Valium stopped secondary to excessive sedation.  Still slightly tachycardic with mild tremors.  Will order Valium at night.  PT/OT recommending SNF.  He would not be able to go to SNF as long as he is PEC.  Will reconsult Psych to reevaluate need of PEC and inpatient psych.  Psych still recommending inpatient psychiatry and PEC.  Patient still tachycardic and slightly hypoxic.  CTA does show bilateral PE.  Started anticoagulation with improvement.  Will monitor over the weekend.  PT/OT recommending SNF.  Hopefully SNF early next week.

## 2024-09-21 PROBLEM — N17.9 AKI (ACUTE KIDNEY INJURY): Status: RESOLVED | Noted: 2024-09-18 | Resolved: 2024-09-21

## 2024-09-21 LAB
ANION GAP SERPL CALC-SCNC: 10 MMOL/L (ref 8–16)
BACTERIA BLD CULT: NORMAL
BUN SERPL-MCNC: 12 MG/DL (ref 8–23)
CALCIUM SERPL-MCNC: 9.3 MG/DL (ref 8.7–10.5)
CHLORIDE SERPL-SCNC: 101 MMOL/L (ref 95–110)
CO2 SERPL-SCNC: 24 MMOL/L (ref 23–29)
CREAT SERPL-MCNC: 0.8 MG/DL (ref 0.5–1.4)
EST. GFR  (NO RACE VARIABLE): >60 ML/MIN/1.73 M^2
GLUCOSE SERPL-MCNC: 90 MG/DL (ref 70–110)
POCT GLUCOSE: 128 MG/DL (ref 70–110)
POTASSIUM SERPL-SCNC: 3.2 MMOL/L (ref 3.5–5.1)
SODIUM SERPL-SCNC: 135 MMOL/L (ref 136–145)

## 2024-09-21 PROCEDURE — 25000003 PHARM REV CODE 250: Performed by: HOSPITALIST

## 2024-09-21 PROCEDURE — 80048 BASIC METABOLIC PNL TOTAL CA: CPT | Performed by: HOSPITALIST

## 2024-09-21 PROCEDURE — 25000003 PHARM REV CODE 250: Performed by: STUDENT IN AN ORGANIZED HEALTH CARE EDUCATION/TRAINING PROGRAM

## 2024-09-21 PROCEDURE — 25000003 PHARM REV CODE 250: Performed by: INTERNAL MEDICINE

## 2024-09-21 PROCEDURE — 36415 COLL VENOUS BLD VENIPUNCTURE: CPT | Performed by: HOSPITALIST

## 2024-09-21 PROCEDURE — S4991 NICOTINE PATCH NONLEGEND: HCPCS | Performed by: INTERNAL MEDICINE

## 2024-09-21 PROCEDURE — 94761 N-INVAS EAR/PLS OXIMETRY MLT: CPT

## 2024-09-21 PROCEDURE — 21400001 HC TELEMETRY ROOM

## 2024-09-21 RX ORDER — POTASSIUM CHLORIDE 20 MEQ/1
40 TABLET, EXTENDED RELEASE ORAL ONCE
Status: COMPLETED | OUTPATIENT
Start: 2024-09-21 | End: 2024-09-21

## 2024-09-21 RX ADMIN — METOPROLOL TARTRATE 100 MG: 50 TABLET, FILM COATED ORAL at 09:09

## 2024-09-21 RX ADMIN — THIAMINE HCL TAB 100 MG 100 MG: 100 TAB at 09:09

## 2024-09-21 RX ADMIN — APIXABAN 10 MG: 5 TABLET, FILM COATED ORAL at 09:09

## 2024-09-21 RX ADMIN — THERA TABS 1 TABLET: TAB at 09:09

## 2024-09-21 RX ADMIN — NICOTINE 1 PATCH: 7 PATCH, EXTENDED RELEASE TRANSDERMAL at 09:09

## 2024-09-21 RX ADMIN — AMLODIPINE BESYLATE 5 MG: 5 TABLET ORAL at 09:09

## 2024-09-21 RX ADMIN — METOPROLOL TARTRATE 100 MG: 50 TABLET, FILM COATED ORAL at 08:09

## 2024-09-21 RX ADMIN — FLUTICASONE PROPIONATE 100 MCG: 50 SPRAY, METERED NASAL at 08:09

## 2024-09-21 RX ADMIN — PANTOPRAZOLE SODIUM 40 MG: 40 TABLET, DELAYED RELEASE ORAL at 09:09

## 2024-09-21 RX ADMIN — GUAIFENESIN 100 MG: 200 SOLUTION ORAL at 04:09

## 2024-09-21 RX ADMIN — LEVOFLOXACIN 750 MG: 750 TABLET, FILM COATED ORAL at 09:09

## 2024-09-21 RX ADMIN — FOLIC ACID 1 MG: 1 TABLET ORAL at 09:09

## 2024-09-21 RX ADMIN — POTASSIUM CHLORIDE 40 MEQ: 1500 TABLET, EXTENDED RELEASE ORAL at 09:09

## 2024-09-21 RX ADMIN — DULOXETINE HYDROCHLORIDE 60 MG: 30 CAPSULE, DELAYED RELEASE ORAL at 09:09

## 2024-09-21 RX ADMIN — FLUTICASONE PROPIONATE 100 MCG: 50 SPRAY, METERED NASAL at 09:09

## 2024-09-21 RX ADMIN — TRAZODONE HYDROCHLORIDE 100 MG: 50 TABLET ORAL at 08:09

## 2024-09-21 RX ADMIN — APIXABAN 10 MG: 5 TABLET, FILM COATED ORAL at 08:09

## 2024-09-21 NOTE — NURSING
[facility] MEWS TRIGGER FOLLOW UP       MEWS Monitoring, Score is: 3  Indication for review:     Pt HR recheck shows a HR of 99. Please call [phone] with any concerns.

## 2024-09-21 NOTE — NURSING
[facility] [place]  Nurses Note -- 4 Eyes      9/21/2024       Skin assessed on: Q Shift      [x] No Pressure Injuries Present    []Prevention Measures Documented    [] Yes LDA  for Pressure Injury Previously documented     [] Yes New Pressure Injury Discovered   [] LDA for New Pressure Injury Added      Attending RN:  Jonathan Kang LPN     Second RN:  Nancy COMER

## 2024-09-21 NOTE — PLAN OF CARE
Problem: Skin Injury Risk Increased  Goal: Skin Health and Integrity  Outcome: Progressing     Problem: Fall Injury Risk  Goal: Absence of Fall and Fall-Related Injury  Outcome: Progressing     Problem: Confusion Acute  Goal: Optimal Cognitive Function  Outcome: Progressing     Problem: Alcohol Withdrawal  Goal: Alcohol Withdrawal Symptom Control  Outcome: Progressing  Goal: Optimal Neurologic Function  Outcome: Progressing  Goal: Readiness for Change Identified  Outcome: Progressing

## 2024-09-21 NOTE — PLAN OF CARE
Problem: Adult Inpatient Plan of Care  Goal: Plan of Care Review  Outcome: Progressing  Goal: Patient-Specific Goal (Individualized)  Outcome: Progressing  Goal: Absence of Hospital-Acquired Illness or Injury  Outcome: Progressing  Goal: Optimal Comfort and Wellbeing  Outcome: Progressing  Goal: Readiness for Transition of Care  Outcome: Progressing     Problem: Suicide Risk  Goal: Absence of Self-Harm  Outcome: Progressing     Problem: Skin Injury Risk Increased  Goal: Skin Health and Integrity  Outcome: Progressing     Problem: Fall Injury Risk  Goal: Absence of Fall and Fall-Related Injury  Outcome: Progressing     Problem: Confusion Acute  Goal: Optimal Cognitive Function  Outcome: Progressing     Problem: Alcohol Withdrawal  Goal: Alcohol Withdrawal Symptom Control  Outcome: Progressing  Goal: Optimal Neurologic Function  Outcome: Progressing  Goal: Readiness for Change Identified  Outcome: Progressing     Problem: Acute Kidney Injury/Impairment  Goal: Fluid and Electrolyte Balance  Outcome: Progressing  Goal: Improved Oral Intake  Outcome: Progressing  Goal: Effective Renal Function  Outcome: Progressing

## 2024-09-21 NOTE — NURSING
Pt had an unwitnessed fall. Upon entering pt was in the sitting position. Nurse helped pt up off the floor. Pt denies any injury. ROM preformed and skin intact. MD notified. Pt family notified.

## 2024-09-21 NOTE — PROGRESS NOTES
[facility] Medicine  Progress Note    Patient Name: Israel De Jesus  MRN: [id number]  Patient Class: IP- Inpatient   Admission Date: 9/8/2024  Length of Stay: 12 days  Attending Physician: Basil Beatty MD  Primary Care Provider: Nuha Lynn MD        Subjective:     Principal Problem:Alcohol withdrawal syndrome without complication        HPI:  63 y.o.  man with h/o generalized anxiety d/o, essential HTN, suspect depression, and ETOH abuse presents to the ER with JPSO after reports from family that he was threatening to kill himself. Apparently posted on his Facebook page SI as well as reported to day time Emergency room physician and RNS. To me he denies an SI but he is PEC by ED. While awaiting a bed he started to become more and more tremulous and showing signs of withdrawal. Started on PO valium and Ativan.     Labs overall unremarkable and UDS with THC only. ETOH level 342. Pt. Denies any h/o Withdrawals, does drink daily but I feel is under reporting what he drinks to me. Drinks hard liquor.   COVID negative.     We have been consulted for further management of his withdrawals and admission to the ICU.     Because this patient's clinical condition is critically guarded and PEC he  requires close monitoring of his vitals and withdrawal symptoms, care in an alternative location isn't clinically appropriate for the reasons stated above.              Overview/Hospital Course:  62yo M admitted with suicidial ideation and alcohol withdrawal.  Due to patient being very drowsy and sleeping throughout the day Valium was stopped, continue p.r.n. IV Ativan based on CIWA protocol.  Has a R groin hernia- CT large bowel containing right inguinal hernia, without proximal bowel obstruction or acute inflammatory changes.  Lovenox stopped due to thrombocytopenia.  Once medically clear plan to discharge to inpatient psych.  Patient is more awake and alert today and has not required  Ativan for 72. Patient still very weak worse in lower extremity. He was evaluated by PT and unable to ambulate on his own. MRI brain ordered which was neg. PT/OT recommending SNF.  Unable to go to SNF as patient PEC.  Fevers and tachycardia on 9/17.  UA consistent with UTI.  Started on Rocephin.  Persistent tachycardia and noted to be slightly hypoxic.  CTA chest showing bilateral lower lobe PE with no evidence of heart strain.  Started on therapeutic Lovenox.  Transitioned to Eliquis.  Psych reconsulted and patient not suicidal.  PEC stopped and no longer needs inpatient psych.  Plan for SNF on discharge.    Interval History: feeling better today.    Review of Systems   HENT:  Negative for ear discharge and ear pain.    Eyes:  Negative for discharge and itching.   Endocrine: Negative for cold intolerance and heat intolerance.   Neurological:  Negative for seizures and syncope.     Objective:     Vital Signs (Most Recent):  Temp: 98.3 °F (36.8 °C) (09/21/24 0730)  Pulse: 97 (09/21/24 0748)  Resp: 19 (09/21/24 0730)  BP: (!) 156/93 (09/21/24 0730)  SpO2: (!) 94 % (09/21/24 0830) Vital Signs (24h Range):  Temp:  [98.2 °F (36.8 °C)-100.1 °F (37.8 °C)] 98.3 °F (36.8 °C)  Pulse:  [] 97  Resp:  [18-19] 19  SpO2:  [91 %-98 %] 94 %  BP: (140-170)/(80-94) 156/93     Weight: 67.2 kg (148 lb 2.4 oz)  Body mass index is 23.91 kg/m².    Intake/Output Summary (Last 24 hours) at 9/21/2024 0946  Last data filed at 9/21/2024 0234  Gross per 24 hour   Intake 240 ml   Output 2600 ml   Net -2360 ml         Physical Exam  Constitutional:       General: He is not in acute distress.     Appearance: He is not toxic-appearing.   HENT:      Head: Normocephalic and atraumatic.      Mouth/Throat:      Mouth: Mucous membranes are dry.      Pharynx: No oropharyngeal exudate or posterior oropharyngeal erythema.   Cardiovascular:      Rate and Rhythm: Regular rhythm. Tachycardia present.   Pulmonary:      Effort: No respiratory distress.       Breath sounds: Normal breath sounds.   Abdominal:      General: Bowel sounds are normal.      Palpations: Abdomen is soft.   Musculoskeletal:         General: No deformity or signs of injury.   Skin:     General: Skin is warm and dry.   Neurological:      Comments: Awakes and alert.  Slow to respond, but answering questions and following commands.  Oriented to person and place.  Confused about date of the month.             Significant Labs: All pertinent labs within the past 24 hours have been reviewed.  BMP:   Recent Labs   Lab 09/21/24  0541   GLU 90   *   K 3.2*      CO2 24   BUN 12   CREATININE 0.8   CALCIUM 9.3     CBC:   Recent Labs   Lab 09/20/24  0427   WBC 11.24   HGB 10.6*   HCT 33.3*          Significant Imaging: I have reviewed all pertinent imaging results/findings within the past 24 hours.    Assessment/Plan:      * Alcohol withdrawal syndrome without complication  Continue replacement with MVI, Thiamine and Folate.  No evidence of DT's at this time.  Valium stopped secondary to excessive sedation.  Still slightly tachycardic with mild tremors.  Will order Valium at night.  PT/OT recommending SNF.  He would not be able to go to SNF as long as he is PEC.  Will reconsult Psych to reevaluate need of PEC and inpatient psych.  Psych still recommending inpatient psychiatry and PEC.  Patient still tachycardic and slightly hypoxic.  CTA does show bilateral PE.  Started anticoagulation with improvement.  Will monitor over the weekend.  PT/OT recommending SNF.  Hopefully SNF early next week.    Acute pulmonary embolism without acute cor pulmonale  Patient with persistent tachycardia.  Noted to be hypoxic.  CTA showing bilateral lower lobe PE.  No evidence of right heart strain.  Started on therapeutic Lovenox.  Hemodynamically stable.  Transition to Eliquis.      UTI (urinary tract infection)  Started on Rocephin.  Transition to oral ABX's.      Right groin mass  -etiology due to right  inguinal hernia  -repeat CT abdominal pelvis shows no strangulation or obstruction  -recommend following up outpatient for an elective repair      Hypomagnesemia  Replace as needed.    Tobacco dependency  -discussed cessation  -nicotine patch    Macrocytic anemia  Noted h/o macrocytic anemia but H/H and MCV stable    Suicidal ideation  PEC and will cont.  Patient currently denies any suicide ideations.  Will have Psych reassess need for PEC and inpatient psychiatry.  Patient reevaluated by Psych last night.  No suicidal ideations.  Ok to remove PEC and no need for inpatient psych on discharge.      Generalized anxiety disorder  -Resume home meds and cont with PRN ativan per CIWA protocol  -scheduled valium stopped due to lethargy        Tobacco use disorder  Nicotine patch ordered daily       Hypertension  Continue current management.         VTE Risk Mitigation (From admission, onward)           Ordered     apixaban tablet 10 mg  2 times daily         09/20/24 0825     Place sequential compression device  Until discontinued         09/11/24 0729     IP VTE LOW RISK PATIENT  Once         09/09/24 0350     Place sequential compression device  Until discontinued         09/09/24 0350                    Discharge Planning   BRANDON: 9/13/2024     Code Status: Full Code   Is the patient medically ready for discharge?:     Reason for patient still in hospital (select all that apply): Pending disposition  Discharge Plan A: Skilled Nursing Facility   Discharge Delays: None known at this time              Basil Beatty MD  Department of Hospital Medicine   [facility] [place] - Observation

## 2024-09-21 NOTE — NURSING
[facility], [facility] [place]  Nurses Note -- 4 Eyes      9/21/2024       Skin assessed on: Q Shift      [] No Pressure Injuries Present    []Prevention Measures Documented    [] Yes LDA  for Pressure Injury Previously documented     [] Yes New Pressure Injury Discovered   [] LDA for New Pressure Injury Added      Attending RN:  Jonathan Kang LPN     Second RN:  ***

## 2024-09-21 NOTE — SUBJECTIVE & OBJECTIVE
Interval History: feeling better today.    Review of Systems   HENT:  Negative for ear discharge and ear pain.    Eyes:  Negative for discharge and itching.   Endocrine: Negative for cold intolerance and heat intolerance.   Neurological:  Negative for seizures and syncope.     Objective:     Vital Signs (Most Recent):  Temp: 98.3 °F (36.8 °C) (09/21/24 0730)  Pulse: 97 (09/21/24 0748)  Resp: 19 (09/21/24 0730)  BP: (!) 156/93 (09/21/24 0730)  SpO2: (!) 94 % (09/21/24 0830) Vital Signs (24h Range):  Temp:  [98.2 °F (36.8 °C)-100.1 °F (37.8 °C)] 98.3 °F (36.8 °C)  Pulse:  [] 97  Resp:  [18-19] 19  SpO2:  [91 %-98 %] 94 %  BP: (140-170)/(80-94) 156/93     Weight: 67.2 kg (148 lb 2.4 oz)  Body mass index is 23.91 kg/m².    Intake/Output Summary (Last 24 hours) at 9/21/2024 0937  Last data filed at 9/21/2024 0234  Gross per 24 hour   Intake 240 ml   Output 2600 ml   Net -2360 ml         Physical Exam  Constitutional:       General: He is not in acute distress.     Appearance: He is not toxic-appearing.   HENT:      Head: Normocephalic and atraumatic.      Mouth/Throat:      Mouth: Mucous membranes are dry.      Pharynx: No oropharyngeal exudate or posterior oropharyngeal erythema.   Cardiovascular:      Rate and Rhythm: Regular rhythm. Tachycardia present.   Pulmonary:      Effort: No respiratory distress.      Breath sounds: Normal breath sounds.   Abdominal:      General: Bowel sounds are normal.      Palpations: Abdomen is soft.   Musculoskeletal:         General: No deformity or signs of injury.   Skin:     General: Skin is warm and dry.   Neurological:      Comments: Awakes and alert.  Slow to respond, but answering questions and following commands.  Oriented to person and place.  Confused about date of the month.             Significant Labs: All pertinent labs within the past 24 hours have been reviewed.  BMP:   Recent Labs   Lab 09/21/24  0541   GLU 90   *   K 3.2*      CO2 24   BUN 12    CREATININE 0.8   CALCIUM 9.3     CBC:   Recent Labs   Lab 09/20/24  0427   WBC 11.24   HGB 10.6*   HCT 33.3*          Significant Imaging: I have reviewed all pertinent imaging results/findings within the past 24 hours.

## 2024-09-22 PROCEDURE — 99900035 HC TECH TIME PER 15 MIN (STAT)

## 2024-09-22 PROCEDURE — 25000003 PHARM REV CODE 250: Performed by: HOSPITALIST

## 2024-09-22 PROCEDURE — 25000003 PHARM REV CODE 250: Performed by: STUDENT IN AN ORGANIZED HEALTH CARE EDUCATION/TRAINING PROGRAM

## 2024-09-22 PROCEDURE — S4991 NICOTINE PATCH NONLEGEND: HCPCS | Performed by: INTERNAL MEDICINE

## 2024-09-22 PROCEDURE — 25000003 PHARM REV CODE 250: Performed by: INTERNAL MEDICINE

## 2024-09-22 PROCEDURE — 21400001 HC TELEMETRY ROOM

## 2024-09-22 RX ADMIN — METOPROLOL TARTRATE 100 MG: 50 TABLET, FILM COATED ORAL at 08:09

## 2024-09-22 RX ADMIN — METOPROLOL TARTRATE 100 MG: 50 TABLET, FILM COATED ORAL at 09:09

## 2024-09-22 RX ADMIN — THIAMINE HCL TAB 100 MG 100 MG: 100 TAB at 09:09

## 2024-09-22 RX ADMIN — FLUTICASONE PROPIONATE 100 MCG: 50 SPRAY, METERED NASAL at 08:09

## 2024-09-22 RX ADMIN — FOLIC ACID 1 MG: 1 TABLET ORAL at 09:09

## 2024-09-22 RX ADMIN — LEVOFLOXACIN 750 MG: 750 TABLET, FILM COATED ORAL at 09:09

## 2024-09-22 RX ADMIN — TRAZODONE HYDROCHLORIDE 100 MG: 50 TABLET ORAL at 08:09

## 2024-09-22 RX ADMIN — NICOTINE 1 PATCH: 7 PATCH, EXTENDED RELEASE TRANSDERMAL at 09:09

## 2024-09-22 RX ADMIN — DULOXETINE HYDROCHLORIDE 60 MG: 30 CAPSULE, DELAYED RELEASE ORAL at 09:09

## 2024-09-22 RX ADMIN — THERA TABS 1 TABLET: TAB at 09:09

## 2024-09-22 RX ADMIN — GUAIFENESIN 100 MG: 200 SOLUTION ORAL at 08:09

## 2024-09-22 RX ADMIN — APIXABAN 10 MG: 5 TABLET, FILM COATED ORAL at 08:09

## 2024-09-22 RX ADMIN — APIXABAN 10 MG: 5 TABLET, FILM COATED ORAL at 09:09

## 2024-09-22 RX ADMIN — PANTOPRAZOLE SODIUM 40 MG: 40 TABLET, DELAYED RELEASE ORAL at 09:09

## 2024-09-22 RX ADMIN — FLUTICASONE PROPIONATE 100 MCG: 50 SPRAY, METERED NASAL at 09:09

## 2024-09-22 NOTE — PROGRESS NOTES
[facility] Medicine  Progress Note    Patient Name: Israel De Jesus  MRN: [id number]  Patient Class: IP- Inpatient   Admission Date: 9/8/2024  Length of Stay: 13 days  Attending Physician: Basil Beatty MD  Primary Care Provider: Nuha Lynn MD        Subjective:     Principal Problem:Alcohol withdrawal syndrome without complication        HPI:  63 y.o.  man with h/o generalized anxiety d/o, essential HTN, suspect depression, and ETOH abuse presents to the ER with JPSO after reports from family that he was threatening to kill himself. Apparently posted on his Facebook page SI as well as reported to day time Emergency room physician and RNS. To me he denies an SI but he is PEC by ED. While awaiting a bed he started to become more and more tremulous and showing signs of withdrawal. Started on PO valium and Ativan.     Labs overall unremarkable and UDS with THC only. ETOH level 342. Pt. Denies any h/o Withdrawals, does drink daily but I feel is under reporting what he drinks to me. Drinks hard liquor.   COVID negative.     We have been consulted for further management of his withdrawals and admission to the ICU.     Because this patient's clinical condition is critically guarded and PEC he  requires close monitoring of his vitals and withdrawal symptoms, care in an alternative location isn't clinically appropriate for the reasons stated above.              Overview/Hospital Course:  64yo M admitted with suicidial ideation and alcohol withdrawal.  Due to patient being very drowsy and sleeping throughout the day Valium was stopped, continue p.r.n. IV Ativan based on CIWA protocol.  Has a R groin hernia- CT large bowel containing right inguinal hernia, without proximal bowel obstruction or acute inflammatory changes.  Lovenox stopped due to thrombocytopenia.  Once medically clear plan to discharge to inpatient psych.  Patient is more awake and alert today and has not required  Ativan for 72. Patient still very weak worse in lower extremity. He was evaluated by PT and unable to ambulate on his own. MRI brain ordered which was neg. PT/OT recommending SNF.  Unable to go to SNF as patient PEC.  Fevers and tachycardia on 9/17.  UA consistent with UTI.  Started on Rocephin.  Persistent tachycardia and noted to be slightly hypoxic.  CTA chest showing bilateral lower lobe PE with no evidence of heart strain.  Started on therapeutic Lovenox.  Transitioned to Eliquis.  Psych reconsulted and patient not suicidal.  PEC stopped and no longer needs inpatient psych.  Plan for SNF on discharge.    Interval History: sleepy today.    Review of Systems   HENT:  Negative for ear discharge and ear pain.    Eyes:  Negative for discharge and itching.   Endocrine: Negative for cold intolerance and heat intolerance.   Neurological:  Negative for seizures and syncope.     Objective:     Vital Signs (Most Recent):  Temp: 98.2 °F (36.8 °C) (09/22/24 1124)  Pulse: 96 (09/22/24 1513)  Resp: 19 (09/22/24 1124)  BP: 104/69 (09/22/24 1124)  SpO2: (!) 94 % (09/22/24 1124) Vital Signs (24h Range):  Temp:  [97.6 °F (36.4 °C)-98.2 °F (36.8 °C)] 98.2 °F (36.8 °C)  Pulse:  [] 96  Resp:  [18-19] 19  SpO2:  [94 %-98 %] 94 %  BP: (103-130)/(66-82) 104/69     Weight: 67.2 kg (148 lb 2.4 oz)  Body mass index is 23.91 kg/m².    Intake/Output Summary (Last 24 hours) at 9/22/2024 1528  Last data filed at 9/22/2024 1300  Gross per 24 hour   Intake 600 ml   Output 600 ml   Net 0 ml         Physical Exam  Constitutional:       General: He is not in acute distress.     Appearance: He is not toxic-appearing.   HENT:      Head: Normocephalic and atraumatic.      Mouth/Throat:      Mouth: Mucous membranes are dry.      Pharynx: No oropharyngeal exudate or posterior oropharyngeal erythema.   Cardiovascular:      Rate and Rhythm: Regular rhythm. Tachycardia present.   Pulmonary:      Effort: No respiratory distress.      Breath sounds:  "Normal breath sounds.   Abdominal:      General: Bowel sounds are normal.      Palpations: Abdomen is soft.   Musculoskeletal:         General: No deformity or signs of injury.   Skin:     General: Skin is warm and dry.   Neurological:      Comments: Awakes and alert.  Slow to respond, but answering questions and following commands.  Oriented to person and place.  Confused about date of the month.             Significant Labs: All pertinent labs within the past 24 hours have been reviewed.  BMP:   Recent Labs   Lab 09/21/24  0541   GLU 90   *   K 3.2*      CO2 24   BUN 12   CREATININE 0.8   CALCIUM 9.3     CBC:   No results for input(s): "WBC", "HGB", "HCT", "PLT" in the last 48 hours.      Significant Imaging: I have reviewed all pertinent imaging results/findings within the past 24 hours.    Assessment/Plan:      * Alcohol withdrawal syndrome without complication  Continue replacement with MVI, Thiamine and Folate.  No evidence of DT's at this time.  Valium stopped secondary to excessive sedation.  Still slightly tachycardic with mild tremors.  Will order Valium at night.  PT/OT recommending SNF.  He would not be able to go to SNF as long as he is PEC.  Will reconsult Psych to reevaluate need of PEC and inpatient psych.  Psych still recommending inpatient psychiatry and PEC.  Patient still tachycardic and slightly hypoxic.  CTA does show bilateral PE.  Started anticoagulation with improvement.  Will monitor over the weekend.  PT/OT recommending SNF.  Hopefully SNF this week.    Acute pulmonary embolism without acute cor pulmonale  Patient with persistent tachycardia.  Noted to be hypoxic.  CTA showing bilateral lower lobe PE.  No evidence of right heart strain.  Started on therapeutic Lovenox.  Hemodynamically stable.  Transition to Eliquis.      UTI (urinary tract infection)  Started on Rocephin.  Transition to oral ABX's.      Right groin mass  -etiology due to right inguinal hernia  -repeat CT " abdominal pelvis shows no strangulation or obstruction  -recommend following up outpatient for an elective repair      Hypomagnesemia  Replace as needed.    Tobacco dependency  -discussed cessation  -nicotine patch    Macrocytic anemia  Noted h/o macrocytic anemia but H/H and MCV stable    Suicidal ideation  PEC and will cont.  Patient currently denies any suicide ideations.  Will have Psych reassess need for PEC and inpatient psychiatry.  Patient reevaluated by Psych last night.  No suicidal ideations.  Ok to remove PEC and no need for inpatient psych on discharge.      Generalized anxiety disorder  -Resume home meds and cont with PRN ativan per CIWA protocol  -scheduled valium stopped due to lethargy        Tobacco use disorder  Nicotine patch ordered daily       Hypertension  Continue current management.         VTE Risk Mitigation (From admission, onward)           Ordered     apixaban tablet 10 mg  2 times daily         09/20/24 0825     Place sequential compression device  Until discontinued         09/11/24 0729     IP VTE LOW RISK PATIENT  Once         09/09/24 0350     Place sequential compression device  Until discontinued         09/09/24 0350                    Discharge Planning   BRANDON: 9/13/2024     Code Status: Full Code   Is the patient medically ready for discharge?:     Reason for patient still in hospital (select all that apply): Pending disposition  Discharge Plan A: Skilled Nursing Facility   Discharge Delays: None known at this time              Basil Beatty MD  Department of Hospital Medicine   [facility] [place]

## 2024-09-22 NOTE — SUBJECTIVE & OBJECTIVE
Interval History: sleepy today.    Review of Systems   HENT:  Negative for ear discharge and ear pain.    Eyes:  Negative for discharge and itching.   Endocrine: Negative for cold intolerance and heat intolerance.   Neurological:  Negative for seizures and syncope.     Objective:     Vital Signs (Most Recent):  Temp: 98.2 °F (36.8 °C) (09/22/24 1124)  Pulse: 96 (09/22/24 1513)  Resp: 19 (09/22/24 1124)  BP: 104/69 (09/22/24 1124)  SpO2: (!) 94 % (09/22/24 1124) Vital Signs (24h Range):  Temp:  [97.6 °F (36.4 °C)-98.2 °F (36.8 °C)] 98.2 °F (36.8 °C)  Pulse:  [] 96  Resp:  [18-19] 19  SpO2:  [94 %-98 %] 94 %  BP: (103-130)/(66-82) 104/69     Weight: 67.2 kg (148 lb 2.4 oz)  Body mass index is 23.91 kg/m².    Intake/Output Summary (Last 24 hours) at 9/22/2024 1528  Last data filed at 9/22/2024 1300  Gross per 24 hour   Intake 600 ml   Output 600 ml   Net 0 ml         Physical Exam  Constitutional:       General: He is not in acute distress.     Appearance: He is not toxic-appearing.   HENT:      Head: Normocephalic and atraumatic.      Mouth/Throat:      Mouth: Mucous membranes are dry.      Pharynx: No oropharyngeal exudate or posterior oropharyngeal erythema.   Cardiovascular:      Rate and Rhythm: Regular rhythm. Tachycardia present.   Pulmonary:      Effort: No respiratory distress.      Breath sounds: Normal breath sounds.   Abdominal:      General: Bowel sounds are normal.      Palpations: Abdomen is soft.   Musculoskeletal:         General: No deformity or signs of injury.   Skin:     General: Skin is warm and dry.   Neurological:      Comments: Awakes and alert.  Slow to respond, but answering questions and following commands.  Oriented to person and place.  Confused about date of the month.             Significant Labs: All pertinent labs within the past 24 hours have been reviewed.  BMP:   Recent Labs   Lab 09/21/24  0541   GLU 90   *   K 3.2*      CO2 24   BUN 12   CREATININE 0.8   CALCIUM  "9.3     CBC:   No results for input(s): "WBC", "HGB", "HCT", "PLT" in the last 48 hours.      Significant Imaging: I have reviewed all pertinent imaging results/findings within the past 24 hours.  "

## 2024-09-22 NOTE — NURSING
PER handoff received from Jonathan     Pt resting in bed quietly. NAD noted. No c/o pain.     Fall and safety precautions maintained. Bed alarm activated and audible.. Bed locked in lowest position, with side rails up x2. Call bell and personal items within reach

## 2024-09-22 NOTE — PLAN OF CARE
Problem: Adult Inpatient Plan of Care  Goal: Optimal Comfort and Wellbeing  Outcome: Progressing     Problem: Skin Injury Risk Increased  Goal: Skin Health and Integrity  Outcome: Progressing     Problem: Fall Injury Risk  Goal: Absence of Fall and Fall-Related Injury  Outcome: Progressing     Problem: Alcohol Withdrawal  Goal: Alcohol Withdrawal Symptom Control  Outcome: Progressing     Problem: Adult Inpatient Plan of Care  Goal: Optimal Comfort and Wellbeing  Outcome: Progressing     Problem: Skin Injury Risk Increased  Goal: Skin Health and Integrity  Outcome: Progressing     Problem: Fall Injury Risk  Goal: Absence of Fall and Fall-Related Injury  Outcome: Progressing     Problem: Alcohol Withdrawal  Goal: Alcohol Withdrawal Symptom Control  Outcome: Progressing

## 2024-09-22 NOTE — NURSING
[facility], [facility]  Nurses Note -- 4 Eyes      9/22/2024       Skin assessed on: Q Shift      [x] No Pressure Injuries Present    []Prevention Measures Documented    [] Yes LDA  for Pressure Injury Previously documented     [] Yes New Pressure Injury Discovered   [] LDA for New Pressure Injury Added      Attending :  Liliana Villanueva LPN     Second RN:  Nancy Street LPN

## 2024-09-22 NOTE — NURSING
[facility], [facility]  Nurses Note -- 4 Eyes      9/21/2024       Skin assessed on: Q Shift      [x] No Pressure Injuries Present    []Prevention Measures Documented    [] Yes LDA  for Pressure Injury Previously documented     [] Yes New Pressure Injury Discovered   [] LDA for New Pressure Injury Added      Attending RN:  Nancy Street LPN     Second RN:  EMELY Jay

## 2024-09-22 NOTE — ASSESSMENT & PLAN NOTE
Continue replacement with MVI, Thiamine and Folate.  No evidence of DT's at this time.  Valium stopped secondary to excessive sedation.  Still slightly tachycardic with mild tremors.  Will order Valium at night.  PT/OT recommending SNF.  He would not be able to go to SNF as long as he is PEC.  Will reconsult Psych to reevaluate need of PEC and inpatient psych.  Psych still recommending inpatient psychiatry and PEC.  Patient still tachycardic and slightly hypoxic.  CTA does show bilateral PE.  Started anticoagulation with improvement.  Will monitor over the weekend.  PT/OT recommending SNF.  Hopefully SNF this week.

## 2024-09-23 LAB
ANION GAP SERPL CALC-SCNC: 9 MMOL/L (ref 8–16)
BASOPHILS # BLD AUTO: ABNORMAL K/UL (ref 0–0.2)
BASOPHILS NFR BLD: 0 % (ref 0–1.9)
BUN SERPL-MCNC: 16 MG/DL (ref 8–23)
CALCIUM SERPL-MCNC: 9.4 MG/DL (ref 8.7–10.5)
CHLORIDE SERPL-SCNC: 107 MMOL/L (ref 95–110)
CO2 SERPL-SCNC: 24 MMOL/L (ref 23–29)
CREAT SERPL-MCNC: 0.7 MG/DL (ref 0.5–1.4)
DIFFERENTIAL METHOD BLD: ABNORMAL
EOSINOPHIL # BLD AUTO: ABNORMAL K/UL (ref 0–0.5)
EOSINOPHIL NFR BLD: 4 % (ref 0–8)
ERYTHROCYTE [DISTWIDTH] IN BLOOD BY AUTOMATED COUNT: 17.2 % (ref 11.5–14.5)
EST. GFR  (NO RACE VARIABLE): >60 ML/MIN/1.73 M^2
GLUCOSE SERPL-MCNC: 112 MG/DL (ref 70–110)
HCT VFR BLD AUTO: 37 % (ref 40–54)
HGB BLD-MCNC: 11.5 G/DL (ref 14–18)
IMM GRANULOCYTES # BLD AUTO: ABNORMAL K/UL (ref 0–0.04)
IMM GRANULOCYTES NFR BLD AUTO: ABNORMAL % (ref 0–0.5)
LYMPHOCYTES # BLD AUTO: ABNORMAL K/UL (ref 1–4.8)
LYMPHOCYTES NFR BLD: 16 % (ref 18–48)
MCH RBC QN AUTO: 32.4 PG (ref 27–31)
MCHC RBC AUTO-ENTMCNC: 31.1 G/DL (ref 32–36)
MCV RBC AUTO: 104 FL (ref 82–98)
MONOCYTES # BLD AUTO: ABNORMAL K/UL (ref 0.3–1)
MONOCYTES NFR BLD: 6 % (ref 4–15)
MYELOCYTES NFR BLD MANUAL: 2 %
NEUTROPHILS NFR BLD: 67 % (ref 38–73)
NEUTS BAND NFR BLD MANUAL: 5 %
NRBC BLD-RTO: 0 /100 WBC
PLATELET # BLD AUTO: 504 K/UL (ref 150–450)
PLATELET BLD QL SMEAR: ABNORMAL
PMV BLD AUTO: 10.1 FL (ref 9.2–12.9)
POTASSIUM SERPL-SCNC: 3.5 MMOL/L (ref 3.5–5.1)
RBC # BLD AUTO: 3.55 M/UL (ref 4.6–6.2)
SODIUM SERPL-SCNC: 140 MMOL/L (ref 136–145)
TOXIC GRANULES BLD QL SMEAR: PRESENT
WBC # BLD AUTO: 10.18 K/UL (ref 3.9–12.7)

## 2024-09-23 PROCEDURE — 94760 N-INVAS EAR/PLS OXIMETRY 1: CPT

## 2024-09-23 PROCEDURE — 99900035 HC TECH TIME PER 15 MIN (STAT)

## 2024-09-23 PROCEDURE — S4991 NICOTINE PATCH NONLEGEND: HCPCS | Performed by: INTERNAL MEDICINE

## 2024-09-23 PROCEDURE — 36415 COLL VENOUS BLD VENIPUNCTURE: CPT | Performed by: HOSPITALIST

## 2024-09-23 PROCEDURE — 25000003 PHARM REV CODE 250: Performed by: STUDENT IN AN ORGANIZED HEALTH CARE EDUCATION/TRAINING PROGRAM

## 2024-09-23 PROCEDURE — 97530 THERAPEUTIC ACTIVITIES: CPT

## 2024-09-23 PROCEDURE — 80048 BASIC METABOLIC PNL TOTAL CA: CPT | Performed by: HOSPITALIST

## 2024-09-23 PROCEDURE — 25000003 PHARM REV CODE 250: Performed by: INTERNAL MEDICINE

## 2024-09-23 PROCEDURE — 97116 GAIT TRAINING THERAPY: CPT

## 2024-09-23 PROCEDURE — 85027 COMPLETE CBC AUTOMATED: CPT | Performed by: HOSPITALIST

## 2024-09-23 PROCEDURE — 25000003 PHARM REV CODE 250: Performed by: HOSPITALIST

## 2024-09-23 PROCEDURE — 21400001 HC TELEMETRY ROOM

## 2024-09-23 PROCEDURE — 27000221 HC OXYGEN, UP TO 24 HOURS

## 2024-09-23 PROCEDURE — 85007 BL SMEAR W/DIFF WBC COUNT: CPT | Performed by: HOSPITALIST

## 2024-09-23 PROCEDURE — 94761 N-INVAS EAR/PLS OXIMETRY MLT: CPT

## 2024-09-23 RX ORDER — LIDOCAINE 50 MG/G
1 PATCH TOPICAL
Status: DISCONTINUED | OUTPATIENT
Start: 2024-09-23 | End: 2024-09-25

## 2024-09-23 RX ADMIN — LEVOFLOXACIN 750 MG: 750 TABLET, FILM COATED ORAL at 08:09

## 2024-09-23 RX ADMIN — METOPROLOL TARTRATE 100 MG: 50 TABLET, FILM COATED ORAL at 08:09

## 2024-09-23 RX ADMIN — LIDOCAINE 1 PATCH: 50 PATCH TOPICAL at 02:09

## 2024-09-23 RX ADMIN — DULOXETINE HYDROCHLORIDE 60 MG: 30 CAPSULE, DELAYED RELEASE ORAL at 08:09

## 2024-09-23 RX ADMIN — APIXABAN 10 MG: 5 TABLET, FILM COATED ORAL at 08:09

## 2024-09-23 RX ADMIN — AMLODIPINE BESYLATE 5 MG: 5 TABLET ORAL at 08:09

## 2024-09-23 RX ADMIN — THIAMINE HCL TAB 100 MG 100 MG: 100 TAB at 08:09

## 2024-09-23 RX ADMIN — FLUTICASONE PROPIONATE 100 MCG: 50 SPRAY, METERED NASAL at 08:09

## 2024-09-23 RX ADMIN — FOLIC ACID 1 MG: 1 TABLET ORAL at 08:09

## 2024-09-23 RX ADMIN — TRAZODONE HYDROCHLORIDE 100 MG: 50 TABLET ORAL at 08:09

## 2024-09-23 RX ADMIN — NICOTINE 1 PATCH: 7 PATCH, EXTENDED RELEASE TRANSDERMAL at 08:09

## 2024-09-23 RX ADMIN — PANTOPRAZOLE SODIUM 40 MG: 40 TABLET, DELAYED RELEASE ORAL at 08:09

## 2024-09-23 RX ADMIN — THERA TABS 1 TABLET: TAB at 08:09

## 2024-09-23 NOTE — PLAN OF CARE
"FLORESITA received signed PASR from MD for hospital exemption. FLORESITA faxed to [employer]. FLORESITA pending 142 form.       ADDENDUM: SW informed by EVIN Espinal, "He does not meet the criteria for a Hospital Exemption since he is hospitalized for suicidal ideation and alcohol withdrawal and there is no acute condition."    Pt will require a Level II screening. SW to submit required documentation to [facility].     ADDENDUM: Fax successfully sent (65 pgs) at 2:17pm.     ADDENDUM: FLORESITA followed up with the following facilities:    [facility]: declined; unable to meet pt's needs    [facility]: declined; unable to accept pt's insurance.     FLORESITA will fax pt's referral to other SNFs for review.   "

## 2024-09-23 NOTE — ASSESSMENT & PLAN NOTE
Continue replacement with MVI, Thiamine and Folate.  No evidence of DT's at this time.  Valium stopped secondary to excessive sedation.  Still slightly tachycardic with mild tremors.  Will order Valium at night.  PT/OT recommending SNF.  He would not be able to go to SNF as long as he is PEC.  Will reconsult Psych to reevaluate need of PEC and inpatient psych.  Psych still recommending inpatient psychiatry and PEC.  Patient still tachycardic and slightly hypoxic.  CTA does show bilateral PE.  Started anticoagulation with improvement.  PT/OT recommending SNF.  SW/CM consulted.  Medically ready for discharge into SNF.

## 2024-09-23 NOTE — PT/OT/SLP PROGRESS
"Occupational Therapy   Treatment    Name: Israel De Jesus  MRN: [id number]  Admitting Diagnosis:  Alcohol withdrawal syndrome without complication       Recommendations:     Discharge Recommendations: Moderate Intensity Therapy  Discharge Equipment Recommendations:  to be determined by next level of care  Barriers to discharge:  Decreased caregiver support    Assessment:     Israel De Jesus is a 63 y.o. male with a medical diagnosis of Alcohol withdrawal syndrome without complication.  He presents with The primary encounter diagnosis was Alcohol withdrawal syndrome without complication. Diagnoses of Medical clearance for psychiatric admission, Depression with suicidal ideation, Difficulty walking, Coarse tremors, [place]i with dietary thiamine deficiency, Cough, Chest pain, and Pulmonary embolism were also pertinent to this visit. . Performance deficits affecting function are weakness, impaired endurance, impaired self care skills, impaired functional mobility, impaired balance, impaired cognition, decreased lower extremity function.     Rehab Prognosis:  Good; patient would benefit from acute skilled OT services to address these deficits and reach maximum level of function.       Plan:     Patient to be seen 4 x/week to address the above listed problems via self-care/home management, therapeutic activities, therapeutic exercises, cognitive retraining  Plan of Care Expires: 10/02/24  Plan of Care Reviewed with: patient    Subjective     Chief Complaint: pain  Patient/Family Comments/goals: "I thought I could get up and reach for something this weekend, but I fell trying to get up." Pt reports trying to hold onto tray table and experiencing posterior LOB  Pain/Comfort:  Pain Rating 1: 6/10 (L knee)  Pain Addressed 1: Reposition, Cessation of Activity, Nurse notified    Objective:     Communicated with: RN prior to session.  Patient found HOB elevated with bed alarm, Condom Catheter, telemetry, oxygen upon OT " entry to room.    General Precautions: Standard, fall, DT, respiratory    Orthopedic Precautions:N/A  Braces: N/A  Respiratory Status: Nasal cannula, flow 2 L/min     Occupational Performance:     Bed Mobility:  vcs for sequencing, education on hand placement and bedrail use  Patient completed Rolling/Turning to Left with  stand by assistance  Patient completed Scooting/Bridging with stand by assistance  Patient completed Supine to Sit with stand by assistance  Patient completed Sit to Supine with stand by assistance     Functional Mobility/Transfers:  Patient completed 2x Sit <> Stand Transfer with moderate assistance and of 2 persons with rolling walker 1st trial and minimum assistance and of 2 persons  with  rolling walker 2nd trial  Patient completed Bed <> Chair Transfer using Step Transfer technique with minimum assistance with rolling walker  Functional Mobility: Pt performed in room/out of room mobility with Min A and RW with 2nd person chair follow. Vc/tcs throughout transitions and mobility on anterior weight shifting with intermittent posterior posture noted; education on RW mgmt and sequencing    Activities of Daily Living:  Upper Body Dressing: modified independence to don/doff gown with setup      [facility] 6 Click ADL: 16    Treatment & Education:  ADL/functional mobility training as above.  Overlap with PT for portions of session due to complex nature of patient and for safety with mobility to decrease fall risk for patient and caregiver injury requiring two skilled therapists to provide different interventions.   L knee noted to be swollen; pt reporting moderate pain with movement. RN notified and assessed pt.    Patient left HOB elevated with door left open, all lines intact, call button in reach, bed alarm on, and RN notified    GOALS:   Multidisciplinary Problems       Occupational Therapy Goals          Problem: Occupational Therapy    Goal Priority Disciplines Outcome Interventions   Occupational  Therapy Goal     OT, PT/OT Progressing    Description: Goals to be met by: 10/02/2024     Patient will increase functional independence with ADLs by performing:    Feeding with [place].  UE Dressing with [place].  LE Dressing with Modified [place].  Grooming while seated with [place].  Toileting from bedside commode with Modified [place] for hygiene and clothing management.   Sitting at edge of bed x8 minutes with [place].  Toilet transfer to bedside commode with Stand-by Assistance.                         Time Tracking:     OT Date of Treatment: 09/23/24  OT Start Time: 1116  OT Stop Time: 1135  OT Total Time (min): 19 min PT cotreat    Billable Minutes:Therapeutic Activity 19    OT/SONA: OT          9/23/2024

## 2024-09-23 NOTE — NURSING
PER handoff received from Liliana    Pt resting in bed quietly. NAD noted. No c/o pain.     Fall and safety precautions maintained. Bed alarm activated and audible.. Bed locked in lowest position, with side rails up x2. Call bell and personal items within reach

## 2024-09-23 NOTE — PT/OT/SLP PROGRESS
Physical Therapy Treatment    Patient Name:  Israel De Jesus   MRN:  [id number]    Recommendations:     Discharge Recommendations: Moderate Intensity Therapy  Discharge Equipment Recommendations: to be determined by next level of care  Barriers to discharge:  Psych Placement     Assessment:     Israel De Jesus is a 63 y.o. male admitted with a medical diagnosis of Alcohol withdrawal syndrome without complication.  He presents with the following impairments/functional limitations: weakness, impaired endurance, impaired self care skills, impaired functional mobility, gait instability, decreased lower extremity function, decreased upper extremity function, decreased coordination, decreased ROM, decreased safety awareness, impaired balance, pain, impaired cardiopulmonary response to activity, impaired cognition, impaired coordination.    Pt ambulated very well considering this is his first time walking this distance. He did require max v/c for all transitional movements , and gait in order to require less assist and improved efficiency of movements.     Rehab Prognosis: Good; patient would benefit from acute skilled PT services to address these deficits and reach maximum level of function.    Recent Surgery: * No surgery found *      Plan:     During this hospitalization, patient to be seen 4 x/week (3-4x per week) to address the identified rehab impairments via gait training, therapeutic activities, therapeutic exercises, neuromuscular re-education and progress toward the following goals:    Plan of Care Expires:  09/21/24    Subjective     Chief Complaint: Pt states that he has L+ knee pain. He states that previously he fell trying to support himself with table tray and when he fell he experienced a LOB backwards   Patient/Family Comments/goals: Rehab  Pain/Comfort:  Pain Rating 1: 6/10  Location - Side 1: Left  Location 1: knee      Objective:     Communicated with nursing prior to session.  Patient found HOB  elevated with   upon PT entry to room.     General Precautions: Standard, fall, DT, respiratory  Orthopedic Precautions: N/A  Braces: N/A  Respiratory Status: Nasal cannula, flow 2 L/min     Functional Mobility: Verbal Cues for all functional mobility  Bed Mobility:     Rolling Left:  supervision and verbal cues for sequencing to roll to side and use HR   Scooting: stand by assistance  Bridging: stand by assistance  Supine to Sit: stand by assistance  Sit to Supine: stand by assistance  Transfers:     Sit to Stand:  minimum assistance with rolling walker  Gait: Performed 2x's with seated RB in between walks: 35 ft, min A for balance, and mod v/c with occasional negotiation of RW in order for pt to walk more fluidily with continuous steps as opposed to stop and go. He requires vc to lean into RW as to not have a LOB posteriorly   Balance: Static Sit balance Good, Static stand with RW Fair (-) and requires assist       AM-PAC 6 CLICK MOBILITY  Turning over in bed (including adjusting bedclothes, sheets and blankets)?: 4  Sitting down on and standing up from a chair with arms (e.g., wheelchair, bedside commode, etc.): 3  Moving from lying on back to sitting on the side of the bed?: 3  Moving to and from a bed to a chair (including a wheelchair)?: 3  Need to walk in hospital room?: 3  Climbing 3-5 steps with a railing?: 2  Basic Mobility Total Score: 18       Treatment & Education:  GT 1:1 x 8 min as noted above     Patient left HOB elevated with all lines intact, call button in reach, nursing notified, and he declined sitting OOB in recliner ..    GOALS:   Multidisciplinary Problems       Physical Therapy Goals          Problem: Physical Therapy    Goal Priority Disciplines Outcome Goal Variances Interventions   Physical Therapy Goal     PT, PT/OT Progressing     Description: Goals to be met by: 24     Patient will increase functional independence with mobility by performin. Supine to sit with Modified  [place]  2. Sit to supine with Modified [place]  3. Rolling to Left and Right with Modified [place].  4. Sit to stand transfer with Modified [place]  5. Bed to chair transfer with Modified [place] using Rolling Walker  6. Gait  x 50 feet with Modified [place] using Rolling Walker.   7. Stand for 5 minutes with Modified [place] using Rolling Walker  8. Lower extremity exercise program x30 reps per handout, with independence                         Time Tracking:     PT Received On:    PT Start Time: 1100     PT Stop Time: 1115  PT Total Time (min): 15 min     Billable Minutes: Gait Training 1    Treatment Type: Treatment  PT/PTA: PT     Number of PTA visits since last PT visit: 0     09/23/2024

## 2024-09-23 NOTE — NURSING
[facility]  Nurses Note -- 4 Eyes      9/23/2024       Skin assessed on: Q Shift      [x] No Pressure Injuries Present    []Prevention Measures Documented    [] Yes LDA  for Pressure Injury Previously documented     [] Yes New Pressure Injury Discovered   [] LDA for New Pressure Injury Added      Attending RN:  Nancy Street LPN     Second RN:  NAHOMI Ford

## 2024-09-23 NOTE — PROGRESS NOTES
[facility] Medicine  Progress Note    Patient Name: Israel De Jesus  MRN: [id number]  Patient Class: IP- Inpatient   Admission Date: 9/8/2024  Length of Stay: 14 days  Attending Physician: Basil Beatty MD  Primary Care Provider: Nuha Lynn MD        Subjective:     Principal Problem:Alcohol withdrawal syndrome without complication        HPI:  63 y.o.  man with h/o generalized anxiety d/o, essential HTN, suspect depression, and ETOH abuse presents to the ER with JPSO after reports from family that he was threatening to kill himself. Apparently posted on his Facebook page SI as well as reported to day time Emergency room physician and RNS. To me he denies an SI but he is PEC by ED. While awaiting a bed he started to become more and more tremulous and showing signs of withdrawal. Started on PO valium and Ativan.     Labs overall unremarkable and UDS with THC only. ETOH level 342. Pt. Denies any h/o Withdrawals, does drink daily but I feel is under reporting what he drinks to me. Drinks hard liquor.   COVID negative.     We have been consulted for further management of his withdrawals and admission to the ICU.     Because this patient's clinical condition is critically guarded and PEC he  requires close monitoring of his vitals and withdrawal symptoms, care in an alternative location isn't clinically appropriate for the reasons stated above.              Overview/Hospital Course:  62yo M admitted with suicidial ideation and alcohol withdrawal.  Due to patient being very drowsy and sleeping throughout the day Valium was stopped, continue p.r.n. IV Ativan based on CIWA protocol.  Has a R groin hernia- CT large bowel containing right inguinal hernia, without proximal bowel obstruction or acute inflammatory changes.  Lovenox stopped due to thrombocytopenia.  Once medically clear plan to discharge to inpatient psych.  Patient is more awake and alert today and has not required  Ativan for 72. Patient still very weak worse in lower extremity. He was evaluated by PT and unable to ambulate on his own. MRI brain ordered which was neg. PT/OT recommending SNF.  Unable to go to SNF as patient PEC.  Fevers and tachycardia on 9/17.  UA consistent with UTI.  Started on Rocephin.  Persistent tachycardia and noted to be slightly hypoxic.  CTA chest showing bilateral lower lobe PE with no evidence of heart strain.  Started on therapeutic Lovenox.  Transitioned to Eliquis.  Psych reconsulted and patient not suicidal.  PEC stopped and no longer needs inpatient psych.  Plan for SNF on discharge.  Patient is now medically ready for discharge and awaiting SNF placement.    Interval History: feeling much better.  Still weak.    Review of Systems   HENT:  Negative for ear discharge and ear pain.    Eyes:  Negative for discharge and itching.   Endocrine: Negative for cold intolerance and heat intolerance.   Neurological:  Negative for seizures and syncope.     Objective:     Vital Signs (Most Recent):  Temp: 98 °F (36.7 °C) (09/23/24 1146)  Pulse: 74 (09/23/24 1146)  Resp: 18 (09/23/24 1146)  BP: (!) 146/81 (09/23/24 1146)  SpO2: 95 % (09/23/24 1146) Vital Signs (24h Range):  Temp:  [97.5 °F (36.4 °C)-98 °F (36.7 °C)] 98 °F (36.7 °C)  Pulse:  [74-96] 74  Resp:  [16-19] 18  SpO2:  [93 %-98 %] 95 %  BP: (110-147)/(64-85) 146/81     Weight: 67.2 kg (148 lb 2.4 oz)  Body mass index is 23.91 kg/m².    Intake/Output Summary (Last 24 hours) at 9/23/2024 1254  Last data filed at 9/23/2024 1001  Gross per 24 hour   Intake 490 ml   Output 900 ml   Net -410 ml         Physical Exam  Constitutional:       General: He is not in acute distress.     Appearance: He is not toxic-appearing.   HENT:      Head: Normocephalic and atraumatic.      Mouth/Throat:      Mouth: Mucous membranes are dry.      Pharynx: No oropharyngeal exudate or posterior oropharyngeal erythema.   Cardiovascular:      Rate and Rhythm: Normal rate and  regular rhythm.   Pulmonary:      Effort: No respiratory distress.      Breath sounds: Normal breath sounds.   Abdominal:      General: Bowel sounds are normal.      Palpations: Abdomen is soft.   Musculoskeletal:         General: No deformity or signs of injury.   Skin:     General: Skin is warm and dry.   Neurological:      Mental Status: He is oriented to person, place, and time.      Comments: Awakes and alert.             Significant Labs: All pertinent labs within the past 24 hours have been reviewed.  BMP:   Recent Labs   Lab 09/23/24 0412   *      K 3.5      CO2 24   BUN 16   CREATININE 0.7   CALCIUM 9.4     CBC:   Recent Labs   Lab 09/23/24 0412   WBC 10.18   HGB 11.5*   HCT 37.0*   *         Significant Imaging: I have reviewed all pertinent imaging results/findings within the past 24 hours.    Assessment/Plan:      * Alcohol withdrawal syndrome without complication  Continue replacement with MVI, Thiamine and Folate.  No evidence of DT's at this time.  Valium stopped secondary to excessive sedation.  Still slightly tachycardic with mild tremors.  Will order Valium at night.  PT/OT recommending SNF.  He would not be able to go to SNF as long as he is PEC.  Will reconsult Psych to reevaluate need of PEC and inpatient psych.  Psych still recommending inpatient psychiatry and PEC.  Patient still tachycardic and slightly hypoxic.  CTA does show bilateral PE.  Started anticoagulation with improvement.  PT/OT recommending SNF.  SW/CM consulted.  Medically ready for discharge into SNF.    Acute pulmonary embolism without acute cor pulmonale  Patient with persistent tachycardia.  Noted to be hypoxic.  CTA showing bilateral lower lobe PE.  No evidence of right heart strain.  Started on therapeutic Lovenox.  Hemodynamically stable.  Transition to Eliquis.      UTI (urinary tract infection)  Completed ABX treatment.      Right groin mass  -etiology due to right inguinal hernia  -repeat CT  abdominal pelvis shows no strangulation or obstruction  -recommend following up outpatient for an elective repair      Hypomagnesemia  Replace as needed.    Tobacco dependency  -discussed cessation  -nicotine patch    Macrocytic anemia  Noted h/o macrocytic anemia but H/H and MCV stable    Suicidal ideation  PEC and will cont.  Patient currently denies any suicide ideations.  Will have Psych reassess need for PEC and inpatient psychiatry.  Patient reevaluated by Psych last night.  No suicidal ideations.  Ok to remove PEC and no need for inpatient psych on discharge.      Generalized anxiety disorder  -Resume home meds and cont with PRN ativan per CIWA protocol  -scheduled valium stopped due to lethargy        Tobacco use disorder  Nicotine patch ordered daily       Hypertension  Continue current management.         VTE Risk Mitigation (From admission, onward)           Ordered     apixaban tablet 5 mg  2 times daily         09/23/24 1253     apixaban tablet 10 mg  2 times daily         09/20/24 0825     Place sequential compression device  Until discontinued         09/11/24 0729     IP VTE LOW RISK PATIENT  Once         09/09/24 0350     Place sequential compression device  Until discontinued         09/09/24 0350                    Discharge Planning   BRANDON: 9/13/2024     Code Status: Full Code   Is the patient medically ready for discharge?:     Reason for patient still in hospital (select all that apply): Pending disposition  Discharge Plan A: Skilled Nursing Facility   Discharge Delays: None known at this time              Basil Beatty MD  Department of Hospital Medicine   [facility] [place] - Observation

## 2024-09-23 NOTE — SUBJECTIVE & OBJECTIVE
Interval History: feeling much better.  Still weak.    Review of Systems   HENT:  Negative for ear discharge and ear pain.    Eyes:  Negative for discharge and itching.   Endocrine: Negative for cold intolerance and heat intolerance.   Neurological:  Negative for seizures and syncope.     Objective:     Vital Signs (Most Recent):  Temp: 98 °F (36.7 °C) (09/23/24 1146)  Pulse: 74 (09/23/24 1146)  Resp: 18 (09/23/24 1146)  BP: (!) 146/81 (09/23/24 1146)  SpO2: 95 % (09/23/24 1146) Vital Signs (24h Range):  Temp:  [97.5 °F (36.4 °C)-98 °F (36.7 °C)] 98 °F (36.7 °C)  Pulse:  [74-96] 74  Resp:  [16-19] 18  SpO2:  [93 %-98 %] 95 %  BP: (110-147)/(64-85) 146/81     Weight: 67.2 kg (148 lb 2.4 oz)  Body mass index is 23.91 kg/m².    Intake/Output Summary (Last 24 hours) at 9/23/2024 1254  Last data filed at 9/23/2024 1001  Gross per 24 hour   Intake 490 ml   Output 900 ml   Net -410 ml         Physical Exam  Constitutional:       General: He is not in acute distress.     Appearance: He is not toxic-appearing.   HENT:      Head: Normocephalic and atraumatic.      Mouth/Throat:      Mouth: Mucous membranes are dry.      Pharynx: No oropharyngeal exudate or posterior oropharyngeal erythema.   Cardiovascular:      Rate and Rhythm: Normal rate and regular rhythm.   Pulmonary:      Effort: No respiratory distress.      Breath sounds: Normal breath sounds.   Abdominal:      General: Bowel sounds are normal.      Palpations: Abdomen is soft.   Musculoskeletal:         General: No deformity or signs of injury.   Skin:     General: Skin is warm and dry.   Neurological:      Mental Status: He is oriented to person, place, and time.      Comments: Awakes and alert.             Significant Labs: All pertinent labs within the past 24 hours have been reviewed.  BMP:   Recent Labs   Lab 09/23/24 0412   *      K 3.5      CO2 24   BUN 16   CREATININE 0.7   CALCIUM 9.4     CBC:   Recent Labs   Lab 09/23/24 0412   WBC  10.18   HGB 11.5*   HCT 37.0*   *         Significant Imaging: I have reviewed all pertinent imaging results/findings within the past 24 hours.

## 2024-09-24 PROCEDURE — S4991 NICOTINE PATCH NONLEGEND: HCPCS | Performed by: INTERNAL MEDICINE

## 2024-09-24 PROCEDURE — 25000003 PHARM REV CODE 250: Performed by: HOSPITALIST

## 2024-09-24 PROCEDURE — 25000003 PHARM REV CODE 250: Performed by: INTERNAL MEDICINE

## 2024-09-24 PROCEDURE — 27000221 HC OXYGEN, UP TO 24 HOURS

## 2024-09-24 PROCEDURE — 21400001 HC TELEMETRY ROOM

## 2024-09-24 PROCEDURE — 99900035 HC TECH TIME PER 15 MIN (STAT)

## 2024-09-24 PROCEDURE — 97116 GAIT TRAINING THERAPY: CPT

## 2024-09-24 PROCEDURE — 25000003 PHARM REV CODE 250: Performed by: STUDENT IN AN ORGANIZED HEALTH CARE EDUCATION/TRAINING PROGRAM

## 2024-09-24 PROCEDURE — 94761 N-INVAS EAR/PLS OXIMETRY MLT: CPT

## 2024-09-24 RX ADMIN — PANTOPRAZOLE SODIUM 40 MG: 40 TABLET, DELAYED RELEASE ORAL at 09:09

## 2024-09-24 RX ADMIN — APIXABAN 10 MG: 5 TABLET, FILM COATED ORAL at 08:09

## 2024-09-24 RX ADMIN — AMLODIPINE BESYLATE 5 MG: 5 TABLET ORAL at 09:09

## 2024-09-24 RX ADMIN — METOPROLOL TARTRATE 100 MG: 50 TABLET, FILM COATED ORAL at 08:09

## 2024-09-24 RX ADMIN — DULOXETINE HYDROCHLORIDE 60 MG: 30 CAPSULE, DELAYED RELEASE ORAL at 09:09

## 2024-09-24 RX ADMIN — FOLIC ACID 1 MG: 1 TABLET ORAL at 09:09

## 2024-09-24 RX ADMIN — FLUTICASONE PROPIONATE 100 MCG: 50 SPRAY, METERED NASAL at 08:09

## 2024-09-24 RX ADMIN — METOPROLOL TARTRATE 100 MG: 50 TABLET, FILM COATED ORAL at 09:09

## 2024-09-24 RX ADMIN — THIAMINE HCL TAB 100 MG 100 MG: 100 TAB at 09:09

## 2024-09-24 RX ADMIN — THERA TABS 1 TABLET: TAB at 09:09

## 2024-09-24 RX ADMIN — APIXABAN 10 MG: 5 TABLET, FILM COATED ORAL at 09:09

## 2024-09-24 RX ADMIN — GUAIFENESIN 100 MG: 200 SOLUTION ORAL at 09:09

## 2024-09-24 RX ADMIN — LIDOCAINE 1 PATCH: 50 PATCH TOPICAL at 03:09

## 2024-09-24 RX ADMIN — NICOTINE 1 PATCH: 7 PATCH, EXTENDED RELEASE TRANSDERMAL at 09:09

## 2024-09-24 NOTE — PLAN OF CARE
Changes in medical condition or discharge plan: Skilled Nursing Facility. Pt is no longer PEC'd. No accepting SNF at this time.     Does patient need new DME? TBD    Follow up appts needed: PCP    Medically stable: yes    Estimated Discharge Date:  TBD 09/24/24 0903   Discharge Reassessment   Assessment Type Discharge Planning Reassessment   Did the patient's condition or plan change since previous assessment? Yes   Discharge Plan discussed with: Patient   Communicated BRANDON with patient/caregiver Date not available/Unable to determine   Discharge Plan A Skilled Nursing Facility   Discharge Plan B Skilled Nursing Facility   DME Needed Upon Discharge  other (see comments)  (TBD)   Transition of Care Barriers None   Why the patient remains in the hospital Placement issues   Post-Acute Status   Discharge Delays None known at this time

## 2024-09-24 NOTE — PROGRESS NOTES
[facility] Medicine  Progress Note    Patient Name: Israel De Jesus  MRN: [id number]  Patient Class: IP- Inpatient   Admission Date: 9/8/2024  Length of Stay: 15 days  Attending Physician: Sarah García, *  Primary Care Provider: Nuha Lynn MD        Subjective:     Principal Problem:Alcohol withdrawal syndrome without complication        HPI:  63 y.o.  man with h/o generalized anxiety d/o, essential HTN, suspect depression, and ETOH abuse presents to the ER with JPSO after reports from family that he was threatening to kill himself. Apparently posted on his Facebook page SI as well as reported to day time Emergency room physician and RNS. To me he denies an SI but he is PEC by ED. While awaiting a bed he started to become more and more tremulous and showing signs of withdrawal. Started on PO valium and Ativan.     Labs overall unremarkable and UDS with THC only. ETOH level 342. Pt. Denies any h/o Withdrawals, does drink daily but I feel is under reporting what he drinks to me. Drinks hard liquor.   COVID negative.     We have been consulted for further management of his withdrawals and admission to the ICU.     Because this patient's clinical condition is critically guarded and PEC he  requires close monitoring of his vitals and withdrawal symptoms, care in an alternative location isn't clinically appropriate for the reasons stated above.              Overview/Hospital Course:  64yo M admitted with suicidial ideation and alcohol withdrawal.  Due to patient being very drowsy and sleeping throughout the day Valium was stopped, continue p.r.n. IV Ativan based on CIWA protocol.  Has a R groin hernia- CT large bowel containing right inguinal hernia, without proximal bowel obstruction or acute inflammatory changes.  Lovenox stopped due to thrombocytopenia.  Once medically clear plan to discharge to inpatient psych.  Patient is more awake and alert today and has not required  Ativan for 72. Patient still very weak worse in lower extremity. He was evaluated by PT and unable to ambulate on his own. MRI brain ordered which was neg. PT/OT recommending SNF.  Unable to go to SNF as patient PEC.  Fevers and tachycardia on 9/17.  UA consistent with UTI.  Started on Rocephin.  Persistent tachycardia and noted to be slightly hypoxic.  CTA chest showing bilateral lower lobe PE with no evidence of heart strain.  Started on therapeutic Lovenox.  Transitioned to Eliquis.  Psych reconsulted and patient not suicidal.  PEC stopped and no longer needs inpatient psych.  Plan for SNF on discharge.  Patient is now medically ready for discharge and awaiting SNF placement.  Pending SNF placement.    Interval History: feeling much better.  Still weak.  Pending SNF placement.    Review of Systems   HENT:  Negative for ear discharge and ear pain.    Eyes:  Negative for discharge and itching.   Endocrine: Negative for cold intolerance and heat intolerance.   Neurological:  Negative for seizures and syncope.     Objective:     Vital Signs (Most Recent):  Temp: 97.5 °F (36.4 °C) (09/24/24 0736)  Pulse: 78 (09/24/24 0736)  Resp: 18 (09/24/24 0736)  BP: 139/76 (09/24/24 0736)  SpO2: 96 % (09/24/24 0830) Vital Signs (24h Range):  Temp:  [97.5 °F (36.4 °C)-98 °F (36.7 °C)] 97.5 °F (36.4 °C)  Pulse:  [69-83] 78  Resp:  [17-18] 18  SpO2:  [95 %-98 %] 96 %  BP: (137-149)/(74-81) 139/76     Weight: 67.2 kg (148 lb 2.4 oz)  Body mass index is 23.91 kg/m².    Intake/Output Summary (Last 24 hours) at 9/24/2024 1011  Last data filed at 9/24/2024 0511  Gross per 24 hour   Intake 500 ml   Output 1300 ml   Net -800 ml         Physical Exam  Constitutional:       General: He is not in acute distress.     Appearance: He is not toxic-appearing.   HENT:      Head: Normocephalic and atraumatic.      Mouth/Throat:      Mouth: Mucous membranes are dry.      Pharynx: No oropharyngeal exudate or posterior oropharyngeal erythema.    Cardiovascular:      Rate and Rhythm: Normal rate and regular rhythm.   Pulmonary:      Effort: No respiratory distress.      Breath sounds: Normal breath sounds.   Abdominal:      General: Bowel sounds are normal.      Palpations: Abdomen is soft.   Musculoskeletal:         General: No deformity or signs of injury.   Skin:     General: Skin is warm and dry.   Neurological:      Mental Status: He is oriented to person, place, and time.      Comments: Awakes and alert.             Significant Labs: All pertinent labs within the past 24 hours have been reviewed.  BMP:   Recent Labs   Lab 09/23/24 0412   *      K 3.5      CO2 24   BUN 16   CREATININE 0.7   CALCIUM 9.4     CBC:   Recent Labs   Lab 09/23/24 0412   WBC 10.18   HGB 11.5*   HCT 37.0*   *         Significant Imaging: I have reviewed all pertinent imaging results/findings within the past 24 hours.    Assessment/Plan:      * Alcohol withdrawal syndrome without complication  Continue replacement with MVI, Thiamine and Folate.  No evidence of DT's at this time.  Valium stopped secondary to excessive sedation.  Still slightly tachycardic with mild tremors.  Will order Valium at night.  PT/OT recommending SNF.  He would not be able to go to SNF as long as he is PEC.  Will reconsult Psych to reevaluate need of PEC and inpatient psych.  Psych still recommending inpatient psychiatry and PEC.  Patient still tachycardic and slightly hypoxic.  CTA does show bilateral PE.  Started anticoagulation with improvement.  PT/OT recommending SNF.  SW/CM consulted.  Medically ready for discharge into SNF.  Pending SNF placement.but require level 2,since was PEC ed.    Acute pulmonary embolism without acute cor pulmonale  Patient with persistent tachycardia.  Noted to be hypoxic.  CTA showing bilateral lower lobe PE.  No evidence of right heart strain.  Started on therapeutic Lovenox.  Hemodynamically stable.  Transition to Eliquis.      UTI (urinary  tract infection)  Completed ABX treatment.      Right groin mass  -etiology due to right inguinal hernia  -repeat CT abdominal pelvis shows no strangulation or obstruction  -recommend following up outpatient for an elective repair      Hypomagnesemia  Replace as needed.    Tobacco dependency  -discussed cessation  -nicotine patch    Macrocytic anemia  Noted h/o macrocytic anemia but H/H and MCV stable    Suicidal ideation  PEC and will cont.  Patient currently denies any suicide ideations.  Will have Psych reassess need for PEC and inpatient psychiatry.  Patient reevaluated by Psych last night.  No suicidal ideations.  Ok to remove PEC and no need for inpatient psych on discharge.      Generalized anxiety disorder  -Resume home meds and cont with PRN ativan per CIWA protocol  -scheduled valium stopped due to lethargy        Tobacco use disorder  Nicotine patch ordered daily       Hypertension  Continue current management.         VTE Risk Mitigation (From admission, onward)           Ordered     apixaban tablet 5 mg  2 times daily         09/23/24 1253     apixaban tablet 10 mg  2 times daily         09/20/24 0825     Place sequential compression device  Until discontinued         09/11/24 0729     IP VTE LOW RISK PATIENT  Once         09/09/24 0350     Place sequential compression device  Until discontinued         09/09/24 0350                    Discharge Planning   BRANDON: 9/13/2024     Code Status: Full Code   Is the patient medically ready for discharge?:     Reason for patient still in hospital (select all that apply): Patient trending condition  Discharge Plan A: Skilled Nursing Facility   Discharge Delays: None known at this time              Sarah García MD  Department of Hospital Medicine   [facility] - [place] - Observation

## 2024-09-24 NOTE — NURSING
[facility]  Nurses Note -- 4 Eyes      9/23/2024       Skin assessed on: Q Shift      [x] No Pressure Injuries Present    []Prevention Measures Documented    [] Yes LDA  for Pressure Injury Previously documented     [] Yes New Pressure Injury Discovered   [] LDA for New Pressure Injury Added      Attending RN:  Nancy Street LPN     Second RN:  EMELY Good

## 2024-09-24 NOTE — PLAN OF CARE
Problem: Adult Inpatient Plan of Care  Goal: Plan of Care Review  Outcome: Progressing  Goal: Patient-Specific Goal (Individualized)  Outcome: Progressing  Goal: Absence of Hospital-Acquired Illness or Injury  Outcome: Progressing  Goal: Optimal Comfort and Wellbeing  Outcome: Progressing  Goal: Readiness for Transition of Care  Outcome: Progressing     Problem: Suicide Risk  Goal: Absence of Self-Harm  Outcome: Progressing     Problem: Skin Injury Risk Increased  Goal: Skin Health and Integrity  Outcome: Progressing     Problem: Confusion Acute  Goal: Optimal Cognitive Function  Outcome: Progressing     Problem: Alcohol Withdrawal  Goal: Alcohol Withdrawal Symptom Control  Outcome: Progressing  Goal: Optimal Neurologic Function  Outcome: Progressing  Goal: Readiness for Change Identified  Outcome: Progressing

## 2024-09-24 NOTE — NURSING
[facility]  Nurses Note -- 4 Eyes      9/24/2024       Skin assessed on: Q Shift      [] No Pressure Injuries Present    []Prevention Measures Documented    [x] Yes LDA  for Pressure Injury Previously documented     [] Yes New Pressure Injury Discovered   [] LDA for New Pressure Injury Added      Attending RN:  Veronica Watkins LPN     Second RN:  Nancy Street LPN

## 2024-09-24 NOTE — ASSESSMENT & PLAN NOTE
Continue replacement with MVI, Thiamine and Folate.  No evidence of DT's at this time.  Valium stopped secondary to excessive sedation.  Still slightly tachycardic with mild tremors.  Will order Valium at night.  PT/OT recommending SNF.  He would not be able to go to SNF as long as he is PEC.  Will reconsult Psych to reevaluate need of PEC and inpatient psych.  Psych still recommending inpatient psychiatry and PEC.  Patient still tachycardic and slightly hypoxic.  CTA does show bilateral PE.  Started anticoagulation with improvement.  PT/OT recommending SNF.  SW/CM consulted.  Medically ready for discharge into SNF.  Pending SNF placement.but require level 2,since was PEC ed.

## 2024-09-24 NOTE — NURSING
PER handoff received from Latisha     Pt resting in bed quietly. NAD noted. No c/o pain.     Fall and safety precautions maintained. Bed alarm activated and audible.. Bed locked in lowest position, with side rails up x2. Call bell and personal items within reach

## 2024-09-24 NOTE — PLAN OF CARE
Manasa Costa, Coordinator with Behavioral Health Division, (Office: [phone] ext. [phone] / Cell: [phone]) emailed SW to inform that Tammy would come out on 9/25 at 8:30 to conduct the assessment for Level II screening.       SW contacted the following facilities regarding pt's SNF referral. Pt has no accepting facility at this time.        - [facility] - received per Olga; will review    - [facility] - no answer    - [facility] - left msg    - [facility] - left msg    - [facility] - left msg    - [facility] - left msg

## 2024-09-24 NOTE — SUBJECTIVE & OBJECTIVE
Interval History: feeling much better.  Still weak.  Pending SNF placement.    Review of Systems   HENT:  Negative for ear discharge and ear pain.    Eyes:  Negative for discharge and itching.   Endocrine: Negative for cold intolerance and heat intolerance.   Neurological:  Negative for seizures and syncope.     Objective:     Vital Signs (Most Recent):  Temp: 97.5 °F (36.4 °C) (09/24/24 0736)  Pulse: 78 (09/24/24 0736)  Resp: 18 (09/24/24 0736)  BP: 139/76 (09/24/24 0736)  SpO2: 96 % (09/24/24 0830) Vital Signs (24h Range):  Temp:  [97.5 °F (36.4 °C)-98 °F (36.7 °C)] 97.5 °F (36.4 °C)  Pulse:  [69-83] 78  Resp:  [17-18] 18  SpO2:  [95 %-98 %] 96 %  BP: (137-149)/(74-81) 139/76     Weight: 67.2 kg (148 lb 2.4 oz)  Body mass index is 23.91 kg/m².    Intake/Output Summary (Last 24 hours) at 9/24/2024 1011  Last data filed at 9/24/2024 0511  Gross per 24 hour   Intake 500 ml   Output 1300 ml   Net -800 ml         Physical Exam  Constitutional:       General: He is not in acute distress.     Appearance: He is not toxic-appearing.   HENT:      Head: Normocephalic and atraumatic.      Mouth/Throat:      Mouth: Mucous membranes are dry.      Pharynx: No oropharyngeal exudate or posterior oropharyngeal erythema.   Cardiovascular:      Rate and Rhythm: Normal rate and regular rhythm.   Pulmonary:      Effort: No respiratory distress.      Breath sounds: Normal breath sounds.   Abdominal:      General: Bowel sounds are normal.      Palpations: Abdomen is soft.   Musculoskeletal:         General: No deformity or signs of injury.   Skin:     General: Skin is warm and dry.   Neurological:      Mental Status: He is oriented to person, place, and time.      Comments: Awakes and alert.             Significant Labs: All pertinent labs within the past 24 hours have been reviewed.  BMP:   Recent Labs   Lab 09/23/24  0412   *      K 3.5      CO2 24   BUN 16   CREATININE 0.7   CALCIUM 9.4     CBC:   Recent Labs   Lab  09/23/24  0412   WBC 10.18   HGB 11.5*   HCT 37.0*   *         Significant Imaging: I have reviewed all pertinent imaging results/findings within the past 24 hours.

## 2024-09-24 NOTE — PT/OT/SLP PROGRESS
Physical Therapy Treatment    Patient Name:  Israel De Jesus   MRN:  [id number]    Recommendations:     Discharge Recommendations: Moderate Intensity Therapy  Discharge Equipment Recommendations: to be determined by next level of care  Barriers to discharge:  Psych Placement     Assessment:     Israel De Jesus is a 63 y.o. male admitted with a medical diagnosis of Alcohol withdrawal syndrome without complication.  He presents with the following impairments/functional limitations: weakness, impaired endurance, impaired self care skills, impaired functional mobility, gait instability, decreased lower extremity function, decreased upper extremity function, decreased coordination, decreased ROM, decreased safety awareness, impaired balance, pain, impaired cardiopulmonary response to activity, impaired cognition, impaired coordination.    Pt ambulated , however it was not as fluid as yesterday and he walked decreased distance due to c/o fatigue. He did improve ability to transition weight anteriorly onto RW to prevent LOB with min vc     Rehab Prognosis: Good; patient would benefit from acute skilled PT services to address these deficits and reach maximum level of function.    Recent Surgery: * No surgery found *      Plan:     During this hospitalization, patient to be seen 4 x/week (3-4x per week) to address the identified rehab impairments via gait training, therapeutic activities, therapeutic exercises, neuromuscular re-education and progress toward the following goals:    Plan of Care Expires:  09/21/24    Subjective     Chief Complaint: Pt states that he has L+ knee pain.   Patient/Family Comments/goals: Rehab  Pain/Comfort:         Objective:     Communicated with nursing prior to session.  Patient found HOB elevated with   upon PT entry to room.     General Precautions: Standard, fall, DT, respiratory  Orthopedic Precautions: N/A  Braces: N/A  Respiratory Status: Nasal cannula, flow 2 L/min     Functional  Mobility: Verbal Cues for all functional mobility  Bed Mobility:     Rolling Left:  supervision and verbal cues for sequencing to roll to side and use HR   Scooting: stand by assistance  Bridging: stand by assistance  Supine to Sit: stand by assistance  Sit to Supine: stand by assistance  Transfers:     Sit to Stand:  minimum assistance with rolling walker  Gait: Performed 35 ft, min A for balance, and mod v/c without negotiation of RW.  He requires vc to lean into RW as to not have a LOB posteriorly   Balance: Static Sit balance Good, Static stand with RW Fair (-) and requires assist       AM-PAC 6 CLICK MOBILITY  Turning over in bed (including adjusting bedclothes, sheets and blankets)?: 4  Sitting down on and standing up from a chair with arms (e.g., wheelchair, bedside commode, etc.): 3  Moving from lying on back to sitting on the side of the bed?: 4  Moving to and from a bed to a chair (including a wheelchair)?: 3  Need to walk in hospital room?: 3  Climbing 3-5 steps with a railing?: 2  Basic Mobility Total Score: 19       Treatment & Education:  GT 1:1 x 8 min as noted above     Patient left HOB elevated with all lines intact, call button in reach, nursing notified, and he declined sitting OOB in recliner ..    GOALS:   Multidisciplinary Problems       Physical Therapy Goals          Problem: Physical Therapy    Goal Priority Disciplines Outcome Goal Variances Interventions   Physical Therapy Goal     PT, PT/OT Progressing     Description: Goals to be met by: 24     Patient will increase functional independence with mobility by performin. Supine to sit with Modified [place]  2. Sit to supine with Modified [place]  3. Rolling to Left and Right with Modified [place].  4. Sit to stand transfer with Modified [place]  5. Bed to chair transfer with Modified [place] using Rolling Walker  6. Gait  x 50 feet with Modified [place] using Rolling Walker.   7. Stand for 5 minutes  with Modified [place] using Rolling Walker  8. Lower extremity exercise program x30 reps per handout, with independence                         Time Tracking:     PT Received On:    PT Start Time: 1515     PT Stop Time: 1530  PT Total Time (min): 15 min     Billable Minutes: Gait Training 1    Treatment Type: Treatment  PT/PTA: PT     Number of PTA visits since last PT visit: 0     09/24/2024

## 2024-09-24 NOTE — NURSING
[facility] [place]  Nurses Note -- 4 Eyes      9/24/2024       Skin assessed on: Q Shift      [x] No Pressure Injuries Present    []Prevention Measures Documented    [] Yes LDA  for Pressure Injury Previously documented     [] Yes New Pressure Injury Discovered   [] LDA for New Pressure Injury Added      Attending RN:  Veronica Watkins LPN     Second RN:  Nancy Street LPN

## 2024-09-24 NOTE — NURSING
PER handoff received from Nancy    PT laying in bed awake. NAD noted. No c/o pain.    Fall and safety precaution maintained. Bed alarm activated and audible. Bed locked in lowest position, with side rails up x2. Call bell and personal items within reach.

## 2024-09-25 PROCEDURE — 25000003 PHARM REV CODE 250: Performed by: HOSPITALIST

## 2024-09-25 PROCEDURE — A4216 STERILE WATER/SALINE, 10 ML: HCPCS | Performed by: INTERNAL MEDICINE

## 2024-09-25 PROCEDURE — 25000003 PHARM REV CODE 250: Performed by: STUDENT IN AN ORGANIZED HEALTH CARE EDUCATION/TRAINING PROGRAM

## 2024-09-25 PROCEDURE — 97116 GAIT TRAINING THERAPY: CPT

## 2024-09-25 PROCEDURE — S4991 NICOTINE PATCH NONLEGEND: HCPCS | Performed by: INTERNAL MEDICINE

## 2024-09-25 PROCEDURE — 21400001 HC TELEMETRY ROOM

## 2024-09-25 PROCEDURE — 25000003 PHARM REV CODE 250: Performed by: INTERNAL MEDICINE

## 2024-09-25 PROCEDURE — 97535 SELF CARE MNGMENT TRAINING: CPT

## 2024-09-25 PROCEDURE — 97530 THERAPEUTIC ACTIVITIES: CPT

## 2024-09-25 PROCEDURE — 27000221 HC OXYGEN, UP TO 24 HOURS

## 2024-09-25 PROCEDURE — 94761 N-INVAS EAR/PLS OXIMETRY MLT: CPT

## 2024-09-25 RX ORDER — LIDOCAINE 50 MG/G
2 PATCH TOPICAL
Status: DISCONTINUED | OUTPATIENT
Start: 2024-09-25 | End: 2024-09-27 | Stop reason: HOSPADM

## 2024-09-25 RX ADMIN — LIDOCAINE 1 PATCH: 700 PATCH TOPICAL at 02:09

## 2024-09-25 RX ADMIN — THIAMINE HCL TAB 100 MG 100 MG: 100 TAB at 09:09

## 2024-09-25 RX ADMIN — PANTOPRAZOLE SODIUM 40 MG: 40 TABLET, DELAYED RELEASE ORAL at 09:09

## 2024-09-25 RX ADMIN — APIXABAN 10 MG: 5 TABLET, FILM COATED ORAL at 09:09

## 2024-09-25 RX ADMIN — THERA TABS 1 TABLET: TAB at 09:09

## 2024-09-25 RX ADMIN — LIDOCAINE 1 PATCH: 50 PATCH TOPICAL at 02:09

## 2024-09-25 RX ADMIN — ACETAMINOPHEN 650 MG: 325 TABLET ORAL at 02:09

## 2024-09-25 RX ADMIN — NICOTINE 1 PATCH: 7 PATCH, EXTENDED RELEASE TRANSDERMAL at 09:09

## 2024-09-25 RX ADMIN — DULOXETINE HYDROCHLORIDE 60 MG: 30 CAPSULE, DELAYED RELEASE ORAL at 09:09

## 2024-09-25 RX ADMIN — METOPROLOL TARTRATE 100 MG: 50 TABLET, FILM COATED ORAL at 09:09

## 2024-09-25 RX ADMIN — FOLIC ACID 1 MG: 1 TABLET ORAL at 09:09

## 2024-09-25 RX ADMIN — Medication 10 ML: at 09:09

## 2024-09-25 RX ADMIN — GUAIFENESIN 100 MG: 200 SOLUTION ORAL at 09:09

## 2024-09-25 RX ADMIN — FLUTICASONE PROPIONATE 100 MCG: 50 SPRAY, METERED NASAL at 09:09

## 2024-09-25 RX ADMIN — AMLODIPINE BESYLATE 5 MG: 5 TABLET ORAL at 09:09

## 2024-09-25 NOTE — PT/OT/SLP PROGRESS
Physical Therapy      Patient Name:  Israel De Jesus   MRN:  [id number]    Patient not seen today secondary to Patient unwilling to participate. Will follow-up this afternoon per pt request.

## 2024-09-25 NOTE — PT/OT/SLP PROGRESS
Physical Therapy Treatment    Patient Name:  Israel De Jesus   MRN:  [id number]    Recommendations:     Discharge Recommendations: Moderate Intensity Therapy  Discharge Equipment Recommendations: to be determined by next level of care  Barriers to discharge:  Psych Placement     Assessment:     Israel De Jesus is a 63 y.o. male admitted with a medical diagnosis of Alcohol withdrawal syndrome without complication.  He presents with the following impairments/functional limitations: weakness, impaired endurance, impaired self care skills, impaired functional mobility, gait instability, decreased lower extremity function, decreased upper extremity function, decreased coordination, decreased ROM, decreased safety awareness, impaired balance, pain, impaired cardiopulmonary response to activity, impaired cognition, impaired coordination.    Pt ambulated with improved balance, endurance, and fluidity of gait. He used a continuous step through gait pattern with decreased bo and able to control RW without assist     Rehab Prognosis: Good; patient would benefit from acute skilled PT services to address these deficits and reach maximum level of function.    Recent Surgery: * No surgery found *      Plan:     During this hospitalization, patient to be seen 4 x/week (3-4x per week) to address the identified rehab impairments via gait training, therapeutic activities, therapeutic exercises, neuromuscular re-education and progress toward the following goals:    Plan of Care Expires:  09/21/24    Subjective     Chief Complaint: Pt states that he has L+ knee pain.   Patient/Family Comments/goals: Rehab  Pain/Comfort:  Pain Rating 1: 5/10  Location - Side 1: Left  Location 1: knee      Objective:     Communicated with nursing prior to session.  Patient found HOB elevated with obrien catheter upon PT entry to room.     General Precautions: Standard, fall  Orthopedic Precautions: N/A  Braces: N/A  Respiratory Status: Nasal  cannula, flow 2 L/min     Functional Mobility: Verbal Cues for all functional mobility  Bed Mobility:     Rolling Left:  supervision and verbal cues for sequencing to roll to side and use HR   Scooting: stand by assistance  Bridging: stand by assistance  Supine to Sit: stand by assistance  Sit to Supine: stand by assistance  Transfers:     Sit to Stand:  CGA to SB assistance with rolling walker  Gait: Performed 105 ft, CGA for balance, He requires vc to lean into RW as to not have a LOB posteriorly   Balance: Static Sit balance Good, Static stand with RW Fair (-) and requires assist       AM-PAC 6 CLICK MOBILITY  Turning over in bed (including adjusting bedclothes, sheets and blankets)?: 4  Sitting down on and standing up from a chair with arms (e.g., wheelchair, bedside commode, etc.): 4  Moving from lying on back to sitting on the side of the bed?: 4  Moving to and from a bed to a chair (including a wheelchair)?: 3  Need to walk in hospital room?: 3  Climbing 3-5 steps with a railing?: 2  Basic Mobility Total Score: 20       Treatment & Education:  GT 1:1 x 8 min as noted above     Patient left HOB elevated with all lines intact, call button in reach, nursing notified, and he declined sitting OOB in recliner ..    GOALS:   Multidisciplinary Problems       Physical Therapy Goals          Problem: Physical Therapy    Goal Priority Disciplines Outcome Goal Variances Interventions   Physical Therapy Goal     PT, PT/OT Progressing     Description: Goals to be met by: 24     Patient will increase functional independence with mobility by performin. Supine to sit with Modified [place]  2. Sit to supine with Modified [place]  3. Rolling to Left and Right with Modified [place].  4. Sit to stand transfer with Modified [place]  5. Bed to chair transfer with Modified [place] using Rolling Walker  6. Gait  x 50 feet with Modified [place] using Rolling Walker.   7. Stand for 5 minutes  with Modified [place] using Rolling Walker  8. Lower extremity exercise program x30 reps per handout, with independence                         Time Tracking:     PT Received On:    PT Start Time: 1335     PT Stop Time: 1345  PT Total Time (min): 10 min     Billable Minutes: Gait Training 1    Treatment Type: Treatment  PT/PTA: PT     Number of PTA visits since last PT visit: 0     09/25/2024

## 2024-09-25 NOTE — NURSING
[facility] [place]  Nurses Note -- 4 Eyes      9/24/2024       Skin assessed on: Q Shift      [x] No Pressure Injuries Present    [x]Prevention Measures Documented    [] Yes LDA  for Pressure Injury Previously documented     [] Yes New Pressure Injury Discovered   [] LDA for New Pressure Injury Added      Attending RN:  Elham Pruitt LPN     Second RN:  Guerda Watkins Lpn

## 2024-09-25 NOTE — PLAN OF CARE
Problem: Adult Inpatient Plan of Care  Goal: Plan of Care Review  9/25/2024 1709 by Veronica Watkins LPN  Outcome: Progressing  9/25/2024 1709 by Veronica Watkins LPN  Outcome: Progressing  Goal: Patient-Specific Goal (Individualized)  9/25/2024 1709 by Veronica Watkins LPN  Outcome: Progressing  9/25/2024 1709 by Veronica Watkins LPN  Outcome: Progressing  Goal: Absence of Hospital-Acquired Illness or Injury  9/25/2024 1709 by Veronica Watkins LPN  Outcome: Progressing  9/25/2024 1709 by Veronica Watkins LPN  Outcome: Progressing  Goal: Optimal Comfort and Wellbeing  9/25/2024 1709 by Veronica Watkins LPN  Outcome: Progressing  9/25/2024 1709 by Veronica Watkins LPN  Outcome: Progressing  Goal: Readiness for Transition of Care  9/25/2024 1709 by Veronica Watkins LPN  Outcome: Progressing  9/25/2024 1709 by Veronica Watkins LPN  Outcome: Progressing     Problem: Suicide Risk  Goal: Absence of Self-Harm  9/25/2024 1709 by Veronica Watkins LPN  Outcome: Progressing  9/25/2024 1709 by Veronica Watkins LPN  Outcome: Progressing     Problem: Skin Injury Risk Increased  Goal: Skin Health and Integrity  9/25/2024 1709 by Veronica Watkins LPN  Outcome: Progressing  9/25/2024 1709 by Veronica Watkins LPN  Outcome: Progressing     Problem: Fall Injury Risk  Goal: Absence of Fall and Fall-Related Injury  9/25/2024 1709 by Veronica Watkins LPN  Outcome: Progressing  9/25/2024 1709 by Veronica Watkins LPN  Outcome: Progressing     Problem: Confusion Acute  Goal: Optimal Cognitive Function  Outcome: Progressing     Problem: Alcohol Withdrawal  Goal: Alcohol Withdrawal Symptom Control  Outcome: Progressing  Goal: Optimal Neurologic Function  Outcome: Progressing  Goal: Readiness for Change Identified  Outcome: Progressing     Problem: Acute Kidney Injury/Impairment  Goal: Fluid and Electrolyte Balance  Outcome: Progressing  Goal: Improved Oral Intake  Outcome: Progressing  Goal: Effective Renal Function  Outcome: Progressing

## 2024-09-25 NOTE — PT/OT/SLP PROGRESS
Occupational Therapy   Treatment    Name: Israel De Jesus  MRN: [id number]  Admitting Diagnosis:  Alcohol withdrawal syndrome without complication       Recommendations:     Discharge Recommendations: Moderate Intensity Therapy  Discharge Equipment Recommendations:  to be determined by next level of care  Barriers to discharge:  Decreased caregiver support    Assessment:     Israel De Jesus is a 63 y.o. male with a medical diagnosis of Alcohol withdrawal syndrome without complication.  He presents with The primary encounter diagnosis was Alcohol withdrawal syndrome without complication. Diagnoses of Medical clearance for psychiatric admission, Depression with suicidal ideation, Difficulty walking, Coarse tremors, Beriberi with dietary thiamine deficiency, Cough, Chest pain, and Pulmonary embolism were also pertinent to this visit. . Performance deficits affecting function are weakness, impaired endurance, impaired self care skills, impaired functional mobility, impaired balance, impaired cognition, decreased lower extremity function.     Rehab Prognosis:  Good; patient would benefit from acute skilled OT services to address these deficits and reach maximum level of function.       Plan:     Patient to be seen 4 x/week to address the above listed problems via self-care/home management, therapeutic activities, therapeutic exercises, cognitive retraining  Plan of Care Expires: 10/02/24  Plan of Care Reviewed with: patient    Subjective     Chief Complaint: pain in R shoulder, L knee post-mobility  Patient/Family Comments/goals: pt agreeable to OOB activity  Pain/Comfort:  Pain Rating 1: 0/10 (at rest)  Pain Addressed 1: Reposition, Nurse notified    Objective:     Communicated with: RN prior to session.  Patient found HOB elevated with bed alarm, Condom Catheter, telemetry, oxygen upon OT entry to room.    General Precautions: Standard, fall, DT, respiratory    Orthopedic Precautions:N/A  Braces: N/A  Respiratory  Status: Nasal cannula, flow 2 L/min     Occupational Performance:     Bed Mobility:    Patient completed Scooting/Bridging with stand by assistance seated to eob and to hob  Patient completed Supine to Sit with stand by assistance  Patient completed Sit to Supine with stand by assistance     Functional Mobility/Transfers:  Patient completed Sit <> Stand Transfer with contact guard assistance  with  rolling walker   Functional Mobility: Pt performed in room mobility with CGA and RW. Re-ed on RW use and anterior weightshift during initial posterior lean noted upon standing upright; pt able to self-correct with CGA. Re-ed on sequencing RW and mgmt of RW close to sink    Activities of Daily Living:  Grooming: stand by assistance with RW standing at sink (initially CGA progressing to SBA) for face washing and hair combing; intermittent vcs on unilateral hand maintenance on RW/sink for steadying and for anterior weight shifting with pt able to self-correct  Upper Body Dressing: modified independence with setup of gown to don/doff      [facility] 6 Click ADL: 18    Treatment & Education:  ADL/functional mobility training as above.  BP taken by PCT at session onset and WFL.  Pt maintaining 98% SpO2 on 2 L initially and on RA post-functional mobility. RN notified via secure chat of SpO2, pt requesting lidocaine patch for pain relief, and condom cath falling off on initial stand.   Pt reporting not sleeping well last night and preferring to return to bed instead of chair for rest. All needs met.    Patient left HOB elevated with all lines intact, call button in reach, bed alarm on, and RN notified    GOALS:   Multidisciplinary Problems       Occupational Therapy Goals          Problem: Occupational Therapy    Goal Priority Disciplines Outcome Interventions   Occupational Therapy Goal     OT, PT/OT Progressing    Description: Goals to be met by: 10/02/2024     Patient will increase functional independence with ADLs by  performing:    Feeding with [place].  UE Dressing with [place].  LE Dressing with Modified [place].  Grooming while seated with [place].  Toileting from bedside commode with Modified [place] for hygiene and clothing management.   Sitting at edge of bed x8 minutes with [place].  Toilet transfer to bedside commode with Stand-by Assistance.                         Time Tracking:     OT Date of Treatment: 09/25/24  OT Start Time: 1114  OT Stop Time: 1146  OT Total Time (min): 32 min    Billable Minutes:Self Care/Home Management 23  Therapeutic Activity 9    OT/SONA: OT          9/25/2024

## 2024-09-25 NOTE — CONSULTS
[facility] Hospital Medicine  Telemedicine Consult Note       Israel De Jesus has been accepted for transfer to [facility] and will be followed through telemedicine services beginning  at 7 AM.        Deborah Miller MD  [facility] Medicine Staff

## 2024-09-25 NOTE — NURSING
PER handoff received from Alexis Lizarraga laying in bed awake. NAD noted. No C/O pain.  Fall and safety precaution maintained. Bed alarm activated and audible.Bed loacked in lowest position, with side rails up x2. Call bell and personal items within reach.

## 2024-09-25 NOTE — SUBJECTIVE & OBJECTIVE
Interval History: Pt noted to be afebrile and hemodynamically stable. Stable respiratory status. No acute events overnight. Reports he did not get enough sleep last night and feels tired today. Doing well overall. Denies any withdrawal symptoms today. Has been able to get adequate rest. Eating and drinking well. Pending placement to SNF. Medically stable for DC. Has been refused from multiple SNF, CM/SW still trying for more facilities.       Review of Systems   Constitutional:  Positive for activity change and fatigue. Negative for fever.   Respiratory:  Negative for cough and shortness of breath.    Cardiovascular:  Negative for chest pain.   Gastrointestinal:  Negative for abdominal pain.   Neurological:  Negative for dizziness and headaches.   Psychiatric/Behavioral:  Negative for confusion.    All other systems reviewed and are negative.    Objective:     Vital Signs (Most Recent):  Temp: 98.3 °F (36.8 °C) (09/25/24 1126)  Pulse: 82 (09/25/24 1341)  Resp: 18 (09/25/24 1126)  BP: (!) 149/78 (09/25/24 1126)  SpO2: 98 % (09/25/24 1341) Vital Signs (24h Range):  Temp:  [97.9 °F (36.6 °C)-98.5 °F (36.9 °C)] 98.3 °F (36.8 °C)  Pulse:  [72-90] 82  Resp:  [17-18] 18  SpO2:  [96 %-98 %] 98 %  BP: (120-149)/(65-78) 149/78     Weight: 67.2 kg (148 lb 2.4 oz)  Body mass index is 23.91 kg/m².    Intake/Output Summary (Last 24 hours) at 9/25/2024 1403  Last data filed at 9/25/2024 0430  Gross per 24 hour   Intake 500 ml   Output 2000 ml   Net -1500 ml         Physical Exam  Vitals and nursing note reviewed.   Constitutional:       Appearance: Normal appearance.   HENT:      Head: Normocephalic and atraumatic.   Eyes:      Extraocular Movements: Extraocular movements intact.      Pupils: Pupils are equal, round, and reactive to light.   Cardiovascular:      Rate and Rhythm: Normal rate and regular rhythm.   Pulmonary:      Effort: Pulmonary effort is normal. No respiratory distress.   Abdominal:      General: Abdomen is flat.  "There is no distension.   Musculoskeletal:         General: Normal range of motion.      Cervical back: Normal range of motion.   Neurological:      General: No focal deficit present.      Mental Status: He is alert and oriented to person, place, and time.   Psychiatric:         Mood and Affect: Mood normal.         Behavior: Behavior normal.             Significant Labs: All pertinent labs within the past 24 hours have been reviewed.  CBC: No results for input(s): "WBC", "HGB", "HCT", "PLT" in the last 48 hours.  CMP: No results for input(s): "NA", "K", "CL", "CO2", "GLU", "BUN", "CREATININE", "CALCIUM", "PROT", "ALBUMIN", "BILITOT", "ALKPHOS", "AST", "ALT", "ANIONGAP", "EGFRNONAA" in the last 48 hours.    Invalid input(s): "ESTGFAFRICA"    Significant Imaging: I have reviewed all pertinent imaging results/findings within the past 24 hours.  "

## 2024-09-25 NOTE — NURSING
[facility]  Nurses Note -- 4 Eyes      9/25/2024       Skin assessed on: Q Shift      [x] No Pressure Injuries Present    []Prevention Measures Documented    [] Yes LDA  for Pressure Injury Previously documented     [] Yes New Pressure Injury Discovered   [] LDA for New Pressure Injury Added      Attending RN:  Veronica Watkins LPN     Second RN:  Elham Pruitt LPN

## 2024-09-25 NOTE — NURSING
Reported off to oncoming nurse Elham, patient resting in bed. AAOX4. Patient can needs known to staff, standby assist required for adls and transfer, no acute distress noted, safety precautions maintained.        Chart check completed.

## 2024-09-26 PROBLEM — R53.81 DEBILITY: Status: ACTIVE | Noted: 2024-09-26

## 2024-09-26 LAB
ANION GAP SERPL CALC-SCNC: 7 MMOL/L (ref 8–16)
BASOPHILS # BLD AUTO: 0.14 K/UL (ref 0–0.2)
BASOPHILS NFR BLD: 1.2 % (ref 0–1.9)
BUN SERPL-MCNC: 14 MG/DL (ref 8–23)
CALCIUM SERPL-MCNC: 9.3 MG/DL (ref 8.7–10.5)
CHLORIDE SERPL-SCNC: 105 MMOL/L (ref 95–110)
CO2 SERPL-SCNC: 26 MMOL/L (ref 23–29)
CREAT SERPL-MCNC: 0.8 MG/DL (ref 0.5–1.4)
DIFFERENTIAL METHOD BLD: ABNORMAL
EOSINOPHIL # BLD AUTO: 0.4 K/UL (ref 0–0.5)
EOSINOPHIL NFR BLD: 3.1 % (ref 0–8)
ERYTHROCYTE [DISTWIDTH] IN BLOOD BY AUTOMATED COUNT: 16.7 % (ref 11.5–14.5)
EST. GFR  (NO RACE VARIABLE): >60 ML/MIN/1.73 M^2
GLUCOSE SERPL-MCNC: 85 MG/DL (ref 70–110)
HCT VFR BLD AUTO: 32.5 % (ref 40–54)
HGB BLD-MCNC: 10.3 G/DL (ref 14–18)
IMM GRANULOCYTES # BLD AUTO: 0.53 K/UL (ref 0–0.04)
IMM GRANULOCYTES NFR BLD AUTO: 4.4 % (ref 0–0.5)
LYMPHOCYTES # BLD AUTO: 2 K/UL (ref 1–4.8)
LYMPHOCYTES NFR BLD: 16.5 % (ref 18–48)
MAGNESIUM SERPL-MCNC: 1.7 MG/DL (ref 1.6–2.6)
MCH RBC QN AUTO: 32.3 PG (ref 27–31)
MCHC RBC AUTO-ENTMCNC: 31.7 G/DL (ref 32–36)
MCV RBC AUTO: 102 FL (ref 82–98)
MONOCYTES # BLD AUTO: 0.9 K/UL (ref 0.3–1)
MONOCYTES NFR BLD: 7.2 % (ref 4–15)
NEUTROPHILS # BLD AUTO: 8.2 K/UL (ref 1.8–7.7)
NEUTROPHILS NFR BLD: 67.6 % (ref 38–73)
NRBC BLD-RTO: 0 /100 WBC
PHOSPHATE SERPL-MCNC: 2.9 MG/DL (ref 2.7–4.5)
PLATELET # BLD AUTO: 508 K/UL (ref 150–450)
PMV BLD AUTO: 9.5 FL (ref 9.2–12.9)
POTASSIUM SERPL-SCNC: 3.9 MMOL/L (ref 3.5–5.1)
RBC # BLD AUTO: 3.19 M/UL (ref 4.6–6.2)
SODIUM SERPL-SCNC: 138 MMOL/L (ref 136–145)
WBC # BLD AUTO: 12.11 K/UL (ref 3.9–12.7)

## 2024-09-26 PROCEDURE — 84100 ASSAY OF PHOSPHORUS: CPT | Performed by: HOSPITALIST

## 2024-09-26 PROCEDURE — 25000003 PHARM REV CODE 250: Performed by: HOSPITALIST

## 2024-09-26 PROCEDURE — 80048 BASIC METABOLIC PNL TOTAL CA: CPT | Performed by: HOSPITALIST

## 2024-09-26 PROCEDURE — 85025 COMPLETE CBC W/AUTO DIFF WBC: CPT | Performed by: HOSPITALIST

## 2024-09-26 PROCEDURE — 83735 ASSAY OF MAGNESIUM: CPT | Performed by: HOSPITALIST

## 2024-09-26 PROCEDURE — 21400001 HC TELEMETRY ROOM

## 2024-09-26 PROCEDURE — 25000003 PHARM REV CODE 250: Performed by: INTERNAL MEDICINE

## 2024-09-26 PROCEDURE — S4991 NICOTINE PATCH NONLEGEND: HCPCS | Performed by: INTERNAL MEDICINE

## 2024-09-26 PROCEDURE — 97535 SELF CARE MNGMENT TRAINING: CPT

## 2024-09-26 PROCEDURE — 25000003 PHARM REV CODE 250: Performed by: STUDENT IN AN ORGANIZED HEALTH CARE EDUCATION/TRAINING PROGRAM

## 2024-09-26 PROCEDURE — 94761 N-INVAS EAR/PLS OXIMETRY MLT: CPT

## 2024-09-26 PROCEDURE — 36415 COLL VENOUS BLD VENIPUNCTURE: CPT | Performed by: HOSPITALIST

## 2024-09-26 PROCEDURE — 97530 THERAPEUTIC ACTIVITIES: CPT

## 2024-09-26 RX ADMIN — TRAZODONE HYDROCHLORIDE 100 MG: 50 TABLET ORAL at 09:09

## 2024-09-26 RX ADMIN — GUAIFENESIN 100 MG: 200 SOLUTION ORAL at 09:09

## 2024-09-26 RX ADMIN — FOLIC ACID 1 MG: 1 TABLET ORAL at 10:09

## 2024-09-26 RX ADMIN — FLUTICASONE PROPIONATE 100 MCG: 50 SPRAY, METERED NASAL at 09:09

## 2024-09-26 RX ADMIN — APIXABAN 10 MG: 5 TABLET, FILM COATED ORAL at 09:09

## 2024-09-26 RX ADMIN — LIDOCAINE 2 PATCH: 700 PATCH TOPICAL at 02:09

## 2024-09-26 RX ADMIN — THERA TABS 1 TABLET: TAB at 10:09

## 2024-09-26 RX ADMIN — THIAMINE HCL TAB 100 MG 100 MG: 100 TAB at 10:09

## 2024-09-26 RX ADMIN — DULOXETINE HYDROCHLORIDE 60 MG: 30 CAPSULE, DELAYED RELEASE ORAL at 10:09

## 2024-09-26 RX ADMIN — PANTOPRAZOLE SODIUM 40 MG: 40 TABLET, DELAYED RELEASE ORAL at 10:09

## 2024-09-26 RX ADMIN — METOPROLOL TARTRATE 100 MG: 50 TABLET, FILM COATED ORAL at 09:09

## 2024-09-26 RX ADMIN — NICOTINE 1 PATCH: 7 PATCH, EXTENDED RELEASE TRANSDERMAL at 10:09

## 2024-09-26 RX ADMIN — AMLODIPINE BESYLATE 5 MG: 5 TABLET ORAL at 10:09

## 2024-09-26 RX ADMIN — METOPROLOL TARTRATE 100 MG: 50 TABLET, FILM COATED ORAL at 10:09

## 2024-09-26 RX ADMIN — APIXABAN 10 MG: 5 TABLET, FILM COATED ORAL at 10:09

## 2024-09-26 NOTE — ASSESSMENT & PLAN NOTE
Continue replacement with MVI, Thiamine and Folate.  No evidence of DT's at this time.  Valium stopped secondary to excessive sedation.  Symptoms resolved.   PT/OT recommending SNF.  He would not be able to go to SNF as long as he is PEC.  Will reconsult Psych to reevaluate need of PEC and inpatient psych.  Psych still recommending inpatient psychiatry and PEC.  Patient still tachycardic and slightly hypoxic.  CTA does show bilateral PE.  Started anticoagulation with improvement.  PT/OT recommending SNF.  SW/CM consulted.  Medically ready for discharge into SNF.  Pending SNF placement.but require level 2,since was PEC ed.

## 2024-09-26 NOTE — PROGRESS NOTES
[facility] Medicine  Telemedicine Progress Note    Patient Name: Israel De Jesus  MRN: [id number]  Patient Class: IP- Inpatient   Admission Date: 9/8/2024  Length of Stay: 17 days  Attending Physician: Deborah Miller MD  Primary Care Provider: Nuha Lynn MD          Subjective:     Principal Problem:Alcohol withdrawal syndrome without complication        HPI:  63 y.o.  man with h/o generalized anxiety d/o, essential HTN, suspect depression, and ETOH abuse presents to the ER with JPSO after reports from family that he was threatening to kill himself. Apparently posted on his Facebook page SI as well as reported to day time Emergency room physician and RNS. To me he denies an SI but he is PEC by ED. While awaiting a bed he started to become more and more tremulous and showing signs of withdrawal. Started on PO valium and Ativan.     Labs overall unremarkable and UDS with THC only. ETOH level 342. Pt. Denies any h/o Withdrawals, does drink daily but I feel is under reporting what he drinks to me. Drinks hard liquor.   COVID negative.     We have been consulted for further management of his withdrawals and admission to the ICU.     Because this patient's clinical condition is critically guarded and PEC he  requires close monitoring of his vitals and withdrawal symptoms, care in an alternative location isn't clinically appropriate for the reasons stated above.              Overview/Hospital Course:  64yo M admitted with suicidial ideation and alcohol withdrawal.  Due to patient being very drowsy and sleeping throughout the day Valium was stopped, continue p.r.n. IV Ativan based on CIWA protocol.  Has a R groin hernia- CT large bowel containing right inguinal hernia, without proximal bowel obstruction or acute inflammatory changes.  Lovenox stopped due to thrombocytopenia.  Once medically clear plan to discharge to inpatient psych.  Patient is more awake and alert today and  has not required Ativan for 72. Patient still very weak worse in lower extremity. He was evaluated by PT and unable to ambulate on his own. MRI brain ordered which was neg. PT/OT recommending SNF.  Unable to go to SNF as patient PEC.  Fevers and tachycardia on 9/17.  UA consistent with UTI.  Started on Rocephin.  Persistent tachycardia and noted to be slightly hypoxic.  CTA chest showing bilateral lower lobe PE with no evidence of heart strain.  Started on therapeutic Lovenox.  Transitioned to Eliquis.  Psych reconsulted and patient not suicidal.  PEC stopped and no longer needs inpatient psych.  Plan for SNF on discharge.  Patient is now medically ready for discharge and awaiting SNF placement.  Pending SNF placement.    Interval History: Pt noted to be afebrile and hemodynamically stable. Stable respiratory status. No acute events overnight. Reports he did not get enough sleep last night and feels tired today. Doing well overall. Denies any withdrawal symptoms today. Has been able to get adequate rest. Eating and drinking well. Pending placement to SNF. Medically stable for DC. Has been refused from multiple SNF, CM/SW still trying for more facilities.       Review of Systems   Constitutional:  Positive for activity change and fatigue. Negative for fever.   Respiratory:  Negative for cough and shortness of breath.    Cardiovascular:  Negative for chest pain.   Gastrointestinal:  Negative for abdominal pain.   Neurological:  Negative for dizziness and headaches.   Psychiatric/Behavioral:  Negative for confusion.    All other systems reviewed and are negative.    Objective:     Vital Signs (Most Recent):  Temp: 98.3 °F (36.8 °C) (09/25/24 1126)  Pulse: 82 (09/25/24 1341)  Resp: 18 (09/25/24 1126)  BP: (!) 149/78 (09/25/24 1126)  SpO2: 98 % (09/25/24 1341) Vital Signs (24h Range):  Temp:  [97.9 °F (36.6 °C)-98.5 °F (36.9 °C)] 98.3 °F (36.8 °C)  Pulse:  [72-90] 82  Resp:  [17-18] 18  SpO2:  [96 %-98 %] 98 %  BP:  "(120-149)/(65-78) 149/78     Weight: 67.2 kg (148 lb 2.4 oz)  Body mass index is 23.91 kg/m².    Intake/Output Summary (Last 24 hours) at 9/25/2024 1403  Last data filed at 9/25/2024 0430  Gross per 24 hour   Intake 500 ml   Output 2000 ml   Net -1500 ml         Physical Exam  Vitals and nursing note reviewed.   Constitutional:       Appearance: Normal appearance.   HENT:      Head: Normocephalic and atraumatic.   Eyes:      Extraocular Movements: Extraocular movements intact.      Pupils: Pupils are equal, round, and reactive to light.   Cardiovascular:      Rate and Rhythm: Normal rate and regular rhythm.   Pulmonary:      Effort: Pulmonary effort is normal. No respiratory distress.   Abdominal:      General: Abdomen is flat. There is no distension.   Musculoskeletal:         General: Normal range of motion.      Cervical back: Normal range of motion.   Neurological:      General: No focal deficit present.      Mental Status: He is alert and oriented to person, place, and time.   Psychiatric:         Mood and Affect: Mood normal.         Behavior: Behavior normal.             Significant Labs: All pertinent labs within the past 24 hours have been reviewed.  CBC: No results for input(s): "WBC", "HGB", "HCT", "PLT" in the last 48 hours.  CMP: No results for input(s): "NA", "K", "CL", "CO2", "GLU", "BUN", "CREATININE", "CALCIUM", "PROT", "ALBUMIN", "BILITOT", "ALKPHOS", "AST", "ALT", "ANIONGAP", "EGFRNONAA" in the last 48 hours.    Invalid input(s): "ESTGFAFR[place]"    Significant Imaging: I have reviewed all pertinent imaging results/findings within the past 24 hours.    Assessment/Plan:      * Alcohol withdrawal syndrome without complication  Continue replacement with MVI, Thiamine and Folate.  No evidence of DT's at this time.  Valium stopped secondary to excessive sedation.  Still slightly tachycardic with mild tremors.  Will order Valium at night.  PT/OT recommending SNF.  He would not be able to go to SNF as long " as he is PEC.  Will reconsult Psych to reevaluate need of PEC and inpatient psych.  Psych still recommending inpatient psychiatry and PEC.  Patient still tachycardic and slightly hypoxic.  CTA does show bilateral PE.  Started anticoagulation with improvement.  PT/OT recommending SNF.  SW/CM consulted.  Medically ready for discharge into SNF.  Pending SNF placement.but require level 2,since was PEC ed.    Debility  -Patient noted to be medically stable for discharge at this time  -Has been able to work with OT/PT who recommend placement for further rehabilitation  -Patient pending placement, continue in-hospital care as stated above in the meantime  -More than 20 minutes of my time has been spent discussing and planning just her safe disposition with patient/family/CM/SW/Nursing staff (not including other time spent in clinical discussion and management)  -CM/SW following, appreciate input   -Will place DC orders once placement has been secured        Acute pulmonary embolism without acute cor pulmonale  Patient with persistent tachycardia.  Noted to be hypoxic.  CTA showing bilateral lower lobe PE.  No evidence of right heart strain.  Started on therapeutic Lovenox.  Hemodynamically stable.  Transition to Eliquis.      UTI (urinary tract infection)  Completed ABX treatment.      Right groin mass  -etiology due to right inguinal hernia  -repeat CT abdominal pelvis shows no strangulation or obstruction  -recommend following up outpatient for an elective repair      Hypomagnesemia  Replace as needed.    Tobacco dependency  -discussed cessation  -nicotine patch    Macrocytic anemia  Noted h/o macrocytic anemia but H/H and MCV stable    Suicidal ideation  PEC and will cont.  Patient currently denies any suicide ideations.  Will have Psych reassess need for PEC and inpatient psychiatry.  Patient reevaluated by Psych last night.  No suicidal ideations.  Ok to remove PEC and no need for inpatient psych on  discharge.      Generalized anxiety disorder  -Resume home meds and cont with PRN ativan per CIWA protocol  -scheduled valium stopped due to lethargy        Tobacco use disorder  Nicotine patch ordered daily       Hypertension  Continue current management.         VTE Risk Mitigation (From admission, onward)           Ordered     apixaban tablet 5 mg  2 times daily         09/23/24 1253     apixaban tablet 10 mg  2 times daily         09/20/24 0825     Place sequential compression device  Until discontinued         09/11/24 0729     IP VTE LOW RISK PATIENT  Once         09/09/24 0350     Place sequential compression device  Until discontinued         09/09/24 0350                          I have completed this tele-visit without the assistance of a telepresenter.    The attending portion of this evaluation, treatment, and documentation was performed per Deborah Miller MD via Telemedicine AudioVisual using the secure [employer] software platform with 2 way audio/video. The provider was located off-site and the patient is located in the hospital. The aforementioned video software was utilized to document the relevant history and physical exam    Deborah Miller MD  Department of Hospital Medicine   [facility] [place] - Observation

## 2024-09-26 NOTE — PLAN OF CARE
Problem: Occupational Therapy  Goal: Occupational Therapy Goal  Description: Goals to be met by: 10/02/2024     Patient will increase functional independence with ADLs by performing:    Feeding with [place].  UE Dressing with [place].  LE Dressing with Modified [place].  Grooming while seated with [place].  Toileting from bedside commode with Modified [place] for hygiene and clothing management.   Sitting at edge of bed x8 minutes with [place].  Toilet transfer to bedside commode with Stand-by Assistance.    Goals added on 9/26/2024 to be met by: 10/02/2024     Patient will increase functional independence with ADLs by performing:    LE Dressing with Modified [place].  Grooming while standing with Modified [place].  Toileting from toilet with Modified [place] for hygiene and clothing management.   Toilet transfer to toilet with Stand-by Assistance.    Outcome: Progressing     OT POC progressing well. Pt performing ADLs/functional mobility with CGA-SBA with RW. Continue to recommend LANDEN and transfer tub bench.

## 2024-09-26 NOTE — PROGRESS NOTES
[facility] Medicine  Telemedicine Progress Note    Patient Name: Israel De Jesus  MRN: [id number]  Patient Class: IP- Inpatient   Admission Date: 9/8/2024  Length of Stay: 17 days  Attending Physician: Jana Thompson MD  Primary Care Provider: Nuha Lynn MD          Subjective:     Principal Problem:Alcohol withdrawal syndrome without complication        HPI:  63 y.o.  man with h/o generalized anxiety d/o, essential HTN, suspect depression, and ETOH abuse presents to the ER with JPSO after reports from family that he was threatening to kill himself. Apparently posted on his Facebook page SI as well as reported to day time Emergency room physician and RNS. To me he denies an SI but he is PEC by ED. While awaiting a bed he started to become more and more tremulous and showing signs of withdrawal. Started on PO valium and Ativan.     Labs overall unremarkable and UDS with THC only. ETOH level 342. Pt. Denies any h/o Withdrawals, does drink daily but I feel is under reporting what he drinks to me. Drinks hard liquor.   COVID negative.     We have been consulted for further management of his withdrawals and admission to the ICU.     Because this patient's clinical condition is critically guarded and PEC he  requires close monitoring of his vitals and withdrawal symptoms, care in an alternative location isn't clinically appropriate for the reasons stated above.              Overview/Hospital Course:  62yo M admitted with suicidial ideation and alcohol withdrawal.  Due to patient being very drowsy and sleeping throughout the day Valium was stopped, continue p.r.n. IV Ativan based on CIWA protocol.  Has a R groin hernia- CT large bowel containing right inguinal hernia, without proximal bowel obstruction or acute inflammatory changes.  Lovenox stopped due to thrombocytopenia.  Once medically clear plan to discharge to inpatient psych.  Patient is more awake and alert today and has  not required Ativan for 72. Patient still very weak worse in lower extremity. He was evaluated by PT and unable to ambulate on his own. MRI brain ordered which was neg. PT/OT recommending SNF.  Unable to go to SNF as patient PEC.  Fevers and tachycardia on 9/17.  UA consistent with UTI.  Started on Rocephin.  Persistent tachycardia and noted to be slightly hypoxic.  CTA chest showing bilateral lower lobe PE with no evidence of heart strain.  Started on therapeutic Lovenox.  Transitioned to Eliquis.  Psych reconsulted and patient not suicidal.  PEC stopped and no longer needs inpatient psych.  Plan for SNF on discharge.  Patient is now medically ready for discharge and awaiting SNF placement.  Pending SNF placement.    Follow-up For:  Patient Active Problem List    Diagnosis Date Noted Date Diagnosed    Alcohol withdrawal syndrome without complication 02/14/2024     Acute pulmonary embolism without acute cor pulmonale 09/20/2024     Debility 09/26/2024      Discharge Planning   BRANDON: 9/13/2024   Discharge Plan A: Skilled Nursing Facility   Discharge Delays: None known at this time    Interval History:   Subjective: Israel De Jesus is being followed for Alcohol withdrawal syndrome without complication. No acute events overnight. He is awake and alert. Calm and cooperative. Following commands. He is pending SNF placement. He lives home alone and is unsafe to discharge home.     Symptoms: Resolved. The patient denies shortness of breath, malaise, cough, chest pain, weakness, headache, chest pressure, anorexia, diarrhea and anxiety.     Diet: Regular. Adequate intake. The patient denies nausea and vomiting.    Last Bowel Movement: 09/26/24    Activity Level: Impaired due to weakness    Ambulation: independent/with supervision/ needing assistance/ nonambulatory     Pain: The patient Denies       Review of Systems   Constitutional:  Negative for chills and fever.   Respiratory:  Negative for cough and shortness of  breath.    Cardiovascular:  Negative for chest pain and palpitations.   Gastrointestinal:  Negative for abdominal pain.        Scheduled Meds:   amLODIPine  5 mg Oral Daily    apixaban  10 mg Oral BID    [START ON 9/28/2024] apixaban  5 mg Oral BID    DULoxetine  60 mg Oral Daily    fluticasone propionate  2 spray Each Nostril BID    folic acid  1 mg Oral Daily    LIDOcaine  2 patch Transdermal Q24H    metoprolol tartrate  100 mg Oral BID    multivitamin  1 tablet Oral Daily    nicotine  1 patch Transdermal Daily    pantoprazole  40 mg Oral Daily    thiamine  100 mg Oral Daily     Continuous Infusions:  PRN Meds:.  Current Facility-Administered Medications:     acetaminophen, 650 mg, Oral, Q4H PRN    aluminum-magnesium hydroxide-simethicone, 30 mL, Oral, QID PRN    chlordiazepoxide, 50 mg, Oral, QID PRN    guaiFENesin 100 mg/5 ml, 100 mg, Oral, Q4H PRN    hydrALAZINE, 10 mg, Intravenous, Q6H PRN    labetalol, 10 mg, Intravenous, Q6H PRN    lorazepam, 2 mg, Intravenous, Q2H PRN    ondansetron, 8 mg, Intravenous, Q6H PRN    polyethylene glycol, 17 g, Oral, BID PRN    simethicone, 1 tablet, Oral, QID PRN    sodium chloride 0.9%, 10 mL, Intravenous, Q12H PRN    trazodone, 100 mg, Oral, Nightly PRN      Objective:     Vitals:    09/26/24 0311 09/26/24 0435 09/26/24 0712 09/26/24 0751   BP:  (!) 140/80  (!) 159/82   BP Location:  Left arm     Patient Position:  Lying     Pulse: 78 83 71 73   Resp:  18  19   Temp:  98.5 °F (36.9 °C)  97.9 °F (36.6 °C)   TempSrc:  Oral     SpO2:  95%  96%   Weight:       Height:           No data found.    Intake/Output Summary (Last 24 hours) at 9/26/2024 0945  Last data filed at 9/26/2024 0924  Gross per 24 hour   Intake 596 ml   Output 2350 ml   Net -1754 ml     Net IO Since Admission: -7,644.36 mL [09/26/24 0945]    Physical Exam  Vitals and nursing note reviewed.   Constitutional:       General: He is awake. He is not in acute distress.     Appearance: Normal appearance. He is  well-developed and well-groomed. He is not toxic-appearing.   HENT:      Head: Normocephalic and atraumatic.   Cardiovascular:      Rate and Rhythm: Normal rate.   Pulmonary:      Effort: No tachypnea, accessory muscle usage or respiratory distress.   Abdominal:      General: There is no distension.   Neurological:      Mental Status: He is alert and oriented to person, place, and time. Mental status is at baseline.   Psychiatric:         Mood and Affect: Mood normal.         Behavior: Behavior normal. Behavior is cooperative.         Thought Content: Thought content normal.       Significant Labs: All pertinent labs within the past 24 hours have been reviewed.    BMP (Last 3 Results):   Recent Labs   Lab 09/21/24  0541 09/23/24  0412 09/26/24  0448   GLU 90 112* 85   * 140 138   K 3.2* 3.5 3.9    107 105   CO2 24 24 26   BUN 12 16 14   CREATININE 0.8 0.7 0.8   CALCIUM 9.3 9.4 9.3   MG  --   --  1.7     CBC (Last 3 Results):   Recent Labs   Lab 09/20/24  0427 09/23/24  0412 09/26/24  0448   WBC 11.24 10.18 12.11   RBC 3.22* 3.55* 3.19*   HGB 10.6* 11.5* 10.3*   HCT 33.3* 37.0* 32.5*    504* 508*   * 104* 102*   MCH 32.9* 32.4* 32.3*   MCHC 31.8* 31.1* 31.7*       Significant Imaging: I have reviewed all pertinent imaging results/findings within the past 24 hours.  Echo    Left Ventricle: The left ventricle is normal in size. Moderately   increased wall thickness. There is moderate concentric hypertrophy. There   is normal systolic function with a visually estimated ejection fraction of   60 - 65%. Grade I diastolic dysfunction.    Right Ventricle: Normal right ventricular cavity size. Systolic   function is normal.    Pulmonary Artery: The estimated pulmonary artery systolic pressure is   40 mmHg.  CTA Chest Non-Coronary (PE Studies)  Narrative: EXAMINATION:  CTA CHEST NON CORONARY (PE STUDIES)    CLINICAL HISTORY:  Pulmonary embolism (PE) suspected, unknown D-dimer;    TECHNIQUE:  Low dose  axial images, sagittal and coronal reformations were obtained from the thoracic inlet to the lung bases following the IV administration of 75 mL of Omnipaque 350.  Contrast timing was optimized to evaluate the pulmonary arteries.  MIP images were performed.    COMPARISON:  CT chest from 02/13/2024    FINDINGS:  Pulmonary Arteries: Filling defects within the segmental branches to the lower lobes bilaterally consistent with pulmonary thromboembolism.  No evidence of right heart strain.    Soft tissues of the neck:  Visualized portion of the thyroid is normal in appearance.    Thoracic aorta:  Normal in caliber and contour.  No evidence of dissection.    Heart: No cardiomegaly.  No pericardial effusion.    Lymph nodes:  No evidence of mediastinal, hilar, or axillary lymphadenopathy.    Trachea and bronchi: Patent.    Lungs:  Mild paraseptal emphysema.  Linear opacity in the right lower lobe which may represent atelectasis, pneumonia, or infarction.  Minimal amount of atelectasis of the left lower lobe.  No large pleural effusion.  No pneumothorax.    Limited evaluation of the upper abdomen:  Probable hepatic steatosis.  Small to moderate hiatal hernia.    Osseous structures:  Postsurgical changes of the right shoulder.  Degenerative changes with no acute fracture.  Postsurgical changes of the cervical spine, partially visualized.  Impression: Pulmonary embolism as described above to the lower lobes bilaterally.  No evidence of right heart strain.  Linear opacity in the right lower lobe which may represent area of infarction, atelectasis, or pneumonia.    Findings 1st identified by Dr. Villanueva at approximately 10:30 a.m. on 09/19/2024.  Findings communicated to Dr. Basil Beatty by Dr. Villanueva via secure chat at approximately 10:38 a.m. on 09/19/2024 with receipt confirmation.    Electronically signed by: Dixon Villanueva MD  Date:    09/19/2024  Time:    10:38        Assessment/Plan:      * Alcohol withdrawal syndrome  without complication  Continue replacement with MVI, Thiamine and Folate.  No evidence of DT's at this time.  Valium stopped secondary to excessive sedation.  Symptoms resolved.   PT/OT recommending SNF.  He would not be able to go to SNF as long as he is PEC.  Will reconsult Psych to reevaluate need of PEC and inpatient psych.  Psych still recommending inpatient psychiatry and PEC.  Patient still tachycardic and slightly hypoxic.  CTA does show bilateral PE.  Started anticoagulation with improvement.  PT/OT recommending SNF.  SW/CM consulted.  Medically ready for discharge into SNF.  Pending SNF placement.but require level 2,since was PEC ed.    Acute pulmonary embolism without acute cor pulmonale  Patient with persistent tachycardia.  Noted to be hypoxic.  CTA showing bilateral lower lobe PE.  No evidence of right heart strain.  Started on therapeutic Lovenox.  Hemodynamically stable.  Transition to Eliquis.      Debility  -Patient noted to be medically stable for discharge at this time  -Has been able to work with OT/PT who recommend placement for further rehabilitation  -Patient pending placement, continue in-hospital care as stated above in the meantime  -More than 20 minutes of my time has been spent discussing and planning just her safe disposition with patient/family/CM/SW/Nursing staff (not including other time spent in clinical discussion and management)  -CM/SW following, appreciate input   -Will place DC orders once placement has been secured        UTI (urinary tract infection)  Completed ABX treatment.      Right groin mass  -etiology due to right inguinal hernia  -repeat CT abdominal pelvis shows no strangulation or obstruction  -recommend following up outpatient for an elective repair      Hypomagnesemia  Replace as needed.    Tobacco dependency  -discussed cessation  -nicotine patch    Macrocytic anemia  Noted h/o macrocytic anemia but H/H and MCV stable    Suicidal ideation  PEC and will  cont.  Patient currently denies any suicide ideations.  Will have Psych reassess need for PEC and inpatient psychiatry.  Patient reevaluated by Psych last night.  No suicidal ideations.  Ok to remove PEC and no need for inpatient psych on discharge.      Generalized anxiety disorder  -Resume home meds and cont with PRN ativan per [facility] protocol  -scheduled valium stopped due to lethargy        Tobacco use disorder  Nicotine patch ordered daily       Hypertension  BP Readings from Last 3 Encounters:   09/26/24 (!) 159/82   07/23/24 (!) 162/94   04/04/24 128/70     Continuing home medications as prescribed  Will cont to monitor and adjust as needed  Will utilize p.r.n. blood pressure medication only if patient's blood pressure greater than 180/110 and he develops symptoms such as worsening chest pain or shortness of breath.  Cardiac diet    Cardiac/Autonomic (From admission, onward)      Start     Stop Route Frequency Ordered    09/19/24 0900  metoprolol tartrate (LOPRESSOR) tablet 100 mg         -- Oral 2 times daily 09/19/24 0729    09/18/24 0900  amLODIPine tablet 5 mg         -- Oral Daily 09/17/24 2129 09/09/24 0503  labetaloL injection 10 mg         -- IV Every 6 hours PRN 09/09/24 0404    09/09/24 0451  hydrALAZINE injection 10 mg         -- IV Every 6 hours PRN 09/09/24 0351              VTE Risk Mitigation (From admission, onward)           Ordered     apixaban tablet 5 mg  2 times daily         09/23/24 1253     apixaban tablet 10 mg  2 times daily         09/20/24 0825     Place sequential compression device  Until discontinued         09/11/24 0729     IP VTE LOW RISK PATIENT  Once         09/09/24 0350     Place sequential compression device  Until discontinued         09/09/24 0350                          I have completed this tele-visit without the assistance of a telepresenter.    The attending portion of this evaluation, treatment, and documentation was performed per Jana Montalvo MD via  Telemedicine AudioVisual using the secure [employer] software platform with 2 way audio/video. The provider was located off-site and the patient is located in the hospital. The aforementioned video software was utilized to document the relevant history and physical exam    Jana Montalvo MD  Department of Hospital Medicine   [facility] [place] [facility]

## 2024-09-26 NOTE — PLAN OF CARE
Plan for patient discharge to SNF. No current accepting facility at this time. 54 referrals sent via care port. Awaiting Level .     CM faxed referral to [facility] teams (fax:[phone] phone:[phone])     Emailed to [facility] [phone] ([email or web address]) for review for out patient mental health and substance abuse services.     [facility] out patient case management referral placed. Attempted to inquire if pt's insurance provides out pt case management services, ([phone]) unable to speak with representative, recording only. CM to continue to follow for dc needs.

## 2024-09-26 NOTE — PLAN OF CARE
Call placed to admissions at [facility] to follow up on referral; had to leave a voicemail.

## 2024-09-26 NOTE — NURSING
[facility]  Nurses Note -- 4 Eyes      9/25/2024       Skin assessed on: Q Shift      [x] No Pressure Injuries Present    []Prevention Measures Documented    [] Yes LDA  for Pressure Injury Previously documented     [] Yes New Pressure Injury Discovered   [] LDA for New Pressure Injury Added      Attending RN:  Neha Correa, RN     Second RN:  Veronica Watkins LPN        PER handoff given by Veronica COMER      Pt resting in bed quietly. NAD noted. No c/o pain.     Fall and safety precautions maintained. Bed alarm activated and audible.. Bed locked in lowest position, with side rails up x2. Call bell and personal items within reach

## 2024-09-26 NOTE — PLAN OF CARE
Recommendations for moderate intensity therapy. No current accepting SNF at this time.     CM spoke with pt regarding discharge planning. Pt stated he lives alone and has no help at home. Pt agreeable for SNF. CM explained placement is based on  medical acceptance and bed availability. Informed of no accepting facility and that plan B would be to discharge home with out patient therapy, pt verbalized understanding.     CM spoke with pt's daughter, Melissa regarding SNF vs discharge home. Per Melissa, pt has no one willing to help him at home and that he would benefit from NH placement. CM explained that pt does not have any accepting facility at this time and that placement can be continued from home. Explained nursing facility would require banking statements/financials. CM emailed a list of facility and letter explaining process and necessary documents needed for placement. ([email or web address])    Additional referrals sent via care port with increase search radius.

## 2024-09-26 NOTE — ASSESSMENT & PLAN NOTE
BP Readings from Last 3 Encounters:   09/26/24 (!) 159/82   07/23/24 (!) 162/94   04/04/24 128/70     Continuing home medications as prescribed  Will cont to monitor and adjust as needed  Will utilize p.r.n. blood pressure medication only if patient's blood pressure greater than 180/110 and he develops symptoms such as worsening chest pain or shortness of breath.  Cardiac diet    Cardiac/Autonomic (From admission, onward)      Start     Stop Route Frequency Ordered    09/19/24 0900  metoprolol tartrate (LOPRESSOR) tablet 100 mg         -- Oral 2 times daily 09/19/24 0729    09/18/24 0900  amLODIPine tablet 5 mg         -- Oral Daily 09/17/24 2129 09/09/24 0503  labetaloL injection 10 mg         -- IV Every 6 hours PRN 09/09/24 0404    09/09/24 0451  hydrALAZINE injection 10 mg         -- IV Every 6 hours PRN 09/09/24 0354

## 2024-09-26 NOTE — NURSING
Reported off to oncoming nurse, patient resting in bed, aaox3. Patient can make needs know to staff., minimal assist required for adls and transfers, no acute distress noted, safety precautions maintained.    Chart check completed.

## 2024-09-26 NOTE — PROGRESS NOTES
[facility]  Nurses Note -- 4 Eyes      9/26/2024       Skin assessed on: Q Shift      [x] No Pressure Injuries Present    []Prevention Measures Documented    [] Yes LDA  for Pressure Injury Previously documented     [] Yes New Pressure Injury Discovered   [] LDA for New Pressure Injury Added      Attending RN:  Braulio Alonso RN     Second RN:  Neha Correa RN

## 2024-09-26 NOTE — PT/OT/SLP PROGRESS
"Occupational Therapy   Treatment    Name: Israel De Jesus  MRN: [id number]  Admitting Diagnosis:  Alcohol withdrawal syndrome without complication       Recommendations:     Discharge Recommendations: Moderate Intensity Therapy  Discharge Equipment Recommendations:  bath bench, to be determined by next level of care  Barriers to discharge:  Decreased caregiver support    Assessment:     Israel De Jesus is a 63 y.o. male with a medical diagnosis of Alcohol withdrawal syndrome without complication.  He presents with The primary encounter diagnosis was Alcohol withdrawal syndrome without complication. Diagnoses of Medical clearance for psychiatric admission, Depression with suicidal ideation, Difficulty walking, Coarse tremors, Beriberi with dietary thiamine deficiency, Cough, Chest pain, and Pulmonary embolism were also pertinent to this visit. . Performance deficits affecting function are weakness, impaired endurance, impaired self care skills, impaired functional mobility, impaired balance, impaired cognition, decreased lower extremity function.     OT POC progressing well. Pt performing ADLs/functional mobility with CGA-SBA with RW. Continue to recommend LANDEN and transfer tub bench.    Rehab Prognosis:  Good; patient would benefit from acute skilled OT services to address these deficits and reach maximum level of function.       Plan:     Patient to be seen 4 x/week to address the above listed problems via self-care/home management, therapeutic activities, therapeutic exercises, cognitive retraining  Plan of Care Expires: 10/02/24  Plan of Care Reviewed with: patient    Subjective     Chief Complaint: pain  Patient/Family Comments/goals: "The lidocaine patch is helping (on R shoulder), but it still hurts"  Pain/Comfort:  Pain Rating 1: 5/10 (R shoulder, L knee)  Pain Addressed 1: Reposition    Objective:     Communicated with: RN prior to session.  Patient found HOB elevated with Condom Catheter, telemetry, bed " alarm (telesitter) upon OT entry to room.    General Precautions: Standard, fall, DT, respiratory    Orthopedic Precautions:N/A  Braces: N/A  Respiratory Status: Nasal cannula, flow 1 L/min     Occupational Performance:     Bed Mobility:    Patient completed Scooting/Bridging with stand by assistance seated to eob, to hob  Patient completed Supine to Sit with supervision  Patient completed Sit to Supine with supervision    Functional Mobility/Transfers:  Patient completed Sit <> Stand Transfer with contact guard assistance  with  rolling walker   Patient completed Toilet Transfer Step Transfer technique with contact guard assistance with  rolling walker  Functional Mobility: Pt performed in room mobility with CGA and RW. Instruction on toilet t/f technique with use of grab bars and sequencing RW. Pt instructed in 3x sit<>stands from eob with limited rest breaks between reps with CGA and RW; pt performed well with carryover of learning 90% for safe t/f techniques with hand placement with RW. SpO2 monitored throughout: 98% at onset 1L NC, 95% RA, and 94% RA post-functional mobility. NC applied back on pt upon end/seated rest break    Activities of Daily Living:  Upper Body Dressing: independence to don/doff gown  Lower Body Dressing: increased time/effort in figure 4 2/2 L knee pain to fix socks seated with CGA; moderate assist to pull loose briefs over hips in stand  Toileting: initial education for toileting on toilet with safe RW use during clothes mgmt and seated performance      [facility] 6 Click ADL: 20    Treatment & Education:  ADL/functional mobility training as above.  Pt reporting L knee pain continuing; minimal L knee buckling noted, but pt able to self-correct and no LOB.  All needs met.    Patient left HOB elevated with all lines intact, call button in reach, bed alarm on, and RN notified    GOALS:   Multidisciplinary Problems       Occupational Therapy Goals          Problem: Occupational Therapy    Goal  Priority Disciplines Outcome Interventions   Occupational Therapy Goal     OT, PT/OT Progressing    Description: Goals to be met by: 10/02/2024     Patient will increase functional independence with ADLs by performing:    Feeding with [place].  UE Dressing with [place].  LE Dressing with Modified [place].  Grooming while seated with [place].  Toileting from bedside commode with Modified [place] for hygiene and clothing management.   Sitting at edge of bed x8 minutes with [place].  Toilet transfer to bedside commode with Stand-by Assistance.    Goals added on 9/26/2024 to be met by: 10/02/2024     Patient will increase functional independence with ADLs by performing:    LE Dressing with Modified [place].  Grooming while standing with Modified [place].  Toileting from toilet with Modified [place] for hygiene and clothing management.   Toilet transfer to toilet with Stand-by Assistance.                         Time Tracking:     OT Date of Treatment: 09/26/24  OT Start Time: 1136  OT Stop Time: 1203  OT Total Time (min): 27 min    Billable Minutes:Self Care/Home Management 8  Therapeutic Activity 19    OT/SONA: OT          9/26/2024

## 2024-09-26 NOTE — SUBJECTIVE & OBJECTIVE
Follow-up For:  Patient Active Problem List    Diagnosis Date Noted Date Diagnosed    Alcohol withdrawal syndrome without complication 02/14/2024     Acute pulmonary embolism without acute cor pulmonale 09/20/2024     Debility 09/26/2024      Discharge Planning   BRANDON: 9/13/2024   Discharge Plan A: Skilled Nursing Facility   Discharge Delays: None known at this time    Interval History:   Subjective: Israel De Jesus is being followed for Alcohol withdrawal syndrome without complication. No acute events overnight. He is awake and alert. Calm and cooperative. Following commands. He is pending SNF placement. He lives home alone and is unsafe to discharge home.     Symptoms: Resolved. The patient denies shortness of breath, malaise, cough, chest pain, weakness, headache, chest pressure, anorexia, diarrhea and anxiety.     Diet: Regular. Adequate intake. The patient denies nausea and vomiting.    Last Bowel Movement: 09/26/24    Activity Level: Impaired due to weakness    Ambulation: independent/with supervision/ needing assistance/ nonambulatory     Pain: The patient Denies       Review of Systems   Constitutional:  Negative for chills and fever.   Respiratory:  Negative for cough and shortness of breath.    Cardiovascular:  Negative for chest pain and palpitations.   Gastrointestinal:  Negative for abdominal pain.        Scheduled Meds:   amLODIPine  5 mg Oral Daily    apixaban  10 mg Oral BID    [START ON 9/28/2024] apixaban  5 mg Oral BID    DULoxetine  60 mg Oral Daily    fluticasone propionate  2 spray Each Nostril BID    folic acid  1 mg Oral Daily    LIDOcaine  2 patch Transdermal Q24H    metoprolol tartrate  100 mg Oral BID    multivitamin  1 tablet Oral Daily    nicotine  1 patch Transdermal Daily    pantoprazole  40 mg Oral Daily    thiamine  100 mg Oral Daily     Continuous Infusions:  PRN Meds:.  Current Facility-Administered Medications:     acetaminophen, 650 mg, Oral, Q4H PRN    aluminum-magnesium  hydroxide-simethicone, 30 mL, Oral, QID PRN    chlordiazepoxide, 50 mg, Oral, QID PRN    guaiFENesin 100 mg/5 ml, 100 mg, Oral, Q4H PRN    hydrALAZINE, 10 mg, Intravenous, Q6H PRN    labetalol, 10 mg, Intravenous, Q6H PRN    lorazepam, 2 mg, Intravenous, Q2H PRN    ondansetron, 8 mg, Intravenous, Q6H PRN    polyethylene glycol, 17 g, Oral, BID PRN    simethicone, 1 tablet, Oral, QID PRN    sodium chloride 0.9%, 10 mL, Intravenous, Q12H PRN    trazodone, 100 mg, Oral, Nightly PRN      Objective:     Vitals:    09/26/24 0311 09/26/24 0435 09/26/24 0712 09/26/24 0751   BP:  (!) 140/80  (!) 159/82   BP Location:  Left arm     Patient Position:  Lying     Pulse: 78 83 71 73   Resp:  18  19   Temp:  98.5 °F (36.9 °C)  97.9 °F (36.6 °C)   TempSrc:  Oral     SpO2:  95%  96%   Weight:       Height:           No data found.    Intake/Output Summary (Last 24 hours) at 9/26/2024 0945  Last data filed at 9/26/2024 0924  Gross per 24 hour   Intake 596 ml   Output 2350 ml   Net -1754 ml     Net IO Since Admission: -7,644.36 mL [09/26/24 0945]    Physical Exam  Vitals and nursing note reviewed.   Constitutional:       General: He is awake. He is not in acute distress.     Appearance: Normal appearance. He is well-developed and well-groomed. He is not toxic-appearing.   HENT:      Head: Normocephalic and atraumatic.   Cardiovascular:      Rate and Rhythm: Normal rate.   Pulmonary:      Effort: No tachypnea, accessory muscle usage or respiratory distress.   Abdominal:      General: There is no distension.   Neurological:      Mental Status: He is alert and oriented to person, place, and time. Mental status is at baseline.   Psychiatric:         Mood and Affect: Mood normal.         Behavior: Behavior normal. Behavior is cooperative.         Thought Content: Thought content normal.       Significant Labs: All pertinent labs within the past 24 hours have been reviewed.    BMP (Last 3 Results):   Recent Labs   Lab 09/21/24  7317  09/23/24  0412 09/26/24  0448   GLU 90 112* 85   * 140 138   K 3.2* 3.5 3.9    107 105   CO2 24 24 26   BUN 12 16 14   CREATININE 0.8 0.7 0.8   CALCIUM 9.3 9.4 9.3   MG  --   --  1.7     CBC (Last 3 Results):   Recent Labs   Lab 09/20/24  0427 09/23/24  0412 09/26/24  0448   WBC 11.24 10.18 12.11   RBC 3.22* 3.55* 3.19*   HGB 10.6* 11.5* 10.3*   HCT 33.3* 37.0* 32.5*    504* 508*   * 104* 102*   MCH 32.9* 32.4* 32.3*   MCHC 31.8* 31.1* 31.7*       Significant Imaging: I have reviewed all pertinent imaging results/findings within the past 24 hours.  Echo    Left Ventricle: The left ventricle is normal in size. Moderately   increased wall thickness. There is moderate concentric hypertrophy. There   is normal systolic function with a visually estimated ejection fraction of   60 - 65%. Grade I diastolic dysfunction.    Right Ventricle: Normal right ventricular cavity size. Systolic   function is normal.    Pulmonary Artery: The estimated pulmonary artery systolic pressure is   40 mmHg.  CTA Chest Non-Coronary (PE Studies)  Narrative: EXAMINATION:  CTA CHEST NON CORONARY (PE STUDIES)    CLINICAL HISTORY:  Pulmonary embolism (PE) suspected, unknown D-dimer;    TECHNIQUE:  Low dose axial images, sagittal and coronal reformations were obtained from the thoracic inlet to the lung bases following the IV administration of 75 mL of Omnipaque 350.  Contrast timing was optimized to evaluate the pulmonary arteries.  MIP images were performed.    COMPARISON:  CT chest from 02/13/2024    FINDINGS:  Pulmonary Arteries: Filling defects within the segmental branches to the lower lobes bilaterally consistent with pulmonary thromboembolism.  No evidence of right heart strain.    Soft tissues of the neck:  Visualized portion of the thyroid is normal in appearance.    Thoracic aorta:  Normal in caliber and contour.  No evidence of dissection.    Heart: No cardiomegaly.  No pericardial effusion.    Lymph nodes:  No  evidence of mediastinal, hilar, or axillary lymphadenopathy.    Trachea and bronchi: Patent.    Lungs:  Mild paraseptal emphysema.  Linear opacity in the right lower lobe which may represent atelectasis, pneumonia, or infarction.  Minimal amount of atelectasis of the left lower lobe.  No large pleural effusion.  No pneumothorax.    Limited evaluation of the upper abdomen:  Probable hepatic steatosis.  Small to moderate hiatal hernia.    Osseous structures:  Postsurgical changes of the right shoulder.  Degenerative changes with no acute fracture.  Postsurgical changes of the cervical spine, partially visualized.  Impression: Pulmonary embolism as described above to the lower lobes bilaterally.  No evidence of right heart strain.  Linear opacity in the right lower lobe which may represent area of infarction, atelectasis, or pneumonia.    Findings 1st identified by Dr. Villanueva at approximately 10:30 a.m. on 09/19/2024.  Findings communicated to Dr. Basil Beatty by Dr. Villanueva via secure chat at approximately 10:38 a.m. on 09/19/2024 with receipt confirmation.    Electronically signed by: Dixon Villanueva MD  Date:    09/19/2024  Time:    10:38

## 2024-09-27 VITALS
BODY MASS INDEX: 23.81 KG/M2 | RESPIRATION RATE: 19 BRPM | HEART RATE: 82 BPM | WEIGHT: 148.13 LBS | TEMPERATURE: 98 F | SYSTOLIC BLOOD PRESSURE: 132 MMHG | DIASTOLIC BLOOD PRESSURE: 68 MMHG | HEIGHT: 66 IN | OXYGEN SATURATION: 95 %

## 2024-09-27 PROCEDURE — 97530 THERAPEUTIC ACTIVITIES: CPT

## 2024-09-27 PROCEDURE — 97535 SELF CARE MNGMENT TRAINING: CPT

## 2024-09-27 PROCEDURE — 25000003 PHARM REV CODE 250: Performed by: STUDENT IN AN ORGANIZED HEALTH CARE EDUCATION/TRAINING PROGRAM

## 2024-09-27 PROCEDURE — 25000003 PHARM REV CODE 250: Performed by: HOSPITALIST

## 2024-09-27 PROCEDURE — 97116 GAIT TRAINING THERAPY: CPT

## 2024-09-27 PROCEDURE — 25000003 PHARM REV CODE 250: Performed by: INTERNAL MEDICINE

## 2024-09-27 PROCEDURE — S4991 NICOTINE PATCH NONLEGEND: HCPCS | Performed by: INTERNAL MEDICINE

## 2024-09-27 RX ORDER — PANTOPRAZOLE SODIUM 40 MG/1
40 TABLET, DELAYED RELEASE ORAL DAILY
Qty: 90 TABLET | Refills: 3 | Status: SHIPPED | OUTPATIENT
Start: 2024-09-28 | End: 2025-09-28

## 2024-09-27 RX ORDER — FOLIC ACID 1 MG/1
1 TABLET ORAL DAILY
Qty: 30 TABLET | Refills: 0 | Status: SHIPPED | OUTPATIENT
Start: 2024-09-28 | End: 2025-09-28

## 2024-09-27 RX ORDER — LANOLIN ALCOHOL/MO/W.PET/CERES
100 CREAM (GRAM) TOPICAL DAILY
Qty: 30 TABLET | Refills: 0 | Status: SHIPPED | OUTPATIENT
Start: 2024-09-27

## 2024-09-27 RX ORDER — AMLODIPINE BESYLATE 5 MG/1
5 TABLET ORAL DAILY
Qty: 90 TABLET | Refills: 3 | Status: SHIPPED | OUTPATIENT
Start: 2024-09-28 | End: 2025-09-28

## 2024-09-27 RX ORDER — METOPROLOL TARTRATE 100 MG/1
100 TABLET ORAL 2 TIMES DAILY
Qty: 180 TABLET | Refills: 3 | Status: SHIPPED | OUTPATIENT
Start: 2024-09-27 | End: 2025-09-27

## 2024-09-27 RX ADMIN — NICOTINE 1 PATCH: 7 PATCH, EXTENDED RELEASE TRANSDERMAL at 08:09

## 2024-09-27 RX ADMIN — THERA TABS 1 TABLET: TAB at 08:09

## 2024-09-27 RX ADMIN — PANTOPRAZOLE SODIUM 40 MG: 40 TABLET, DELAYED RELEASE ORAL at 08:09

## 2024-09-27 RX ADMIN — FOLIC ACID 1 MG: 1 TABLET ORAL at 08:09

## 2024-09-27 RX ADMIN — FLUTICASONE PROPIONATE 100 MCG: 50 SPRAY, METERED NASAL at 08:09

## 2024-09-27 RX ADMIN — AMLODIPINE BESYLATE 5 MG: 5 TABLET ORAL at 08:09

## 2024-09-27 RX ADMIN — THIAMINE HCL TAB 100 MG 100 MG: 100 TAB at 08:09

## 2024-09-27 RX ADMIN — METOPROLOL TARTRATE 100 MG: 50 TABLET, FILM COATED ORAL at 08:09

## 2024-09-27 RX ADMIN — DULOXETINE HYDROCHLORIDE 60 MG: 30 CAPSULE, DELAYED RELEASE ORAL at 08:09

## 2024-09-27 NOTE — ASSESSMENT & PLAN NOTE
Continue replacement with MVI, Thiamine and Folate.  No evidence of DT's at this time.  Valium stopped secondary to excessive sedation.  Symptoms resolved.   PT/OT recommending SNF.  He would not be able to go to SNF as long as he is PEC.  Will reconsult Psych to reevaluate need of PEC and inpatient psych.  Psych still recommending inpatient psychiatry and PEC.  Patient still tachycardic and slightly hypoxic.  CTA does show bilateral PE.  Started anticoagulation with improvement.  PT/OT recommending SNF.  SW/CM consulted.  Medically ready for discharge into SNF.  Pending SNF placement   No accepting SNF facilities despite sending multiple referral per CM  Patient will have to discharge home with outpatient therapy  Outpatient CM referral sent   Patient and family can work on NH placement as outpatient.

## 2024-09-27 NOTE — PT/OT/SLP PROGRESS
Occupational Therapy   Treatment    Name: Israel De Jesus  MRN: [id number]  Admitting Diagnosis:  Alcohol withdrawal syndrome without complication       Recommendations:     Discharge Recommendations: Moderate Intensity Therapy  Discharge Equipment Recommendations:  to be determined by next level of care  Barriers to discharge:  Decreased caregiver support    Assessment:     Israel De Jesus is a 63 y.o. male with a medical diagnosis of Alcohol withdrawal syndrome without complication.  He presents with The primary encounter diagnosis was Alcohol withdrawal syndrome without complication. Diagnoses of Medical clearance for psychiatric admission, Depression with suicidal ideation, Difficulty walking, Coarse tremors, Beriberi with dietary thiamine deficiency, Cough, Chest pain, Pulmonary embolism, and Debility were also pertinent to this visit. . Performance deficits affecting function are weakness, impaired endurance, impaired self care skills, impaired functional mobility, impaired balance, impaired cognition, decreased lower extremity function.     Rehab Prognosis:  Good; patient would benefit from acute skilled OT services to address these deficits and reach maximum level of function.       Plan:     Patient to be seen 4 x/week to address the above listed problems via self-care/home management, therapeutic activities, therapeutic exercises, cognitive retraining  Plan of Care Expires: 10/02/24  Plan of Care Reviewed with: patient    Subjective     Chief Complaint: pain  Patient/Family Comments/goals: CM and RN discussed with pt during session d/c home plan  Pain/Comfort:  Pain Rating 1: 5/10 (R shoulder, L knee)  Pain Addressed 1: Reposition, Pre-medicate for activity  Pain Rating Post-Intervention 1: 5/10    Objective:     Communicated with: RN prior to session.  Patient found HOB elevated with Condom Catheter, telemetry, bed alarm (telesitter) upon OT entry to room.    General Precautions: Standard, fall, DT,  respiratory    Orthopedic Precautions:N/A  Braces: N/A  Respiratory Status: Nasal cannula, flow 1 L/min     Occupational Performance:     Bed Mobility:    Patient completed Scooting/Bridging with supervision  Patient completed Supine to Sit with supervision  Patient completed Sit to Supine with supervision     Functional Mobility/Transfers:  Patient completed Sit <> Stand Transfer with stand by assistance  with  rolling walker   Patient completed Toilet Transfer Step Transfer technique with CGA progressing to SBA with  rolling walker  Functional Mobility: Pt performed in room mobility with CGA progressing to SBA with RW. Pt with 1 posterior lean/LOB noted seated eob during self-care activity but able to self-correct with vcs. Pt instructed in home mgmt safety functional mobility training with use of RW with item retrieval task and simulation of bathroom and kitchen setup for reaching objects in room cabinet/closet; pt performed with RW and SBA with vcs. Re-ed on unilateral hand maintenance on RW/surface during standing tasks    Activities of Daily Living:  Grooming: stand by assistance standing at sink to comb hair  Upper Body Dressing: supervision to don gown with education on hemidressing technique for pain relief in R shoulder  Lower Body Dressing: stand by assistance to change socks; 1 posterior LOB noted with pt able to self-correct  Toileting: CGA progressing to SBA with RW on toilet for clothes mgmt ; BM noted      AMPAC 6 Click ADL: 23    Treatment & Education:  ADL/functional mobility/IADL training as above for safe transition home.  All needs met.    Patient left HOB elevated with all lines intact, call button in reach, bed alarm on, and RN notified    GOALS:   Multidisciplinary Problems       Occupational Therapy Goals          Problem: Occupational Therapy    Goal Priority Disciplines Outcome Interventions   Occupational Therapy Goal     OT, PT/OT Progressing    Description: Goals to be met by:  10/02/2024     Patient will increase functional independence with ADLs by performing:    Feeding with [place].  UE Dressing with [place].  LE Dressing with Modified [place].  Grooming while seated with [place].  Toileting from bedside commode with Modified [place] for hygiene and clothing management.   Sitting at edge of bed x8 minutes with [place].  Toilet transfer to bedside commode with Stand-by Assistance.    Goals added on 9/26/2024 to be met by: 10/02/2024     Patient will increase functional independence with ADLs by performing:    LE Dressing with Modified [place].  Grooming while standing with Modified [place].  Toileting from toilet with Modified [place] for hygiene and clothing management.   Toilet transfer to toilet with Stand-by Assistance.                         Time Tracking:     OT Date of Treatment: 09/27/24  OT Start Time: 0930  OT Stop Time: 1003  OT Total Time (min): 33 min    Billable Minutes:Self Care/Home Management 23  Therapeutic Activity 10    OT/SONA: OT          9/27/2024

## 2024-09-27 NOTE — PLAN OF CARE
Problem: Adult Inpatient Plan of Care  Goal: Optimal Comfort and Wellbeing  Outcome: Progressing     Problem: Acute Kidney Injury/Impairment  Goal: Effective Renal Function  Outcome: Progressing

## 2024-09-27 NOTE — PROGRESS NOTES
[facility] [place]  Nurses Note -- 4 Eyes      9/27/2024       Skin assessed on: Q Shift      [x] No Pressure Injuries Present    []Prevention Measures Documented    [] Yes LDA  for Pressure Injury Previously documented     [] Yes New Pressure Injury Discovered   [] LDA for New Pressure Injury Added      Attending RN:  Braulio Alonso RN     Second RN:  Neha Correa RN

## 2024-09-27 NOTE — PT/OT/SLP PROGRESS
Physical Therapy Treatment    Patient Name:  Israel De Jesus   MRN:  [id number]    Recommendations:     Discharge Recommendations: Moderate Intensity Therapy  Discharge Equipment Recommendations: to be determined by next level of care  Barriers to discharge:  Psych Placement     Assessment:     Israel De Jesus is a 63 y.o. male admitted with a medical diagnosis of Alcohol withdrawal syndrome without complication.  He presents with the following impairments/functional limitations: weakness, impaired endurance, impaired self care skills, impaired functional mobility, gait instability, decreased lower extremity function, decreased upper extremity function, decreased coordination, decreased ROM, decreased safety awareness, impaired balance, pain, impaired cardiopulmonary response to activity, impaired cognition, impaired coordination.    Pt ambulated with improved balance, endurance, and fluidity of gait. He used a continuous step through gait pattern with decreased bo and able to control RW without assist     Rehab Prognosis: Good; patient would benefit from acute skilled PT services to address these deficits and reach maximum level of function.    Recent Surgery: * No surgery found *      Plan:     During this hospitalization, patient to be seen 4 x/week (3-4x per week) to address the identified rehab impairments via gait training, therapeutic activities, therapeutic exercises, neuromuscular re-education and progress toward the following goals:    Plan of Care Expires:  09/21/24    Subjective     Chief Complaint: Pt states that he has L+ knee pain.   Patient/Family Comments/goals: Rehab  Pain/Comfort:  Pain Rating 1: 5/10  Location - Side 1: Left  Location 1: knee      Objective:     Communicated with nursing prior to session.  Patient found HOB elevated with obrien catheter upon PT entry to room.     General Precautions: Standard, fall  Orthopedic Precautions: N/A  Braces: N/A  Respiratory Status: Nasal  cannula, flow 2 L/min     Functional Mobility: Verbal Cues for all functional mobility  Bed Mobility:     Rolling Left:  supervision and verbal cues for sequencing to roll to side and use HR   Scooting: stand by assistance  Bridging: stand by assistance  Supine to Sit: stand by assistance  Sit to Supine: stand by assistance  Transfers:     Sit to Stand: SBA assistance with rolling walker  Gait: Performed 105 ft, CGA for balance, He requires vc to lean into RW as to not have a LOB posteriorly   Balance: Static Sit balance Good, Static stand with RW Good and requires assist       AM-PAC 6 CLICK MOBILITY  Turning over in bed (including adjusting bedclothes, sheets and blankets)?: 4  Sitting down on and standing up from a chair with arms (e.g., wheelchair, bedside commode, etc.): 4  Moving from lying on back to sitting on the side of the bed?: 4  Moving to and from a bed to a chair (including a wheelchair)?: 4  Need to walk in hospital room?: 3  Climbing 3-5 steps with a railing?: 3  Basic Mobility Total Score: 22       Treatment & Education:  GT 1:1 x 8 min as noted above   TA 1:1 x 15 min for all functional transitions including sup <> sit, bed repositioning, and multiple sit <> stand due to pt soiled diaper. He required set up assist to perform hygiene. He was able to doff the brief with verbal cues, however donning in standing required max A to put on    Patient left HOB elevated with.    GOALS:   Multidisciplinary Problems       Physical Therapy Goals          Problem: Physical Therapy    Goal Priority Disciplines Outcome Goal Variances Interventions   Physical Therapy Goal     PT, PT/OT Progressing     Description: Goals to be met by: 24     Patient will increase functional independence with mobility by performin. Supine to sit with Modified [place]  2. Sit to supine with Modified [place]  3. Rolling to Left and Right with Modified [place].  4. Sit to stand transfer with Modified  [place]  5. Bed to chair transfer with Modified [place] using Rolling Walker  6. Gait  x 50 feet with Modified [place] using Rolling Walker.   7. Stand for 5 minutes with Modified [place] using Rolling Walker  8. Lower extremity exercise program x30 reps per handout, with independence                         Time Tracking:     PT Received On:    PT Start Time: 1435     PT Stop Time: 1500  PT Total Time (min): 25 min     Billable Minutes: Gait Training 1 Therapeutic Activity     Treatment Type: Treatment  PT/PTA: PT     Number of PTA visits since last PT visit: 0     09/27/2024

## 2024-09-27 NOTE — PLAN OF CARE
Plan for discharge home today. Placed call to [facility] Act teams ([phone]) left message with call back number for update on referral for out patient mental health and substance abuse services.     Placed call to [facility], [phone], per Mini will confirm if transportation and an appointment can be arranged for Monday. Awaiting call back.     10:06am Received call from Nina with  Act Team, stated pt not appropriate for services. Act team provides services for drug about and mental health debility.     11:01am Spoke with Danica with [facility]. Patient scheduled for initial assessment and appointment on Monday Sept 30 @1:00pm. Transportation has been scheduled.     11:28pm Spoke with patient's daughter regarding dc planning. Reviewed plan for discharge with follow up scheduled with [facility] and referrals placed. Pt's daughter stated she will provide transportation home for the pt.

## 2024-09-27 NOTE — NURSING
[facility] [place]  Nurses Note -- 4 Eyes      9/26/2024       Skin assessed on: Q Shift      [x] No Pressure Injuries Present    []Prevention Measures Documented    [] Yes LDA  for Pressure Injury Previously documented     [] Yes New Pressure Injury Discovered   [] LDA for New Pressure Injury Added      Attending RN:  Neha Correa, RN     Second RN:  Braulio Alonso RN          PER handoff given by Braulio HAN      Pt resting in bed quietly. NAD noted. No c/o pain. Respirations even and unlabored.     Fall and safety precautions maintained. Bed alarm activated and audible.. Bed locked in lowest position, with side rails up x2. Call bell and personal items within reach

## 2024-09-27 NOTE — ASSESSMENT & PLAN NOTE
BP Readings from Last 3 Encounters:   09/27/24 (!) 160/78   07/23/24 (!) 162/94   04/04/24 128/70     Continuing home medications as prescribed  Will cont to monitor and adjust as needed  Will utilize p.r.n. blood pressure medication only if patient's blood pressure greater than 180/110 and he develops symptoms such as worsening chest pain or shortness of breath.  Cardiac diet    Cardiac/Autonomic (From admission, onward)    Start     Stop Route Frequency Ordered    09/19/24 0900  metoprolol tartrate (LOPRESSOR) tablet 100 mg         -- Oral 2 times daily 09/19/24 0729    09/18/24 0900  amLODIPine tablet 5 mg         -- Oral Daily 09/17/24 2129 09/09/24 0503  labetaloL injection 10 mg         -- IV Every 6 hours PRN 09/09/24 0404    09/09/24 0451  hydrALAZINE injection 10 mg         -- IV Every 6 hours PRN 09/09/24 035

## 2024-09-27 NOTE — DISCHARGE INSTRUCTIONS
IN CASE OF SUICIDAL THINKING, call the National Suicide Hotline Number: 988       988 Suicide & Crisis Lifeline: 988 , 7-2607-875-117-TALK (9928)   Provides 24/7, free and confidential support for people in distress, prevention and crisis resources for you or your loved ones, and best practices for professionals.    Call, text or chat.   https://[employer].org      Alcoholics Anonymous [place] 24 Hour Hotline: (912) 709-9954    www.[place].org for schedules for in-person and online meetings     There are AA meetings throughout the day all over [facility]     AA costs nothing to attend; they pass a basket for donations but donation is not required

## 2024-09-27 NOTE — PLAN OF CARE
Case Management Final Discharge Note      Discharge Disposition: Home     New DME ordered / company name: Pt stated he has RW at home    Relevant SDOH / Transition of Care Barriers:  NH placement to be continue from home. Pt provided with information for medicaid transportation.     Primary Caretaker and contact information: Self    Scheduled followup appointment: [facility] scheduled with transportation arranged from Monday 9/30/24, listed on AVS.     Referrals placed: Out patient PT/OT, [facility] and [facility].     Transportation: Private vehicle/daughter     Patient and family educated on discharge services and updated on DC plan. Bedside RN notified, patient clear to discharge from Case Management Perspective.      09/27/24 1216   Final Note   Assessment Type Final Discharge Note   Anticipated Discharge Disposition Home   Hospital Resources/Appts/Education Provided Provided patient/caregiver with written discharge plan information;Appointments scheduled and added to AVS   Post-Acute Status   Post-Acute Placement Status Discharge Plan Changed   Discharge Delays None known at this time

## 2024-09-27 NOTE — DISCHARGE SUMMARY
[facility] Medicine  Discharge Summary      Patient Name: Israel De Jesus  MRN: [id number]  Patient Class: IP- Inpatient  Admission Date: 9/8/2024  Hospital Length of Stay: 18 days  Discharge Date and Time:  09/27/2024 9:02 AM  Attending Physician: Jana Thompson MD   Discharging Provider: Jana Montalvo MD  Primary Care Provider: Nuha Lynn MD      HPI:   63 y.o.  man with h/o generalized anxiety d/o, essential HTN, suspect depression, and ETOH abuse presents to the ER with JPSO after reports from family that he was threatening to kill himself. Apparently posted on his Facebook page SI as well as reported to day time Emergency room physician and RNS. To me he denies an SI but he is PEC by ED. While awaiting a bed he started to become more and more tremulous and showing signs of withdrawal. Started on PO valium and Ativan.     Labs overall unremarkable and UDS with THC only. ETOH level 342. Pt. Denies any h/o Withdrawals, does drink daily but I feel is under reporting what he drinks to me. Drinks hard liquor.   COVID negative.     We have been consulted for further management of his withdrawals and admission to the ICU.     Because this patient's clinical condition is critically guarded and PEC he  requires close monitoring of his vitals and withdrawal symptoms, care in an alternative location isn't clinically appropriate for the reasons stated above.              * No surgery found *      Hospital Course:   64yo M admitted with suicidial ideation and alcohol withdrawal.  Due to patient being very drowsy and sleeping throughout the day Valium was stopped, continue p.r.n. IV Ativan based on CIWA protocol.  Has a R groin hernia- CT large bowel containing right inguinal hernia, without proximal bowel obstruction or acute inflammatory changes.  Lovenox stopped due to thrombocytopenia.  Once medically clear plan to discharge to inpatient psych.  Patient is more awake and alert today  and has not required Ativan for 72. Patient still very weak worse in lower extremity. He was evaluated by PT and unable to ambulate on his own. MRI brain ordered which was neg. PT/OT recommending SNF.  Unable to go to SNF as patient PEC.  Fevers and tachycardia on 9/17.  UA consistent with UTI.  Started on Rocephin.  Persistent tachycardia and noted to be slightly hypoxic.  CTA chest showing bilateral lower lobe PE with no evidence of heart strain.  Started on therapeutic Lovenox.  Transitioned to Eliquis.  Psych reconsulted and patient not suicidal.  PEC stopped and no longer needs inpatient psych.  Plan for SNF on discharge.  Patient is now medically ready for discharge and awaiting SNF placement.  Pending SNF placement.     Goals of Care Treatment Preferences:  Code Status: Full Code      Consults:   Consults (From admission, onward)          Status Ordering Provider     Inpatient virtual consult to Hospital Medicine  Once        Provider:  Deborah Miller MD    Completed KIMO VASQUEZ     Inpatient virtual consult to Hospital Medicine  Once        Provider:  Deborah Miller MD    Completed DEMETRIO MELO     Inpatient consult to Telemedicine - Psych  Once        Provider:  (Not yet assigned)    Completed KENJI DUCKWORTH     Inpatient consult to Social Work/Case Management  Once        Provider:  (Not yet assigned)    Completed RAFA CARBONE            Psychiatric  * Alcohol withdrawal syndrome without complication  Continue replacement with MVI, Thiamine and Folate.  No evidence of DT's at this time.  Valium stopped secondary to excessive sedation.  Symptoms resolved.   PT/OT recommending SNF.  He would not be able to go to SNF as long as he is PEC.  Will reconsult Psych to reevaluate need of PEC and inpatient psych.  Psych still recommending inpatient psychiatry and PEC.  Patient still tachycardic and slightly hypoxic.  CTA does show bilateral PE.  Started anticoagulation with  improvement.  PT/OT recommending SNF.  SW/CM consulted.  Medically ready for discharge into SNF.  Pending SNF placement   No accepting SNF facilities despite sending multiple referral per CM  Patient will have to discharge home with outpatient therapy  Outpatient CM referral sent   Patient and family can work on NH placement as outpatient.     Suicidal ideation  PEC and will cont.  Patient currently denies any suicide ideations.  Will have Psych reassess need for PEC and inpatient psychiatry.  Patient reevaluated by Psych last night.  No suicidal ideations.  Ok to remove PEC and no need for inpatient psych on discharge.      Generalized anxiety disorder  -Resume home meds and cont with PRN ativan per CIWA protocol  -scheduled valium stopped due to lethargy        Cardiac/Vascular  Hypertension  BP Readings from Last 3 Encounters:   09/27/24 (!) 160/78   07/23/24 (!) 162/94   04/04/24 128/70     Continuing home medications as prescribed  Will cont to monitor and adjust as needed  Will utilize p.r.n. blood pressure medication only if patient's blood pressure greater than 180/110 and he develops symptoms such as worsening chest pain or shortness of breath.  Cardiac diet    Cardiac/Autonomic (From admission, onward)      Start     Stop Route Frequency Ordered    09/19/24 0900  metoprolol tartrate (LOPRESSOR) tablet 100 mg         -- Oral 2 times daily 09/19/24 0729    09/18/24 0900  amLODIPine tablet 5 mg         -- Oral Daily 09/17/24 2129 09/09/24 0503  labetaloL injection 10 mg         -- IV Every 6 hours PRN 09/09/24 0404    09/09/24 0451  hydrALAZINE injection 10 mg         -- IV Every 6 hours PRN 09/09/24 0351            Renal/  UTI (urinary tract infection)  Completed ABX treatment.      Hypomagnesemia  Replace as needed.    Hematology  Acute pulmonary embolism without acute cor pulmonale  Patient with persistent tachycardia.  Noted to be hypoxic.  CTA showing bilateral lower lobe PE.  No evidence of right  heart strain.  Started on therapeutic Lovenox.  Hemodynamically stable.  Transition to Eliquis.      Oncology  Macrocytic anemia  Noted h/o macrocytic anemia but H/H and MCV stable    GI  Right groin mass  -etiology due to right inguinal hernia  -repeat CT abdominal pelvis shows no strangulation or obstruction  -recommend following up outpatient for an elective repair      Other  Debility  -Patient noted to be medically stable for discharge at this time  -Has been able to work with OT/PT who recommend placement for further rehabilitation  -Patient pending placement, continue in-hospital care as stated above in the meantime  -More than 20 minutes of my time has been spent discussing and planning just her safe disposition with patient/family/CM/SW/Nursing staff (not including other time spent in clinical discussion and management)  -CM/SW following, appreciate input   -Will place DC orders once placement has been secured        Tobacco dependency  -discussed cessation  -nicotine patch    Tobacco use disorder  Nicotine patch ordered daily         Final Active Diagnoses:    Diagnosis Date Noted POA    PRINCIPAL PROBLEM:  Alcohol withdrawal syndrome without complication [F10.930] 02/14/2024 Yes    Acute pulmonary embolism without acute cor pulmonale [I26.99] 09/20/2024 No    Debility [R53.81] 09/26/2024 Yes    UTI (urinary tract infection) [N39.0] 09/19/2024 No    Tobacco dependency [F17.200] 09/09/2024 Yes    Hypomagnesemia [E83.42] 09/09/2024 Yes    Right groin mass [R19.09] 09/09/2024 Yes    Macrocytic anemia [D53.9] 02/14/2024 Yes    Suicidal ideation [R45.851] 02/14/2024 Not Applicable    Generalized anxiety disorder [F41.1] 03/03/2023 Yes    Tobacco use disorder [F17.200] 07/29/2016 Yes    Hypertension [I10] 04/03/2014 Yes     Chronic      Problems Resolved During this Admission:    Diagnosis Date Noted Date Resolved POA    JINA (acute kidney injury) [N17.9] 09/18/2024 09/21/2024 No    Thrombocytopenia [D69.6]  09/09/2024 09/17/2024 Yes       Discharged Condition: fair    Disposition: Home or Self Care    Follow Up:    Patient Instructions:      Ambulatory referral/consult to Outpatient Case Management   Referral Priority: Routine Referral Type: Consultation   Referral Reason: Specialty Services Required   Number of Visits Requested: 1     Ambulatory referral/consult to Smoking Cessation Program   Standing Status: Future   Referral Priority: Routine Referral Type: Consultation   Referral Reason: Specialty Services Required   Requested Specialty: CTTS   Number of Visits Requested: 1     Diet Adult Regular     Notify your health care provider if you experience any of the following:  increased confusion or weakness     Reason for not Prescribing Nicotine Replacement     Order Specific Question Answer Comments   Reason for not Prescribing: Patient refused      Activity as tolerated       Significant Diagnostic Studies: Labs: CMP   Recent Labs   Lab 09/26/24 0448      K 3.9      CO2 26   GLU 85   BUN 14   CREATININE 0.8   CALCIUM 9.3   ANIONGAP 7*    and CBC   Recent Labs   Lab 09/26/24 0448   WBC 12.11   HGB 10.3*   HCT 32.5*   *       Pending Diagnostic Studies:       None           Medications:  Reconciled Home Medications:      Medication List        START taking these medications      apixaban 5 mg Tab  Commonly known as: ELIQUIS  Take 1 tablet (5 mg total) by mouth 2 (two) times daily.  Start taking on: September 28, 2024     folic acid 1 MG tablet  Commonly known as: FOLVITE  Take 1 tablet (1 mg total) by mouth once daily.  Start taking on: September 28, 2024     pantoprazole 40 MG tablet  Commonly known as: PROTONIX  Take 1 tablet (40 mg total) by mouth once daily.  Start taking on: September 28, 2024            CHANGE how you take these medications      amLODIPine 5 MG tablet  Commonly known as: NORVASC  Take 1 tablet (5 mg total) by mouth once daily.  Start taking on: September 28, 2024  What  changed:   medication strength  how much to take     metoprolol tartrate 100 MG tablet  Commonly known as: LOPRESSOR  Take 1 tablet (100 mg total) by mouth 2 (two) times daily.  What changed:   medication strength  how much to take            CONTINUE taking these medications      DULoxetine 60 MG capsule  Commonly known as: CYMBALTA  Take 1 capsule (60 mg total) by mouth once daily.     ONE DAILY MULTIVITAMIN per tablet  Generic drug: multivitamin  Take 1 tablet by mouth once daily.     thiamine 100 MG tablet  Take 1 tablet (100 mg total) by mouth once daily.            STOP taking these medications      acetaminophen 500 MG tablet  Commonly known as: TYLENOL     pregabalin 75 MG capsule  Commonly known as: LYRICA              Indwelling Lines/Drains at time of discharge:   Lines/Drains/Airways       Drain  Duration             Male External Urinary Catheter 09/12/24 2330 Small 14 days                    Time spent on the discharge of patient: 45 minutes         The attending portion of this evaluation, treatment, and documentation was performed per Jana Montalvo MD via Telemedicine AudioVisual using the secure e[employer] software platform with 2 way audio/video. The provider was located off-site and the patient is located in the hospital. The aforementioned video software was utilized to document the relevant history and physical exam    Jana Montalvo MD  Department of Hospital Medicine  [facility] - Observation

## 2024-10-01 ENCOUNTER — PATIENT OUTREACH (OUTPATIENT)
Dept: ADMINISTRATIVE | Facility: OTHER | Age: 64
End: 2024-10-01
Payer: MEDICAID

## 2024-10-01 NOTE — PROGRESS NOTES
Spoke to the pt and he notified me he was about to take a nap and requested I call back tomorrow.

## 2024-10-02 NOTE — PLAN OF CARE
Post acute note:     Received notice of denial of Nursing Home Placement by [facility] [employer]. Authorization for nursing home placement has been denied. For any questions, contact Office of Behavioral Health [phone]. Notice to be scanned in patient chart.

## 2024-10-04 NOTE — PROGRESS NOTES
CHW - Outreach Attempt    Community Health Worker left a voicemail message for 2nd attempt to contact patient regarding: Pt mailbox is full. 2nd attempt.   Community Health Worker to attempt to contact patient on:     10/25/24

## 2024-10-17 ENCOUNTER — TELEPHONE (OUTPATIENT)
Facility: CLINIC | Age: 64
End: 2024-10-17
Payer: MEDICAID

## 2024-10-17 NOTE — TELEPHONE ENCOUNTER
----- Message from Med Assistant Fowler sent at 10/17/2024  2:57 PM CDT -----  Contact: Daughter, Melissa, [phone]  Called and spoke to pt daughter , pt daughter stated she has tried everything and will continue to try to get her dad help  ----- Message -----  From: Nuha Lynn MD  Sent: 10/17/2024   2:44 PM CDT  To: Leah Breen Staff    Please call her and ask her to bring him to the ER at [facility] and tell them he is gravely disabled and cannot care for himself. Thanks.  ----- Message -----  From: Janeth Chu MA  Sent: 10/17/2024   2:11 PM CDT  To: Nuha Lynn MD    Pt daughter states that  stated that pt has been in hosp and pt has been drinking again and urinating on hisself , he is not taking care of his self, etc. Pt daughter is asking for help in this situation.  ----- Message -----  From: Kyara Nolasco  Sent: 10/17/2024   1:31 PM CDT  To: Leah Breen Staff    Calling to speak with the nurse regarding an update on the patient's care. Please call her. Thanks.

## 2024-10-17 NOTE — TELEPHONE ENCOUNTER
Thanks. It appears that pt's  called EMS but when they arrived, pt was deemed competent to refuse care and did. Will continue to reach out to him to schedule follow up.     Nuha Lynn MD 10/17/2024

## 2024-10-23 ENCOUNTER — HOSPITAL ENCOUNTER (INPATIENT)
Facility: HOSPITAL | Age: 64
LOS: 8 days | Discharge: HOME HEALTH | DRG: 682 | End: 2024-10-31
Attending: STUDENT IN AN ORGANIZED HEALTH CARE EDUCATION/TRAINING PROGRAM | Admitting: HOSPITALIST
Payer: MEDICAID

## 2024-10-23 DIAGNOSIS — F10.931 ALCOHOL WITHDRAWAL SYNDROME, WITH DELIRIUM: Primary | ICD-10-CM

## 2024-10-23 DIAGNOSIS — R07.9 CHEST PAIN: ICD-10-CM

## 2024-10-23 DIAGNOSIS — I26.99 OTHER ACUTE PULMONARY EMBOLISM WITHOUT ACUTE COR PULMONALE: ICD-10-CM

## 2024-10-23 DIAGNOSIS — F41.1 GENERALIZED ANXIETY DISORDER: ICD-10-CM

## 2024-10-23 DIAGNOSIS — S12.120S ODONTOID FRACTURE WITH TYPE III MORPHOLOGY, SEQUELA: ICD-10-CM

## 2024-10-23 DIAGNOSIS — M53.82 IMPAIRED RANGE OF MOTION OF CERVICAL SPINE: ICD-10-CM

## 2024-10-23 PROBLEM — F10.930 ALCOHOL WITHDRAWAL SYNDROME WITHOUT COMPLICATION: Status: RESOLVED | Noted: 2024-02-14 | Resolved: 2024-10-23

## 2024-10-23 PROBLEM — D53.9 MACROCYTIC ANEMIA: Chronic | Status: ACTIVE | Noted: 2024-02-14

## 2024-10-23 PROBLEM — F06.30 DEPRESSIVE DISORDER DUE TO SEPARATE MEDICAL CONDITION: Chronic | Status: ACTIVE | Noted: 2024-02-14

## 2024-10-23 PROBLEM — E87.20 METABOLIC ACIDOSIS: Status: ACTIVE | Noted: 2024-10-23

## 2024-10-23 PROBLEM — N39.0 UTI (URINARY TRACT INFECTION): Status: RESOLVED | Noted: 2024-09-19 | Resolved: 2024-10-23

## 2024-10-23 LAB
ALBUMIN SERPL BCP-MCNC: 3.1 G/DL (ref 3.5–5.2)
ALP SERPL-CCNC: 104 U/L (ref 40–150)
ALT SERPL W/O P-5'-P-CCNC: 38 U/L (ref 10–44)
ANION GAP SERPL CALC-SCNC: 24 MMOL/L (ref 8–16)
AST SERPL-CCNC: 41 U/L (ref 10–40)
BASOPHILS # BLD AUTO: 0.02 K/UL (ref 0–0.2)
BASOPHILS NFR BLD: 0.2 % (ref 0–1.9)
BILIRUB SERPL-MCNC: 1.6 MG/DL (ref 0.1–1)
BNP SERPL-MCNC: 142 PG/ML (ref 0–99)
BUN SERPL-MCNC: 35 MG/DL (ref 8–23)
CALCIUM SERPL-MCNC: 9.2 MG/DL (ref 8.7–10.5)
CHLORIDE SERPL-SCNC: 94 MMOL/L (ref 95–110)
CO2 SERPL-SCNC: 14 MMOL/L (ref 23–29)
CREAT SERPL-MCNC: 1.6 MG/DL (ref 0.5–1.4)
DIFFERENTIAL METHOD BLD: ABNORMAL
EOSINOPHIL # BLD AUTO: 0 K/UL (ref 0–0.5)
EOSINOPHIL NFR BLD: 0 % (ref 0–8)
ERYTHROCYTE [DISTWIDTH] IN BLOOD BY AUTOMATED COUNT: 17 % (ref 11.5–14.5)
EST. GFR  (NO RACE VARIABLE): 48 ML/MIN/1.73 M^2
ETHANOL SERPL-MCNC: <10 MG/DL
GLUCOSE SERPL-MCNC: 73 MG/DL (ref 70–110)
HCT VFR BLD AUTO: 34.5 % (ref 40–54)
HGB BLD-MCNC: 11.7 G/DL (ref 14–18)
IMM GRANULOCYTES # BLD AUTO: 0.06 K/UL (ref 0–0.04)
IMM GRANULOCYTES NFR BLD AUTO: 0.6 % (ref 0–0.5)
LIPASE SERPL-CCNC: 17 U/L (ref 4–60)
LYMPHOCYTES # BLD AUTO: 1.1 K/UL (ref 1–4.8)
LYMPHOCYTES NFR BLD: 10.1 % (ref 18–48)
MCH RBC QN AUTO: 34.3 PG (ref 27–31)
MCHC RBC AUTO-ENTMCNC: 33.9 G/DL (ref 32–36)
MCV RBC AUTO: 101 FL (ref 82–98)
MONOCYTES # BLD AUTO: 1.3 K/UL (ref 0.3–1)
MONOCYTES NFR BLD: 12 % (ref 4–15)
NEUTROPHILS # BLD AUTO: 8.3 K/UL (ref 1.8–7.7)
NEUTROPHILS NFR BLD: 77.1 % (ref 38–73)
NRBC BLD-RTO: 0 /100 WBC
PLATELET # BLD AUTO: 88 K/UL (ref 150–450)
PMV BLD AUTO: 10.9 FL (ref 9.2–12.9)
POTASSIUM SERPL-SCNC: 3.8 MMOL/L (ref 3.5–5.1)
PROT SERPL-MCNC: 6.6 G/DL (ref 6–8.4)
RBC # BLD AUTO: 3.41 M/UL (ref 4.6–6.2)
SODIUM SERPL-SCNC: 132 MMOL/L (ref 136–145)
TROPONIN I SERPL DL<=0.01 NG/ML-MCNC: 0.03 NG/ML (ref 0–0.03)
TROPONIN I SERPL DL<=0.01 NG/ML-MCNC: 0.04 NG/ML (ref 0–0.03)
WBC # BLD AUTO: 10.71 K/UL (ref 3.9–12.7)

## 2024-10-23 PROCEDURE — 25000003 PHARM REV CODE 250: Performed by: HOSPITALIST

## 2024-10-23 PROCEDURE — 84484 ASSAY OF TROPONIN QUANT: CPT | Mod: 91 | Performed by: HOSPITALIST

## 2024-10-23 PROCEDURE — 63600175 PHARM REV CODE 636 W HCPCS: Performed by: STUDENT IN AN ORGANIZED HEALTH CARE EDUCATION/TRAINING PROGRAM

## 2024-10-23 PROCEDURE — 11000001 HC ACUTE MED/SURG PRIVATE ROOM

## 2024-10-23 PROCEDURE — 84484 ASSAY OF TROPONIN QUANT: CPT | Mod: 91

## 2024-10-23 PROCEDURE — G0378 HOSPITAL OBSERVATION PER HR: HCPCS

## 2024-10-23 PROCEDURE — 80053 COMPREHEN METABOLIC PANEL: CPT

## 2024-10-23 PROCEDURE — 83690 ASSAY OF LIPASE: CPT | Performed by: STUDENT IN AN ORGANIZED HEALTH CARE EDUCATION/TRAINING PROGRAM

## 2024-10-23 PROCEDURE — 83880 ASSAY OF NATRIURETIC PEPTIDE: CPT

## 2024-10-23 PROCEDURE — 93010 ELECTROCARDIOGRAM REPORT: CPT | Mod: ,,, | Performed by: INTERNAL MEDICINE

## 2024-10-23 PROCEDURE — 25000003 PHARM REV CODE 250: Performed by: STUDENT IN AN ORGANIZED HEALTH CARE EDUCATION/TRAINING PROGRAM

## 2024-10-23 PROCEDURE — 25000003 PHARM REV CODE 250: Performed by: INTERNAL MEDICINE

## 2024-10-23 PROCEDURE — 93005 ELECTROCARDIOGRAM TRACING: CPT

## 2024-10-23 PROCEDURE — 99285 EMERGENCY DEPT VISIT HI MDM: CPT | Mod: 25

## 2024-10-23 PROCEDURE — 85025 COMPLETE CBC W/AUTO DIFF WBC: CPT

## 2024-10-23 PROCEDURE — 82077 ASSAY SPEC XCP UR&BREATH IA: CPT | Performed by: STUDENT IN AN ORGANIZED HEALTH CARE EDUCATION/TRAINING PROGRAM

## 2024-10-23 RX ORDER — DOCUSATE SODIUM 100 MG
400 CAPSULE ORAL
Status: DISCONTINUED | OUTPATIENT
Start: 2024-10-23 | End: 2024-10-25

## 2024-10-23 RX ORDER — ALUMINUM HYDROXIDE, MAGNESIUM HYDROXIDE, AND SIMETHICONE 1200; 120; 1200 MG/30ML; MG/30ML; MG/30ML
30 SUSPENSION ORAL 4 TIMES DAILY PRN
Status: DISCONTINUED | OUTPATIENT
Start: 2024-10-23 | End: 2024-10-31 | Stop reason: HOSPADM

## 2024-10-23 RX ORDER — IPRATROPIUM BROMIDE AND ALBUTEROL SULFATE 2.5; .5 MG/3ML; MG/3ML
3 SOLUTION RESPIRATORY (INHALATION) EVERY 4 HOURS PRN
Status: DISCONTINUED | OUTPATIENT
Start: 2024-10-23 | End: 2024-10-31 | Stop reason: HOSPADM

## 2024-10-23 RX ORDER — FOLIC ACID 1 MG/1
1 TABLET ORAL DAILY
Status: DISCONTINUED | OUTPATIENT
Start: 2024-10-23 | End: 2024-10-31 | Stop reason: HOSPADM

## 2024-10-23 RX ORDER — NALOXONE HCL 0.4 MG/ML
0.02 VIAL (ML) INJECTION
Status: DISCONTINUED | OUTPATIENT
Start: 2024-10-23 | End: 2024-10-31 | Stop reason: HOSPADM

## 2024-10-23 RX ORDER — AMLODIPINE BESYLATE 5 MG/1
5 TABLET ORAL DAILY
Status: DISCONTINUED | OUTPATIENT
Start: 2024-10-23 | End: 2024-10-31 | Stop reason: HOSPADM

## 2024-10-23 RX ORDER — DULOXETIN HYDROCHLORIDE 30 MG/1
60 CAPSULE, DELAYED RELEASE ORAL DAILY
Status: DISCONTINUED | OUTPATIENT
Start: 2024-10-23 | End: 2024-10-31 | Stop reason: HOSPADM

## 2024-10-23 RX ORDER — IBUPROFEN 200 MG
16 TABLET ORAL
Status: DISCONTINUED | OUTPATIENT
Start: 2024-10-23 | End: 2024-10-31 | Stop reason: HOSPADM

## 2024-10-23 RX ORDER — ACETAMINOPHEN 325 MG/1
650 TABLET ORAL EVERY 6 HOURS PRN
Status: DISCONTINUED | OUTPATIENT
Start: 2024-10-23 | End: 2024-10-31 | Stop reason: HOSPADM

## 2024-10-23 RX ORDER — TALC
6 POWDER (GRAM) TOPICAL NIGHTLY PRN
Status: DISCONTINUED | OUTPATIENT
Start: 2024-10-23 | End: 2024-10-31 | Stop reason: HOSPADM

## 2024-10-23 RX ORDER — POLYETHYLENE GLYCOL 3350 17 G/17G
17 POWDER, FOR SOLUTION ORAL DAILY
Status: DISCONTINUED | OUTPATIENT
Start: 2024-10-23 | End: 2024-10-31 | Stop reason: HOSPADM

## 2024-10-23 RX ORDER — SIMETHICONE 80 MG
1 TABLET,CHEWABLE ORAL 4 TIMES DAILY PRN
Status: DISCONTINUED | OUTPATIENT
Start: 2024-10-23 | End: 2024-10-31 | Stop reason: HOSPADM

## 2024-10-23 RX ORDER — SODIUM CHLORIDE 0.9 % (FLUSH) 0.9 %
10 SYRINGE (ML) INJECTION EVERY 8 HOURS PRN
Status: DISCONTINUED | OUTPATIENT
Start: 2024-10-23 | End: 2024-10-31 | Stop reason: HOSPADM

## 2024-10-23 RX ORDER — GLUCAGON 1 MG
1 KIT INJECTION
Status: DISCONTINUED | OUTPATIENT
Start: 2024-10-23 | End: 2024-10-31 | Stop reason: HOSPADM

## 2024-10-23 RX ORDER — LANOLIN ALCOHOL/MO/W.PET/CERES
100 CREAM (GRAM) TOPICAL DAILY
Status: DISCONTINUED | OUTPATIENT
Start: 2024-10-24 | End: 2024-10-31 | Stop reason: HOSPADM

## 2024-10-23 RX ORDER — FUROSEMIDE 10 MG/ML
20 INJECTION INTRAMUSCULAR; INTRAVENOUS
Status: COMPLETED | OUTPATIENT
Start: 2024-10-23 | End: 2024-10-23

## 2024-10-23 RX ORDER — PANTOPRAZOLE SODIUM 40 MG/1
40 TABLET, DELAYED RELEASE ORAL DAILY
Status: DISCONTINUED | OUTPATIENT
Start: 2024-10-23 | End: 2024-10-31 | Stop reason: HOSPADM

## 2024-10-23 RX ORDER — METHOCARBAMOL 750 MG/1
750 TABLET, FILM COATED ORAL ONCE
Status: COMPLETED | OUTPATIENT
Start: 2024-10-23 | End: 2024-10-23

## 2024-10-23 RX ORDER — ONDANSETRON HYDROCHLORIDE 2 MG/ML
4 INJECTION, SOLUTION INTRAVENOUS EVERY 8 HOURS PRN
Status: DISCONTINUED | OUTPATIENT
Start: 2024-10-23 | End: 2024-10-31 | Stop reason: HOSPADM

## 2024-10-23 RX ORDER — PROCHLORPERAZINE EDISYLATE 5 MG/ML
5 INJECTION INTRAMUSCULAR; INTRAVENOUS EVERY 6 HOURS PRN
Status: DISCONTINUED | OUTPATIENT
Start: 2024-10-23 | End: 2024-10-31 | Stop reason: HOSPADM

## 2024-10-23 RX ORDER — FOLIC ACID 1 MG/1
1 TABLET ORAL ONCE
Status: COMPLETED | OUTPATIENT
Start: 2024-10-23 | End: 2024-10-23

## 2024-10-23 RX ORDER — METOPROLOL TARTRATE 50 MG/1
100 TABLET ORAL 2 TIMES DAILY
Status: DISCONTINUED | OUTPATIENT
Start: 2024-10-23 | End: 2024-10-31 | Stop reason: HOSPADM

## 2024-10-23 RX ORDER — IBUPROFEN 200 MG
24 TABLET ORAL
Status: DISCONTINUED | OUTPATIENT
Start: 2024-10-23 | End: 2024-10-31 | Stop reason: HOSPADM

## 2024-10-23 RX ADMIN — THERA TABS 1 TABLET: TAB at 03:10

## 2024-10-23 RX ADMIN — METHOCARBAMOL TABLETS 750 MG: 750 TABLET, COATED ORAL at 08:10

## 2024-10-23 RX ADMIN — DULOXETINE HYDROCHLORIDE 60 MG: 30 CAPSULE, DELAYED RELEASE ORAL at 03:10

## 2024-10-23 RX ADMIN — AMLODIPINE BESYLATE 5 MG: 5 TABLET ORAL at 03:10

## 2024-10-23 RX ADMIN — METOPROLOL TARTRATE 100 MG: 50 TABLET, FILM COATED ORAL at 08:10

## 2024-10-23 RX ADMIN — Medication 400 ML: at 04:10

## 2024-10-23 RX ADMIN — FUROSEMIDE 20 MG: 10 INJECTION, SOLUTION INTRAMUSCULAR; INTRAVENOUS at 03:10

## 2024-10-23 RX ADMIN — POLYETHYLENE GLYCOL 3350 17 G: 17 POWDER, FOR SOLUTION ORAL at 03:10

## 2024-10-23 RX ADMIN — Medication 400 ML: at 08:10

## 2024-10-23 RX ADMIN — ACETAMINOPHEN 650 MG: 325 TABLET ORAL at 08:10

## 2024-10-23 RX ADMIN — THIAMINE HYDROCHLORIDE 500 MG: 100 INJECTION, SOLUTION INTRAMUSCULAR; INTRAVENOUS at 02:10

## 2024-10-23 RX ADMIN — FOLIC ACID 1 MG: 1 TABLET ORAL at 03:10

## 2024-10-23 RX ADMIN — APIXABAN 5 MG: 5 TABLET, FILM COATED ORAL at 08:10

## 2024-10-23 RX ADMIN — PANTOPRAZOLE SODIUM 40 MG: 40 TABLET, DELAYED RELEASE ORAL at 03:10

## 2024-10-23 NOTE — HPI
64 y.o. male with recent acute PE on Eliquis, hypertension alcohol use disorder, and chronic anemia presents with complaint of chest pain.  Acute onset, duration 1 week, pleuritic in nature, associated with chronic cough, exacerbated with deep inspiration.  Reports he has missed some doses of Eliquis.  Denies fever, chills palpitations, syncope, nausea vomiting, diarrhea.  In the ED, vitals reassuring.  Labs with hyponatremia, metabolic acidosis, JINA, and minimal troponin elevation.  CT head and chest x-ray unremarkable.  Eliquis resumed.  Placed in observation.

## 2024-10-23 NOTE — ED PROVIDER NOTES
Dictation #1  MRN:[id number]  CSN:[id number] Encounter Date: 10/23/2024    SCRIBE #1 NOTE: I, Ayla Mireles, am scribing for, and in the presence of,  Geena Borden MD. I have scribed the following portions of the note - Other sections scribed: HPI, ROS, PE.       History     Chief Complaint   Patient presents with    Chest Pain     Pt BIB EMS, c/o non radiating, mid sternal CP that began this morning. Pt denies any SOB, abd pain or n/v/d     65 yo M with PMHx of HTN, Acute pulmonary embolism without acute cor pulmonale, Macrocytic anemia to the ED via EMS with non-radiating mid-sternal pleurtitic chest pain, x 1 week, worsening today. Reports associated chronic cough. States he woke up this morning feeling like he was going to pass out. States he has pain with deep inspiration.     States he has missed the last two days of his Eliquis or more.     Additional history obtained from independent historian: patient's son states that they are attempting to work through Medicare to get a  for the patient. Endorses half a pack of tobacco a day. No other alleviating or exacerbating factors. Denies ETOH use. Denies any other complaints at this time. This is the extent of the patient's complaints in the ED.  number [phone]     Per review of chart, the patient was hospitalized last month for blood clots in his lungs, he was prescribed Eliquis.         The history is provided by the patient and a relative (pt son). No  was used.     Review of patient's allergies indicates:  No Known Allergies  Past Medical History:   Diagnosis Date    Acute pulmonary embolism without acute cor pulmonale 9/20/2024    Eye injury 09/01/1980    ? eye hot metal, and burn eyes ou     Generalized anxiety disorder 03/03/2023    Hypertension     Macrocytic anemia 02/14/2024    Macrocytic anemia 2/14/2024    Sciatica 12/28/2020     Past Surgical History:   Procedure Laterality Date    FUSION OF CERVICAL SPINE  BY POSTERIOR APPROACH N/A 10/12/2022    Procedure: POSTERIOR CERVICAL FUSION, SPINE C1-C4 PCF ;  Surgeon: Ike Storm DO;  [facility];  Service: Neurosurgery;  Laterality: N/A;    FUSION OF CERVICAL SPINE BY POSTERIOR APPROACH N/A 12/7/2022    Procedure: FUSION,SPINE,CERVICAL,POSTERIOR APPROACH C1-T1;  Surgeon: Ike Storm DO;  [facility];  Service: Neurosurgery;  Laterality: N/A;  GENERAL/ TYPE & SCREEN/ EMG/ SEP/ MEP/ MIAMI/ REGULAR BED, REVERSED/ BROWN/ PRONE/ C-ARM/ DEPUY/ COLEMAN/ PACU/ ASSISTANT SURGEON DR. MAYURI LEMOS    KNEE ARTHROPLASTY      LAMINECTOMY N/A 10/12/2022    Procedure: LAMINECTOMY, SPINE, C-1;  Surgeon: Ike Storm DO;  [facility];  Service: Neurosurgery;  Laterality: N/A;    ORIF HUMERUS FRACTURE Right 2/16/2024    Procedure: ORIF, FRACTURE, HUMERUS;  Surgeon: Yulissa Rich III, MD;  Location: [facility];  Service: Orthopedics;  Laterality: Right;  SYNTHES LUKE [phone] CALLED LUKE ON 2/15/2024-     Family History   Problem Relation Name Age of Onset    Hypertension Mother      Stroke Maternal Grandmother      Cancer Maternal Grandmother      Amblyopia Neg Hx      Blindness Neg Hx      Cataracts Neg Hx      Diabetes Neg Hx      Glaucoma Neg Hx      Macular degeneration Neg Hx      Retinal detachment Neg Hx      Strabismus Neg Hx      Thyroid disease Neg Hx       Social History     Tobacco Use    Smoking status: Every Day     Current packs/day: 1.00     Types: Cigarettes    Smokeless tobacco: Never   Substance Use Topics    Alcohol use: Not Currently    Drug use: No     Review of Systems   Constitutional:  Negative for fever.   HENT:  Negative for sore throat.    Respiratory:  Positive for cough. Negative for shortness of breath.    Cardiovascular:  Positive for chest pain. Negative for leg swelling.   Gastrointestinal:  Negative for abdominal pain, diarrhea, nausea and vomiting.   Genitourinary:  Negative for dysuria  and hematuria.   Musculoskeletal:  Negative for back pain and neck pain.   Skin:  Negative for rash.   Neurological:  Negative for syncope.       Physical Exam     Initial Vitals [10/23/24 1056]   BP Pulse Resp Temp SpO2   (!) 145/92 100 18 97.9 °F (36.6 °C) 100 %      MAP       --         Physical Exam    Nursing note and vitals reviewed.  Constitutional: He appears well-developed and well-nourished.  Non-toxic appearance. No distress.   HENT:   Head: Normocephalic and atraumatic.   Eyes: Conjunctivae are normal. Pupils are equal, round, and reactive to light.   + Nystagmus.    Cardiovascular:  Normal rate, regular rhythm, normal heart sounds and intact distal pulses.           No murmur heard.  Pulmonary/Chest: Effort normal and breath sounds normal. No respiratory distress. He has no wheezes. He has no rhonchi.   Tachypneic.    Abdominal: Abdomen is soft. He exhibits no distension.   No abdominal tenderness.  There is no guarding.   Musculoskeletal:         General: No tenderness or edema.      Comments: Leg lift intact bilaterally.      Neurological: He is alert and oriented to person, place, and time.   Tremors in bilateral hands.    Skin: Skin is warm and dry.   Psychiatric: He has a normal mood and affect.         ED Course   Procedures  Labs Reviewed   CBC W/ AUTO DIFFERENTIAL - Abnormal       Result Value    WBC 10.71      RBC 3.41 (*)     Hemoglobin 11.7 (*)     Hematocrit 34.5 (*)      (*)     MCH 34.3 (*)     MCHC 33.9      RDW 17.0 (*)     Platelets 88 (*)     MPV 10.9      Immature Granulocytes 0.6 (*)     Gran # (ANC) 8.3 (*)     Immature Grans (Abs) 0.06 (*)     Lymph # 1.1      Mono # 1.3 (*)     Eos # 0.0      Baso # 0.02      nRBC 0      Gran % 77.1 (*)     Lymph % 10.1 (*)     Mono % 12.0      Eosinophil % 0.0      Basophil % 0.2      Differential Method Automated     COMPREHENSIVE METABOLIC PANEL - Abnormal    Sodium 132 (*)     Potassium 3.8      Chloride 94 (*)     CO2 14 (*)      Glucose 73      BUN 35 (*)     Creatinine 1.6 (*)     Calcium 9.2      Total Protein 6.6      Albumin 3.1 (*)     Total Bilirubin 1.6 (*)     Alkaline Phosphatase 104      AST 41 (*)     ALT 38      eGFR 48 (*)     Anion Gap 24 (*)    TROPONIN I - Abnormal    Troponin I 0.034 (*)    TROPONIN I - Abnormal    Troponin I 0.038 (*)    B-TYPE NATRIURETIC PEPTIDE - Abnormal     (*)    TROPONIN I - Abnormal    Troponin I 0.035 (*)     Narrative:     STAT, if not done in ED, then at 2nd and 6th hour from  initial draw.   ALCOHOL,MEDICAL (ETHANOL)    Alcohol, Serum <10     LIPASE    Lipase 17     TROPONIN I          Imaging Results              CT Head Without Contrast (Final result)  Result time 10/23/24 13:19:31      Final result by Ivan East MD (10/23/24 13:19:31)                   Impression:      No CT evidence of acute intracranial abnormality. If the patient has an acute, focal neurological deficit, MRI of the brain may be indicated.    Generalized cerebral volume loss and chronic ischemic changes.      Electronically signed by: Ivan East MD  Date:    10/23/2024  Time:    13:19               Narrative:    EXAMINATION:  CT HEAD WITHOUT CONTRAST    CLINICAL HISTORY:  Mental status change, unknown cause;    TECHNIQUE:  Low dose axial images were obtained through the head.  Coronal and sagittal reformations were also performed. Contrast was not administered.    COMPARISON:  CT, 02/13/2024.  MRI, 09/16/2024.    FINDINGS:  Generalized cerebral volume loss with ex vacuo dilation of the ventricles and sulci.  Mild periventricular white matter hypoattenuation suggestive of chronic microvascular ischemic change, though nonspecific.  Probable small scattered remote infarcts.  Encephalomalacia in the bilateral temporal lobes.  Brain parenchyma appears similar to the prior CT.    No evidence of acute territorial infarct, hemorrhage, mass effect, or midline shift.    Ventricles are similar in size and  configuration.  No acute hydrocephalus.    No displaced calvarial fracture.    Visualized paranasal sinuses and mastoid air cells are essentially clear.  Operative changes partially imaged in the cervical spine.                                       X-Ray Chest AP Portable (Final result)  Result time 10/23/24 11:30:48      Final result by Gregorio Gannon MD (10/23/24 11:30:48)                   Impression:      No acute radiographic findings in the chest on this single view.      Electronically signed by: Gregorio Gannon MD  Date:    10/23/2024  Time:    11:30               Narrative:    EXAMINATION:  XR CHEST AP PORTABLE    CLINICAL HISTORY:  Chest Pain;    TECHNIQUE:  Single frontal view of the chest was performed.    COMPARISON:  Radiograph 09/17/2024.    FINDINGS:  Cardiac monitoring leads project over the bilateral hemithoraces.  Mediastinal structures are midline. Hilar contours are unremarkable.  Cardiac silhouette is normal in size. Lung volumes are normal and symmetric.  No consolidation.  No pneumothorax or pleural effusion.  No free air beneath the diaphragm. No acute osseous abnormalities.  Remote healed left rib fractures.  Partially visualized postsurgical changes of the right proximal femur and cervical spine.  Degenerative changes of the spine.                                       Medications   amLODIPine tablet 5 mg (5 mg Oral Given 10/23/24 1516)   apixaban tablet 5 mg (has no administration in time range)   DULoxetine DR capsule 60 mg (60 mg Oral Given 10/23/24 1516)   folic acid tablet 1 mg (1 mg Oral Given 10/23/24 1550)   metoprolol tartrate (LOPRESSOR) tablet 100 mg (has no administration in time range)   multivitamin tablet (1 tablet Oral Given 10/23/24 1517)   pantoprazole EC tablet 40 mg (40 mg Oral Given 10/23/24 1516)   thiamine tablet 100 mg (has no administration in time range)   sodium chloride 0.9% flush 10 mL (has no administration in time range)   albuterol-ipratropium 2.5  mg-0.5 mg/3 mL nebulizer solution 3 mL (has no administration in time range)   melatonin tablet 6 mg (has no administration in time range)   ondansetron injection 4 mg (has no administration in time range)   prochlorperazine injection Soln 5 mg (has no administration in time range)   polyethylene glycol packet 17 g (17 g Oral Given 10/23/24 1524)   acetaminophen tablet 650 mg (has no administration in time range)   simethicone chewable tablet 80 mg (has no administration in time range)   aluminum-magnesium hydroxide-simethicone 200-200-20 mg/5 mL suspension 30 mL (has no administration in time range)   naloxone 0.4 mg/mL injection 0.02 mg (has no administration in time range)   glucose chewable tablet 16 g (has no administration in time range)   glucose chewable tablet 24 g (has no administration in time range)   glucagon (human recombinant) injection 1 mg (has no administration in time range)   dextrose 10% bolus 125 mL 125 mL (has no administration in time range)   dextrose 10% bolus 250 mL 250 mL (has no administration in time range)   electrolytes-dextrose (Pedialyte) oral solution 400 mL (400 mLs Oral Given 10/23/24 1608)   thiamine (B-1) 500 mg in D5W 100 mL IVPB (0 mg Intravenous Stopped 10/23/24 1516)   folic acid tablet 1 mg (1 mg Oral Given 10/23/24 1516)   furosemide injection 20 mg (20 mg Intravenous Given 10/23/24 1517)     Medical Decision Making  65 yo M w/EtOH cirrhosis (last drink 5 days ago), HTN, recent diagnosis of PE (intermittently noncompliant with eliquis), presenting with pleuritic L sided chest pain x 1 week, poor care of self at home per  and son, has had poor gait/mobility since discharge in September.      Differential diagnosis includes but is not limited to: Wernicke's, new onset CHF, PE, electrolyte abnormalities, malnutrition, SDH    Son and  gait instability in the last several weeks, with nystagmus on exam, with significant history of EtOH intake  (none in the last 5 days, with no signs of withdrawal in the ER), gait instability may be due to Wernicke's, ordered for 500 mg IV thiamine.  Also with infarcts seen on cerebellum, nonacute, which may be contributing to gait instability.    No midline back tenderness on exam to suggest spinal fracture, no bony tenderness in extremities.  CT head negative for any subdural hemorrhage or other intracranial hemorrhage, with labs otherwise finding a new thrombocytopenia of 88, elevated BNP and troponin, as well as JINA.  May be due to worsening PE, though patient is not hypoxic, would not be a submassive PE where thrombectomy would be indicated, given known PE with Eliquis prescribed, no indication for repeat CTA imaging unless patient develops hypoxia, significant tachycardia.     Given his elevation in cardiac markers, patient admitted for cardiac workup, with no known history of CHF.  Chest pain presentation does not appear to be consistent with ACS, as it is pleuritic in nature, nonexertional.    After review of the patient's physical exam, ED testing, and history/symptoms, the patient requires additional care in the hospital overnight. The hospital medicine service will accept the patient and relevant labs, imaging, or procedures were discussed and they were made aware of any pending labs/imaging/interventions. The diagnosis, treatment and plan were discussed with the patient. All questions and/or concerns have been addressed to the best of my ability.          Amount and/or Complexity of Data Reviewed  Independent Historian: caregiver     Details: See HPI.   External Data Reviewed: notes.     Details: See HPI.   Labs: ordered. Decision-making details documented in ED Course.  Radiology: ordered. Decision-making details documented in ED Course.    Risk  Prescription drug management.            Scribe Attestation:   Scribe #1: I performed the above scribed service and the documentation accurately describes the  services I performed. I attest to the accuracy of the note.        ED Course as of 10/23/24 1853   Wed Oct 23, 2024   1104 CTA FINDINGS (last hospitalization 09/2024)  Pulmonary Arteries: Filling defects within the segmental branches to the lower lobes bilaterally consistent with pulmonary thromboembolism.  No evidence of right heart strain.   [LF]   1204 Creatinine(!): 1.6  New JINA creatinine 1.6, baseline 0.8 [LF]   1205 Platelet Count(!): 88  New thrombocytopenia  [LF]   1205 Anion Gap(!): 24 [LF]   1216 Troponin I(!): 0.034 [LF]   1216 BNP(!): 142 [LF]   1217 Discussed case with patient's FLORESITA Fraser [phone] who does home visits, patient needs HHA on discharge, unable to take care of himself at home, poor condition of house, not compliant with his medications [LF]   1232 EKG independently interpreted by me with normal sinus rhythm, rate of 93, RSR morphology seen in inferior leads present on prior EKG, no STEMI,  [LF]      ED Course User Index  [LF] Geena Borden MD                           Clinical Impression:  Final diagnoses:  [R07.9] Chest pain          ED Disposition Condition    Observation           Scribe #1: I performed the above scribed service and the documentation accurately describes the services I performed. I attest to the accuracy of the note.        Geena Borden MD  10/23/24 1853

## 2024-10-23 NOTE — NURSING
Received report from EMELY oHlbrook from ED. AAOx4 with scrotal edema. /95, HR 99, O2 97% on RA, T 97.0, R 18. Awaiting pt's arrival to the floor.

## 2024-10-23 NOTE — ASSESSMENT & PLAN NOTE
JINA is likely due to pre-renal azotemia due to intravascular volume depletion. Baseline creatinine is  0.8 . Most recent creatinine and eGFR are listed below.  Recent Labs     10/23/24  1129   CREATININE 1.6*   EGFRNORACEVR 48*      Plan  - JINA is worsening. Will adjust treatment as follows:    Patient is in need of IV fluids for the treatment of JINA and metabolic acidosis.  Due to a shortage of IV fluids, patient to receive oral rehydration.  Patient must receive this treatment in a hospital location due to the risk of adverse effects, insufficient response to treatment, or other potential complications of disease such as organ failure.   - Avoid nephrotoxins and renally dose meds for GFR listed above  - Monitor urine output, serial BMP, and adjust therapy as needed

## 2024-10-23 NOTE — ASSESSMENT & PLAN NOTE
Patients blood pressure range in the last 24 hours was: BP  Min: 145/92  Max: 157/101.The patient's inpatient anti-hypertensive regimen is listed below:  Current Antihypertensives  Amlodipine 5 mg daily  Metoprolol 100 mg    Plan  - BP is controlled, no changes needed to their regimen

## 2024-10-23 NOTE — SUBJECTIVE & OBJECTIVE
Past Medical History:   Diagnosis Date    Acute pulmonary embolism without acute cor pulmonale 9/20/2024    Eye injury 09/01/1980    ? eye hot metal, and burn eyes ou     Generalized anxiety disorder 03/03/2023    Hypertension     Macrocytic anemia 02/14/2024    Macrocytic anemia 2/14/2024    Sciatica 12/28/2020       Past Surgical History:   Procedure Laterality Date    FUSION OF CERVICAL SPINE BY POSTERIOR APPROACH N/A 10/12/2022    Procedure: POSTERIOR CERVICAL FUSION, SPINE C1-C4 PCF ;  Surgeon: Ike Storm DO;  [facility];  Service: Neurosurgery;  Laterality: N/A;    FUSION OF CERVICAL SPINE BY POSTERIOR APPROACH N/A 12/7/2022    Procedure: FUSION,SPINE,CERVICAL,POSTERIOR APPROACH C1-T1;  Surgeon: Ike Storm DO;  [facility];  Service: Neurosurgery;  Laterality: N/A;  GENERAL/ TYPE & SCREEN/ EMG/ SEP/ MEP/ MIAMI/ REGULAR BED, REVERSED/ BROWN/ PRONE/ C-ARM/ DEPUY/ COLEMAN/ PACU/ ASSISTANT SURGEON DR. MAYURI LEMOS    KNEE ARTHROPLASTY      LAMINECTOMY N/A 10/12/2022    Procedure: LAMINECTOMY, SPINE, C-1;  Surgeon: Ike Storm DO;  [facility];  Service: Neurosurgery;  Laterality: N/A;    ORIF HUMERUS FRACTURE Right 2/16/2024    Procedure: ORIF, FRACTURE, HUMERUS;  Surgeon: Yulissa Rich III, MD;  Location: [facility] OR;  Service: Orthopedics;  Laterality: Right;  SYNTHES LUKE [phone] CALLED BETY ON 2/15/2024-       Review of patient's allergies indicates:  No Known Allergies    Current Facility-Administered Medications on File Prior to Encounter   Medication    mupirocin 2 % ointment     Current Outpatient Medications on File Prior to Encounter   Medication Sig    amLODIPine (NORVASC) 5 MG tablet Take 1 tablet (5 mg total) by mouth once daily.    apixaban (ELIQUIS) 5 mg Tab Take 1 tablet (5 mg total) by mouth 2 (two) times daily.    DULoxetine (CYMBALTA) 60 MG capsule Take 1 capsule (60 mg total) by mouth once daily.    folic acid (FOLVITE)  1 MG tablet Take 1 tablet (1 mg total) by mouth once daily.    metoprolol tartrate (LOPRESSOR) 100 MG tablet Take 1 tablet (100 mg total) by mouth 2 (two) times daily.    multivitamin (ONE DAILY MULTIVITAMIN) per tablet Take 1 tablet by mouth once daily.    pantoprazole (PROTONIX) 40 MG tablet Take 1 tablet (40 mg total) by mouth once daily.    thiamine 100 MG tablet Take 1 tablet (100 mg total) by mouth once daily.     Family History       Problem Relation (Age of Onset)    Cancer Maternal Grandmother    Hypertension Mother    Stroke Maternal Grandmother          Tobacco Use    Smoking status: Every Day     Current packs/day: 1.00     Types: Cigarettes    Smokeless tobacco: Never   Substance and Sexual Activity    Alcohol use: Not Currently    Drug use: No    Sexual activity: Not Currently     Review of Systems   Constitutional:  Negative for chills and fever.   HENT:  Negative for congestion and rhinorrhea.    Eyes:  Negative for photophobia and visual disturbance.   Respiratory:  Positive for cough. Negative for shortness of breath.    Cardiovascular:  Positive for chest pain. Negative for palpitations and leg swelling.   Gastrointestinal:  Negative for abdominal pain, diarrhea, nausea and vomiting.   Genitourinary:  Negative for frequency, hematuria and urgency.   Skin:  Negative for pallor, rash and wound.   Neurological:  Negative for light-headedness and headaches.   Psychiatric/Behavioral:  Negative for confusion and decreased concentration.      Objective:     Vital Signs (Most Recent):  Temp: 97.9 °F (36.6 °C) (10/23/24 1056)  Pulse: 92 (10/23/24 1232)  Resp: 18 (10/23/24 1056)  BP: (!) 150/95 (10/23/24 1232)  SpO2: 100 % (10/23/24 1232) Vital Signs (24h Range):  Temp:  [97.9 °F (36.6 °C)] 97.9 °F (36.6 °C)  Pulse:  [] 92  Resp:  [18] 18  SpO2:  [100 %] 100 %  BP: (145-157)/() 150/95     Weight: 63.5 kg (140 lb)  Body mass index is 22.6 kg/m².     Physical Exam  Vitals and nursing note  reviewed.   Constitutional:       General: He is not in acute distress.     Appearance: He is well-developed.   HENT:      Head: Normocephalic and atraumatic.      Right Ear: External ear normal.      Left Ear: External ear normal.      Nose: Nose normal.   Eyes:      Conjunctiva/sclera: Conjunctivae normal.   Cardiovascular:      Rate and Rhythm: Normal rate and regular rhythm.   Pulmonary:      Effort: Pulmonary effort is normal. No respiratory distress.      Breath sounds: Normal breath sounds. No wheezing or rales.   Abdominal:      General: There is no distension.      Palpations: Abdomen is soft.   Skin:     General: Skin is warm and dry.   Neurological:      Mental Status: He is alert and oriented to person, place, and time.   Psychiatric:         Thought Content: Thought content normal.                Significant Labs: All pertinent labs within the past 24 hours have been reviewed.    Significant Imaging: I have reviewed all pertinent imaging results/findings within the past 24 hours.

## 2024-10-23 NOTE — NURSING
Upon arrival to floor: cardiac monitor applied, patient oriented to room, call bell in reach and bed in lowest position. Patient arrived to floor in stable condition. Visualized patient and assessed patient's overall condition and appearance. No complaints. NAD noted. Plan of care ongoing.    [facility] [place]  Nurses Note -- 4 Eyes      10/23/2024       Skin assessed on: Admit      [x] No Pressure Injuries Present    []Prevention Measures Documented    [] Yes LDA  for Pressure Injury Previously documented     [] Yes New Pressure Injury Discovered   [] LDA for New Pressure Injury Added      Attending RN:  Narcisa Gurrola LPN     Second RN:  Aminata Wylie,PCT

## 2024-10-23 NOTE — H&P
[facility] Medicine  History & Physical    Patient Name: Israel De Jesus  MRN: [id number]  Patient Class: OP- Observation  Admission Date: 10/23/2024  Attending Physician: Mack Castellanos MD   Primary Care Provider: Nuha Lynn MD         Patient information was obtained from patient, past medical records, and ER records.     Subjective:     Principal Problem:Acute pulmonary embolism without acute cor pulmonale    Chief Complaint:   Chief Complaint   Patient presents with    Chest Pain     Pt BIB EMS, c/o non radiating, mid sternal CP that began this morning. Pt denies any SOB, abd pain or n/v/d        HPI: 64 y.o. male with recent acute PE on Eliquis, hypertension alcohol use disorder, and chronic anemia presents with complaint of chest pain.  Acute onset, duration 1 week, pleuritic in nature, associated with chronic cough, exacerbated with deep inspiration.  Reports he has missed some doses of Eliquis.  Denies fever, chills palpitations, syncope, nausea vomiting, diarrhea.  In the ED, vitals reassuring.  Labs with hyponatremia, metabolic acidosis, JINA, and minimal troponin elevation.  CT head and chest x-ray unremarkable.  Eliquis resumed.  Placed in observation.    Past Medical History:   Diagnosis Date    Acute pulmonary embolism without acute cor pulmonale 9/20/2024    Eye injury 09/01/1980    ? eye hot metal, and burn eyes ou     Generalized anxiety disorder 03/03/2023    Hypertension     Macrocytic anemia 02/14/2024    Macrocytic anemia 2/14/2024    Sciatica 12/28/2020       Past Surgical History:   Procedure Laterality Date    FUSION OF CERVICAL SPINE BY POSTERIOR APPROACH N/A 10/12/2022    Procedure: POSTERIOR CERVICAL FUSION, SPINE C1-C4 PCF ;  Surgeon: Ike Storm DO;  Location: [facility] OR [address] [place] [address];  Service: Neurosurgery;  Laterality: N/A;    FUSION OF CERVICAL SPINE BY POSTERIOR APPROACH N/A 12/7/2022    Procedure: FUSION,SPINE,CERVICAL,POSTERIOR APPROACH C1-T1;   Surgeon: Ike Storm DO;  Location: [facility] OR 2ND FLR;  Service: Neurosurgery;  Laterality: N/A;  GENERAL/ TYPE & SCREEN/ EMG/ SEP/ MEP/ MIAMI/ REGULAR BED, REVERSED/ BROWN/ PRONE/ C-ARM/ DEPUY/ COLEMAN/ PACU/ ASSISTANT SURGEON DR. MAYURI LEMOS    KNEE ARTHROPLASTY      LAMINECTOMY N/A 10/12/2022    Procedure: LAMINECTOMY, SPINE, C-1;  Surgeon: Ike Storm DO;  Location: [facility] OR 2ND FLR;  Service: Neurosurgery;  Laterality: N/A;    ORIF HUMERUS FRACTURE Right 2/16/2024    Procedure: ORIF, FRACTURE, HUMERUS;  Surgeon: Yulissa Rich III, MD;  Location: [facility] OR;  Service: Orthopedics;  Laterality: Right;  SYNTHES LUKE [phone] CALLED LUKARLA ON 2/15/2024-       Review of patient's allergies indicates:  No Known Allergies    Current Facility-Administered Medications on File Prior to Encounter   Medication    mupirocin 2 % ointment     Current Outpatient Medications on File Prior to Encounter   Medication Sig    amLODIPine (NORVASC) 5 MG tablet Take 1 tablet (5 mg total) by mouth once daily.    apixaban (ELIQUIS) 5 mg Tab Take 1 tablet (5 mg total) by mouth 2 (two) times daily.    DULoxetine (CYMBALTA) 60 MG capsule Take 1 capsule (60 mg total) by mouth once daily.    folic acid (FOLVITE) 1 MG tablet Take 1 tablet (1 mg total) by mouth once daily.    metoprolol tartrate (LOPRESSOR) 100 MG tablet Take 1 tablet (100 mg total) by mouth 2 (two) times daily.    multivitamin (ONE DAILY MULTIVITAMIN) per tablet Take 1 tablet by mouth once daily.    pantoprazole (PROTONIX) 40 MG tablet Take 1 tablet (40 mg total) by mouth once daily.    thiamine 100 MG tablet Take 1 tablet (100 mg total) by mouth once daily.     Family History       Problem Relation (Age of Onset)    Cancer Maternal Grandmother    Hypertension Mother    Stroke Maternal Grandmother          Tobacco Use    Smoking status: Every Day     Current packs/day: 1.00     Types: Cigarettes    Smokeless tobacco: Never   Substance and Sexual Activity     Alcohol use: Not Currently    Drug use: No    Sexual activity: Not Currently     Review of Systems   Constitutional:  Negative for chills and fever.   HENT:  Negative for congestion and rhinorrhea.    Eyes:  Negative for photophobia and visual disturbance.   Respiratory:  Positive for cough. Negative for shortness of breath.    Cardiovascular:  Positive for chest pain. Negative for palpitations and leg swelling.   Gastrointestinal:  Negative for abdominal pain, diarrhea, nausea and vomiting.   Genitourinary:  Negative for frequency, hematuria and urgency.   Skin:  Negative for pallor, rash and wound.   Neurological:  Negative for light-headedness and headaches.   Psychiatric/Behavioral:  Negative for confusion and decreased concentration.      Objective:     Vital Signs (Most Recent):  Temp: 97.9 °F (36.6 °C) (10/23/24 1056)  Pulse: 92 (10/23/24 1232)  Resp: 18 (10/23/24 1056)  BP: (!) 150/95 (10/23/24 1232)  SpO2: 100 % (10/23/24 1232) Vital Signs (24h Range):  Temp:  [97.9 °F (36.6 °C)] 97.9 °F (36.6 °C)  Pulse:  [] 92  Resp:  [18] 18  SpO2:  [100 %] 100 %  BP: (145-157)/() 150/95     Weight: 63.5 kg (140 lb)  Body mass index is 22.6 kg/m².     Physical Exam  Vitals and nursing note reviewed.   Constitutional:       General: He is not in acute distress.     Appearance: He is well-developed.   HENT:      Head: Normocephalic and atraumatic.      Right Ear: External ear normal.      Left Ear: External ear normal.      Nose: Nose normal.   Eyes:      Conjunctiva/sclera: Conjunctivae normal.   Cardiovascular:      Rate and Rhythm: Normal rate and regular rhythm.   Pulmonary:      Effort: Pulmonary effort is normal. No respiratory distress.      Breath sounds: Normal breath sounds. No wheezing or rales.   Abdominal:      General: There is no distension.      Palpations: Abdomen is soft.   Skin:     General: Skin is warm and dry.   Neurological:      Mental Status: He is alert and oriented to person, place,  and time.   Psychiatric:         Thought Content: Thought content normal.                Significant Labs: All pertinent labs within the past 24 hours have been reviewed.    Significant Imaging: I have reviewed all pertinent imaging results/findings within the past 24 hours.  Assessment/Plan:     * Acute pulmonary embolism without acute cor pulmonale  Stable on room air, resume apixaban    JINA (acute kidney injury)  JINA is likely due to pre-renal azotemia due to intravascular volume depletion. Baseline creatinine is  0.8 . Most recent creatinine and eGFR are listed below.  Recent Labs     10/23/24  1129   CREATININE 1.6*   EGFRNORACEVR 48*      Plan  - JINA is worsening. Will adjust treatment as follows:    Patient is in need of IV fluids for the treatment of JINA and metabolic acidosis.  Due to a shortage of IV fluids, patient to receive oral rehydration.  Patient must receive this treatment in a hospital location due to the risk of adverse effects, insufficient response to treatment, or other potential complications of disease such as organ failure.   - Avoid nephrotoxins and renally dose meds for GFR listed above  - Monitor urine output, serial BMP, and adjust therapy as needed    Metabolic acidosis  As above    Macrocytic anemia  Patient H/H stable and consistent with baseline. No evidence acute bleeding or indication for transfusion at this time.      Recent Labs     10/23/24  1129   HGB 11.7*   HCT 34.5*     Plan  - Monitor serial CBC: Daily  - Transfuse PRBC if patient becomes hemodynamically unstable, symptomatic or H/H drops below 7/21.  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  Continue home regimen of duloxetine    Hypertension  Patients blood pressure range in the last 24 hours was: BP  Min: 145/92  Max: 157/101.The patient's inpatient anti-hypertensive regimen is listed below:  Current  Antihypertensives  Amlodipine 5 mg daily  Metoprolol 100 mg    Plan  - BP is controlled, no changes needed to their regimen      VTE Risk Mitigation (From admission, onward)           Ordered     apixaban tablet 5 mg  2 times daily         10/23/24 1427     IP VTE HIGH RISK PATIENT  Once         10/23/24 1427     Place sequential compression device  Until discontinued         10/23/24 1427     Reason for No Pharmacological VTE Prophylaxis  Once        Question:  Reasons:  Answer:  Already adequately anticoagulated on oral Anticoagulants    10/23/24 1427                         On 10/23/2024, patient should be placed in hospital observation services under my care in collaboration with Mack Castellanos MD.    Min Marinelli Jr., APRN, [facility]  Hospitalist - Department of Hospital Medicine  [facility] [address] [place] [address]  Office #: [phone]; Pager #: [phone]

## 2024-10-23 NOTE — ASSESSMENT & PLAN NOTE
Patient H/H stable and consistent with baseline. No evidence acute bleeding or indication for transfusion at this time.      Recent Labs     10/23/24  1129   HGB 11.7*   HCT 34.5*     Plan  - Monitor serial CBC: Daily  - Transfuse PRBC if patient becomes hemodynamically unstable, symptomatic or H/H drops below 7/21.  - Patient has not received any PRBC transfusions to date  - Patient's anemia is currently stable

## 2024-10-23 NOTE — PLAN OF CARE
[facility] Emergency Dept  Discharge Assessment    Primary Care Provider: Nuha Lynn MD   Case Management Assessment     PCP: See above  Pharmacy: [facility]    Patient Arrived From: home alone  Existing Help at Home: daughter or son helps with transportation    Barriers to Discharge: transportation    Discharge Plan:    A. home   B. home      Discharge planning assessment completed with patient's assistance.  Patient is from home alone  and independent with assistive device.  Patient has walker to assist with ambulation. He has no OP services.    When medically stable, patient will discharge to  home . Patient's son (randall) or daughter ( Melissa)  will transport home.  Patient will need followup appointments  with PCP and/ additional appts   as ordered by the discharging provider.  Case Management will continue to follow and assist with discharge planning.       Discharge Assessment (most recent)       BRIEF DISCHARGE ASSESSMENT - 10/23/24 4226          Discharge Planning    Assessment Type Discharge Planning Brief Assessment     Resource/Environmental Concerns reliable transportation     Transportation Concerns no car     Support Systems Children     Assistance Needed Mobility     Equipment Currently Used at Home walker, rolling     Current Living Arrangements apartment     Care Facility Name n/a     Patient/Family Anticipates Transition to home     Patient/Family Anticipated Services at Transition outpatient care     DME Needed Upon Discharge  none     Discharge Plan A Home     Discharge Plan B Home

## 2024-10-24 PROBLEM — E87.6 HYPOKALEMIA: Status: ACTIVE | Noted: 2024-10-24

## 2024-10-24 LAB
ALBUMIN SERPL BCP-MCNC: 2.7 G/DL (ref 3.5–5.2)
ALP SERPL-CCNC: 89 U/L (ref 40–150)
ALT SERPL W/O P-5'-P-CCNC: 26 U/L (ref 10–44)
ANION GAP SERPL CALC-SCNC: 15 MMOL/L (ref 8–16)
AST SERPL-CCNC: 31 U/L (ref 10–40)
BASOPHILS # BLD AUTO: 0.01 K/UL (ref 0–0.2)
BASOPHILS NFR BLD: 0.2 % (ref 0–1.9)
BILIRUB SERPL-MCNC: 1.3 MG/DL (ref 0.1–1)
BUN SERPL-MCNC: 31 MG/DL (ref 8–23)
CALCIUM SERPL-MCNC: 8.5 MG/DL (ref 8.7–10.5)
CHLORIDE SERPL-SCNC: 96 MMOL/L (ref 95–110)
CO2 SERPL-SCNC: 21 MMOL/L (ref 23–29)
CREAT SERPL-MCNC: 1.3 MG/DL (ref 0.5–1.4)
DIFFERENTIAL METHOD BLD: ABNORMAL
EOSINOPHIL # BLD AUTO: 0 K/UL (ref 0–0.5)
EOSINOPHIL NFR BLD: 0.3 % (ref 0–8)
ERYTHROCYTE [DISTWIDTH] IN BLOOD BY AUTOMATED COUNT: 17 % (ref 11.5–14.5)
EST. GFR  (NO RACE VARIABLE): >60 ML/MIN/1.73 M^2
GLUCOSE SERPL-MCNC: 86 MG/DL (ref 70–110)
HCT VFR BLD AUTO: 29.8 % (ref 40–54)
HGB BLD-MCNC: 9.7 G/DL (ref 14–18)
IMM GRANULOCYTES # BLD AUTO: 0.05 K/UL (ref 0–0.04)
IMM GRANULOCYTES NFR BLD AUTO: 0.8 % (ref 0–0.5)
LYMPHOCYTES # BLD AUTO: 1.1 K/UL (ref 1–4.8)
LYMPHOCYTES NFR BLD: 16.3 % (ref 18–48)
MAGNESIUM SERPL-MCNC: 1.5 MG/DL (ref 1.6–2.6)
MCH RBC QN AUTO: 33.2 PG (ref 27–31)
MCHC RBC AUTO-ENTMCNC: 32.6 G/DL (ref 32–36)
MCV RBC AUTO: 102 FL (ref 82–98)
MONOCYTES # BLD AUTO: 0.8 K/UL (ref 0.3–1)
MONOCYTES NFR BLD: 11.8 % (ref 4–15)
NEUTROPHILS # BLD AUTO: 4.6 K/UL (ref 1.8–7.7)
NEUTROPHILS NFR BLD: 70.6 % (ref 38–73)
NRBC BLD-RTO: 0 /100 WBC
OHS QRS DURATION: 86 MS
OHS QTC CALCULATION: 477 MS
PHOSPHATE SERPL-MCNC: 2.5 MG/DL (ref 2.7–4.5)
PLATELET # BLD AUTO: 102 K/UL (ref 150–450)
PMV BLD AUTO: 11.1 FL (ref 9.2–12.9)
POTASSIUM SERPL-SCNC: 2.8 MMOL/L (ref 3.5–5.1)
PROT SERPL-MCNC: 5.6 G/DL (ref 6–8.4)
RBC # BLD AUTO: 2.92 M/UL (ref 4.6–6.2)
SODIUM SERPL-SCNC: 132 MMOL/L (ref 136–145)
WBC # BLD AUTO: 6.44 K/UL (ref 3.9–12.7)

## 2024-10-24 PROCEDURE — 99900031 HC PATIENT EDUCATION (STAT)

## 2024-10-24 PROCEDURE — 25000003 PHARM REV CODE 250: Performed by: HOSPITALIST

## 2024-10-24 PROCEDURE — 80053 COMPREHEN METABOLIC PANEL: CPT | Performed by: HOSPITALIST

## 2024-10-24 PROCEDURE — 99900035 HC TECH TIME PER 15 MIN (STAT)

## 2024-10-24 PROCEDURE — 83735 ASSAY OF MAGNESIUM: CPT | Performed by: HOSPITALIST

## 2024-10-24 PROCEDURE — 85025 COMPLETE CBC W/AUTO DIFF WBC: CPT | Performed by: HOSPITALIST

## 2024-10-24 PROCEDURE — 94761 N-INVAS EAR/PLS OXIMETRY MLT: CPT

## 2024-10-24 PROCEDURE — 11000001 HC ACUTE MED/SURG PRIVATE ROOM

## 2024-10-24 PROCEDURE — 97161 PT EVAL LOW COMPLEX 20 MIN: CPT

## 2024-10-24 PROCEDURE — 84100 ASSAY OF PHOSPHORUS: CPT | Performed by: HOSPITALIST

## 2024-10-24 PROCEDURE — 97165 OT EVAL LOW COMPLEX 30 MIN: CPT

## 2024-10-24 PROCEDURE — 36415 COLL VENOUS BLD VENIPUNCTURE: CPT | Performed by: HOSPITALIST

## 2024-10-24 PROCEDURE — 63600175 PHARM REV CODE 636 W HCPCS: Performed by: HOSPITALIST

## 2024-10-24 PROCEDURE — 94799 UNLISTED PULMONARY SVC/PX: CPT

## 2024-10-24 RX ORDER — MAGNESIUM SULFATE HEPTAHYDRATE 40 MG/ML
2 INJECTION, SOLUTION INTRAVENOUS ONCE
Status: COMPLETED | OUTPATIENT
Start: 2024-10-24 | End: 2024-10-24

## 2024-10-24 RX ORDER — HYDROCODONE BITARTRATE AND ACETAMINOPHEN 5; 325 MG/1; MG/1
1 TABLET ORAL EVERY 6 HOURS PRN
Status: DISCONTINUED | OUTPATIENT
Start: 2024-10-24 | End: 2024-10-31 | Stop reason: HOSPADM

## 2024-10-24 RX ADMIN — Medication 400 ML: at 04:10

## 2024-10-24 RX ADMIN — APIXABAN 5 MG: 5 TABLET, FILM COATED ORAL at 08:10

## 2024-10-24 RX ADMIN — Medication 400 ML: at 08:10

## 2024-10-24 RX ADMIN — Medication 400 ML: at 06:10

## 2024-10-24 RX ADMIN — FOLIC ACID 1 MG: 1 TABLET ORAL at 09:10

## 2024-10-24 RX ADMIN — Medication 400 ML: at 10:10

## 2024-10-24 RX ADMIN — HYDROCODONE BITARTRATE AND ACETAMINOPHEN 1 TABLET: 5; 325 TABLET ORAL at 09:10

## 2024-10-24 RX ADMIN — APIXABAN 5 MG: 5 TABLET, FILM COATED ORAL at 09:10

## 2024-10-24 RX ADMIN — MAGNESIUM SULFATE HEPTAHYDRATE 2 G: 40 INJECTION, SOLUTION INTRAVENOUS at 01:10

## 2024-10-24 RX ADMIN — PANTOPRAZOLE SODIUM 40 MG: 40 TABLET, DELAYED RELEASE ORAL at 09:10

## 2024-10-24 RX ADMIN — THIAMINE HCL TAB 100 MG 100 MG: 100 TAB at 09:10

## 2024-10-24 RX ADMIN — THERA TABS 1 TABLET: TAB at 09:10

## 2024-10-24 RX ADMIN — POTASSIUM BICARBONATE 50 MEQ: 977.5 TABLET, EFFERVESCENT ORAL at 09:10

## 2024-10-24 RX ADMIN — DULOXETINE HYDROCHLORIDE 60 MG: 30 CAPSULE, DELAYED RELEASE ORAL at 09:10

## 2024-10-24 RX ADMIN — AMLODIPINE BESYLATE 5 MG: 5 TABLET ORAL at 09:10

## 2024-10-24 RX ADMIN — Medication 400 ML: at 03:10

## 2024-10-24 NOTE — PROGRESS NOTES
[facility] [place] Telemetry  Adult Nutrition  Progress Note    SUMMARY     Recommendations  Recommendation:   1. Continue cardiac diet as tolerated   2. Continue multivitamin, folic acid, and magnesium sulfate supplements   3. Add Boost Plus BID to support weight gain   4. Encoruage PO intake   5. Monitor weight and labs    Goals: Pt will consume 50-75% of meals by RD follow up    Nutrition Goal Status: new  Communication of RD Recs:  (POC)    Assessment and Plan  Nutrition Problem  Inadequate energy intake     Related to (etiology):   Depression     Signs and Symptoms (as evidenced by):   Noted 28 lb weight loss in 8 month    Suicidal ideations 2/14/24    Interventions:  Collaboration by nutrition professional with other providers   Commercial beverage - Boost Plus (protein and calories)     Nutrition Diagnosis Status:   New    Malnutrition Assessment  Weight Loss (Malnutrition):  (Noted 28 lb weight loss in 8 months (-17.5%))   Unable to assess at this time, RD providing remote assistance     Reason for Assessment  Reason For Assessment: identified at risk by screening criteria  Diagnosis: cardiac disease  General Information Comments: Pt is currently on a cardiac diet. Rigo-15/skin intact. No meal intake noted. Noted chronic anemia, on multivitamin. Presented with chest pain. Noted oral rehydration developed hypokalemia. Noted 28 lb weight loss in 8 months. Unable to conduct NFPE at this time, RD providing remote assistance.  Nutrition Discharge Planning: d/c on cardiac diet with Iron supplement    Nutrition Risk Screen  Nutrition Risk Screen: reduced oral intake over the last month    Nutrition/Diet History  Patient Reported Diet/Restrictions/Preferences: heart healthy  Food Preferences: NEELA  Spiritual, Cultural Beliefs, Denominational Practices, Values that Affect Care: no  Factors Affecting Nutritional Intake: None identified at this time    Nutrition Related Social Determinants of Health: SDOH: Unable to assess at  "this time.     Anthropometrics  Temp: 98.6 °F (37 °C)  Height Method: Stated  Height: 5' 6" (167.6 cm)  Height (inches): 66 in  Weight Method: Bed Scale  Weight: 60.3 kg (132 lb 15 oz)  Weight (lb): 132.94 lb  Ideal Body Weight (IBW), Male: 142 lb  % Ideal Body Weight, Male (lb): 93.62 %  BMI (Calculated): 21.5  BMI Grade: 18.5-24.9 - normal  Usual Body Weight (UBW), k.6 kg (24)  % Usual Body Weight: 83.23  % Weight Change From Usual Weight: -16.94 %     Lab/Procedures/Meds  Pertinent Labs Reviewed: reviewed  Pertinent Labs Comments: Na 132, K 2.8, CO2 21, Ca 8.5, Phos 2.5, Magnesium 1.5  Pertinent Medications Reviewed: reviewed  Pertinent Medications Comments: pedialyte, folic acid, magnesium sulfate, pantoprazole, multivitamin, thiamine    Estimated/Assessed Needs  Weight Used For Calorie Calculations: 60.3 kg (132 lb 15 oz)  Energy Calorie Requirements (kcal): 1809 kcals (30 kcals/kg)  Energy Need Method: Kcal/kg  Protein Requirements: 60g (1g/kg (impaired kideny fxn))  Weight Used For Protein Calculations: 60.3 kg (132 lb 15 oz)     Estimated Fluid Requirement Method: RDA Method  RDA Method (mL): 1809     Nutrition Prescription Ordered  Current Diet Order: Cardiac Diet    Evaluation of Received Nutrient/Fluid Intake  I/O: 1320/250  Energy Calories Required: not meeting needs  Protein Required: not meeting needs  Fluid Required: not meeting needs  Comments: LB-10/23/24  Tolerance: not tolerating  % Intake of Estimated Energy Needs: Other: No meal intake noted   % Meal Intake: Other: No meal intake noted     Nutrition Risk  Level of Risk/Frequency of Follow-up:  (2x/weekly)     Monitor and Evaluation  Food and Nutrient Intake: energy intake, food and beverage intake  Food and Nutrient Adminstration: diet order  Anthropometric Measurements: weight, weight change, body mass index  Biochemical Data, Medical Tests and Procedures: electrolyte and renal panel, gastrointestinal profile, inflammatory profile, " lipid profile     Nutrition Follow-Up  RD Follow-up?: Yes

## 2024-10-24 NOTE — PLAN OF CARE
Problem: Physical Therapy  Goal: Physical Therapy Goal  Description: Goals to be met by: 10/31/24     Patient will increase functional independence with mobility by performin. Pt to be (I) with bed mobility.  2. Pt to transfer with (S).  3. Pt to ambulate 50' with sw and (S).  4. Pt to be (I) with HEP.    Outcome: Progressing   Initial eval completed, see in chart for details.

## 2024-10-24 NOTE — ASSESSMENT & PLAN NOTE
Patient H/H stable and consistent with baseline. No evidence acute bleeding or indication for transfusion at this time.      Recent Labs     10/23/24  1129 10/24/24  0422   HGB 11.7* 9.7*   HCT 34.5* 29.8*       Plan  - Monitor serial CBC: Daily  - Transfuse PRBC if patient becomes hemodynamically unstable, symptomatic or H/H drops below 7/21.  - Patient has not received any PRBC transfusions to date  - Patient's anemia is currently stable

## 2024-10-24 NOTE — NURSING
Report received from night nurse EMELY Solomon. Visualized patient and assessed patient's overall condition and appearance. No acute distress noted. Plan of care ongoing.    [facility] [place] -- 4 Eyes      10/24/2024       Skin assessed on: Q Shift      [x] No Pressure Injuries Present    []Prevention Measures Documented    [] Yes LDA  for Pressure Injury Previously documented     [] Yes New Pressure Injury Discovered   [] LDA for New Pressure Injury Added      Attending RN:  Narcisa Gurrola LPN     Second RN:  Neha Correa RN

## 2024-10-24 NOTE — HOSPITAL COURSE
Mr. De Jesus was hospitalized with acute kidney injury.  Cr double his baseline.  Patient is in need of IV fluids for the treatment of JINA.  Due to a shortage of IV fluids, patient received oral rehydration.  Patient must receive this treatment in a hospital location due to the risk of adverse effects, insufficient response to treatment, or other potential complications of disease such as organ failure.  Renal function improved, but developed electrolyte abnormalities with oral rehydration including hypokalemia and hyponatremia.  Cautious replacement due to impaired renal function and continue tele monitoring with increased arrhythmia risk necessitating continued in hospital care.  Unable to walk 10/25/24, not at baseline mobility function.  Plan is for SNF.  More confused 10/26/24.  At presentation patient and son stated last EtOH drink 8 days prior, but concern for possible EtOH withdrawal.  Continue diazepam and monitoring.  Mental status and tremors improved.  SNF authorization still pending. Pt unable to get into SNF or get HH per insurance. OP PT/OT ordered. Pt DC home in stable condition.

## 2024-10-24 NOTE — NURSING
Bedside Report given to night nurse EMELY Solomon.  Walking rounds completed. Visualized and assessed patient NAD noted. Safety precautions maintained and call light within reach.     Chart check completed.

## 2024-10-24 NOTE — NURSING
PER handoff given by Narcisa COMER     Pt resting in bed quietly. NAD noted. Complaining of lower back pain 7/10. Informed Dr Floyd and ordered methocarbamol p.o., Condom Cath in placed. Tele monitoring ongoing.      Fall and safety precautions maintained. Bed alarm activated and audible.. Bed locked in lowest position, with side rails up x2. Call bell and personal items within reach    [facility]  Nurses Note -- 4 Eyes      10/23/2024       Skin assessed on: Q Shift      [x] No Pressure Injuries Present    []Prevention Measures Documented    [] Yes LDA  for Pressure Injury Previously documented     [] Yes New Pressure Injury Discovered   [] LDA for New Pressure Injury Added      Attending RN:  Neha Correa, EMELY     Second RN:  Narcisa Gurrola LPN

## 2024-10-24 NOTE — ASSESSMENT & PLAN NOTE
Patient has Abnormal Magnesium: hypomagnesemia. Will continue to monitor electrolytes closely. Will replace the affected electrolytes and repeat labs to be done after interventions completed. The patient's magnesium results have been reviewed and are listed below.  Recent Labs   Lab 10/24/24  0422   MG 1.5*
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
## 2024-10-24 NOTE — PLAN OF CARE
Problem: Infection  Goal: Absence of Infection Signs and Symptoms  Outcome: Progressing     Problem: Skin Injury Risk Increased  Goal: Skin Health and Integrity  Outcome: Progressing

## 2024-10-24 NOTE — SUBJECTIVE & OBJECTIVE
Interval History: still with intermittent chest pain    Review of Systems   Respiratory:  Negative for cough and shortness of breath.    Cardiovascular:  Positive for chest pain.   Neurological:  Positive for weakness.   All other systems reviewed and are negative.    Objective:     Vital Signs (Most Recent):  Temp: 98.6 °F (37 °C) (10/24/24 1127)  Pulse: 78 (10/24/24 1127)  Resp: 18 (10/24/24 1127)  BP: 121/67 (10/24/24 1127)  SpO2: 96 % (10/24/24 1127) Vital Signs (24h Range):  Temp:  [97.9 °F (36.6 °C)-98.8 °F (37.1 °C)] 98.6 °F (37 °C)  Pulse:  [] 78  Resp:  [17-20] 18  SpO2:  [96 %-99 %] 96 %  BP: ()/(55-83) 121/67     Weight: 60.3 kg (132 lb 15 oz)  Body mass index is 21.46 kg/m².    Intake/Output Summary (Last 24 hours) at 10/24/2024 1251  Last data filed at 10/24/2024 0642  Gross per 24 hour   Intake 1320 ml   Output 250 ml   Net 1070 ml         Physical Exam  Vitals and nursing note reviewed.   Constitutional:       General: He is not in acute distress.     Appearance: He is well-developed.   HENT:      Head: Normocephalic and atraumatic.      Right Ear: External ear normal.      Left Ear: External ear normal.      Nose: Nose normal.   Eyes:      Conjunctiva/sclera: Conjunctivae normal.   Cardiovascular:      Rate and Rhythm: Normal rate and regular rhythm.   Pulmonary:      Effort: Pulmonary effort is normal. No respiratory distress.      Breath sounds: Normal breath sounds. No wheezing or rales.   Chest:      Chest wall: Tenderness present.   Skin:     General: Skin is warm and dry.   Neurological:      Mental Status: He is alert and oriented to person, place, and time.   Psychiatric:         Thought Content: Thought content normal.             Significant Labs: All pertinent labs within the past 24 hours have been reviewed.    Significant Imaging: I have reviewed all pertinent imaging results/findings within the past 24 hours.

## 2024-10-24 NOTE — ASSESSMENT & PLAN NOTE
JINA is likely due to pre-renal azotemia due to intravascular volume depletion. Baseline creatinine is  0.8 . Most recent creatinine and eGFR are listed below.  Recent Labs     10/23/24  1129 10/24/24  0422   CREATININE 1.6* 1.3   EGFRNORACEVR 48* >60        Plan  - JINA is improving but not resolved. Patient is in need of IV fluids for the treatment of JINA and metabolic acidosis.  Due to a shortage of IV fluids, patient to receive oral rehydration.  Patient must receive this treatment in a hospital location due to the risk of adverse effects, insufficient response to treatment, or other potential complications of disease such as organ failure.   - Avoid nephrotoxins and renally dose meds for GFR listed above  - Monitor urine output, serial BMP, and adjust therapy as needed

## 2024-10-24 NOTE — PLAN OF CARE
Problem: Adult Inpatient Plan of Care  Goal: Plan of Care Review  Outcome: Progressing     Problem: Infection  Goal: Absence of Infection Signs and Symptoms  Outcome: Progressing     Problem: Acute Kidney Injury/Impairment  Goal: Fluid and Electrolyte Balance  Outcome: Progressing     Problem: Skin Injury Risk Increased  Goal: Skin Health and Integrity  Outcome: Progressing

## 2024-10-24 NOTE — PLAN OF CARE
Problem: Occupational Therapy  Goal: Occupational Therapy Goal  Description: Goals to be met by: 11/7/2024     Patient will increase functional independence with ADLs by performing:    UE Dressing with Modified [place].  LE Dressing with Modified [place].  Grooming while standing at sink with Modified [place] and Assistive Devices as needed.  Toileting from toilet with Modified [place] and Assistive Devices as needed for hygiene and clothing management.   Supine to sit with Modified [place].  Step transfer with Modified [place]  Toilet transfer to toilet with Modified [place].  Upper extremity exercise program x15 reps per handout, with independence.    Outcome: Progressing     The patient participated in OT with c/o weakness and unsteadiness. The patient required CGA for overall functional mobility.    The patient will benefit from LIT and caregiver assist/supervision for safety.

## 2024-10-24 NOTE — PLAN OF CARE
Recommendations  Recommendation:   1. Continue cardiac diet as tolerated   2. Continue multivitamin, folic acid, and magnesium sulfate supplements   3. Add Boost Plus BID to support weight gain   4. Encoruage PO intake   5. Monitor weight and labs    Goals: Pt will consume 50-75% of meals by RD follow up    Nutrition Goal Status: new  Communication of RD Recs:  (POC)

## 2024-10-24 NOTE — PROGRESS NOTES
[facility] Medicine  Progress Note    Patient Name: Israel De Jesus  MRN: [id number]  Patient Class: OP- Observation   Admission Date: 10/23/2024  Length of Stay: 0 days  Attending Physician: Mack Castellanos MD  Primary Care Provider: Nuha Lynn MD        Subjective:     Principal Problem:Acute pulmonary embolism without acute cor pulmonale        HPI:  64 y.o. male with recent acute PE on Eliquis, hypertension alcohol use disorder, and chronic anemia presents with complaint of chest pain.  Acute onset, duration 1 week, pleuritic in nature, associated with chronic cough, exacerbated with deep inspiration.  Reports he has missed some doses of Eliquis.  Denies fever, chills palpitations, syncope, nausea vomiting, diarrhea.  In the ED, vitals reassuring.  Labs with hyponatremia, metabolic acidosis, JINA, and minimal troponin elevation.  CT head and chest x-ray unremarkable.  Eliquis resumed.  Placed in observation.    Overview/Hospital Course:  Mr. De Jesus was hospitalized with acute kidney injury.  Cr double his baseline.  Patient is in need of IV fluids for the treatment of JINA.  Due to a shortage of IV fluids, patient to receive oral rehydration.  Patient must receive this treatment in a hospital location due to the risk of adverse effects, insufficient response to treatment, or other potential complications of disease such as organ failure.  Some slight improvement, but still not at baseline.  Also, with oral rehydration developed hypokalemia.  Cautious replacement due to impaired renal function and continue tele monitoring with increased arrhythmia risk necessitating continued in hospital care.    Interval History: still with intermittent chest pain    Review of Systems   Respiratory:  Negative for cough and shortness of breath.    Cardiovascular:  Positive for chest pain.   Neurological:  Positive for weakness.   All other systems reviewed and are negative.    Objective:     Vital  Signs (Most Recent):  Temp: 98.6 °F (37 °C) (10/24/24 1127)  Pulse: 78 (10/24/24 1127)  Resp: 18 (10/24/24 1127)  BP: 121/67 (10/24/24 1127)  SpO2: 96 % (10/24/24 1127) Vital Signs (24h Range):  Temp:  [97.9 °F (36.6 °C)-98.8 °F (37.1 °C)] 98.6 °F (37 °C)  Pulse:  [] 78  Resp:  [17-20] 18  SpO2:  [96 %-99 %] 96 %  BP: ()/(55-83) 121/67     Weight: 60.3 kg (132 lb 15 oz)  Body mass index is 21.46 kg/m².    Intake/Output Summary (Last 24 hours) at 10/24/2024 1251  Last data filed at 10/24/2024 0642  Gross per 24 hour   Intake 1320 ml   Output 250 ml   Net 1070 ml         Physical Exam  Vitals and nursing note reviewed.   Constitutional:       General: He is not in acute distress.     Appearance: He is well-developed.   HENT:      Head: Normocephalic and atraumatic.      Right Ear: External ear normal.      Left Ear: External ear normal.      Nose: Nose normal.   Eyes:      Conjunctiva/sclera: Conjunctivae normal.   Cardiovascular:      Rate and Rhythm: Normal rate and regular rhythm.   Pulmonary:      Effort: Pulmonary effort is normal. No respiratory distress.      Breath sounds: Normal breath sounds. No wheezing or rales.   Chest:      Chest wall: Tenderness present.   Skin:     General: Skin is warm and dry.   Neurological:      Mental Status: He is alert and oriented to person, place, and time.   Psychiatric:         Thought Content: Thought content normal.             Significant Labs: All pertinent labs within the past 24 hours have been reviewed.    Significant Imaging: I have reviewed all pertinent imaging results/findings within the past 24 hours.    Assessment/Plan:      * Acute pulmonary embolism without acute cor pulmonale  Stable on room air, resume apixaban    JINA (acute kidney injury)  JINA is likely due to pre-renal azotemia due to intravascular volume depletion. Baseline creatinine is  0.8 . Most recent creatinine and eGFR are listed below.  Recent Labs     10/23/24  1129 10/24/24  9664    CREATININE 1.6* 1.3   EGFRNORACEVR 48* >60        Plan  - JINA is improving but not resolved. Patient is in need of IV fluids for the treatment of JINA and metabolic acidosis.  Due to a shortage of IV fluids, patient to receive oral rehydration.  Patient must receive this treatment in a hospital location due to the risk of adverse effects, insufficient response to treatment, or other potential complications of disease such as organ failure.   - Avoid nephrotoxins and renally dose meds for GFR listed above  - Monitor urine output, serial BMP, and adjust therapy as needed  - no changes noted on tele, PRN EKG if needed    Metabolic acidosis  As above    Hypokalemia  Patient's most recent potassium results are listed below.   Recent Labs     10/23/24  1129 10/24/24  0422   K 3.8 2.8*     Plan  - Replete potassium per protocol  - Monitor potassium Daily  - Patient's hypokalemia is worsening. Will adjust treatment as follows: replace potassium cautiously due to impaired renal function    Hypomagnesemia  Patient has Abnormal Magnesium: hypomagnesemia. Will continue to monitor electrolytes closely. Will replace the affected electrolytes and repeat labs to be done after interventions completed. The patient's magnesium results have been reviewed and are listed below.  Recent Labs   Lab 10/24/24  0422   MG 1.5*        Macrocytic anemia  Patient H/H stable and consistent with baseline. No evidence acute bleeding or indication for transfusion at this time.      Recent Labs     10/23/24  1129 10/24/24  0422   HGB 11.7* 9.7*   HCT 34.5* 29.8*       Plan  - Monitor serial CBC: Daily  - Transfuse PRBC if patient becomes hemodynamically unstable, symptomatic or H/H drops below 7/21.  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  Continue home regimen of duloxetine    Hypertension  Patients blood pressure range in the last 24  hours was: BP  Min: 145/92  Max: 157/101.The patient's inpatient anti-hypertensive regimen is listed below:  Current Antihypertensives  Amlodipine 5 mg daily  Metoprolol 100 mg    Plan  - BP is controlled, no changes needed to their regimen      VTE Risk Mitigation (From admission, onward)           Ordered     apixaban tablet 5 mg  2 times daily         10/23/24 1427     IP VTE HIGH RISK PATIENT  Once         10/23/24 1427     Place sequential compression device  Until discontinued         10/23/24 1427     Reason for No Pharmacological VTE Prophylaxis  Once        Question:  Reasons:  Answer:  Already adequately anticoagulated on oral Anticoagulants    10/23/24 1427                    Discharge Planning   BRANDON:      Code Status: Full Code   Is the patient medically ready for discharge?:     Reason for patient still in hospital (select all that apply): Patient trending condition, Laboratory test, and Treatment  Discharge Plan A: Home      Min Marinelli Jr., APRN, AGACNP-BC  Hospitalist - Department of Hospital Medicine  [facility] [address] [place] [address]  Office #: [phone]; Pager #: [phone]

## 2024-10-24 NOTE — ASSESSMENT & PLAN NOTE
JINA is likely due to pre-renal azotemia due to intravascular volume depletion. Baseline creatinine is  0.8 . Most recent creatinine and eGFR are listed below.  Recent Labs     10/23/24  1129 10/24/24  0422   CREATININE 1.6* 1.3   EGFRNORACEVR 48* >60        Plan  - JINA is improving but not resolved. Patient is in need of IV fluids for the treatment of JINA and metabolic acidosis.  Due to a shortage of IV fluids, patient to receive oral rehydration.  Patient must receive this treatment in a hospital location due to the risk of adverse effects, insufficient response to treatment, or other potential complications of disease such as organ failure.   - Avoid nephrotoxins and renally dose meds for GFR listed above  - Monitor urine output, serial BMP, and adjust therapy as needed  - no changes noted on tele, PRN EKG if needed
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
## 2024-10-24 NOTE — NURSING
Patient complaining of lower back pain 7/10 and requesting pain medicine. MD informed. Ordered norco prn.

## 2024-10-24 NOTE — ASSESSMENT & PLAN NOTE
Patient's most recent potassium results are listed below.   Recent Labs     10/23/24  1129 10/24/24  0422   K 3.8 2.8*     Plan  - Replete potassium per protocol  - Monitor potassium Daily  - Patient's hypokalemia is worsening. Will adjust treatment as follows: replace potassium cautiously due to impaired renal function

## 2024-10-24 NOTE — PLAN OF CARE
Pt remained free of falls during current shift.  Pt c/o chest pain and back pain and did receive pain medications. IV magnesium was given per MD order. Plan of care and fall precautions reviewed with pt and verbalized understanding. Bed locked, lowered, SR up x2 and call light placed within reach.            Problem: Adult Inpatient Plan of Care  Goal: Plan of Care Review  Outcome: Progressing     Problem: Infection  Goal: Absence of Infection Signs and Symptoms  Outcome: Progressing     Problem: Acute Kidney Injury/Impairment  Goal: Fluid and Electrolyte Balance  Outcome: Progressing     Problem: Skin Injury Risk Increased  Goal: Skin Health and Integrity  Outcome: Progressing

## 2024-10-25 PROBLEM — E87.1 HYPONATREMIA: Status: ACTIVE | Noted: 2024-10-25

## 2024-10-25 LAB
ALBUMIN SERPL BCP-MCNC: 2.6 G/DL (ref 3.5–5.2)
ALP SERPL-CCNC: 93 U/L (ref 40–150)
ALT SERPL W/O P-5'-P-CCNC: 25 U/L (ref 10–44)
ANION GAP SERPL CALC-SCNC: 9 MMOL/L (ref 8–16)
AST SERPL-CCNC: 30 U/L (ref 10–40)
BASOPHILS # BLD AUTO: 0.01 K/UL (ref 0–0.2)
BASOPHILS NFR BLD: 0.2 % (ref 0–1.9)
BILIRUB SERPL-MCNC: 1 MG/DL (ref 0.1–1)
BUN SERPL-MCNC: 19 MG/DL (ref 8–23)
CALCIUM SERPL-MCNC: 8.9 MG/DL (ref 8.7–10.5)
CHLORIDE SERPL-SCNC: 94 MMOL/L (ref 95–110)
CO2 SERPL-SCNC: 27 MMOL/L (ref 23–29)
CREAT SERPL-MCNC: 1 MG/DL (ref 0.5–1.4)
DIFFERENTIAL METHOD BLD: ABNORMAL
EOSINOPHIL # BLD AUTO: 0 K/UL (ref 0–0.5)
EOSINOPHIL NFR BLD: 0.3 % (ref 0–8)
ERYTHROCYTE [DISTWIDTH] IN BLOOD BY AUTOMATED COUNT: 16.6 % (ref 11.5–14.5)
EST. GFR  (NO RACE VARIABLE): >60 ML/MIN/1.73 M^2
GLUCOSE SERPL-MCNC: 133 MG/DL (ref 70–110)
HCT VFR BLD AUTO: 30.7 % (ref 40–54)
HGB BLD-MCNC: 10.7 G/DL (ref 14–18)
IMM GRANULOCYTES # BLD AUTO: 0.06 K/UL (ref 0–0.04)
IMM GRANULOCYTES NFR BLD AUTO: 0.9 % (ref 0–0.5)
LYMPHOCYTES # BLD AUTO: 1 K/UL (ref 1–4.8)
LYMPHOCYTES NFR BLD: 15 % (ref 18–48)
MAGNESIUM SERPL-MCNC: 1.7 MG/DL (ref 1.6–2.6)
MCH RBC QN AUTO: 35 PG (ref 27–31)
MCHC RBC AUTO-ENTMCNC: 34.9 G/DL (ref 32–36)
MCV RBC AUTO: 100 FL (ref 82–98)
MONOCYTES # BLD AUTO: 0.8 K/UL (ref 0.3–1)
MONOCYTES NFR BLD: 13 % (ref 4–15)
NEUTROPHILS # BLD AUTO: 4.6 K/UL (ref 1.8–7.7)
NEUTROPHILS NFR BLD: 70.6 % (ref 38–73)
NRBC BLD-RTO: 0 /100 WBC
PHOSPHATE SERPL-MCNC: 1.6 MG/DL (ref 2.7–4.5)
PLATELET # BLD AUTO: 128 K/UL (ref 150–450)
PMV BLD AUTO: 10.5 FL (ref 9.2–12.9)
POTASSIUM SERPL-SCNC: 3.3 MMOL/L (ref 3.5–5.1)
PROT SERPL-MCNC: 5.6 G/DL (ref 6–8.4)
RBC # BLD AUTO: 3.06 M/UL (ref 4.6–6.2)
SODIUM SERPL-SCNC: 130 MMOL/L (ref 136–145)
WBC # BLD AUTO: 6.48 K/UL (ref 3.9–12.7)

## 2024-10-25 PROCEDURE — 80053 COMPREHEN METABOLIC PANEL: CPT | Performed by: HOSPITALIST

## 2024-10-25 PROCEDURE — 85025 COMPLETE CBC W/AUTO DIFF WBC: CPT | Performed by: HOSPITALIST

## 2024-10-25 PROCEDURE — 97530 THERAPEUTIC ACTIVITIES: CPT

## 2024-10-25 PROCEDURE — 97110 THERAPEUTIC EXERCISES: CPT | Mod: CQ

## 2024-10-25 PROCEDURE — 83735 ASSAY OF MAGNESIUM: CPT | Performed by: HOSPITALIST

## 2024-10-25 PROCEDURE — 97535 SELF CARE MNGMENT TRAINING: CPT

## 2024-10-25 PROCEDURE — 84100 ASSAY OF PHOSPHORUS: CPT | Performed by: HOSPITALIST

## 2024-10-25 PROCEDURE — 97530 THERAPEUTIC ACTIVITIES: CPT | Mod: CQ

## 2024-10-25 PROCEDURE — 63600175 PHARM REV CODE 636 W HCPCS: Performed by: HOSPITALIST

## 2024-10-25 PROCEDURE — 36415 COLL VENOUS BLD VENIPUNCTURE: CPT | Performed by: HOSPITALIST

## 2024-10-25 PROCEDURE — 25000003 PHARM REV CODE 250: Performed by: HOSPITALIST

## 2024-10-25 PROCEDURE — 25000003 PHARM REV CODE 250: Performed by: INTERNAL MEDICINE

## 2024-10-25 PROCEDURE — 99900035 HC TECH TIME PER 15 MIN (STAT)

## 2024-10-25 PROCEDURE — 11000001 HC ACUTE MED/SURG PRIVATE ROOM

## 2024-10-25 RX ORDER — DIAZEPAM 5 MG/1
5 TABLET ORAL EVERY 8 HOURS
Status: DISCONTINUED | OUTPATIENT
Start: 2024-10-25 | End: 2024-10-28

## 2024-10-25 RX ORDER — SODIUM CHLORIDE 1 G/1
1000 TABLET ORAL ONCE
Status: COMPLETED | OUTPATIENT
Start: 2024-10-25 | End: 2024-10-25

## 2024-10-25 RX ORDER — LORAZEPAM 2 MG/ML
2 INJECTION INTRAMUSCULAR ONCE
Status: COMPLETED | OUTPATIENT
Start: 2024-10-25 | End: 2024-10-25

## 2024-10-25 RX ADMIN — Medication 400 ML: at 03:10

## 2024-10-25 RX ADMIN — THERA TABS 1 TABLET: TAB at 08:10

## 2024-10-25 RX ADMIN — LORAZEPAM 2 MG: 2 INJECTION INTRAMUSCULAR; INTRAVENOUS at 01:10

## 2024-10-25 RX ADMIN — POLYETHYLENE GLYCOL 3350 17 G: 17 POWDER, FOR SOLUTION ORAL at 08:10

## 2024-10-25 RX ADMIN — METOPROLOL TARTRATE 100 MG: 50 TABLET, FILM COATED ORAL at 08:10

## 2024-10-25 RX ADMIN — PANTOPRAZOLE SODIUM 40 MG: 40 TABLET, DELAYED RELEASE ORAL at 08:10

## 2024-10-25 RX ADMIN — APIXABAN 5 MG: 5 TABLET, FILM COATED ORAL at 09:10

## 2024-10-25 RX ADMIN — DIAZEPAM 5 MG: 5 TABLET ORAL at 09:10

## 2024-10-25 RX ADMIN — APIXABAN 5 MG: 5 TABLET, FILM COATED ORAL at 08:10

## 2024-10-25 RX ADMIN — METOPROLOL TARTRATE 100 MG: 50 TABLET, FILM COATED ORAL at 09:10

## 2024-10-25 RX ADMIN — POTASSIUM BICARBONATE 50 MEQ: 977.5 TABLET, EFFERVESCENT ORAL at 08:10

## 2024-10-25 RX ADMIN — FOLIC ACID 1 MG: 1 TABLET ORAL at 08:10

## 2024-10-25 RX ADMIN — Medication 1000 MG: at 02:10

## 2024-10-25 RX ADMIN — AMLODIPINE BESYLATE 5 MG: 5 TABLET ORAL at 08:10

## 2024-10-25 RX ADMIN — DULOXETINE HYDROCHLORIDE 60 MG: 30 CAPSULE, DELAYED RELEASE ORAL at 08:10

## 2024-10-25 RX ADMIN — THIAMINE HCL TAB 100 MG 100 MG: 100 TAB at 08:10

## 2024-10-25 NOTE — NURSING
[facility]  Nurses Note -- 4 Eyes      10/25/2024       Skin assessed on: Q Shift      [x] No Pressure Injuries Present    []Prevention Measures Documented    [] Yes LDA  for Pressure Injury Previously documented     [] Yes New Pressure Injury Discovered   [] LDA for New Pressure Injury Added      Attending RN:  Liliana Villanueva LPN     Second RN:  Neha Correa RN

## 2024-10-25 NOTE — PT/OT/SLP PROGRESS
Occupational Therapy   Treatment    Name: Israel De Jesus  MRN: [id number]  Admitting Diagnosis:  Acute pulmonary embolism without acute cor pulmonale       Recommendations:     Discharge Recommendations: Moderate Intensity Therapy  Discharge Equipment Recommendations:  to be determined by next level of care  Barriers to discharge:   (unsteady with poor safety awareness, fall risk)    Assessment:     Israel De Jesus is a 64 y.o. male with a medical diagnosis of Acute pulmonary embolism without acute cor pulmonale. Performance deficits affecting function are weakness, impaired endurance, impaired self care skills, impaired functional mobility, gait instability, impaired balance, decreased coordination, decreased upper extremity function, decreased lower extremity function, decreased safety awareness, pain, impaired fine motor.   The patient participate in OT with SBA/CGA need for bed mobility and CGA to stand and take 2 side steps along the EOB. The patient was unable to amb away from the bed or transfer to the chair 2* unsteadiness and patient sitting without warning while side stepping. The patient had difficulty asking questions and remaining focused during OT. The patient lives alone and is a fall risk if D/C to home. The patient will benefit from [facility].    Rehab Prognosis:  Good and Fair; patient would benefit from acute skilled OT services to address these deficits and reach maximum level of function.       Plan:     Patient to be seen  (3-5x/week) to address the above listed problems via self-care/home management, therapeutic activities, therapeutic exercises  Plan of Care Expires: 11/07/24  Plan of Care Reviewed with: patient    Subjective     Chief Complaint: left chest pain and wants his heating pad from home  Patient/Family Comments/goals: agreeable to OT  Pain/Comfort:  Pain Rating 1:  (yes-did not rate)  Location 1: chest  Pain Addressed 1: Distraction, Cessation of Activity    Objective:      Communicated with: Liliana foreman prior to session.  Patient found HOB elevated with bed alarm, telemetry, Condom Catheter upon OT entry to room.    General Precautions: Standard, fall    Orthopedic Precautions:N/A  Braces: N/A  Respiratory Status: Room air     Occupational Performance:     Bed Mobility:    Patient completed Rolling/Turning to Right with stand by assistance  Patient completed Scooting/Bridging with stand by assistance  Patient completed Supine to Sit with stand by assistance with HOB elevated and increased time and VC to perform  Patient completed Sit to Supine with stand by assistance     Functional Mobility/Transfers:  Patient completed Sit <> Stand Transfer with stand by assistance and contact guard assistance  with  rolling walker   Functional Mobility: The patient stood x2 trials using a RW and required VC for hand placement on the RW and transfer surface. The patient took 2 side steps on 2nd trial and sat suddenly on the EOB with c/o weakness. The patient was shaky and unsteady while standing.     Activities of Daily Living:  Upper Body Dressing: moderate assistance to don back gown  Toileting: Condom Cath in use        [facility] 6 Click ADL: 17    Treatment & Education:  Performed self care and functional mobility as noted above  Discussed D/C plans/recommendations with Min Paz NP present    Patient left HOB elevated with all lines intact, call button in reach, bed alarm on, nurse notified, and ALIE Copeland present    GOALS:   Multidisciplinary Problems       Occupational Therapy Goals          Problem: Occupational Therapy    Goal Priority Disciplines Outcome Interventions   Occupational Therapy Goal     OT, PT/OT Progressing    Description: Goals to be met by: 11/7/2024     Patient will increase functional independence with ADLs by performing:    UE Dressing with Modified [place].  LE Dressing with Modified [place].  Grooming while standing at sink with Modified [place] and  Assistive Devices as needed.  Toileting from toilet with Modified [place] and Assistive Devices as needed for hygiene and clothing management.   Supine to sit with Modified [place].  Step transfer with Modified [place]  Toilet transfer to toilet with Modified [place].  Upper extremity exercise program x15 reps per handout, with independence.                         Time Tracking:     OT Date of Treatment: 10/25/24  OT Start Time: 0931  OT Stop Time: 0958  OT Total Time (min): 27 min    Billable Minutes:Self Care/Home Management 10  Therapeutic Activity 17  Total Time 27    OT/SONA: OT          10/25/2024

## 2024-10-25 NOTE — PLAN OF CARE
Problem: Acute Kidney Injury/Impairment  Goal: Fluid and Electrolyte Balance  Outcome: Progressing     Problem: Skin Injury Risk Increased  Goal: Skin Health and Integrity  Outcome: Progressing     Problem: Fall Injury Risk  Goal: Absence of Fall and Fall-Related Injury  Outcome: Progressing

## 2024-10-25 NOTE — PLAN OF CARE
PT/OT recommended SNF for patient as next level of care. FLORESITA met with patient at his bedside to discuss discharge planning. SW informed patient that SNF is commended. Patient agreed with the recommendation.    I provided the patient a choice of post acute providers and offered a list of CMS rated SNF providers. Patient has declined to select a preferred provider and elects placement with the first accepting in network provider that is available to provide services as ordered by the physician.     SNF referral sent via [facility].    Addendum    Pasrr completed.  Locet called in to \[facility] [place] [facility]\"" @ [phone].  PASSR faxed to [facility] at:  [phone].   Awaiting 142.      Addendum    FLORESITA Met with patient at his bedside to inform him that [facility] and [facility] are willing to accept him. Patient stated that to contact his daughter Toya so that she can make a choice.    SW contacted patient's daughter Toya to inform her of the providers willing to accept patient. Toya stated that she prefers [facility].    FLORESITA contacted [facility] to inform them that they are the provider of patient's choice and that they can submit for Auth. [facility] will submit for auth today.

## 2024-10-25 NOTE — NURSING
PER handoff given by Narcisa COMER      Pt resting in bed quietly. NAD noted. No c/o pain.      Fall and safety precautions maintained. Bed alarm activated and audible.. Bed locked in lowest position, with side rails up x2. Call bell and personal items within reach    [facility]  Nurses Note -- 4 Eyes      10/24/2024       Skin assessed on: Q Shift      [x] No Pressure Injuries Present    []Prevention Measures Documented    [] Yes LDA  for Pressure Injury Previously documented     [] Yes New Pressure Injury Discovered   [] LDA for New Pressure Injury Added      Attending RN:  Neha Correa, RN     Second RN:  Narcisa Gurrola LPN

## 2024-10-25 NOTE — PLAN OF CARE
Problem: Adult Inpatient Plan of Care  Goal: Optimal Comfort and Wellbeing  Outcome: Progressing     Problem: Infection  Goal: Absence of Infection Signs and Symptoms  Outcome: Progressing     Problem: Fall Injury Risk  Goal: Absence of Fall and Fall-Related Injury  Outcome: Progressing

## 2024-10-25 NOTE — ASSESSMENT & PLAN NOTE
Patient's most recent potassium results are listed below.   Recent Labs     10/23/24  1129 10/24/24  0422 10/25/24  0439   K 3.8 2.8* 3.3*       Plan  - Replete potassium per protocol  - Monitor potassium Daily  - Patient's hypokalemia is worsening. Will adjust treatment as follows: replace potassium cautiously due to impaired renal function

## 2024-10-25 NOTE — SUBJECTIVE & OBJECTIVE
Interval History: soreness of chest, left shoulder and back worse with palpation and movement s/p fall at home    Review of Systems   Cardiovascular:  Positive for chest pain.   Musculoskeletal:  Positive for arthralgias, back pain and gait problem.   All other systems reviewed and are negative.    Objective:     Vital Signs (Most Recent):  Temp: 97.6 °F (36.4 °C) (10/25/24 1124)  Pulse: 86 (10/25/24 1143)  Resp: 18 (10/25/24 1124)  BP: 112/73 (10/25/24 1124)  SpO2: 96 % (10/25/24 1124) Vital Signs (24h Range):  Temp:  [97.4 °F (36.3 °C)-98.5 °F (36.9 °C)] 97.6 °F (36.4 °C)  Pulse:  [73-97] 86  Resp:  [18-20] 18  SpO2:  [94 %-97 %] 96 %  BP: ()/(59-89) 112/73     Weight: 60.3 kg (132 lb 15 oz)  Body mass index is 21.46 kg/m².    Intake/Output Summary (Last 24 hours) at 10/25/2024 1318  Last data filed at 10/25/2024 1004  Gross per 24 hour   Intake 920 ml   Output 950 ml   Net -30 ml         Physical Exam  Vitals and nursing note reviewed.   Constitutional:       General: He is not in acute distress.     Appearance: He is well-developed.   HENT:      Head: Normocephalic and atraumatic.      Right Ear: External ear normal.      Left Ear: External ear normal.      Nose: Nose normal.   Eyes:      Conjunctiva/sclera: Conjunctivae normal.   Cardiovascular:      Rate and Rhythm: Normal rate and regular rhythm.   Pulmonary:      Effort: Pulmonary effort is normal. No respiratory distress.   Skin:     General: Skin is warm and dry.   Neurological:      Mental Status: He is alert and oriented to person, place, and time.      Motor: Tremor present.      Gait: Gait abnormal.   Psychiatric:         Thought Content: Thought content normal.             Significant Labs: All pertinent labs within the past 24 hours have been reviewed.    Significant Imaging: I have reviewed all pertinent imaging results/findings within the past 24 hours.

## 2024-10-25 NOTE — ASSESSMENT & PLAN NOTE
Hyponatremia is likely due to renal insufficiency. The patient's most recent sodium results are listed below.  Recent Labs     10/23/24  1129 10/24/24  0422 10/25/24  0439   * 132* 130*     Plan  - Correct the sodium by 4-6mEq in 24 hours.   - Obtain the following studies: Urine sodium, urine osmolality, serum osmolality, AM cortisol, or TSH, T4.  - Will treat the hyponatremia with Fluid restriction of:  1.5 liter per day and 1000 mg oral sodium x1  - Monitor sodium Daily.   - Patient hyponatremia is worsening. Will adjust treatment as follows: fluid restriction

## 2024-10-25 NOTE — PROGRESS NOTES
[facility] Medicine  Progress Note    Patient Name: Israel De Jesus  MRN: [id number]  Patient Class: IP- Inpatient   Admission Date: 10/23/2024  Length of Stay: 1 days  Attending Physician: Mack Castellanos MD  Primary Care Provider: Nuha Lynn MD        Subjective:     Principal Problem:Acute pulmonary embolism without acute cor pulmonale        HPI:  64 y.o. male with recent acute PE on Eliquis, hypertension alcohol use disorder, and chronic anemia presents with complaint of chest pain.  Acute onset, duration 1 week, pleuritic in nature, associated with chronic cough, exacerbated with deep inspiration.  Reports he has missed some doses of Eliquis.  Denies fever, chills palpitations, syncope, nausea vomiting, diarrhea.  In the ED, vitals reassuring.  Labs with hyponatremia, metabolic acidosis, JINA, and minimal troponin elevation.  CT head and chest x-ray unremarkable.  Eliquis resumed.  Placed in observation.    Overview/Hospital Course:  Mr. De Jesus was hospitalized with acute kidney injury.  Cr double his baseline.  Patient is in need of IV fluids for the treatment of JINA.  Due to a shortage of IV fluids, patient received oral rehydration.  Patient must receive this treatment in a hospital location due to the risk of adverse effects, insufficient response to treatment, or other potential complications of disease such as organ failure.  Renal function improved, but developed electrolyte abnormalities with oral rehydration including hypokalemia and hyponatremia.  Cautious replacement due to impaired renal function and continue tele monitoring with increased arrhythmia risk necessitating continued in hospital care.  Unable to walk today, not at baseline mobility function.  Plan is for SNF.    Interval History: soreness of chest, left shoulder and back worse with palpation and movement s/p fall at home    Review of Systems   Cardiovascular:  Positive for chest pain.   Musculoskeletal:   Positive for arthralgias, back pain and gait problem.   All other systems reviewed and are negative.    Objective:     Vital Signs (Most Recent):  Temp: 97.6 °F (36.4 °C) (10/25/24 1124)  Pulse: 86 (10/25/24 1143)  Resp: 18 (10/25/24 1124)  BP: 112/73 (10/25/24 1124)  SpO2: 96 % (10/25/24 1124) Vital Signs (24h Range):  Temp:  [97.4 °F (36.3 °C)-98.5 °F (36.9 °C)] 97.6 °F (36.4 °C)  Pulse:  [73-97] 86  Resp:  [18-20] 18  SpO2:  [94 %-97 %] 96 %  BP: ()/(59-89) 112/73     Weight: 60.3 kg (132 lb 15 oz)  Body mass index is 21.46 kg/m².    Intake/Output Summary (Last 24 hours) at 10/25/2024 1318  Last data filed at 10/25/2024 1004  Gross per 24 hour   Intake 920 ml   Output 950 ml   Net -30 ml         Physical Exam  Vitals and nursing note reviewed.   Constitutional:       General: He is not in acute distress.     Appearance: He is well-developed.   HENT:      Head: Normocephalic and atraumatic.      Right Ear: External ear normal.      Left Ear: External ear normal.      Nose: Nose normal.   Eyes:      Conjunctiva/sclera: Conjunctivae normal.   Cardiovascular:      Rate and Rhythm: Normal rate and regular rhythm.   Pulmonary:      Effort: Pulmonary effort is normal. No respiratory distress.   Skin:     General: Skin is warm and dry.   Neurological:      Mental Status: He is alert and oriented to person, place, and time.      Motor: Tremor present.      Gait: Gait abnormal.   Psychiatric:         Thought Content: Thought content normal.             Significant Labs: All pertinent labs within the past 24 hours have been reviewed.    Significant Imaging: I have reviewed all pertinent imaging results/findings within the past 24 hours.    Assessment/Plan:      * Acute pulmonary embolism without acute cor pulmonale  Stable on room air, resume apixaban    JINA (acute kidney injury)  JINA is likely due to pre-renal azotemia due to intravascular volume depletion. Baseline creatinine is  0.8 . Most recent creatinine and  eGFR are listed below.  Recent Labs     10/23/24  1129 10/24/24  0422   CREATININE 1.6* 1.3   EGFRNORACEVR 48* >60        Plan  - JINA is improving but not resolved. Patient is in need of IV fluids for the treatment of JINA and metabolic acidosis.  Due to a shortage of IV fluids, patient to receive oral rehydration.  Patient must receive this treatment in a hospital location due to the risk of adverse effects, insufficient response to treatment, or other potential complications of disease such as organ failure.   - Avoid nephrotoxins and renally dose meds for GFR listed above  - Monitor urine output, serial BMP, and adjust therapy as needed  - no changes noted on tele, PRN EKG if needed    Metabolic acidosis  Resolved    Hyponatremia  Hyponatremia is likely due to renal insufficiency. The patient's most recent sodium results are listed below.  Recent Labs     10/23/24  1129 10/24/24  0422 10/25/24  0439   * 132* 130*     Plan  - Correct the sodium by 4-6mEq in 24 hours.   - Obtain the following studies: Urine sodium, urine osmolality, serum osmolality, AM cortisol, or TSH, T4.  - Will treat the hyponatremia with Fluid restriction of:  1.5 liter per day and 1000 mg oral sodium x1  - Monitor sodium Daily.   - Patient hyponatremia is worsening. Will adjust treatment as follows: fluid restriction    Hypokalemia  Patient's most recent potassium results are listed below.   Recent Labs     10/23/24  1129 10/24/24  0422 10/25/24  0439   K 3.8 2.8* 3.3*       Plan  - Replete potassium per protocol  - Monitor potassium Daily  - Patient's hypokalemia is worsening. Will adjust treatment as follows: replace potassium cautiously due to impaired renal function    Hypomagnesemia  Patient has Abnormal Magnesium: hypomagnesemia. Will continue to monitor electrolytes closely. Will replace the affected electrolytes and repeat labs to be done after interventions completed. The patient's magnesium results have been reviewed and are  listed below.  Recent Labs   Lab 10/24/24  0422   MG 1.5*        Macrocytic anemia  Patient H/H stable and consistent with baseline. No evidence acute bleeding or indication for transfusion at this time.      Recent Labs     10/23/24  1129 10/24/24  0422 10/25/24  0439   HGB 11.7* 9.7* 10.7*   HCT 34.5* 29.8* 30.7*       Plan  - Monitor serial CBC: Daily  - Transfuse PRBC if patient becomes hemodynamically unstable, symptomatic or H/H drops below 7/21.  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  Continue home regimen of duloxetine    Hypertension  Patients blood pressure range in the last 24 hours was: BP  Min: 145/92  Max: 157/101.The patient's inpatient anti-hypertensive regimen is listed below:  Current Antihypertensives  Amlodipine 5 mg daily  Metoprolol 100 mg    Plan  - BP is controlled, no changes needed to their regimen      VTE Risk Mitigation (From admission, onward)           Ordered     apixaban tablet 5 mg  2 times daily         10/23/24 1427     IP VTE HIGH RISK PATIENT  Once         10/23/24 1427     Place sequential compression device  Until discontinued         10/23/24 1427     Reason for No Pharmacological VTE Prophylaxis  Once        Question:  Reasons:  Answer:  Already adequately anticoagulated on oral Anticoagulants    10/23/24 1427                    Discharge Planning   BRANDON:      Code Status: Full Code   Is the patient medically ready for discharge?:     Reason for patient still in hospital (select all that apply): Patient trending condition, Laboratory test, Treatment, and PT / OT recommendations  Discharge Plan A: Home      Min Marinelli Jr., APRN, AGACNP-BC  Hospitalist - Department of Hospital Medicine  [facility] [address] [place] [address]  Office #: [phone]; Pager #: [phone]

## 2024-10-25 NOTE — PT/OT/SLP PROGRESS
Physical Therapy Treatment    Patient Name:  Israel De Jesus   MRN:  [id number]    Recommendations:     Discharge Recommendations: Moderate Intensity Therapy  Discharge Equipment Recommendations: none  Barriers to discharge:  fall risks if DC home at this time     Assessment:     Israel De Jesus is a 64 y.o. male admitted with a medical diagnosis of Acute pulmonary embolism without acute cor pulmonale.  He presents with the following impairments/functional limitations: weakness, impaired endurance, impaired functional mobility, impaired self care skills, gait instability, decreased lower extremity function, decreased upper extremity function, impaired balance, decreased safety awareness, decreased coordination, pain, decreased ROM, impaired cognition .  Performed modified 5 sit to stand test from bed with  RW / CGA  , pt completed test in 43.94  seconds( normal result for his age group is 13 s ), that indicates pt with high fall risks.     Rehab Prognosis: Good; patient would benefit from acute skilled PT services to address these deficits and reach maximum level of function.    Recent Surgery: * No surgery found *      Plan:     During this hospitalization, patient to be seen 3 x/week to address the identified rehab impairments via gait training, therapeutic activities, therapeutic exercises and progress toward the following goals:    Plan of Care Expires:  10/31/24    Subjective     Chief Complaint: weakness   Patient/Family Comments/goals: : pt is agreeable to therapy  Pain/Comfort:  Pain Rating 1: 8/10  Location 1: chest  Pain Addressed 1: Cessation of Activity, Reposition, Nurse notified  Pain Rating Post-Intervention 1: 8/10      Objective:     Communicated with nurse  prior to session.  Patient found HOB elevated with telemetry, peripheral IV, bed alarm, Condom Catheter upon PT entry to room.     General Precautions: Standard, fall  Orthopedic Precautions: N/A  Braces: N/A  Respiratory Status: Room air    SpO2 : 94% - 98% on RA ,HR stable   Functional Mobility:  Bed Mobility:     Rolling Left:  contact guard assistance  Scooting: contact guard assistance  Supine to Sit: minimum assistance   Sit to Supine: contact guard assistance  Transfers:  V/T cues for safety, proper technique and walker management.    Sit <> Stand: x 5 reps and 3 reps   contact guard assistance with rolling walker  Gait: Patient ambulated ~ 6 side steps  on level tile with Rolling Walker with Minimal  Assistance  . Noted with increased body tremors/ shakiness . Limited with further gait training 2* to c/o fatigue, weakness and dizziness.   Pt with demonstarting a  3-point gait with decreased bo, increased time in double stance, decreased velocity of limb motion, decreased step length, decreased swing-to-stance ratio, decreased toe-to-floor clearance and decreased weight-shifting ability.Impairments contributing to gait deviations include impaired balance, decreased flexibility, pain, impaired postural control, decreased ROM and decreased strength. V/T cues for safety technique and walker management.    Balance: good in sitting, fair - in standing        AM-PAC 6 CLICK MOBILITY  Turning over in bed (including adjusting bedclothes, sheets and blankets)?: 4  Sitting down on and standing up from a chair with arms (e.g., wheelchair, bedside commode, etc.): 3  Moving from lying on back to sitting on the side of the bed?: 3  Moving to and from a bed to a chair (including a wheelchair)?: 3  Need to walk in hospital room?: 2  Climbing 3-5 steps with a railing?: 2  Basic Mobility Total Score: 17       Treatment & Education:  Lower Extremity Exercises.    Patient educated on: Purpose and Benefits of Therapeutic Exercise(s).    Patient verbalized acceptance/understanding of instruction(s), expectation(s), and limitation(s) (for safety).  Patient performed: 2 sets of 10 reps (each) of B LE Ther Ex: AP, LAQ, Hip abd/add, Hip flexion while sitting up on  EOB.       Patient required  verbal cues/tactile cues to ensure correct sequence, to maintain proper form, and to allow for self-correction.  Pt reported no complaints or problems with exercise activity.     Patient required increase Reaction Time to initiate movements and a Sitting Rest Break between set(s) to recover.      Patient left with bed in chair position with heels offloading  all lines intact, call button in reach, bed alarm on, and nurse  notified..    GOALS:   Multidisciplinary Problems       Physical Therapy Goals          Problem: Physical Therapy    Goal Priority Disciplines Outcome Interventions   Physical Therapy Goal     PT, PT/OT Progressing    Description: Goals to be met by: 10/31/24     Patient will increase functional independence with mobility by performin. Pt to be (I) with bed mobility.  2. Pt to transfer with (S).  3. Pt to ambulate 50' with sw and (S).  4. Pt to be (I) with HEP.                         Time Tracking:     PT Received On: 10/25/24  PT Start Time: 1130     PT Stop Time: 1200  PT Total Time (min): 30 min     Billable Minutes: Therapeutic Activity 15 and Therapeutic Exercise 15    Treatment Type: Treatment  PT/PTA: PTA     Number of PTA visits since last PT visit: 1     10/25/2024

## 2024-10-25 NOTE — ASSESSMENT & PLAN NOTE
Patient H/H stable and consistent with baseline. No evidence acute bleeding or indication for transfusion at this time.      Recent Labs     10/23/24  1129 10/24/24  0422 10/25/24  0439   HGB 11.7* 9.7* 10.7*   HCT 34.5* 29.8* 30.7*       Plan  - Monitor serial CBC: Daily  - Transfuse PRBC if patient becomes hemodynamically unstable, symptomatic or H/H drops below 7/21.  - Patient has not received any PRBC transfusions to date  - Patient's anemia is currently stable

## 2024-10-26 PROBLEM — E87.20 METABOLIC ACIDOSIS: Status: RESOLVED | Noted: 2024-10-23 | Resolved: 2024-10-26

## 2024-10-26 PROBLEM — F10.931 ALCOHOL WITHDRAWAL SYNDROME, WITH DELIRIUM: Status: ACTIVE | Noted: 2024-02-14

## 2024-10-26 LAB
ALBUMIN SERPL BCP-MCNC: 2.8 G/DL (ref 3.5–5.2)
ALP SERPL-CCNC: 93 U/L (ref 40–150)
ALT SERPL W/O P-5'-P-CCNC: 32 U/L (ref 10–44)
ANION GAP SERPL CALC-SCNC: 8 MMOL/L (ref 8–16)
AST SERPL-CCNC: 39 U/L (ref 10–40)
BASOPHILS # BLD AUTO: 0.03 K/UL (ref 0–0.2)
BASOPHILS NFR BLD: 0.5 % (ref 0–1.9)
BILIRUB SERPL-MCNC: 1 MG/DL (ref 0.1–1)
BUN SERPL-MCNC: 14 MG/DL (ref 8–23)
CALCIUM SERPL-MCNC: 9.5 MG/DL (ref 8.7–10.5)
CHLORIDE SERPL-SCNC: 97 MMOL/L (ref 95–110)
CO2 SERPL-SCNC: 29 MMOL/L (ref 23–29)
CREAT SERPL-MCNC: 0.9 MG/DL (ref 0.5–1.4)
DIFFERENTIAL METHOD BLD: ABNORMAL
EOSINOPHIL # BLD AUTO: 0 K/UL (ref 0–0.5)
EOSINOPHIL NFR BLD: 0.3 % (ref 0–8)
ERYTHROCYTE [DISTWIDTH] IN BLOOD BY AUTOMATED COUNT: 16.2 % (ref 11.5–14.5)
EST. GFR  (NO RACE VARIABLE): >60 ML/MIN/1.73 M^2
GLUCOSE SERPL-MCNC: 92 MG/DL (ref 70–110)
HCT VFR BLD AUTO: 35.3 % (ref 40–54)
HGB BLD-MCNC: 11.6 G/DL (ref 14–18)
IMM GRANULOCYTES # BLD AUTO: 0.05 K/UL (ref 0–0.04)
IMM GRANULOCYTES NFR BLD AUTO: 0.8 % (ref 0–0.5)
LYMPHOCYTES # BLD AUTO: 1.2 K/UL (ref 1–4.8)
LYMPHOCYTES NFR BLD: 19.4 % (ref 18–48)
MAGNESIUM SERPL-MCNC: 1.7 MG/DL (ref 1.6–2.6)
MCH RBC QN AUTO: 33.9 PG (ref 27–31)
MCHC RBC AUTO-ENTMCNC: 32.9 G/DL (ref 32–36)
MCV RBC AUTO: 103 FL (ref 82–98)
MONOCYTES # BLD AUTO: 1 K/UL (ref 0.3–1)
MONOCYTES NFR BLD: 16.9 % (ref 4–15)
NEUTROPHILS # BLD AUTO: 3.7 K/UL (ref 1.8–7.7)
NEUTROPHILS NFR BLD: 62.1 % (ref 38–73)
NRBC BLD-RTO: 0 /100 WBC
PHOSPHATE SERPL-MCNC: 1.9 MG/DL (ref 2.7–4.5)
PLATELET # BLD AUTO: 183 K/UL (ref 150–450)
PLATELET BLD QL SMEAR: ABNORMAL
PMV BLD AUTO: 10.5 FL (ref 9.2–12.9)
POTASSIUM SERPL-SCNC: 4.5 MMOL/L (ref 3.5–5.1)
PROT SERPL-MCNC: 6.2 G/DL (ref 6–8.4)
RBC # BLD AUTO: 3.42 M/UL (ref 4.6–6.2)
SODIUM SERPL-SCNC: 134 MMOL/L (ref 136–145)
WBC # BLD AUTO: 6.03 K/UL (ref 3.9–12.7)

## 2024-10-26 PROCEDURE — 83735 ASSAY OF MAGNESIUM: CPT | Performed by: HOSPITALIST

## 2024-10-26 PROCEDURE — 85025 COMPLETE CBC W/AUTO DIFF WBC: CPT | Performed by: HOSPITALIST

## 2024-10-26 PROCEDURE — 25000003 PHARM REV CODE 250: Performed by: INTERNAL MEDICINE

## 2024-10-26 PROCEDURE — 84100 ASSAY OF PHOSPHORUS: CPT | Performed by: HOSPITALIST

## 2024-10-26 PROCEDURE — 80053 COMPREHEN METABOLIC PANEL: CPT | Performed by: HOSPITALIST

## 2024-10-26 PROCEDURE — 99900035 HC TECH TIME PER 15 MIN (STAT)

## 2024-10-26 PROCEDURE — 25000003 PHARM REV CODE 250: Performed by: HOSPITALIST

## 2024-10-26 PROCEDURE — 11000001 HC ACUTE MED/SURG PRIVATE ROOM

## 2024-10-26 PROCEDURE — 94761 N-INVAS EAR/PLS OXIMETRY MLT: CPT

## 2024-10-26 PROCEDURE — 36415 COLL VENOUS BLD VENIPUNCTURE: CPT | Performed by: HOSPITALIST

## 2024-10-26 RX ADMIN — APIXABAN 5 MG: 5 TABLET, FILM COATED ORAL at 08:10

## 2024-10-26 RX ADMIN — THIAMINE HCL TAB 100 MG 100 MG: 100 TAB at 09:10

## 2024-10-26 RX ADMIN — APIXABAN 5 MG: 5 TABLET, FILM COATED ORAL at 09:10

## 2024-10-26 RX ADMIN — DULOXETINE HYDROCHLORIDE 60 MG: 30 CAPSULE, DELAYED RELEASE ORAL at 09:10

## 2024-10-26 RX ADMIN — METOPROLOL TARTRATE 100 MG: 50 TABLET, FILM COATED ORAL at 09:10

## 2024-10-26 RX ADMIN — PANTOPRAZOLE SODIUM 40 MG: 40 TABLET, DELAYED RELEASE ORAL at 09:10

## 2024-10-26 RX ADMIN — DIAZEPAM 5 MG: 5 TABLET ORAL at 05:10

## 2024-10-26 RX ADMIN — FOLIC ACID 1 MG: 1 TABLET ORAL at 09:10

## 2024-10-26 RX ADMIN — DIAZEPAM 5 MG: 5 TABLET ORAL at 09:10

## 2024-10-26 RX ADMIN — METOPROLOL TARTRATE 100 MG: 50 TABLET, FILM COATED ORAL at 08:10

## 2024-10-26 RX ADMIN — AMLODIPINE BESYLATE 5 MG: 5 TABLET ORAL at 09:10

## 2024-10-26 RX ADMIN — THERA TABS 1 TABLET: TAB at 09:10

## 2024-10-26 RX ADMIN — DIAZEPAM 5 MG: 5 TABLET ORAL at 01:10

## 2024-10-26 NOTE — PLAN OF CARE
Problem: Adult Inpatient Plan of Care  Goal: Plan of Care Review  Outcome: Progressing  Goal: Patient-Specific Goal (Individualized)  Outcome: Progressing  Goal: Absence of Hospital-Acquired Illness or Injury  Outcome: Progressing  Goal: Optimal Comfort and Wellbeing  Outcome: Progressing  Goal: Readiness for Transition of Care  Outcome: Progressing     Problem: Infection  Goal: Absence of Infection Signs and Symptoms  Outcome: Progressing     Problem: Acute Kidney Injury/Impairment  Goal: Fluid and Electrolyte Balance  Outcome: Progressing  Goal: Improved Oral Intake  Outcome: Progressing  Goal: Effective Renal Function  Outcome: Progressing     Problem: Skin Injury Risk Increased  Goal: Skin Health and Integrity  Outcome: Progressing     Problem: Fall Injury Risk  Goal: Absence of Fall and Fall-Related Injury  Outcome: Progressing

## 2024-10-26 NOTE — ASSESSMENT & PLAN NOTE
Hyponatremia is likely due to renal insufficiency. The patient's most recent sodium results are listed below.  Recent Labs     10/24/24  0422 10/25/24  0439 10/26/24  0718   * 130* 134*       Plan  - Correct the sodium by 4-6mEq in 24 hours.   - Obtain the following studies: Urine sodium, urine osmolality, serum osmolality, AM cortisol, or TSH, T4.  - Will treat the hyponatremia with Fluid restriction of:  1.5 liter per day  - Monitor sodium Daily.   - Patient hyponatremia is improving

## 2024-10-26 NOTE — NURSING
[facility]  Nurses Note -- 4 Eyes      10/26/2024       Skin assessed on: Q Shift      [x] No Pressure Injuries Present    [x]Prevention Measures Documented    [] Yes LDA  for Pressure Injury Previously documented     [] Yes New Pressure Injury Discovered   [] LDA for New Pressure Injury Added      Attending RN:  Cornelia Brush RN     Second RN:  EMELY Natarajan

## 2024-10-26 NOTE — SUBJECTIVE & OBJECTIVE
Interval History: more confused today    Review of Systems   Unable to perform ROS: Mental status change   Neurological:  Positive for tremors.   Psychiatric/Behavioral:  Positive for confusion.      Objective:     Vital Signs (Most Recent):  Temp: 98.1 °F (36.7 °C) (10/26/24 1137)  Pulse: 84 (10/26/24 1137)  Resp: 18 (10/26/24 1137)  BP: (!) 141/76 (10/26/24 1137)  SpO2: 95 % (10/26/24 1137) Vital Signs (24h Range):  Temp:  [97.3 °F (36.3 °C)-98.1 °F (36.7 °C)] 98.1 °F (36.7 °C)  Pulse:  [72-99] 84  Resp:  [18] 18  SpO2:  [93 %-98 %] 95 %  BP: (119-161)/(75-89) 141/76     Weight: 60.3 kg (132 lb 15 oz)  Body mass index is 21.46 kg/m².    Intake/Output Summary (Last 24 hours) at 10/26/2024 1240  Last data filed at 10/26/2024 0522  Gross per 24 hour   Intake 240 ml   Output 300 ml   Net -60 ml         Physical Exam  Vitals and nursing note reviewed.   Constitutional:       General: He is not in acute distress.     Appearance: He is well-developed.   HENT:      Head: Normocephalic and atraumatic.      Right Ear: External ear normal.      Left Ear: External ear normal.      Nose: Nose normal.   Eyes:      Conjunctiva/sclera: Conjunctivae normal.   Cardiovascular:      Rate and Rhythm: Normal rate and regular rhythm.   Pulmonary:      Effort: Pulmonary effort is normal. No respiratory distress.   Skin:     General: Skin is warm and dry.   Neurological:      Mental Status: He is alert. He is disoriented and confused.      Motor: Tremor present.   Psychiatric:         Speech: Speech is tangential.         Cognition and Memory: Cognition is impaired.         Judgment: Judgment is impulsive.             Significant Labs: All pertinent labs within the past 24 hours have been reviewed.    Significant Imaging: I have reviewed all pertinent imaging results/findings within the past 24 hours.

## 2024-10-26 NOTE — ASSESSMENT & PLAN NOTE
JINA is likely due to pre-renal azotemia due to intravascular volume depletion. Baseline creatinine is  0.8 . Most recent creatinine and eGFR are listed below.  Recent Labs     10/24/24  0422 10/25/24  0439 10/26/24  0718   CREATININE 1.3 1.0 0.9   EGFRNORACEVR >60 >60 >60        Plan  - JINA is improving   - Avoid nephrotoxins and renally dose meds for GFR listed above  - Monitor urine output, serial BMP, and adjust therapy as needed

## 2024-10-26 NOTE — ASSESSMENT & PLAN NOTE
Despite initial history, suspect symptoms may be related to EtOH withdrawal.  Continue MVI/thiamine/folate and diazepam.

## 2024-10-26 NOTE — ASSESSMENT & PLAN NOTE
Patient H/H stable and consistent with baseline. No evidence acute bleeding or indication for transfusion at this time.      Recent Labs     10/24/24  0422 10/25/24  0439 10/26/24  0718   HGB 9.7* 10.7* 11.6*   HCT 29.8* 30.7* 35.3*       Plan  - Monitor serial CBC: Daily  - Transfuse PRBC if patient becomes hemodynamically unstable, symptomatic or H/H drops below 7/21.  - Patient has not received any PRBC transfusions to date  - Patient's anemia is currently stable

## 2024-10-26 NOTE — PROGRESS NOTES
[facility] Medicine  Progress Note    Patient Name: Israel De Jesus  MRN: [id number]  Patient Class: IP- Inpatient   Admission Date: 10/23/2024  Length of Stay: 2 days  Attending Physician: Mack Castellanos MD  Primary Care Provider: Nuha Lynn MD        Subjective:     Principal Problem:Acute pulmonary embolism without acute cor pulmonale        HPI:  64 y.o. male with recent acute PE on Eliquis, hypertension alcohol use disorder, and chronic anemia presents with complaint of chest pain.  Acute onset, duration 1 week, pleuritic in nature, associated with chronic cough, exacerbated with deep inspiration.  Reports he has missed some doses of Eliquis.  Denies fever, chills palpitations, syncope, nausea vomiting, diarrhea.  In the ED, vitals reassuring.  Labs with hyponatremia, metabolic acidosis, JINA, and minimal troponin elevation.  CT head and chest x-ray unremarkable.  Eliquis resumed.  Placed in observation.    Overview/Hospital Course:  Mr. De Jesus was hospitalized with acute kidney injury.  Cr double his baseline.  Patient is in need of IV fluids for the treatment of JINA.  Due to a shortage of IV fluids, patient received oral rehydration.  Patient must receive this treatment in a hospital location due to the risk of adverse effects, insufficient response to treatment, or other potential complications of disease such as organ failure.  Renal function improved, but developed electrolyte abnormalities with oral rehydration including hypokalemia and hyponatremia.  Cautious replacement due to impaired renal function and continue tele monitoring with increased arrhythmia risk necessitating continued in hospital care.  Unable to walk 10/25/24, not at baseline mobility function.  Plan is for SNF.  More confused today.  At presentation patient and son stated last EtOH drink 8 days prior, but concern for possible EtOH withdrawal.  Continue diazepam and monitoring.    Interval History: more  confused today    Review of Systems   Unable to perform ROS: Mental status change   Neurological:  Positive for tremors.   Psychiatric/Behavioral:  Positive for confusion.      Objective:     Vital Signs (Most Recent):  Temp: 98.1 °F (36.7 °C) (10/26/24 1137)  Pulse: 84 (10/26/24 1137)  Resp: 18 (10/26/24 1137)  BP: (!) 141/76 (10/26/24 1137)  SpO2: 95 % (10/26/24 1137) Vital Signs (24h Range):  Temp:  [97.3 °F (36.3 °C)-98.1 °F (36.7 °C)] 98.1 °F (36.7 °C)  Pulse:  [72-99] 84  Resp:  [18] 18  SpO2:  [93 %-98 %] 95 %  BP: (119-161)/(75-89) 141/76     Weight: 60.3 kg (132 lb 15 oz)  Body mass index is 21.46 kg/m².    Intake/Output Summary (Last 24 hours) at 10/26/2024 1240  Last data filed at 10/26/2024 0522  Gross per 24 hour   Intake 240 ml   Output 300 ml   Net -60 ml         Physical Exam  Vitals and nursing note reviewed.   Constitutional:       General: He is not in acute distress.     Appearance: He is well-developed.   HENT:      Head: Normocephalic and atraumatic.      Right Ear: External ear normal.      Left Ear: External ear normal.      Nose: Nose normal.   Eyes:      Conjunctiva/sclera: Conjunctivae normal.   Cardiovascular:      Rate and Rhythm: Normal rate and regular rhythm.   Pulmonary:      Effort: Pulmonary effort is normal. No respiratory distress.   Skin:     General: Skin is warm and dry.   Neurological:      Mental Status: He is alert. He is disoriented and confused.      Motor: Tremor present.   Psychiatric:         Speech: Speech is tangential.         Cognition and Memory: Cognition is impaired.         Judgment: Judgment is impulsive.             Significant Labs: All pertinent labs within the past 24 hours have been reviewed.    Significant Imaging: I have reviewed all pertinent imaging results/findings within the past 24 hours.    Assessment/Plan:      * Acute pulmonary embolism without acute cor pulmonale  Stable on room air, resume apixaban    JINA (acute kidney injury)  JINA is likely  due to pre-renal azotemia due to intravascular volume depletion. Baseline creatinine is  0.8 . Most recent creatinine and eGFR are listed below.  Recent Labs     10/24/24  0422 10/25/24  0439 10/26/24  0718   CREATININE 1.3 1.0 0.9   EGFRNORACEVR >60 >60 >60        Plan  - JINA is improving   - Avoid nephrotoxins and renally dose meds for GFR listed above  - Monitor urine output, serial BMP, and adjust therapy as needed    Hyponatremia  Hyponatremia is likely due to renal insufficiency. The patient's most recent sodium results are listed below.  Recent Labs     10/24/24  0422 10/25/24  0439 10/26/24  0718   * 130* 134*       Plan  - Correct the sodium by 4-6mEq in 24 hours.   - Obtain the following studies: Urine sodium, urine osmolality, serum osmolality, AM cortisol, or TSH, T4.  - Will treat the hyponatremia with Fluid restriction of:  1.5 liter per day  - Monitor sodium Daily.   - Patient hyponatremia is improving    Hypokalemia  Patient's most recent potassium results are listed below.   Recent Labs     10/24/24  0422 10/25/24  0439 10/26/24  0718   K 2.8* 3.3* 4.5       Plan  - Replete potassium per protocol  - Monitor potassium Daily  - Patient's hypokalemia is improving    Hypomagnesemia  Resolved    Patient has Abnormal Magnesium: hypomagnesemia. Will continue to monitor electrolytes closely. Will replace the affected electrolytes and repeat labs to be done after interventions completed. The patient's magnesium results have been reviewed and are listed below.  Recent Labs   Lab 10/26/24  0718   MG 1.7         Alcohol withdrawal syndrome, with delirium  Despite initial history, suspect symptoms may be related to EtOH withdrawal.  Continue MVI/thiamine/folate and diazepam.    Macrocytic anemia  Patient H/H stable and consistent with baseline. No evidence acute bleeding or indication for transfusion at this time.      Recent Labs     10/24/24  0422 10/25/24  0439 10/26/24  0718   HGB 9.7* 10.7* 11.6*    HCT 29.8* 30.7* 35.3*       Plan  - Monitor serial CBC: Daily  - Transfuse PRBC if patient becomes hemodynamically unstable, symptomatic or H/H drops below 7/21.  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  Continue home regimen of duloxetine    Hypertension  Patients blood pressure range in the last 24 hours was: BP  Min: 145/92  Max: 157/101.The patient's inpatient anti-hypertensive regimen is listed below:  Current Antihypertensives  Amlodipine 5 mg daily  Metoprolol 100 mg    Plan  - BP is controlled, no changes needed to their regimen      VTE Risk Mitigation (From admission, onward)           Ordered     apixaban tablet 5 mg  2 times daily         10/23/24 1427     IP VTE HIGH RISK PATIENT  Once         10/23/24 1427     Place sequential compression device  Until discontinued         10/23/24 1427     Reason for No Pharmacological VTE Prophylaxis  Once        Question:  Reasons:  Answer:  Already adequately anticoagulated on oral Anticoagulants    10/23/24 1427                    Discharge Planning   BRANDON:      Code Status: Full Code   Is the patient medically ready for discharge?:     Reason for patient still in hospital (select all that apply): Patient trending condition, Treatment, PT / OT recommendations, and Pending disposition  Discharge Plan A: Home      Min Marinelli Jr., APRN, AGACNP-BC  Hospitalist - Department of Hospital Medicine  [facility] [address] [place] [address]  Office #: [phone]; Pager #: [phone]

## 2024-10-26 NOTE — ASSESSMENT & PLAN NOTE
Resolved    Patient has Abnormal Magnesium: hypomagnesemia. Will continue to monitor electrolytes closely. Will replace the affected electrolytes and repeat labs to be done after interventions completed. The patient's magnesium results have been reviewed and are listed below.  Recent Labs   Lab 10/26/24  0718   MG 1.7

## 2024-10-27 PROBLEM — E83.42 HYPOMAGNESEMIA: Status: RESOLVED | Noted: 2024-09-09 | Resolved: 2024-10-27

## 2024-10-27 LAB
ANION GAP SERPL CALC-SCNC: 10 MMOL/L (ref 8–16)
BUN SERPL-MCNC: 14 MG/DL (ref 8–23)
CALCIUM SERPL-MCNC: 9.5 MG/DL (ref 8.7–10.5)
CHLORIDE SERPL-SCNC: 100 MMOL/L (ref 95–110)
CO2 SERPL-SCNC: 26 MMOL/L (ref 23–29)
CREAT SERPL-MCNC: 0.9 MG/DL (ref 0.5–1.4)
EST. GFR  (NO RACE VARIABLE): >60 ML/MIN/1.73 M^2
GLUCOSE SERPL-MCNC: 88 MG/DL (ref 70–110)
MAGNESIUM SERPL-MCNC: 1.7 MG/DL (ref 1.6–2.6)
POTASSIUM SERPL-SCNC: 3.7 MMOL/L (ref 3.5–5.1)
SODIUM SERPL-SCNC: 136 MMOL/L (ref 136–145)

## 2024-10-27 PROCEDURE — 97110 THERAPEUTIC EXERCISES: CPT | Mod: CQ

## 2024-10-27 PROCEDURE — 36415 COLL VENOUS BLD VENIPUNCTURE: CPT | Performed by: INTERNAL MEDICINE

## 2024-10-27 PROCEDURE — 11000001 HC ACUTE MED/SURG PRIVATE ROOM

## 2024-10-27 PROCEDURE — 80048 BASIC METABOLIC PNL TOTAL CA: CPT | Performed by: INTERNAL MEDICINE

## 2024-10-27 PROCEDURE — 83735 ASSAY OF MAGNESIUM: CPT | Performed by: INTERNAL MEDICINE

## 2024-10-27 PROCEDURE — 25000003 PHARM REV CODE 250: Performed by: HOSPITALIST

## 2024-10-27 PROCEDURE — 25000003 PHARM REV CODE 250: Performed by: INTERNAL MEDICINE

## 2024-10-27 RX ADMIN — DIAZEPAM 5 MG: 5 TABLET ORAL at 05:10

## 2024-10-27 RX ADMIN — AMLODIPINE BESYLATE 5 MG: 5 TABLET ORAL at 09:10

## 2024-10-27 RX ADMIN — DIAZEPAM 5 MG: 5 TABLET ORAL at 01:10

## 2024-10-27 RX ADMIN — POLYETHYLENE GLYCOL 3350 17 G: 17 POWDER, FOR SOLUTION ORAL at 09:10

## 2024-10-27 RX ADMIN — APIXABAN 5 MG: 5 TABLET, FILM COATED ORAL at 09:10

## 2024-10-27 RX ADMIN — DULOXETINE HYDROCHLORIDE 60 MG: 30 CAPSULE, DELAYED RELEASE ORAL at 09:10

## 2024-10-27 RX ADMIN — THERA TABS 1 TABLET: TAB at 09:10

## 2024-10-27 RX ADMIN — METOPROLOL TARTRATE 100 MG: 50 TABLET, FILM COATED ORAL at 09:10

## 2024-10-27 RX ADMIN — DIAZEPAM 5 MG: 5 TABLET ORAL at 09:10

## 2024-10-27 RX ADMIN — PANTOPRAZOLE SODIUM 40 MG: 40 TABLET, DELAYED RELEASE ORAL at 09:10

## 2024-10-27 RX ADMIN — FOLIC ACID 1 MG: 1 TABLET ORAL at 09:10

## 2024-10-27 RX ADMIN — THIAMINE HCL TAB 100 MG 100 MG: 100 TAB at 09:10

## 2024-10-27 NOTE — ASSESSMENT & PLAN NOTE
Hyponatremia is likely due to renal insufficiency. The patient's most recent sodium results are listed below.  Recent Labs     10/25/24  0439 10/26/24  0718 10/27/24  0513   * 134* 136       Plan  - Correct the sodium by 4-6mEq in 24 hours.   - Obtain the following studies: Urine sodium, urine osmolality, serum osmolality, AM cortisol, or TSH, T4.  - Will treat the hyponatremia with Fluid restriction of:  1.5 liter per day  - Monitor sodium Daily.   - Patient hyponatremia is improving

## 2024-10-27 NOTE — PT/OT/SLP PROGRESS
Physical Therapy Treatment    Patient Name:  Israel De Jesus   MRN:  [id number]    Recommendations:     Discharge Recommendations: Moderate Intensity Therapy  Discharge Equipment Recommendations: none  Barriers to discharge: None    Assessment:     Israel De Jesus is a 64 y.o. male admitted with a medical diagnosis of Acute pulmonary embolism without acute cor pulmonale.  He presents with the following impairments/functional limitations: weakness, impaired endurance, impaired self care skills, impaired functional mobility, gait instability, decreased lower extremity function, decreased upper extremity function, impaired coordination, decreased coordination, decreased ROM, impaired cognition, pain, impaired balance, decreased safety awareness, impaired cardiopulmonary response to activity .    Rehab Prognosis: Good and Fair; patient would benefit from acute skilled PT services to address these deficits and reach maximum level of function.    Recent Surgery: * No surgery found *      Plan:     During this hospitalization, patient to be seen 3 x/week to address the identified rehab impairments via gait training, therapeutic activities, therapeutic exercises and progress toward the following goals:    Plan of Care Expires:  10/31/24    Subjective     Chief Complaint: sleepy and fatigue   Patient/Family Comments/goals: pt is pleasant and agreeable to therapy   Pain/Comfort:  Pain Rating 1: 0/10  Pain Rating Post-Intervention 1: 0/10      Objective:     Communicated with nurse prior to session.  Patient found HOB elevated with telemetry, bed alarm, peripheral IV, Condom Catheter  safety sitter, upon PT entry to room.     General Precautions: Standard, fall, DT  Orthopedic Precautions: N/A  Braces: N/A  Respiratory Status: Room air   SpO2 : 95%   Functional Mobility:  Bed Mobility:     Scooting: moderate assistance  Bridging: minimum assistance  Limited with OOB mobility today 2* to low BP . BP while sitting in bed  with HOB elevated : 87/53, HR 77, MAP 65   Bp placed in trendelenburg position : BP : 90/59 , MAP 70 . Pt c/o fatigue and sleepy, nurse aware .     AM-PAC 6 CLICK MOBILITY  Turning over in bed (including adjusting bedclothes, sheets and blankets)?: 3  Sitting down on and standing up from a chair with arms (e.g., wheelchair, bedside commode, etc.): 2  Moving from lying on back to sitting on the side of the bed?: 2  Moving to and from a bed to a chair (including a wheelchair)?: 2  Need to walk in hospital room?: 2  Climbing 3-5 steps with a railing?: 1  Basic Mobility Total Score: 12       Treatment & Education:  Lower Extremity Exercises.    Patient educated on: Purpose and Benefits of Therapeutic Exercise(s).    Patient verbalized acceptance/understanding of instruction(s), expectation(s), and limitation(s) (for safety).  Patient performed: 2 sets of 10 reps (each) of B LE Ther Ex: AP, QS , SAQ Hip abd/add, HS while sitting up on bed      Patient required  verbal cues/tactile cues to ensure correct sequence, to maintain proper form, and to allow for self-correction.  Pt reported no complaints or problems with exercise activity.     Patient required increase Reaction Time to initiate movements and a Sitting Rest Break between set(s) to recover.      Patient left HOB elevated with BLE elevated , heels offloading  all lines intact, call button in reach, bed alarm on, nurse notified, and safety sitter  present..    GOALS:   Multidisciplinary Problems       Physical Therapy Goals          Problem: Physical Therapy    Goal Priority Disciplines Outcome Interventions   Physical Therapy Goal     PT, PT/OT Progressing    Description: Goals to be met by: 10/31/24     Patient will increase functional independence with mobility by performin. Pt to be (I) with bed mobility.  2. Pt to transfer with (S).  3. Pt to ambulate 50' with sw and (S).  4. Pt to be (I) with HEP.                         Time Tracking:     PT Received  On: 10/27/24  PT Start Time: 1112     PT Stop Time: 1124  PT Total Time (min): 12 min     Billable Minutes: Therapeutic Activity 12    Treatment Type: Treatment  PT/PTA: PTA     Number of PTA visits since last PT visit: 2     10/27/2024

## 2024-10-27 NOTE — SUBJECTIVE & OBJECTIVE
Interval History: no new complaints    Review of Systems   Neurological:  Positive for tremors.   Psychiatric/Behavioral:  Positive for confusion.    All other systems reviewed and are negative.    Objective:     Vital Signs (Most Recent):  Temp: 98.3 °F (36.8 °C) (10/27/24 1119)  Pulse: 80 (10/27/24 1206)  Resp: 18 (10/27/24 1119)  BP: (!) 90/59 (10/27/24 1119)  SpO2: 97 % (10/27/24 1119) Vital Signs (24h Range):  Temp:  [97.6 °F (36.4 °C)-98.3 °F (36.8 °C)] 98.3 °F (36.8 °C)  Pulse:  [] 80  Resp:  [18-20] 18  SpO2:  [96 %-98 %] 97 %  BP: ()/(59-91) 90/59     Weight: 60.3 kg (132 lb 15 oz)  Body mass index is 21.46 kg/m².    Intake/Output Summary (Last 24 hours) at 10/27/2024 1242  Last data filed at 10/27/2024 0916  Gross per 24 hour   Intake 480 ml   Output 650 ml   Net -170 ml         Physical Exam  Vitals and nursing note reviewed.   Constitutional:       General: He is not in acute distress.     Appearance: He is well-developed.   HENT:      Head: Normocephalic and atraumatic.      Right Ear: External ear normal.      Left Ear: External ear normal.      Nose: Nose normal.   Eyes:      Conjunctiva/sclera: Conjunctivae normal.   Cardiovascular:      Rate and Rhythm: Normal rate and regular rhythm.   Pulmonary:      Effort: Pulmonary effort is normal. No respiratory distress.   Skin:     General: Skin is warm and dry.   Neurological:      Mental Status: He is alert.             Significant Labs: All pertinent labs within the past 24 hours have been reviewed.    Significant Imaging: I have reviewed all pertinent imaging results/findings within the past 24 hours.

## 2024-10-27 NOTE — NURSING
[facility] [place]  Nurses Note -- 4 Eyes      10/27/2024       Skin assessed on: Q Shift      [x] No Pressure Injuries Present    [x]Prevention Measures Documented    [] Yes LDA  for Pressure Injury Previously documented     [] Yes New Pressure Injury Discovered   [] LDA for New Pressure Injury Added      Attending RN:  Cornelia Brush, RN     Second RN:  Dennis HERRMANN RN

## 2024-10-27 NOTE — ASSESSMENT & PLAN NOTE
JINA is likely due to pre-renal azotemia due to intravascular volume depletion. Baseline creatinine is  0.8 . Most recent creatinine and eGFR are listed below.  Recent Labs     10/25/24  0439 10/26/24  0718 10/27/24  0513   CREATININE 1.0 0.9 0.9   EGFRNORACEVR >60 >60 >60        Plan  - JINA is resolved   - Avoid nephrotoxins and renally dose meds for GFR listed above  - Monitor urine output, serial BMP, and adjust therapy as needed

## 2024-10-27 NOTE — ASSESSMENT & PLAN NOTE
Patient's most recent potassium results are listed below.   Recent Labs     10/25/24  0439 10/26/24  0718 10/27/24  0513   K 3.3* 4.5 3.7       Plan  - Replete potassium per protocol  - Monitor potassium Daily  - Patient's hypokalemia is resolved

## 2024-10-27 NOTE — NURSING
[facility] [place]  Nurses Note -- 4 Eyes      10/26/2024       Skin assessed on: Q Shift      [x] No Pressure Injuries Present    []Prevention Measures Documented    [] Yes LDA  for Pressure Injury Previously documented     [] Yes New Pressure Injury Discovered   [] LDA for New Pressure Injury Added      Attending RN:  Dennis Gtz RN     Second RN:  Cornelia Brush RN

## 2024-10-27 NOTE — ASSESSMENT & PLAN NOTE
Despite initial history, suspect symptoms may be related to EtOH withdrawal.  Continue MVI/thiamine/folate and diazepam.  Improved today.

## 2024-10-27 NOTE — PROGRESS NOTES
[facility] Medicine  Progress Note    Patient Name: Israel De Jesus  MRN: [id number]  Patient Class: IP- Inpatient   Admission Date: 10/23/2024  Length of Stay: 3 days  Attending Physician: Mack Castellanos MD  Primary Care Provider: Nuha Lynn MD        Subjective:     Principal Problem:Acute pulmonary embolism without acute cor pulmonale        HPI:  64 y.o. male with recent acute PE on Eliquis, hypertension alcohol use disorder, and chronic anemia presents with complaint of chest pain.  Acute onset, duration 1 week, pleuritic in nature, associated with chronic cough, exacerbated with deep inspiration.  Reports he has missed some doses of Eliquis.  Denies fever, chills palpitations, syncope, nausea vomiting, diarrhea.  In the ED, vitals reassuring.  Labs with hyponatremia, metabolic acidosis, JINA, and minimal troponin elevation.  CT head and chest x-ray unremarkable.  Eliquis resumed.  Placed in observation.    Overview/Hospital Course:  Mr. De Jesus was hospitalized with acute kidney injury.  Cr double his baseline.  Patient is in need of IV fluids for the treatment of JINA.  Due to a shortage of IV fluids, patient received oral rehydration.  Patient must receive this treatment in a hospital location due to the risk of adverse effects, insufficient response to treatment, or other potential complications of disease such as organ failure.  Renal function improved, but developed electrolyte abnormalities with oral rehydration including hypokalemia and hyponatremia.  Cautious replacement due to impaired renal function and continue tele monitoring with increased arrhythmia risk necessitating continued in hospital care.  Unable to walk 10/25/24, not at baseline mobility function.  Plan is for SNF.  More confused 10/26/24.  At presentation patient and son stated last EtOH drink 8 days prior, but concern for possible EtOH withdrawal.  Continue diazepam and monitoring.  Mental status and tremors  improved today.    Interval History: no new complaints    Review of Systems   Neurological:  Positive for tremors.   Psychiatric/Behavioral:  Positive for confusion.    All other systems reviewed and are negative.    Objective:     Vital Signs (Most Recent):  Temp: 98.3 °F (36.8 °C) (10/27/24 1119)  Pulse: 80 (10/27/24 1206)  Resp: 18 (10/27/24 1119)  BP: (!) 90/59 (10/27/24 1119)  SpO2: 97 % (10/27/24 1119) Vital Signs (24h Range):  Temp:  [97.6 °F (36.4 °C)-98.3 °F (36.8 °C)] 98.3 °F (36.8 °C)  Pulse:  [] 80  Resp:  [18-20] 18  SpO2:  [96 %-98 %] 97 %  BP: ()/(59-91) 90/59     Weight: 60.3 kg (132 lb 15 oz)  Body mass index is 21.46 kg/m².    Intake/Output Summary (Last 24 hours) at 10/27/2024 1242  Last data filed at 10/27/2024 0916  Gross per 24 hour   Intake 480 ml   Output 650 ml   Net -170 ml         Physical Exam  Vitals and nursing note reviewed.   Constitutional:       General: He is not in acute distress.     Appearance: He is well-developed.   HENT:      Head: Normocephalic and atraumatic.      Right Ear: External ear normal.      Left Ear: External ear normal.      Nose: Nose normal.   Eyes:      Conjunctiva/sclera: Conjunctivae normal.   Cardiovascular:      Rate and Rhythm: Normal rate and regular rhythm.   Pulmonary:      Effort: Pulmonary effort is normal. No respiratory distress.   Skin:     General: Skin is warm and dry.   Neurological:      Mental Status: He is alert.             Significant Labs: All pertinent labs within the past 24 hours have been reviewed.    Significant Imaging: I have reviewed all pertinent imaging results/findings within the past 24 hours.    Assessment/Plan:      * Acute pulmonary embolism without acute cor pulmonale  Stable on room air, resume apixaban    JINA (acute kidney injury)  JINA is likely due to pre-renal azotemia due to intravascular volume depletion. Baseline creatinine is  0.8 . Most recent creatinine and eGFR are listed below.  Recent Labs      10/25/24  0439 10/26/24  0718 10/27/24  0513   CREATININE 1.0 0.9 0.9   EGFRNORACEVR >60 >60 >60        Plan  - JINA is resolved   - Avoid nephrotoxins and renally dose meds for GFR listed above  - Monitor urine output, serial BMP, and adjust therapy as needed    Hyponatremia  Hyponatremia is likely due to renal insufficiency. The patient's most recent sodium results are listed below.  Recent Labs     10/25/24  0439 10/26/24  0718 10/27/24  0513   * 134* 136       Plan  - Correct the sodium by 4-6mEq in 24 hours.   - Obtain the following studies: Urine sodium, urine osmolality, serum osmolality, AM cortisol, or TSH, T4.  - Will treat the hyponatremia with Fluid restriction of:  1.5 liter per day  - Monitor sodium Daily.   - Patient hyponatremia is improving    Hypokalemia  Patient's most recent potassium results are listed below.   Recent Labs     10/25/24  0439 10/26/24  0718 10/27/24  0513   K 3.3* 4.5 3.7       Plan  - Replete potassium per protocol  - Monitor potassium Daily  - Patient's hypokalemia is resolved    Alcohol withdrawal syndrome, with delirium  Despite initial history, suspect symptoms may be related to EtOH withdrawal.  Continue MVI/thiamine/folate and diazepam.  Improved today.    Macrocytic anemia  Patient H/H stable and consistent with baseline. No evidence acute bleeding or indication for transfusion at this time.      Recent Labs     10/25/24  0439 10/26/24  0718   HGB 10.7* 11.6*   HCT 30.7* 35.3*       Plan  - Monitor serial CBC: Daily  - Transfuse PRBC if patient becomes hemodynamically unstable, symptomatic or H/H drops below 7/21.  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  Continue home regimen of duloxetine    Hypertension  Patients blood pressure range in the last 24 hours was: BP  Min: 145/92  Max: 157/101.The patient's inpatient anti-hypertensive regimen is listed  below:  Current Antihypertensives  Amlodipine 5 mg daily  Metoprolol 100 mg    Plan  - BP is controlled, no changes needed to their regimen      VTE Risk Mitigation (From admission, onward)           Ordered     apixaban tablet 5 mg  2 times daily         10/23/24 1427     IP VTE HIGH RISK PATIENT  Once         10/23/24 1427     Place sequential compression device  Until discontinued         10/23/24 1427     Reason for No Pharmacological VTE Prophylaxis  Once        Question:  Reasons:  Answer:  Already adequately anticoagulated on oral Anticoagulants    10/23/24 1427                    Discharge Planning   BRANDON:      Code Status: Full Code   Is the patient medically ready for discharge?:     Reason for patient still in hospital (select all that apply): Patient trending condition, Treatment, PT / OT recommendations, and Pending disposition  Discharge Plan A: Home      Min Marinelli Jr., APRN, [facility]-BC  Hospitalist - Department of Hospital Medicine  [facility] [address] [place] [address]  Office #: [phone]; Pager #: [phone]

## 2024-10-27 NOTE — PLAN OF CARE
Problem: Adult Inpatient Plan of Care  Goal: Plan of Care Review  Outcome: Progressing     Problem: Adult Inpatient Plan of Care  Goal: Absence of Hospital-Acquired Illness or Injury  Outcome: Progressing     Problem: Adult Inpatient Plan of Care  Goal: Optimal Comfort and Wellbeing  Outcome: Progressing     Problem: Infection  Goal: Absence of Infection Signs and Symptoms  Outcome: Progressing     Problem: Acute Kidney Injury/Impairment  Goal: Improved Oral Intake  Outcome: Progressing     Problem: Skin Injury Risk Increased  Goal: Skin Health and Integrity  Outcome: Progressing     Problem: Fall Injury Risk  Goal: Absence of Fall and Fall-Related Injury  Outcome: Progressing

## 2024-10-27 NOTE — ASSESSMENT & PLAN NOTE
Patient H/H stable and consistent with baseline. No evidence acute bleeding or indication for transfusion at this time.      Recent Labs     10/25/24  0439 10/26/24  0718   HGB 10.7* 11.6*   HCT 30.7* 35.3*       Plan  - Monitor serial CBC: Daily  - Transfuse PRBC if patient becomes hemodynamically unstable, symptomatic or H/H drops below 7/21.  - Patient has not received any PRBC transfusions to date  - Patient's anemia is currently stable

## 2024-10-28 PROBLEM — N17.9 AKI (ACUTE KIDNEY INJURY): Status: RESOLVED | Noted: 2024-09-18 | Resolved: 2024-10-28

## 2024-10-28 PROBLEM — E87.6 HYPOKALEMIA: Status: RESOLVED | Noted: 2024-10-24 | Resolved: 2024-10-28

## 2024-10-28 PROBLEM — E87.1 HYPONATREMIA: Status: RESOLVED | Noted: 2024-10-25 | Resolved: 2024-10-28

## 2024-10-28 PROCEDURE — 99900035 HC TECH TIME PER 15 MIN (STAT)

## 2024-10-28 PROCEDURE — 97116 GAIT TRAINING THERAPY: CPT | Mod: CQ

## 2024-10-28 PROCEDURE — 25000003 PHARM REV CODE 250: Performed by: HOSPITALIST

## 2024-10-28 PROCEDURE — 25000003 PHARM REV CODE 250: Performed by: INTERNAL MEDICINE

## 2024-10-28 PROCEDURE — 99900035 HC TECH TIME PER 15 MIN (STAT): Mod: ER

## 2024-10-28 PROCEDURE — 97110 THERAPEUTIC EXERCISES: CPT | Mod: CQ

## 2024-10-28 PROCEDURE — 99900031 HC PATIENT EDUCATION (STAT)

## 2024-10-28 PROCEDURE — 94761 N-INVAS EAR/PLS OXIMETRY MLT: CPT

## 2024-10-28 PROCEDURE — 11000001 HC ACUTE MED/SURG PRIVATE ROOM

## 2024-10-28 RX ORDER — DIAZEPAM 5 MG/1
5 TABLET ORAL NIGHTLY
Status: DISCONTINUED | OUTPATIENT
Start: 2024-10-28 | End: 2024-10-31 | Stop reason: HOSPADM

## 2024-10-28 RX ORDER — DIAZEPAM 5 MG/1
5 TABLET ORAL NIGHTLY
Start: 2024-10-28 | End: 2024-11-27

## 2024-10-28 RX ADMIN — THIAMINE HCL TAB 100 MG 100 MG: 100 TAB at 09:10

## 2024-10-28 RX ADMIN — PANTOPRAZOLE SODIUM 40 MG: 40 TABLET, DELAYED RELEASE ORAL at 09:10

## 2024-10-28 RX ADMIN — AMLODIPINE BESYLATE 5 MG: 5 TABLET ORAL at 09:10

## 2024-10-28 RX ADMIN — FOLIC ACID 1 MG: 1 TABLET ORAL at 09:10

## 2024-10-28 RX ADMIN — METOPROLOL TARTRATE 100 MG: 50 TABLET, FILM COATED ORAL at 09:10

## 2024-10-28 RX ADMIN — THERA TABS 1 TABLET: TAB at 09:10

## 2024-10-28 RX ADMIN — DIAZEPAM 5 MG: 5 TABLET ORAL at 05:10

## 2024-10-28 RX ADMIN — DULOXETINE HYDROCHLORIDE 60 MG: 30 CAPSULE, DELAYED RELEASE ORAL at 09:10

## 2024-10-28 RX ADMIN — APIXABAN 5 MG: 5 TABLET, FILM COATED ORAL at 09:10

## 2024-10-28 RX ADMIN — DIAZEPAM 5 MG: 5 TABLET ORAL at 09:10

## 2024-10-28 NOTE — NURSING
[facility], [facility]  Nurses Note -- 4 Eyes      10/27/2024       Skin assessed on: Q Shift      [x] No Pressure Injuries Present    [x]Prevention Measures Documented    [] Yes LDA  for Pressure Injury Previously documented     [] Yes New Pressure Injury Discovered   [] LDA for New Pressure Injury Added      Attending RN:  Jermaine Purvis RN     Second RN:  Cornelia Brush RN

## 2024-10-28 NOTE — PROGRESS NOTES
[facility], [facility]  Nurses Note -- 4 Eyes      10/28/2024       Skin assessed on: Q Shift      [x] No Pressure Injuries Present    []Prevention Measures Documented    [] Yes LDA  for Pressure Injury Previously documented     [] Yes New Pressure Injury Discovered   [] LDA for New Pressure Injury Added      Attending RN:  Braulio Alonso RN     Second RN:  Jermaine Purvis RN

## 2024-10-28 NOTE — PT/OT/SLP PROGRESS
Physical Therapy Treatment    Patient Name:  Israel De Jesus   MRN:  [id number]    Recommendations:     Discharge Recommendations: Moderate Intensity Therapy  Discharge Equipment Recommendations: none  Barriers to discharge: None    Assessment:     Israel De Jesus is a 64 y.o. male admitted with a medical diagnosis of Acute pulmonary embolism without acute cor pulmonale.  He presents with the following impairments/functional limitations: weakness, impaired endurance, impaired self care skills, impaired functional mobility, gait instability, decreased lower extremity function, decreased upper extremity function, decreased coordination, impaired cognition, impaired balance, decreased safety awareness, pain, decreased ROM .    Rehab Prognosis: Good; patient would benefit from acute skilled PT services to address these deficits and reach maximum level of function.    Recent Surgery: * No surgery found *      Plan:     During this hospitalization, patient to be seen 3 x/week to address the identified rehab impairments via gait training, therapeutic activities, therapeutic exercises and progress toward the following goals:    Plan of Care Expires:  10/31/24    Subjective     Chief Complaint: weakness   Patient/Family Comments/goals: pt is pleasant and agreeable to therapy   Pain/Comfort:  Pain Rating 1: 0/10  Pain Rating Post-Intervention 1: 0/10      Objective:     Communicated with nurse prior to session.  Patient found HOB elevated with telemetry, bed alarm, peripheral IV, Condom Catheter upon PT entry to room.     General Precautions: Standard, fall, DT  Orthopedic Precautions: N/A  Braces: N/A  Respiratory Status: Room air     Functional Mobility:  Bed Mobility:     Rolling Left:  stand by assistance  Scooting: stand by assistance  Bridging: stand by assistance  Supine to Sit: minimum assistance  Sit to Supine: contact guard assistance  Transfers:  VC's for safety technique and    Sit to Stand: from bed and  chair minimum assistance and moderate assistance with no AD and RW  Bed to Chair: minimum assistance and moderate assistance with  hand-held assist  using  Stand Pivot  Gait: pt ambulated ~ 14 ft with RW, MIN A ( chair followed). Noted with decreased bo,decreased step length, min tremors,  no LOB. VC's for safety technique and walker management .   Balance:  good in sitting, fair in standing.       AM-PAC 6 CLICK MOBILITY  Turning over in bed (including adjusting bedclothes, sheets and blankets)?: 4  Sitting down on and standing up from a chair with arms (e.g., wheelchair, bedside commode, etc.): 2  Moving from lying on back to sitting on the side of the bed?: 3  Moving to and from a bed to a chair (including a wheelchair)?: 3  Need to walk in hospital room?: 3  Climbing 3-5 steps with a railing?: 2  Basic Mobility Total Score: 17       Treatment & Education:  Lower Extremity Exercises.    Patient educated on: Purpose and Benefits of Therapeutic Exercise(s).    Patient verbalized acceptance/understanding of instruction(s), expectation(s), and limitation(s) (for safety).  Patient performed: 2 sets of 10 reps (each) of B LE Ther Ex: AP, LAQ, HSC, Hip abd/add, Hip flexion while sitting up on EOB.       Patient required  verbal cues/tactile cues to ensure correct sequence, to maintain proper form, and to allow for self-correction.  Pt reported no complaints or problems with exercise activity.     Patient required increase Reaction Time to initiate movements and a Sitting Rest Break between set(s) to recover.      Patient left with bed in chair position with heels offloading  all lines intact, call button in reach, bed alarm on, nurse notified, and sitter present..    GOALS:   Multidisciplinary Problems       Physical Therapy Goals          Problem: Physical Therapy    Goal Priority Disciplines Outcome Interventions   Physical Therapy Goal     PT, PT/OT Progressing    Description: Goals to be met by: 10/31/24      Patient will increase functional independence with mobility by performin. Pt to be (I) with bed mobility.  2. Pt to transfer with (S).  3. Pt to ambulate 50' with sw and (S).  4. Pt to be (I) with HEP.                         Time Tracking:     PT Received On: 10/28/24  PT Start Time: 1054     PT Stop Time: 1122  PT Total Time (min): 28 min     Billable Minutes: Gait Training 11 and Therapeutic Exercise 17    Treatment Type: Treatment  PT/PTA: PTA     Number of PTA visits since last PT visit: 3     10/28/2024

## 2024-10-28 NOTE — PLAN OF CARE
Changes in medical condition or discharge plan:   Patient not at baseline mobility function. There is still concern for possible EtOH withdrawal. Patient will be monitored. Mental status and tremors improved. SNF authorization still pending for [facility] in admissions.     Does patient need new DME? NA    Follow up appts needed:     Medically stable: yes    Estimated Discharge Date: pending SNF auth       10/28/24 1514   Discharge Reassessment   Assessment Type Discharge Planning Reassessment   Did the patient's condition or plan change since previous assessment? No   Discharge Plan A Skilled Nursing Facility   Discharge Plan B Home Health   DME Needed Upon Discharge  none   Transition of Care Barriers Substance Abuse   Why the patient remains in the hospital Requires continued medical care   Post-Acute Status   Post-Acute Authorization Placement  (SNF)   Post-Acute Placement Status Pending payor review/awaiting authorization (if required)   Discharge Delays None known at this time

## 2024-10-28 NOTE — SUBJECTIVE & OBJECTIVE
Interval History: feeling better, no new complaints    Review of Systems   Respiratory:  Negative for cough and shortness of breath.    Cardiovascular:  Negative for chest pain.   All other systems reviewed and are negative.    Objective:     Vital Signs (Most Recent):  Temp: 98.3 °F (36.8 °C) (10/28/24 1129)  Pulse: 80 (10/28/24 1129)  Resp: 18 (10/28/24 1129)  BP: 113/72 (10/28/24 1129)  SpO2: 97 % (10/28/24 1129) Vital Signs (24h Range):  Temp:  [97.2 °F (36.2 °C)-99.2 °F (37.3 °C)] 98.3 °F (36.8 °C)  Pulse:  [80-89] 80  Resp:  [16-19] 18  SpO2:  [94 %-97 %] 97 %  BP: ()/(58-82) 113/72     Weight: 60.3 kg (132 lb 15 oz)  Body mass index is 21.46 kg/m².    Intake/Output Summary (Last 24 hours) at 10/28/2024 1302  Last data filed at 10/28/2024 1007  Gross per 24 hour   Intake 440 ml   Output 750 ml   Net -310 ml         Physical Exam  Vitals and nursing note reviewed.   Constitutional:       General: He is not in acute distress.     Appearance: He is well-developed.   HENT:      Head: Normocephalic and atraumatic.      Right Ear: External ear normal.      Left Ear: External ear normal.      Nose: Nose normal.   Eyes:      Conjunctiva/sclera: Conjunctivae normal.   Cardiovascular:      Rate and Rhythm: Normal rate and regular rhythm.   Pulmonary:      Effort: Pulmonary effort is normal. No respiratory distress.   Skin:     General: Skin is warm and dry.   Neurological:      Mental Status: He is alert.             Significant Labs: All pertinent labs within the past 24 hours have been reviewed.    Significant Imaging: I have reviewed all pertinent imaging results/findings within the past 24 hours.

## 2024-10-28 NOTE — ASSESSMENT & PLAN NOTE
Despite initial history, suspect symptoms may be related to EtOH withdrawal.  Continue MVI/thiamine/folate and diazepam.  Continues to improve, decrease diazepam.

## 2024-10-28 NOTE — PLAN OF CARE
Problem: Adult Inpatient Plan of Care  Goal: Plan of Care Review  Outcome: Progressing  Flowsheets (Taken 10/28/2024 0413)  Plan of Care Reviewed With: patient  Goal: Absence of Hospital-Acquired Illness or Injury  Outcome: Progressing  Intervention: Identify and Manage Fall Risk  Flowsheets (Taken 10/28/2024 0413)  Safety Promotion/Fall Prevention:   assistive device/personal item within reach   bed alarm set   Fall Risk reviewed with patient/family   room near unit station   side rails raised x 2  Intervention: Prevent Skin Injury  Flowsheets (Taken 10/28/2024 0413)  Body Position: position changed independently  Skin Protection: incontinence pads utilized  Device Skin Pressure Protection:   absorbent pad utilized/changed   adhesive use limited   positioning supports utilized  Intervention: Prevent and Manage VTE (Venous Thromboembolism) Risk  Flowsheets (Taken 10/28/2024 0413)  VTE Prevention/Management: bleeding risk assessed  Intervention: Prevent Infection  Flowsheets (Taken 10/28/2024 0413)  Infection Prevention:   rest/sleep promoted   single patient room provided      Patient is AAOx4. On room air. Tele maintained. No falls or injuries reported during shift, safety precautions maintained.

## 2024-10-28 NOTE — PLAN OF CARE
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]  Yes     Chronic    Macrocytic anemia [D53.9]  Yes     Chronic    Alcohol withdrawal syndrome, with delirium [F10.931]  Yes    Hypertension [I10]  Yes     Chronic      Resolved Hospital Problems    Diagnosis Date Resolved POA    Metabolic acidosis [E87.20] 10/26/2024 Yes     Priority: 3     Hypomagnesemia [E83.42] 10/27/2024 Yes       Patient is homebound due to:  Acute pulmonary embolism without acute cor pulmonale    Allergies:Review of patient's allergies indicates:  No Known Allergies    Vitals:     Every shift (Skilled Nursing patients)    Diet: Cardiac     Acitivities:      - Up in a chair each morning as tolerated   - Ambulate with assistance to bathroom   - Scheduled walks once each shift (every 8 hours)    LABS:  Per facility protocol    Nursing Precautions:    - Fall precautions per nursing home protocol     CONSULTS:      Physical Therapy to evaluate and treat     Occupational Therapy to evaluate and treat     Nutrition to evaluate and recommend diet     Psychiatry to evaluate and follow patients for delirium    Medications: Discontinue all previous medication orders, if any. See new list below.       Medication List        START taking these medications      diazePAM 5 MG tablet  Commonly known as: VALIUM  Take 1 tablet (5 mg total) by mouth every evening.            CONTINUE taking these  medications      amLODIPine 5 MG tablet  Commonly known as: NORVASC  Take 1 tablet (5 mg total) by mouth once daily.     apixaban 5 mg Tab  Commonly known as: ELIQUIS  Take 1 tablet (5 mg total) by mouth 2 (two) times daily.     DULoxetine 60 MG capsule  Commonly known as: CYMBALTA  Take 1 capsule (60 mg total) by mouth once daily.     folic acid 1 MG tablet  Commonly known as: FOLVITE  Take 1 tablet (1 mg total) by mouth once daily.     metoprolol tartrate 100 MG tablet  Commonly known as: LOPRESSOR  Take 1 tablet (100 mg total) by mouth 2 (two) times daily.     ONE DAILY MULTIVITAMIN per tablet  Generic drug: multivitamin  Take 1 tablet by mouth once daily.     pantoprazole 40 MG tablet  Commonly known as: PROTONIX  Take 1 tablet (40 mg total) by mouth once daily.     thiamine 100 MG tablet  Take 1 tablet (100 mg total) by mouth once daily.                    _________________________________  Min Marinelli Jr, NP  10/28/2024

## 2024-10-28 NOTE — CONSULTS
[facility] Hospital Medicine  Telemedicine Consult Note       Israel De Jesus has been accepted for transfer to [facility] and will be followed through telemedicine services beginning 10/29/24 at 7 AM.        Deborah Miller MD  [facility] Medicine Staff

## 2024-10-28 NOTE — PROGRESS NOTES
[facility] Medicine  Progress Note    Patient Name: Israel De Jesus  MRN: [id number]  Patient Class: IP- Inpatient   Admission Date: 10/23/2024  Length of Stay: 4 days  Attending Physician: Mack Castellanos MD  Primary Care Provider: Nuha Lynn MD        Subjective:     Principal Problem:Acute pulmonary embolism without acute cor pulmonale        HPI:  64 y.o. male with recent acute PE on Eliquis, hypertension alcohol use disorder, and chronic anemia presents with complaint of chest pain.  Acute onset, duration 1 week, pleuritic in nature, associated with chronic cough, exacerbated with deep inspiration.  Reports he has missed some doses of Eliquis.  Denies fever, chills palpitations, syncope, nausea vomiting, diarrhea.  In the ED, vitals reassuring.  Labs with hyponatremia, metabolic acidosis, JINA, and minimal troponin elevation.  CT head and chest x-ray unremarkable.  Eliquis resumed.  Placed in observation.    Overview/Hospital Course:  Mr. De Jesus was hospitalized with acute kidney injury.  Cr double his baseline.  Patient is in need of IV fluids for the treatment of JINA.  Due to a shortage of IV fluids, patient received oral rehydration.  Patient must receive this treatment in a hospital location due to the risk of adverse effects, insufficient response to treatment, or other potential complications of disease such as organ failure.  Renal function improved, but developed electrolyte abnormalities with oral rehydration including hypokalemia and hyponatremia.  Cautious replacement due to impaired renal function and continue tele monitoring with increased arrhythmia risk necessitating continued in hospital care.  Unable to walk 10/25/24, not at baseline mobility function.  Plan is for SNF.  More confused 10/26/24.  At presentation patient and son stated last EtOH drink 8 days prior, but concern for possible EtOH withdrawal.  Continue diazepam and monitoring.  Mental status and tremors  improved.  SNF authorization still pending.    Interval History: feeling better, no new complaints    Review of Systems   Respiratory:  Negative for cough and shortness of breath.    Cardiovascular:  Negative for chest pain.   All other systems reviewed and are negative.    Objective:     Vital Signs (Most Recent):  Temp: 98.3 °F (36.8 °C) (10/28/24 1129)  Pulse: 80 (10/28/24 1129)  Resp: 18 (10/28/24 1129)  BP: 113/72 (10/28/24 1129)  SpO2: 97 % (10/28/24 1129) Vital Signs (24h Range):  Temp:  [97.2 °F (36.2 °C)-99.2 °F (37.3 °C)] 98.3 °F (36.8 °C)  Pulse:  [80-89] 80  Resp:  [16-19] 18  SpO2:  [94 %-97 %] 97 %  BP: ()/(58-82) 113/72     Weight: 60.3 kg (132 lb 15 oz)  Body mass index is 21.46 kg/m².    Intake/Output Summary (Last 24 hours) at 10/28/2024 1302  Last data filed at 10/28/2024 1007  Gross per 24 hour   Intake 440 ml   Output 750 ml   Net -310 ml         Physical Exam  Vitals and nursing note reviewed.   Constitutional:       General: He is not in acute distress.     Appearance: He is well-developed.   HENT:      Head: Normocephalic and atraumatic.      Right Ear: External ear normal.      Left Ear: External ear normal.      Nose: Nose normal.   Eyes:      Conjunctiva/sclera: Conjunctivae normal.   Cardiovascular:      Rate and Rhythm: Normal rate and regular rhythm.   Pulmonary:      Effort: Pulmonary effort is normal. No respiratory distress.   Skin:     General: Skin is warm and dry.   Neurological:      Mental Status: He is alert.             Significant Labs: All pertinent labs within the past 24 hours have been reviewed.    Significant Imaging: I have reviewed all pertinent imaging results/findings within the past 24 hours.    Assessment/Plan:      * Acute pulmonary embolism without acute cor pulmonale  Stable on room air, resume apixaban    Alcohol withdrawal syndrome, with delirium  Despite initial history, suspect symptoms may be related to EtOH withdrawal.  Continue MVI/thiamine/folate  and diazepam.  Continues to improve, decrease diazepam.    Macrocytic anemia  Patient H/H stable and consistent with baseline. No evidence acute bleeding or indication for transfusion at this time.      Recent Labs     10/25/24  0439 10/26/24  0718   HGB 10.7* 11.6*   HCT 30.7* 35.3*       Plan  - Monitor serial CBC: Daily  - Transfuse PRBC if patient becomes hemodynamically unstable, symptomatic or H/H drops below 7/21.  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  Continue home regimen of duloxetine    Hypertension  Patients blood pressure range in the last 24 hours was: BP  Min: 145/92  Max: 157/101.The patient's inpatient anti-hypertensive regimen is listed below:  Current Antihypertensives  Amlodipine 5 mg daily  Metoprolol 100 mg    Plan  - BP is controlled, no changes needed to their regimen      VTE Risk Mitigation (From admission, onward)           Ordered     apixaban tablet 5 mg  2 times daily         10/23/24 1427     IP VTE HIGH RISK PATIENT  Once         10/23/24 1427     Place sequential compression device  Until discontinued         10/23/24 1427     Reason for No Pharmacological VTE Prophylaxis  Once        Question:  Reasons:  Answer:  Already adequately anticoagulated on oral Anticoagulants    10/23/24 1427                    Discharge Planning   BRANDON:      Code Status: Full Code   Is the patient medically ready for discharge?:     Reason for patient still in hospital (select all that apply): Patient trending condition, Laboratory test, Treatment, PT / OT recommendations, and Pending disposition  Discharge Plan A: Home      Min Marinelli Jr., APRN, AGACNP-BC  Hospitalist - Department of Hospital Medicine  [facility] [address] [place] [address]  Office #: [phone]; Pager #: [phone]

## 2024-10-29 LAB
ANION GAP SERPL CALC-SCNC: 9 MMOL/L (ref 8–16)
BASOPHILS # BLD AUTO: 0.07 K/UL (ref 0–0.2)
BASOPHILS NFR BLD: 1.1 % (ref 0–1.9)
BUN SERPL-MCNC: 17 MG/DL (ref 8–23)
CALCIUM SERPL-MCNC: 9 MG/DL (ref 8.7–10.5)
CHLORIDE SERPL-SCNC: 102 MMOL/L (ref 95–110)
CO2 SERPL-SCNC: 25 MMOL/L (ref 23–29)
CREAT SERPL-MCNC: 0.9 MG/DL (ref 0.5–1.4)
DIFFERENTIAL METHOD BLD: ABNORMAL
EOSINOPHIL # BLD AUTO: 0.1 K/UL (ref 0–0.5)
EOSINOPHIL NFR BLD: 1.1 % (ref 0–8)
ERYTHROCYTE [DISTWIDTH] IN BLOOD BY AUTOMATED COUNT: 16.2 % (ref 11.5–14.5)
EST. GFR  (NO RACE VARIABLE): >60 ML/MIN/1.73 M^2
GLUCOSE SERPL-MCNC: 100 MG/DL (ref 70–110)
HCT VFR BLD AUTO: 32.5 % (ref 40–54)
HGB BLD-MCNC: 10.3 G/DL (ref 14–18)
IMM GRANULOCYTES # BLD AUTO: 0.06 K/UL (ref 0–0.04)
IMM GRANULOCYTES NFR BLD AUTO: 0.9 % (ref 0–0.5)
LYMPHOCYTES # BLD AUTO: 1.8 K/UL (ref 1–4.8)
LYMPHOCYTES NFR BLD: 27.1 % (ref 18–48)
MCH RBC QN AUTO: 35 PG (ref 27–31)
MCHC RBC AUTO-ENTMCNC: 31.7 G/DL (ref 32–36)
MCV RBC AUTO: 111 FL (ref 82–98)
MONOCYTES # BLD AUTO: 1.1 K/UL (ref 0.3–1)
MONOCYTES NFR BLD: 16.3 % (ref 4–15)
NEUTROPHILS # BLD AUTO: 3.5 K/UL (ref 1.8–7.7)
NEUTROPHILS NFR BLD: 53.5 % (ref 38–73)
NRBC BLD-RTO: 0 /100 WBC
PLATELET # BLD AUTO: 333 K/UL (ref 150–450)
PMV BLD AUTO: 10 FL (ref 9.2–12.9)
POTASSIUM SERPL-SCNC: 3.9 MMOL/L (ref 3.5–5.1)
RBC # BLD AUTO: 2.94 M/UL (ref 4.6–6.2)
SODIUM SERPL-SCNC: 136 MMOL/L (ref 136–145)
WBC # BLD AUTO: 6.52 K/UL (ref 3.9–12.7)

## 2024-10-29 PROCEDURE — 97530 THERAPEUTIC ACTIVITIES: CPT | Mod: CQ

## 2024-10-29 PROCEDURE — 85025 COMPLETE CBC W/AUTO DIFF WBC: CPT | Performed by: HOSPITALIST

## 2024-10-29 PROCEDURE — 25000003 PHARM REV CODE 250: Performed by: INTERNAL MEDICINE

## 2024-10-29 PROCEDURE — 36415 COLL VENOUS BLD VENIPUNCTURE: CPT | Performed by: HOSPITALIST

## 2024-10-29 PROCEDURE — 11000001 HC ACUTE MED/SURG PRIVATE ROOM

## 2024-10-29 PROCEDURE — 97530 THERAPEUTIC ACTIVITIES: CPT

## 2024-10-29 PROCEDURE — 94761 N-INVAS EAR/PLS OXIMETRY MLT: CPT

## 2024-10-29 PROCEDURE — 80048 BASIC METABOLIC PNL TOTAL CA: CPT | Performed by: HOSPITALIST

## 2024-10-29 PROCEDURE — 25000003 PHARM REV CODE 250: Performed by: HOSPITALIST

## 2024-10-29 PROCEDURE — 97116 GAIT TRAINING THERAPY: CPT | Mod: CQ

## 2024-10-29 PROCEDURE — 99900035 HC TECH TIME PER 15 MIN (STAT)

## 2024-10-29 PROCEDURE — 97535 SELF CARE MNGMENT TRAINING: CPT

## 2024-10-29 RX ADMIN — THERA TABS 1 TABLET: TAB at 08:10

## 2024-10-29 RX ADMIN — DIAZEPAM 5 MG: 5 TABLET ORAL at 09:10

## 2024-10-29 RX ADMIN — METOPROLOL TARTRATE 100 MG: 50 TABLET, FILM COATED ORAL at 08:10

## 2024-10-29 RX ADMIN — THIAMINE HCL TAB 100 MG 100 MG: 100 TAB at 08:10

## 2024-10-29 RX ADMIN — METOPROLOL TARTRATE 100 MG: 50 TABLET, FILM COATED ORAL at 09:10

## 2024-10-29 RX ADMIN — FOLIC ACID 1 MG: 1 TABLET ORAL at 08:10

## 2024-10-29 RX ADMIN — PANTOPRAZOLE SODIUM 40 MG: 40 TABLET, DELAYED RELEASE ORAL at 08:10

## 2024-10-29 RX ADMIN — DULOXETINE HYDROCHLORIDE 60 MG: 30 CAPSULE, DELAYED RELEASE ORAL at 08:10

## 2024-10-29 RX ADMIN — APIXABAN 5 MG: 5 TABLET, FILM COATED ORAL at 08:10

## 2024-10-29 RX ADMIN — AMLODIPINE BESYLATE 5 MG: 5 TABLET ORAL at 08:10

## 2024-10-29 RX ADMIN — APIXABAN 5 MG: 5 TABLET, FILM COATED ORAL at 09:10

## 2024-10-29 NOTE — PROGRESS NOTES
[facility] Medicine  Telemedicine Progress Note    Patient Name: Israel De Jesus  MRN: [id number]  Patient Class: IP- Inpatient   Admission Date: 10/23/2024  Length of Stay: 5 days  Attending Physician: Deborah Miller MD  Primary Care Provider: Nuha Lynn MD          Subjective:     Principal Problem:Acute pulmonary embolism without acute cor pulmonale        HPI:  64 y.o. male with recent acute PE on Eliquis, hypertension alcohol use disorder, and chronic anemia presents with complaint of chest pain.  Acute onset, duration 1 week, pleuritic in nature, associated with chronic cough, exacerbated with deep inspiration.  Reports he has missed some doses of Eliquis.  Denies fever, chills palpitations, syncope, nausea vomiting, diarrhea.  In the ED, vitals reassuring.  Labs with hyponatremia, metabolic acidosis, JINA, and minimal troponin elevation.  CT head and chest x-ray unremarkable.  Eliquis resumed.  Placed in observation.    Overview/Hospital Course:  Mr. De Jesus was hospitalized with acute kidney injury.  Cr double his baseline.  Patient is in need of IV fluids for the treatment of JINA.  Due to a shortage of IV fluids, patient received oral rehydration.  Patient must receive this treatment in a hospital location due to the risk of adverse effects, insufficient response to treatment, or other potential complications of disease such as organ failure.  Renal function improved, but developed electrolyte abnormalities with oral rehydration including hypokalemia and hyponatremia.  Cautious replacement due to impaired renal function and continue tele monitoring with increased arrhythmia risk necessitating continued in hospital care.  Unable to walk 10/25/24, not at baseline mobility function.  Plan is for SNF.  More confused 10/26/24.  At presentation patient and son stated last EtOH drink 8 days prior, but concern for possible EtOH withdrawal.  Continue diazepam and monitoring.   Mental status and tremors improved.  SNF authorization still pending.    Interval History: Pt noted to be afebrile and hemodynamically stable. Stable respiratory status. No acute events overnight. No new complaints this morning. Doing well overall but feels tired. Eating and drinking well. Having regular Bms. Pending SNF placement.       Review of Systems   Constitutional:  Positive for activity change and fatigue. Negative for fever.   Respiratory:  Negative for cough and shortness of breath.    Cardiovascular:  Negative for chest pain.   Gastrointestinal:  Negative for abdominal pain.   Neurological:  Negative for dizziness and headaches.   Psychiatric/Behavioral:  Negative for confusion.    All other systems reviewed and are negative.    Objective:     Vital Signs (Most Recent):  Temp: 97 °F (36.1 °C) (10/29/24 1114)  Pulse: 64 (10/29/24 1114)  Resp: 18 (10/29/24 1114)  BP: 113/68 (10/29/24 1114)  SpO2: 97 % (10/29/24 1114) Vital Signs (24h Range):  Temp:  [97 °F (36.1 °C)-98.7 °F (37.1 °C)] 97 °F (36.1 °C)  Pulse:  [64-84] 64  Resp:  [18] 18  SpO2:  [94 %-97 %] 97 %  BP: (104-141)/(57-81) 113/68     Weight: 60.3 kg (132 lb 15 oz)  Body mass index is 21.46 kg/m².    Intake/Output Summary (Last 24 hours) at 10/29/2024 1338  Last data filed at 10/29/2024 0930  Gross per 24 hour   Intake 840 ml   Output 700 ml   Net 140 ml         Physical Exam  Vitals and nursing note reviewed.   Constitutional:       Appearance: Normal appearance.   HENT:      Head: Normocephalic and atraumatic.   Eyes:      Extraocular Movements: Extraocular movements intact.      Pupils: Pupils are equal, round, and reactive to light.   Cardiovascular:      Rate and Rhythm: Normal rate and regular rhythm.   Pulmonary:      Effort: Pulmonary effort is normal. No respiratory distress.   Abdominal:      General: Abdomen is flat. There is no distension.   Musculoskeletal:         General: Normal range of motion.      Cervical back: Normal range of  motion.   Neurological:      General: No focal deficit present.      Mental Status: He is alert and oriented to person, place, and time.   Psychiatric:         Mood and Affect: Mood normal.         Behavior: Behavior normal.             Significant Labs: All pertinent labs within the past 24 hours have been reviewed.  CBC:   Recent Labs   Lab 10/29/24  0414   WBC 6.52   HGB 10.3*   HCT 32.5*        CMP:   Recent Labs   Lab 10/29/24  0414      K 3.9      CO2 25      BUN 17   CREATININE 0.9   CALCIUM 9.0   ANIONGAP 9       Significant Imaging: I have reviewed all pertinent imaging results/findings within the past 24 hours.    Assessment/Plan:      * Acute pulmonary embolism without acute cor pulmonale  Stable on room air, resume apixaban    Debility    -Patient noted to be medically stable for discharge at this time  -Has been able to work with OT/PT who recommend placement for further rehabilitation  -Patient pending placement, continue in-hospital care as stated above in the meantime  -More than 20 minutes of my time has been spent discussing and planning just her safe disposition with patient/family/CM/SW/Nursing staff (not including other time spent in clinical discussion and management)  -CM/SW following, appreciate input   -Will place DC orders once placement has been secured      Alcohol withdrawal syndrome, with delirium  Despite initial history, suspect symptoms may be related to EtOH withdrawal.  Continue MVI/thiamine/folate and diazepam.  Continues to improve, decrease diazepam.    Macrocytic anemia  Patient H/H stable and consistent with baseline. No evidence acute bleeding or indication for transfusion at this time.      Recent Labs     10/25/24  0439 10/26/24  0718   HGB 10.7* 11.6*   HCT 30.7* 35.3*       Plan  - Monitor serial CBC: Daily  - Transfuse PRBC if patient becomes hemodynamically unstable, symptomatic or H/H drops below 7/21.  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  Continue home regimen of duloxetine    Hypertension  Patients blood pressure range in the last 24 hours was: BP  Min: 145/92  Max: 157/101.The patient's inpatient anti-hypertensive regimen is listed below:  Current Antihypertensives  Amlodipine 5 mg daily  Metoprolol 100 mg    Plan  - BP is controlled, no changes needed to their regimen      VTE Risk Mitigation (From admission, onward)           Ordered     apixaban tablet 5 mg  2 times daily         10/23/24 1427     IP VTE HIGH RISK PATIENT  Once         10/23/24 1427     Place sequential compression device  Until discontinued         10/23/24 1427     Reason for No Pharmacological VTE Prophylaxis  Once        Question:  Reasons:  Answer:  Already adequately anticoagulated on oral Anticoagulants    10/23/24 1427                          I have completed this tele-visit without the assistance of a telepresenter.    The attending portion of this evaluation, treatment, and documentation was performed per Deborah Miller MD via Telemedicine AudioVisual using the secure prettysecrets software platform with 2 way audio/video. The provider was located off-site and the patient is located in the hospital. The aforementioned video software was utilized to document the relevant history and physical exam    Deborah Miller MD  Department of Hospital Medicine   [facility]

## 2024-10-29 NOTE — PROGRESS NOTES
[facility]  Nurses Note -- 4 Eyes      10/29/2024       Skin assessed on: Q Shift      [x] No Pressure Injuries Present    [x]Prevention Measures Documented    [] Yes LDA  for Pressure Injury Previously documented     [] Yes New Pressure Injury Discovered   [] LDA for New Pressure Injury Added      Attending RN:  Braulio Alonso, EMELY     Second RN:  Linda Karimi LPN

## 2024-10-29 NOTE — PT/OT/SLP PROGRESS
Occupational Therapy   Treatment    Name: Israel De Jesus  MRN: [id number]  Admitting Diagnosis:  Acute pulmonary embolism without acute cor pulmonale       Recommendations:     Discharge Recommendations: Moderate Intensity Therapy  Discharge Equipment Recommendations:  to be determined by next level of care  Barriers to discharge:   (unsteady with poor safety awareness, fall risk)    Assessment:     Israel De Jesus is a 64 y.o. male with a medical diagnosis of Acute pulmonary embolism without acute cor pulmonale.   Performance deficits affecting function are weakness, impaired endurance, impaired self care skills, impaired functional mobility, gait instability, impaired balance, decreased upper extremity function. The patient participated in OT with encouragement 2* c/o feeling tired. The patient performed bed mobility with CGA/SBA and tolerated sitting on the EOB ~15 min ro perform grooming tasks.     Rehab Prognosis:  Good and Fair; patient would benefit from acute skilled OT services to address these deficits and reach maximum level of function.       Plan:     Patient to be seen  (3-5x/week) to address the above listed problems via self-care/home management, therapeutic activities, therapeutic exercises  Plan of Care Expires: 11/17/24  Plan of Care Reviewed with: patient    Subjective     Chief Complaint: feels tired  Patient/Family Comments/goals: agreeable to perform grooming tasks seated on the EOB  Pain/Comfort:  Pain Rating 1: 0/10    Objective:     Communicated with: Braulio foreman prior to session.  Patient found HOB elevated with bed alarm, telemetry, peripheral IV upon OT entry to room.    General Precautions: Standard, fall    Orthopedic Precautions:N/A  Braces: N/A  Respiratory Status: Room air     Occupational Performance:     Bed Mobility:    Patient completed Scooting/Bridging with stand by assistance  Patient completed Supine to Sit with stand by assistance and contact guard  assistance  Patient completed Sit to Supine with stand by assistance     Functional Mobility/Transfers:  Functional Mobility: The patient sat on the EOB ~15 min with (S) and no c/o dizziness. The patient declined to sit in the chair despite education and encopuragement. The patient  scooted along the EOB with SBA and return to supine.     Activities of Daily Living:  Feeding:  modified independence and set up assist to drink water     Grooming: supervision and set up assist to brush teeth, use mouthwash and comb hair while seated on the EOB    Upper Body Dressing: contact guard assistance        [facility] 6 Click ADL: 17    Treatment & Education:  Performed self care and functional mobility as noted above    Patient left HOB elevated with all lines intact, call button in reach, and bed alarm on    GOALS:   Multidisciplinary Problems       Occupational Therapy Goals          Problem: Occupational Therapy    Goal Priority Disciplines Outcome Interventions   Occupational Therapy Goal     OT, PT/OT Progressing    Description: Goals to be met by: 11/7/2024     Patient will increase functional independence with ADLs by performing:    UE Dressing with Modified [place].  LE Dressing with Modified [place].  Grooming while standing at sink with Modified [place] and Assistive Devices as needed.  Toileting from toilet with Modified [place] and Assistive Devices as needed for hygiene and clothing management.   Supine to sit with Modified [place].  Step transfer with Modified [place]  Toilet transfer to toilet with Modified [place].  Upper extremity exercise program x15 reps per handout, with independence.                         Time Tracking:     OT Date of Treatment: 10/29/24  OT Start Time: 1136  OT Stop Time: 1156  OT Total Time (min): 20 min    Billable Minutes:Self Care/Home Management 10  Therapeutic Activity 10  Total Time 20    OT/SONA: OT          10/29/2024

## 2024-10-29 NOTE — PLAN OF CARE
Recommendation:  1. Continue pt on cardiac diet 2000mL  2. Encourage intake at meals  3. Monitor weihgt/labs  4. RD to follow to monitor PO intake    Goals:  Pt will continue to consume % EEN/EPN by RD follow-up

## 2024-10-29 NOTE — NURSING
Received report from EMELY Ramsey and care assumed. Patient lying in bed watching TV, NAD noted. Safety precautions maintained.    [facility]  Nurses Note -- 4 Eyes      10/28/2024       Skin assessed on: Q Shift      [x] No Pressure Injuries Present    []Prevention Measures Documented    [] Yes LDA  for Pressure Injury Previously documented     [] Yes New Pressure Injury Discovered   [] LDA for New Pressure Injury Added      Attending RN:  Linda Karimi LPN     Second RN:  Braulio Alonso RN

## 2024-10-29 NOTE — PLAN OF CARE
Patient AAOx4 and able to make needs known. Fall risk monitoring remain in place at bedside. No acute distress noted, patient free from falls or injury this shift.  Bed in low position, wheels locked, call light in reach for assistance, plan of care continued.      Problem: Infection  Goal: Absence of Infection Signs and Symptoms  Outcome: Progressing     Problem: Acute Kidney Injury/Impairment  Goal: Fluid and Electrolyte Balance  Outcome: Progressing

## 2024-10-29 NOTE — SUBJECTIVE & OBJECTIVE
Interval History: Pt noted to be afebrile and hemodynamically stable. Stable respiratory status. No acute events overnight. No new complaints this morning. Doing well overall but feels tired. Eating and drinking well. Having regular Bms. Pending SNF placement.       Review of Systems   Constitutional:  Positive for activity change and fatigue. Negative for fever.   Respiratory:  Negative for cough and shortness of breath.    Cardiovascular:  Negative for chest pain.   Gastrointestinal:  Negative for abdominal pain.   Neurological:  Negative for dizziness and headaches.   Psychiatric/Behavioral:  Negative for confusion.    All other systems reviewed and are negative.    Objective:     Vital Signs (Most Recent):  Temp: 97 °F (36.1 °C) (10/29/24 1114)  Pulse: 64 (10/29/24 1114)  Resp: 18 (10/29/24 1114)  BP: 113/68 (10/29/24 1114)  SpO2: 97 % (10/29/24 1114) Vital Signs (24h Range):  Temp:  [97 °F (36.1 °C)-98.7 °F (37.1 °C)] 97 °F (36.1 °C)  Pulse:  [64-84] 64  Resp:  [18] 18  SpO2:  [94 %-97 %] 97 %  BP: (104-141)/(57-81) 113/68     Weight: 60.3 kg (132 lb 15 oz)  Body mass index is 21.46 kg/m².    Intake/Output Summary (Last 24 hours) at 10/29/2024 1338  Last data filed at 10/29/2024 0930  Gross per 24 hour   Intake 840 ml   Output 700 ml   Net 140 ml         Physical Exam  Vitals and nursing note reviewed.   Constitutional:       Appearance: Normal appearance.   HENT:      Head: Normocephalic and atraumatic.   Eyes:      Extraocular Movements: Extraocular movements intact.      Pupils: Pupils are equal, round, and reactive to light.   Cardiovascular:      Rate and Rhythm: Normal rate and regular rhythm.   Pulmonary:      Effort: Pulmonary effort is normal. No respiratory distress.   Abdominal:      General: Abdomen is flat. There is no distension.   Musculoskeletal:         General: Normal range of motion.      Cervical back: Normal range of motion.   Neurological:      General: No focal deficit present.       Mental Status: He is alert and oriented to person, place, and time.   Psychiatric:         Mood and Affect: Mood normal.         Behavior: Behavior normal.             Significant Labs: All pertinent labs within the past 24 hours have been reviewed.  CBC:   Recent Labs   Lab 10/29/24  0414   WBC 6.52   HGB 10.3*   HCT 32.5*        CMP:   Recent Labs   Lab 10/29/24  0414      K 3.9      CO2 25      BUN 17   CREATININE 0.9   CALCIUM 9.0   ANIONGAP 9       Significant Imaging: I have reviewed all pertinent imaging results/findings within the past 24 hours.

## 2024-10-29 NOTE — PT/OT/SLP PROGRESS
Physical Therapy Treatment    Patient Name:  Israel De Jesus   MRN:  [id number]    Recommendations:     Discharge Recommendations: Moderate Intensity Therapy  Discharge Equipment Recommendations: none  Barriers to discharge: None    Assessment:     Israel De Jesus is a 64 y.o. male admitted with a medical diagnosis of Acute pulmonary embolism without acute cor pulmonale.  He presents with the following impairments/functional limitations: weakness, impaired endurance, impaired functional mobility, gait instability, decreased lower extremity function, impaired cognition, impaired balance, decreased safety awareness, pain, decreased coordination, decreased ROM, decreased upper extremity function .    Rehab Prognosis: Good; patient would benefit from acute skilled PT services to address these deficits and reach maximum level of function.    Recent Surgery: * No surgery found *      Plan:     During this hospitalization, patient to be seen 3 x/week to address the identified rehab impairments via gait training, therapeutic activities, therapeutic exercises and progress toward the following goals:    Plan of Care Expires:  10/31/24    Subjective     Chief Complaint: tired   Patient/Family Comments/goals: pt is agreeable to therapy  Pain/Comfort:  Pain Rating 1: 0/10  Pain Rating Post-Intervention 1: 0/10      Objective:     Communicated with nurse  prior to session.  Patient found HOB elevated with telemetry, bed alarm, peripheral IV upon PT entry to room.     General Precautions: Standard, fall  Orthopedic Precautions: N/A  Braces: N/A  Respiratory Status: Room air     Functional Mobility:  Bed Mobility:     Rolling Left:  supervision  Scooting: supervision and stand by assistance  Supine to Sit: contact guard assistance and minimum assistance  Sit to Supine: contact guard assistance  Transfers:     Sit to Stand: x 4 trials from bed contact guard assistance with rolling walker  Gait:  pt ambulated 22 ft  with RW, CGA  ( chair followed). Noted with decreased bo,decreased step length, min tremors,  no LOB. VC's for safety technique and walker management .   Balance:  good in sitting, fair in standing.       AM-PAC 6 CLICK MOBILITY  Turning over in bed (including adjusting bedclothes, sheets and blankets)?: 4  Sitting down on and standing up from a chair with arms (e.g., wheelchair, bedside commode, etc.): 3  Moving from lying on back to sitting on the side of the bed?: 3  Moving to and from a bed to a chair (including a wheelchair)?: 3  Need to walk in hospital room?: 3  Climbing 3-5 steps with a railing?: 2  Basic Mobility Total Score: 18       Treatment & Education:  Pt tolerated prolong static standing with RW , CGA while getting diaper changed ( pt soiled with BM ) .   Instructed pt to perform BLE AP, QS, GS while in bed throughout the day.     Patient left HOB elevated ( pt declined to sit up in chair despite encouragement and education with heels offloading  all lines intact, call button in reach, bed alarm on, and nurse  notified..    GOALS:   Multidisciplinary Problems       Physical Therapy Goals          Problem: Physical Therapy    Goal Priority Disciplines Outcome Interventions   Physical Therapy Goal     PT, PT/OT Progressing    Description: Goals to be met by: 10/31/24     Patient will increase functional independence with mobility by performin. Pt to be (I) with bed mobility.  2. Pt to transfer with (S).  3. Pt to ambulate 50' with sw and (S).  4. Pt to be (I) with HEP.                         Time Tracking:     PT Received On: 10/29/24  PT Start Time: 1040     PT Stop Time: 1103  PT Total Time (min): 23 min     Billable Minutes: Gait Training 10 and Therapeutic Activity 13    Treatment Type: Treatment  PT/PTA: PTA     Number of PTA visits since last PT visit: 3     10/29/2024

## 2024-10-29 NOTE — PLAN OF CARE
NOEMI Mgr contacted Lynda at [facility] to inquire about auth for SNF. Lynda stated that auth is still pending.

## 2024-10-29 NOTE — PROGRESS NOTES
"[facility] - Telemetry  Adult Nutrition  Progress Note    SUMMARY       Recommendations    Recommendation:  1. Continue pt on cardiac diet 2000mL  2. Encourage intake at meals  3. Monitor weihgt/labs  4. RD to follow to monitor PO intake    Goals:  Pt will continue to consume % EEN/EPN by RD follow-up  Nutrition Goal Status: new  Communication of RD Recs:  (POC)    Assessment and Plan  Nutrition Problem  Inadequate energy intake    Related to (etiology):   Depression    Signs and Symptoms (as evidenced by):   14% weight loss x 6 months  Suicidal Ideation 2/14/24    Interventions:  Collaboration by nutritional professional with other providers    Nutrition Diagnosis Status:   Improving (Intake %)    Malnutrition Assessment  NFPE not assessed at this time, pt refused  Weight Loss (Malnutrition): greater than 10% in 6 months (14% weight loss x 6 months)     Reason for Assessment  Reason For Assessment: RD follow-up  Diagnosis: cardiac disease  General Information Comments: Pt admitted with Acute pulmonary embolism without acute cor pulmonale. Currently on cardiac 2000mL diet. Spoke with pt, reports intaek at 100%, having a strong appetite. NFPE not assessed at this time, pt refused. Rigo 19- skin intact. Recent weight changes of 22lb weight loss x 6 months. Pt shares that weight loss is due to depression.  Nutrition Discharge Planning: d/c on cardiac diet with Iron supplement    Nutrition Risk Screen  Nutrition Risk Screen: no indicators present    Nutrition/Diet History  Patient Reported Diet/Restrictions/Preferences: heart healthy  Food Preferences: NEELA  Spiritual, Cultural Beliefs, Roman Catholic Practices, Values that Affect Care: no  Factors Affecting Nutritional Intake: None identified at this time    Anthropometrics  Temp: 97 °F (36.1 °C)  Height Method: Stated  Height: 5' 6" (167.6 cm)  Height (inches): 66 in  Weight Method: Bed Scale  Weight: 60.3 kg (132 lb 15 oz)  Weight (lb): 132.94 lb  Ideal Body " Weight (IBW), Male: 142 lb  % Ideal Body Weight, Male (lb): 93.62 %  BMI (Calculated): 21.5  BMI Grade: 18.5-24.9 - normal  Weight Loss: unintentional  Usual Body Weight (UBW), k.3 kg (24)  Weight Change Amount: 22 lb 0.7 oz  % Usual Body Weight: 85.95  % Weight Change From Usual Weight: -14.23 %     Lab/Procedures/Meds  Pertinent Labs Reviewed: reviewed  Pertinent Labs Comments: Hemglobin 10.3(L) Hematocrit 32.5(L)  Pertinent Medications Reviewed: reviewed  Pertinent Medications Comments: amlodipine, apixaban, folic acid & thiamine, MTV, pantoprazole, polyethylene glycol    Nutrition Related Social Determinants of Health:  SDOH: Unable to assess at this time.     Estimated/Assessed Needs  Weight Used For Calorie Calculations: 60.3 kg (132 lb 15 oz)  Energy Calorie Requirements (kcal): 1809kcal (30 kcal/kg)  Energy Need Method: Kcal/kg  Protein Requirements: 72g (1.2 g/kg)  Weight Used For Protein Calculations: 60.3 kg (132 lb 15 oz)  Estimated Fluid Requirement Method: RDA Method  RDA Method (mL): 1809     Nutrition Prescription Ordered  Current Diet Order: Cardiac Diet 2000mL    Evaluation of Received Nutrient/Fluid Intake  I/O: 120/0  Energy Calories Required: meeting needs  Protein Required: meeting needs  Fluid Required: meeting needs  Comments: LBM: 10/28  Tolerance: not tolerating  % Intake of Estimated Energy Needs: 75 - 100 %  % Meal Intake: 75 - 100 %    Nutrition Risk  Level of Risk/Frequency of Follow-up:  (1x weekly)     Monitor and Evaluation  Food and Nutrient Intake: energy intake  Food and Nutrient Adminstration: diet order  Physical Activity and Function: nutrition-related ADLs and IADLs  Anthropometric Measurements: weight  Biochemical Data, Medical Tests and Procedures: electrolyte and renal panel, lipid profile  Nutrition-Focused Physical Findings: overall appearance     Nutrition Follow-Up  RD Follow-up?: Yes

## 2024-10-30 PROCEDURE — 97535 SELF CARE MNGMENT TRAINING: CPT

## 2024-10-30 PROCEDURE — 97530 THERAPEUTIC ACTIVITIES: CPT

## 2024-10-30 PROCEDURE — 94761 N-INVAS EAR/PLS OXIMETRY MLT: CPT

## 2024-10-30 PROCEDURE — 25000003 PHARM REV CODE 250: Performed by: INTERNAL MEDICINE

## 2024-10-30 PROCEDURE — 99900035 HC TECH TIME PER 15 MIN (STAT)

## 2024-10-30 PROCEDURE — 25000003 PHARM REV CODE 250: Performed by: HOSPITALIST

## 2024-10-30 PROCEDURE — 11000001 HC ACUTE MED/SURG PRIVATE ROOM

## 2024-10-30 RX ORDER — HYDROCODONE BITARTRATE AND ACETAMINOPHEN 5; 325 MG/1; MG/1
1 TABLET ORAL EVERY 6 HOURS PRN
Qty: 10 TABLET | Refills: 0 | Status: SHIPPED | OUTPATIENT
Start: 2024-10-30

## 2024-10-30 RX ORDER — IBUPROFEN 200 MG
1 TABLET ORAL DAILY
Qty: 30 PATCH | Refills: 0 | Status: SHIPPED | OUTPATIENT
Start: 2024-10-30

## 2024-10-30 RX ADMIN — APIXABAN 5 MG: 5 TABLET, FILM COATED ORAL at 08:10

## 2024-10-30 RX ADMIN — PANTOPRAZOLE SODIUM 40 MG: 40 TABLET, DELAYED RELEASE ORAL at 08:10

## 2024-10-30 RX ADMIN — POLYETHYLENE GLYCOL 3350 17 G: 17 POWDER, FOR SOLUTION ORAL at 08:10

## 2024-10-30 RX ADMIN — AMLODIPINE BESYLATE 5 MG: 5 TABLET ORAL at 08:10

## 2024-10-30 RX ADMIN — DIAZEPAM 5 MG: 5 TABLET ORAL at 08:10

## 2024-10-30 RX ADMIN — METOPROLOL TARTRATE 100 MG: 50 TABLET, FILM COATED ORAL at 08:10

## 2024-10-30 RX ADMIN — THIAMINE HCL TAB 100 MG 100 MG: 100 TAB at 08:10

## 2024-10-30 RX ADMIN — DULOXETINE HYDROCHLORIDE 60 MG: 30 CAPSULE, DELAYED RELEASE ORAL at 08:10

## 2024-10-30 RX ADMIN — THERA TABS 1 TABLET: TAB at 08:10

## 2024-10-30 RX ADMIN — FOLIC ACID 1 MG: 1 TABLET ORAL at 08:10

## 2024-10-30 NOTE — PLAN OF CARE
NOEMI Aguilarr contacted [facility] to inquire about auth for SNF. SW was told that auth is still pending.     Addendum    Secure message received from Radha PT/OT to inform that patient stated that he does not want to go to SNF anymore he wants to go home so that he can pay his rent on time. FLORESITA informed Dr. Miller who stated that patient will be discharging home tomorrow  with HH.

## 2024-10-30 NOTE — PROGRESS NOTES
[facility] Medicine  Telemedicine Progress Note    Patient Name: Israel De Jesus  MRN: [id number]  Patient Class: IP- Inpatient   Admission Date: 10/23/2024  Length of Stay: 6 days  Attending Physician: Deborah Miller MD  Primary Care Provider: Nuha Lynn MD          Subjective:     Principal Problem:Acute pulmonary embolism without acute cor pulmonale        HPI:  64 y.o. male with recent acute PE on Eliquis, hypertension alcohol use disorder, and chronic anemia presents with complaint of chest pain.  Acute onset, duration 1 week, pleuritic in nature, associated with chronic cough, exacerbated with deep inspiration.  Reports he has missed some doses of Eliquis.  Denies fever, chills palpitations, syncope, nausea vomiting, diarrhea.  In the ED, vitals reassuring.  Labs with hyponatremia, metabolic acidosis, JINA, and minimal troponin elevation.  CT head and chest x-ray unremarkable.  Eliquis resumed.  Placed in observation.    Overview/Hospital Course:  Mr. De Jesus was hospitalized with acute kidney injury.  Cr double his baseline.  Patient is in need of IV fluids for the treatment of JINA.  Due to a shortage of IV fluids, patient received oral rehydration.  Patient must receive this treatment in a hospital location due to the risk of adverse effects, insufficient response to treatment, or other potential complications of disease such as organ failure.  Renal function improved, but developed electrolyte abnormalities with oral rehydration including hypokalemia and hyponatremia.  Cautious replacement due to impaired renal function and continue tele monitoring with increased arrhythmia risk necessitating continued in hospital care.  Unable to walk 10/25/24, not at baseline mobility function.  Plan is for SNF.  More confused 10/26/24.  At presentation patient and son stated last EtOH drink 8 days prior, but concern for possible EtOH withdrawal.  Continue diazepam and monitoring.   Mental status and tremors improved.  SNF authorization still pending.    Interval History: Pt noted to be afebrile and hemodynamically stable. Stable respiratory status. No acute events overnight. No new complaints this morning.  Pending SNF placement. Medically stable for DC.      Review of Systems   Constitutional:  Positive for activity change and fatigue. Negative for fever.   Respiratory:  Negative for cough and shortness of breath.    Cardiovascular:  Negative for chest pain.   Gastrointestinal:  Negative for abdominal pain.   Neurological:  Negative for dizziness and headaches.   Psychiatric/Behavioral:  Negative for confusion.    All other systems reviewed and are negative.    Objective:     Vital Signs (Most Recent):  Temp: 97.3 °F (36.3 °C) (10/30/24 1050)  Pulse: 75 (10/30/24 1108)  Resp: 18 (10/30/24 1050)  BP: 131/69 (10/30/24 1050)  SpO2: 95 % (10/30/24 1050) Vital Signs (24h Range):  Temp:  [97.3 °F (36.3 °C)-98.9 °F (37.2 °C)] 97.3 °F (36.3 °C)  Pulse:  [59-75] 75  Resp:  [18] 18  SpO2:  [95 %-98 %] 95 %  BP: (116-161)/(67-96) 131/69     Weight: 60.3 kg (132 lb 15 oz)  Body mass index is 21.46 kg/m².    Intake/Output Summary (Last 24 hours) at 10/30/2024 1317  Last data filed at 10/29/2024 1734  Gross per 24 hour   Intake 480 ml   Output --   Net 480 ml         Physical Exam  Vitals and nursing note reviewed.   Constitutional:       Appearance: Normal appearance.   HENT:      Head: Normocephalic and atraumatic.   Eyes:      Extraocular Movements: Extraocular movements intact.      Pupils: Pupils are equal, round, and reactive to light.   Cardiovascular:      Rate and Rhythm: Normal rate and regular rhythm.   Pulmonary:      Effort: Pulmonary effort is normal. No respiratory distress.   Abdominal:      General: Abdomen is flat. There is no distension.   Musculoskeletal:         General: Normal range of motion.      Cervical back: Normal range of motion.   Neurological:      General: No focal deficit  present.      Mental Status: He is alert and oriented to person, place, and time.   Psychiatric:         Mood and Affect: Mood normal.         Behavior: Behavior normal.             Significant Labs: All pertinent labs within the past 24 hours have been reviewed.  CBC:   Recent Labs   Lab 10/29/24  0414   WBC 6.52   HGB 10.3*   HCT 32.5*        CMP:   Recent Labs   Lab 10/29/24  0414      K 3.9      CO2 25      BUN 17   CREATININE 0.9   CALCIUM 9.0   ANIONGAP 9       Significant Imaging: I have reviewed all pertinent imaging results/findings within the past 24 hours.    Assessment/Plan:      * Acute pulmonary embolism without acute cor pulmonale  Stable on room air, resume apixaban    Debility    -Patient noted to be medically stable for discharge at this time  -Has been able to work with OT/PT who recommend placement for further rehabilitation  -Patient pending placement, continue in-hospital care as stated above in the meantime  -More than 20 minutes of my time has been spent discussing and planning just her safe disposition with patient/family/CM/SW/Nursing staff (not including other time spent in clinical discussion and management)  -CM/SW following, appreciate input   -Will place DC orders once placement has been secured      Alcohol withdrawal syndrome, with delirium  Despite initial history, suspect symptoms may be related to EtOH withdrawal.  Continue MVI/thiamine/folate and diazepam.  Continues to improve, decrease diazepam.    Macrocytic anemia  Patient H/H stable and consistent with baseline. No evidence acute bleeding or indication for transfusion at this time.      Recent Labs     10/25/24  0439 10/26/24  0718   HGB 10.7* 11.6*   HCT 30.7* 35.3*       Plan  - Monitor serial CBC: Daily  - Transfuse PRBC if patient becomes hemodynamically unstable, symptomatic or H/H drops below 7/21.  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  Continue home regimen of duloxetine    Hypertension  Patients blood pressure range in the last 24 hours was: BP  Min: 145/92  Max: 157/101.The patient's inpatient anti-hypertensive regimen is listed below:  Current Antihypertensives  Amlodipine 5 mg daily  Metoprolol 100 mg    Plan  - BP is controlled, no changes needed to their regimen      VTE Risk Mitigation (From admission, onward)           Ordered     apixaban tablet 5 mg  2 times daily         10/23/24 1427     IP VTE HIGH RISK PATIENT  Once         10/23/24 1427     Place sequential compression device  Until discontinued         10/23/24 1427     Reason for No Pharmacological VTE Prophylaxis  Once        Question:  Reasons:  Answer:  Already adequately anticoagulated on oral Anticoagulants    10/23/24 1427                          I have completed this tele-visit without the assistance of a telepresenter.    The attending portion of this evaluation, treatment, and documentation was performed per Deborah Miller MD via Telemedicine AudioVisual using the secure [employer] software platform with 2 way audio/video. The provider was located off-site and the patient is located in the hospital. The aforementioned video software was utilized to document the relevant history and physical exam    Deborah Miller MD  Department of Hospital Medicine   [facility]

## 2024-10-30 NOTE — SUBJECTIVE & OBJECTIVE
Interval History: Pt noted to be afebrile and hemodynamically stable. Stable respiratory status. No acute events overnight. No new complaints this morning.  Pending SNF placement. Medically stable for DC.      Review of Systems   Constitutional:  Positive for activity change and fatigue. Negative for fever.   Respiratory:  Negative for cough and shortness of breath.    Cardiovascular:  Negative for chest pain.   Gastrointestinal:  Negative for abdominal pain.   Neurological:  Negative for dizziness and headaches.   Psychiatric/Behavioral:  Negative for confusion.    All other systems reviewed and are negative.    Objective:     Vital Signs (Most Recent):  Temp: 97.3 °F (36.3 °C) (10/30/24 1050)  Pulse: 75 (10/30/24 1108)  Resp: 18 (10/30/24 1050)  BP: 131/69 (10/30/24 1050)  SpO2: 95 % (10/30/24 1050) Vital Signs (24h Range):  Temp:  [97.3 °F (36.3 °C)-98.9 °F (37.2 °C)] 97.3 °F (36.3 °C)  Pulse:  [59-75] 75  Resp:  [18] 18  SpO2:  [95 %-98 %] 95 %  BP: (116-161)/(67-96) 131/69     Weight: 60.3 kg (132 lb 15 oz)  Body mass index is 21.46 kg/m².    Intake/Output Summary (Last 24 hours) at 10/30/2024 1317  Last data filed at 10/29/2024 1734  Gross per 24 hour   Intake 480 ml   Output --   Net 480 ml         Physical Exam  Vitals and nursing note reviewed.   Constitutional:       Appearance: Normal appearance.   HENT:      Head: Normocephalic and atraumatic.   Eyes:      Extraocular Movements: Extraocular movements intact.      Pupils: Pupils are equal, round, and reactive to light.   Cardiovascular:      Rate and Rhythm: Normal rate and regular rhythm.   Pulmonary:      Effort: Pulmonary effort is normal. No respiratory distress.   Abdominal:      General: Abdomen is flat. There is no distension.   Musculoskeletal:         General: Normal range of motion.      Cervical back: Normal range of motion.   Neurological:      General: No focal deficit present.      Mental Status: He is alert and oriented to person, place,  and time.   Psychiatric:         Mood and Affect: Mood normal.         Behavior: Behavior normal.             Significant Labs: All pertinent labs within the past 24 hours have been reviewed.  CBC:   Recent Labs   Lab 10/29/24  0414   WBC 6.52   HGB 10.3*   HCT 32.5*        CMP:   Recent Labs   Lab 10/29/24  0414      K 3.9      CO2 25      BUN 17   CREATININE 0.9   CALCIUM 9.0   ANIONGAP 9       Significant Imaging: I have reviewed all pertinent imaging results/findings within the past 24 hours.

## 2024-10-30 NOTE — PLAN OF CARE
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   Follow-up Information       Nuha Lynn MD. Schedule an appointment as soon as possible for a visit in 7 day(s).    Specialty: Family Medicine  Why: Make an appointment to be seen in 7 days for a hospital followup appointment.  Contact information:  [address] [place] [address]  [address] [phone]                               I have seen and examined this patient face to face today. My clinical findings that support the need for the home health skilled services and home bound status are the following:  Weakness/numbness causing balance and gait disturbance due to Weakness/Debility making it taxing to leave home.    Allergies:Review of patient's allergies indicates:  No Known Allergies    Diet: cardiac diet    Activities: activity as tolerated    Nursing:   SN to complete comprehensive assessment including routine vital signs. Instruct on disease process and s/s of complications to  report to MD. Review/verify medication list sent home with the patient at time of discharge  and instruct patient/caregiver as needed. Frequency may be adjusted depending on start of care date.    Notify MD if SBP > 160 or < 90; DBP > 90 or < 50; HR > 120 or < 50; Temp > 101;       CONSULTS:    Physical Therapy to evaluate and treat. Evaluate for home safety and equipment needs; Establish/upgrade home exercise program. Perform / instruct on therapeutic exercises, gait training, transfer training, and Range of Motion.  Occupational Therapy to evaluate and treat. Evaluate home environment for safety and equipment needs. Perform/Instruct on transfers, ADL training, ROM, and therapeutic exercises.  Aide to provide assistance with personal care, ADLs, and vital signs.    MISCELLANEOUS CARE:  Routine Skin for Bedridden Patients: Instruct patient/caregiver to apply moisture barrier cream to all skin folds and wet areas in perineal area daily and after baths and all bowel movements.    WOUND CARE ORDERS:  No       Medications: Review discharge medications with patient and family and provide education.      Current Discharge Medication List        START taking these medications    Details   diazePAM (VALIUM) 5 MG tablet Take 1 tablet (5 mg total) by mouth every evening.    Associated Diagnoses: Generalized anxiety disorder; Other acute pulmonary embolism without acute cor pulmonale      HYDROcodone-acetaminophen (NORCO) 5-325 mg per tablet Take 1 tablet by mouth every 6 (six) hours as needed for Pain.  Qty: 10 tablet, Refills: 0    Comments: Quantity prescribed more than 7 day supply? No  Associated Diagnoses: Alcohol withdrawal syndrome, with delirium      nicotine (NICODERM CQ) 14 mg/24 hr Place 1 patch onto the skin once daily.  Qty: 30 patch, Refills: 0           CONTINUE these medications which have NOT CHANGED    Details   amLODIPine (NORVASC) 5 MG tablet Take 1 tablet (5 mg total) by mouth once daily.  Qty: 90 tablet,  Refills: 3    Comments: .      apixaban (ELIQUIS) 5 mg Tab Take 1 tablet (5 mg total) by mouth 2 (two) times daily.  Qty: 60 tablet, Refills: 2      DULoxetine (CYMBALTA) 60 MG capsule Take 1 capsule (60 mg total) by mouth once daily.  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  Qty: 30 tablet, Refills: 0      metoprolol tartrate (LOPRESSOR) 100 MG tablet Take 1 tablet (100 mg total) by mouth 2 (two) times daily.  Qty: 180 tablet, Refills: 3    Comments: .      multivitamin (ONE DAILY MULTIVITAMIN) per tablet Take 1 tablet by mouth once daily.      pantoprazole (PROTONIX) 40 MG tablet Take 1 tablet (40 mg total) by mouth once daily.  Qty: 90 tablet, Refills: 3      thiamine 100 MG tablet Take 1 tablet (100 mg total) by mouth once daily.  Qty: 30 tablet, Refills: 0             I certify that this patient is confined to his home and needs intermittent skilled nursing care, physical therapy and occupational therapy.    Deborah Miller MD  10/30/2024 4:34 PM  Department of Hospital medicine

## 2024-10-30 NOTE — PT/OT/SLP PROGRESS
"Occupational Therapy   Treatment    Name: Israel De Jesus  MRN: [id number]  Admitting Diagnosis:  Acute pulmonary embolism without acute cor pulmonale       Recommendations:     Discharge Recommendations: Moderate Intensity Therapy, if the patient is D/C to home, he will benefit from LIT and caregiver assist/(S)  Discharge Equipment Recommendations:  shower chair  Barriers to discharge:   (fall risk, poor safety awareness)    Assessment:     Israel De Jesus is a 64 y.o. male with a medical diagnosis of Acute pulmonary embolism without acute cor pulmonale.  Performance deficits affecting function are impaired self care skills, impaired functional mobility, gait instability, impaired balance, decreased safety awareness.   The p[patient participated in OT with encouragement and education. The patient performed self care and functional mobility with SBA/(S). The patient was able to stand at the sink >10 min to perform grooming tasks with no unsteadiness or LOB. The patient stated he is anxious to go home because he "needs to pay his rent" and does not want to go to a rehab facility. OT informed  per patient request.       Rehab Prognosis:  Fair; patient would benefit from acute skilled OT services to address these deficits and reach maximum level of function.       Plan:     Patient to be seen  (3-5x/week) to address the above listed problems via self-care/home management, therapeutic activities, therapeutic exercises  Plan of Care Expires: 11/17/24  Plan of Care Reviewed with: patient    Subjective     Chief Complaint: feels tired and wants to stay in bed  Patient/Family Comments/goals: wants to go home  Pain/Comfort:  Pain Rating 1: 0/10    Objective:     Communicated with: Braulio foreman prior to session.  Patient found HOB elevated with Condom Catheter, telemetry, peripheral IV , [employer] tele monitor upon OT entry to room.    General Precautions: Standard, fall    Orthopedic Precautions:N/A  Braces: " "N/A  Respiratory Status: Room air     Occupational Performance:     Bed Mobility:    Patient completed Scooting/Bridging with stand by assistance  Patient completed Supine to Sit with stand by assistance  Patient completed Sit to Supine with stand by assistance     Functional Mobility/Transfers:  Patient completed Sit <> Stand Transfer with supervision and stand by assistance  with  rolling walker   Functional Mobility: The patient amb using a RW to the sink ~12' with SBA. The patient was able to stand at the sink for >10 min to perform grooming tasks with no unsteadiness and no c/o LE weakness. The patient refused to amb further or to sit in the chair for dinner and amb back to the bed with SBA/(S).     Activities of Daily Living:  Grooming: supervision and stand by assistance to stand at the sink with a RW to comb his hair, wash hands and face and brush teeth/rinse mouth with mouthwash.   Upper Body Dressing: contact guard assistance to don/doff back gown while seated on the EOB      [facility] 6 Click ADL: 20    Treatment & Education:  Performed self care and functional mobility as noted above  Discussed D/C plans if not accepted by SNF. The patient stated he is anxious to go home because he "needs to pay his rent" and does not want to go to a rehab facility.The patient states he will order groceries via [employer] delivery.   Educated the patient re: benefits of sitting in the chair. The patient refused to sit in the chair for dinner despite education and encouragement.     Patient left HOB elevated with all lines intact, call button in reach, bed alarm on, and nurse notified, Monica present    GOALS:   Multidisciplinary Problems       Occupational Therapy Goals          Problem: Occupational Therapy    Goal Priority Disciplines Outcome Interventions   Occupational Therapy Goal     OT, PT/OT Progressing    Description: Goals to be met by: 11/7/2024     Patient will increase functional independence with ADLs by " performing:    UE Dressing with Modified [place].  LE Dressing with Modified [place].  Grooming while standing at sink with Modified [place] and Assistive Devices as needed.  Toileting from toilet with Modified [place] and Assistive Devices as needed for hygiene and clothing management.   Supine to sit with Modified [place].  Step transfer with Modified [place]  Toilet transfer to toilet with Modified [place].  Upper extremity exercise program x15 reps per handout, with independence.                         Time Tracking:     OT Date of Treatment: 10/30/24  OT Start Time: 1504  OT Stop Time: 1527  OT Total Time (min): 23 min    Billable Minutes:Self Care/Home Management 15  Therapeutic Activity 8  Total Time 23    OT/SONA: OT          10/30/2024

## 2024-10-30 NOTE — PROGRESS NOTES
[facility] [place] 4 [place]      10/30/2024       Skin assessed on: Q Shift      [x] No Pressure Injuries Present    []Prevention Measures Documented    [] Yes LDA  for Pressure Injury Previously documented     [] Yes New Pressure Injury Discovered   [] LDA for New Pressure Injury Added      Attending RN:  Braulio Alonso RN     Second RN:  Elham Pruitt LPN

## 2024-10-31 VITALS
RESPIRATION RATE: 18 BRPM | DIASTOLIC BLOOD PRESSURE: 79 MMHG | HEART RATE: 70 BPM | WEIGHT: 132.94 LBS | HEIGHT: 66 IN | TEMPERATURE: 98 F | SYSTOLIC BLOOD PRESSURE: 142 MMHG | OXYGEN SATURATION: 98 % | BODY MASS INDEX: 21.36 KG/M2

## 2024-10-31 PROCEDURE — 25000003 PHARM REV CODE 250: Performed by: INTERNAL MEDICINE

## 2024-10-31 PROCEDURE — 99900035 HC TECH TIME PER 15 MIN (STAT)

## 2024-10-31 PROCEDURE — 25000003 PHARM REV CODE 250: Performed by: HOSPITALIST

## 2024-10-31 PROCEDURE — 94761 N-INVAS EAR/PLS OXIMETRY MLT: CPT

## 2024-10-31 RX ADMIN — FOLIC ACID 1 MG: 1 TABLET ORAL at 08:10

## 2024-10-31 RX ADMIN — AMLODIPINE BESYLATE 5 MG: 5 TABLET ORAL at 08:10

## 2024-10-31 RX ADMIN — PANTOPRAZOLE SODIUM 40 MG: 40 TABLET, DELAYED RELEASE ORAL at 08:10

## 2024-10-31 RX ADMIN — METOPROLOL TARTRATE 100 MG: 50 TABLET, FILM COATED ORAL at 08:10

## 2024-10-31 RX ADMIN — THERA TABS 1 TABLET: TAB at 08:10

## 2024-10-31 RX ADMIN — THIAMINE HCL TAB 100 MG 100 MG: 100 TAB at 08:10

## 2024-10-31 RX ADMIN — APIXABAN 5 MG: 5 TABLET, FILM COATED ORAL at 08:10

## 2024-10-31 RX ADMIN — DULOXETINE HYDROCHLORIDE 60 MG: 30 CAPSULE, DELAYED RELEASE ORAL at 08:10

## 2024-10-31 NOTE — DISCHARGE SUMMARY
[facility] Medicine  Discharge Summary      Patient Name: Israel De Jesus  MRN: [id number]  Patient Class: IP- Inpatient  Admission Date: 10/23/2024  Hospital Length of Stay: 7 days  Discharge Date and Time:  10/31/2024 1:00 PM  Attending Physician: Deborah Miller MD   Discharging Provider: Deborah Miller MD  Primary Care Provider: Nuha Lynn MD      HPI:   64 y.o. male with recent acute PE on Eliquis, hypertension alcohol use disorder, and chronic anemia presents with complaint of chest pain.  Acute onset, duration 1 week, pleuritic in nature, associated with chronic cough, exacerbated with deep inspiration.  Reports he has missed some doses of Eliquis.  Denies fever, chills palpitations, syncope, nausea vomiting, diarrhea.  In the ED, vitals reassuring.  Labs with hyponatremia, metabolic acidosis, JINA, and minimal troponin elevation.  CT head and chest x-ray unremarkable.  Eliquis resumed.  Placed in observation.    * No surgery found *      Hospital Course:   Mr. De Jesus was hospitalized with acute kidney injury.  Cr double his baseline.  Patient is in need of IV fluids for the treatment of JINA.  Due to a shortage of IV fluids, patient received oral rehydration.  Patient must receive this treatment in a hospital location due to the risk of adverse effects, insufficient response to treatment, or other potential complications of disease such as organ failure.  Renal function improved, but developed electrolyte abnormalities with oral rehydration including hypokalemia and hyponatremia.  Cautious replacement due to impaired renal function and continue tele monitoring with increased arrhythmia risk necessitating continued in hospital care.  Unable to walk 10/25/24, not at baseline mobility function.  Plan is for SNF.  More confused 10/26/24.  At presentation patient and son stated last EtOH drink 8 days prior, but concern for possible EtOH withdrawal.  Continue diazepam and monitoring.   Mental status and tremors improved.  SNF authorization still pending. Pt unable to get into SNF or get HH per insurance. OP PT/OT ordered. Pt DC home in stable condition.      Goals of Care Treatment Preferences:  Code Status: Full Code      Consults:   Consults (From admission, onward)          Status Ordering Provider     Inpatient virtual consult to Hospital Medicine  Once        Provider:  Deborah Miller MD    Completed ISAIAH HEIN JR            No new Assessment & Plan notes have been filed under this hospital service since the last note was generated.  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] 02/14/2024 Yes     Chronic    Macrocytic anemia [D53.9] 02/14/2024 Yes     Chronic    Alcohol withdrawal syndrome, with delirium [F10.931] 02/14/2024 Yes    Hypertension [I10] 04/03/2014 Yes     Chronic      Problems Resolved During this Admission:    Diagnosis Date Noted Date Resolved POA    Hyponatremia [E87.1] 10/25/2024 10/28/2024 No    Hypokalemia [E87.6] 10/24/2024 10/28/2024 No    Metabolic acidosis [E87.20] 10/23/2024 10/26/2024 Yes    JINA (acute kidney injury) [N17.9] 09/18/2024 10/28/2024 Yes    Hypomagnesemia [E83.42] 09/09/2024 10/27/2024 Yes       Discharged Condition: stable    Disposition: Home-Health Care [facility]    Follow Up:   Follow-up Information       Nuha Lynn MD. Schedule an appointment as soon as possible for a visit in 7 day(s).    Specialty: Family Medicine  Why: Hospital follow-up on 11/6/24 @10:30am  Contact information:  [address] [place] [address]  [place] [id number] [phone]                           Patient Instructions:      Ambulatory referral/consult to Smoking Cessation Program   Standing Status: Future   Referral Priority: Routine  "Referral Type: Consultation   Referral Reason: Specialty Services Required   Requested Specialty: CTTS   Number of Visits Requested: 1     Ambulatory referral/consult to Physical/Occupational Therapy   Standing Status: Future   Referral Priority: Routine Referral Type: Physical Medicine   Referral Reason: Specialty Services Required   Number of Visits Requested: 1     Diet Cardiac     Notify your health care provider if you experience any of the following:  temperature >100.4     Notify your health care provider if you experience any of the following:  persistent nausea and vomiting or diarrhea     Notify your health care provider if you experience any of the following:  severe uncontrolled pain     Notify your health care provider if you experience any of the following:  redness, tenderness, or signs of infection (pain, swelling, redness, odor or green/yellow discharge around incision site)     Notify your health care provider if you experience any of the following:  difficulty breathing or increased cough     Notify your health care provider if you experience any of the following:  severe persistent headache     Notify your health care provider if you experience any of the following:  worsening rash     Notify your health care provider if you experience any of the following:  persistent dizziness, light-headedness, or visual disturbances     Notify your health care provider if you experience any of the following:  increased confusion or weakness     Send follow up & questions to   Order Comments: Patient's primary care physician: Nuha Lynn MD     Activity as tolerated       Significant Diagnostic Studies: Labs: CMP No results for input(s): "NA", "K", "CL", "CO2", "GLU", "BUN", "CREATININE", "CALCIUM", "PROT", "ALBUMIN", "BILITOT", "ALKPHOS", "AST", "ALT", "ANIONGAP", "ESTGFRAFRICA", "EGFRNONAA" in the last 48 hours. and CBC No results for input(s): "WBC", "HGB", "HCT", "PLT" in the last 48 hours.    Pending " Diagnostic Studies:       None           Medications:  Reconciled Home Medications:      Medication List        START taking these medications      diazePAM 5 MG tablet  Commonly known as: VALIUM  Take 1 tablet (5 mg total) by mouth every evening.     HYDROcodone-acetaminophen 5-325 mg per tablet  Commonly known as: NORCO  Take 1 tablet by mouth every 6 (six) hours as needed for Pain.     nicotine 14 mg/24 hr  Commonly known as: NICODERM CQ  Place 1 patch onto the skin once daily.            CONTINUE taking these medications      amLODIPine 5 MG tablet  Commonly known as: NORVASC  Take 1 tablet (5 mg total) by mouth once daily.     apixaban 5 mg Tab  Commonly known as: ELIQUIS  Take 1 tablet (5 mg total) by mouth 2 (two) times daily.     DULoxetine 60 MG capsule  Commonly known as: CYMBALTA  Take 1 capsule (60 mg total) by mouth once daily.     folic acid 1 MG tablet  Commonly known as: FOLVITE  Take 1 tablet (1 mg total) by mouth once daily.     metoprolol tartrate 100 MG tablet  Commonly known as: LOPRESSOR  Take 1 tablet (100 mg total) by mouth 2 (two) times daily.     ONE DAILY MULTIVITAMIN per tablet  Generic drug: multivitamin  Take 1 tablet by mouth once daily.     pantoprazole 40 MG tablet  Commonly known as: PROTONIX  Take 1 tablet (40 mg total) by mouth once daily.     thiamine 100 MG tablet  Take 1 tablet (100 mg total) by mouth once daily.              Indwelling Lines/Drains at time of discharge:   Lines/Drains/Airways       Drain  Duration             Male External Urinary Catheter 10/23/24 1852 Large 7 days                    Time spent on the discharge of patient: 39 minutes         The attending portion of this evaluation, treatment, and documentation was performed per Deborah Miller MD via Telemedicine AudioVisual using the secure [employer] software platform with 2 way audio/video. The provider was located off-site and the patient is located in the hospital. The aforementioned video software was  utilized to document the relevant history and physical exam    Deborah Miller MD  Department of Hospital Medicine  [facility]

## 2024-10-31 NOTE — PLAN OF CARE
Problem: Adult Inpatient Plan of Care  Goal: Plan of Care Review  Outcome: Met  Goal: Patient-Specific Goal (Individualized)  Outcome: Met  Goal: Absence of Hospital-Acquired Illness or Injury  Outcome: Met  Goal: Optimal Comfort and Wellbeing  Outcome: Met  Goal: Readiness for Transition of Care  Outcome: Met     Problem: Infection  Goal: Absence of Infection Signs and Symptoms  Outcome: Met     Problem: Acute Kidney Injury/Impairment  Goal: Fluid and Electrolyte Balance  Outcome: Met  Goal: Improved Oral Intake  Outcome: Met  Goal: Effective Renal Function  Outcome: Met     Problem: Skin Injury Risk Increased  Goal: Skin Health and Integrity  Outcome: Met     Problem: Fall Injury Risk  Goal: Absence of Fall and Fall-Related Injury  Outcome: Met

## 2024-10-31 NOTE — PLAN OF CARE
Case Management Final Discharge Note      Discharge Disposition: home- PT/OT recommended SNF, but patient declined to go. Patient stated that he needs to go home so that he can pays his bills on time. Dr. Miller placed HH orders patient has medicaid no accepting facility. Ambulatory referral placed to PT/OT for outpatient therapy.     New DME ordered / company name: none    Relevant SDOH / Transition of Care Barriers:  none    Primary Caretaker and contact information: Kieran De Jesus (son) [phone]    Scheduled followup appointment: PCP    Referrals placed: none    Transportation: Patient's son Kieran will provide transportation    Patient and family educated on discharge services and updated on DC plan. Bedside RN notified, patient clear to discharge from Case Management Perspective.      10/31/24 1204   Final Note   Assessment Type Final Discharge Note   Anticipated Discharge Disposition Home   What phone number can be called within the next 1-3 days to see how you are doing after discharge? [phone]   Hospital Resources/Appts/Education Provided Appointments scheduled and added to AVS   Post-Acute Status   Post-Acute Authorization Placement  (SNF)   Post-Acute Placement Status Patient declined/refused

## 2024-10-31 NOTE — NURSING
[facility]  Nurses Note -- 4 Eyes      10/31/2024       Skin assessed on: Q Shift      [x] No Pressure Injuries Present    [x]Prevention Measures Documented    [] Yes LDA  for Pressure Injury Previously documented     [] Yes New Pressure Injury Discovered   [] LDA for New Pressure Injury Added      Attending RN:  Nora Gomez LPN     Second RN:  Elham

## 2024-10-31 NOTE — NURSING
AVS virtually reviewed with patient in its entirety with emphasis on diet, medications, follow-up appointments and reasons to return to the ED or contact the [facility] On Call Nurse Care Line. Patient also encouraged to utilize their patient portal. Ease and convenience of use reiterated. Education complete and patient voiced understanding. All questions answered. Discharge teaching complete

## 2024-11-01 NOTE — ASSESSMENT & PLAN NOTE
Patient's most recent potassium results are listed below.   Recent Labs     10/24/24  0422 10/25/24  0439 10/26/24  0718   K 2.8* 3.3* 4.5       Plan  - Replete potassium per protocol  - Monitor potassium Daily  - Patient's hypokalemia is improving   03-Nov-2024

## 2024-11-11 ENCOUNTER — HOSPITAL ENCOUNTER (EMERGENCY)
Facility: HOSPITAL | Age: 64
Discharge: PSYCHIATRIC OR SUBSTANCE USE FACILITY | End: 2024-11-12
Attending: EMERGENCY MEDICINE
Payer: MEDICAID

## 2024-11-11 DIAGNOSIS — F10.920 ALCOHOLIC INTOXICATION WITHOUT COMPLICATION: ICD-10-CM

## 2024-11-11 DIAGNOSIS — F32.A DEPRESSION WITH SUICIDAL IDEATION: Primary | ICD-10-CM

## 2024-11-11 DIAGNOSIS — R45.851 DEPRESSION WITH SUICIDAL IDEATION: Primary | ICD-10-CM

## 2024-11-11 DIAGNOSIS — F10.20 ALCOHOLISM: ICD-10-CM

## 2024-11-11 LAB
ALBUMIN SERPL BCP-MCNC: 2.6 G/DL (ref 3.5–5.2)
ALP SERPL-CCNC: 156 U/L (ref 40–150)
ALT SERPL W/O P-5'-P-CCNC: 14 U/L (ref 10–44)
AMPHET+METHAMPHET UR QL: NEGATIVE
ANION GAP SERPL CALC-SCNC: 12 MMOL/L (ref 8–16)
APAP SERPL-MCNC: <3 UG/ML (ref 10–20)
AST SERPL-CCNC: 19 U/L (ref 10–40)
BARBITURATES UR QL SCN>200 NG/ML: NEGATIVE
BASOPHILS # BLD AUTO: 0.05 K/UL (ref 0–0.2)
BASOPHILS NFR BLD: 0.8 % (ref 0–1.9)
BENZODIAZ UR QL SCN>200 NG/ML: ABNORMAL
BILIRUB SERPL-MCNC: 0.3 MG/DL (ref 0.1–1)
BILIRUB UR QL STRIP: NEGATIVE
BUN SERPL-MCNC: 7 MG/DL (ref 8–23)
BZE UR QL SCN: NEGATIVE
CALCIUM SERPL-MCNC: 8.5 MG/DL (ref 8.7–10.5)
CANNABINOIDS UR QL SCN: NEGATIVE
CHLORIDE SERPL-SCNC: 109 MMOL/L (ref 95–110)
CLARITY UR: CLEAR
CO2 SERPL-SCNC: 20 MMOL/L (ref 23–29)
COLOR UR: YELLOW
CREAT SERPL-MCNC: 1 MG/DL (ref 0.5–1.4)
CREAT UR-MCNC: 131.4 MG/DL (ref 23–375)
DIFFERENTIAL METHOD BLD: ABNORMAL
EOSINOPHIL # BLD AUTO: 0 K/UL (ref 0–0.5)
EOSINOPHIL NFR BLD: 0.2 % (ref 0–8)
ERYTHROCYTE [DISTWIDTH] IN BLOOD BY AUTOMATED COUNT: 15.9 % (ref 11.5–14.5)
EST. GFR  (NO RACE VARIABLE): >60 ML/MIN/1.73 M^2
ETHANOL SERPL-MCNC: 306 MG/DL
GLUCOSE SERPL-MCNC: 80 MG/DL (ref 70–110)
GLUCOSE UR QL STRIP: NEGATIVE
HCT VFR BLD AUTO: 33.9 % (ref 40–54)
HGB BLD-MCNC: 11.2 G/DL (ref 14–18)
HGB UR QL STRIP: NEGATIVE
IMM GRANULOCYTES # BLD AUTO: 0.01 K/UL (ref 0–0.04)
IMM GRANULOCYTES NFR BLD AUTO: 0.2 % (ref 0–0.5)
KETONES UR QL STRIP: NEGATIVE
LEUKOCYTE ESTERASE UR QL STRIP: NEGATIVE
LYMPHOCYTES # BLD AUTO: 2.2 K/UL (ref 1–4.8)
LYMPHOCYTES NFR BLD: 37 % (ref 18–48)
MAGNESIUM SERPL-MCNC: 1.7 MG/DL (ref 1.6–2.6)
MCH RBC QN AUTO: 34.5 PG (ref 27–31)
MCHC RBC AUTO-ENTMCNC: 33 G/DL (ref 32–36)
MCV RBC AUTO: 104 FL (ref 82–98)
METHADONE UR QL SCN>300 NG/ML: NEGATIVE
MONOCYTES # BLD AUTO: 0.2 K/UL (ref 0.3–1)
MONOCYTES NFR BLD: 3.6 % (ref 4–15)
NEUTROPHILS # BLD AUTO: 3.4 K/UL (ref 1.8–7.7)
NEUTROPHILS NFR BLD: 58.2 % (ref 38–73)
NITRITE UR QL STRIP: NEGATIVE
NRBC BLD-RTO: 0 /100 WBC
OPIATES UR QL SCN: NEGATIVE
PCP UR QL SCN>25 NG/ML: NEGATIVE
PH UR STRIP: 6 [PH] (ref 5–8)
PLATELET # BLD AUTO: 221 K/UL (ref 150–450)
PMV BLD AUTO: 9.3 FL (ref 9.2–12.9)
POTASSIUM SERPL-SCNC: 4 MMOL/L (ref 3.5–5.1)
PROT SERPL-MCNC: 5.9 G/DL (ref 6–8.4)
PROT UR QL STRIP: ABNORMAL
RBC # BLD AUTO: 3.25 M/UL (ref 4.6–6.2)
SODIUM SERPL-SCNC: 141 MMOL/L (ref 136–145)
SP GR UR STRIP: 1.02 (ref 1–1.03)
TOXICOLOGY INFORMATION: ABNORMAL
TSH SERPL DL<=0.005 MIU/L-ACNC: 0.56 UIU/ML (ref 0.4–4)
URN SPEC COLLECT METH UR: ABNORMAL
UROBILINOGEN UR STRIP-ACNC: NEGATIVE EU/DL
WBC # BLD AUTO: 5.89 K/UL (ref 3.9–12.7)

## 2024-11-11 PROCEDURE — 81003 URINALYSIS AUTO W/O SCOPE: CPT | Performed by: EMERGENCY MEDICINE

## 2024-11-11 PROCEDURE — 99285 EMERGENCY DEPT VISIT HI MDM: CPT

## 2024-11-11 PROCEDURE — 99215 OFFICE O/P EST HI 40 MIN: CPT | Mod: AF,HB,95, | Performed by: PSYCHIATRY & NEUROLOGY

## 2024-11-11 PROCEDURE — 25000003 PHARM REV CODE 250: Performed by: STUDENT IN AN ORGANIZED HEALTH CARE EDUCATION/TRAINING PROGRAM

## 2024-11-11 PROCEDURE — 85025 COMPLETE CBC W/AUTO DIFF WBC: CPT | Performed by: EMERGENCY MEDICINE

## 2024-11-11 PROCEDURE — 83735 ASSAY OF MAGNESIUM: CPT | Performed by: EMERGENCY MEDICINE

## 2024-11-11 PROCEDURE — 80307 DRUG TEST PRSMV CHEM ANLYZR: CPT | Performed by: EMERGENCY MEDICINE

## 2024-11-11 PROCEDURE — 84443 ASSAY THYROID STIM HORMONE: CPT | Performed by: EMERGENCY MEDICINE

## 2024-11-11 PROCEDURE — 80053 COMPREHEN METABOLIC PANEL: CPT | Performed by: EMERGENCY MEDICINE

## 2024-11-11 PROCEDURE — 82077 ASSAY SPEC XCP UR&BREATH IA: CPT | Performed by: EMERGENCY MEDICINE

## 2024-11-11 PROCEDURE — 80143 DRUG ASSAY ACETAMINOPHEN: CPT | Performed by: EMERGENCY MEDICINE

## 2024-11-11 RX ORDER — ALUMINUM HYDROXIDE, MAGNESIUM HYDROXIDE, AND SIMETHICONE 1200; 120; 1200 MG/30ML; MG/30ML; MG/30ML
30 SUSPENSION ORAL ONCE
Status: COMPLETED | OUTPATIENT
Start: 2024-11-11 | End: 2024-11-11

## 2024-11-11 RX ADMIN — ALUMINUM HYDROXIDE, MAGNESIUM HYDROXIDE, AND SIMETHICONE 30 ML: 1200; 120; 1200 SUSPENSION ORAL at 06:11

## 2024-11-11 NOTE — CONSULTS
"  [facility]   DEPARTMENT OF PSYCHIATRY     IDENTIFIERS & DEMOGRAPHICS:     SERVICE: Telepsychiatry  ENCOUNTER: initial    TELEPSYCHIATRY (AUDIOVISUAL): Each patient who is provided psychiatric services via telehealth is: (1) informed of the relationship between the psychiatric provider and patient, as well as the respective role of any other health care staff/providers with respect to management of the patient; and (2) notified that he or she may decline to receive psychiatric services by telehealth and may withdraw from such care at any time.  Risks of telehealth include the potential for security breaches (HIPPA compliant platforms notwithstanding) and technological failure, as well as the limitations to physical examination inherent to the modality. The patient was agreeable to the use of telehealth services.    START TIME: 11/11/2024 5:29 PM  STOP TIME: 11/11/2024 6:41 PM    -- PATIENT IDENTIFIERS: Israel De Jesus  [id number]  1960  64 y.o.  male  -- REQUESTING PROVIDER: Adeel Burgos MD *  -- LOCATION OF PATIENT: ED    -- MODE OF ARRIVAL: ambulance  -- PRESENT WITH PATIENT DURING SESSION: ALONE  -- SOURCES OF INFORMATION: PATIENT, EHR/chart, family/friend(s), provider(s) (son)  -- LOCATION OF ENCOUNTER PROVIDER: [place], [place]    -- ENCOUNTER PROVIDER: Genaro Castanon MD        PRESENTATION:   History of Present Illness   **  OVERVIEW OF THE HPI:    63yo male, history of depression and alcohol use disorder, brought to ED after expressing suicidal ideation to son.  Patient acutely intoxicated upon presentation.    SUBJECTIVE/CURRENT FINDINGS:    Per Patient:  - not sure what happened for him to come in  - states he heard he had too much alcohol  - drinking daily  - endorses passive and active suicidal ideation - denies plan  - believes he is a danger to self - "sometimes"  - abruptly ends conversation, refusing to answer further questions    Per Chart Review:    Presents to the ED via " "EMS with c/o alcohol intoxication and SI reported by son. Son reports patient that patient is a constant drinker and has recently been dx with dementia. Patient told son that he wants to "drink himself to death".      Patient is a 64 y.o. male, with PMHx Pulmonary embolism ( 9/20/24), Macrocytic anemia, HTN who presents to the ED via EMS with alcohol intoxication and suicidal ideation reported by son. Pt says son called EMS because he thought he was drunk. Son found him laying on sofa watching TV. He also told his son he was "ready to quit his life". Pt says son thinks he drinks too much. Pt had approximately a  20 ounce cup mixed with fireball whiskey and blue corona rum which he had half of via a straw. He also had 20 ounce lemonade mixed with iced tea to drink along with alcohol mixture. Pt doesn't think he drinks too much and notes for 44 years he has decreased his drinking. Pt is "completely interested" in decreasing his drinking. When aksed if he had a daily ride to alcohol treatment he asked how much it was, and that he couldn't afford it. Pt said he tried to eat but mostly is laying down on sofa. Pt reports left sided chest pain in one spot onset 3 years, pain increases with deep breath and movement. Pt also notes urinary incontinence because he doesn't want to walk across the room to bathroom. He doesn't wear diapers. Patient denies cough, shortness of breath, fever, chills, abdominal pain, nausea, vomiting, diarrhea, dysuria, headaches, congestion, sore throat, arm or leg trouble,  or other associated symptoms.    Per Collateral:    Son [phone]:  - he's having memory difficulties  - suspect dementia secondary to alcohol  - told son he was tired of life - "I'm done, I'm over with this"  - feels like he is a danger to himself at this point  - has a  but not yet able to have services set up  - believes he is suicidal  - can't manage own life anymore    REVIEW OF SYSTEMS:    >> SOURCES: " patient     U   Sleep Disturbance/Disruption   U   Appetite/Weight Change   U   Alterations in Energy Level   Y   Impaired Focus/Concentration   Y   Depressive Symptomatology  +depressed mood, +hopelessness, +worthlessness     U   Excessive Anxiety/Worry   U   Dysregulated Mood/Behavior   N   Manic Symptomatology   U   Psychosis    Regarding the current presentation, no other significant issues or complaints are voiced or known at this time.      ADD-ON PSYCHOTHERAPY:     N/A         HISTORY:   Patient Information   **  >> SOURCES: patient, collateral       PSYCH  SUBSTANCE  FAMILY  SOCIAL  MEDICAL     Y   Previous/Pre-Existing Psychiatric Diagnoses   U   Past Psychotropic Trials   N   Current Psychiatric Provider   N   Hx of Outpatient Psychiatric Treatment   Y   Hx of Psychiatric Hospitalization   Y   Hx of Suicidal Ideation/Threats   N   Hx of Suicide Attempts/Gestures   N   Hx of Perpetrated Violence   N   Documented Hx of Malingering   N   Hx of Psychosis   N   Hx of Bipolar Diathesis   Y   Hx of Depression     N   Hx of Formal HENRIQUE Treatment   Y   Recent Alcohol Consumption  +SASQ   +   Drinking Pattern (frequency)  +daily   +   Drinking Pattern (amount)  +heavy drinking   Y   Hx of Nicotine Use   Y   Hx of Alcohol Misuse/Abuse  current   Y   Hx of Illicit Drug Misuse/Abuse   +   Drug Experimentation/Usage  +cannabis       N   Family Psychiatric History      U   Hx of Trauma   U   Hx of Abuse     N   Developmental Delay/Disability   Y   GED/High School Diploma   N   Currently Employed  retired - last worked February 2024   N   Currently on Disability   N   Financially Stable   N   Functions Independently   Y   Domiciled   Y   Intact Support System   N   Currently in a Relationship   Y   Ever    Y   Ever  "/   Y   Children/Dependents   Y   Anabaptism/Spiritual   N   Hx of  Service     N   Ever Charged/Convicted   N   Current Probation/[place]/Diversion   N   Hx of Incarceration     U   Hx of Seizures   U   Hx of Head Trauma     Y   Medical Hx & Diagnoses   N   Allergies   +   Key Diagnoses  +HTN      >> EHR (EPIC): reviewed/reconciled      The patient's past medical history has been reviewed and updated as appropriate within the electronic health record system.  See PROBLEM LIST & HISTORY for details.    Scheduled and PRN Medications: The electronic chart was reviewed and updated as appropriate.  See MEDCARD for details.    Allergies:  Patient has no known allergies.      EXAMINATION:   Objective   **  VITALS:  BP (!) 147/85   Pulse 101   Temp 98.4 °F (36.9 °C) (Oral)   Resp 18   Ht 5' 6" (1.676 m)   Wt 59.9 kg (132 lb)   SpO2 97%   BMI 21.31 kg/m²     MENTAL STATUS EXAMINATION:  Appearance: in no apparent distress    Behavior & Attitude: uncooperative, minimally participative, under adequate behavioral control  calm, engaged    Movements & Motor Activity: no psychomotor agitation, no psychomotor retardation    Speech & Language: normal rate, normal volume, normal quantity, increased latency, spontaneous, reciprocal    Mood: depressed  Affect: reactive    Thought Process & Associations: ruminative, scattered    Thought Content & Perceptions:   unknown/unable to assess  Sensorium: awake  clouded    Orientation:   unknown/unable to assess  Recent & Remote Memory:   unknown/unable to assess  Attention & Concentration:   unknown/unable to assess  Fund of Knowledge: impaired    Insight: impaired    Judgment: impaired        RISK & REGULATORY:   Impression   **   RISK PARAMETERS:     Y   Suicidal Ideation/Threats   U   Homicidal Ideation/Threats      LEGAL (current status  certification criteria):     - DANGER TO SELF: yes   - " DANGER TO OTHERS: no   - GRAVELY DISABLED: yes    NOTE: RISK PARAMETERS are current to the encounter/episode  NOT inclusive of past history.       FIREARMS & WEAPONS:     N   Ready Access to Firearms   Y   Prohibition of Firearms Indicated  removal/restriction CONFIRMED   Y   Gun Safety Counseled  e.g., proper storage, inherent risk     SAFETY SCREENINGS:    -- RISK FACTORS: IDENTIFIED     - SPECIFIC MODIFIABLE FACTORS IDENTIFIED: substance abuse, intoxication/use, hopelessness/despair, guilt/worthlessness, financial strain     - SPECIFIC NON-MODIFIABLE FACTORS IDENTIFIED: hx psych tx, male sex, age 59+ years,     -- CONTRACTS FOR SAFETY: NO  -- FUTURE ORIENTED: NO    -- CAREGIVER(S)     - SUPPORTIVE & APPROPRIATELY INVOLVED: YES     - ADVOCATES FOR COMMUNITY MGMT (safe/appropriate): NO    -- RISK MITIGATION & PREVENTION:      - INTERVENTIONS: 988/911/ED (info), advice/counseling, harm reduction, safety plan, standardization, tx of pathology     REGULATORY:    -- CARE COORDINATION  the case was discussed and care was coordinated with member(s) of the treatment team.      INFORMED CONSENT & SHARED DECISION MAKING are the hallmark and bedrock of good clinical care, and as such have been employed and obtained, respectively, to the degree possible.  Discussed, to the extent possible, diagnosis, risks and benefits of proposed treatment (e.g., medication, therapy) vs alternative treatments vs no treatment, potential side effects of these treatments, and the inherent unpredictability of treatment.  Resources have been provided via the patient instructions in the AVS to supplement, augment, and reinforce discussions, counseling, and/or interventions.       - ABILITY TO UNDERSTAND, PARTICIPATE & ENGAGE: rudimentary     - RELIABILITY/ACCURACY: the patient is deemed to be an unreliable historian      WARNINGS & PRECAUTIONS:  >> In cases of emergencies (e.g. SI/HI resulting in danger to self or others,  functioning deteriorating to the level of grave disability), call 911 or [phone], or present to the emergency department for immediate assistance.    >> Individuals should not operate a motor vehicle or heavy machinery if effects of medications or underlying symptoms/condition impair the ability to do so safely.    >> FULLY comply with ANY/ALL medication as prescribed/instructed and report ANY/ALL suspected adverse effects to appropriate health care providers.      ASSESSMENT & PLAN:   Assessment & Plan   **  DIAGNOSES & PROBLEMS:    Depression with suicidal ideation   Alcohol Use Disorder    PSYCHOTROPIC REGIMEN:  []Continue  []Adjust  []Initiate  [x]Defer  []D/C  []N/A    Unknown     A&P (Synthesis  Analysis  Summation  Dispo  Goals  Recs & Mgmt):    Admit to inpt psych hosp once medically cleared.  Per son, patient with cognitive impairment, difficulty caring for self - likely will need home services vs NH status post stabilization.  Whether or not he can engage in addiction rehab status post discharge will depend on how much his cognition improves.  Recommend jose psych placement.  He is high risk for complicated withdrawal - begin alcohol detox protocol while in house.     - WITH REASONABLE MEDICAL CERTAINTY, based on history, chart review, available collateral information, and a present-state examination:   -- currently meets criteria for psychiatric hospitalization - once medically cleared, seek admission    * PEC is INDICATED - enact if not yet in place, and ensure safety measures and elopement precautions, per unit protocol     >> attended to therapeutic alliance, via the building and maintenance of rapport and trust  >> risk mitigation strategies and safety netting were employed, explored, and reinforced  >> upon psychiatric stabilization and step-down, would recommend the patient consider pursuing the following referrals/services: outpatient psychiatric services, psychotherapy/counseling, addiction rehab  program, and mutual self-help groups (e.g. Alcoholics Anonymous, SeeMe)  >> advised to abstain from alcohol and illicit drug use  >> counseled on full abstinence from alcohol and substances of abuse (illicit and prescription), as well as maintenance of a recovery lifestyle  >> harm reduction techniques discussed, as warranted, to mitigate risk from problematic behaviors  >> serial laboratory testing (e.g. PETH, serum ethanol, urine toxicology) recommended and/or planned to provide accountability, as well as guide and refine treatment moving forward  >> importance of lifelong, active engagement in 12 step (or equivalent) mutual self-help program(s) was stressed/reinforced, including regular meeting attendance and acquisition/maintenance of a sponsor  >> education and/or resources discussed and/or provided for various addiction recovery and rehabilitation options  >> motivational interviewing applied, relapse prevention provided  >> initiate alcohol (or sedative-hypnotic) withdrawal protocol    ALCOHOL (OR SEDATIVE-HYPNOTIC) WITHDRAWAL PROTOCOL    >> recommendations provided herein should be seen as rough guidelines, as an alcohol/benzodiazepine withdrawal protocol should be individualized and modified routinely, as clinically warranted  >> the absence of a positive serum or urine toxicology for alcohol does not exclude recent heavy prolonged drinking, as there may have been a delay in the patient presenting; a withdrawal protocol may still be warranted, based on the clinical situation  >> in the absence of specified contraindications, err on the side of giving more than less benzodiazepine; diazepam is favored for its long half-life, while lorazepam is typically indicated when hepatic metabolism is of utmost concern  >> provide supportive care (fluid and electrolyte replacement; caloric support; vitamin supplementation/repletion)  >> parenteral thiamine is preferred (at least initially), and should be given  prior to glucose, to prevent/treat Wernicke-Korsakoff Syndrome  >> vital signs should be frequently assessed and factored into management protocols, taking into account comorbidities and baseline cardiovascular status  >> prototypical threshold levels that indicate significant alcohol/benzodiazepine withdrawal and the need for additional doses of benzodiazepines include systolic BP>160, diastolic BP >100 or HR >110  >> frequent assessment with CIWA-Ar is the standard of care and the hallmark of excellent clinical practice  >> a score of 8+ on CIWA-Ar typically warrants administration of benzodiazepines  >> frequent need for administration of a benzodiazepine or CIWA-Ar scores >15 should trigger possible reclassification of the patient as at higher risk for complicated withdrawal, with concomitant adjustment of the detox protocol  >> if withdrawal does not progress over the first 24-48 hours, CIWA-Ar can be administered less frequently - an interval of four to six hours is reasonable   >> patients with a history of seizures, delirium tremens (or withdrawal delirium), or prolonged heavy alcohol (or sedative-hypnotic) consumption, who are minimally symptomatic or asymptomatic and are admitted to the hospital for other reasons, should typically be prophylactically treated with STANDING benzodiazepines  >> if a fixed-dose regimen is utilized, patients should still be reassessed frequently, and additional doses of medication given if a score of 8+ is achieved on the CIWA-Ar  >> if such elevated CIWA-Ar scores are reached routinely, prophylaxis is not adequate, and the regimen should be adjusted accordingly  >> if delirium ensues, rule out and treat potential alternative etiologies, even if delirium tremens is likely   >> identify and correct metabolic derangements and volume deficits, and determine if ICU monitoring is warranted  >> in complicated withdrawal, benzodiazepines should be given via IV route whenever possible  to assure proper absorption  >> titrate benzodiazepines to effect (ideally light sedation) which may entail the use of massive doses of medication  >> if the patient is incapacitated and the CIWA-Ar cannot be used, instead utilize the Nelson Agitation-Sedation Scale (RASS) with a goal of 0 to -2  >> in refractory delirium tremens (or withdrawal delirium), phenobarbital or propofol can be used; these agents are preferred to dexmedetomidine as they act directly on the RACHAEL and NMDA receptors, which underlie the derangements seen in alcohol (or sedative-hypnotic) withdrawal  >> intubation may be required in refractory DTs, especially with the use of propofol  >> post-withdrawal treatment is paramount; medically supervised withdrawal manages the patient through the withdrawal symptoms but does not treat alcohol (or sedative-hypnotic) use disorders; patients completing withdrawal are at high risk of relapse to resumed alcohol (or sedative-hypnotic) consumption/use  >> if in a facility, patients should be given explicit plans for follow-up care PRIOR to discharge  >> follow-up may involve ongoing treatment through primary care or a specialized addiction treatment program or physician, and is dependent on a number of factors, including availability and patient willingness  >> continuing care, including pharmacotherapy (e.g., MAT) and psychosocial intervention for alcohol (or sedative-hypnotic) use disorders, are indicated      CHART REVIEW: available documentation has been reviewed, and pertinent elements of the chart have been incorporated into this evaluation where appropriate.       KEY & LINKS:        Y  = yes/endorses     N  = no/denies     U  = unknown/unable to assess    ADHD   AIMS   AUDIT   AUDIT-C   C-SSRS (Screen)   C-SSRS (Short)   C-SSRS (Full)   DAST   DAST-10   BECK-7   MoCA   PCL-5   PHQ-9   HENRIQUE   YMRS       DIAGNOSTIC TESTING:   Results   **   Glu 80  11/11/2024  Li *   *  TSH 0.555   11/11/2024    HgA1c 4.8  10/11/2022  VPA *   *   FT4 *   *    Na 141  11/11/2024  CLZ *   *  WBC 5.89  11/11/2024    Cr 1.0  11/11/2024  ANC 3.4; 58.2;   11/11/2024   Hgb 11.2 (L)  11/11/2024     BUN 7 (L)  11/11/2024  Trop I 0.036 (H)  10/23/2024  HCT 33.9 (L)  11/11/2024     GFR >60  11/11/2024   CPK *   *    11/11/2024     Alb 2.6 (L)  11/11/2024   PRL *   *  B12 399  9/9/2024     T Bili 0.3  11/11/2024  Chol 179  9/30/2022  B9 18.1  9/9/2024     (H)  11/11/2024  TGs 119  9/30/2022  B1 *   *    AST 19  11/11/2024  HDL 65  9/30/2022  Vit D *   *     ALT 14  11/11/2024  LDL 90.2  9/30/2022  HIV Non-reactive  9/30/2022     INR 0.9  2/19/2024  Kadi *   *   Hep C Non-reactive  9/30/2022    GGT *   *  Lip 17  10/23/2024  RPR *   *     (H)  11/11/2024   NH4 *   *  UPT *   *      PETH *   *  THC Presumptive Positive (A)  9/8/2024    ETOH 306 (HH)  11/11/2024  GOGO Negative  9/8/2024    EtG *   *  AMP Negative  9/8/2024    ALC *   *  OPI Negative  9/8/2024    BZO Negative  9/8/2024  MTD Negative  9/8/2024     BAR Negative  9/8/2024  BUP *   *    PCP Negative  9/8/2024  FEN *   *     Results for orders placed or performed during the hospital encounter of 10/23/24   EKG 12-lead    Collection Time: 10/23/24 11:23 AM   Result Value Ref Range    QRS Duration 86 ms    OHS QTC Calculation 477 ms    Narrative    Test Reason : R07.9,    Vent. Rate : 093 BPM     Atrial Rate : 093 BPM     P-R Int : 136 ms          QRS Dur : 086 ms      QT Int : 384 ms       P-R-T Axes : 072 025 066 degrees     QTc Int : 477 ms    Normal sinus rhythm  Normal ECG  When compared with ECG of 10-SEP-2024 08:14,  No significant change was found  Confirmed by Adeel Eagle MD ([id number]) on 10/24/2024 8:24:40 PM    Referred By: [email or web address]   SELF           Confirmed By:Adeel Eagle MD         Inpatient consult to Telemedicine - Psyc  Consult performed by: Genaro Castanon,  MD  Consult ordered by: Shahzad Burgos MD        [facility] [place] EMERGENCY DEPARTMENT

## 2024-11-11 NOTE — ED TRIAGE NOTES
"Pt presents to ED via EMS with c/o SI  and alcohol intoxication. Pt states his son called EMS because his son thinks he drinks too much, and he told him he did not wish to live anymore. When asked if having SI, pt states "to be honest yes. I just don't want to live in this old body anymore". Pt states these feelings seem to occur because he is lonely. Admits to drinking a mix of liquors, and "sips" on them throughout the day. Denies HI or A/V hallucinations. Last drink was around 1400.   "

## 2024-11-11 NOTE — ED PROVIDER NOTES
"Encounter Date: 11/11/2024    SCRIBE #1 NOTE: I, Jocelyn Ruiz, am scribing for, and in the presence of,  Dr. Shahzad Burgos. I have scribed the following portions of the note - Other sections scribed: HPI/ROS.       History     Chief Complaint   Patient presents with    Psychiatric Evaluation     Presents to the ED via EMS with c/o alcohol intoxication and SI reported by son. Son reports patient that patient is a constant drinker and has recently been dx with dementia. Patient told son that he wants to "drink himself to death".      Patient is a 64 y.o. male, with PMHx Pulmonary embolism ( 9/20/24), Macrocytic anemia, HTN who presents to the ED via EMS with alcohol intoxication and suicidal ideation reported by son. Pt says son called EMS because he thought he was drunk. Son found him laying on sofa watching TV. He also told his son he was "ready to quit his life". Pt says son thinks he drinks too much. Pt had approximately a  20 ounce cup mixed with fireball whiskey and blue corona rum which he had half of via a straw. He also had 20 ounce lemonade mixed with iced tea to drink along with alcohol mixture. Pt doesn't think he drinks too much and notes for 44 years he has decreased his drinking. Pt is "completely interested" in decreasing his drinking. When aksed if he had a daily ride to alcohol treatment he asked how much it was, and that he couldn't afford it. Pt said he tried to eat but mostly is laying down on sofa. Pt reports left sided chest pain in one spot onset 3 years, pain increases with deep breath and movement. Pt also notes urinary incontinence because he doesn't want to walk across the room to bathroom. He doesn't wear diapers. Patient denies cough, shortness of breath, fever, chills, abdominal pain, nausea, vomiting, diarrhea, dysuria, headaches, congestion, sore throat, arm or leg trouble,  or other associated symptoms.            The history is provided by the patient.     Review of patient's " allergies indicates:  No Known Allergies  Past Medical History:   Diagnosis Date    Acute pulmonary embolism without acute cor pulmonale 9/20/2024    Eye injury 09/01/1980    ? eye hot metal, and burn eyes ou     Generalized anxiety disorder 03/03/2023    Hypertension     Macrocytic anemia 02/14/2024    Macrocytic anemia 2/14/2024    Sciatica 12/28/2020     Past Surgical History:   Procedure Laterality Date    FUSION OF CERVICAL SPINE BY POSTERIOR APPROACH N/A 10/12/2022    Procedure: POSTERIOR CERVICAL FUSION, SPINE C1-C4 PCF ;  Surgeon: Ike Storm DO;  Location: [facility] OR [address] [place] [address];  Service: Neurosurgery;  Laterality: N/A;    FUSION OF CERVICAL SPINE BY POSTERIOR APPROACH N/A 12/7/2022    Procedure: FUSION,SPINE,CERVICAL,POSTERIOR APPROACH C1-T1;  Surgeon: Ike Storm DO;  Location: [facility] OR [address] [place] [address];  Service: Neurosurgery;  Laterality: N/A;  GENERAL/ TYPE & SCREEN/ EMG/ SEP/ MEP/ [place]AM[place]/ REGULAR BED, REVERSED/ BROWN/ PRONE/ C-ARM/ DEPUY/ COLEMAN/ PACU/ ASSISTANT SURGEON DR. MAYURI LEMOS    KNEE ARTHROPLASTY      LAMINECTOMY N/A 10/12/2022    Procedure: LAMINECTOMY, SPINE, C-1;  Surgeon: Ike Storm DO;  [facility];  Service: Neurosurgery;  Laterality: N/A;    ORIF HUMERUS FRACTURE Right 2/16/2024    Procedure: ORIF, FRACTURE, HUMERUS;  Surgeon: Yulissa Rich III, MD;  Location: [facility] OR;  Service: Orthopedics;  Laterality: Right;  SYNTHES LUKE [phone] CALLED BETY ON 2/15/2024-     Family History   Problem Relation Name Age of Onset    Hypertension Mother      Stroke Maternal Grandmother      Cancer Maternal Grandmother      Amblyopia Neg Hx      Blindness Neg Hx      Cataracts Neg Hx      Diabetes Neg Hx      Glaucoma Neg Hx      Macular degeneration Neg Hx      Retinal detachment Neg Hx      Strabismus Neg Hx      Thyroid disease Neg Hx       Social History     Tobacco Use    Smoking status: Every Day     Current packs/day: 1.00     Types: Cigarettes    Smokeless  "tobacco: Never   Substance Use Topics    Alcohol use: Not Currently    Drug use: No     Review of Systems   Constitutional:  Negative for chills, diaphoresis and fever.   HENT:  Negative for ear pain and sore throat.    Eyes:  Negative for pain.   Respiratory:  Negative for cough and shortness of breath.    Cardiovascular:  Positive for chest pain (chronic left-sided).   Gastrointestinal:  Negative for abdominal pain, diarrhea, nausea and vomiting.   Genitourinary:  Negative for dysuria.        Positive for urinary incontinence.   Musculoskeletal:  Negative for back pain.        (-) Arm or leg trouble.    Skin:  Negative for rash.   Neurological:  Negative for headaches.   Psychiatric/Behavioral:  Negative for confusion.         Positive for telling son: "I'm ready to quit my life"       Physical Exam     Initial Vitals [11/11/24 1530]   BP Pulse Resp Temp SpO2   130/88 104 18 98.4 °F (36.9 °C) 97 %      MAP       --         Physical Exam  The patient was examined specifically for the following:   General:No significant distress, Good color, Warm and dry. Head and neck:Scalp atraumatic, Neck supple. Neurological:Appropriate conversation, Gross motor deficits. Eyes:Conjugate gaze, Clear corneas. ENT: No epistaxis. Cardiac: Regular rate and rhythm, Grossly normal heart tones. Pulmonary: Wheezing, Rales. Gastrointestinal: Abdominal tenderness, Abdominal distention. Musculoskeletal: Extremity deformity, Apparent pain with range of motion of the joints. Skin: Rash.   The findings on examination were normal except for the following:  The patient has a large right inguinal hernia.  The lungs are clear.  The heart tones are normal.  The abdomen is soft.  The patient is alert and bright.  He is suicidal.  He is not hearing voices.  ED Course   Procedures  Labs Reviewed   CBC W/ AUTO DIFFERENTIAL - Abnormal       Result Value    WBC 5.89      RBC 3.25 (*)     Hemoglobin 11.2 (*)     Hematocrit 33.9 (*)      (*)     MCH " 34.5 (*)     MCHC 33.0      RDW 15.9 (*)     Platelets 221      MPV 9.3      Immature Granulocytes 0.2      Gran # (ANC) 3.4      Immature Grans (Abs) 0.01      Lymph # 2.2      Mono # 0.2 (*)     Eos # 0.0      Baso # 0.05      nRBC 0      Gran % 58.2      Lymph % 37.0      Mono % 3.6 (*)     Eosinophil % 0.2      Basophil % 0.8      Differential Method Automated     COMPREHENSIVE METABOLIC PANEL - Abnormal    Sodium 141      Potassium 4.0      Chloride 109      CO2 20 (*)     Glucose 80      BUN 7 (*)     Creatinine 1.0      Calcium 8.5 (*)     Total Protein 5.9 (*)     Albumin 2.6 (*)     Total Bilirubin 0.3      Alkaline Phosphatase 156 (*)     AST 19      ALT 14      eGFR >60      Anion Gap 12     URINALYSIS, REFLEX TO URINE CULTURE - Abnormal    Specimen UA Urine, Clean Catch      Color, UA Yellow      Appearance, UA Clear      pH, UA 6.0      Specific Gravity, UA 1.020      Protein, UA Trace (*)     Glucose, UA Negative      Ketones, UA Negative      Bilirubin (UA) Negative      Occult Blood UA Negative      Nitrite, UA Negative      Urobilinogen, UA Negative      Leukocytes, UA Negative      Narrative:     Specimen Source->Urine   DRUG SCREEN PANEL, URINE EMERGENCY - Abnormal    Benzodiazepines Presumptive Positive (*)     Methadone metabolites Negative      Cocaine (Metab.) Negative      Opiate Scrn, Ur Negative      Barbiturate Screen, Ur Negative      Amphetamine Screen, Ur Negative      THC Negative      Phencyclidine Negative      Creatinine, Urine 131.4      Toxicology Information SEE COMMENT      Narrative:     Specimen Source->Urine   ALCOHOL,MEDICAL (ETHANOL) - Abnormal    Alcohol, Serum 306 (*)     Narrative:        ALCOHOL critical result(s) called and verbal readback obtained   from RATNA PADILLA RN  by KAREY 11/11/2024 17:31   ACETAMINOPHEN LEVEL - Abnormal    Acetaminophen (Tylenol), Serum <3.0 (*)    ALCOHOL,MEDICAL (ETHANOL) - Abnormal    Alcohol, Serum 66 (*)    TSH    TSH 0.555      MAGNESIUM    Magnesium 1.7            Imaging Results    None          Medications   aluminum-magnesium hydroxide-simethicone 200-200-20 mg/5 mL suspension 30 mL (30 mLs Oral Given 11/11/24 2433)   amLODIPine tablet 5 mg (5 mg Oral Given 11/12/24 1616)     Medical Decision Making  Given the above this patient presents emergency room with a history of alcoholism.  The patient has been seen and treated here for withdrawal in the past.  The patient complains that he is depressed and he no longer wants to live.  He was sent to the emergency room by his son.  Diagnostic evaluation reveals a white blood count of 6000.  The patient has a hemoglobin of 11 and a hematocrit of 33 this is a trivial anemia.  Chemistries are essentially unremarkable except for a slightly low CO2 at 20.  BUN is low at 7 these abnormalities are not significant.  The calcium is slightly low at 8.5.  Also not significant, likely from the fact that the patient has a an albumin of 2.6 which is low, likely from malnutrition.  His alkaline phosphatase is 156 the bilirubin is normal there is no transaminitis.  There is no evidence of renal failure.  The patient has alcohol level is the 306.  A repeat alcohol level we will need to be drawn at midnight to clear this patient.  I await evaluation from tele psychiatry.   Pec has been completed.    This case will be signed out to Dr. Adeel Burgos for final disposition.    Patient is signed out to me to follow up medical clearance.  Psychiatry evaluated and recommends transfer for [facility]. Initial ETOH 306.  Repeat 66.  Labs reviewed and unremarkable.  Patient is medically cleared.  Patient has been well-appearing without any complaints during my time with him.  ADT32 placed for transfer for [facility].  Signed out to Dr. Anton to follow up transfer.          Scribe Attestation:   Scribe #1: I performed the above scribed service and the documentation accurately describes the services I  performed. I attest to the accuracy of the note.                               Clinical Impression:  Final diagnoses:  [F32.A, R45.851] Depression with suicidal ideation (Primary)  [F10.20] Alcoholism  [F10.920] Alcoholic intoxication without complication       Please note that the documentation on this chart was provided by the scribe above on the date of service noted above, and that the documentation in the chart accurately reflects the work and decisions made by me alone.  Signed, Dr. Burgos       ED Disposition Condition    Transfer to Psych Facility Stable          ED Prescriptions    None       Follow-up Information    None          Adeel Burgos MD  11/12/24 0318

## 2024-11-12 VITALS
SYSTOLIC BLOOD PRESSURE: 157 MMHG | WEIGHT: 132 LBS | DIASTOLIC BLOOD PRESSURE: 94 MMHG | HEIGHT: 66 IN | TEMPERATURE: 98 F | BODY MASS INDEX: 21.21 KG/M2 | RESPIRATION RATE: 18 BRPM | HEART RATE: 118 BPM | OXYGEN SATURATION: 97 %

## 2024-11-12 LAB — ETHANOL SERPL-MCNC: 66 MG/DL

## 2024-11-12 PROCEDURE — 25000003 PHARM REV CODE 250: Performed by: STUDENT IN AN ORGANIZED HEALTH CARE EDUCATION/TRAINING PROGRAM

## 2024-11-12 PROCEDURE — 82077 ASSAY SPEC XCP UR&BREATH IA: CPT | Performed by: STUDENT IN AN ORGANIZED HEALTH CARE EDUCATION/TRAINING PROGRAM

## 2024-11-12 PROCEDURE — 25000003 PHARM REV CODE 250: Performed by: EMERGENCY MEDICINE

## 2024-11-12 RX ORDER — DIAZEPAM 5 MG/1
5 TABLET ORAL
Status: COMPLETED | OUTPATIENT
Start: 2024-11-12 | End: 2024-11-12

## 2024-11-12 RX ORDER — AMLODIPINE BESYLATE 5 MG/1
5 TABLET ORAL
Status: COMPLETED | OUTPATIENT
Start: 2024-11-12 | End: 2024-11-12

## 2024-11-12 RX ADMIN — DIAZEPAM 5 MG: 5 TABLET ORAL at 05:11

## 2024-11-12 RX ADMIN — AMLODIPINE BESYLATE 5 MG: 5 TABLET ORAL at 02:11

## 2024-11-12 NOTE — PROVIDER PROGRESS NOTES - EMERGENCY DEPT.
Encounter Date: 11/11/2024    ED Physician Progress Notes            Patient is signed out to me to follow up medical clearance.  Psychiatry evaluated and recommends transfer for [facility]. Initial ETOH 306.  Repeat 66.  Labs reviewed and unremarkable.  Patient is medically cleared.  Patient has been well-appearing without any complaints during my time with him.  ADT32 placed for transfer for [facility].  Signed out to Dr. Anton to follow up transfer.

## 2024-11-12 NOTE — PATIENT INSTRUCTIONS
Thank you for allowing me to participate as part of your health care team, and thank you for choosing [facility].    SERGEY ROMAN MD  Board Certified in Psychiatry & Addiction Medicine      IN CASE OF SUICIDAL THINKING, call the National Suicide Hotline Number: 988    988 Suicide & Crisis Lifeline: 988 , 1-565-133-TALK (8903)  https://988DS Digitale Seiten.org           RECOMMENDATIONS & INSTRUCTIONS:     [x] Take all medication, from all providers, as prescribed.  [x] Follow with primary care provider for routine health maintenance and management of medical co-morbidities, as well as any indicated/needed specialists.  [x] If questions or concerns arise, or if experiencing side effects, adverse reactions or worsening symptoms, contact your provider through the [facility] portal at [email or web address], or call [phone] to reach the [facility] main line.  [x] In cases of emergencies, call [phone] or [phone], or present directly to the emergency department for immediate assistance.  [x] Avoid alcohol intake; findings now indicate that when it comes to alcohol consumption, there is no safe amount that does not affect health.  [x] Abstain from illicit drug use.  [x] Upon discharge from an emergency department or inpatient setting, it is recommended to follow up with an outpatient provider within 1-2 weeks of discharge, but ideally as soon as possible; additionally, if you currently follow with an outpatient provider, expeditiously contact them upon discharge to apprise them of the situation and receive further instructions.    ADDITIONAL RECOMMENDATIONS:  - successfully complete a licensed addiction rehabilitation program, and follow all aftercare instructions and recommendations  - routinely attend 12 step (or equivalent) mutual self-help meetings  - work with a sponsor  - establish sobriety and maintain a recovery lifestyle    INFORMATION ON MENTAL HEALTH MEDICATIONS:     National Juda of Mental Health:    https://www.nim.nih.gov/health/topics/mental-health-medications     Web MD:   https://www.webmd.Itsworld [place]       RESOURCES:     IN CASE OF SUICIDAL THINKING, call the National Suicide Hotline Number: 988    988 Suicide & Crisis Lifeline: 988 , 4-201-351-TALK (8255)  Provides 24/7, free and confidential support for people in distress, prevention and crisis resources for you or your loved ones, and best practices for professionals.    Call, text or chat.  https://988eShop Venturesline.org     National Action Sycamore for Suicide Prevention: the National Action Sycamore for Suicide Prevention (Action Sycamore) is the nations public-private partnership for suicide prevention, working with more than 250 national partners.   https://theEcogii Energy Labsiance.org     National Strategy for Suicide Prevention & Risk Mitigation:  https://theactionalliance.org/our-strategy/national-strategy-suicide-prevention     [x] Fact Sheet:   https://www.[facility].gov/sites/default/files/national-strategy-for-suicide-prevention-factsheet.pdf     [x] Report:   https://www.ncbi.nlm.nih.gov/books/IIO788698/pdf/Bookshelf_NBK109917.pdf     Suicide Prevention Resource Center: The Suicide Prevention Resource Center (SPR) is the only federally supported resource center devoted to advancing the implementation of the National Strategy for Suicide Prevention. [facility] is funded by the U.S. Department of Health and Human Services' Substance Abuse and Mental Health Services Administration (SAMHSA).  https://www.[facility].org     [x] Safety Plan:   https://Cryptopay.TidePool.SurfAir.[employer]sworld [place]/wp-content/uploads/2021/08/Uzair-Job-Safety-Plan-8-6-21.pdf     [x] Suicide Risk Curve:  https://Cryptopay.IGLOO [employer] [place]/wp-content/uploads/2021/08/Tlznrwh-sude-lwpzo-8-6-21.pdf     [place] Mental Health Advocacy Service: the state agency tasked with protecting the legal rights of people with behavioral health diagnoses.  https://mhas.[place].gov     Alcoholics Anonymous (AA): find a meeting near  you.  https://www.aa.org     SMI Adviser: resources for individuals and families with serious mental illness.  https://smiadviser.org     National [place] for the Mentally Ill (GENO): the nation's largest grassroots organization dedicated to building better lives for individuals with mental illness.  https://www.geno.org/Home     U.S. Department of Health and Human Services (HHS): the mission of HHS is to enhance the health and well-being of all Americans, by providing for effective health and human services and by fostering sound, sustained advances in the sciences underlying medicine, public health, and .   https://www.[facility].gov     Substance Abuse and Mental Health Services Administration (SAMHSA): [facility] is the agency within [facility] that leads public health efforts to advance the behavioral health of the nation. [facility]'s mission is to reduce the impact of substance abuse and mental illness on [place] communities.   https://www.[facility].gov     National Institutes of Health (NIH): a part of [facility], [facility] is the largest biomedical research agency in the world.   https://www.nih.gov     National [place] on Drug Abuse (NIKOLE): sponsored by the NIH, the mission of NIKOLE is to advance science on drug use and addiction and to apply that knowledge to improve individual and public health.  https://nikole.nih.gov     National [place] on Alcohol Abuse and Alcoholism (NIAAA): sponsored by the NIH, the mission of NIAA is to generate and disseminate fundamental knowledge about the effects of alcohol on health and well-being, and apply that knowledge to improve diagnosis, prevention, and treatment of alcohol-related problems, including alcohol use disorder, across the lifespan.   https://www.niaaa.nih.gov     National Harm Reduction Coalition: resources for harm reduction, including techniques, strategies, policy, and advocacy.  https://harmreduction.org     The SHARE Approach - A Model for Shared Decision Making:  [x]  Fact Sheet  https://www.ahrq.gov/sites/default/files/publications/files/share-approach_factsheet.pdf     AMA Principles of Medical Ethics - Informed Consent & Shared Decision Making:  [x] Chapter  https://www.ama-assn.org/system/files/2019-06/code-of-medical-zzemnb-mvatgsc-8.pdf     Safety Netting for Primary Care:  [x] Article  https://www.ncbi.nlm.nih.gov/pmc/articles/UJQ7265164/pdf/fjwkbpo-2070--e70.pdf       MEDICATION MANAGEMENT:     [x] In addition to the potential beneficial effects, the use of any medication or drug (prescribed, over the counter or otherwise) carries with it the risk of potential adverse effects.  Each has a set of typical adverse effects - some common, some rare - but idiosyncratic and unanticipated reactions unique to you are always possible.      [x] It is important to remember that untreated illness can also pose a risk, which must be taken into account when weighing the pros and cons of a medication trial.    [x] Medications and drugs can sometimes interact with each other in the body, leading to adverse effects - it is important that all your providers know all the medications and drugs you take - prescribed, over the counter, or otherwise.  Keep all your practitioners up to date with any changes.  It's always a good idea to keep an up-to-date list in an easily accessible location.    [x] There is an inherent unpredictability to all treatment, including the use of medication.  Unexpected outcomes can occur - keep me up to date with any difficulties you encounter.    [x] It is important to take medication as directed, and to comply fully with the instructions.  Check with the appropriate provider first before adjusting or stopping your medication on your own.    If you require further information pertaining to the issues outlined above, please reach out to your providers through the [facility] portal at [email or web address], or call [phone] to discuss.  See resource list for  additional material.     Additional information can be provided pertaining to your diagnosis, intended outcomes, target symptoms for treatment, and possible benefits and risks of medication - you can also access this information through the provided resources.  Possible alternatives to the current treatment plan (including no treatment) can also be reviewed.      GENERAL HEALTH & WELLNESS:     [x] Establish and follow regularly with a primary care physician for routine health maintenance and management of any medical comorbidities.  [x] Follow a healthy diet, exercise routinely, and monitor weight and metabolic parameters.  [x] Allow adequate time for sleep and practice good sleep hygiene.  [x] Do not operate a motor vehicle or heavy machinery if the effects of medications or the symptoms underlying your condition impair the ability for you to do so safely.    Dietary Guidelines for Americans, 2996-4662:  U.S. Department of Agriculture (USDA)  https://www.dietaryguidelines.gov/sites/default/files/2020-12/Dietary_Guidelines_for_Americans_2020-2025.pdf#page=31     The Nutrition Source:  [employer]  https://www.[facility].[place].[facility]/nutritionsource       SLEEP HYGIENE:     Follow these tips to establish healthy sleep habits:  [x] Keep a consistent sleep schedule. Get up at the same time every day, even on weekends or during vacations.  [x] Set a bedtime that is early enough for you to get at least 7-8 hours of sleep.  [x] Don't go to bed unless you are sleepy.  [x] If you don't fall asleep after 20 minutes, get out of bed. Go do a quiet activity without a lot of light exposure. It is especially important to not get on electronics.  [x] Establish a relaxing bedtime routine.  [x] Use your bed only for sleep and sex.  [x] Make your bedroom quiet and relaxing. Keep the room at a comfortable, cool temperature.  [x] Limit exposure to bright light in the evenings.  [x] Turn off electronic devices at least 30  minutes before bedtime.  [x] Don't eat a large meal before bedtime. If you are hungry at night, eat a light, healthy snack.  [x] Exercise regularly and maintain a healthy diet.  [x] Avoid consuming caffeine in the afternoon or evening.  [x] Avoid consuming alcohol before bedtime.  [x] Reduce your fluid intake before bedtime.    QUICK TIPS FOR BETTER SLEEP  Reduce smartphone usage Create and maintain a nightly ritual Avoid caffeine 4-6 hours before sleeping Don't eat or drink too much at bedtime Sleep at the same time every night        American Academy of Sleep Medicine - Healthy Sleep Habits:  https://sleepeducation.org/healthy-sleep/healthy-sleep-habits     American Academy of Sleep Medicine - Bedtime Calculator:  https://sleepeducation.org/healthy-sleep/bedtime-calculator     American Academy of Sleep Medicine - Cognitive Behavioral Therapy for Insomnia (CBT-I):  https://sleepeducation.org/patients/cognitive-behavioral-therapy     American Academy of Sleep Medicine - Insomnia:  https://sleepeducation.org/sleep-disorders/insomnia       ALCOHOL & DRUG USE COUNSELING:     Preventing Excessive Alcohol Use (CDC):  https://www.cdc.gov/alcohol/fact-sheets/moderate-drinking.htm#:~:text=To%20reduce%20the%20risk%20of,days%20when%20alcohol%20is%20consumed.     [x] Alcohol consumption is associated with a variety of short- and long-term health risks, including motor vehicle crashes, violence, sexual risk behaviors, high blood pressure, and various cancers (e.g., breast cancer).  [x] The risk of these harms increases with the amount of alcohol you drink. For some conditions, like some cancers, the risk increases even at very low levels of alcohol consumption (less than 1 drink).  [x] To reduce the risk of alcohol-related harms, the 2874-8962 Dietary Guidelines for Americans recommends that adults of legal drinking age can choose not to drink, or to drink in moderation by limiting intake to 2 drinks or less in a day for men or  1 drink or less in a day for women, on days when alcohol is consumed.  [x] The Guidelines also do not recommend that individuals who do not drink alcohol start drinking for any reason and that if adults of legal drinking age choose to drink alcoholic beverages, drinking less is better for health than drinking more.  [x] The Guidelines note that some people should not drink alcohol at all, such as:  - If they are pregnant or might be pregnant.  - If they are younger than age 21.  - If they have certain medical conditions or are taking certain medications that can interact with alcohol.  - If they are recovering from an alcohol use disorder or if they are unable to control the amount they drink.  [x] The Guidelines also note that not drinking alcohol is the safest option for women who are lactating. Generally, moderate consumption of alcoholic beverages by a woman who is lactating (up to 1 standard drink in a day) is not known to be harmful to the infant, especially if the woman waits at least 2 hours after a single drink before nursing or expressing breast milk. Women considering consuming alcohol during lactation should talk to their healthcare provider.  [x] The Guidelines note, Emerging evidence suggests that even drinking within the recommended limits may increase the overall risk of death from various causes, such as from several types of cancer and some forms of cardiovascular disease. Alcohol has been found to increase risk for cancer, and for some types of cancer, the risk increases even at low levels of alcohol consumption (less than 1 drink in a day).  [x] Although past studies have indicated that moderate alcohol consumption has protective health benefits (e.g., reducing risk of heart disease), recent studies show this not to be true.  [x] Its important to focus on the amount people drink on the days that they drink. Even if women consume an average of 1 drink per day or men consume an average of 2  "drinks per day, binge drinking increases the risk of experiencing alcohol-related harm in the short-term and in the future.    Drinking Levels Defined (NIAAA):  https://www.niaaa.nih.gov/alcohol-health/overview-alcohol-consumption/moderate-binge-drinking     Drinking in Moderation:  According to the "Dietary Guidelines for Americans 4722-4233, U.S. Department of Health and Human Services and U.S. Department of Agriculture, adults of legal drinking age can choose not to drink or to drink in moderation by limiting intake to 2 drinks or less in a day for men and 1 drink or less in a day for women, when alcohol is consumed. Drinking less is better for health than drinking more, and findings now indicate that when it comes to alcohol consumption, there is no safe amount that does not affect health.    Binge Drinking:  NIAAA defines binge drinking as a pattern of drinking alcohol that brings blood alcohol concentration (JUNE) to 0.08 percent - or 0.08 grams of alcohol per deciliter - or higher.  For a typical adult, this pattern corresponds to consuming 5 or more drinks (male), or 4 or more drinks (female), in about 2 hours.    The Substance Abuse and Mental Health Services Administration (SAMHSA), which conducts the annual National Survey on Drug Use and Health (NSDUH), defines binge drinking as 5 or more alcoholic drinks for males or 4 or more alcoholic drinks for females on the same occasion (i.e., at the same time or within a couple of hours of each other) on at least 1 day in the past month.    Heavy Alcohol Use:  NIAAA defines heavy drinking as follows:  - For men, consuming more than 4 drinks on any day or more than 14 drinks per week.  - For women, consuming more than 3 drinks on any day or more than 7 drinks per week.     [facility] defines heavy alcohol use as binge drinking on 5 or more days in the past month.    Patterns of Drinking Associated with Alcohol Use Disorder:  Binge drinking and heavy alcohol use can " increase an individual's risk of alcohol use disorder.    Certain people should avoid alcohol completely, including those who:  - Plan to drive or operate machinery, or participate in activities that require skill, coordination, and alertness.  - Take certain over-the-counter or prescription medications.  - Have certain medical conditions.  - Are recovering from alcohol use disorder or are unable to control the amount that they drink.  - Are younger than age 21.  - Are pregnant or may become pregnant.    U.S. Standard Drink  12 oz beer   (5% ABV) 8 oz malt liquor   (7% ABV) 5 oz wine   (12% ABV) 1.5 oz 80-proof distilled spirit  (40% ABV)        Heroin use harm reduction:  1. Carry naloxone. When using heroin, make sure you have at least one dose of naloxone - the overdose reversal drug - and have it in plain view. Understand how to give it.  2. Try a small dose first. It is best to first try a small amount of the heroin to check the effect.  3. Dont use heroin alone. Always use heroin with someone else and take turns while using.    It is possible to overdose with heroin whether you are snorting, injecting or using it in another form.    Signs of an overdose or emergency:   - The person is awake but unable to talk.  - Their body is limp.  - Their breathing is shallow or slow or stopped.  - Their skin is pale, ashen or clammy/sweaty.  - They are unconscious.    In case of emergency, give naloxone. If you suspect the heroin may contain fentanyl, administer more than one dose. Seek medical help even if naloxone has been given. Call 911 for help.      ADDICTION & RECOVERY:     [x] Addiction is a chronic medical illness, and as such, requires treatment through the lifespan  [x] Individuals with a history of alcohol or drug use disorder should maintain sobriety and a recovery lifestyle, as failure to do so can lead to relapse and progression of illness  [x] As part of a recovery lifestyle, it is advised to routinely  attend mutual self-help groups (12 step or equivalent) and to work with a sponsor  [x] For those with a history of alcohol or drug use disorder, it is important that all members of your treatment team - regardless of specialty - are fully apprised of your history and recovery plan moving forward.    For those struggling with active addiction, [facility] is an intensive outpatient addiction rehabilitation program located at [facility] - please call [phone] to speak with an  about enrolling in the program.      ADHD TREATMENT AND STIMULANT MEDICATIONS:     [facility] Prescription Stimulants Drug Facts  CMS Stimulant and Related Medications: Use in Adults  FANNY Drug Fact Sheets: Stimulants  FDA Drug Safety Communication: Stimulants  [facility] ADHD  WebMD ADHD Medications and Side Effects  [facility]: ADHD Medication      SHARED DECISION MAKING & INFORMED CONSENT:     Shared medical decision making and informed consent are the hallmark and bedrock of excellent clinical care.  During the encounter, shared medical decision making was employed and informed consent was obtained, to the degree possible, whenever feasible, appropriate and relevant. Those interventions are supplemented here with written materials, detailing the topics in more depth.       PSYCHOEDUCATION:     Psychoeducation pertaining to the following -     Diagnosis Etiology Disease Processes Natural Progression   Treatment Options Time Course Safety Netting Informed Consent   Intended Benefits of Medication Expectable Adverse Effects Target Symptoms for Treatment Alternatives to Current Treatment   Shared   Decision Making Risk Mitigation Strategies Harm Reduction Techniques Associated Bio-Med Complications     - can be further discussed and reviewed (you can also access additional information through the provided resources in this document).      Effective communication is essential in order to engage in  shared medical decision making.  If you had difficulty understanding anything during your encounter or in this supplementary document, please contact your providers through the [facility] portal at [email or web address] or call [phone].     Ezequiel Dictionary  https://dictionary.ezequiel.org/us       It can be easy to miss, forget, or misremember important important information that was discussed during the session - especially when you're stressed, upset, or don't feel well.  If you or a representative have any additional questions, concerns, or topics to discuss - please contact your providers through the [facility] portal at [email or web address] or call [phone].    Memory Loss  https://www.O-RID.Ren[employer]/brain/memory-loss    Causes of Memory Loss  https://www.International Network for Outcomes Research(INOR)/what-causes-memory-loss-6848899    Memory loss: When to seek help  https://www.[facility].org/diseases-conditions/alzheimers-disease/in-depth/memory-loss/art-06677824    Memory, Forgetfulness, and Aging: What's Normal and What's Not?  https://www.rolan.nih.gov/health/memory-forgetfulness-and-aging-whats-normal-and-whats-not    Depression and Memory Loss  https://www.Via/health/depression/depression-and-memory-loss    The Relationship Between Anxiety and Memory Loss  https://www.[employer].[facility]/academics/blog-posts/the-relationship-between-anxiety-and-memory-loss     PRESCRIPTION DRUG MANAGEMENT:     Prescription Drug Management entails the following:  [x] The review, recommendation, or consideration without recommendation of medications during the encounter.  [x] Discussion (to the extent possible) with the patient and/or other interested parties of the diagnosis, target symptoms, intended outcomes, and possible benefits and risks of medication, as well as alternatives (including no treatment), if not otherwise known or stated prior.  [x] Discussion (to the extent possible) with the patient and/or other interested parties of  possible expectable adverse effects of any proposed individual psychotropic agents, as well as the inherent unpredictability of treatment, if not otherwise known or stated prior.  [x] Informed consent is sought from the patient (and/or guardian/designated decision maker, if applicable) after a thorough discussion (to the extent possible) of the aforementioned points outlined above.  [x] The provision of counseling (to the extent possible) to the patient and/or other interested parties on the importance of full compliance with any prescribed medication, if not otherwise known or stated prior.    Information on psychotropic medication can be found at:   National [place] of Mental Health: Information on Mental Health Medications      RISK MITIGATION, HARM REDUCTION & SAFETY NETTING:     Risk Mitigation Strategies, Harm Reduction Techniques, and Safety Netting are important interventions that can reduce acute and chronic risk.  As such, opportunities were sought to incorporate psychoeducation and practical advice pertaining to these topics into the encounter, to the degree possible, whenever feasible, appropriate and relevant.  Those interventions are supplemented here with written materials, detailing the topics in more depth.       RISK MITIGATION STRATEGIES:     Risk mitigation strategies are used to reduce the likelihood of future episodes of suicide, homicide, violence, and/or other problematic behaviors (e.g. self-injurious, risky, addictive, compulsive, impulsive). The following are examples of risk mitigation strategies which you can employ in order to reduce your overall burden of risk.     [x] Treatment of underlying psychopathology driving acute and chronic risk to the extent possible.  [x] Use of self administered rating scales and journaling to assist in risk tracking.  [x] Exploration of protective factors to potentially counterbalance risk.  [x] Identification and avoidance of triggers and situations  that increase risk, including excessive alcohol and drug use.  [x] Timely follow up and ongoing treatment of mental health issues moving forward.  [x] Full compliance with medication regimen.  [x] A good working knowledge of your medication regimen, including specific instructions on the administration of the medications.  [x] Consultation with an appropriate medical provider prior to altering or deviating from these instructions on your own.  [x] Active involvement and participation of family and natural support wherever feasible and possible.  [x] Development and review of coping strategies that can be immediately deployed in times of acute crisis.  [x] Implementation of home safety practices and the removal/reduction of access to lethal means (including, but not limited to, firearms, certain types and quantities of medication, poisons, or other methods you may have contemplated or identified).  [x] Collaborative development of a written safety plan with your treatment team and loved ones that can be immediately referred to in times of acute crisis.  [x] Utilization of a safety contract to engage your treatment team and further assess/manage risk.  [x] A good working knowledge of how to access emergency treatment in times of acute crisis.  [x] Utilization of suicide hotlines number (988) and resources in times of crisis.    If you require further information pertaining to the issues outlined above, please reach out to your providers through the [facility] portal at [email or web address], or call [phone] to discuss.  See resource list for additional material.      SAFETY NETTING:     In healthcare, safety netting refers to the provision of information to help patients or carers identify the need to consult a health care professional if a health concern arises or changes.  The relevance of this advice is most obvious with chronic mental illnesses, as their dynamic nature, with symptoms and signs emerging at  different times and in different combinations, makes safety netting particularly important.  Specific safety net advice for you includes the following:    [x] The existence of uncertainty. Mental health diagnoses and conditions contain at least some degree of uncertainty - knowing this, you should feel empowered to reconsult if necessary.  [x] What exactly to look out for. Given the recognised risk of possible deterioration or the development of complications, you should become familiar with the specific clinical features (including red flags) to look out for.    [x] How exactly to seek further help. You should know how and where to seek further help if needed.  Make a plan in advance and keep it handy.  It's also a good idea to share the plan with your treatment providers and loved ones.  [x] What to expect about time course. Mental health diagnoses and conditions often have an expected time course, which is important information for you to know.  However, if your difficulties do not conform to this time line and concerns arise, do not delay seeking further medical advice.    If you require further information pertaining to the issues outlined above, please reach out to your providers through the [facility] portal at [email or web address], or call [phone] to discuss.  See resource list for additional material.      HARM REDUCTION:     Harm Reduction techniques are used in an effort to reduce negative consequences associated with risky and maladaptive behaviors, until cessation of the problematic behaviors can be established.  Harm reduction is best thought of as a journey and not a destination; it is not an endorsement of problematic behavior, but an acknowledgement and recognition of the step-by-step nature of recovery.      Although commonly employed in working with people who suffer with drug addiction, harm reduction can be more broadly applied to any problematic behavior.    Harm Reduction and Substance  Abuse:  [x] Incorporates a spectrum of strategies that includes safer use, managed use, abstinence, meeting people who use drugs where theyre at, and addressing conditions of use along with the use itself.  [x] Accepts, for better or worse, that licit and illicit drug use is part of our world and chooses to work to minimize its harmful effects rather than simply ignore or condemn them.  [x] Understands drug use as a complex, multi-faceted phenomenon that encompasses a continuum of behaviors from severe use to total abstinence, and acknowledges that some ways of using drugs are clearly safer than others.  [x] Calls for the non-judgmental, non-coercive provision of services and resources to people who use drugs and the communities in which they live in order to assist them in reducing attendant harm.  [x] Affirms people who use drugs themselves as the primary agents of reducing the harms of their drug use and seeks to empower them to share information and support each other in strategies which meet their actual conditions of use.  [x] Does not attempt to minimize or ignore the real and tragic harm and danger that can be associated with illicit drug use.  [x] Meets people where they are, but seeks to not leave them there.  [x] Examples of specific interventions include, but are not limited to, narcan (naloxone), medication assisted treatment, syringe access, overdose prevention, and safer drug use techniques.    Key Harm Reduction Strategies: Opioid Use Disorder  [x] Safe Injection Sites & Equipment  [x] Managed Use  [x] Syringe Exchange Programs  [x] Fentanyl Test Strips  [x] Pharmacotherapy/Medication Assisted Treatment  [x] Narcan  [x] Good Jehovah's witness Laws  [x] Treatment Instead of USP  [x] Diversion Programs  [x] Overdose Education  [x] Abstinence    Whether or not you struggle with substance abuse, any and all opportunities to employ harm reduction techniques to address difficult to change problematic  "behaviors should be sought and implemented - whenever and wherever feasible, relevant and applicable. Additionally, harm reduction techniques can be applied broadly, and are relevant for a multitude of situations - even those that do not involve problematic or maladaptive behaviors.     EXAMPLES OF HARM REDUCTION IN OTHER AREAS  SUN SCREEN SEAT BELTS SPEED LIMITS BIRTH CONTROL        If you require further information pertaining to the issues outlined above, please reach out to your providers through the [facility] portal at [email or web address], or call [phone] to discuss.  See resource list for additional material.      FIREARM SAFETY:     THE SIX BASIC GUN SAFETY RULES  There are six basic gun safety rules for gun owners to understand and practice at all times:  Treat all guns as if they are loaded. Always assume that a gun is loaded even if you think it is unloaded. Every time a gun is handled for any reason, check to see that it is unloaded. If you are unable to check a gun to see if it is unloaded, leave it alone and seek help from someone more knowledgeable about guns.  Keep the gun pointed in the safest possible direction. Always be aware of where a gun is pointing. A "safe direction" is one where an accidental discharge of the gun will not cause injury or damage. Only point a gun at an object you intend to shoot. Never point a gun toward yourself or another person.  Keep your finger off the trigger until you are ready to shoot. Always keep your finger off the trigger and outside the trigger guard until you are ready to shoot. Even though it may be comfortable to rest your finger on the trigger, it also is unsafe. If you are moving around with your finger on the trigger and stumble or fall, you could inadvertently pull the trigger. Sudden loud noises or movements can result in an accidental discharge because there is a natural tendency to tighten the muscles when startled. The trigger is for firing and " the handle is for handling.  Know your target, its surroundings and beyond. Check that the areas in front of and behind your target are safe before shooting. Be aware that if the bullet misses or completely passes through the target, it could strike a person or object. Identify the target and make sure it is what you intend to shoot. If you are in doubt, DON'T SHOOT! Never fire at a target that is only a movement, color, sound or unidentifiable shape. Be aware of all the people around you before you shoot.  Know how to properly operate your gun. It is important to become thoroughly familiar with your gun. You should know its mechanical characteristics including how to properly load, unload and clear a malfunction from your gun. Obviously, not all guns are mechanically the same. Never assume that what applies to one make or model is exactly applicable to another. You should direct questions regarding the operation of your gun to your firearms dealer, or contact the  directly.  Store your gun safely and securely to prevent unauthorized use. Guns and ammunition should be stored separately. When the gun is not in your hands, you must still think of safety. Use an approved firearms safety device on the gun, such as a trigger lock or cable lock, so it cannot be fired. Store it unloaded in a locked container, such as an approved lock box or a gun safe. Store your gun in a different location than the ammunition. For maximum safety you should use both a locking device and a storage container.    ADDITIONAL SAFETY POINTS  The six basic safety rules are the foundational rules for gun safety. However, there are additional safety points that must not be overlooked.  [x] Never handle a gun when you are in an emotional state such as anger or depression. Your judgment may be impaired. If you have acute or chronic suicidal ideation, a suicide plan, or suicidal intent, have firearms removed and your access restricted by a  "trusted loved one or other responsible individual or agency.  [x] Never shoot a gun in celebration (the Fourth of July or New Year's Maryjane, for example). Not only is this unsafe, but it is generally illegal. A bullet fired into the air will return to the ground with enough speed to cause injury or death.  [x] Do not shoot at water, flat or hard surfaces. The bullet can ricochet and hit someone or something other than the target.  [x] Hand your gun to someone only after you verify that it is unloaded and the cylinder or action is open. Take a gun from someone only after you verify that it is unloaded and the cylinder or action is open.  [x] Guns, alcohol and drugs don't mix. Alcohol and drugs can negatively affect judgment as well as physical coordination. Alcohol and any other substance likely to impair normal mental or physical functions should not be used before or while handling guns. Avoid handling and using your gun when you are taking medications that cause drowsiness or include a warning to not operate machinery while taking this drug.   [x] The loud noise from a fired gun can cause hearing damage, and the debris and hot gas that is often emitted can result in eye injury. Always wear ear and eye protection when shooting a gun.      GUNS AND CHILDREN - FIREARM OWNER RESPONSIBILITIES    You Cannot Be Too Careful with Children and Guns  [x] There is no such thing as being too careful with children and guns. Never assume that simply because a toddler may lack finger strength, they can't pull the trigger. A child's thumb has twice the strength of the other fingers. When a toddler's thumb "pushes" against a trigger, invariably the barrel of the gun is pointing directly at the child's face. NEVER leave a firearm lying around the house.  [x] Child safety precautions still apply even if you have no children or if your children have grown to adulthood and left home. A nephew, niece, neighbor's child or a grandchild may " "come to visit. Practice gun safety at all times.  [x] To prevent injury or death caused by improper storage of guns in a home where children are likely to be present, you should store all guns unloaded, lock them with a firearms safety device and store them in a locked container. Ammunition should be stored in a location separate from the gun.    Talking to Children About Guns  [x] Children are naturally curious about things they don't know about or think are "forbidden." When a child asks questions or begins to act out "gun play," you may want to address his or her curiosity by answering the questions as honestly and openly as possible. This will remove the mystery and reduce the natural curiosity. Also, it is important to remember to talk to children in a manner they can relate to and understand. This is very important, especially when teaching children about the difference between "real" and "make-believe." Let children know that, even though they may look the same, real guns are very different than toy guns. A real gun will hurt or kill someone who is shot.    Instill a Mind Set of Safety and Responsibility  [x] The American Academy of Pediatrics reports that adolescence is a highly vulnerable stage in life for teenagers struggling to develop traits of identity, independence and autonomy. Children, of course, are both naturally curious and innocently unaware of many dangers around them. Thus, adolescents as well as children may not be sufficiently safeguarded by cautionary words, however frequent. Contrary actions can completely undermine good advice. A "Do as I say and not as I do" approach to gun safety is both irresponsible and dangerous.  [x] Remember that actions speak louder than words. Children learn most by observing the adults around them. By practicing safe conduct you will also be teaching safe conduct.    Safety and Storage Devices  [x] If you decide to keep a firearm in your home you must consider " the issue of how to store the firearm in a safe and secure manner. There are a variety of safety and storage devices currently available to the public in a wide range of prices. Some devices are locking mechanisms designed to keep the firearm from being loaded or fired, but don't prevent the firearm from being handled or stolen. There are also locking storage containers that hold the firearm out of sight. For maximum safety you should use both a firearm safety device and a locking storage container to store your unloaded firearm.   Two of the most common locking mechanisms are trigger locks and cable locks. Trigger locks are typically two-piece devices that fit around the trigger and trigger guard to prevent access to the trigger. One side has a post that fits into a hole in the other side. They are locked by a key or combination locking mechanism. Cable locks typically work by looping a strong steel cable through the action of the firearm to block the firearm's operation and prevent accidental firing. However, neither trigger locks nor cable locks are designed to prevent access to the firearm.   [x] Smaller lock boxes and larger gun safes are two of the most common types of locking storage containers. One advantage of lock boxes and gun safes is that they are designed to completely prevent unintended handling and removal of a firearm. Lock boxes are generally constructed of sturdy, high-grade metal opened by either a key or combination lock. Gun safes are quite heavy, usually weighing at least 50 pounds. While gun safes are typically the most expensive firearm storage devices, they are generally more reliable and secure.     Remember: Safety and storage devices are only as secure as the precautions you take to protect the key or combination to the lock.    RULES FOR KIDS  Adults should be aware that a child could discover a gun when a parent or another adult is not present. This could happen in the child's own  home; the home of a neighbor, friend or relative; or in a public place such as a school or park. If this should happen, a child should know the following rules and be taught to practice them.   Stop  The first rule for a child to follow if he/she finds or sees a gun is to stop what he/she is doing.  Don't Touch!  The second rule is for a child not to touch a gun he/she finds or sees. A child may think the best thing to do if he/she finds a gun is to pick it up and take it to an adult. A child needs to know he/she should NEVER touch a gun he/she may find or see.  Leave the Area  The third rule is to immediately leave the area. This would include never taking a gun away from another child or trying to stop someone from using gun.  Tell an Adult  The last rule is for a child to tell an adult about the gun he/she has seen. This includes times when other kids are playing with or shooting a gun.     METHODS OF CHILDPROOFING YOUR FIREARM  As a responsible handgun owner, you must recognize the need and be aware of the methods of childproofing your handgun, whether or not you have children.  Whenever children could be around, whether your own, or a friend's, relative's or neighbor's, additional safety steps should be taken when storing firearms and ammunition in your home.  [x] Always store your firearm unloaded.  [x] Use a firearms safety device AND store the firearm in a locked container.  [x] Store the ammunition separately in a locked container.  Always storing your firearm securely is the best method of childproofing your firearm; however, your choice of a storage place can add another element of safety. Carefully choose the storage place in your home especially if children may be around.  [x] Do not store your firearm where it is visible.  [x] Do not store your firearm in a bedside table, under your mattress or pillow, or on a closet shelf.  [x] Do not store your firearm among your valuables (such as jewelry or  cameras) unless it is locked in a secure container.  [x] Consider storing firearms not possessed for self-defense in a safe and secure manner away from the home.    [employer] for Gun Safety:  https://www.everytown.org       Gun Violence: Prediction, Prevention and Policy  American Psychological Association Panel of Experts Report  https://www.apa.org/pubs/reports/gun-violence-report.pdf     If you require further information pertaining to any of the issues outlined above, please reach out to your providers through the [facility] portal at [email or web address], or call [phone] to discuss.  See resource list for additional material.      IN CASE OF SUICIDAL THINKING, call the [place] Suicide Hotline Number: 988    988 Suicide & Crisis Lifeline: 988 , 7-107-358-TALK (8255)  Provides 24/7, free and confidential support for people in distress, prevention and crisis resources for you or your loved ones, and best practices for professionals.    Call, text or chat.  https://[employer]           REFERRAL RECOMMENDATIONS FOR SUBSTANCE ABUSE & MENTAL HEALTH      IN CASE OF SUICIDAL THINKING, call the [place] Suicide Hotline Number: 988    988 Suicide & Crisis Lifeline: 988 , 0-405-386-DLCN (8255)  https://[employer]       SUBSTANCE ABUSE:     [facility] (formerly known as the ABU)  [x] [phone], Option 2  [x] [address], [place] [address]  [x] [email or web address]  [x] [facility] delivers comprehensive and collaborative treatment for alcohol and substance use disorders.  Excellent program for working professionals or anyone else seeking recovery.  [x] Requires insurance approval prior to starting program, call number above for more information.  [x] Intensive Outpatient Rehabilitation Program - M-F 9am-3pm - daily groups with psychologists and social workers, sessions with MDs 3x per week   [x] Ambulatory detox  and dual diagnosis available      SUBOXONE:     NOTE: some Suboxone clinics require their clients to participate in a structured program (such as an IOP) in order to be prescribed Suboxone.  Some clinics have a long waiting list.  Most of these clinics do not accept walk-in clients, so call first to to learn what must be done to get started on Suboxone.    [facility] - [phone] (can do Sublocade)  [address], [place] [address]    [facility]  [address], [place]  [phone]    [facility] - [phone] (can do Sublocade)  [address] [place] [address]    [facility]  [address] [place]  [phone]     [facility] (very short waiting list, may accept some walk-in's but call first if possible)  [address], [place] [id number] [phone]; [phone]    [facility]   [address] [place]    [phone]    [facility] (can usually be seen within a week but is cash only for appointment)  [address] [place] [address] [place], [place]    [facility] ([facility])  [address] [place]  [phone]    [facility] ([facility])  [address] [facility] [place] [id number] [phone]    [place], [place]:    [facility] - [address] [place] [address] - Tel: [phone]    Adeel Ann - [address] [place] [place] [address] - Tel: [phone]    Henrik Barraza - [address] [place] [address] - Tel: [phone]    Shahzad Tompkins - [address] [place] [address] - Tel: [phone]    Nj Castro - [address] [place] [address] - Tel: [phone]    [place], [place]:     Dr. Wilbur Loza and Dr. Rory Santiago - [address] [facility], [place] - Tel: [phone]    Dr. Brianda Corea - [address] [place] [address] [place] - Tel: [phone]    Dr. Bhupinder Blankenship - Tel: [phone]    Dr. Agustín Pitt [facility] - [phone]      METHADONE:     [facility] (the only  methadone clinic in the city, has two locations)  [x] [place] - [address] [place] [address], [phone]  [x] [facility] [place] [address], [phone]      12 STEP PROGRAMS (and similar):     Alcoholics Anonymous (local)  [x] [phone]  [x] www.[place].org for schedules for in-person and online meetings  [x] There are AA meetings throughout the day all over town  [x] AA costs nothing to attend; they pass a basket for donations but this is not required    Narcotics Anonymous  [x] [phone]  [x] www.noana.org  [x] There are NA meetings throughout the day all over [facility]  [x] NA costs nothing to attend; they pass a basket for donations but this is not required    Alcoholics Anonymous Online Intergroup (national)  [x] www.aa-intergroup.org  [x] Good resource for large, nation-wide meetings  [x] Can also attend smaller, local meetings in other cities  [x] Countless meetings all day and all night  [x] AA costs nothing to attend; they pass a basket for donations but this is not required    [employer] - 24/7 zoom meetings for women and coed - sign on anytime, anywhere!  [email or web address]    Online Intergroup of AA - [address] [facility] [place] [address] - 24/7 zoom meetings  [email or web address]-intergroup.org/[email or web address]    LOOKING FOR AN ALTERNATIVE TO 12 STEP PROGRAMS - check out:  [facility]: https://www.smartrecovery.org/about-us  [facility]: [email or web address]      DETOX UNITS (USUALLY 5-7 DAYS):     [facility]: [address], [place]  [phone], call first to ensure bed availability    [facility]: [address] [place]  [phone], Option 1, call first to ensure bed availability    [facility]: [address], [place]  [phone] (intake by appointment only)    [facility]: [address], [place]  [phone]      INTENSIVE OUTPATIENT PROGRAMS:     [facility] (formerly known  as the ABU)  [x] [phone], Option 2  [x] [address], [place] [place] 4th Floor, [facility] [address]  [x] [email or web address]  [x] [facility] delivers comprehensive and collaborative treatment for alcohol and substance use disorders.  Excellent program for working professionals or anyone else seeking recovery.  [x] Requires insurance approval prior to starting program, call number above for more information.  [x] Intensive Outpatient Rehabilitation Program - M-F 9am-3pm - daily groups with psychologists and social workers, sessions with MDs 3x per week   [x] Ambulatory detox and dual diagnosis available    [facility] Intensive [facility]patient Program  [x] [phone]  [x] [facility] (the clinic not on [facility] main [facility])  [x] Call number above for more info and to check insurance requirements    [facility]  [address] [place] [address] [phone]    [facility]:  [address] [facility], [address] [place], [place] [address]?, [phone]  [address] [facility]?, [place], [place] [address]?, [phone]    RESIDENTIAL REHABS (USUALLY 28 DAYS):     [facility]: [address], [phone]    [facility] Detox & Recovery Center: [address] [place] [address] , [facility]  [phone] (intake by appointment only)    [facility] (men only) [address], [facility], [phone]    [facility] (Female only) [address] [facility], [phone]    [facility]: [address], [facility], men's program [phone], women's program [phone]    [facility]: [address], [facility], [phone]    [facility]: [address], [place], [place], [phone]    [facility]: Men only, [phone], [address] [place]    [facility]: [address], [place], [place], [phone]    [facility]: [address] [place] [address],  [phone]  New Location: [address] [place] [address]  [address], [place] [address], [phone]    [facility]:   ?[address]?[place], [place] [address]?[phone]    Harish: [address], [place] [address], [phone]    [place]: [place], [place], [phone]     [facility]: [place], [place], [phone]    [facility]: [place], [phone]    [facility]: [place], [phone]    [place]: [place], [phone]    [facility]: [address] [facility], [place] [address], [phone]    COMMUNITY ADDICTION CLINICS:     [facility]: [address] [facility], [facility] B Susan [phone] -or- [address] [place] [address]    [facility] Addiction Recovery [place]: [address] [facility] [place]  [address]     [facility]: Clinics [phone]; Crisis [phone]    [facility]: [address] [place] [address]    [facility] Behavior[facility] Health Center: [address] [place] [address]    [facility]: [address] [place] [address],    [facility] Center: 2nd Floor [address] [place] [address]    [facility]: [address], [place] [address]    [facility], [address] [place], [place] [address]    [facility]: [address] [place] [address] [phone]  (serves youth 16-23 years old)    [facility]: [facility] [phone]    [facility]: [address] [facility], NERIS [phone]    [facility]: [address] [facility] [phone]    [facility]: [address] [facility], [address], [phone]     [facility]: [address] [facility] Ulises Carver, [phone], [phone]    RESOURCES IN OTHER [facility]:     [facility]: [facility]. [address]  "[address] [place], [phone], [phone]    [facility]: [address] [facility], [address], [phone]    [facility], [address] [place] [address], [phone]    [facility] Health: [address] [facility], [phone]    [facility], [facility], [address] [facility], [phone] ([facility])    [facility], [address] [facility], [phone] ([facility])    [facility] Health, [address], [address], [place], [phone] ([facility], and [facility])    [facility], [address] [facility], [phone] ([facility])    [facility] Hotline [phone], [phone]    [facility], [address] [facility], [facility], [address] [facility], [address], [facility] Behavioral [facility], [address] [facility] Behavioral [facility], 500 [facility]. [address]., [facility]al [facility], [address], [facility] Human [facility], [address], [place] [phone]    [facility] Human [facility], [address] [facility] [phone]    [facility], [address] [facility], [phone]    [facility] for Addiction Recovery, [address] [facility], [phone]      Qatari SPEAKING (en español):     Información de la reunión de Alcohólicos Anónimos  Dennis [facility], 10:00 am  Habla español  Esta reunión está abierta y cualquiera puede asistir.    Mauritanian speaking Alcoholics anonymous meetings:  El "Dennis Osseo AA Skype" es un dennis on line de Alcohólicos Anónimos en español. El dennis es latasha, gratuito y virtual a través de Skype Audio. El dennis funciona mediante aline llamada " grupal de voz, por lo que no se utiliza la videollamada, ni se pueden casi las imágenes o rostros de los participantes. Hace katrina años y medio abrimos el primer Dennis de AA por Bhavana en Kristyn, jhonny actualmente asisten personas desde [place], [place], [place], [place], [place], [place], [place], [place], [place], [place], [place] y [place], entre otros.    El dennis es muy útil para los alcohólicos que residen en lugares donde no se celebran reuniones de AA, o residen en lugares donde las reuniones de AA son un número limitado de días a la semana, o para aquellos compañeros que se hayan de viaje o que, por cualquier motivo, se hayan convalecientes y no pueden desplazarse. Todos los días nos reuniones a las 21:00 (hora española)    Podéis obtener más información sobre el dennis y ruben sesiones en la página web [email or web address]      MENTAL HEALTH:     [facility] Outpatient [facility]  [phone]    [facility] - General Psychiatry Intensive Outpatient Program  [facility] (formerly known as the [facility])  [phone], option 3    [facility] - Dual Diagnosis Intensive Outpatient Program  [facility] (formerly known as the [facility]BU)  [phone], option 2      [facility] MENTAL HEALTH CENTERS:     [facility]  (aka [facility], aka [facility])  Serves [facility], and [facility] residents.  Serves uninsured patients & those with Medicaid.  Main location: [address] [place] [address]  [phone]  Walk-in's available during regular business hours.  24/7 Crisis Line: [phone]    [facility]  (aka [facility], [facility])  Serves [place] residents.  Serves uninsured patients, those with Medicaid and some private plans.  Walk-in's available during regular business hours.  Primary care services  available as well.  [facility]: [address] [facility] [address] [place] [address];  [phone]  [place]: [address] [place] [address];  [phone]  24/7 Crisis Line: [phone]    [facility]  Serves uninsured patients & those with Medicaid, call for more info.  Primary care, pediatrics, HIV treatment, and dentistry services available as well.  Three locations.  [phone]    [facility] [place]?Corporate Office  Serves patients with Medicaid, Medicare, and private insurance  [address]  [place],?[place] [address]  [phone]    [facility]  Serves uninsured on a sliding scale, as well as Medicaid, Medicare, and private plans.  Eight locations around the [employer].  [phone]    [facility]  Serves uninsured patients & those with Medicaid, private insurances.  Primary care available as well.  [phone]  [address] [place] [address]    [facility]  Serves veterans who were honorably discharged.  [address] [place] [address]  [phone]  24/7 Veterans Crisis Line: [phone] (Press 1)    If you have private insurance and need to find a specialist, please contact your insurance network to request a list of providers covered by your benefits.      MENTAL HEALTH/ADDICTIVE DISORDERS:     AA ([phone]), NA ([phone])   National Suicide Prevention Lifeline- Call 1-465.968.2695 Available 24 hours everyday  [facility] 289-1934; Crisis Line 274-1718 - Call for options A-F:  Intensive Outpatient Treatment/ Day programs   [facility], please contact   [facility], please contact [phone] or [phone] to speak with an admissions counselor.  [facility] (Methadone Maintenance)   [address] [place] [address], [phone]  [address] [place] [address] [phone]  [facility], [address] [place] [address], [phone]  [place] [facility] [phone]  [facility] please contact [phone]  [facility], [address], 4th [place] [address] Phone: [phone]   [facility]cer  [place] Office: [address] [place] [address], [phone]  [place] Office: [address] [facility], [address], [place] [address], [phone]  [place] Office: [address] [place], [place] [address] [phone]    Outpatient Substance Abuse Treatment   [facility] Group (Methadone Maintenance)   [address] [place] [address], [phone]  [address] [place] [address] [phone]  [facility], [address] [address], [phone]  [facility]cer  [place] Office: [address] [address], [phone]  [place] Office: [address] [facility], [address], [place] [address], [phone]  [place] Office: [address] [facility] [place] [address] [phone]  [facility], [address] [place] [address] [phone]   [facility] Recovery, [address] [facility], [address], [phone]  [facility] Recover, [address] [place], [place] [phone]    Residential Substance Abuse Treatment   [facility] [address] [facility], [phone] or [phone]  [facility], 4[address] [facility], [phone]  [facility] (men only) [address] [place] [address], [place] [address], [phone]  [facility] at the [facility] (women only) [address] [place] [address] [phone]  [employer], [address] [place] [address] [phone]  [facility] (women only), intakes at 4[address] [facility], [phone]  [facility] (7-day program, $100, [address] Khanna, [phone], [phone], [phone])  [facility]  (Men only, [phone]), [address] (Vets*/Non-Vets)  [facility] (Men only, $400/month program fee) [address], [phone]  [facility] (Women over age 39 only), [address] [facility], [phone]    Out of Area:    [place], [address], [place], [place] [phone])  [facility] Recovery Program (men only), [address] [facility] [place] [address], [phone]  [facility], [address] [place], [place] ([phone])  [place], [facility] [address] [place], [place] ([phone])  [facility] Addiction [facility], [address] [facility], [phone]  [facility] (Women only, has to have mental illness, can be homeless or substance abuser), [phone]        DOMESTIC VIOLENCE RESOURCES:     Advocacy  [facility] (NOF[facility])  [address] [address]    [facility] ? [phone]  Services provided: emergency shelter, individual advocacy, information and referrals, group support, children's program, medical advocacy, forensic medical exams, primary care, legal assistance, counseling, safety planning, and caregiver support    [facility] HEALING AND EMPOWERMENT Max  Confidential location  [facility] ? [phone]  Services provided: short term emergency shelter, all services provided are free of charge    [facility] CENTERS FOR COMMUNITY ADVOCACY  Multiple locations in [place], [facility], [place], [facility], [place], and [facility] ([place], [place], and [place])    [place] ? [phone]  Services provided: emergency shelter, individual advocacy, information and referrals, group support, children's program, medical advocacy, legal assistance in obtaining restraining orders, counseling, safety planning, and caregiver support    [facility]   Emergency Shelter   [phone]  Emergency Services ,Legal and Financial Assistance Services ,Housing Services ,Support Services      [place] [facility] Children's detention   [phone]  Emergency Services ,Counseling Services , Housing Services ,Support Services ,Children's Services     WOMEN WITH A VISION  [address] [place] [address]  [place]AV ? [phone]  Services provided: advocacy, health education and supportive services, specializing in free healing services for marginalized groups, including LGBTQ individuals and sex workers    SEXUAL TRAUMA AWARENESS AND RESPONSE (STAR)  [address] [place] [address]    STAR ? (314) 133-STAR  Services provided: individual advocacy, information and referrals, group support, medical advocacy, legal assistance, counseling, and safety planning for survivors of sexual assault    [facility]  [address] [place] [address]  [facility] Forensic Program ? [phone]  Services provided: free forensic medical exams for sexual assault and domestic violence, which can be performed up to 5 days after an incident. It is not necessary to make a police report to receive a forensic medical exam    Legal  [facility]  [address] [address]  [facility] ? [phone]  Services provided: free emergency legal representation for survivors of doemstic violence residing in [facility]. Legal services may include temporary restraining orders, temporary child support, custody, and use of property    [facility] LEGAL SERVICES (LS)  [address] [address]  SLLS ? [phone]  Services provided: free legal representation for survivors of domestic violence residing in [facility]. Legal services may include temporary child support, custody, and divorce      HOTLINES:     [facility] DOMESTIC VIOLENCE HOTLINE  [phone]    Services provided: free and confidential hotline for victims and survivors of domestic violence. All calls will be routed to a domestic violence service provided in the  victim or survivor's area    NATIONAL HUMAN TRAFFICKING HOTLINE  ([phone]    Services provided: national anti-trafficking hotline serving victims and survivors of human trafficking. Provides information about local resources, and access to safe space to report tips, seek services, and ask for help    VIA LINK  211 or [phone]    Service provided: counselors can provide crisis counseling. Counselors can also provide information and referrals to programs which can help with needs such as food, shelter, medical care, financial assistance, mental health services, substance abuse treatment, senior services, childcare, and more      HOMELESS SHELTERS:      Homeless shelters  [facility]  Emergency shelter for individuals and families  [address] [facility]  [phone]  [facility]  Emergency shelter for men only  Meals daily 6am, 2pm, & 6pm  Clothing, case management M-F by appointment (ID/job/housing/legal assistance), mail  [address] [facility]  [phone]  [facility]  Emergency shelter for men  [address] Kathryn [facility]  [phone]  Emergency shelter for women  [address] [facility]  [phone]  Breakfast & lunch daily, dinner M-F  Case management, job counseling services   [facility]  Emergency shelter for teens and young adults up to 20yo  [address] [facility]  [phone]  [place] [facility]  Emergency shelter for women over 17yo and their kids  [address] [place] [address]  [phone]  [facility]  Day program, meals M-F 1PM (arrive early)  Showers, laundry, hygiene kits, showers, phones, , notary services, case management, ID assistance  [address] [facility]  [phone] M-F 8am-2:30pm  [facility]  Day program  MTWF 7:30am-3:30pm,  8:30am-3:30pm  Crisis intervention, employment assistance, food/clothing, hygiene kits, bus tokens, mail  [address] [facility]  [phone]  [facility]  Mobile outreach for homeless persons in  [facility]  [phone]  Healthcare for the Homeless  Primary healthcare, case management, dental services, TB placement  Call ahead  [address]  2nd Floor  [phone]  [facility] at the [facility]  Connects homeless people with their loved ones in other cities by providing transportation costs   [phone]      [place] RESOURCES:     [place] Mobile [facility] Health Crisis Response Team:    Region 12 ([place], [place], [place], and [facility]) ([facility])  [phone]      Outpatient Mental Health & Addiction Clinic Resources for both [facility] and [facility]:    [facility] Mental [facility] Resources  Website: www.[facility].org  Main Number: 401-167-8627    [facility] ([facility])  [address] ([facility]) [place] [address]  [phone]    [facility] ([facility])  [address] ([address] [facility] [place] [address]  [phone]    [facility]  [address] ([address]) [place] [address]  [phone]    [facility]  [address] ([address] [facility] [place] [place] [address]  [phone]      Addiction Treatment Resources for both [facility]:    [facility] (Detox, Residential, PHP, IOP, and Aftercare Programs)  [address] [place] [address]  [phone]    [facility] (Residential, IOP, Transitional Living, and Aftercare Programs)  [address] [place] [address]  [phone]    [facility] (Inpatient, Residential, Detox, IOP, Outpatient, and Aftercare Programs)  [address] [facility], [place] [address] [phone] or toll free at [phone]      Outpatient Mental Health Psychotherapy Resources for both [facility]:    Tena Duckworth, [facility]  [address]  [place] [address]  [phone]  Specialties: Depression, Anxiety, and Life Transitions    Katty Kohler, PhD  [address]  [place] [address]  [phone]  Specialties: Testing and Evaluation, Education and Learning Disabilities, and ADHD    Genevieve Chilel, [facility] [address] [place] [address]  [phone]  Specialties: Obsessive-Compulsive (OCD), Depression, and Relationship Issues    Yessenia Moore [facility] [address] [facility] [place] [address]  [phone]  Specialties: Trauma & PTSD, Mood Disorders, and Anxiety    Yessenia Rowell, PhD, [facility]  [facility] [address] [place] [address]  [phone]  Specialties: Family Conflict, Child, and Relationship Issues    Lynda Poole [facility] Counseling [place] [address] [phone]  Specialties: Anxiety, Depression, and Anger Management        IN CASE OF SUICIDAL THINKING, call the National Suicide Hotline Number: 988    988 Suicide & Crisis Lifeline: 988 , 6-698-305-TALK (8255)  Provides 24/7, free and confidential support for people in distress, prevention and crisis resources for you or your loved ones, and best practices for professionals.    Call, text or chat.  https://988DropShipline.org

## 2024-11-12 NOTE — ED NOTES
MD notified that pt haven't voided yet, advised to wait until pt more aroused to void in urinal before in and out cath.

## 2024-11-12 NOTE — ED NOTES
Pt refused to keep nasal cannula, educated on importance pt still denies. Will continue to monitor oxygen.

## 2024-11-12 NOTE — ED NOTES
Resumed care. Pt resting in bed, no concerns at this time. Sitter at bedside. Pure wick in place.

## 2024-11-19 ENCOUNTER — TELEPHONE (OUTPATIENT)
Facility: CLINIC | Age: 64
End: 2024-11-19
Payer: MEDICAID

## 2024-11-19 NOTE — TELEPHONE ENCOUNTER
----- Message from Med Assistant Lisa sent at 11/19/2024  1:53 PM CST -----  Contact: Giovanna with University phone [phone]  Please advise. Next available appointment is not until January. Do you want to double book?  ----- Message -----  From: Kyara Nolasco  Sent: 11/19/2024   9:00 AM CST  To: Leah Breen Staff    Calling to schedule an appointment for [facility]. Please call the patient to schedule. Thanks.

## 2024-11-19 NOTE — TELEPHONE ENCOUNTER
Called and spoke to pt daughter , pt daughter states she doesn't speak to her father no more but she will try and update him

## 2024-12-19 ENCOUNTER — TELEPHONE (OUTPATIENT)
Facility: CLINIC | Age: 64
End: 2024-12-19

## (undated) DEVICE — APPLICATOR CHLORAPREP ORN 26ML

## (undated) DEVICE — PACK ARTHROSCOPY W/ISO BAC

## (undated) DEVICE — BLANKET LOWER BODY 55.9X40.2IN

## (undated) DEVICE — CORD BIPOLAR 12 FOOT

## (undated) DEVICE — GAUZE SPONGE 4X4 12PLY

## (undated) DEVICE — DRAPE C-ARMOR EQUIPMENT COVER

## (undated) DEVICE — CANISTER SUCTION 2 LTR

## (undated) DEVICE — TOURNIQUET SB QC DP 18X4IN

## (undated) DEVICE — DRAPE STERI-DRAPE 1000 17X11IN

## (undated) DEVICE — LINER GLOVE POWDERFREE SZ 7

## (undated) DEVICE — SEE MEDLINE ITEM 156905

## (undated) DEVICE — CHLORAPREP 10.5 ML APPLICATOR

## (undated) DEVICE — DRAPE T THYROID STERILE

## (undated) DEVICE — SYS CLSR DERMABOND PRINEO 22CM

## (undated) DEVICE — SEALER AQUAMANTYS 2.3 BIPOLAR

## (undated) DEVICE — BLADE MILL BONE MEDIUM

## (undated) DEVICE — SUT VICRYL PLUS 0 CT1 18IN

## (undated) DEVICE — SUT VICRYL 2-0 8-18 CP-2

## (undated) DEVICE — BUR BONE CUT MICRO TPS 3X3.8MM

## (undated) DEVICE — 2.5 DRILL BIT

## (undated) DEVICE — BNDG COFLEX FOAM LF2 ST 4X5YD

## (undated) DEVICE — PINS SKULL ADULT MAYFIELD
Type: IMPLANTABLE DEVICE | Site: SCALP | Status: NON-FUNCTIONAL
Removed: 2022-10-12

## (undated) DEVICE — TUBE FRAZIER 5MM 2FT SOFT TIP

## (undated) DEVICE — STAPLER SKIN PROXIMATE WIDE

## (undated) DEVICE — BURR RND FLUT SFT TOUCH 2.0MM

## (undated) DEVICE — SUT STRATAFIX 0 PDS+ 22CM 9IN

## (undated) DEVICE — Device

## (undated) DEVICE — TAP SHORT SURGICAL NS 3.5MM

## (undated) DEVICE — CARTRIDGE OIL

## (undated) DEVICE — DRESSING MEPILEX BORDR AG 4X12

## (undated) DEVICE — UNDERGLOVES BIOGEL PI SIZE 8

## (undated) DEVICE — SOL NACL IRR 1000ML BTL

## (undated) DEVICE — DIFFUSER

## (undated) DEVICE — DURAPREP SURG SCRUB 26ML

## (undated) DEVICE — GLOVE SURGICAL LATEX SZ 7

## (undated) DEVICE — SUT MCRYL PLUS 4-0 PS2 27IN

## (undated) DEVICE — SUT STRATAFIX 1 PDS CT-1

## (undated) DEVICE — SUT 3/0 30IN ETHILON BLK M

## (undated) DEVICE — DRAPE C-ARM FOR MOBILE XRAY

## (undated) DEVICE — BUCKET PLASTER DISPOSABLE

## (undated) DEVICE — TOWEL OR DISP STRL BLUE 4/PK

## (undated) DEVICE — DRESSING GAUZE XEROFORM 5X9

## (undated) DEVICE — KWIRE

## (undated) DEVICE — GOWN NONREINF SET-IN SLV XL

## (undated) DEVICE — DRAPE C-ARM ELAS CLIP 42X120IN

## (undated) DEVICE — SUT STRATAFIX PDS PLUS VIO

## (undated) DEVICE — COVER PROXIMA MAYO STAND

## (undated) DEVICE — TAP SYMPHONY CORT FIX 4.5MM
Type: IMPLANTABLE DEVICE | Site: SPINE CERVICAL | Status: NON-FUNCTIONAL
Removed: 2022-12-07

## (undated) DEVICE — PAD PREP 50/CA

## (undated) DEVICE — ELECTRODE REM PLYHSV RETURN 9

## (undated) DEVICE — SEE MEDLINE ITEM 157166

## (undated) DEVICE — KIT SURGIFLO HEMOSTATIC MATRIX

## (undated) DEVICE — SUPPORT ULNA NERVE PROTECTOR

## (undated) DEVICE — BLADE CLIPPER NEURO / MDL 4411

## (undated) DEVICE — ROUTER TAPERED 2.3MM

## (undated) DEVICE — DEVICE PUSH PULL REDUC 4.5

## (undated) DEVICE — BANDAGE ELAS SOFTWRAP ST 4X5YD

## (undated) DEVICE — GLOVE SURGICAL LATEX SZ 8

## (undated) DEVICE — 2.8 DRILL BIT

## (undated) DEVICE — SUT VICRYL PLUS 0 CT1 36IN

## (undated) DEVICE — SYS PRINEO SKIN CLOSURE